# Patient Record
Sex: FEMALE | Race: WHITE | Employment: UNEMPLOYED | URBAN - METROPOLITAN AREA
[De-identification: names, ages, dates, MRNs, and addresses within clinical notes are randomized per-mention and may not be internally consistent; named-entity substitution may affect disease eponyms.]

---

## 2017-10-04 LAB
AVERAGE GLUCOSE: NORMAL
HBA1C MFR BLD: 10.1 %

## 2017-11-07 ENCOUNTER — OFFICE VISIT (OUTPATIENT)
Dept: FAMILY MEDICINE CLINIC | Age: 67
End: 2017-11-07

## 2017-11-07 VITALS
DIASTOLIC BLOOD PRESSURE: 68 MMHG | HEART RATE: 66 BPM | WEIGHT: 152.4 LBS | SYSTOLIC BLOOD PRESSURE: 138 MMHG | BODY MASS INDEX: 26.02 KG/M2 | OXYGEN SATURATION: 96 % | TEMPERATURE: 95.9 F | HEIGHT: 64 IN

## 2017-11-07 DIAGNOSIS — I10 ESSENTIAL HYPERTENSION: ICD-10-CM

## 2017-11-07 DIAGNOSIS — Z79.4 TYPE 2 DIABETES MELLITUS WITH DIABETIC NEUROPATHY, WITH LONG-TERM CURRENT USE OF INSULIN (HCC): Primary | ICD-10-CM

## 2017-11-07 DIAGNOSIS — F32.A DEPRESSION, UNSPECIFIED DEPRESSION TYPE: ICD-10-CM

## 2017-11-07 DIAGNOSIS — E11.40 TYPE 2 DIABETES MELLITUS WITH DIABETIC NEUROPATHY, WITH LONG-TERM CURRENT USE OF INSULIN (HCC): Primary | ICD-10-CM

## 2017-11-07 DIAGNOSIS — E03.9 ACQUIRED HYPOTHYROIDISM: ICD-10-CM

## 2017-11-07 DIAGNOSIS — Z76.89 ESTABLISHING CARE WITH NEW DOCTOR, ENCOUNTER FOR: ICD-10-CM

## 2017-11-07 DIAGNOSIS — Z85.3 PERSONAL HISTORY OF BREAST CANCER: ICD-10-CM

## 2017-11-07 PROBLEM — E78.5 HYPERLIPIDEMIA: Status: ACTIVE | Noted: 2017-11-07

## 2017-11-07 PROBLEM — E11.9 DM (DIABETES MELLITUS) (HCC): Status: ACTIVE | Noted: 2017-11-07

## 2017-11-07 PROCEDURE — 99214 OFFICE O/P EST MOD 30 MIN: CPT | Performed by: FAMILY MEDICINE

## 2017-11-07 RX ORDER — FERROUS SULFATE 325(65) MG
325 TABLET ORAL
COMMUNITY
End: 2022-07-15

## 2017-11-07 RX ORDER — LORAZEPAM 0.5 MG/1
TABLET ORAL
Qty: 90 TABLET | Refills: 1 | Status: SHIPPED | OUTPATIENT
Start: 2017-11-07 | End: 2018-05-11 | Stop reason: SDUPTHER

## 2017-11-07 RX ORDER — METFORMIN HYDROCHLORIDE 500 MG/1
2 TABLET, EXTENDED RELEASE ORAL
COMMUNITY
End: 2019-02-05 | Stop reason: SDUPTHER

## 2017-11-07 RX ORDER — RANITIDINE 150 MG/1
150 TABLET ORAL 2 TIMES DAILY
COMMUNITY
End: 2022-07-15

## 2017-11-07 RX ORDER — INSULIN ASPART 100 [IU]/ML
INJECTION, SOLUTION INTRAVENOUS; SUBCUTANEOUS
Status: ON HOLD | COMMUNITY
Start: 2017-08-28 | End: 2019-03-23 | Stop reason: SDUPTHER

## 2017-11-07 RX ORDER — LATANOPROST 50 UG/ML
SOLUTION/ DROPS OPHTHALMIC
COMMUNITY
Start: 2017-10-30 | End: 2019-09-09

## 2017-11-07 RX ORDER — INSULIN GLARGINE 100 [IU]/ML
INJECTION, SOLUTION SUBCUTANEOUS
COMMUNITY
Start: 2017-10-30 | End: 2018-11-30 | Stop reason: ALTCHOICE

## 2017-11-07 RX ORDER — LORAZEPAM 0.5 MG/1
TABLET ORAL
COMMUNITY
Start: 2017-09-18 | End: 2017-11-07 | Stop reason: SDUPTHER

## 2017-11-07 RX ORDER — LOVASTATIN 40 MG/1
40 TABLET ORAL
COMMUNITY
Start: 2017-08-09 | End: 2017-11-07 | Stop reason: SDUPTHER

## 2017-11-07 RX ORDER — GABAPENTIN 100 MG/1
CAPSULE ORAL
COMMUNITY
Start: 2017-08-14 | End: 2017-11-07 | Stop reason: SDUPTHER

## 2017-11-07 RX ORDER — TRAMADOL HYDROCHLORIDE 50 MG/1
50 TABLET ORAL EVERY 6 HOURS PRN
COMMUNITY
End: 2018-05-14 | Stop reason: SDUPTHER

## 2017-11-07 RX ORDER — LEVOTHYROXINE SODIUM 0.07 MG/1
TABLET ORAL
COMMUNITY
Start: 2017-10-01 | End: 2018-11-30 | Stop reason: SDUPTHER

## 2017-11-07 RX ORDER — LISINOPRIL 10 MG/1
TABLET ORAL
COMMUNITY
Start: 2017-08-22 | End: 2017-11-07 | Stop reason: SDUPTHER

## 2017-11-07 RX ORDER — ASPIRIN 81 MG
TABLET, DELAYED RELEASE (ENTERIC COATED) ORAL
Status: ON HOLD | COMMUNITY
End: 2022-07-15 | Stop reason: HOSPADM

## 2017-11-07 RX ORDER — GABAPENTIN 100 MG/1
100 CAPSULE ORAL 3 TIMES DAILY
Qty: 270 CAPSULE | Refills: 1 | Status: SHIPPED | OUTPATIENT
Start: 2017-11-07 | End: 2018-05-11 | Stop reason: SDUPTHER

## 2017-11-07 RX ORDER — LOVASTATIN 40 MG/1
40 TABLET ORAL NIGHTLY
Qty: 90 TABLET | Refills: 1 | Status: SHIPPED | OUTPATIENT
Start: 2017-11-07 | End: 2018-05-11 | Stop reason: SDUPTHER

## 2017-11-07 RX ORDER — LISINOPRIL 10 MG/1
10 TABLET ORAL DAILY
Qty: 90 TABLET | Refills: 1 | Status: SHIPPED | OUTPATIENT
Start: 2017-11-07 | End: 2018-05-11 | Stop reason: SDUPTHER

## 2017-11-07 ASSESSMENT — PATIENT HEALTH QUESTIONNAIRE - PHQ9
1. LITTLE INTEREST OR PLEASURE IN DOING THINGS: 0
SUM OF ALL RESPONSES TO PHQ9 QUESTIONS 1 & 2: 1
2. FEELING DOWN, DEPRESSED OR HOPELESS: 1
SUM OF ALL RESPONSES TO PHQ QUESTIONS 1-9: 1

## 2017-11-07 ASSESSMENT — ENCOUNTER SYMPTOMS: SHORTNESS OF BREATH: 0

## 2017-11-07 NOTE — PROGRESS NOTES
Subjective:      Patient ID: Natacha Stafford is a 79 y.o. female. Prince Quiroz is here to re-establish care. She continues to see Dr. Richard Saenz for DM, lipids, and thyroid. DM/Lipids/Thyroid  DM is doing better, she is controlling it better. Thyroid is being worked on also, she expects an increase in her dose of med in 2 weeks with labs. Overall she is very confident in Dr. Richard Saenz and is accepting the DM and working with it now. Depression  Chronic. No meds. Dr. Richard Saenz tried [place], but she didn't tolerate it. She reports this is better, but still has symptoms. Review of Systems   Respiratory: Negative for shortness of breath. Cardiovascular: Negative for chest pain. Objective:   Physical Exam   Constitutional: She is oriented to person, place, and time. She appears well-developed and well-nourished. No distress. Cardiovascular: Normal rate and regular rhythm. Pulmonary/Chest: Effort normal and breath sounds normal. She has no wheezes. She has no rales. Neurological: She is alert and oriented to person, place, and time. Psychiatric: She has a normal mood and affect. Nursing note and vitals reviewed. Assessment:      1. Type 2 diabetes mellitus with diabetic neuropathy, with long-term current use of insulin ([address]     2. Acquired hypothyroidism     3. Depression, unspecified depression type     4. Essential hypertension     5. Personal history of breast cancer     6. Establishing care with new doctor, encounter for            Plan:      1 & 2. Continue excellent care with Dr. Richard Saenz. She follows up with him in about 1 month. Continue meds. Continue to work on diet and exercise. 3. This is doing better, despite not being on medications. Prince Quiroz runs a fine line of being controlled and out of control with her depressive symptoms. We will work on reducing her stress and increasing balance in her life to see if this gives her more stability in her symptom control.  She has found some relief in the Relaxing Deep Breath. Will add breath observation to that. Will also have her work on regular enjoyable exercise, mental de-stressing, and the biggest one for Jodie Montes is taking time for herself. Her symptoms do get worse in the winter so will have her look into a light/photo box or sun lamp. Breath Observation - at least 5 minutes daily. Sit in a comfortable position with your back straight and your eyes lightly closed. Keep clothing loose. Follow the contour of your breathing cycle from inhalation through exhalation, paying attention to when it changes from one to another. 4. Controlled. Continue meds. Work on diet. Work on exercise. Will work on inflammation via supplements. Start on the following vitamin regimen, each weeks builds on the previous. Week 1: Vit C 2959-6098 mg at breakfast, dinner and bedtime. Week 2: Mixed Carotene once daily - at least 25,000 IU of beta carotene. Must contain Licopene. Week 3:  Vit E 400-800 IU. Selenium 200-300 mcg. Take with the largest meal of the day. 5. 10 years disease free. She is happy with that, still has the concern in her head. Follows with Onc as directed. 6. Visit to re-establish care.      Follow up 3 months: diet, lifestyle mods, depression

## 2017-11-15 ENCOUNTER — TELEPHONE (OUTPATIENT)
Dept: FAMILY MEDICINE CLINIC | Age: 67
End: 2017-11-15

## 2017-11-15 NOTE — TELEPHONE ENCOUNTER
Patient's  called and stated the patient has been vomiting all afternoon. Patient is diabetic  And her blood sugar is 280. I spoke with Dr. Shirleen Blizzard and he wanted the patient to only do clear liquids in small amounts. Patient was instructed to do 2 tablespoons of clear liquids every 30 minutes and is she becomes not able to do that then she was informed she needed to go to the ED.  Patient's  confirmed understanding

## 2017-12-01 ENCOUNTER — OFFICE VISIT (OUTPATIENT)
Dept: FAMILY MEDICINE CLINIC | Age: 67
End: 2017-12-01

## 2017-12-01 VITALS
OXYGEN SATURATION: 98 % | DIASTOLIC BLOOD PRESSURE: 82 MMHG | WEIGHT: 149.8 LBS | BODY MASS INDEX: 25.71 KG/M2 | HEART RATE: 106 BPM | SYSTOLIC BLOOD PRESSURE: 148 MMHG | TEMPERATURE: 96.5 F

## 2017-12-01 DIAGNOSIS — J01.40 ACUTE NON-RECURRENT PANSINUSITIS: Primary | ICD-10-CM

## 2017-12-01 PROCEDURE — G8419 CALC BMI OUT NRM PARAM NOF/U: HCPCS | Performed by: FAMILY MEDICINE

## 2017-12-01 PROCEDURE — G8427 DOCREV CUR MEDS BY ELIG CLIN: HCPCS | Performed by: FAMILY MEDICINE

## 2017-12-01 PROCEDURE — 3014F SCREEN MAMMO DOC REV: CPT | Performed by: FAMILY MEDICINE

## 2017-12-01 PROCEDURE — G8484 FLU IMMUNIZE NO ADMIN: HCPCS | Performed by: FAMILY MEDICINE

## 2017-12-01 PROCEDURE — 1123F ACP DISCUSS/DSCN MKR DOCD: CPT | Performed by: FAMILY MEDICINE

## 2017-12-01 PROCEDURE — 1090F PRES/ABSN URINE INCON ASSESS: CPT | Performed by: FAMILY MEDICINE

## 2017-12-01 PROCEDURE — 3017F COLORECTAL CA SCREEN DOC REV: CPT | Performed by: FAMILY MEDICINE

## 2017-12-01 PROCEDURE — G8400 PT W/DXA NO RESULTS DOC: HCPCS | Performed by: FAMILY MEDICINE

## 2017-12-01 PROCEDURE — 4040F PNEUMOC VAC/ADMIN/RCVD: CPT | Performed by: FAMILY MEDICINE

## 2017-12-01 PROCEDURE — 99213 OFFICE O/P EST LOW 20 MIN: CPT | Performed by: FAMILY MEDICINE

## 2017-12-01 PROCEDURE — 1036F TOBACCO NON-USER: CPT | Performed by: FAMILY MEDICINE

## 2017-12-01 RX ORDER — AMOXICILLIN 500 MG/1
500 CAPSULE ORAL 2 TIMES DAILY
Qty: 20 CAPSULE | Refills: 0 | Status: SHIPPED | OUTPATIENT
Start: 2017-12-01 | End: 2017-12-11 | Stop reason: ALTCHOICE

## 2017-12-01 RX ORDER — ASCORBIC ACID 500 MG
1000 TABLET ORAL 3 TIMES DAILY
COMMUNITY
End: 2019-09-09

## 2017-12-01 RX ORDER — VITAMIN E 268 MG
800 CAPSULE ORAL DAILY
COMMUNITY
End: 2019-09-09

## 2017-12-01 RX ORDER — CALCIUM CARBONATE 500(1250)
500 TABLET ORAL DAILY
COMMUNITY
End: 2022-07-15

## 2017-12-01 RX ORDER — SELENIUM 100 MCG
300 TABLET ORAL DAILY
COMMUNITY
End: 2019-09-09

## 2017-12-01 RX ORDER — BETA-CAROTENE 7500 MCG
25000 CAPSULE ORAL DAILY
COMMUNITY
End: 2019-09-09

## 2017-12-01 ASSESSMENT — ENCOUNTER SYMPTOMS
DIARRHEA: 0
HOARSE VOICE: 1
SINUS PRESSURE: 1
COUGH: 1
VOMITING: 0

## 2017-12-08 ENCOUNTER — TELEPHONE (OUTPATIENT)
Dept: FAMILY MEDICINE CLINIC | Age: 67
End: 2017-12-08

## 2017-12-11 ENCOUNTER — TELEPHONE (OUTPATIENT)
Dept: FAMILY MEDICINE CLINIC | Age: 67
End: 2017-12-11

## 2017-12-11 RX ORDER — AZITHROMYCIN 250 MG/1
TABLET, FILM COATED ORAL
Qty: 1 PACKET | Refills: 0 | Status: SHIPPED | OUTPATIENT
Start: 2017-12-11 | End: 2017-12-21

## 2017-12-11 NOTE — TELEPHONE ENCOUNTER
Will try zithromax in its place. If she is not better after 10 more days, then she needs to contact us and be seen. Script(s) sent.

## 2017-12-11 NOTE — TELEPHONE ENCOUNTER
Pt called left a message stating she still is not feeling any better she still has the cough and drainage. Pt wanted to know if you could call in a different antibiotic or does she need to be seen again.   Please advise

## 2017-12-11 NOTE — TELEPHONE ENCOUNTER
Pt notified and wanted to know if the rx is only for 5 days or will she be on it for the whole 10 days

## 2017-12-11 NOTE — TELEPHONE ENCOUNTER
Zithromax is taken for 5 days, but stays in her body for 10 days, so she will be on the abx for 10 days.

## 2018-02-06 ENCOUNTER — OFFICE VISIT (OUTPATIENT)
Dept: FAMILY MEDICINE CLINIC | Age: 68
End: 2018-02-06

## 2018-02-06 VITALS
TEMPERATURE: 96.1 F | WEIGHT: 153.6 LBS | SYSTOLIC BLOOD PRESSURE: 136 MMHG | BODY MASS INDEX: 26.37 KG/M2 | OXYGEN SATURATION: 96 % | HEART RATE: 57 BPM | DIASTOLIC BLOOD PRESSURE: 82 MMHG

## 2018-02-06 DIAGNOSIS — E11.40 TYPE 2 DIABETES MELLITUS WITH DIABETIC NEUROPATHY, WITH LONG-TERM CURRENT USE OF INSULIN (HCC): Primary | ICD-10-CM

## 2018-02-06 DIAGNOSIS — Z79.4 TYPE 2 DIABETES MELLITUS WITH DIABETIC NEUROPATHY, WITH LONG-TERM CURRENT USE OF INSULIN (HCC): Primary | ICD-10-CM

## 2018-02-06 DIAGNOSIS — F32.A DEPRESSION, UNSPECIFIED DEPRESSION TYPE: ICD-10-CM

## 2018-02-06 PROCEDURE — G8484 FLU IMMUNIZE NO ADMIN: HCPCS | Performed by: FAMILY MEDICINE

## 2018-02-06 PROCEDURE — G8427 DOCREV CUR MEDS BY ELIG CLIN: HCPCS | Performed by: FAMILY MEDICINE

## 2018-02-06 PROCEDURE — 3014F SCREEN MAMMO DOC REV: CPT | Performed by: FAMILY MEDICINE

## 2018-02-06 PROCEDURE — 99214 OFFICE O/P EST MOD 30 MIN: CPT | Performed by: FAMILY MEDICINE

## 2018-02-06 PROCEDURE — G8400 PT W/DXA NO RESULTS DOC: HCPCS | Performed by: FAMILY MEDICINE

## 2018-02-06 PROCEDURE — 3017F COLORECTAL CA SCREEN DOC REV: CPT | Performed by: FAMILY MEDICINE

## 2018-02-06 PROCEDURE — G8419 CALC BMI OUT NRM PARAM NOF/U: HCPCS | Performed by: FAMILY MEDICINE

## 2018-02-06 PROCEDURE — 1090F PRES/ABSN URINE INCON ASSESS: CPT | Performed by: FAMILY MEDICINE

## 2018-02-06 PROCEDURE — 1123F ACP DISCUSS/DSCN MKR DOCD: CPT | Performed by: FAMILY MEDICINE

## 2018-02-06 PROCEDURE — 1036F TOBACCO NON-USER: CPT | Performed by: FAMILY MEDICINE

## 2018-02-06 PROCEDURE — 3046F HEMOGLOBIN A1C LEVEL >9.0%: CPT | Performed by: FAMILY MEDICINE

## 2018-02-06 PROCEDURE — 4040F PNEUMOC VAC/ADMIN/RCVD: CPT | Performed by: FAMILY MEDICINE

## 2018-02-06 ASSESSMENT — ENCOUNTER SYMPTOMS: SHORTNESS OF BREATH: 0

## 2018-02-28 LAB
ALBUMIN SERPL-MCNC: 4.5 G/DL
ALP BLD-CCNC: 59 U/L
ALT SERPL-CCNC: 32 U/L
ANION GAP SERPL CALCULATED.3IONS-SCNC: 1.3 MMOL/L
AST SERPL-CCNC: 34 U/L
BASOPHILS ABSOLUTE: 0 /ΜL
BASOPHILS RELATIVE PERCENT: 0.4 %
BILIRUB SERPL-MCNC: 0.3 MG/DL (ref 0.1–1.4)
BUN BLDV-MCNC: 19 MG/DL
CALCIUM SERPL-MCNC: 10 MG/DL
CHLORIDE BLD-SCNC: 98 MMOL/L
CHOLESTEROL, TOTAL: 156 MG/DL
CHOLESTEROL/HDL RATIO: NORMAL
CO2: 28 MMOL/L
CREAT SERPL-MCNC: 1.3 MG/DL
CREATININE, URINE: NORMAL
EOSINOPHILS ABSOLUTE: 0.1 /ΜL
EOSINOPHILS RELATIVE PERCENT: 1.6 %
GFR CALCULATED: 43
GLUCOSE BLD-MCNC: 28 MG/DL
HCT VFR BLD CALC: 35.4 % (ref 36–46)
HDLC SERPL-MCNC: 48 MG/DL (ref 35–70)
HEMOGLOBIN: 11.8 G/DL (ref 12–16)
LDL CHOLESTEROL CALCULATED: 72 MG/DL (ref 0–160)
LYMPHOCYTES ABSOLUTE: 1.7 /ΜL
LYMPHOCYTES RELATIVE PERCENT: 22.8 %
MCH RBC QN AUTO: 31.5 PG
MCHC RBC AUTO-ENTMCNC: 33.4 G/DL
MCV RBC AUTO: 94.3 FL
MICROALBUMIN/CREAT 24H UR: NORMAL MG/G{CREAT}
MICROALBUMIN/CREAT UR-RTO: NORMAL
MONOCYTES ABSOLUTE: 0.4 /ΜL
MONOCYTES RELATIVE PERCENT: 5.6 %
NEUTROPHILS ABSOLUTE: 5.3 /ΜL
NEUTROPHILS RELATIVE PERCENT: 69.6 %
PLATELET # BLD: 222 K/ΜL
PMV BLD AUTO: ABNORMAL FL
POTASSIUM SERPL-SCNC: 4.4 MMOL/L
RBC # BLD: 3.76 10^6/ΜL
SODIUM BLD-SCNC: 141 MMOL/L
T4 FREE: 1.58
TOTAL PROTEIN: 7.9
TRIGL SERPL-MCNC: 178 MG/DL
VLDLC SERPL CALC-MCNC: 36 MG/DL
WBC # BLD: 7.6 10^3/ML

## 2018-05-11 ENCOUNTER — OFFICE VISIT (OUTPATIENT)
Dept: FAMILY MEDICINE CLINIC | Age: 68
End: 2018-05-11

## 2018-05-11 VITALS
BODY MASS INDEX: 25.1 KG/M2 | DIASTOLIC BLOOD PRESSURE: 76 MMHG | WEIGHT: 146.2 LBS | TEMPERATURE: 97.6 F | OXYGEN SATURATION: 99 % | SYSTOLIC BLOOD PRESSURE: 142 MMHG | HEART RATE: 98 BPM

## 2018-05-11 DIAGNOSIS — I10 ESSENTIAL HYPERTENSION: Primary | ICD-10-CM

## 2018-05-11 DIAGNOSIS — F32.A DEPRESSION, UNSPECIFIED DEPRESSION TYPE: ICD-10-CM

## 2018-05-11 DIAGNOSIS — E78.2 MIXED HYPERLIPIDEMIA: ICD-10-CM

## 2018-05-11 DIAGNOSIS — E03.9 ACQUIRED HYPOTHYROIDISM: ICD-10-CM

## 2018-05-11 DIAGNOSIS — E11.40 TYPE 2 DIABETES MELLITUS WITH DIABETIC NEUROPATHY, WITH LONG-TERM CURRENT USE OF INSULIN (HCC): ICD-10-CM

## 2018-05-11 DIAGNOSIS — Z79.4 TYPE 2 DIABETES MELLITUS WITH DIABETIC NEUROPATHY, WITH LONG-TERM CURRENT USE OF INSULIN (HCC): ICD-10-CM

## 2018-05-11 PROCEDURE — 2022F DILAT RTA XM EVC RTNOPTHY: CPT | Performed by: FAMILY MEDICINE

## 2018-05-11 PROCEDURE — G8400 PT W/DXA NO RESULTS DOC: HCPCS | Performed by: FAMILY MEDICINE

## 2018-05-11 PROCEDURE — G8428 CUR MEDS NOT DOCUMENT: HCPCS | Performed by: FAMILY MEDICINE

## 2018-05-11 PROCEDURE — 1090F PRES/ABSN URINE INCON ASSESS: CPT | Performed by: FAMILY MEDICINE

## 2018-05-11 PROCEDURE — 3017F COLORECTAL CA SCREEN DOC REV: CPT | Performed by: FAMILY MEDICINE

## 2018-05-11 PROCEDURE — 3046F HEMOGLOBIN A1C LEVEL >9.0%: CPT | Performed by: FAMILY MEDICINE

## 2018-05-11 PROCEDURE — 4040F PNEUMOC VAC/ADMIN/RCVD: CPT | Performed by: FAMILY MEDICINE

## 2018-05-11 PROCEDURE — 99214 OFFICE O/P EST MOD 30 MIN: CPT | Performed by: FAMILY MEDICINE

## 2018-05-11 PROCEDURE — 1123F ACP DISCUSS/DSCN MKR DOCD: CPT | Performed by: FAMILY MEDICINE

## 2018-05-11 PROCEDURE — G8419 CALC BMI OUT NRM PARAM NOF/U: HCPCS | Performed by: FAMILY MEDICINE

## 2018-05-11 PROCEDURE — 1036F TOBACCO NON-USER: CPT | Performed by: FAMILY MEDICINE

## 2018-05-14 DIAGNOSIS — E11.42 DIABETIC POLYNEUROPATHY ASSOCIATED WITH TYPE 2 DIABETES MELLITUS (HCC): Primary | ICD-10-CM

## 2018-05-14 RX ORDER — TRAMADOL HYDROCHLORIDE 50 MG/1
50 TABLET ORAL EVERY 6 HOURS PRN
Qty: 50 TABLET | Refills: 0 | Status: SHIPPED | OUTPATIENT
Start: 2018-05-14 | End: 2018-07-13

## 2018-05-14 RX ORDER — LOVASTATIN 40 MG/1
40 TABLET ORAL NIGHTLY
Qty: 90 TABLET | Refills: 1 | Status: SHIPPED | OUTPATIENT
Start: 2018-05-14 | End: 2018-11-05 | Stop reason: SDUPTHER

## 2018-05-14 RX ORDER — GABAPENTIN 100 MG/1
100 CAPSULE ORAL 3 TIMES DAILY
Qty: 270 CAPSULE | Refills: 1 | Status: SHIPPED | OUTPATIENT
Start: 2018-05-14 | End: 2018-11-05 | Stop reason: SDUPTHER

## 2018-05-14 RX ORDER — LISINOPRIL 10 MG/1
10 TABLET ORAL DAILY
Qty: 90 TABLET | Refills: 1 | Status: SHIPPED | OUTPATIENT
Start: 2018-05-14 | End: 2018-11-30 | Stop reason: SDUPTHER

## 2018-05-14 RX ORDER — LORAZEPAM 0.5 MG/1
TABLET ORAL
Qty: 90 TABLET | Refills: 1 | Status: SHIPPED | OUTPATIENT
Start: 2018-05-14 | End: 2018-11-10

## 2018-05-14 ASSESSMENT — ENCOUNTER SYMPTOMS
COUGH: 0
SHORTNESS OF BREATH: 0

## 2018-07-03 ENCOUNTER — OFFICE VISIT (OUTPATIENT)
Dept: FAMILY MEDICINE CLINIC | Age: 68
End: 2018-07-03

## 2018-07-03 VITALS
SYSTOLIC BLOOD PRESSURE: 162 MMHG | TEMPERATURE: 96.5 F | WEIGHT: 147.2 LBS | HEART RATE: 100 BPM | BODY MASS INDEX: 25.27 KG/M2 | OXYGEN SATURATION: 98 % | DIASTOLIC BLOOD PRESSURE: 90 MMHG

## 2018-07-03 DIAGNOSIS — K62.89 PERIANAL PAIN: ICD-10-CM

## 2018-07-03 DIAGNOSIS — R22.9 PERINEAL LUMP: Primary | ICD-10-CM

## 2018-07-03 PROCEDURE — 1123F ACP DISCUSS/DSCN MKR DOCD: CPT | Performed by: FAMILY MEDICINE

## 2018-07-03 PROCEDURE — 99213 OFFICE O/P EST LOW 20 MIN: CPT | Performed by: FAMILY MEDICINE

## 2018-07-03 PROCEDURE — 3017F COLORECTAL CA SCREEN DOC REV: CPT | Performed by: FAMILY MEDICINE

## 2018-07-03 PROCEDURE — G8419 CALC BMI OUT NRM PARAM NOF/U: HCPCS | Performed by: FAMILY MEDICINE

## 2018-07-03 PROCEDURE — 1090F PRES/ABSN URINE INCON ASSESS: CPT | Performed by: FAMILY MEDICINE

## 2018-07-03 PROCEDURE — 1036F TOBACCO NON-USER: CPT | Performed by: FAMILY MEDICINE

## 2018-07-03 PROCEDURE — G8428 CUR MEDS NOT DOCUMENT: HCPCS | Performed by: FAMILY MEDICINE

## 2018-07-03 PROCEDURE — G8400 PT W/DXA NO RESULTS DOC: HCPCS | Performed by: FAMILY MEDICINE

## 2018-07-03 PROCEDURE — 4040F PNEUMOC VAC/ADMIN/RCVD: CPT | Performed by: FAMILY MEDICINE

## 2018-07-03 NOTE — PROGRESS NOTES
Subjective:      Patient ID: Sebastian Bajwa is a 76 y.o. female. Taisha Rebollar is here with concerns about a bug bite. Labial pain  Right labia pain for the last week. No injury. She thinks she may have been bitten by a bug. She has been placing neosporin on it with minimal improvement. She has only had 2 drops of bloody drainage. It is not getting bigger, hurting more or changing much. No fevers. Review of Systems   Constitutional: Negative for fever. Past Medical History:   Diagnosis Date    [facility]     Diabetes mellitus ([facility]     Glaucoma     Gout     Hyperlipidemia     Hypertension     [facility]     Thyroid disease      Past Surgical History:   Procedure Laterality Date     SECTION      MASTECTOMY, BILATERAL  10/26/2007     Social History     Social History    Marital status:      Spouse name: N/A    Number of children: N/A    Years of education: N/A     Occupational History    Not on file. Social History Main Topics    Smoking status: Never Smoker    Smokeless tobacco: Never Used    Alcohol use No    Drug use: No    Sexual activity: Not on file     Other Topics Concern    Not on file     Social History Narrative    No narrative on file       Current Outpatient Prescriptions:     lisinopril (PRINIVIL;ZESTRIL) 10 MG tablet, Take 1 tablet by mouth daily, Disp: 90 tablet, Rfl: 1    gabapentin (NEURONTIN) 100 MG capsule, Take 1 capsule by mouth 3 times daily for 180 days. ., Disp: 270 capsule, Rfl: 1    LORazepam (ATIVAN) 0.5 MG tablet, Take every night and one other time a day if needed. Indications: anxiety. , Disp: 90 tablet, Rfl: 1    lovastatin (MEVACOR) 40 MG tablet, Take 1 tablet by mouth nightly, Disp: 90 tablet, Rfl: 1    traMADol (ULTRAM) 50 MG tablet, Take 1 tablet by mouth every 6 hours as needed for Pain for up to 60 days. ., Disp: 50 tablet, Rfl: 0    vitamin C (ASCORBIC ACID) 500 MG tablet, Take 1,000 mg by mouth 3 times daily, Disp: ,

## 2018-11-05 DIAGNOSIS — E11.40 TYPE 2 DIABETES MELLITUS WITH DIABETIC NEUROPATHY, WITH LONG-TERM CURRENT USE OF INSULIN (HCC): ICD-10-CM

## 2018-11-05 DIAGNOSIS — Z79.4 TYPE 2 DIABETES MELLITUS WITH DIABETIC NEUROPATHY, WITH LONG-TERM CURRENT USE OF INSULIN (HCC): ICD-10-CM

## 2018-11-05 DIAGNOSIS — E78.2 MIXED HYPERLIPIDEMIA: ICD-10-CM

## 2018-11-05 RX ORDER — LOVASTATIN 40 MG/1
40 TABLET ORAL NIGHTLY
Qty: 90 TABLET | Refills: 1 | Status: SHIPPED | OUTPATIENT
Start: 2018-11-05 | End: 2019-05-07 | Stop reason: SDUPTHER

## 2018-11-05 RX ORDER — GABAPENTIN 100 MG/1
100 CAPSULE ORAL 3 TIMES DAILY
Qty: 270 CAPSULE | Refills: 1 | Status: SHIPPED | OUTPATIENT
Start: 2018-11-05 | End: 2019-05-07 | Stop reason: SDUPTHER

## 2018-11-05 NOTE — TELEPHONE ENCOUNTER
Pt called state she  Will be out of her gabapentin and lovastatin  Before her appointment and wanted to know if you could send in a refill.

## 2018-11-30 ENCOUNTER — OFFICE VISIT (OUTPATIENT)
Dept: FAMILY MEDICINE CLINIC | Age: 68
End: 2018-11-30
Payer: MEDICARE

## 2018-11-30 VITALS
TEMPERATURE: 98.1 F | BODY MASS INDEX: 26.02 KG/M2 | HEART RATE: 119 BPM | HEIGHT: 64 IN | OXYGEN SATURATION: 98 % | SYSTOLIC BLOOD PRESSURE: 160 MMHG | WEIGHT: 152.4 LBS | DIASTOLIC BLOOD PRESSURE: 86 MMHG

## 2018-11-30 DIAGNOSIS — E11.42 DIABETIC POLYNEUROPATHY ASSOCIATED WITH TYPE 2 DIABETES MELLITUS (HCC): ICD-10-CM

## 2018-11-30 DIAGNOSIS — E11.40 TYPE 2 DIABETES MELLITUS WITH DIABETIC NEUROPATHY, WITH LONG-TERM CURRENT USE OF INSULIN (HCC): Primary | ICD-10-CM

## 2018-11-30 DIAGNOSIS — F32.A DEPRESSION, UNSPECIFIED DEPRESSION TYPE: ICD-10-CM

## 2018-11-30 DIAGNOSIS — Z79.4 TYPE 2 DIABETES MELLITUS WITH DIABETIC NEUROPATHY, WITH LONG-TERM CURRENT USE OF INSULIN (HCC): Primary | ICD-10-CM

## 2018-11-30 DIAGNOSIS — I10 ESSENTIAL HYPERTENSION: ICD-10-CM

## 2018-11-30 DIAGNOSIS — E78.2 MIXED HYPERLIPIDEMIA: ICD-10-CM

## 2018-11-30 DIAGNOSIS — E03.9 ACQUIRED HYPOTHYROIDISM: ICD-10-CM

## 2018-11-30 LAB — HBA1C MFR BLD: 10.3 %

## 2018-11-30 PROCEDURE — 1090F PRES/ABSN URINE INCON ASSESS: CPT | Performed by: FAMILY MEDICINE

## 2018-11-30 PROCEDURE — G8400 PT W/DXA NO RESULTS DOC: HCPCS | Performed by: FAMILY MEDICINE

## 2018-11-30 PROCEDURE — 3046F HEMOGLOBIN A1C LEVEL >9.0%: CPT | Performed by: FAMILY MEDICINE

## 2018-11-30 PROCEDURE — 3017F COLORECTAL CA SCREEN DOC REV: CPT | Performed by: FAMILY MEDICINE

## 2018-11-30 PROCEDURE — 99214 OFFICE O/P EST MOD 30 MIN: CPT | Performed by: FAMILY MEDICINE

## 2018-11-30 PROCEDURE — 2022F DILAT RTA XM EVC RTNOPTHY: CPT | Performed by: FAMILY MEDICINE

## 2018-11-30 PROCEDURE — 83036 HEMOGLOBIN GLYCOSYLATED A1C: CPT | Performed by: FAMILY MEDICINE

## 2018-11-30 PROCEDURE — 1036F TOBACCO NON-USER: CPT | Performed by: FAMILY MEDICINE

## 2018-11-30 PROCEDURE — G8427 DOCREV CUR MEDS BY ELIG CLIN: HCPCS | Performed by: FAMILY MEDICINE

## 2018-11-30 PROCEDURE — 1123F ACP DISCUSS/DSCN MKR DOCD: CPT | Performed by: FAMILY MEDICINE

## 2018-11-30 PROCEDURE — G8419 CALC BMI OUT NRM PARAM NOF/U: HCPCS | Performed by: FAMILY MEDICINE

## 2018-11-30 PROCEDURE — 4040F PNEUMOC VAC/ADMIN/RCVD: CPT | Performed by: FAMILY MEDICINE

## 2018-11-30 PROCEDURE — G8484 FLU IMMUNIZE NO ADMIN: HCPCS | Performed by: FAMILY MEDICINE

## 2018-11-30 PROCEDURE — 1101F PT FALLS ASSESS-DOCD LE1/YR: CPT | Performed by: FAMILY MEDICINE

## 2018-11-30 RX ORDER — TRAMADOL HYDROCHLORIDE 50 MG/1
50 TABLET ORAL EVERY 6 HOURS PRN
Qty: 50 TABLET | Refills: 0 | Status: SHIPPED | OUTPATIENT
Start: 2018-11-30 | End: 2019-01-29

## 2018-11-30 RX ORDER — LISINOPRIL 10 MG/1
10 TABLET ORAL DAILY
Qty: 90 TABLET | Refills: 1 | Status: SHIPPED | OUTPATIENT
Start: 2018-11-30 | End: 2019-05-07 | Stop reason: SDUPTHER

## 2018-11-30 RX ORDER — ATOMOXETINE 40 MG/1
40 CAPSULE ORAL DAILY
Qty: 30 CAPSULE | Refills: 1 | Status: SHIPPED | OUTPATIENT
Start: 2018-11-30 | End: 2019-04-03 | Stop reason: ALTCHOICE

## 2018-11-30 RX ORDER — LEVOTHYROXINE SODIUM 88 UG/1
88 TABLET ORAL DAILY
Qty: 90 TABLET | Refills: 1 | Status: SHIPPED | OUTPATIENT
Start: 2018-11-30 | End: 2019-05-07 | Stop reason: SDUPTHER

## 2018-11-30 RX ORDER — LORAZEPAM 0.5 MG/1
TABLET ORAL
Qty: 90 TABLET | Refills: 1 | Status: SHIPPED | OUTPATIENT
Start: 2018-11-30 | End: 2019-05-07 | Stop reason: SDUPTHER

## 2018-11-30 ASSESSMENT — PATIENT HEALTH QUESTIONNAIRE - PHQ9
SUM OF ALL RESPONSES TO PHQ9 QUESTIONS 1 & 2: 0
SUM OF ALL RESPONSES TO PHQ QUESTIONS 1-9: 0
1. LITTLE INTEREST OR PLEASURE IN DOING THINGS: 0
SUM OF ALL RESPONSES TO PHQ QUESTIONS 1-9: 0
2. FEELING DOWN, DEPRESSED OR HOPELESS: 0

## 2018-11-30 ASSESSMENT — ENCOUNTER SYMPTOMS: SHORTNESS OF BREATH: 0

## 2018-12-03 ENCOUNTER — TELEPHONE (OUTPATIENT)
Dept: FAMILY MEDICINE CLINIC | Age: 68
End: 2018-12-03

## 2018-12-03 LAB
ALBUMIN SERPL-MCNC: 5.1 G/DL
ALP BLD-CCNC: 58 U/L
ALT SERPL-CCNC: 31 U/L
ANION GAP SERPL CALCULATED.3IONS-SCNC: 1.9 MMOL/L
AST SERPL-CCNC: 27 U/L
BILIRUB SERPL-MCNC: 0.3 MG/DL (ref 0.1–1.4)
BILIRUBIN, URINE: NEGATIVE
BLOOD, URINE: NEGATIVE
BUN BLDV-MCNC: 21 MG/DL
CALCIUM SERPL-MCNC: 10.3 MG/DL
CHLORIDE BLD-SCNC: 96 MMOL/L
CHOLESTEROL, TOTAL: 182 MG/DL
CHOLESTEROL/HDL RATIO: NORMAL
CLARITY: CLEAR
CO2: 27 MMOL/L
COLOR: YELLOW
CREAT SERPL-MCNC: 1.2 MG/DL
CREATININE, URINE: 159.9
GFR CALCULATED: 47
GLUCOSE BLD-MCNC: 223 MG/DL
GLUCOSE URINE: NORMAL
HDLC SERPL-MCNC: 50 MG/DL (ref 35–70)
KETONES, URINE: NEGATIVE
LDL CHOLESTEROL CALCULATED: 89 MG/DL (ref 0–160)
LEUKOCYTE ESTERASE, URINE: POSITIVE
MICROALBUMIN/CREAT 24H UR: 4050 MG/G{CREAT}
MICROALBUMIN/CREAT UR-RTO: 25
NITRITE, URINE: NEGATIVE
PH UA: 6 (ref 4.5–8)
POTASSIUM SERPL-SCNC: 4.8 MMOL/L
PROTEIN UA: NORMAL
SODIUM BLD-SCNC: 136 MMOL/L
SPECIFIC GRAVITY, URINE: 1.02
T4 FREE: 1.69
TOTAL PROTEIN: 7.8
TRIGL SERPL-MCNC: 217 MG/DL
TSH SERPL DL<=0.05 MIU/L-ACNC: 1.23 UIU/ML
UROBILINOGEN, URINE: NORMAL
VLDLC SERPL CALC-MCNC: 43 MG/DL

## 2019-02-01 ENCOUNTER — OFFICE VISIT (OUTPATIENT)
Dept: FAMILY MEDICINE CLINIC | Age: 69
End: 2019-02-01
Payer: MEDICARE

## 2019-02-01 VITALS
BODY MASS INDEX: 25.64 KG/M2 | TEMPERATURE: 98.4 F | OXYGEN SATURATION: 97 % | SYSTOLIC BLOOD PRESSURE: 122 MMHG | WEIGHT: 149.4 LBS | DIASTOLIC BLOOD PRESSURE: 72 MMHG | HEART RATE: 112 BPM

## 2019-02-01 DIAGNOSIS — R09.81 SINUS CONGESTION: Primary | ICD-10-CM

## 2019-02-01 DIAGNOSIS — R00.0 TACHYCARDIA: ICD-10-CM

## 2019-02-01 PROCEDURE — 4040F PNEUMOC VAC/ADMIN/RCVD: CPT | Performed by: FAMILY MEDICINE

## 2019-02-01 PROCEDURE — G8419 CALC BMI OUT NRM PARAM NOF/U: HCPCS | Performed by: FAMILY MEDICINE

## 2019-02-01 PROCEDURE — 1101F PT FALLS ASSESS-DOCD LE1/YR: CPT | Performed by: FAMILY MEDICINE

## 2019-02-01 PROCEDURE — 1123F ACP DISCUSS/DSCN MKR DOCD: CPT | Performed by: FAMILY MEDICINE

## 2019-02-01 PROCEDURE — 99213 OFFICE O/P EST LOW 20 MIN: CPT | Performed by: FAMILY MEDICINE

## 2019-02-01 PROCEDURE — 3017F COLORECTAL CA SCREEN DOC REV: CPT | Performed by: FAMILY MEDICINE

## 2019-02-01 PROCEDURE — G8427 DOCREV CUR MEDS BY ELIG CLIN: HCPCS | Performed by: FAMILY MEDICINE

## 2019-02-01 PROCEDURE — G8400 PT W/DXA NO RESULTS DOC: HCPCS | Performed by: FAMILY MEDICINE

## 2019-02-01 PROCEDURE — G8484 FLU IMMUNIZE NO ADMIN: HCPCS | Performed by: FAMILY MEDICINE

## 2019-02-01 PROCEDURE — 1036F TOBACCO NON-USER: CPT | Performed by: FAMILY MEDICINE

## 2019-02-01 PROCEDURE — 1090F PRES/ABSN URINE INCON ASSESS: CPT | Performed by: FAMILY MEDICINE

## 2019-02-01 RX ORDER — DEXTROMETHORPHAN HYDROBROMIDE AND PROMETHAZINE HYDROCHLORIDE 15; 6.25 MG/5ML; MG/5ML
5 SYRUP ORAL 4 TIMES DAILY PRN
Qty: 180 ML | Refills: 0 | Status: SHIPPED | OUTPATIENT
Start: 2019-02-01 | End: 2019-02-08

## 2019-02-01 ASSESSMENT — ENCOUNTER SYMPTOMS
SORE THROAT: 0
SINUS PRESSURE: 1
DIARRHEA: 1
VOMITING: 1
NAUSEA: 1
SINUS COMPLAINT: 1
CHEST TIGHTNESS: 0
RHINORRHEA: 1
SHORTNESS OF BREATH: 0
COUGH: 1
HOARSE VOICE: 1

## 2019-02-05 ENCOUNTER — OFFICE VISIT (OUTPATIENT)
Dept: FAMILY MEDICINE CLINIC | Age: 69
End: 2019-02-05
Payer: MEDICARE

## 2019-02-05 VITALS
BODY MASS INDEX: 25.92 KG/M2 | TEMPERATURE: 98.4 F | WEIGHT: 151 LBS | OXYGEN SATURATION: 98 % | DIASTOLIC BLOOD PRESSURE: 72 MMHG | SYSTOLIC BLOOD PRESSURE: 132 MMHG | HEART RATE: 107 BPM

## 2019-02-05 DIAGNOSIS — Z79.4 TYPE 2 DIABETES MELLITUS WITH DIABETIC NEUROPATHY, WITH LONG-TERM CURRENT USE OF INSULIN (HCC): Primary | ICD-10-CM

## 2019-02-05 DIAGNOSIS — E11.40 TYPE 2 DIABETES MELLITUS WITH DIABETIC NEUROPATHY, WITH LONG-TERM CURRENT USE OF INSULIN (HCC): Primary | ICD-10-CM

## 2019-02-05 DIAGNOSIS — Z71.3 DIETARY COUNSELING: ICD-10-CM

## 2019-02-05 PROCEDURE — G8400 PT W/DXA NO RESULTS DOC: HCPCS | Performed by: FAMILY MEDICINE

## 2019-02-05 PROCEDURE — G8427 DOCREV CUR MEDS BY ELIG CLIN: HCPCS | Performed by: FAMILY MEDICINE

## 2019-02-05 PROCEDURE — G8484 FLU IMMUNIZE NO ADMIN: HCPCS | Performed by: FAMILY MEDICINE

## 2019-02-05 PROCEDURE — G8419 CALC BMI OUT NRM PARAM NOF/U: HCPCS | Performed by: FAMILY MEDICINE

## 2019-02-05 PROCEDURE — 1036F TOBACCO NON-USER: CPT | Performed by: FAMILY MEDICINE

## 2019-02-05 PROCEDURE — 1101F PT FALLS ASSESS-DOCD LE1/YR: CPT | Performed by: FAMILY MEDICINE

## 2019-02-05 PROCEDURE — 4040F PNEUMOC VAC/ADMIN/RCVD: CPT | Performed by: FAMILY MEDICINE

## 2019-02-05 PROCEDURE — 1090F PRES/ABSN URINE INCON ASSESS: CPT | Performed by: FAMILY MEDICINE

## 2019-02-05 PROCEDURE — 99214 OFFICE O/P EST MOD 30 MIN: CPT | Performed by: FAMILY MEDICINE

## 2019-02-05 PROCEDURE — 2022F DILAT RTA XM EVC RTNOPTHY: CPT | Performed by: FAMILY MEDICINE

## 2019-02-05 PROCEDURE — 3017F COLORECTAL CA SCREEN DOC REV: CPT | Performed by: FAMILY MEDICINE

## 2019-02-05 PROCEDURE — 3046F HEMOGLOBIN A1C LEVEL >9.0%: CPT | Performed by: FAMILY MEDICINE

## 2019-02-05 PROCEDURE — 83036 HEMOGLOBIN GLYCOSYLATED A1C: CPT | Performed by: FAMILY MEDICINE

## 2019-02-05 PROCEDURE — 1123F ACP DISCUSS/DSCN MKR DOCD: CPT | Performed by: FAMILY MEDICINE

## 2019-02-05 RX ORDER — BRIMONIDINE TARTRATE 2 MG/ML
1 SOLUTION/ DROPS OPHTHALMIC EVERY 8 HOURS
Status: ON HOLD | COMMUNITY
End: 2019-03-21

## 2019-02-06 LAB — HBA1C MFR BLD: 9 %

## 2019-02-06 RX ORDER — METFORMIN HYDROCHLORIDE 500 MG/1
1000 TABLET, EXTENDED RELEASE ORAL 2 TIMES DAILY
Qty: 360 TABLET | Refills: 0 | Status: ON HOLD | OUTPATIENT
Start: 2019-02-06 | End: 2019-03-23 | Stop reason: SDUPTHER

## 2019-02-06 ASSESSMENT — ENCOUNTER SYMPTOMS: SHORTNESS OF BREATH: 0

## 2019-03-20 ENCOUNTER — TELEPHONE (OUTPATIENT)
Dept: FAMILY MEDICINE CLINIC | Age: 69
End: 2019-03-20

## 2019-03-20 ENCOUNTER — OFFICE VISIT (OUTPATIENT)
Dept: FAMILY MEDICINE CLINIC | Age: 69
End: 2019-03-20
Payer: MEDICARE

## 2019-03-20 VITALS
DIASTOLIC BLOOD PRESSURE: 90 MMHG | HEART RATE: 103 BPM | BODY MASS INDEX: 24.96 KG/M2 | SYSTOLIC BLOOD PRESSURE: 144 MMHG | OXYGEN SATURATION: 97 % | WEIGHT: 145.4 LBS | TEMPERATURE: 96.6 F

## 2019-03-20 DIAGNOSIS — R11.2 NON-INTRACTABLE VOMITING WITH NAUSEA, UNSPECIFIED VOMITING TYPE: ICD-10-CM

## 2019-03-20 DIAGNOSIS — R05.9 COUGH: ICD-10-CM

## 2019-03-20 DIAGNOSIS — R10.84 GENERALIZED ABDOMINAL PAIN: ICD-10-CM

## 2019-03-20 DIAGNOSIS — J34.89 NASAL DRAINAGE: ICD-10-CM

## 2019-03-20 DIAGNOSIS — J34.89 NASAL DRAINAGE: Primary | ICD-10-CM

## 2019-03-20 LAB
HAV IGM SER IA-ACNC: NORMAL
HEPATITIS B CORE IGM ANTIBODY: NORMAL
HEPATITIS B SURFACE ANTIGEN INTERPRETATION: NORMAL
HEPATITIS C ANTIBODY INTERPRETATION: NORMAL

## 2019-03-20 PROCEDURE — G8420 CALC BMI NORM PARAMETERS: HCPCS | Performed by: FAMILY MEDICINE

## 2019-03-20 PROCEDURE — G8484 FLU IMMUNIZE NO ADMIN: HCPCS | Performed by: FAMILY MEDICINE

## 2019-03-20 PROCEDURE — 4040F PNEUMOC VAC/ADMIN/RCVD: CPT | Performed by: FAMILY MEDICINE

## 2019-03-20 PROCEDURE — 1101F PT FALLS ASSESS-DOCD LE1/YR: CPT | Performed by: FAMILY MEDICINE

## 2019-03-20 PROCEDURE — 36415 COLL VENOUS BLD VENIPUNCTURE: CPT | Performed by: FAMILY MEDICINE

## 2019-03-20 PROCEDURE — 1036F TOBACCO NON-USER: CPT | Performed by: FAMILY MEDICINE

## 2019-03-20 PROCEDURE — 1090F PRES/ABSN URINE INCON ASSESS: CPT | Performed by: FAMILY MEDICINE

## 2019-03-20 PROCEDURE — 1123F ACP DISCUSS/DSCN MKR DOCD: CPT | Performed by: FAMILY MEDICINE

## 2019-03-20 PROCEDURE — 99213 OFFICE O/P EST LOW 20 MIN: CPT | Performed by: FAMILY MEDICINE

## 2019-03-20 PROCEDURE — 3017F COLORECTAL CA SCREEN DOC REV: CPT | Performed by: FAMILY MEDICINE

## 2019-03-20 PROCEDURE — G8400 PT W/DXA NO RESULTS DOC: HCPCS | Performed by: FAMILY MEDICINE

## 2019-03-20 PROCEDURE — G8427 DOCREV CUR MEDS BY ELIG CLIN: HCPCS | Performed by: FAMILY MEDICINE

## 2019-03-20 RX ORDER — LANCETS 33 GAUGE
EACH MISCELLANEOUS
Qty: 100 EACH | Refills: 5 | Status: ON HOLD | OUTPATIENT
Start: 2019-03-20 | End: 2019-03-22 | Stop reason: SDUPTHER

## 2019-03-20 RX ORDER — BROMPHENIRAMINE MALEATE, PSEUDOEPHEDRINE HYDROCHLORIDE, AND DEXTROMETHORPHAN HYDROBROMIDE 2; 30; 10 MG/5ML; MG/5ML; MG/5ML
5 SYRUP ORAL 4 TIMES DAILY PRN
Qty: 1 BOTTLE | Refills: 0 | Status: SHIPPED | OUTPATIENT
Start: 2019-03-20 | End: 2019-12-17

## 2019-03-20 RX ORDER — LANCETS 33 GAUGE
EACH MISCELLANEOUS
COMMUNITY
End: 2019-03-20 | Stop reason: SDUPTHER

## 2019-03-20 RX ORDER — ONDANSETRON 4 MG/1
4 TABLET, FILM COATED ORAL 3 TIMES DAILY PRN
Qty: 30 TABLET | Refills: 0 | Status: SHIPPED | OUTPATIENT
Start: 2019-03-20 | End: 2019-07-24

## 2019-03-20 RX ORDER — BROMPHENIRAMINE MALEATE, PSEUDOEPHEDRINE HYDROCHLORIDE, AND DEXTROMETHORPHAN HYDROBROMIDE 2; 30; 10 MG/5ML; MG/5ML; MG/5ML
5 SYRUP ORAL 4 TIMES DAILY PRN
Qty: 1 BOTTLE | Refills: 0 | COMMUNITY
Start: 2019-03-20 | End: 2019-03-20 | Stop reason: SDUPTHER

## 2019-03-20 ASSESSMENT — ENCOUNTER SYMPTOMS
SINUS PRESSURE: 1
SORE THROAT: 0
RHINORRHEA: 1
ABDOMINAL PAIN: 1
NAUSEA: 1
VOMITING: 1
BLOOD IN STOOL: 0
CONSTIPATION: 0
SHORTNESS OF BREATH: 0
DIARRHEA: 1
COUGH: 1
SINUS PAIN: 0

## 2019-03-21 ENCOUNTER — HOSPITAL ENCOUNTER (INPATIENT)
Age: 69
LOS: 2 days | Discharge: HOME | DRG: 392 | End: 2019-03-23
Attending: EMERGENCY MEDICINE | Admitting: INTERNAL MEDICINE
Payer: MEDICARE

## 2019-03-21 ENCOUNTER — APPOINTMENT (OUTPATIENT)
Dept: GENERAL RADIOLOGY | Age: 69
DRG: 392 | End: 2019-03-21
Payer: MEDICARE

## 2019-03-21 DIAGNOSIS — Z79.4 TYPE 2 DIABETES MELLITUS WITH DIABETIC NEUROPATHY, WITH LONG-TERM CURRENT USE OF INSULIN (HCC): ICD-10-CM

## 2019-03-21 DIAGNOSIS — R11.2 INTRACTABLE VOMITING WITH NAUSEA, UNSPECIFIED VOMITING TYPE: Primary | ICD-10-CM

## 2019-03-21 DIAGNOSIS — R73.9 HYPERGLYCEMIA: ICD-10-CM

## 2019-03-21 DIAGNOSIS — N17.9 AKI (ACUTE KIDNEY INJURY) (HCC): ICD-10-CM

## 2019-03-21 DIAGNOSIS — E11.40 TYPE 2 DIABETES MELLITUS WITH DIABETIC NEUROPATHY, WITH LONG-TERM CURRENT USE OF INSULIN (HCC): ICD-10-CM

## 2019-03-21 LAB
ADENOVIRUS DETECTION BY PCR: NOT DETECTED
ALBUMIN SERPL-MCNC: 4.5 GM/DL (ref 3.4–5)
ALP BLD-CCNC: 55 IU/L (ref 40–129)
ALT SERPL-CCNC: 45 U/L (ref 10–40)
ANION GAP SERPL CALCULATED.3IONS-SCNC: 18 MMOL/L (ref 4–16)
AST SERPL-CCNC: 35 IU/L (ref 15–37)
BACTERIA: NEGATIVE /HPF
BASE EXCESS MIXED: ABNORMAL (ref 0–2.3)
BASOPHILS ABSOLUTE: 0 K/CU MM
BASOPHILS RELATIVE PERCENT: 0.2 % (ref 0–1)
BETA-HYDROXYBUTYRATE: 17.3 MG/DL (ref 0–3)
BILIRUB SERPL-MCNC: 0.3 MG/DL (ref 0–1)
BILIRUBIN URINE: NEGATIVE MG/DL
BLOOD, URINE: NEGATIVE
BORDETELLA PERTUSSIS PCR: NOT DETECTED
BUN BLDV-MCNC: 37 MG/DL (ref 6–23)
CALCIUM SERPL-MCNC: 8.9 MG/DL (ref 8.3–10.6)
CARBON MONOXIDE, BLOOD: 1.9 % (ref 0–5)
CHLAMYDOPHILA PNEUMONIA PCR: NOT DETECTED
CHLORIDE BLD-SCNC: 93 MMOL/L (ref 99–110)
CLARITY: CLEAR
CO2 CONTENT: 30.2 MMOL/L (ref 19–24)
CO2: 27 MMOL/L (ref 21–32)
COLOR: YELLOW
COMMENT: ABNORMAL
CORONAVIRUS 229E PCR: NOT DETECTED
CORONAVIRUS HKU1 PCR: NOT DETECTED
CORONAVIRUS NL63 PCR: NOT DETECTED
CORONAVIRUS OC43 PCR: NOT DETECTED
CREAT SERPL-MCNC: 2 MG/DL (ref 0.6–1.1)
CREATININE URINE: 144.3 MG/DL (ref 28–217)
DIFFERENTIAL TYPE: ABNORMAL
EOSINOPHILS ABSOLUTE: 0 K/CU MM
EOSINOPHILS RELATIVE PERCENT: 0 % (ref 0–3)
ESTIMATED AVERAGE GLUCOSE: 240 MG/DL
GFR AFRICAN AMERICAN: 30 ML/MIN/1.73M2
GFR NON-AFRICAN AMERICAN: 25 ML/MIN/1.73M2
GLUCOSE BLD-MCNC: 124 MG/DL (ref 70–99)
GLUCOSE BLD-MCNC: 235 MG/DL (ref 70–99)
GLUCOSE BLD-MCNC: 258 MG/DL (ref 70–99)
GLUCOSE BLD-MCNC: 387 MG/DL (ref 70–99)
GLUCOSE BLD-MCNC: 428 MG/DL (ref 70–99)
GLUCOSE BLD-MCNC: 489 MG/DL (ref 70–99)
GLUCOSE, URINE: >500 MG/DL
HBA1C MFR BLD: 10 % (ref 4.2–6.3)
HCO3 ARTERIAL: 29 MMOL/L (ref 18–23)
HCT VFR BLD CALC: 39.8 % (ref 37–47)
HEMOGLOBIN: 12.5 GM/DL (ref 12.5–16)
HUMAN METAPNEUMOVIRUS PCR: NOT DETECTED
IMMATURE NEUTROPHIL %: 0.4 % (ref 0–0.43)
INFLUENZA A BY PCR: NOT DETECTED
INFLUENZA A H1 (2009) PCR: NOT DETECTED
INFLUENZA A H1 PANDEMIC PCR: NOT DETECTED
INFLUENZA A H3 PCR: NOT DETECTED
INFLUENZA B BY PCR: NOT DETECTED
KETONES, URINE: ABNORMAL MG/DL
LEUKOCYTE ESTERASE, URINE: ABNORMAL
LIPASE: 50 IU/L (ref 13–60)
LYMPHOCYTES ABSOLUTE: 1 K/CU MM
LYMPHOCYTES RELATIVE PERCENT: 9.7 % (ref 24–44)
MCH RBC QN AUTO: 31.1 PG (ref 27–31)
MCHC RBC AUTO-ENTMCNC: 31.4 % (ref 32–36)
MCV RBC AUTO: 99 FL (ref 78–100)
METHEMOGLOBIN ARTERIAL: 0.9 %
MONOCYTES ABSOLUTE: 0.6 K/CU MM
MONOCYTES RELATIVE PERCENT: 6.2 % (ref 0–4)
MUCUS: ABNORMAL HPF
MYCOPLASMA PNEUMONIAE PCR: NOT DETECTED
NITRITE URINE, QUANTITATIVE: NEGATIVE
NUCLEATED RBC %: 0 %
O2 SATURATION: 92.3 % (ref 96–97)
PARAINFLUENZA 1 PCR: NOT DETECTED
PARAINFLUENZA 2 PCR: NOT DETECTED
PARAINFLUENZA 3 PCR: NOT DETECTED
PARAINFLUENZA 4 PCR: NOT DETECTED
PCO2 ARTERIAL: 39 MMHG (ref 32–45)
PDW BLD-RTO: 13.8 % (ref 11.7–14.9)
PH BLOOD: 7.48 (ref 7.34–7.45)
PH VENOUS: 7.39 (ref 7.32–7.42)
PH, URINE: 5 (ref 5–8)
PLATELET # BLD: 269 K/CU MM (ref 140–440)
PMV BLD AUTO: 11.4 FL (ref 7.5–11.1)
PO2 ARTERIAL: 63 MMHG (ref 75–100)
POTASSIUM SERPL-SCNC: 4.8 MMOL/L (ref 3.5–5.1)
PROTEIN UA: 100 MG/DL
RBC # BLD: 4.02 M/CU MM (ref 4.2–5.4)
RBC URINE: 7 /HPF (ref 0–6)
RHINOVIRUS ENTEROVIRUS PCR: NOT DETECTED
RSV PCR: NOT DETECTED
SEGMENTED NEUTROPHILS ABSOLUTE COUNT: 8.6 K/CU MM
SEGMENTED NEUTROPHILS RELATIVE PERCENT: 83.5 % (ref 36–66)
SODIUM BLD-SCNC: 138 MMOL/L (ref 135–145)
SODIUM URINE: 99 MMOL/L (ref 35–167)
SPECIFIC GRAVITY UA: 1.03 (ref 1–1.03)
SQUAMOUS EPITHELIAL: 2 /HPF
T4 FREE: 1.47 NG/DL (ref 0.9–1.8)
TOTAL IMMATURE NEUTOROPHIL: 0.04 K/CU MM
TOTAL NUCLEATED RBC: 0 K/CU MM
TOTAL PROTEIN: 8.4 GM/DL (ref 6.4–8.2)
TRICHOMONAS: ABNORMAL /HPF
TSH HIGH SENSITIVITY: 1.22 UIU/ML (ref 0.27–4.2)
UROBILINOGEN, URINE: NORMAL MG/DL (ref 0.2–1)
WBC # BLD: 10.3 K/CU MM (ref 4–10.5)
WBC UA: 10 /HPF (ref 0–5)

## 2019-03-21 PROCEDURE — 36415 COLL VENOUS BLD VENIPUNCTURE: CPT

## 2019-03-21 PROCEDURE — 6360000002 HC RX W HCPCS: Performed by: PHYSICIAN ASSISTANT

## 2019-03-21 PROCEDURE — 80053 COMPREHEN METABOLIC PANEL: CPT

## 2019-03-21 PROCEDURE — 85025 COMPLETE CBC W/AUTO DIFF WBC: CPT

## 2019-03-21 PROCEDURE — 71046 X-RAY EXAM CHEST 2 VIEWS: CPT

## 2019-03-21 PROCEDURE — 1200000000 HC SEMI PRIVATE

## 2019-03-21 PROCEDURE — 82803 BLOOD GASES ANY COMBINATION: CPT

## 2019-03-21 PROCEDURE — 82962 GLUCOSE BLOOD TEST: CPT

## 2019-03-21 PROCEDURE — 6370000000 HC RX 637 (ALT 250 FOR IP): Performed by: INTERNAL MEDICINE

## 2019-03-21 PROCEDURE — 6360000002 HC RX W HCPCS: Performed by: INTERNAL MEDICINE

## 2019-03-21 PROCEDURE — 2580000003 HC RX 258: Performed by: PHYSICIAN ASSISTANT

## 2019-03-21 PROCEDURE — 82010 KETONE BODYS QUAN: CPT

## 2019-03-21 PROCEDURE — 2580000003 HC RX 258: Performed by: INTERNAL MEDICINE

## 2019-03-21 PROCEDURE — 96372 THER/PROPH/DIAG INJ SC/IM: CPT

## 2019-03-21 PROCEDURE — 87486 CHLMYD PNEUM DNA AMP PROBE: CPT

## 2019-03-21 PROCEDURE — 96361 HYDRATE IV INFUSION ADD-ON: CPT

## 2019-03-21 PROCEDURE — 96374 THER/PROPH/DIAG INJ IV PUSH: CPT

## 2019-03-21 PROCEDURE — 82800 BLOOD PH: CPT

## 2019-03-21 PROCEDURE — 84300 ASSAY OF URINE SODIUM: CPT

## 2019-03-21 PROCEDURE — 87798 DETECT AGENT NOS DNA AMP: CPT

## 2019-03-21 PROCEDURE — 84439 ASSAY OF FREE THYROXINE: CPT

## 2019-03-21 PROCEDURE — 87086 URINE CULTURE/COLONY COUNT: CPT

## 2019-03-21 PROCEDURE — 99285 EMERGENCY DEPT VISIT HI MDM: CPT

## 2019-03-21 PROCEDURE — 6360000002 HC RX W HCPCS

## 2019-03-21 PROCEDURE — 83690 ASSAY OF LIPASE: CPT

## 2019-03-21 PROCEDURE — 83036 HEMOGLOBIN GLYCOSYLATED A1C: CPT

## 2019-03-21 PROCEDURE — 81001 URINALYSIS AUTO W/SCOPE: CPT

## 2019-03-21 PROCEDURE — 82570 ASSAY OF URINE CREATININE: CPT

## 2019-03-21 PROCEDURE — 87581 M.PNEUMON DNA AMP PROBE: CPT

## 2019-03-21 PROCEDURE — 84443 ASSAY THYROID STIM HORMONE: CPT

## 2019-03-21 PROCEDURE — 36600 WITHDRAWAL OF ARTERIAL BLOOD: CPT

## 2019-03-21 RX ORDER — DEXTROSE MONOHYDRATE 50 MG/ML
100 INJECTION, SOLUTION INTRAVENOUS PRN
Status: DISCONTINUED | OUTPATIENT
Start: 2019-03-21 | End: 2019-03-23 | Stop reason: HOSPADM

## 2019-03-21 RX ORDER — NICOTINE POLACRILEX 4 MG
15 LOZENGE BUCCAL PRN
Status: DISCONTINUED | OUTPATIENT
Start: 2019-03-21 | End: 2019-03-23 | Stop reason: HOSPADM

## 2019-03-21 RX ORDER — GABAPENTIN 100 MG/1
100 CAPSULE ORAL 3 TIMES DAILY
Status: DISCONTINUED | OUTPATIENT
Start: 2019-03-21 | End: 2019-03-23 | Stop reason: HOSPADM

## 2019-03-21 RX ORDER — METOCLOPRAMIDE HYDROCHLORIDE 5 MG/ML
10 INJECTION INTRAMUSCULAR; INTRAVENOUS EVERY 6 HOURS
Status: COMPLETED | OUTPATIENT
Start: 2019-03-21 | End: 2019-03-22

## 2019-03-21 RX ORDER — INSULIN GLARGINE 100 [IU]/ML
50 INJECTION, SOLUTION SUBCUTANEOUS NIGHTLY
Status: DISCONTINUED | OUTPATIENT
Start: 2019-03-21 | End: 2019-03-21

## 2019-03-21 RX ORDER — CALCIUM CARBONATE 500(1250)
500 TABLET ORAL DAILY
Status: DISCONTINUED | OUTPATIENT
Start: 2019-03-21 | End: 2019-03-23 | Stop reason: HOSPADM

## 2019-03-21 RX ORDER — BETA-CAROTENE 7500 MCG
25000 CAPSULE ORAL DAILY
Status: DISCONTINUED | OUTPATIENT
Start: 2019-03-21 | End: 2019-03-23 | Stop reason: HOSPADM

## 2019-03-21 RX ORDER — BRIMONIDINE TARTRATE 2 MG/ML
1 SOLUTION/ DROPS OPHTHALMIC EVERY 8 HOURS SCHEDULED
Status: DISCONTINUED | OUTPATIENT
Start: 2019-03-21 | End: 2019-03-21

## 2019-03-21 RX ORDER — DEXTROMETHORPHAN HYDROBROMIDE AND PROMETHAZINE HYDROCHLORIDE 15; 6.25 MG/5ML; MG/5ML
5 SYRUP ORAL EVERY 6 HOURS PRN
Status: DISCONTINUED | OUTPATIENT
Start: 2019-03-21 | End: 2019-03-23 | Stop reason: HOSPADM

## 2019-03-21 RX ORDER — ATOMOXETINE 40 MG/1
40 CAPSULE ORAL DAILY
Status: DISCONTINUED | OUTPATIENT
Start: 2019-03-21 | End: 2019-03-23 | Stop reason: HOSPADM

## 2019-03-21 RX ORDER — DEXTROSE MONOHYDRATE 25 G/50ML
12.5 INJECTION, SOLUTION INTRAVENOUS PRN
Status: DISCONTINUED | OUTPATIENT
Start: 2019-03-21 | End: 2019-03-23 | Stop reason: HOSPADM

## 2019-03-21 RX ORDER — SODIUM CHLORIDE 0.9 % (FLUSH) 0.9 %
10 SYRINGE (ML) INJECTION PRN
Status: DISCONTINUED | OUTPATIENT
Start: 2019-03-21 | End: 2019-03-23 | Stop reason: HOSPADM

## 2019-03-21 RX ORDER — ASPIRIN 81 MG/1
81 TABLET, CHEWABLE ORAL DAILY
Status: DISCONTINUED | OUTPATIENT
Start: 2019-03-21 | End: 2019-03-23 | Stop reason: HOSPADM

## 2019-03-21 RX ORDER — INSULIN GLARGINE 100 [IU]/ML
50 INJECTION, SOLUTION SUBCUTANEOUS NIGHTLY
Status: DISCONTINUED | OUTPATIENT
Start: 2019-03-21 | End: 2019-03-23 | Stop reason: HOSPADM

## 2019-03-21 RX ORDER — SIMVASTATIN 20 MG
20 TABLET ORAL NIGHTLY
Status: DISCONTINUED | OUTPATIENT
Start: 2019-03-21 | End: 2019-03-23 | Stop reason: HOSPADM

## 2019-03-21 RX ORDER — SODIUM CHLORIDE 0.9 % (FLUSH) 0.9 %
10 SYRINGE (ML) INJECTION EVERY 12 HOURS SCHEDULED
Status: DISCONTINUED | OUTPATIENT
Start: 2019-03-21 | End: 2019-03-23 | Stop reason: HOSPADM

## 2019-03-21 RX ORDER — PSEUDOEPHEDRINE HYDROCHLORIDE 30 MG/1
30 TABLET ORAL EVERY 6 HOURS PRN
Status: DISCONTINUED | OUTPATIENT
Start: 2019-03-21 | End: 2019-03-21

## 2019-03-21 RX ORDER — BROMPHENIRAMINE MALEATE, PSEUDOEPHEDRINE HYDROCHLORIDE, AND DEXTROMETHORPHAN HYDROBROMIDE 2; 30; 10 MG/5ML; MG/5ML; MG/5ML
5 SYRUP ORAL 4 TIMES DAILY PRN
Status: DISCONTINUED | OUTPATIENT
Start: 2019-03-21 | End: 2019-03-21

## 2019-03-21 RX ORDER — ONDANSETRON 2 MG/ML
4 INJECTION INTRAMUSCULAR; INTRAVENOUS EVERY 4 HOURS PRN
Status: DISCONTINUED | OUTPATIENT
Start: 2019-03-21 | End: 2019-03-23 | Stop reason: HOSPADM

## 2019-03-21 RX ORDER — CETIRIZINE HYDROCHLORIDE 10 MG/1
5 TABLET ORAL DAILY
Status: DISCONTINUED | OUTPATIENT
Start: 2019-03-21 | End: 2019-03-23 | Stop reason: HOSPADM

## 2019-03-21 RX ORDER — SODIUM CHLORIDE 9 MG/ML
INJECTION, SOLUTION INTRAVENOUS
Status: DISPENSED
Start: 2019-03-21 | End: 2019-03-21

## 2019-03-21 RX ORDER — PANTOPRAZOLE SODIUM 40 MG/10ML
40 INJECTION, POWDER, LYOPHILIZED, FOR SOLUTION INTRAVENOUS DAILY
Status: DISCONTINUED | OUTPATIENT
Start: 2019-03-21 | End: 2019-03-21

## 2019-03-21 RX ORDER — SODIUM CHLORIDE 9 MG/ML
INJECTION, SOLUTION INTRAVENOUS CONTINUOUS
Status: DISCONTINUED | OUTPATIENT
Start: 2019-03-21 | End: 2019-03-23

## 2019-03-21 RX ORDER — DOCUSATE SODIUM 100 MG/1
100 CAPSULE, LIQUID FILLED ORAL 2 TIMES DAILY
Status: DISCONTINUED | OUTPATIENT
Start: 2019-03-21 | End: 2019-03-23 | Stop reason: HOSPADM

## 2019-03-21 RX ORDER — AMLODIPINE BESYLATE 10 MG/1
10 TABLET ORAL DAILY
Status: DISCONTINUED | OUTPATIENT
Start: 2019-03-21 | End: 2019-03-23 | Stop reason: HOSPADM

## 2019-03-21 RX ORDER — PROMETHAZINE HYDROCHLORIDE 25 MG/ML
25 INJECTION, SOLUTION INTRAMUSCULAR; INTRAVENOUS ONCE
Status: COMPLETED | OUTPATIENT
Start: 2019-03-21 | End: 2019-03-21

## 2019-03-21 RX ORDER — ONDANSETRON 2 MG/ML
INJECTION INTRAMUSCULAR; INTRAVENOUS
Status: COMPLETED
Start: 2019-03-21 | End: 2019-03-21

## 2019-03-21 RX ORDER — PROMETHAZINE HYDROCHLORIDE 25 MG/ML
6.25 INJECTION, SOLUTION INTRAMUSCULAR; INTRAVENOUS EVERY 6 HOURS PRN
Status: DISCONTINUED | OUTPATIENT
Start: 2019-03-21 | End: 2019-03-21

## 2019-03-21 RX ORDER — ONDANSETRON 2 MG/ML
4 INJECTION INTRAMUSCULAR; INTRAVENOUS EVERY 6 HOURS PRN
Status: DISCONTINUED | OUTPATIENT
Start: 2019-03-21 | End: 2019-03-21

## 2019-03-21 RX ORDER — LATANOPROST 50 UG/ML
1 SOLUTION/ DROPS OPHTHALMIC NIGHTLY
Status: DISCONTINUED | OUTPATIENT
Start: 2019-03-21 | End: 2019-03-23 | Stop reason: HOSPADM

## 2019-03-21 RX ORDER — 0.9 % SODIUM CHLORIDE 0.9 %
1000 INTRAVENOUS SOLUTION INTRAVENOUS ONCE
Status: COMPLETED | OUTPATIENT
Start: 2019-03-21 | End: 2019-03-21

## 2019-03-21 RX ORDER — ONDANSETRON 2 MG/ML
4 INJECTION INTRAMUSCULAR; INTRAVENOUS ONCE
Status: COMPLETED | OUTPATIENT
Start: 2019-03-21 | End: 2019-03-21

## 2019-03-21 RX ORDER — LEVOTHYROXINE SODIUM 88 UG/1
88 TABLET ORAL DAILY
Status: DISCONTINUED | OUTPATIENT
Start: 2019-03-21 | End: 2019-03-23 | Stop reason: HOSPADM

## 2019-03-21 RX ADMIN — PROMETHAZINE HYDROCHLORIDE 6.25 MG: 25 INJECTION INTRAMUSCULAR; INTRAVENOUS at 06:40

## 2019-03-21 RX ADMIN — SODIUM CHLORIDE, PRESERVATIVE FREE 10 ML: 5 INJECTION INTRAVENOUS at 22:39

## 2019-03-21 RX ADMIN — INSULIN GLARGINE 50 UNITS: 100 INJECTION, SOLUTION SUBCUTANEOUS at 22:40

## 2019-03-21 RX ADMIN — GABAPENTIN 100 MG: 100 CAPSULE ORAL at 22:39

## 2019-03-21 RX ADMIN — CETIRIZINE HYDROCHLORIDE 5 MG: 10 TABLET, FILM COATED ORAL at 12:17

## 2019-03-21 RX ADMIN — LATANOPROST 1 DROP: 50 SOLUTION OPHTHALMIC at 22:30

## 2019-03-21 RX ADMIN — SODIUM CHLORIDE 1000 ML: 9 INJECTION, SOLUTION INTRAVENOUS at 02:05

## 2019-03-21 RX ADMIN — ONDANSETRON 4 MG: 2 INJECTION INTRAMUSCULAR; INTRAVENOUS at 02:05

## 2019-03-21 RX ADMIN — INSULIN LISPRO 10 UNITS: 100 INJECTION, SOLUTION INTRAVENOUS; SUBCUTANEOUS at 08:27

## 2019-03-21 RX ADMIN — ONDANSETRON 4 MG: 2 INJECTION INTRAMUSCULAR; INTRAVENOUS at 12:28

## 2019-03-21 RX ADMIN — SODIUM CHLORIDE: 9 INJECTION, SOLUTION INTRAVENOUS at 06:14

## 2019-03-21 RX ADMIN — ONDANSETRON 4 MG: 2 INJECTION INTRAMUSCULAR; INTRAVENOUS at 06:04

## 2019-03-21 RX ADMIN — GABAPENTIN 100 MG: 100 CAPSULE ORAL at 10:40

## 2019-03-21 RX ADMIN — ASPIRIN 81 MG 81 MG: 81 TABLET ORAL at 10:40

## 2019-03-21 RX ADMIN — METOCLOPRAMIDE 10 MG: 5 INJECTION, SOLUTION INTRAMUSCULAR; INTRAVENOUS at 18:56

## 2019-03-21 RX ADMIN — PROMETHAZINE HYDROCHLORIDE 25 MG: 25 INJECTION INTRAMUSCULAR; INTRAVENOUS at 03:19

## 2019-03-21 RX ADMIN — AMLODIPINE BESYLATE 10 MG: 10 TABLET ORAL at 12:17

## 2019-03-21 RX ADMIN — SODIUM CHLORIDE: 9 INJECTION, SOLUTION INTRAVENOUS at 10:45

## 2019-03-21 RX ADMIN — SIMVASTATIN 20 MG: 20 TABLET, FILM COATED ORAL at 22:39

## 2019-03-21 RX ADMIN — INSULIN HUMAN 20 UNITS: 100 INJECTION, SUSPENSION SUBCUTANEOUS at 10:53

## 2019-03-21 RX ADMIN — LEVOTHYROXINE SODIUM 88 MCG: 88 TABLET ORAL at 10:39

## 2019-03-21 RX ADMIN — DOCUSATE SODIUM 100 MG: 100 CAPSULE, LIQUID FILLED ORAL at 22:39

## 2019-03-21 RX ADMIN — INSULIN LISPRO 6 UNITS: 100 INJECTION, SOLUTION INTRAVENOUS; SUBCUTANEOUS at 12:26

## 2019-03-21 RX ADMIN — PROMETHAZINE HYDROCHLORIDE 6.25 MG: 25 INJECTION INTRAMUSCULAR; INTRAVENOUS at 16:50

## 2019-03-21 RX ADMIN — ONDANSETRON 4 MG: 2 INJECTION INTRAMUSCULAR; INTRAVENOUS at 22:39

## 2019-03-21 RX ADMIN — GABAPENTIN 100 MG: 100 CAPSULE ORAL at 13:22

## 2019-03-21 RX ADMIN — INSULIN LISPRO 4 UNITS: 100 INJECTION, SOLUTION INTRAVENOUS; SUBCUTANEOUS at 22:40

## 2019-03-21 ASSESSMENT — PAIN SCALES - GENERAL
PAINLEVEL_OUTOF10: 3
PAINLEVEL_OUTOF10: 0

## 2019-03-21 ASSESSMENT — PAIN DESCRIPTION - PAIN TYPE: TYPE: ACUTE PAIN

## 2019-03-21 ASSESSMENT — PAIN DESCRIPTION - LOCATION: LOCATION: ABDOMEN

## 2019-03-21 NOTE — ED PROVIDER NOTES
Emergency [address] EMERGENCY DEPARTMENT    Patient: Rob Zhang  MRN: [id number]  : 1950  Date of Evaluation: 3/20/2019  ED Supervising Physician: Hank Concepcion MD    I independently examined and evaluated Rob Zhang. In brief, Rob Zhang is a 76 y.o. female that presents to the emergency department with 3 days of intractable nausea, vomiting diarrhea, abdominal cramping. Focused exam: Well-appearing patient in no acute distress. Cardiac exam reveals tachycardia without any murmurs. Lungs sounds are clear bilaterally with no increased work of breathing. Abdomen is soft, nontender, nondistended. Brief ED course/MDM: Patient with benign abdominal exam.  Tachycardia is improving with IV fluids. She required Zofran and Phenergan. Suspect likely gastroenteritis. Do not suspect acute intraabdominal emergency at this time based on presentation and exam.  Lab work significant for evidence of dehydration, hyperglycemia without DKA. Plan to admit for further management. All diagnostic, treatment, and disposition decisions were made by myself in conjunction with the [facility]. For all further details of the patient's emergency department visit, please see their documentation.     (Please note that portions of this note may have been completed with a voice recognition program. Efforts were made to edit the dictations but occasionally words are mis-transcribed.)    MD Alexandru Downey7, MD  19 1623

## 2019-03-21 NOTE — CONSULTS
Endocrinology   Consult Note  Dear Doctor Angeles Olsen for the Consult     Pt. Was Admitted for : Nausea and vomiting and dehydration    Reason for Consult:  Better control of blood glucose    History Obtained From:  Patient/ EMR       HISTORY OF PRESENT ILLNESS:                The patient is a 76 y.o. female with significant past medical history of diabetes mellitus, hyperlipidemia, hypertension, neuropathy, hypothyroidism and breast cancer with bilateral mastectomy admitted with history of nausea vomiting and dehydration. I was  consulted for better control of blood glucose. ROS:   Pt's ROS done in detail. Abnormal ROS are noted in Medical and Surgical History Section below: Other Medical History:        Diagnosis Date    [facility]     [facility]     Glaucoma     Gout     Hyperlipidemia     Hypertension     Neuropathy     Thyroid disease      Surgical History:        Procedure Laterality Date     SECTION      MASTECTOMY, BILATERAL  10/26/2007       Allergies:  Bupropion; Nsaids; Nateglinide; Ofloxacin; Paroxetine hcl; and Pioglitazone    Family History:   History reviewed. No pertinent family history. REVIEW OF SYSTEMS:  Review of System Done as noted above     PHYSICAL EXAM:      Vitals:    BP (!) 190/87   Pulse 97   Temp 98.8 °F (37.1 °C) (Oral)   Resp 18   Ht 5' 4\" (1.626 m)   Wt 141 lb 8 oz (64.2 kg)   SpO2 93%   Breastfeeding? No   BMI 24.29 kg/m²     CONSTITUTIONAL:  awake, alert, cooperative, appears stated age   EYES:  vision intact Fundoscopic Exam not performed   ENT:Normal  NECK:  Supple, No JVD.    Thyroid Exam:Normal   LUNGS:  Has Vesicular Breath Sounds,   CARDIOVASCULAR:  Normal apical impulse, regular rate and rhythm, normal S1 and S2, no S3 or S4, and has no  murmur   ABDOMEN:  No scars, normal bowel sounds, soft, non-distended, non-tender, no masses palpated, no hepatolienomegaly  Musculoskeletal: Normal  Extremities: Normal, peripheral pulses normal, , has no edema   NEUROLOGIC:  Awake, alert, oriented to name, place and time. Cranial nerves II-XII are grossly intact. Motor is  intact. Sensory is intact. ,  and gait is normal.    DATA:    CBC:   Recent Labs     03/21/19 0151   WBC 10.3   HGB 12.5       CMP:  Recent Labs     03/21/19 0151      K 4.8   CL 93*   CO2 27   BUN 37*   CREATININE 2.0*   CALCIUM 8.9   PROT 8.4*   LABALBU 4.5   BILITOT 0.3   ALKPHOS 55   AST 35   ALT 45*     Lipids:   Lab Results   Component Value Date    CHOL 182 11/30/2018    HDL 50 11/30/2018    TRIG 217 11/30/2018     Glucose: No results for input(s): POCGLU in the last 72 hours. Hemoglobin A1C:   Lab Results   Component Value Date    LABA1C 9.0 02/06/2019     Free T4:   Lab Results   Component Value Date    T4FREE 1.69 11/30/2018     Free T3: No results found for: FT3  TSH High Sensitivity: No results found for: [facility]    Xr Chest Standard (2 Vw)    Result Date: 3/21/2019  EXAMINATION: TWO VIEWS OF THE CHEST 3/21/2019 3:15 am COMPARISON: None. HISTORY: ORDERING SYSTEM PROVIDED HISTORY: cough TECHNOLOGIST PROVIDED HISTORY: Reason for exam:->cough Ordering Physician Provided Reason for Exam: cough Acuity: Acute Type of Exam: Initial FINDINGS: The cardiomediastinal and hilar silhouettes appear unremarkable. The lungs appear clear. No pleural effusion evident. No pneumothorax is seen. No acute osseous abnormality is identified. Confluent ossification of the anterior longitudinal ligament of thoracic spine reflects diffuse idiopathic skeletal hyperostosis (DISH). No radiographic evidence of acute cardiopulmonary disease.        Scheduled Medicines   Medications:    aspirin  81 mg Oral Daily    atomoxetine  40 mg Oral Daily    beta carotene  25,000 Units Oral Daily    brimonidine  1 drop Left Eye 3 times per day    calcium elemental  500 mg Oral Daily    gabapentin  100 mg Oral TID    latanoprost  1 drop Both Eyes Nightly    levothyroxine  88 mcg Oral Daily    simvastatin  20 mg Oral Nightly    sodium chloride flush  10 mL Intravenous 2 times per day    docusate sodium  100 mg Oral BID    enoxaparin  30 mg Subcutaneous Daily    pantoprazole  40 mg Intravenous Daily    insulin lispro  0-12 Units Subcutaneous TID WC    insulin lispro  0-12 Units Subcutaneous 2 times per day    insulin lispro  15 Units Subcutaneous TID WC    insulin glargine  50 Units Subcutaneous Nightly    insulin NPH  20 Units Subcutaneous Once      Infusions:    sodium chloride 125 mL/hr at 03/21/19 2786    sodium chloride      dextrose           IMPRESSION    Patient Active Problem List   Diagnosis    Acquired hypothyroidism    Depression    [facility]    DM (diabetes mellitus) ([facility]    HTN (hypertension)    Hyperlipidemia    Osteopenia    Personal history of breast cancer    Allergic rhinitis    [facility]) [facility]         RECOMMENDATIONS:      1. Reviewed POC blood glucose . Labs and X ray results   2. Reviewed Home and Current Medicines   3. Will Start On meal/ Correction bolus Humalog/ Lantus Insulin regime  4. Monitor Blood glucose frequently   5. Modify  the dose of Insulin  as needed        Will follow with you  Again thank you for sharing pt's care with me.      Truly yours,       Reece Martini MD

## 2019-03-21 NOTE — PROGRESS NOTES
MG tablet, Take one pill at bedtime and may take one other once a day as needed for anxiety. lovastatin (MEVACOR) 40 MG tablet, Take 1 tablet by mouth nightly  gabapentin (NEURONTIN) 100 MG capsule, Take 1 capsule by mouth 3 times daily for 180 days. .  vitamin C (ASCORBIC ACID) 500 MG tablet, Take 1,000 mg by mouth 3 times daily  beta carotene 95548 units capsule, Take 25,000 Units by mouth daily  vitamin E 400 UNIT capsule, Take 800 Units by mouth daily  Selenium 100 MCG TABS, Take 300 mcg by mouth daily  calcium carbonate (OSCAL) 500 MG TABS tablet, Take 500 mg by mouth daily  aspirin 81 MG tablet, Take 1 tablet by mouth  NOVOLOG FLEXPEN 100 UNIT/ML injection pen, 14 units with her largest meal  latanoprost (XALATAN) 0.005 % ophthalmic solution,   Cetirizine HCl 10 MG CAPS, Take by mouth  Cranberry 27828 MG CAPS, Take by mouth  Docusate Sodium (STOOL SOFTENER) 100 MG TABS, Take by mouth  GARLIC PO, Take by mouth  Multiple Vitamins-Minerals (ICAPS AREDS 2 PO), Take by mouth  ferrous sulfate 325 (65 Fe) MG tablet, Take 325 mg by mouth daily (with breakfast)  ranitidine (ACID REDUCER) 150 MG tablet, Take 150 mg by mouth 2 times daily  Pomegranate, [place] gran[place], (POMEGRANATE PO), Take 500 mg by mouth daily    Current Medications  Current Facility-Administered Medications   Medication Dose Route Frequency Provider Last Rate Last Dose    aspirin chewable tablet 81 mg  81 mg Oral Daily Ziyad Álvarez MD   81 mg at 03/21/19 1040    atomoxetine (STRATTERA) capsule 40 mg  40 mg Oral Daily Ziyad Álvarez MD        beta carotene capsule 25,000 Units  25,000 Units Oral Daily Ziyad Álvarez MD        calcium elemental (OSCAL) tablet 500 mg  500 mg Oral Daily Ziyad Álvarez MD        gabapentin (NEURONTIN) capsule 100 mg  100 mg Oral TID Ziyad Álvarez MD   100 mg at 03/21/19 1040    latanoprost (XALATAN) 0.005 % ophthalmic solution 1 drop  1 drop Both Eyes Nightly Ziyad Álvarez MD        levothyroxine (SYNTHROID) tablet 88 mcg  88 mcg Oral Daily Edna Bond MD   88 mcg at 03/21/19 1039    simvastatin (ZOCOR) tablet 20 mg  20 mg Oral Nightly Edna Bond MD        sodium chloride flush 0.9 % injection 10 mL  10 mL Intravenous 2 times per day Edna Bond MD        sodium chloride flush 0.9 % injection 10 mL  10 mL Intravenous PRN Edna Bond MD        docusate sodium (COLACE) capsule 100 mg  100 mg Oral BID Edna Bond MD        ondansetron [facility]) injection 4 mg  4 mg Intravenous Q6H PRN Edna Bond MD   4 mg at 03/21/19 1228    enoxaparin (LOVENOX) injection 30 mg  30 mg Subcutaneous Daily Edna Bond MD        0.9 % sodium chloride infusion   Intravenous Continuous Bebeto Heath MD 75 mL/hr at 03/21/19 1045      promethazine (PHENERGAN) injection 6.25 mg  6.25 mg Intravenous Q6H PRN Edna Bond MD   6.25 mg at 03/21/19 0640    sodium chloride 0.9 % infusion             insulin lispro (HUMALOG) injection vial 0-12 Units  0-12 Units Subcutaneous TID  Dinora Mckee MD   6 Units at 03/21/19 1226    glucose (GLUTOSE) 40 % oral gel 15 g  15 g Oral PRN Dinora Mckee MD        dextrose 50 % solution 12.5 g  12.5 g Intravenous PRN Dinora Mckee MD        glucagon (rDNA) injection 1 mg  1 mg Intramuscular PRN Dinora Mckee MD        dextrose 5 % solution  100 mL/hr Intravenous PRN Dinora Mckee MD        promethazine-dextromethorphan (PROMETHAZINE-DM) 6.25-15 MG/5ML syrup 5 mL  5 mL Oral Q6H PRN Edna Bond MD        pseudoephedrine (SUDAFED) tablet 30 mg  30 mg Oral Q6H PRN Edna Bond MD        insulin lispro (HUMALOG) injection vial 0-12 Units  0-12 Units Subcutaneous 2 times per day Dinora Mckee MD        insulin lispro (HUMALOG) injection vial 15 Units  15 Units Subcutaneous TID  Dinora Mckee MD        insulin glargine (LANTUS) injection vial 50 Units  50 Units Subcutaneous Nightly M Claudia Muro MD        cetirizine (ZYRTEC) tablet 5 mg  5 mg Oral Daily Jens Jacob MD   5 mg at 03/21/19 1217    amLODIPine (NORVASC) tablet 10 mg  10 mg Oral Daily Jens Jacob MD   10 mg at 03/21/19 1217         Allergies  Allergies   Allergen Reactions    Bupropion      Other reaction(s): Other - comment required  Suicidal ideation    Nsaids      Other reaction(s): Other - comment required  Elevated Serum Creatinine    Nateglinide Nausea Only    Ofloxacin Hives    Paroxetine Hcl Hives     Other reaction(s): Other - comment required  Shakey/nerveous    Pioglitazone      Other reaction(s): Other - comment required  Dizzy / nauseated       REVIEW OF SYSTEMS     Within above limitations. 14 point review of systems reviewed. Pertinent positive or negative as per HPI or otherwise negative per 14 point systems review. Reviewed 3/21/2019 at 1:11 PM    PHYSICAL EXAM       Blood pressure (!) 162/77, pulse 95, temperature 98.8 °F (37.1 °C), temperature source Oral, resp. rate 18, height 5' 4\" (1.626 m), weight 141 lb 8 oz (64.2 kg), SpO2 93 %, not currently breastfeeding. General - AAO x 3  Psych - Appropriate affect/speech. No agitation  Eyes - Eye lids intact. No scleral icterus  ENT - Lips wnl. External ear clear/dry/intact. No thyromegaly on inspection  Neuro - No gross peripheral or central neuro deficits on inspection  Heart - Sinus. RRR. S1 and S2 present. No elevated JVD appreciated   Lung - Adequate air entry b/l, No crackles/wheezes appreciated  GI -  Soft. No guarding/rigidity.  BS+   - No CVA/suprapubic tenderness or palpable bladder distension      LABS AND IMAGING   CBC  [unfilled]    Last 3 Hemoglobin  Lab Results   Component Value Date    HGB 12.5 03/21/2019    HGB 11.8 02/28/2018     Last 3 WBC/ANC  Lab Results   Component Value Date    WBC 10.3 03/21/2019    WBC 7.6 02/28/2018     No components found for: GRNLOCTYABS  Last 3 Platelets  No results found for: PLATELET  Chemistry  [unfilled]  [unfilled]  No results found for: LDH  Coagulation Studies  No results found for: PTT, INR  Liver Function Studies  Lab Results   Component Value Date    ALT 45 03/21/2019    AST 35 03/21/2019    ALKPHOS 55 03/21/2019       Recent Imaging    Mathieu Pizano #[id number] ([id number]) (77 y.o. F) (Adm: 03/21/19)    [facility] [id number]         Imaging Results (last 7 days)          Procedure Component Value Ref Range Date/Time     XR CHEST STANDARD (2 VW) [[id number]] Collected: 03/21/19 0319     Order Status: Completed Specimen: Chest Updated: 03/21/19 0323     Narrative:       EXAMINATION:  TWO VIEWS OF THE CHEST    3/21/2019 3:15 am    COMPARISON:  None. HISTORY:  ORDERING SYSTEM PROVIDED HISTORY: cough  TECHNOLOGIST PROVIDED HISTORY:  Reason for exam:->cough  Ordering Physician Provided Reason for Exam: cough  Acuity: Acute  Type of Exam: Initial    FINDINGS:  The cardiomediastinal and hilar silhouettes appear unremarkable.  The lungs  appear clear. No pleural effusion evident. No pneumothorax is seen. No acute  osseous abnormality is identified.  Confluent ossification of the anterior  longitudinal ligament of thoracic spine reflects diffuse idiopathic skeletal  hyperostosis (DISH).      Impression:       No radiographic evidence of acute cardiopulmonary disease.             Relevant labs and imaging reviewed    ASSESSMENT AND PLAN     ANTONINA on CKD  Suspect pre-renal from viral prodrome - viral gastroenteritis  - IVF  - IV anti-emetic  - supportive care for now    DMII, IDDM  - uncontrolled, A1c 10  - at baseline, uses basaglar 30 am/ 20 pm and 14 U TIDAC, metformin  - endocrine assisting with adjustment and optimization     HTN  - add norvasc    Hypothyroid  HLD    Lovenox ppx    [facility] Internal Medicine  3/21/2019 at 1:11 PM

## 2019-03-21 NOTE — ED PROVIDER NOTES
by mouth daily (with breakfast)      ranitidine (ACID REDUCER) 150 MG tablet Take 150 mg by mouth 2 times daily      Pomegranate, Punica granatum, (POMEGRANATE PO) Take 500 mg by mouth daily         ALLERGIES    Allergies   Allergen Reactions    Bupropion      Other reaction(s): Other - comment required  Suicidal ideation    Nsaids      Other reaction(s): Other - comment required  Elevated Serum Creatinine    Nateglinide Nausea Only    Ofloxacin Hives    Paroxetine Hcl Hives     Other reaction(s): Other - comment required  Shakey/nerveous    Pioglitazone      Other reaction(s): Other - comment required  Dizzy / nauseated       FAMILY HISTORY    History reviewed. No pertinent family history.     SOCIAL HISTORY    Social History     Socioeconomic History    Marital status:      Spouse name: None    Number of children: None    Years of education: None    Highest education level: None   Occupational History    None   Social Needs    Financial resource strain: None    Food insecurity:     Worry: None     Inability: None    Transportation needs:     Medical: None     Non-medical: None   Tobacco Use    Smoking status: Never Smoker    Smokeless tobacco: Never Used   Substance and Sexual Activity    Alcohol use: No    Drug use: No    Sexual activity: None   Lifestyle    Physical activity:     Days per week: None     Minutes per session: None    Stress: None   Relationships    Social connections:     Talks on phone: None     Gets together: None     Attends Mosque service: None     Active member of club or organization: None     Attends meetings of clubs or organizations: None     Relationship status: None    Intimate partner violence:     Fear of current or ex partner: None     Emotionally abused: None     Physically abused: None     Forced sexual activity: None   Other Topics Concern    None   Social History Narrative    None       PHYSICAL EXAM    VITAL SIGNS: BP (!) 161/103   Pulse 117 Temp 97.9 °F (36.6 °C) (Oral)   Resp 18   Ht 5' 4\" (1.626 m)   Wt 145 lb (65.8 kg)   SpO2 97%   Breastfeeding? No   BMI 24.89 kg/m²   Constitutional:  Well developed, appears unwell  Eyes:  Sclera anicteric. HENT:  NC/AT. Ears, nose normal.  Oropharynx moist.  Neck:  Supple. Respiratory:  Lungs CTAB. Cardiovascular:  Tachycardic, regular. GI:  Abdomen soft, mild diffuse tenderness, BS active. Patient actively vomiting. :  No CVA tenderness. Musculoskeletal:  No acute deformities. Integument:  Warm and dry. Neurologic:  Alert & oriented. No focal deficits. LABS/IMAGING    Labs Reviewed   CBC WITH AUTO DIFFERENTIAL - Abnormal; Notable for the following components:       Result Value    RBC 4.02 (*)     MCH 31.1 (*)     MCHC 31.4 (*)     MPV 11.4 (*)     Segs Relative 83.5 (*)     Lymphocytes % 9.7 (*)     Monocytes % 6.2 (*)     All other components within normal limits   COMPREHENSIVE METABOLIC PANEL - Abnormal; Notable for the following components:    Chloride 93 (*)     BUN 37 (*)     CREATININE 2.0 (*)     Glucose 489 (*)     Total Protein 8.4 (*)     ALT 45 (*)     GFR Non- 25 (*)     GFR  30 (*)     Anion Gap 18 (*)     All other components within normal limits   URINE RT REFLEX TO CULTURE - Abnormal; Notable for the following components:    Glucose, Urine >500 (*)     Ketones, Urine MODERATE (*)     Protein,  (*)     Leukocyte Esterase, Urine TRACE (*)     RBC, UA 7 (*)     WBC, UA 10 (*)     Mucus, UA RARE (*)     All other components within normal limits   BETA-HYDROXYBUTYRATE - Abnormal; Notable for the following components:    Beta-Hydroxybutyrate 17.3 (*)     All other components within normal limits   URINE CULTURE   LIPASE   PH, VENOUS     ED COURSE & MEDICAL DECISION MAKING    Pertinent Labs & Imaging studies reviewed. (See chart for details)   -  Patient seen and evaluated in the emergency department.   -  Triage and nursing notes

## 2019-03-21 NOTE — H&P
brimonidine (ALPHAGAN) 0.2 % ophthalmic solution 1 drop  1 drop Left Eye 3 times per day Drew Marroquin MD        calcium elemental (OSCAL) tablet 500 mg  500 mg Oral Daily Drew Marroquin MD        gabapentin (NEURONTIN) capsule 100 mg  100 mg Oral TID Drew Marroquin MD        latanoprost (XALATAN) 0.005 % ophthalmic solution 1 drop  1 drop Both Eyes Nightly Drew Marroquin MD        levothyroxine (SYNTHROID) tablet 88 mcg  88 mcg Oral Daily Drew Marroquin MD        simvastatin (ZOCOR) tablet 20 mg  20 mg Oral Nightly Drew Marroquin MD        sodium chloride flush 0.9 % injection 10 mL  10 mL Intravenous 2 times per day Drew Marroquin MD        sodium chloride flush 0.9 % injection 10 mL  10 mL Intravenous PRN Drew Marroquin MD        docusate sodium (COLACE) capsule 100 mg  100 mg Oral BID Drew Marroquin MD        ondansetron [facility]) injection 4 mg  4 mg Intravenous Q6H PRN Drwe Marroquin MD   4 mg at 03/21/19 0604    enoxaparin (LOVENOX) injection 30 mg  30 mg Subcutaneous Daily Drew Marroquin MD        0.9 % sodium chloride infusion   Intravenous Continuous Drew Marroquin  mL/hr at 03/21/19 6427      pantoprazole (PROTONIX) injection 40 mg  40 mg Intravenous Daily Drew Marroquin MD        [facility]) injection 6.25 mg  6.25 mg Intravenous Q6H PRN Drew Marroquin MD   6.25 mg at 03/21/19 0640    sodium chloride 0.9 % infusion             insulin lispro (HUMALOG) injection vial 0-12 Units  0-12 Units Subcutaneous TID  Haley Khan MD        glucose (GLUTOSE) 40 % oral gel 15 g  15 g Oral PRN Haley Khan MD        dextrose 50 % solution 12.5 g  12.5 g Intravenous PRN Haley Khan MD        glucagon (rDNA) injection 1 mg  1 mg Intramuscular PRN Haley Khan MD        dextrose 5 % solution  100 mL/hr Intravenous PRN Haley Khan MD        promethazine-dextromethorphan (PROMETHAZINE-DM) 6.25-15 MG/5ML syrup 5 mL  5 mL Oral Q6H PRN Joeline Carrel, MD        pseudoephedrine (SUDAFED) tablet 30 mg  30 mg Oral Q6H PRN Joeline Carrel, MD        insulin lispro (HUMALOG) injection vial 0-12 Units  0-12 Units Subcutaneous 2 times per day Cyn Jean MD        insulin lispro (HUMALOG) injection vial 15 Units  15 Units Subcutaneous TID  Cyn Jean MD        insulin glargine (LANTUS) injection vial 50 Units  50 Units Subcutaneous Nightly M Alejandro Stafford MD        insulin NPH (HUMULIN N;NOVOLIN N) injection vial 20 Units  20 Units Subcutaneous Once Cyn Failing, MD          ?   ? REVIEW OF SYSTEMS:   All systems were reviewed and all were negative except for those mentioned in HPI. PHYSICAL EXAM:   Blood pressure (!) 190/87, pulse 97, temperature 98.8 °F (37.1 °C), temperature source Oral, resp. rate 18, height 5' 4\" (1.626 m), weight 141 lb 8 oz (64.2 kg), SpO2 93 %, not currently breastfeeding. . Body mass index is 24.29 kg/m². CONSTITUTIONAL: Not in acute distress  HENT: NC/AT Ear: normal, patent without effusion Nose: no deformities, nares patent  EYES:Conjunctiva normal. No discharge. NECK: Neck supple,No JVD /Thyromegaly/LAD   RESP:No chest wall deformities or tenderness. No wheezing or rales. B/L air entry positive+  CVS: Regular rate and rhythm. S1 and S2 normal, no murmurs, clicks, gallops or rubs  GI: Soft, ND/NT,No guarding/rebound/mass/organomegaly. Bowel sounds are normal.    MUSCULAR/EXT:  no pedal edema, no clubbing or cyanosis,Pulses 2+ B/L  CNS: Awake, alert. Cranial nerves intact, no focal neurological deficits.    MOOD/PSYCH: Normal mood and affect  SKIN: Warm and dry,No rashes    Lab results:   Results for orders placed or performed during the hospital encounter of 03/21/19   CBC Auto Differential   Result Value Ref Range    WBC 10.3 4.0 - 10.5 K/CU MM    RBC 4.02 (L) 4.2 - 5.4 M/CU MM    Hemoglobin 12.5 12.5 - 16.0 GM/DL    Hematocrit 39.8 37 - 47 %    MCV 99.0 78 - 100 FL    MCH 31.1 (H) 27 - 31 PG MCHC 31.4 (L) 32.0 - 36.0 %    RDW 13.8 11.7 - 14.9 %    Platelets 845 753 - 748 K/CU MM    MPV 11.4 (H) 7.5 - 11.1 FL    Differential Type AUTOMATED DIFFERENTIAL     Segs Relative 83.5 (H) 36 - 66 %    Lymphocytes % 9.7 (L) 24 - 44 %    Monocytes % 6.2 (H) 0 - 4 %    Eosinophils % 0.0 0 - 3 %    Basophils % 0.2 0 - 1 %    Segs Absolute 8.6 K/CU MM    Lymphocytes # 1.0 K/CU MM    Monocytes # 0.6 K/CU MM    Eosinophils # 0.0 K/CU MM    Basophils # 0.0 K/CU MM    Nucleated RBC % 0.0 %    Total Nucleated RBC 0.0 K/CU MM    Total Immature Neutrophil 0.04 K/CU MM    Immature Neutrophil % 0.4 0 - 0.43 %   CMP   Result Value Ref Range    Sodium 138 135 - 145 MMOL/L    Potassium 4.8 3.5 - 5.1 MMOL/L    Chloride 93 (L) 99 - 110 mMol/L    CO2 27 21 - 32 MMOL/L    BUN 37 (H) 6 - 23 MG/DL    CREATININE 2.0 (H) 0.6 - 1.1 MG/DL    Glucose 489 (HH) 70 - 99 MG/DL    Calcium 8.9 8.3 - 10.6 MG/DL    Alb 4.5 3.4 - 5.0 GM/DL    Total Protein 8.4 (H) 6.4 - 8.2 GM/DL    Total Bilirubin 0.3 0.0 - 1.0 MG/DL    ALT 45 (H) 10 - 40 U/L    AST 35 15 - 37 IU/L    Alkaline Phosphatase 55 40 - 129 IU/L    GFR Non- 25 (L) >60 mL/min/1.73m2    GFR  30 (L) >60 mL/min/1.73m2    Anion Gap 18 (H) 4 - 16   Lipase   Result Value Ref Range    Lipase 50 13 - 60 IU/L   Urinalysis Reflex to Culture   Result Value Ref Range    Color, UA YELLOW YELLOW    Clarity, UA CLEAR CLEAR    Glucose, Urine >500 (A) NEGATIVE MG/DL    Bilirubin Urine NEGATIVE NEGATIVE MG/DL    Ketones, Urine MODERATE (A) NEGATIVE MG/DL    Specific Gravity, UA 1.026 1.001 - 1.035    Blood, Urine NEGATIVE NEGATIVE    pH, Urine 5.0 5.0 - 8.0    Protein,  (A) NEGATIVE MG/DL    Urobilinogen, Urine NORMAL 0.2 - 1.0 MG/DL    Nitrite Urine, Quantitative NEGATIVE NEGATIVE    Leukocyte Esterase, Urine TRACE (A) NEGATIVE    RBC, UA 7 (H) 0 - 6 /HPF    WBC, UA 10 (H) 0 - 5 /HPF    Bacteria, UA NEGATIVE NEGATIVE /HPF    Squam Epithel, UA 2 /HPF    Mucus, UA RARE (A) NEGATIVE HPF    Trichomonas, UA NONE SEEN NONE SEEN /HPF   pH, venous   Result Value Ref Range    pH, Gilbert 7.39 7.32 - 7.42   Beta-Hydroxybutyrate   Result Value Ref Range    Beta-Hydroxybutyrate 17.3 (H) 0.0 - 3.0 MG/DL   Blood gas, arterial   Result Value Ref Range    pH, Bld 7.48 (H) 7.34 - 7.45    pCO2, Arterial 39.0 32 - 45 MMHG    pO2, Arterial 63 (L) 75 - 100 MMHG    Base Exc, Mixed 5.2  PLUS   (H) 0 - 2.3    HCO3, Arterial 29.0 (H) 18 - 23 MMOL/L    CO2 Content 30.2 (H) 19 - 24 MMOL/L    O2 Sat 92.3 (L) 96 - 97 %    Carbon Monoxide, Blood 1.9 0 - 5 %    Methemoglobin, Arterial 0.9 <1.5 %    Comment ROOM AIR      XR CHEST STANDARD (2 VW)   Final Result   No radiographic evidence of acute cardiopulmonary disease. ASSESSMENT/IMPRESSION:      ANTONINA (acute kidney injury) ([address] likely prerenal from dehydration. Continue IV fluids    Avoid nephrotoxic agents. Follow clinically, vitals, BMP and electrolytes.  Nausea vomiting and improved diarrhea: PPI IV and advance diet as tolerated.  Uncontrolled diabetes mellitus: Started Lantus, prandial insulin and sliding scale. Check A1c and consulted Endo for blood sugar management. Follow Accu-Cheks? DVT prophylaxis: Lovenox . Old records reviewed. Medications reviewed with patient. Patient is a Full Code-discussed     All questions and concerns addressed at this time, and patient is in agreement with current treatment plan.      Shira Hawkins MD   Hospitalist at [facility]

## 2019-03-22 LAB
ALBUMIN SERPL-MCNC: 3.4 GM/DL (ref 3.4–5)
ALP BLD-CCNC: 41 IU/L (ref 40–129)
ALT SERPL-CCNC: 25 U/L (ref 10–40)
ANION GAP SERPL CALCULATED.3IONS-SCNC: 10 MMOL/L (ref 4–16)
AST SERPL-CCNC: 20 IU/L (ref 15–37)
BILIRUB SERPL-MCNC: 0.3 MG/DL (ref 0–1)
BILIRUBIN DIRECT: 0.2 MG/DL (ref 0–0.3)
BILIRUBIN, INDIRECT: 0.1 MG/DL (ref 0–0.7)
BUN BLDV-MCNC: 31 MG/DL (ref 6–23)
CALCIUM SERPL-MCNC: 8 MG/DL (ref 8.3–10.6)
CHLORIDE BLD-SCNC: 103 MMOL/L (ref 99–110)
CO2: 27 MMOL/L (ref 21–32)
CREAT SERPL-MCNC: 2 MG/DL (ref 0.6–1.1)
CULTURE: ABNORMAL
GFR AFRICAN AMERICAN: 30 ML/MIN/1.73M2
GFR NON-AFRICAN AMERICAN: 25 ML/MIN/1.73M2
GLUCOSE BLD-MCNC: 130 MG/DL (ref 70–99)
GLUCOSE BLD-MCNC: 140 MG/DL (ref 70–99)
GLUCOSE BLD-MCNC: 160 MG/DL (ref 70–99)
GLUCOSE BLD-MCNC: 166 MG/DL (ref 70–99)
GLUCOSE BLD-MCNC: 77 MG/DL (ref 70–99)
GLUCOSE BLD-MCNC: 83 MG/DL (ref 70–99)
Lab: ABNORMAL
MAGNESIUM: 1.9 MG/DL (ref 1.8–2.4)
POTASSIUM SERPL-SCNC: 3.7 MMOL/L (ref 3.5–5.1)
SODIUM BLD-SCNC: 140 MMOL/L (ref 135–145)
SPECIMEN: ABNORMAL
TOTAL COLONY COUNT: ABNORMAL
TOTAL PROTEIN: 6.7 GM/DL (ref 6.4–8.2)

## 2019-03-22 PROCEDURE — 6370000000 HC RX 637 (ALT 250 FOR IP): Performed by: INTERNAL MEDICINE

## 2019-03-22 PROCEDURE — 1200000000 HC SEMI PRIVATE

## 2019-03-22 PROCEDURE — 36415 COLL VENOUS BLD VENIPUNCTURE: CPT

## 2019-03-22 PROCEDURE — 2580000003 HC RX 258: Performed by: INTERNAL MEDICINE

## 2019-03-22 PROCEDURE — 6360000002 HC RX W HCPCS: Performed by: INTERNAL MEDICINE

## 2019-03-22 PROCEDURE — 83735 ASSAY OF MAGNESIUM: CPT

## 2019-03-22 PROCEDURE — 82962 GLUCOSE BLOOD TEST: CPT

## 2019-03-22 PROCEDURE — 82248 BILIRUBIN DIRECT: CPT

## 2019-03-22 PROCEDURE — 80053 COMPREHEN METABOLIC PANEL: CPT

## 2019-03-22 RX ORDER — ACETAMINOPHEN 80 MG
TABLET,CHEWABLE ORAL
Status: COMPLETED
Start: 2019-03-22 | End: 2019-03-22

## 2019-03-22 RX ORDER — LANCETS 33 GAUGE
EACH MISCELLANEOUS
Qty: 100 EACH | Refills: 5 | Status: SHIPPED | OUTPATIENT
Start: 2019-03-22 | End: 2020-04-17 | Stop reason: SDUPTHER

## 2019-03-22 RX ADMIN — INSULIN LISPRO 2 UNITS: 100 INJECTION, SOLUTION INTRAVENOUS; SUBCUTANEOUS at 01:07

## 2019-03-22 RX ADMIN — METOCLOPRAMIDE 10 MG: 5 INJECTION, SOLUTION INTRAMUSCULAR; INTRAVENOUS at 01:02

## 2019-03-22 RX ADMIN — GABAPENTIN 100 MG: 100 CAPSULE ORAL at 20:54

## 2019-03-22 RX ADMIN — SODIUM CHLORIDE, PRESERVATIVE FREE 10 ML: 5 INJECTION INTRAVENOUS at 01:03

## 2019-03-22 RX ADMIN — LEVOTHYROXINE SODIUM 88 MCG: 88 TABLET ORAL at 06:07

## 2019-03-22 RX ADMIN — SODIUM CHLORIDE: 9 INJECTION, SOLUTION INTRAVENOUS at 06:52

## 2019-03-22 RX ADMIN — SODIUM CHLORIDE, PRESERVATIVE FREE 10 ML: 5 INJECTION INTRAVENOUS at 20:54

## 2019-03-22 RX ADMIN — ENOXAPARIN SODIUM 30 MG: 30 INJECTION SUBCUTANEOUS at 10:34

## 2019-03-22 RX ADMIN — SODIUM CHLORIDE: 9 INJECTION, SOLUTION INTRAVENOUS at 19:00

## 2019-03-22 RX ADMIN — SIMVASTATIN 20 MG: 20 TABLET, FILM COATED ORAL at 20:54

## 2019-03-22 RX ADMIN — ASPIRIN 81 MG 81 MG: 81 TABLET ORAL at 10:33

## 2019-03-22 RX ADMIN — Medication: at 15:51

## 2019-03-22 RX ADMIN — GABAPENTIN 100 MG: 100 CAPSULE ORAL at 10:32

## 2019-03-22 RX ADMIN — CETIRIZINE HYDROCHLORIDE 5 MG: 10 TABLET, FILM COATED ORAL at 10:32

## 2019-03-22 RX ADMIN — DOCUSATE SODIUM 100 MG: 100 CAPSULE, LIQUID FILLED ORAL at 10:33

## 2019-03-22 RX ADMIN — GABAPENTIN 100 MG: 100 CAPSULE ORAL at 15:49

## 2019-03-22 RX ADMIN — AMLODIPINE BESYLATE 10 MG: 10 TABLET ORAL at 10:32

## 2019-03-22 RX ADMIN — INSULIN LISPRO 2 UNITS: 100 INJECTION, SOLUTION INTRAVENOUS; SUBCUTANEOUS at 20:54

## 2019-03-22 RX ADMIN — INSULIN GLARGINE 50 UNITS: 100 INJECTION, SOLUTION SUBCUTANEOUS at 20:55

## 2019-03-22 RX ADMIN — DOCUSATE SODIUM 100 MG: 100 CAPSULE, LIQUID FILLED ORAL at 20:54

## 2019-03-22 RX ADMIN — METOCLOPRAMIDE 10 MG: 5 INJECTION, SOLUTION INTRAMUSCULAR; INTRAVENOUS at 06:07

## 2019-03-22 RX ADMIN — LATANOPROST 1 DROP: 50 SOLUTION OPHTHALMIC at 20:54

## 2019-03-22 ASSESSMENT — PAIN SCALES - GENERAL: PAINLEVEL_OUTOF10: 0

## 2019-03-22 NOTE — PROGRESS NOTES
tablet, Take 1 tablet by mouth nightly  gabapentin (NEURONTIN) 100 MG capsule, Take 1 capsule by mouth 3 times daily for 180 days. .  vitamin C (ASCORBIC ACID) 500 MG tablet, Take 1,000 mg by mouth 3 times daily  beta carotene 49887 units capsule, Take 25,000 Units by mouth daily  vitamin E 400 UNIT capsule, Take 800 Units by mouth daily  Selenium 100 MCG TABS, Take 300 mcg by mouth daily  calcium carbonate (OSCAL) 500 MG TABS tablet, Take 500 mg by mouth daily  aspirin 81 MG tablet, Take 1 tablet by mouth  NOVOLOG FLEXPEN 100 UNIT/ML injection pen, 14 units with her largest meal  latanoprost (XALATAN) 0.005 % ophthalmic solution,   Cetirizine HCl 10 MG CAPS, Take by mouth  Cranberry 86242 MG CAPS, Take by mouth  Docusate Sodium (STOOL SOFTENER) 100 MG TABS, Take by mouth  GARLIC PO, Take by mouth  Multiple Vitamins-Minerals (ICAPS AREDS 2 PO), Take by mouth  ferrous sulfate 325 (65 Fe) MG tablet, Take 325 mg by mouth daily (with breakfast)  ranitidine (ACID REDUCER) 150 MG tablet, Take 150 mg by mouth 2 times daily  Pomegranate, Punica granatum, (POMEGRANATE PO), Take 500 mg by mouth daily    Current Medications  Current Facility-Administered Medications   Medication Dose Route Frequency Provider Last Rate Last Dose    pill splitter             aspirin chewable tablet 81 mg  81 mg Oral Daily Donna Hernandez MD   81 mg at 03/22/19 1033    atomoxetine (STRATTERA) capsule 40 mg  40 mg Oral Daily Donna Hernandez MD        beta carotene capsule 25,000 Units  25,000 Units Oral Daily Donna Hernandez MD        calcium elemental (OSCAL) tablet 500 mg  500 mg Oral Daily Donna Hernandez MD        gabapentin (NEURONTIN) capsule 100 mg  100 mg Oral TID Donna Hernandez MD   100 mg at 03/22/19 1032    latanoprost (XALATAN) 0.005 % ophthalmic solution 1 drop  1 drop Both Eyes Nightly Donna Hernandez MD   1 drop at 03/21/19 3460    levothyroxine (SYNTHROID) tablet 88 mcg  88 mcg Oral Daily Donna Hernandez MD   88 mcg Intravenous Q4H PRN Verna Benitez MD        labetalol (NORMODYNE;TRANDATE) 10 mg in sodium chloride 0.9 % 50 mL IVPB  10 mg Intravenous Q4H PRN Verna Benitez MD        ondansetron [facility]) injection 4 mg  4 mg Intravenous Q4H PRN Verna Benitez MD   4 mg at 03/21/19 2239    prochlorperazine (COMPAZINE) injection 10 mg  10 mg Intravenous Q6H PRN Verna Benitez MD             Allergies  Allergies   Allergen Reactions    Bupropion      Other reaction(s): Other - comment required  Suicidal ideation    Nsaids      Other reaction(s): Other - comment required  Elevated Serum Creatinine    Nateglinide Nausea Only    Ofloxacin Hives    Paroxetine Hcl Hives     Other reaction(s): Other - comment required  Shakey/nerveous    Pioglitazone      Other reaction(s): Other - comment required  Dizzy / nauseated       REVIEW OF SYSTEMS     Within above limitations. 14 point review of systems reviewed. Pertinent positive or negative as per HPI or otherwise negative per 14 point systems review. Reviewed 3/22/2019 at 2:00 PM    PHYSICAL EXAM       Blood pressure (!) 156/74, pulse 95, temperature 98.1 °F (36.7 °C), temperature source Oral, resp. rate 17, height 5' 4\" (1.626 m), weight 141 lb (64 kg), SpO2 98 %, not currently breastfeeding. General - AAO x 3  Psych - Appropriate affect/speech. No agitation  Eyes - Eye lids intact. No scleral icterus  ENT - Lips wnl. External ear clear/dry/intact. No thyromegaly on inspection  Neuro - No gross peripheral or central neuro deficits on inspection  Heart - Sinus. RRR. S1 and S2 present. No elevated JVD appreciated   Lung - Adequate air entry b/l, No crackles/wheezes appreciated  GI -  Soft. No guarding/rigidity.  BS+   - No CVA/suprapubic tenderness or palpable bladder distension      LABS AND IMAGING   CBC  [unfilled]    Last 3 Hemoglobin  Lab Results   Component Value Date    HGB 12.5 03/21/2019    HGB 11.8 02/28/2018     Last 3 WBC/ANC  Lab Results   Component Value Date improvement    Hypothyroid  HLD    Lovenox ppx    [address] [facility], Internal Medicine  3/22/2019 at 2:00 PM

## 2019-03-22 NOTE — PLAN OF CARE
Nutrition Problem: Predicted suboptimal energy intake  Intervention: Food and/or Nutrient Delivery: Continue current diet, Start ONS  Nutritional Goals: Patient will tolerate diet to consume at least 70% of meals durign stay

## 2019-03-22 NOTE — PLAN OF CARE
Problem: Nausea/Vomiting:  Goal: Absence of nausea/vomiting  Description  Absence of nausea/vomiting  Outcome: Ongoing     Problem: Nausea/Vomiting:  Goal: Able to drink  Description  Able to drink  Outcome: Met This Shift  Goal: Able to eat  Description  Able to eat  Outcome: Met This Shift

## 2019-03-22 NOTE — PROGRESS NOTES
Nutrition Assessment    Type and Reason for Visit: Initial, Positive Nutrition Screen(weight loss, nausea, vomiting)    Nutrition Recommendations:    Continue carb controlled diet    Offer glucerna oral nutrition supplement as needed    Nutrition Assessment: Patient currently moderate nutrition risk with predicted suboptimal intake related to recent illness as evidenced by 2 day hx nausea and vomiting, hx weight loss. Able to eat some of meals today. Malnutrition Assessment:  · Malnutrition Status: At risk for malnutrition  · Context: Acute illness or injury    Nutrition Risk Level:  Moderate    Nutrient Needs:  · Estimated Daily Total Kcal: 1016-3053 (25-30 elizabeth/kg actual weight with BMI 24.3)  · Estimated Daily Protein (g): 64-77 (1-12. g/kg current wt)  · Estimated Daily Total Fluid (ml/day): 1238-6969 (1 ml/elizabeth)    Nutrition Diagnosis:   · Problem: Predicted suboptimal energy intake  · Etiology: related to Other (Comment)(illness -viral )     Signs and symptoms:  as evidenced by Diet history of poor intake, Vomiting, Nausea    Objective Information:  · Nutrition-Focused Physical Findings: covered up asleep in bed, meal tray present with some of meal eaten  · Wound Type: None  · Current Nutrition Therapies:  · Oral Diet Orders: Carb Control 4 Carbs/Meal   · Oral Diet intake: %, 26-50%  · Oral Nutrition Supplement (ONS) Orders: None  · ONS intake:    · Anthropometric Measures:  · Ht: 5' 4\" (162.6 cm)   · Current Body Wt: 141 lb 1.5 oz (64 kg)  · % Weight Change:  ,  per records loss in past 1-2 months 5%  · Ideal Body Wt: 120 lb (54.4 kg), % Ideal Body 117  · BMI Classification: BMI 18.5 - 24.9 Normal Weight(BMI-24.3 )    Nutrition Interventions:   Continue current diet, Start ONS  Continued Inpatient Monitoring, Education not appropriate at this time, Coordination of Care    Nutrition Evaluation:   · Evaluation: Goals set   · Goals: Patient will tolerate diet to consume at least 70% of meals clar[place] stay     · Monitoring: Meal Intake, Diet Tolerance, Weight, Pertinent Labs, Nausea or Vomiting      Electronically signed by Deborah Benton RD, LD on 3/22/19 at 12:59 PM    Contact Number: [phone]

## 2019-03-22 NOTE — PROGRESS NOTES
Progress Note( Dr. Dorota Hughes)  3/22/2019  Subjective:   Admit Date: 3/21/2019  PCP: Petra West MD    Admitted For : Nausea and vomiting and dehydration        Consulted For: better control of Blood glucose     Interval History: Feels better     Denies any chest pains,   Mild SOB . Denies nausea or vomiting. No new bowel or bladder symptoms. Intake/Output Summary (Last 24 hours) at 3/22/2019 0725  Last data filed at 3/22/2019 0616  Gross per 24 hour   Intake 1170 ml   Output 502 ml   Net 668 ml       DATA    CBC:   Recent Labs     03/21/19  0151   WBC 10.3   HGB 12.5       CMP:  Recent Labs     03/21/19  0151      K 4.8   CL 93*   CO2 27   BUN 37*   CREATININE 2.0*   CALCIUM 8.9   PROT 8.4*   LABALBU 4.5   BILITOT 0.3   ALKPHOS 55   AST 35   ALT 45*     Lipids:   Lab Results   Component Value Date    CHOL 182 11/30/2018    HDL 50 11/30/2018    TRIG 217 11/30/2018     Glucose:  Recent Labs     03/21/19  1641 03/21/19  2236 03/22/19  0106   POCGLU 124* 235* 160*     [id number]:  Lab Results   Component Value Date    LABA1C 10.0 03/21/2019     High Sensitivity TSH:   Lab Results   Component Value Date    TSHHS 1.220 03/21/2019     Free T3: No results found for: FT3  Free T4:  Lab Results   Component Value Date    T4FREE 1.47 03/21/2019       Xr Chest Standard (2 Vw)    Result Date: 3/21/2019  EXAMINATION: TWO VIEWS OF THE CHEST 3/21/2019 3:15 am COMPARISON: None. HISTORY: ORDERING SYSTEM PROVIDED HISTORY: cough TECHNOLOGIST PROVIDED HISTORY: Reason for exam:->cough   No radiographic evidence of acute cardiopulmonary disease.        Scheduled Medicines   Medications:    aspirin  81 mg Oral Daily    atomoxetine  40 mg Oral Daily    beta carotene  25,000 Units Oral Daily    calcium elemental  500 mg Oral Daily    gabapentin  100 mg Oral TID    latanoprost  1 drop Both Eyes Nightly    levothyroxine  88 mcg Oral Daily    simvastatin  20 mg Oral Nightly    sodium chloride flush  10 mL

## 2019-03-23 VITALS
TEMPERATURE: 98.8 F | SYSTOLIC BLOOD PRESSURE: 145 MMHG | HEIGHT: 64 IN | BODY MASS INDEX: 25.01 KG/M2 | OXYGEN SATURATION: 93 % | DIASTOLIC BLOOD PRESSURE: 70 MMHG | HEART RATE: 98 BPM | RESPIRATION RATE: 20 BRPM | WEIGHT: 146.5 LBS

## 2019-03-23 LAB
ANION GAP SERPL CALCULATED.3IONS-SCNC: 10 MMOL/L (ref 4–16)
BUN BLDV-MCNC: 21 MG/DL (ref 6–23)
CALCIUM SERPL-MCNC: 7.5 MG/DL (ref 8.3–10.6)
CHLORIDE BLD-SCNC: 104 MMOL/L (ref 99–110)
CO2: 25 MMOL/L (ref 21–32)
CREAT SERPL-MCNC: 1.7 MG/DL (ref 0.6–1.1)
GFR AFRICAN AMERICAN: 36 ML/MIN/1.73M2
GFR NON-AFRICAN AMERICAN: 30 ML/MIN/1.73M2
GLUCOSE BLD-MCNC: 174 MG/DL (ref 70–99)
GLUCOSE BLD-MCNC: 180 MG/DL (ref 70–99)
GLUCOSE BLD-MCNC: 194 MG/DL (ref 70–99)
GLUCOSE BLD-MCNC: 233 MG/DL (ref 70–99)
POTASSIUM SERPL-SCNC: 3.9 MMOL/L (ref 3.5–5.1)
SODIUM BLD-SCNC: 139 MMOL/L (ref 135–145)

## 2019-03-23 PROCEDURE — 82962 GLUCOSE BLOOD TEST: CPT

## 2019-03-23 PROCEDURE — 6360000002 HC RX W HCPCS: Performed by: INTERNAL MEDICINE

## 2019-03-23 PROCEDURE — 36415 COLL VENOUS BLD VENIPUNCTURE: CPT

## 2019-03-23 PROCEDURE — 6370000000 HC RX 637 (ALT 250 FOR IP): Performed by: INTERNAL MEDICINE

## 2019-03-23 PROCEDURE — 94761 N-INVAS EAR/PLS OXIMETRY MLT: CPT

## 2019-03-23 PROCEDURE — 80048 BASIC METABOLIC PNL TOTAL CA: CPT

## 2019-03-23 RX ORDER — INSULIN ASPART 100 [IU]/ML
15 INJECTION, SOLUTION INTRAVENOUS; SUBCUTANEOUS
Qty: 5 PEN | Refills: 0 | Status: SHIPPED
Start: 2019-03-23 | End: 2019-04-03

## 2019-03-23 RX ORDER — METFORMIN HYDROCHLORIDE 500 MG/1
500 TABLET, EXTENDED RELEASE ORAL 2 TIMES DAILY
Qty: 60 TABLET | Refills: 0 | Status: SHIPPED
Start: 2019-03-23 | End: 2019-05-07 | Stop reason: SDUPTHER

## 2019-03-23 RX ORDER — AMLODIPINE BESYLATE 10 MG/1
10 TABLET ORAL DAILY
Qty: 90 TABLET | Refills: 0 | Status: SHIPPED | OUTPATIENT
Start: 2019-03-23 | End: 2019-04-03 | Stop reason: ALTCHOICE

## 2019-03-23 RX ADMIN — CETIRIZINE HYDROCHLORIDE 5 MG: 10 TABLET, FILM COATED ORAL at 09:24

## 2019-03-23 RX ADMIN — LEVOTHYROXINE SODIUM 88 MCG: 88 TABLET ORAL at 06:49

## 2019-03-23 RX ADMIN — AMLODIPINE BESYLATE 10 MG: 10 TABLET ORAL at 09:25

## 2019-03-23 RX ADMIN — INSULIN LISPRO 2 UNITS: 100 INJECTION, SOLUTION INTRAVENOUS; SUBCUTANEOUS at 09:28

## 2019-03-23 RX ADMIN — ASPIRIN 81 MG 81 MG: 81 TABLET ORAL at 09:25

## 2019-03-23 RX ADMIN — GABAPENTIN 100 MG: 100 CAPSULE ORAL at 09:25

## 2019-03-23 RX ADMIN — ENOXAPARIN SODIUM 30 MG: 30 INJECTION SUBCUTANEOUS at 09:26

## 2019-03-23 RX ADMIN — DOCUSATE SODIUM 100 MG: 100 CAPSULE, LIQUID FILLED ORAL at 09:25

## 2019-03-23 RX ADMIN — INSULIN LISPRO 2 UNITS: 100 INJECTION, SOLUTION INTRAVENOUS; SUBCUTANEOUS at 02:13

## 2019-03-23 NOTE — PROGRESS NOTES
Progress Note( Dr. Mackey Backjustine)  3/23/2019  Subjective:   Admit Date: 3/21/2019  PCP: Darcie Edwards MD    Admitted For : Nausea and vomiting and dehydration        Consulted For: better control of Blood glucose     Interval History: Feels better     Denies any chest pains,   Mild SOB . Denies nausea or vomiting. No new bowel or bladder symptoms. Intake/Output Summary (Last 24 hours) at 3/23/2019 0711  Last data filed at 3/23/2019 0418  Gross per 24 hour   Intake --   Output 1200 ml   Net -1200 ml       DATA    CBC:   Recent Labs     03/21/19  0151   WBC 10.3   HGB 12.5       CMP:  Recent Labs     03/21/19  0151 03/22/19  0831 03/23/19  0604    140 139   K 4.8 3.7 3.9   CL 93* 103 104   CO2 27 27 25   BUN 37* 31* 21   CREATININE 2.0* 2.0* 1.7*   CALCIUM 8.9 8.0* 7.5*   PROT 8.4* 6.7  --    LABALBU 4.5 3.4  --    BILITOT 0.3 0.3  --    ALKPHOS 55 41  --    AST 35 20  --    ALT 45* 25  --      Lipids:   Lab Results   Component Value Date    CHOL 182 11/30/2018    HDL 50 11/30/2018    TRIG 217 11/30/2018     Glucose:  Recent Labs     03/22/19  1709 03/22/19  2052 03/23/19  0212   POCGLU 130* 166* 174*     [id number]:  Lab Results   Component Value Date    LABA1C 10.0 03/21/2019     High Sensitivity TSH:   Lab Results   Component Value Date    TSHHS 1.220 03/21/2019     Free T3: No results found for: FT3  Free T4:  Lab Results   Component Value Date    T4FREE 1.47 03/21/2019       Xr Chest Standard (2 Vw)    Result Date: 3/21/2019  EXAMINATION: TWO VIEWS OF THE CHEST 3/21/2019 3:15 am COMPARISON: None. HISTORY: ORDERING SYSTEM PROVIDED HISTORY: cough TECHNOLOGIST PROVIDED HISTORY: Reason for exam:->cough   No radiographic evidence of acute cardiopulmonary disease.        Scheduled Medicines   Medications:    aspirin  81 mg Oral Daily    atomoxetine  40 mg Oral Daily    beta carotene  25,000 Units Oral Daily    calcium elemental  500 mg Oral Daily    gabapentin  100 mg Oral TID   Cely Phelps

## 2019-03-23 NOTE — DISCHARGE SUMMARY
Gavino Mccarthy 1950 [id number]  PCP:  Noreen Baez MD    Admit date: 3/21/2019  Admitting Physician: Marija Kovacs MD    Discharge date: 3/23/2019 Discharge Physician: Russ Johnson MD      Reason for admission:   Chief Complaint   Patient presents with    Emesis     Present on Admission:   ANTONINA (acute kidney injury) [facility])       Discharge Diagnoses & Hospital Course[de-identified]       ANTONINA on CKD  Suspect pre-renal from viral prodrome - viral gastroenteritis  - continue IVF  - attempt to wean off IV anti-emetic  - clinical condition improved and tolerating PO intake. Ok to discharge with close PCP f/u         DMII, IDDM  - uncontrolled, A1c 10  - at baseline, uses basaglar 30 am/ 20 pm and 14 U TIDAC, metformin  - endocrine assisting with adjustment and optimization - regimen adjusted     HTN  - add norvasc with improvement  - resume lisinopril late next week in setting of ANTONINA on CKD    Hypothyroid  HLD        Discharge instructions    Take note of insulin adjustment for better sugar control  Decrease metformin to 500mg BID (instead of 1000mg BID)  Hold lisinopril until Thursday 3/28 before resuming  Add norvasc for BP control - hold if systolic BP < 701    Exam:   Wt Readings from Last 3 Encounters:   03/23/19 146 lb 8 oz (66.5 kg)   03/20/19 145 lb 6.4 oz (66 kg)   02/05/19 151 lb (68.5 kg)       Blood pressure 136/75, pulse 98, temperature 99.7 °F (37.6 °C), temperature source Oral, resp. rate 18, height 5' 4\" (1.626 m), weight 146 lb 8 oz (66.5 kg), SpO2 93 %, not currently breastfeeding. General - AAO x 3  Psych - Appropriate affect/speech. No agitation  Eyes - Eye lids intact. No scleral icterus  ENT - Lips wnl. External ear clear/dry/intact. No thyromegaly on inspection  Heart - Sinus. RRR. S1 and S2 present. No elevated JVD appreciated  Lung - Adequate air entry b/l, No crackles/wheezes appreciated  GI -  Soft. No guarding/rigidity.          Significant Diagnostic Studies:   CBC:   Recent Labs TOUCH ULTRA TEST VI) strip  1 each by In Vitro route daily As needed. brompheniramine-pseudoephedrine-DM 2-30-10 MG/5ML syrup  Take 5 mLs by mouth 4 times daily as needed for Congestion or Cough             calcium carbonate (OSCAL) 500 MG TABS tablet  Take 500 mg by mouth daily             Cetirizine HCl 10 MG CAPS  Take by mouth             Cranberry 63531 MG CAPS  Take by mouth             Docusate Sodium (STOOL SOFTENER) 100 MG TABS  Take by mouth             ferrous sulfate 325 (65 Fe) MG tablet  Take 325 mg by mouth daily (with breakfast)             gabapentin (NEURONTIN) 100 MG capsule  Take 1 capsule by mouth 3 times daily for 180 days. Luciacee Taylor GARLIC PO  Take by mouth             insulin aspart (NOVOLOG FLEXPEN) 100 UNIT/ML injection pen  0-12 Units,ISS TIDAC, Glucose: Dose: If <139 No Insulin, 140-199 2 Units, 200-249 4 Units, 250-299 6 Units, 300-349 8 Units, 350-400 10 Units, Above 400 12 Units             insulin glargine (BASAGLAR KWIKPEN) 100 UNIT/ML injection pen  Inject 50 Units into the skin nightly Hold if qHS glucose < 150 or NPO overnight             latanoprost (XALATAN) 0.005 % ophthalmic solution               levothyroxine (SYNTHROID) 88 MCG tablet  Take 1 tablet by mouth Daily             lisinopril (PRINIVIL;ZESTRIL) 10 MG tablet  Take 1 tablet by mouth daily             LORazepam (ATIVAN) 0.5 MG tablet  Take one pill at bedtime and may take one other once a day as needed for anxiety.              lovastatin (MEVACOR) 40 MG tablet  Take 1 tablet by mouth nightly             metFORMIN (GLUCOPHAGE-XR) 500 MG extended release tablet  Take 1 tablet by mouth 2 times daily             Multiple Vitamins-Minerals (ICAPS AREDS 2 PO)  Take by mouth             NOVOLOG FLEXPEN 100 UNIT/ML injection pen  Inject 15 Units into the skin 3 times daily (before meals) Only use if you are eating your meals             ondansetron (ZOFRAN) 4 MG tablet  Take 1 tablet by mouth 3 times daily as needed for Nausea or Vomiting             ONETOUCH DELICA LANCETS 77Z MISC  Test blood sugar 1-2 times a day as directed. Dx: E11.65             Pomegranate, [place] granatum, (POMEGRANATE PO)  Take 500 mg by mouth daily             ranitidine (ACID REDUCER) 150 MG tablet  Take 150 mg by mouth 2 times daily             Selenium 100 MCG TABS  Take 300 mcg by mouth daily             vitamin C (ASCORBIC ACID) 500 MG tablet  Take 1,000 mg by mouth 3 times daily             vitamin E 400 UNIT capsule  Take 800 Units by mouth daily                  Code Status: Full Code     Consults:   IP CONSULT TO HOSPITALIST  IP CONSULT TO ENDOCRINOLOGY    Diet: cardiac diet and diabetic diet    Activity: activity as tolerated   Work:    Discharged Condition: fair    Prognosis: Fair    Disposition: home      Follow-up with     Follow-up With  Details  Why  Contact Info   Juan Ramon Stringer MD  Schedule an appointment as soon as possible for a visit in 1 week  for post hospitalization check  [address] [place] [address]   Reece Martini MD  Call  for diabetic care  [address]  [phone]            Discharge Physician Signed:   [address] [place] [address], Internal medicine  3/23/2019 at 8:55 AM    The patient was seen and examined on day of discharge and this discharge summary is in conjunction with any daily progress note from day of discharge.   Time spent on discharge in the examination, evaluation, counseling and review of medications and discharge plan: <30 minutes    Please forward this discharge summary to patient's PCP

## 2019-04-03 ENCOUNTER — OFFICE VISIT (OUTPATIENT)
Dept: FAMILY MEDICINE CLINIC | Age: 69
End: 2019-04-03
Payer: MEDICARE

## 2019-04-03 VITALS
OXYGEN SATURATION: 98 % | BODY MASS INDEX: 24.89 KG/M2 | HEART RATE: 104 BPM | TEMPERATURE: 96.5 F | DIASTOLIC BLOOD PRESSURE: 80 MMHG | WEIGHT: 145 LBS | SYSTOLIC BLOOD PRESSURE: 124 MMHG

## 2019-04-03 DIAGNOSIS — E11.40 TYPE 2 DIABETES MELLITUS WITH DIABETIC NEUROPATHY, WITH LONG-TERM CURRENT USE OF INSULIN (HCC): ICD-10-CM

## 2019-04-03 DIAGNOSIS — N17.9 AKI (ACUTE KIDNEY INJURY) (HCC): Primary | ICD-10-CM

## 2019-04-03 DIAGNOSIS — K52.9 AGE (ACUTE GASTROENTERITIS): ICD-10-CM

## 2019-04-03 DIAGNOSIS — Z79.4 TYPE 2 DIABETES MELLITUS WITH DIABETIC NEUROPATHY, WITH LONG-TERM CURRENT USE OF INSULIN (HCC): ICD-10-CM

## 2019-04-03 PROCEDURE — 99214 OFFICE O/P EST MOD 30 MIN: CPT | Performed by: FAMILY MEDICINE

## 2019-04-03 PROCEDURE — 36415 COLL VENOUS BLD VENIPUNCTURE: CPT | Performed by: FAMILY MEDICINE

## 2019-04-03 PROCEDURE — 1111F DSCHRG MED/CURRENT MED MERGE: CPT | Performed by: FAMILY MEDICINE

## 2019-04-03 RX ORDER — INSULIN ASPART 100 [IU]/ML
INJECTION, SOLUTION INTRAVENOUS; SUBCUTANEOUS
Qty: 5 PEN | Refills: 0
Start: 2019-04-03 | End: 2020-07-07 | Stop reason: DRUGHIGH

## 2019-04-03 ASSESSMENT — ENCOUNTER SYMPTOMS: SHORTNESS OF BREATH: 0

## 2019-04-03 NOTE — PROGRESS NOTES
Post-Discharge Transitional Care Management Services or Hospital Follow Up      Gavino Mccarthy   YOB: 1950    Date of Office Visit:  4/3/2019  Date of Hospital Admission: 3/21/19  Date of Hospital Discharge: 3/23/19  Readmission Risk Score(high >=14%. Medium >=10%):Readmission Risk Score: 16      Care management risk score Rising risk (score 2-5) and Complex Care (Scores >=6): 2     Non face to face  following discharge, date last encounter closed (first attempt may have been earlier): *No documented post hospital discharge outreach found in the last 14 days *No documented post hospital discharge outreach found in the last 14 days    Call initiated 2 business days of discharge: *No response recorded in the last 14 days     Patient Active Problem List   Diagnosis    Acquired hypothyroidism    Depression    Diabetic neuropathy [facility]    DM (diabetes mellitus) ([facility]    HTN (hypertension)    Hyperlipidemia    Osteopenia    Personal history of breast cancer    Allergic rhinitis    ANTONINA (acute kidney injury) ([facility]       Allergies   Allergen Reactions    Bupropion      Other reaction(s): Other - comment required  Suicidal ideation    Nsaids      Other reaction(s): Other - comment required  Elevated Serum Creatinine    Nateglinide Nausea Only    Ofloxacin Hives    Paroxetine Hcl Hives     Other reaction(s): Other - comment required  Shakey/nerveous    Pioglitazone      Other reaction(s): Other - comment required  Dizzy / nauseated       Medications listed as ordered at the time of discharge from hospital   [facility], [address] [place] Medication Instructions KELLY:    Printed on:04/03/19 1311   Medication Information                      aspirin 81 MG tablet  Take 1 tablet by mouth             beta carotene 99160 units capsule  Take 25,000 Units by mouth daily             blood glucose test strips (ASCENSIA AUTODISC VI;ONE TOUCH ULTRA TEST VI) strip  1 each by In Vitro route daily As needed. 500 MG tablet  Take 1,000 mg by mouth 3 times daily             vitamin E 400 UNIT capsule  Take 800 Units by mouth daily                   Medications marked \"taking\" at this time  Outpatient Medications Marked as Taking for the 4/3/19 encounter (Office Visit) with Rae Clemons MD   Medication Sig Dispense Refill    insulin glargine (BASAGLAR KWIKPEN) 100 UNIT/ML injection pen 30 units in the morning and 20 units at bed 15 pen 0    NOVOLOG FLEXPEN 100 UNIT/ML injection pen 14 units with largest meal of the day 5 pen 0    blood glucose test strips (ASCENSIA AUTODISC VI;ONE TOUCH ULTRA TEST VI) strip 1 each by In Vitro route daily As needed. 100 each 5    metFORMIN (GLUCOPHAGE-XR) 500 MG extended release tablet Take 1 tablet by mouth 2 times daily 60 tablet 0    ondansetron (ZOFRAN) 4 MG tablet Take 1 tablet by mouth 3 times daily as needed for Nausea or Vomiting 30 tablet 0    brompheniramine-pseudoephedrine-DM 2-30-10 MG/5ML syrup Take 5 mLs by mouth 4 times daily as needed for Congestion or Cough 1 Bottle 0    levothyroxine (SYNTHROID) 88 MCG tablet Take 1 tablet by mouth Daily 90 tablet 1    LORazepam (ATIVAN) 0.5 MG tablet Take one pill at bedtime and may take one other once a day as needed for anxiety. 90 tablet 1    lovastatin (MEVACOR) 40 MG tablet Take 1 tablet by mouth nightly 90 tablet 1    gabapentin (NEURONTIN) 100 MG capsule Take 1 capsule by mouth 3 times daily for 180 days. . 270 capsule 1    vitamin C (ASCORBIC ACID) 500 MG tablet Take 1,000 mg by mouth 3 times daily      beta carotene 93033 units capsule Take 25,000 Units by mouth daily      vitamin E 400 UNIT capsule Take 800 Units by mouth daily      Selenium 100 MCG TABS Take 300 mcg by mouth daily      calcium carbonate (OSCAL) 500 MG TABS tablet Take 500 mg by mouth daily      aspirin 81 MG tablet Take 1 tablet by mouth      latanoprost (XALATAN) 0.005 % ophthalmic solution       Cetirizine HCl 10 MG CAPS Take by mouth  Cranberry 57585 MG CAPS Take by mouth      Docusate Sodium (STOOL SOFTENER) 100 MG TABS Take by mouth      GARLIC PO Take by mouth      Multiple Vitamins-Minerals (ICAPS AREDS 2 PO) Take by mouth      ferrous sulfate 325 (65 Fe) MG tablet Take 325 mg by mouth daily (with breakfast)      ranitidine (ACID REDUCER) 150 MG tablet Take 150 mg by mouth 2 times daily      Pomegranate, [place] granatum, (POMEGRANATE PO) Take 500 mg by mouth daily          Medications patient taking as of now reconciled against medications ordered at time of hospital discharge: Yes    Chief Complaint   Patient presents with    Follow-Up from Hospital       HPI    Inpatient course: Discharge summary reviewed- see chart. Interval history/Current status: Since discharge she's been doing quite well. She still does not have her stamina back but has no more vomiting. Her energy is returning. Blood sugars are very good. She was not tolerating amlodipine well so she stopped taking it. She is not taking lisinopril as of yet. Her BP has been adequately controlled on no meds for 2 days. She is taking her insulin as prescribed previously to admission which is 15 units of NovoLog with her largest meal day 30 units of Basiglar in the morning and 20 units of Basiglar in the evening. She remains on 500 mg of metformin twice a day. Blood sugars are in the 120s to 150s. Review of Systems   Constitutional: Negative for fever and unexpected weight change. Respiratory: Negative for shortness of breath. Cardiovascular: Negative for chest pain. Vitals:    04/03/19 0733   BP: 124/80   Site: Left Upper Arm   Position: Sitting   Cuff Size: Medium Adult   Pulse: 104   Temp: 96.5 °F (35.8 °C)   TempSrc: Temporal   SpO2: 98%   Weight: 145 lb (65.8 kg)     Body mass index is 24.89 kg/m².    Wt Readings from Last 3 Encounters:   04/03/19 145 lb (65.8 kg)   03/23/19 146 lb 8 oz (66.5 kg)   03/20/19 145 lb 6.4 oz (66 kg)     BP Readings from Last 3 Encounters:   04/03/19 124/80   03/23/19 (!) 145/70   03/20/19 (!) 144/90       Physical Exam   Constitutional: She is oriented to person, place, and time. She appears well-developed and well-nourished. No distress. Cardiovascular: Normal rate and regular rhythm. Pulmonary/Chest: Effort normal and breath sounds normal.   Neurological: She is alert and oriented to person, place, and time. Psychiatric: She has a normal mood and affect. Nursing note and vitals reviewed. Assessment:   Diagnosis Orders   1. ANTONINA (acute kidney injury) ([facility]  AL DISCHARGE MEDS RECONCILED W/ CURRENT OUTPATIENT MED LIST    BASIC METABOLIC PANEL   2. AGE (acute gastroenteritis)  AL DISCHARGE MEDS RECONCILED W/ CURRENT OUTPATIENT MED LIST   3. Type 2 diabetes mellitus with diabetic neuropathy, with long-term current use of insulin ([facility])  AL DISCHARGE MEDS RECONCILED W/ CURRENT OUTPATIENT MED LIST    insulin glargine (BASAGLAR KWIKPEN) 100 UNIT/ML injection pen    NOVOLOG FLEXPEN 100 UNIT/ML injection pen     Plan:  1. We'll check BMP today to ensure renal function has returned to normal.  We'll resume lisinopril once that is happening. Continue metformin 500 mg twice a day as long as renal function is good. 2.  This has resolved. 3.  A1c was 10.0 in the hospital.  I think this is most likely because of her being sick. Her blood sugars since home have been good. Continue metformin twice a day and insulin as previously addressed. Call if blood sugars are consistently over 150. Keep follow-up in one month for diabetes.     Follow up 1 month DM, Lipids, HTN, et al.       Medical Decision Making: moderate complexity

## 2019-04-04 LAB
ANION GAP SERPL CALCULATED.3IONS-SCNC: 20 MMOL/L (ref 3–16)
BUN BLDV-MCNC: 25 MG/DL (ref 7–20)
CALCIUM SERPL-MCNC: 10.3 MG/DL (ref 8.3–10.6)
CHLORIDE BLD-SCNC: 103 MMOL/L (ref 99–110)
CO2: 18 MMOL/L (ref 21–32)
CREAT SERPL-MCNC: 1.5 MG/DL (ref 0.6–1.2)
GFR AFRICAN AMERICAN: 42
GFR NON-AFRICAN AMERICAN: 34
GLUCOSE BLD-MCNC: 135 MG/DL (ref 70–99)
POTASSIUM SERPL-SCNC: 5.8 MMOL/L (ref 3.5–5.1)
SODIUM BLD-SCNC: 141 MMOL/L (ref 136–145)

## 2019-05-07 ENCOUNTER — OFFICE VISIT (OUTPATIENT)
Dept: FAMILY MEDICINE CLINIC | Age: 69
End: 2019-05-07
Payer: MEDICARE

## 2019-05-07 ENCOUNTER — TELEPHONE (OUTPATIENT)
Dept: FAMILY MEDICINE CLINIC | Age: 69
End: 2019-05-07

## 2019-05-07 VITALS
SYSTOLIC BLOOD PRESSURE: 148 MMHG | DIASTOLIC BLOOD PRESSURE: 82 MMHG | HEART RATE: 100 BPM | WEIGHT: 148.8 LBS | OXYGEN SATURATION: 98 % | BODY MASS INDEX: 25.54 KG/M2 | TEMPERATURE: 96.8 F

## 2019-05-07 DIAGNOSIS — E11.40 TYPE 2 DIABETES MELLITUS WITH DIABETIC NEUROPATHY, WITH LONG-TERM CURRENT USE OF INSULIN (HCC): Primary | ICD-10-CM

## 2019-05-07 DIAGNOSIS — F32.A DEPRESSION, UNSPECIFIED DEPRESSION TYPE: ICD-10-CM

## 2019-05-07 DIAGNOSIS — E03.9 ACQUIRED HYPOTHYROIDISM: ICD-10-CM

## 2019-05-07 DIAGNOSIS — E78.2 MIXED HYPERLIPIDEMIA: ICD-10-CM

## 2019-05-07 DIAGNOSIS — Z79.4 TYPE 2 DIABETES MELLITUS WITH DIABETIC NEUROPATHY, WITH LONG-TERM CURRENT USE OF INSULIN (HCC): Primary | ICD-10-CM

## 2019-05-07 DIAGNOSIS — Z71.3 DIETARY COUNSELING: ICD-10-CM

## 2019-05-07 DIAGNOSIS — I10 ESSENTIAL HYPERTENSION: ICD-10-CM

## 2019-05-07 LAB — HBA1C MFR BLD: 8.9 %

## 2019-05-07 PROCEDURE — 3017F COLORECTAL CA SCREEN DOC REV: CPT | Performed by: FAMILY MEDICINE

## 2019-05-07 PROCEDURE — 99214 OFFICE O/P EST MOD 30 MIN: CPT | Performed by: FAMILY MEDICINE

## 2019-05-07 PROCEDURE — G8419 CALC BMI OUT NRM PARAM NOF/U: HCPCS | Performed by: FAMILY MEDICINE

## 2019-05-07 PROCEDURE — 1123F ACP DISCUSS/DSCN MKR DOCD: CPT | Performed by: FAMILY MEDICINE

## 2019-05-07 PROCEDURE — 4040F PNEUMOC VAC/ADMIN/RCVD: CPT | Performed by: FAMILY MEDICINE

## 2019-05-07 PROCEDURE — 1090F PRES/ABSN URINE INCON ASSESS: CPT | Performed by: FAMILY MEDICINE

## 2019-05-07 PROCEDURE — G8427 DOCREV CUR MEDS BY ELIG CLIN: HCPCS | Performed by: FAMILY MEDICINE

## 2019-05-07 PROCEDURE — 3045F PR MOST RECENT HEMOGLOBIN A1C LEVEL 7.0-9.0%: CPT | Performed by: FAMILY MEDICINE

## 2019-05-07 PROCEDURE — 83036 HEMOGLOBIN GLYCOSYLATED A1C: CPT | Performed by: FAMILY MEDICINE

## 2019-05-07 PROCEDURE — 2022F DILAT RTA XM EVC RTNOPTHY: CPT | Performed by: FAMILY MEDICINE

## 2019-05-07 PROCEDURE — 1036F TOBACCO NON-USER: CPT | Performed by: FAMILY MEDICINE

## 2019-05-07 PROCEDURE — G8400 PT W/DXA NO RESULTS DOC: HCPCS | Performed by: FAMILY MEDICINE

## 2019-05-07 PROCEDURE — 36415 COLL VENOUS BLD VENIPUNCTURE: CPT | Performed by: FAMILY MEDICINE

## 2019-05-07 RX ORDER — SCOLOPAMINE TRANSDERMAL SYSTEM 1 MG/1
1 PATCH, EXTENDED RELEASE TRANSDERMAL
Qty: 7 PATCH | Refills: 0 | Status: SHIPPED | OUTPATIENT
Start: 2019-05-07 | End: 2019-06-06

## 2019-05-07 RX ORDER — GABAPENTIN 100 MG/1
100 CAPSULE ORAL 3 TIMES DAILY
Qty: 270 CAPSULE | Refills: 1 | Status: SHIPPED | OUTPATIENT
Start: 2019-05-07 | End: 2019-11-19 | Stop reason: SDUPTHER

## 2019-05-07 RX ORDER — LOVASTATIN 40 MG/1
40 TABLET ORAL NIGHTLY
Qty: 90 TABLET | Refills: 1 | Status: SHIPPED | OUTPATIENT
Start: 2019-05-07 | End: 2019-11-19 | Stop reason: SDUPTHER

## 2019-05-07 RX ORDER — METFORMIN HYDROCHLORIDE 500 MG/1
500 TABLET, EXTENDED RELEASE ORAL 2 TIMES DAILY
Qty: 180 TABLET | Refills: 0 | Status: SHIPPED | OUTPATIENT
Start: 2019-05-07 | End: 2019-09-09 | Stop reason: SDUPTHER

## 2019-05-07 RX ORDER — LORAZEPAM 0.5 MG/1
TABLET ORAL
Qty: 90 TABLET | Refills: 1 | Status: SHIPPED | OUTPATIENT
Start: 2019-05-07 | End: 2019-11-04

## 2019-05-07 RX ORDER — LISINOPRIL 10 MG/1
10 TABLET ORAL DAILY
Qty: 90 TABLET | Refills: 1 | Status: SHIPPED | OUTPATIENT
Start: 2019-05-07 | End: 2019-12-02 | Stop reason: SDUPTHER

## 2019-05-07 RX ORDER — LEVOTHYROXINE SODIUM 88 UG/1
88 TABLET ORAL DAILY
Qty: 90 TABLET | Refills: 1 | Status: SHIPPED | OUTPATIENT
Start: 2019-05-07 | End: 2019-12-17 | Stop reason: SDUPTHER

## 2019-05-07 ASSESSMENT — ENCOUNTER SYMPTOMS: SHORTNESS OF BREATH: 0

## 2019-05-07 NOTE — PROGRESS NOTES
 Smoking status: Never Smoker    Smokeless tobacco: Never Used   Substance and Sexual Activity    Alcohol use: No    Drug use: No    Sexual activity: Not on file   Lifestyle    Physical activity:     Days per week: Not on file     Minutes per session: Not on file    Stress: Not on file   Relationships    Social connections:     Talks on phone: Not on file     Gets together: Not on file     Attends [employer] service: Not on file     Active member of club or organization: Not on file     Attends meetings of clubs or organizations: Not on file     Relationship status: Not on file    Intimate partner violence:     Fear of current or ex partner: Not on file     Emotionally abused: Not on file     Physically abused: Not on file     Forced sexual activity: Not on file   Other Topics Concern    Not on file   Social History Narrative    Not on file     No family history on file. Objective:   Physical Exam   Constitutional: She is oriented to person, place, and time. She appears well-developed and well-nourished. No distress. Cardiovascular: Normal rate and regular rhythm. Pulmonary/Chest: Effort normal and breath sounds normal. She has no wheezes. She has no rales. Musculoskeletal: She exhibits no edema. Neurological: She is alert and oriented to person, place, and time. Psychiatric: She has a normal mood and affect. Nursing note and vitals reviewed. Assessment:       Diagnosis Orders   1. Type 2 diabetes mellitus with diabetic neuropathy, with long-term current use of insulin (HCC)  insulin glargine (BASAGLAR KWIKPEN) 100 UNIT/ML injection pen    gabapentin (NEURONTIN) 100 MG capsule    metFORMIN (GLUCOPHAGE-XR) 500 MG extended release tablet    POCT glycosylated hemoglobin (Hb A1C)    Comprehensive Metabolic Panel, Fasting   2. Dietary counseling     3. Essential hypertension  lisinopril (PRINIVIL;ZESTRIL) 10 MG tablet    Comprehensive Metabolic Panel, Fasting   4.  Mixed hyperlipidemia lovastatin (MEVACOR) 40 MG tablet    Lipid, Fasting    Comprehensive Metabolic Panel, Fasting   5. Depression, unspecified depression type  LORazepam (ATIVAN) 0.5 MG tablet   6. Acquired hypothyroidism  levothyroxine (SYNTHROID) 88 MCG tablet           Plan:      1 & 2. Her A1c is 8.9%. That's an improvement over March which was 10%. About the same as in February before hospitalization. I encouraged her to continue to work on lifestyle modifications. We spent 25 minutes reviewing dietary suggestions. More fruits more vegetables more whole gr more plant food less processed food less dairy and less animal proteins. We discussed the goal of complete avoidance of animal proteins up at all possible. We discussed the differences of foods high in carbohydrates with or without fiber and the impact that holds. She will continue to work on this. 3.  Blood pressure is adequately controlled on current therapy. CMP obtained to evaluate labs. 4.  Continue lovastatin therapy. Check lipids for stabilization. 5.  Continue lorazepam as needed. Stable. Controlled Substances Monitoring:     RX Monitoring 5/7/2019   Attestation The Prescription Monitoring Report for this patient was reviewed today. Chronic Pain Routine Monitoring -      6. Refill thyroid medication labs are due in 6 months. Follow-up 3 months: Diabetes, diet. Current Outpatient Medications:     insulin glargine (BASAGLAR KWIKPEN) 100 UNIT/ML injection pen, 30 units in the morning and 20 units at bed, Disp: 15 pen, Rfl: 0    gabapentin (NEURONTIN) 100 MG capsule, Take 1 capsule by mouth 3 times daily for 180 days. , Disp: 270 capsule, Rfl: 1    lisinopril (PRINIVIL;ZESTRIL) 10 MG tablet, Take 1 tablet by mouth daily, Disp: 90 tablet, Rfl: 1    lovastatin (MEVACOR) 40 MG tablet, Take 1 tablet by mouth nightly, Disp: 90 tablet, Rfl: 1    LORazepam (ATIVAN) 0.5 MG tablet, Take one pill at bedtime and may take one other once a day as needed for anxiety, Disp: 90 tablet, Rfl: 1    metFORMIN (GLUCOPHAGE-XR) 500 MG extended release tablet, Take 1 tablet by mouth 2 times daily, Disp: 180 tablet, Rfl: 0    levothyroxine (SYNTHROID) 88 MCG tablet, Take 1 tablet by mouth Daily, Disp: 90 tablet, Rfl: 1    blood glucose test strips (ASCENSIA AUTODISC VI;ONE TOUCH ULTRA TEST VI) strip, 1 each by In Vitro route daily As needed. Dx: E11.40, Disp: 100 each, Rfl: 5    NOVOLOG FLEXPEN 100 UNIT/ML injection pen, 14 units with largest meal of the day, Disp: 5 pen, Rfl: 0    ONETOUCH DELICA LANCETS 12V MISC, Test blood sugar 1-2 times a day as directed.  Dx: E11.65, Disp: 100 each, Rfl: 5    ondansetron (ZOFRAN) 4 MG tablet, Take 1 tablet by mouth 3 times daily as needed for Nausea or Vomiting, Disp: 30 tablet, Rfl: 0    brompheniramine-pseudoephedrine-DM 2-30-10 MG/5ML syrup, Take 5 mLs by mouth 4 times daily as needed for Congestion or Cough, Disp: 1 Bottle, Rfl: 0    vitamin C (ASCORBIC ACID) 500 MG tablet, Take 1,000 mg by mouth 3 times daily, Disp: , Rfl:     beta carotene 05854 units capsule, Take 25,000 Units by mouth daily, Disp: , Rfl:     vitamin E 400 UNIT capsule, Take 800 Units by mouth daily, Disp: , Rfl:     Selenium 100 MCG TABS, Take 300 mcg by mouth daily, Disp: , Rfl:     calcium carbonate (OSCAL) 500 MG TABS tablet, Take 500 mg by mouth daily, Disp: , Rfl:     aspirin 81 MG tablet, Take 1 tablet by mouth, Disp: , Rfl:     latanoprost (XALATAN) 0.005 % ophthalmic solution, , Disp: , Rfl:     Cetirizine HCl 10 MG CAPS, Take by mouth, Disp: , Rfl:     Cranberry 72091 MG CAPS, Take by mouth, Disp: , Rfl:     Docusate Sodium (STOOL SOFTENER) 100 MG TABS, Take by mouth, Disp: , Rfl:     GARLIC PO, Take by mouth, Disp: , Rfl:     Multiple Vitamins-Minerals (ICAPS AREDS 2 PO), Take by mouth, Disp: , Rfl:     ferrous sulfate 325 (65 Fe) MG tablet, Take 325 mg by mouth daily (with breakfast), Disp: , Rfl:     ranitidine (ACID REDUCER) 150 MG tablet, Take 150 mg by mouth 2 times daily, Disp: , Rfl:     Pomegranate, [place] granatum, (POMEGRANATE PO), Take 500 mg by mouth daily, Disp: , Rfl:           Arin Esposito MD

## 2019-05-07 NOTE — TELEPHONE ENCOUNTER
Pt called state she asked for a patch for when she gets on the plane.    Pt wanted to know if you could send it to [facility]

## 2019-05-08 LAB
A/G RATIO: 1.3 (ref 1.1–2.2)
ALBUMIN SERPL-MCNC: 4.7 G/DL (ref 3.4–5)
ALP BLD-CCNC: 65 U/L (ref 40–129)
ALT SERPL-CCNC: 18 U/L (ref 10–40)
ANION GAP SERPL CALCULATED.3IONS-SCNC: 20 MMOL/L (ref 3–16)
AST SERPL-CCNC: 24 U/L (ref 15–37)
BILIRUB SERPL-MCNC: 0.3 MG/DL (ref 0–1)
BUN BLDV-MCNC: 23 MG/DL (ref 7–20)
CALCIUM SERPL-MCNC: 10.1 MG/DL (ref 8.3–10.6)
CHLORIDE BLD-SCNC: 97 MMOL/L (ref 99–110)
CHOLESTEROL, FASTING: 157 MG/DL (ref 0–199)
CO2: 21 MMOL/L (ref 21–32)
CREAT SERPL-MCNC: 1.1 MG/DL (ref 0.6–1.2)
GFR AFRICAN AMERICAN: 60
GFR NON-AFRICAN AMERICAN: 49
GLOBULIN: 3.5 G/DL
GLUCOSE FASTING: 175 MG/DL (ref 70–99)
HDLC SERPL-MCNC: 50 MG/DL (ref 40–60)
LDL CHOLESTEROL CALCULATED: 71 MG/DL
POTASSIUM SERPL-SCNC: 5.1 MMOL/L (ref 3.5–5.1)
SODIUM BLD-SCNC: 138 MMOL/L (ref 136–145)
TOTAL PROTEIN: 8.2 G/DL (ref 6.4–8.2)
TRIGLYCERIDE, FASTING: 182 MG/DL (ref 0–150)
VLDLC SERPL CALC-MCNC: 36 MG/DL

## 2019-06-19 ENCOUNTER — TELEPHONE (OUTPATIENT)
Dept: FAMILY MEDICINE CLINIC | Age: 69
End: 2019-06-19

## 2019-06-19 DIAGNOSIS — Z79.4 TYPE 2 DIABETES MELLITUS WITH DIABETIC NEUROPATHY, WITH LONG-TERM CURRENT USE OF INSULIN (HCC): ICD-10-CM

## 2019-06-19 DIAGNOSIS — E11.40 TYPE 2 DIABETES MELLITUS WITH DIABETIC NEUROPATHY, WITH LONG-TERM CURRENT USE OF INSULIN (HCC): ICD-10-CM

## 2019-07-15 ENCOUNTER — TELEPHONE (OUTPATIENT)
Dept: FAMILY MEDICINE CLINIC | Age: 69
End: 2019-07-15

## 2019-07-15 DIAGNOSIS — Z79.4 TYPE 2 DIABETES MELLITUS WITH DIABETIC NEUROPATHY, WITH LONG-TERM CURRENT USE OF INSULIN (HCC): ICD-10-CM

## 2019-07-15 DIAGNOSIS — E11.40 TYPE 2 DIABETES MELLITUS WITH DIABETIC NEUROPATHY, WITH LONG-TERM CURRENT USE OF INSULIN (HCC): ICD-10-CM

## 2019-07-17 NOTE — TELEPHONE ENCOUNTER
Please check with the pharmacy as to if that most recent script was filled and if it was, then why it wasn't filled as ordered.

## 2019-07-24 ENCOUNTER — OFFICE VISIT (OUTPATIENT)
Dept: FAMILY MEDICINE CLINIC | Age: 69
End: 2019-07-24
Payer: MEDICARE

## 2019-07-24 VITALS
SYSTOLIC BLOOD PRESSURE: 158 MMHG | TEMPERATURE: 96.1 F | OXYGEN SATURATION: 92 % | WEIGHT: 150 LBS | BODY MASS INDEX: 25.75 KG/M2 | DIASTOLIC BLOOD PRESSURE: 90 MMHG | HEART RATE: 101 BPM

## 2019-07-24 DIAGNOSIS — J01.90 ACUTE NON-RECURRENT SINUSITIS, UNSPECIFIED LOCATION: Primary | ICD-10-CM

## 2019-07-24 PROCEDURE — G8419 CALC BMI OUT NRM PARAM NOF/U: HCPCS | Performed by: FAMILY MEDICINE

## 2019-07-24 PROCEDURE — 99213 OFFICE O/P EST LOW 20 MIN: CPT | Performed by: FAMILY MEDICINE

## 2019-07-24 PROCEDURE — 1036F TOBACCO NON-USER: CPT | Performed by: FAMILY MEDICINE

## 2019-07-24 PROCEDURE — 4040F PNEUMOC VAC/ADMIN/RCVD: CPT | Performed by: FAMILY MEDICINE

## 2019-07-24 PROCEDURE — G8427 DOCREV CUR MEDS BY ELIG CLIN: HCPCS | Performed by: FAMILY MEDICINE

## 2019-07-24 PROCEDURE — 1123F ACP DISCUSS/DSCN MKR DOCD: CPT | Performed by: FAMILY MEDICINE

## 2019-07-24 PROCEDURE — 1090F PRES/ABSN URINE INCON ASSESS: CPT | Performed by: FAMILY MEDICINE

## 2019-07-24 PROCEDURE — G8400 PT W/DXA NO RESULTS DOC: HCPCS | Performed by: FAMILY MEDICINE

## 2019-07-24 PROCEDURE — 3017F COLORECTAL CA SCREEN DOC REV: CPT | Performed by: FAMILY MEDICINE

## 2019-07-24 RX ORDER — AMOXICILLIN 875 MG/1
875 TABLET, COATED ORAL 2 TIMES DAILY
Qty: 20 TABLET | Refills: 0 | Status: SHIPPED | OUTPATIENT
Start: 2019-07-24 | End: 2019-08-03

## 2019-07-24 ASSESSMENT — ENCOUNTER SYMPTOMS
SINUS COMPLAINT: 1
COUGH: 1
HOARSE VOICE: 1
SINUS PRESSURE: 1
SORE THROAT: 1
SHORTNESS OF BREATH: 0

## 2019-08-05 ENCOUNTER — TELEPHONE (OUTPATIENT)
Dept: FAMILY MEDICINE CLINIC | Age: 69
End: 2019-08-05

## 2019-08-05 RX ORDER — DOXYCYCLINE HYCLATE 100 MG
100 TABLET ORAL 2 TIMES DAILY
Qty: 14 TABLET | Refills: 0 | Status: SHIPPED | OUTPATIENT
Start: 2019-08-05 | End: 2019-08-12

## 2019-09-09 ENCOUNTER — OFFICE VISIT (OUTPATIENT)
Dept: FAMILY MEDICINE CLINIC | Age: 69
End: 2019-09-09
Payer: MEDICARE

## 2019-09-09 VITALS
BODY MASS INDEX: 26.26 KG/M2 | WEIGHT: 153 LBS | TEMPERATURE: 96.7 F | SYSTOLIC BLOOD PRESSURE: 122 MMHG | DIASTOLIC BLOOD PRESSURE: 86 MMHG | OXYGEN SATURATION: 97 % | HEART RATE: 113 BPM

## 2019-09-09 DIAGNOSIS — Z79.4 TYPE 2 DIABETES MELLITUS WITH DIABETIC NEUROPATHY, WITH LONG-TERM CURRENT USE OF INSULIN (HCC): Primary | ICD-10-CM

## 2019-09-09 DIAGNOSIS — F32.4 MAJOR DEPRESSIVE DISORDER IN PARTIAL REMISSION, UNSPECIFIED WHETHER RECURRENT (HCC): ICD-10-CM

## 2019-09-09 DIAGNOSIS — Z78.0 POST-MENOPAUSAL: ICD-10-CM

## 2019-09-09 DIAGNOSIS — E11.40 TYPE 2 DIABETES MELLITUS WITH DIABETIC NEUROPATHY, WITH LONG-TERM CURRENT USE OF INSULIN (HCC): Primary | ICD-10-CM

## 2019-09-09 PROCEDURE — 4040F PNEUMOC VAC/ADMIN/RCVD: CPT | Performed by: FAMILY MEDICINE

## 2019-09-09 PROCEDURE — 1123F ACP DISCUSS/DSCN MKR DOCD: CPT | Performed by: FAMILY MEDICINE

## 2019-09-09 PROCEDURE — 1090F PRES/ABSN URINE INCON ASSESS: CPT | Performed by: FAMILY MEDICINE

## 2019-09-09 PROCEDURE — G8400 PT W/DXA NO RESULTS DOC: HCPCS | Performed by: FAMILY MEDICINE

## 2019-09-09 PROCEDURE — 3017F COLORECTAL CA SCREEN DOC REV: CPT | Performed by: FAMILY MEDICINE

## 2019-09-09 PROCEDURE — G8419 CALC BMI OUT NRM PARAM NOF/U: HCPCS | Performed by: FAMILY MEDICINE

## 2019-09-09 PROCEDURE — 2022F DILAT RTA XM EVC RTNOPTHY: CPT | Performed by: FAMILY MEDICINE

## 2019-09-09 PROCEDURE — 99213 OFFICE O/P EST LOW 20 MIN: CPT | Performed by: FAMILY MEDICINE

## 2019-09-09 PROCEDURE — 3045F PR MOST RECENT HEMOGLOBIN A1C LEVEL 7.0-9.0%: CPT | Performed by: FAMILY MEDICINE

## 2019-09-09 PROCEDURE — 83036 HEMOGLOBIN GLYCOSYLATED A1C: CPT | Performed by: FAMILY MEDICINE

## 2019-09-09 PROCEDURE — 1036F TOBACCO NON-USER: CPT | Performed by: FAMILY MEDICINE

## 2019-09-09 PROCEDURE — G8427 DOCREV CUR MEDS BY ELIG CLIN: HCPCS | Performed by: FAMILY MEDICINE

## 2019-09-09 RX ORDER — BIMATOPROST 0.3 MG/ML
1 SOLUTION/ DROPS OPHTHALMIC NIGHTLY
COMMUNITY

## 2019-09-09 RX ORDER — TRAMADOL HYDROCHLORIDE 50 MG/1
50 TABLET ORAL EVERY 8 HOURS PRN
Qty: 50 TABLET | Refills: 0 | Status: SHIPPED | OUTPATIENT
Start: 2019-09-09 | End: 2019-10-09

## 2019-09-09 RX ORDER — METFORMIN HYDROCHLORIDE 500 MG/1
500 TABLET, EXTENDED RELEASE ORAL 3 TIMES DAILY
Qty: 90 TABLET | Refills: 2 | Status: SHIPPED | OUTPATIENT
Start: 2019-09-09 | End: 2019-12-17 | Stop reason: SDUPTHER

## 2019-09-09 ASSESSMENT — ENCOUNTER SYMPTOMS: SHORTNESS OF BREATH: 0

## 2019-09-10 ENCOUNTER — TELEPHONE (OUTPATIENT)
Dept: FAMILY MEDICINE CLINIC | Age: 69
End: 2019-09-10

## 2019-09-23 ENCOUNTER — TELEPHONE (OUTPATIENT)
Dept: FAMILY MEDICINE CLINIC | Age: 69
End: 2019-09-23

## 2019-11-19 DIAGNOSIS — Z79.4 TYPE 2 DIABETES MELLITUS WITH DIABETIC NEUROPATHY, WITH LONG-TERM CURRENT USE OF INSULIN (HCC): ICD-10-CM

## 2019-11-19 DIAGNOSIS — E78.2 MIXED HYPERLIPIDEMIA: ICD-10-CM

## 2019-11-19 DIAGNOSIS — E11.40 TYPE 2 DIABETES MELLITUS WITH DIABETIC NEUROPATHY, WITH LONG-TERM CURRENT USE OF INSULIN (HCC): ICD-10-CM

## 2019-11-19 RX ORDER — LOVASTATIN 40 MG/1
40 TABLET ORAL NIGHTLY
Qty: 90 TABLET | Refills: 0 | Status: SHIPPED | OUTPATIENT
Start: 2019-11-19 | End: 2020-02-10 | Stop reason: SDUPTHER

## 2019-11-19 RX ORDER — GABAPENTIN 100 MG/1
100 CAPSULE ORAL 3 TIMES DAILY
Qty: 270 CAPSULE | Refills: 0 | Status: SHIPPED | OUTPATIENT
Start: 2019-11-19 | End: 2020-02-10 | Stop reason: SDUPTHER

## 2019-12-02 DIAGNOSIS — I10 ESSENTIAL HYPERTENSION: ICD-10-CM

## 2019-12-02 DIAGNOSIS — F32.4 MAJOR DEPRESSIVE DISORDER IN PARTIAL REMISSION, UNSPECIFIED WHETHER RECURRENT (HCC): Primary | ICD-10-CM

## 2019-12-02 RX ORDER — LORAZEPAM 0.5 MG/1
TABLET ORAL
Qty: 90 TABLET | Refills: 0 | Status: SHIPPED | OUTPATIENT
Start: 2019-12-02 | End: 2020-03-02

## 2019-12-02 RX ORDER — LISINOPRIL 10 MG/1
10 TABLET ORAL DAILY
Qty: 30 TABLET | Refills: 0 | Status: SHIPPED | OUTPATIENT
Start: 2019-12-02 | End: 2019-12-17 | Stop reason: SDUPTHER

## 2019-12-17 ENCOUNTER — OFFICE VISIT (OUTPATIENT)
Dept: FAMILY MEDICINE CLINIC | Age: 69
End: 2019-12-17
Payer: MEDICARE

## 2019-12-17 VITALS
TEMPERATURE: 95.5 F | DIASTOLIC BLOOD PRESSURE: 78 MMHG | OXYGEN SATURATION: 94 % | BODY MASS INDEX: 25.85 KG/M2 | HEART RATE: 118 BPM | SYSTOLIC BLOOD PRESSURE: 132 MMHG | WEIGHT: 150.6 LBS

## 2019-12-17 DIAGNOSIS — F32.4 MAJOR DEPRESSIVE DISORDER IN PARTIAL REMISSION, UNSPECIFIED WHETHER RECURRENT (HCC): ICD-10-CM

## 2019-12-17 DIAGNOSIS — E03.9 ACQUIRED HYPOTHYROIDISM: ICD-10-CM

## 2019-12-17 DIAGNOSIS — E78.2 MIXED HYPERLIPIDEMIA: ICD-10-CM

## 2019-12-17 DIAGNOSIS — Z79.4 TYPE 2 DIABETES MELLITUS WITH DIABETIC NEUROPATHY, WITH LONG-TERM CURRENT USE OF INSULIN (HCC): Primary | ICD-10-CM

## 2019-12-17 DIAGNOSIS — E11.40 TYPE 2 DIABETES MELLITUS WITH DIABETIC NEUROPATHY, WITH LONG-TERM CURRENT USE OF INSULIN (HCC): Primary | ICD-10-CM

## 2019-12-17 DIAGNOSIS — I10 ESSENTIAL HYPERTENSION: ICD-10-CM

## 2019-12-17 DIAGNOSIS — Z85.3 PERSONAL HISTORY OF BREAST CANCER: ICD-10-CM

## 2019-12-17 LAB
A/G RATIO: 1.5 (ref 1.1–2.2)
ALBUMIN SERPL-MCNC: 4.8 G/DL (ref 3.4–5)
ALP BLD-CCNC: 56 U/L (ref 40–129)
ALT SERPL-CCNC: 33 U/L (ref 10–40)
ANION GAP SERPL CALCULATED.3IONS-SCNC: 22 MMOL/L (ref 3–16)
AST SERPL-CCNC: 29 U/L (ref 15–37)
BACTERIA: ABNORMAL /HPF
BILIRUB SERPL-MCNC: 0.4 MG/DL (ref 0–1)
BILIRUBIN URINE: NEGATIVE
BLOOD, URINE: NEGATIVE
BUN BLDV-MCNC: 29 MG/DL (ref 7–20)
CALCIUM SERPL-MCNC: 10.2 MG/DL (ref 8.3–10.6)
CHLORIDE BLD-SCNC: 95 MMOL/L (ref 99–110)
CHOLESTEROL, FASTING: 153 MG/DL (ref 0–199)
CLARITY: CLEAR
CO2: 19 MMOL/L (ref 21–32)
COLOR: YELLOW
CREAT SERPL-MCNC: 1.4 MG/DL (ref 0.6–1.2)
CREATININE URINE: 149.7 MG/DL (ref 28–259)
EPITHELIAL CELLS, UA: 1 /HPF (ref 0–5)
GFR AFRICAN AMERICAN: 45
GFR NON-AFRICAN AMERICAN: 37
GLOBULIN: 3.2 G/DL
GLUCOSE FASTING: 244 MG/DL (ref 70–99)
GLUCOSE URINE: NEGATIVE MG/DL
HBA1C MFR BLD: 10.1 %
HDLC SERPL-MCNC: 51 MG/DL (ref 40–60)
HYALINE CASTS: 1 /LPF (ref 0–8)
KETONES, URINE: NEGATIVE MG/DL
LDL CHOLESTEROL CALCULATED: 60 MG/DL
LEUKOCYTE ESTERASE, URINE: ABNORMAL
MICROALBUMIN UR-MCNC: 2.2 MG/DL
MICROALBUMIN/CREAT UR-RTO: 14.7 MG/G (ref 0–30)
MICROSCOPIC EXAMINATION: YES
NITRITE, URINE: NEGATIVE
PH UA: 5.5 (ref 5–8)
POTASSIUM SERPL-SCNC: 5 MMOL/L (ref 3.5–5.1)
PROTEIN UA: NEGATIVE MG/DL
RBC UA: 0 /HPF (ref 0–4)
SODIUM BLD-SCNC: 136 MMOL/L (ref 136–145)
SPECIFIC GRAVITY UA: 1.02 (ref 1–1.03)
T4 FREE: 1.5 NG/DL (ref 0.9–1.8)
TOTAL PROTEIN: 8 G/DL (ref 6.4–8.2)
TRIGLYCERIDE, FASTING: 208 MG/DL (ref 0–150)
TSH SERPL DL<=0.05 MIU/L-ACNC: 1.35 UIU/ML (ref 0.27–4.2)
URINE TYPE: ABNORMAL
UROBILINOGEN, URINE: 0.2 E.U./DL
VLDLC SERPL CALC-MCNC: 42 MG/DL
WBC UA: 5 /HPF (ref 0–5)

## 2019-12-17 PROCEDURE — 83036 HEMOGLOBIN GLYCOSYLATED A1C: CPT | Performed by: FAMILY MEDICINE

## 2019-12-17 PROCEDURE — 2022F DILAT RTA XM EVC RTNOPTHY: CPT | Performed by: FAMILY MEDICINE

## 2019-12-17 PROCEDURE — G8484 FLU IMMUNIZE NO ADMIN: HCPCS | Performed by: FAMILY MEDICINE

## 2019-12-17 PROCEDURE — 3045F PR MOST RECENT HEMOGLOBIN A1C LEVEL 7.0-9.0%: CPT | Performed by: FAMILY MEDICINE

## 2019-12-17 PROCEDURE — G8400 PT W/DXA NO RESULTS DOC: HCPCS | Performed by: FAMILY MEDICINE

## 2019-12-17 PROCEDURE — 1036F TOBACCO NON-USER: CPT | Performed by: FAMILY MEDICINE

## 2019-12-17 PROCEDURE — 1123F ACP DISCUSS/DSCN MKR DOCD: CPT | Performed by: FAMILY MEDICINE

## 2019-12-17 PROCEDURE — 4040F PNEUMOC VAC/ADMIN/RCVD: CPT | Performed by: FAMILY MEDICINE

## 2019-12-17 PROCEDURE — 3017F COLORECTAL CA SCREEN DOC REV: CPT | Performed by: FAMILY MEDICINE

## 2019-12-17 PROCEDURE — 81003 URINALYSIS AUTO W/O SCOPE: CPT | Performed by: FAMILY MEDICINE

## 2019-12-17 PROCEDURE — G8417 CALC BMI ABV UP PARAM F/U: HCPCS | Performed by: FAMILY MEDICINE

## 2019-12-17 PROCEDURE — G8427 DOCREV CUR MEDS BY ELIG CLIN: HCPCS | Performed by: FAMILY MEDICINE

## 2019-12-17 PROCEDURE — 36415 COLL VENOUS BLD VENIPUNCTURE: CPT | Performed by: FAMILY MEDICINE

## 2019-12-17 PROCEDURE — 1090F PRES/ABSN URINE INCON ASSESS: CPT | Performed by: FAMILY MEDICINE

## 2019-12-17 PROCEDURE — 99214 OFFICE O/P EST MOD 30 MIN: CPT | Performed by: FAMILY MEDICINE

## 2019-12-17 RX ORDER — LISINOPRIL 10 MG/1
10 TABLET ORAL DAILY
Qty: 90 TABLET | Refills: 1 | Status: SHIPPED | OUTPATIENT
Start: 2019-12-17 | End: 2020-06-18 | Stop reason: SDUPTHER

## 2019-12-17 RX ORDER — LEVOTHYROXINE SODIUM 88 UG/1
88 TABLET ORAL DAILY
Qty: 90 TABLET | Refills: 3 | Status: SHIPPED | OUTPATIENT
Start: 2019-12-17 | End: 2021-01-08 | Stop reason: SDUPTHER

## 2019-12-17 RX ORDER — RISPERIDONE 1 MG/1
1 TABLET, FILM COATED ORAL DAILY
Qty: 30 TABLET | Refills: 0 | Status: SHIPPED | OUTPATIENT
Start: 2019-12-17 | End: 2020-01-13 | Stop reason: SDUPTHER

## 2019-12-17 RX ORDER — METFORMIN HYDROCHLORIDE 500 MG/1
500 TABLET, EXTENDED RELEASE ORAL 3 TIMES DAILY
Qty: 270 TABLET | Refills: 0 | Status: SHIPPED | OUTPATIENT
Start: 2019-12-17 | End: 2020-03-09 | Stop reason: SDUPTHER

## 2019-12-17 ASSESSMENT — ENCOUNTER SYMPTOMS: SHORTNESS OF BREATH: 0

## 2020-01-13 ENCOUNTER — OFFICE VISIT (OUTPATIENT)
Dept: FAMILY MEDICINE CLINIC | Age: 70
End: 2020-01-13
Payer: MEDICARE

## 2020-01-13 ENCOUNTER — HOSPITAL ENCOUNTER (OUTPATIENT)
Age: 70
Discharge: HOME | End: 2020-01-13
Payer: MEDICARE

## 2020-01-13 ENCOUNTER — TELEPHONE (OUTPATIENT)
Dept: FAMILY MEDICINE CLINIC | Age: 70
End: 2020-01-13

## 2020-01-13 VITALS
SYSTOLIC BLOOD PRESSURE: 132 MMHG | WEIGHT: 156.6 LBS | TEMPERATURE: 97.2 F | DIASTOLIC BLOOD PRESSURE: 84 MMHG | HEART RATE: 105 BPM | OXYGEN SATURATION: 93 % | BODY MASS INDEX: 26.88 KG/M2

## 2020-01-13 LAB
FOLATE: >20 NG/ML (ref 3.1–17.5)
VITAMIN B-12: 334.5 PG/ML (ref 211–911)
VITAMIN D 25-HYDROXY: 26.06 NG/ML

## 2020-01-13 PROCEDURE — G8484 FLU IMMUNIZE NO ADMIN: HCPCS | Performed by: FAMILY MEDICINE

## 2020-01-13 PROCEDURE — 82607 VITAMIN B-12: CPT

## 2020-01-13 PROCEDURE — 1036F TOBACCO NON-USER: CPT | Performed by: FAMILY MEDICINE

## 2020-01-13 PROCEDURE — 4040F PNEUMOC VAC/ADMIN/RCVD: CPT | Performed by: FAMILY MEDICINE

## 2020-01-13 PROCEDURE — 3017F COLORECTAL CA SCREEN DOC REV: CPT | Performed by: FAMILY MEDICINE

## 2020-01-13 PROCEDURE — 1090F PRES/ABSN URINE INCON ASSESS: CPT | Performed by: FAMILY MEDICINE

## 2020-01-13 PROCEDURE — 3046F HEMOGLOBIN A1C LEVEL >9.0%: CPT | Performed by: FAMILY MEDICINE

## 2020-01-13 PROCEDURE — G8428 CUR MEDS NOT DOCUMENT: HCPCS | Performed by: FAMILY MEDICINE

## 2020-01-13 PROCEDURE — 82746 ASSAY OF FOLIC ACID SERUM: CPT

## 2020-01-13 PROCEDURE — 2022F DILAT RTA XM EVC RTNOPTHY: CPT | Performed by: FAMILY MEDICINE

## 2020-01-13 PROCEDURE — 82306 VITAMIN D 25 HYDROXY: CPT

## 2020-01-13 PROCEDURE — 1123F ACP DISCUSS/DSCN MKR DOCD: CPT | Performed by: FAMILY MEDICINE

## 2020-01-13 PROCEDURE — 84252 ASSAY OF VITAMIN B-2: CPT

## 2020-01-13 PROCEDURE — G8417 CALC BMI ABV UP PARAM F/U: HCPCS | Performed by: FAMILY MEDICINE

## 2020-01-13 PROCEDURE — G8400 PT W/DXA NO RESULTS DOC: HCPCS | Performed by: FAMILY MEDICINE

## 2020-01-13 PROCEDURE — 99214 OFFICE O/P EST MOD 30 MIN: CPT | Performed by: FAMILY MEDICINE

## 2020-01-13 PROCEDURE — 36415 COLL VENOUS BLD VENIPUNCTURE: CPT

## 2020-01-13 RX ORDER — TRAMADOL HYDROCHLORIDE 50 MG/1
50 TABLET ORAL EVERY 8 HOURS PRN
Qty: 50 TABLET | Refills: 0 | Status: SHIPPED | OUTPATIENT
Start: 2020-01-13 | End: 2020-02-12

## 2020-01-13 RX ORDER — RISPERIDONE 0.5 MG/1
0.5 TABLET, FILM COATED ORAL NIGHTLY
Qty: 30 TABLET | Refills: 0 | Status: SHIPPED | OUTPATIENT
Start: 2020-01-13 | End: 2020-02-10 | Stop reason: SDUPTHER

## 2020-01-13 ASSESSMENT — ENCOUNTER SYMPTOMS: SHORTNESS OF BREATH: 0

## 2020-01-13 NOTE — PROGRESS NOTES
Relationship status: Not on file    Intimate partner violence:     Fear of current or ex partner: Not on file     Emotionally abused: Not on file     Physically abused: Not on file     Forced sexual activity: Not on file   Other Topics Concern    Not on file   Social History Narrative    Not on file     No family history on file. Objective:   Physical Exam  Vitals signs and nursing note reviewed. Constitutional:       Appearance: Normal appearance. She is not ill-appearing. Neck:      Musculoskeletal: Neck supple. No muscular tenderness. Cardiovascular:      Rate and Rhythm: Normal rate and regular rhythm. Heart sounds: No murmur. Pulmonary:      Effort: Pulmonary effort is normal.      Breath sounds: Normal breath sounds. No wheezing, rhonchi or rales. Abdominal:      General: Bowel sounds are normal.      Palpations: Abdomen is soft. There is no mass. Tenderness: There is no tenderness. Musculoskeletal:         General: No tenderness. Right lower leg: No edema. Left lower leg: No edema. Lymphadenopathy:      Cervical: No cervical adenopathy. Neurological:      General: No focal deficit present. Mental Status: She is alert and oriented to person, place, and time. Psychiatric:         Mood and Affect: Mood normal.         Behavior: Behavior normal.         Assessment:       Diagnosis Orders   1. Major depressive disorder in partial remission, unspecified whether recurrent ([facility])  VITAMIN D 25 HYDROXY    VITAMIN B12 & FOLATE    VITAMIN B2    risperiDONE (RISPERDAL) 0.5 MG tablet   2. Type 2 diabetes mellitus with diabetic neuropathy, with long-term current use of insulin ([facility])     3. Personal history of breast cancer  VITAMIN D 25 HYDROXY    VITAMIN B12 & FOLATE    VITAMIN B2    UNABLE TO FIND    UNABLE TO FIND   4. Midline low back pain without sciatica, unspecified chronicity  traMADol (ULTRAM) 50 MG tablet   5.  CKD (chronic kidney disease) stage 3, GFR 30-59

## 2020-01-14 LAB
CONTROL: NORMAL
HEMOCCULT STL QL: NEGATIVE

## 2020-01-14 PROCEDURE — 82274 ASSAY TEST FOR BLOOD FECAL: CPT | Performed by: FAMILY MEDICINE

## 2020-01-16 LAB
VITAMIN B2: 9 NMOL/L (ref 5–50)
VITAMIN B2: NORMAL NMOL/L (ref 5–50)

## 2020-01-24 ENCOUNTER — TELEPHONE (OUTPATIENT)
Dept: FAMILY MEDICINE CLINIC | Age: 70
End: 2020-01-24

## 2020-01-24 NOTE — TELEPHONE ENCOUNTER
Patient is at the pharmacy and she is only finding Vitamin D3 and wants to make sure this is the correct medication.  She has also checked with two pharmacies already and is unable to find any vitamin B-2

## 2020-01-24 NOTE — TELEPHONE ENCOUNTER
Patient left a message she stated she was able to order the B2 from [facility] and it will be coming in the mail.

## 2020-02-10 RX ORDER — LORAZEPAM 0.5 MG/1
TABLET ORAL
Qty: 90 TABLET | Refills: 0 | OUTPATIENT
Start: 2020-02-10 | End: 2020-05-11

## 2020-02-10 RX ORDER — GABAPENTIN 100 MG/1
100 CAPSULE ORAL 3 TIMES DAILY
Qty: 90 CAPSULE | Refills: 0 | Status: SHIPPED | OUTPATIENT
Start: 2020-02-10 | End: 2020-04-06 | Stop reason: SDUPTHER

## 2020-02-10 RX ORDER — LOVASTATIN 40 MG/1
40 TABLET ORAL NIGHTLY
Qty: 30 TABLET | Refills: 0 | Status: SHIPPED | OUTPATIENT
Start: 2020-02-10 | End: 2020-03-11 | Stop reason: SDUPTHER

## 2020-02-10 RX ORDER — RISPERIDONE 0.25 MG/1
0.25 TABLET, FILM COATED ORAL NIGHTLY
Qty: 30 TABLET | Refills: 0 | Status: SHIPPED | OUTPATIENT
Start: 2020-02-10 | End: 2020-03-11 | Stop reason: SDUPTHER

## 2020-02-10 NOTE — TELEPHONE ENCOUNTER
Prescription sent on all meds except for the Ativan. I will have to wait until all she sees Dr. Avel Killian.  She should not be out at this time.

## 2020-02-18 NOTE — TELEPHONE ENCOUNTER
Patient stated she is taking her Ativan one at bedtime. She sttaed she will also need a refill on her [address].

## 2020-03-02 ENCOUNTER — TELEPHONE (OUTPATIENT)
Dept: FAMILY MEDICINE CLINIC | Age: 70
End: 2020-03-02

## 2020-03-04 ENCOUNTER — TELEPHONE (OUTPATIENT)
Dept: FAMILY MEDICINE CLINIC | Age: 70
End: 2020-03-04

## 2020-03-04 RX ORDER — LORAZEPAM 0.5 MG/1
0.5 TABLET ORAL NIGHTLY PRN
Qty: 90 TABLET | Refills: 0 | Status: SHIPPED | OUTPATIENT
Start: 2020-03-04 | End: 2020-06-02

## 2020-03-04 NOTE — TELEPHONE ENCOUNTER
Script(s) sent. Controlled Substance Monitoring:    Acute and Chronic Pain Monitoring:   RX Monitoring 3/4/2020   Attestation -   Periodic Controlled Substance Monitoring No signs of potential drug abuse or diversion identified.

## 2020-03-09 RX ORDER — METFORMIN HYDROCHLORIDE 500 MG/1
500 TABLET, EXTENDED RELEASE ORAL 3 TIMES DAILY
Qty: 270 TABLET | Refills: 0 | Status: SHIPPED | OUTPATIENT
Start: 2020-03-09 | End: 2020-06-08 | Stop reason: SDUPTHER

## 2020-03-11 RX ORDER — LOVASTATIN 40 MG/1
40 TABLET ORAL NIGHTLY
Qty: 30 TABLET | Refills: 1 | Status: SHIPPED | OUTPATIENT
Start: 2020-03-11 | End: 2020-04-06 | Stop reason: SDUPTHER

## 2020-03-11 RX ORDER — RISPERIDONE 0.25 MG/1
0.25 TABLET, FILM COATED ORAL NIGHTLY
Qty: 30 TABLET | Refills: 1 | Status: SHIPPED | OUTPATIENT
Start: 2020-03-11 | End: 2020-05-06 | Stop reason: SDUPTHER

## 2020-04-06 RX ORDER — LOVASTATIN 40 MG/1
40 TABLET ORAL NIGHTLY
Qty: 90 TABLET | Refills: 0 | Status: SHIPPED | OUTPATIENT
Start: 2020-04-06 | End: 2020-06-18 | Stop reason: SDUPTHER

## 2020-04-06 RX ORDER — GABAPENTIN 100 MG/1
100 CAPSULE ORAL 3 TIMES DAILY
Qty: 90 CAPSULE | Refills: 2 | Status: SHIPPED | OUTPATIENT
Start: 2020-04-06 | End: 2020-07-07 | Stop reason: SDUPTHER

## 2020-04-15 RX ORDER — INSULIN GLARGINE 100 [IU]/ML
INJECTION, SOLUTION SUBCUTANEOUS
Qty: 15 PEN | Refills: 0 | Status: SHIPPED | OUTPATIENT
Start: 2020-04-15 | End: 2020-08-21 | Stop reason: SDUPTHER

## 2020-04-17 RX ORDER — LANCETS 33 GAUGE
EACH MISCELLANEOUS
Qty: 100 EACH | Refills: 5 | Status: SHIPPED | OUTPATIENT
Start: 2020-04-17

## 2020-04-29 ENCOUNTER — TELEPHONE (OUTPATIENT)
Dept: FAMILY MEDICINE CLINIC | Age: 70
End: 2020-04-29

## 2020-05-06 RX ORDER — RISPERIDONE 0.25 MG/1
0.25 TABLET, FILM COATED ORAL NIGHTLY
Qty: 30 TABLET | Refills: 0 | Status: SHIPPED | OUTPATIENT
Start: 2020-05-06 | End: 2020-06-08 | Stop reason: SDUPTHER

## 2020-06-08 RX ORDER — LORAZEPAM 1 MG/1
1 TABLET ORAL EVERY 8 HOURS PRN
Qty: 90 TABLET | Refills: 0 | Status: SHIPPED | OUTPATIENT
Start: 2020-06-08 | End: 2020-08-21 | Stop reason: SDUPTHER

## 2020-06-08 RX ORDER — METFORMIN HYDROCHLORIDE 500 MG/1
500 TABLET, EXTENDED RELEASE ORAL 3 TIMES DAILY
Qty: 90 TABLET | Refills: 0 | Status: SHIPPED | OUTPATIENT
Start: 2020-06-08 | End: 2020-07-07 | Stop reason: SDUPTHER

## 2020-06-08 RX ORDER — RISPERIDONE 0.25 MG/1
0.25 TABLET, FILM COATED ORAL NIGHTLY
Qty: 30 TABLET | Refills: 0 | Status: SHIPPED | OUTPATIENT
Start: 2020-06-08 | End: 2020-06-18 | Stop reason: SDUPTHER

## 2020-06-18 ENCOUNTER — VIRTUAL VISIT (OUTPATIENT)
Dept: FAMILY MEDICINE CLINIC | Age: 70
End: 2020-06-18
Payer: MEDICARE

## 2020-06-18 PROCEDURE — 99443 PR PHYS/QHP TELEPHONE EVALUATION 21-30 MIN: CPT | Performed by: FAMILY MEDICINE

## 2020-06-18 RX ORDER — RISPERIDONE 0.25 MG/1
0.25 TABLET, FILM COATED ORAL NIGHTLY
Qty: 90 TABLET | Refills: 1 | Status: SHIPPED | OUTPATIENT
Start: 2020-06-18 | End: 2020-12-01 | Stop reason: SDUPTHER

## 2020-06-18 RX ORDER — GLUCOSAMINE HCL/CHONDROITIN SU 500-400 MG
CAPSULE ORAL
Qty: 100 STRIP | Refills: 5 | Status: SHIPPED | OUTPATIENT
Start: 2020-06-18 | End: 2020-06-22

## 2020-06-18 RX ORDER — LISINOPRIL 10 MG/1
10 TABLET ORAL DAILY
Qty: 90 TABLET | Refills: 1 | Status: SHIPPED | OUTPATIENT
Start: 2020-06-18 | End: 2020-12-21 | Stop reason: SDUPTHER

## 2020-06-18 RX ORDER — LOVASTATIN 40 MG/1
40 TABLET ORAL NIGHTLY
Qty: 90 TABLET | Refills: 1 | Status: SHIPPED | OUTPATIENT
Start: 2020-06-18

## 2020-06-18 NOTE — PROGRESS NOTES
Component Value Date    CHOL 182 2018    TRIG 217 2018    HDL 51 2019    LDLCALC 60 2019        D&A  Chronic. On risperdal at night and ativan as needed. Works [employer]. Her anxiety has been higher soince COVID started. She is a social butterfly and her social life revolves around [employer] and family which has been limited. She has not been exercising as much as she should. Vit D def  Chronic. Taking VIt D as prescribed. No side effects. Due for labs. Vit B2 Def  Chronic. B vitamins make her belch but it is tolerable.      ROS: No CP or shortness of breath    Past Medical History:   Diagnosis Date    [facility] [facility]     Diabetes mellitus ([facility] [facility]     Glaucoma     Gout     Hyperlipidemia     Hypertension     Neuropathy     Thyroid disease      Past Surgical History:   Procedure Laterality Date     SECTION      MASTECTOMY, BILATERAL  10/26/2007     Social History     Socioeconomic History    Marital status:      Spouse name: Not on file    Number of children: Not on file    Years of education: Not on file    Highest education level: Not on file   Occupational History    Not on file   Social Needs    Financial resource strain: Not on file    Food insecurity     Worry: Not on file     Inability: Not on file    Transportation needs     Medical: Not on file     Non-medical: Not on file   Tobacco Use    Smoking status: Never Smoker    Smokeless tobacco: Never Used   Substance and Sexual Activity    Alcohol use: No    Drug use: No    Sexual activity: Not on file   Lifestyle    Physical activity     Days per week: Not on file     Minutes per session: Not on file    Stress: Not on file   Relationships    Social connections     Talks on phone: Not on file     Gets together: Not on file     Attends [employer] service: Not on file     Active member of club or organization: Not on file     Attends meetings of clubs or organizations: Not on file     Relationship status: Not on file    Intimate partner violence     Fear of current or ex partner: Not on file     Emotionally abused: Not on file     Physically abused: Not on file     Forced sexual activity: Not on file   Other Topics Concern    Not on file   Social History Narrative    Not on file     No family history on file. O:  Alert and oriented x 3  Normal respiratory effort    A/P:  1. DM  Uncontrolled based on home numbers. Get A1c to see where she is. Improve diet and exercise. Continue meds. 2. HTN  Controlled. Continue current meds. 3. Lipids  Check labs for stability. Continue statin therapy. 4. D&A  Uncontrolled. Continue meds. Get back to regular exercise, as this helps her a lot and she doesn't like taking med. 5. Vit D def  Check levels. Continue supplementation. 6. Vit B2 Def  Check levels. Continue supplementation. 7. CKD  Check CMP for levels. Drink adequate fluids. Follow up based on labs, anticipate 3 months. Current Outpatient Medications:     lisinopril (PRINIVIL;ZESTRIL) 10 MG tablet, Take 1 tablet by mouth daily, Disp: 90 tablet, Rfl: 1    blood glucose monitor strips, Test 2 times a day & as needed for symptoms of irregular blood glucose. Dispense sufficient amount for indicated testing frequency plus additional to accommodate PRN testing needs. , Disp: 100 strip, Rfl: 5    lovastatin (MEVACOR) 40 MG tablet, Take 1 tablet by mouth nightly, Disp: 90 tablet, Rfl: 1    risperiDONE (RISPERDAL) 0.25 MG tablet, Take 1 tablet by mouth nightly, Disp: 90 tablet, Rfl: 1    metFORMIN (GLUCOPHAGE-XR) 500 MG extended release tablet, Take 1 tablet by mouth 3 times daily, Disp: 90 tablet, Rfl: 0    LORazepam (ATIVAN) 1 MG tablet, Take 1 tablet by mouth every 8 hours as needed for Anxiety for up to 90 days. May take an additional one during the day as needed. , Disp: 90 tablet, Rfl: 0    OneTouch Delica Lancets 06A MISC, Test blood sugar 1-2 times a day as directed.  Dx: E11.65, Disp: 100 each, Rfl: 5    insulin glargine (BASAGLAR KWIKPEN) 100 UNIT/ML injection pen, 30 units in the morning and 20 units at bed, Disp: 15 pen, Rfl: 0    gabapentin (NEURONTIN) 100 MG capsule, Take 1 capsule by mouth 3 times daily for 90 days. , Disp: 90 capsule, Rfl: 2    UNABLE TO FIND, Bilateral breast prosthesis, Disp: 1 each, Rfl: 0    UNABLE TO FIND, Bra for bilateral prosthesis, Disp: 12 each, Rfl: 0    zoster recombinant adjuvanted vaccine (SHINGRIX) 50 MCG/0.5ML SUSR injection, Inject 0.5 mLs into the muscle See Admin Instructions 1 dose now and repeat in 2-6 months, Disp: 0.5 mL, Rfl: 0    levothyroxine (SYNTHROID) 88 MCG tablet, Take 1 tablet by mouth Daily, Disp: 90 tablet, Rfl: 3    bimatoprost (LUMIGAN) 0.03 % ophthalmic drops, Place 1 drop into both eyes nightly, Disp: , Rfl:     blood glucose test strips (ASCENSIA AUTODISC VI;ONE TOUCH ULTRA TEST VI) strip, 1 each by In Vitro route daily As needed.  Dx: E11.40, Disp: 100 each, Rfl: 5    NOVOLOG FLEXPEN 100 UNIT/ML injection pen, 14 units with largest meal of the day, Disp: 5 pen, Rfl: 0    calcium carbonate (OSCAL) 500 MG TABS tablet, Take 500 mg by mouth daily, Disp: , Rfl:     aspirin 81 MG tablet, Take 1 tablet by mouth, Disp: , Rfl:     Cetirizine HCl 10 MG CAPS, Take by mouth, Disp: , Rfl:     Cranberry 27488 MG CAPS, Take by mouth, Disp: , Rfl:     Docusate Sodium (STOOL SOFTENER) 100 MG TABS, Take by mouth, Disp: , Rfl:     GARLIC PO, Take by mouth, Disp: , Rfl:     Multiple Vitamins-Minerals (ICAPS AREDS 2 PO), Take by mouth, Disp: , Rfl:     ferrous sulfate 325 (65 Fe) MG tablet, Take 325 mg by mouth daily (with breakfast), Disp: , Rfl:     ranitidine (ACID REDUCER) 150 MG tablet, Take 150 mg by mouth 2 times daily, Disp: , Rfl:     Pomegranate, [place] granatum, (POMEGRANATE PO), Take 500 mg by mouth daily, Disp: , Rfl:        I affirm this is a Patient Initiated Episode with a Patient who has not had a related appointment within my department in the past 7 days or scheduled within the next 24 hours.     Patient identification was verified at the start of the visit: Yes    Total Time: minutes: 21-30 minutes    Note: not billable if this call serves to triage the patient into an appointment for the relevant concern      Marily Manuel

## 2020-06-22 RX ORDER — GLUCOSAMINE HCL/CHONDROITIN SU 500-400 MG
CAPSULE ORAL
Qty: 100 STRIP | Refills: 5 | Status: SHIPPED | OUTPATIENT
Start: 2020-06-22 | End: 2022-08-31

## 2020-07-06 ENCOUNTER — TELEPHONE (OUTPATIENT)
Dept: FAMILY MEDICINE CLINIC | Age: 70
End: 2020-07-06

## 2020-07-07 RX ORDER — INSULIN ASPART 100 [IU]/ML
INJECTION, SOLUTION INTRAVENOUS; SUBCUTANEOUS
Qty: 5 PEN | Refills: 0 | Status: SHIPPED
Start: 2020-07-07 | End: 2020-08-14 | Stop reason: SDUPTHER

## 2020-07-07 RX ORDER — METFORMIN HYDROCHLORIDE 500 MG/1
500 TABLET, EXTENDED RELEASE ORAL 3 TIMES DAILY
Qty: 90 TABLET | Refills: 2 | Status: SHIPPED | OUTPATIENT
Start: 2020-07-07 | End: 2020-09-15 | Stop reason: SDUPTHER

## 2020-07-07 RX ORDER — GABAPENTIN 100 MG/1
100 CAPSULE ORAL 3 TIMES DAILY
Qty: 90 CAPSULE | Refills: 2 | Status: SHIPPED | OUTPATIENT
Start: 2020-07-07 | End: 2020-09-15 | Stop reason: SDUPTHER

## 2020-07-07 RX ORDER — RISPERIDONE 0.25 MG/1
0.25 TABLET, FILM COATED ORAL NIGHTLY
Qty: 90 TABLET | Refills: 1 | Status: CANCELLED | OUTPATIENT
Start: 2020-07-07

## 2020-07-07 NOTE — TELEPHONE ENCOUNTER
I want her to take 7 units with the 1 or 2 smaller meals a day and continue the 14 units with the largest meal. Continue the basaglar at the current dose. Appt in 2 1/2 months.

## 2020-07-14 DIAGNOSIS — E53.0 VITAMIN B2 DEFICIENCY: ICD-10-CM

## 2020-08-12 RX ORDER — INSULIN GLARGINE 100 [IU]/ML
INJECTION, SOLUTION SUBCUTANEOUS
Qty: 15 PEN | Refills: 0 | OUTPATIENT
Start: 2020-08-12

## 2020-08-12 RX ORDER — INSULIN ASPART 100 [IU]/ML
INJECTION, SOLUTION INTRAVENOUS; SUBCUTANEOUS
Qty: 5 PEN | Refills: 0 | OUTPATIENT
Start: 2020-08-12

## 2020-08-14 ENCOUNTER — TELEPHONE (OUTPATIENT)
Dept: FAMILY MEDICINE CLINIC | Age: 70
End: 2020-08-14

## 2020-08-14 RX ORDER — INSULIN ASPART 100 [IU]/ML
INJECTION, SOLUTION INTRAVENOUS; SUBCUTANEOUS
Qty: 7 PEN | Refills: 0 | Status: SHIPPED | OUTPATIENT
Start: 2020-08-14

## 2020-08-21 RX ORDER — LORAZEPAM 1 MG/1
1 TABLET ORAL EVERY 8 HOURS PRN
Qty: 90 TABLET | Refills: 0 | Status: SHIPPED | OUTPATIENT
Start: 2020-09-06 | End: 2020-12-01 | Stop reason: SDUPTHER

## 2020-08-21 RX ORDER — GABAPENTIN 100 MG/1
100 CAPSULE ORAL 3 TIMES DAILY
Qty: 90 CAPSULE | Refills: 2 | OUTPATIENT
Start: 2020-08-21 | End: 2020-11-19

## 2020-08-21 RX ORDER — INSULIN GLARGINE 100 [IU]/ML
INJECTION, SOLUTION SUBCUTANEOUS
Qty: 15 PEN | Refills: 0 | Status: SHIPPED | OUTPATIENT
Start: 2020-08-21 | End: 2020-09-15 | Stop reason: SDUPTHER

## 2020-08-21 NOTE — TELEPHONE ENCOUNTER
Addtl request was meant to be for Basaglar, I accidentally pended the incorrect medication. Please review.

## 2020-08-26 RX ORDER — TRAMADOL HYDROCHLORIDE 50 MG/1
50 TABLET ORAL EVERY 4 HOURS PRN
COMMUNITY
Start: 2017-08-11 | End: 2020-08-26 | Stop reason: SDUPTHER

## 2020-08-26 RX ORDER — TRAMADOL HYDROCHLORIDE 50 MG/1
50 TABLET ORAL EVERY 8 HOURS PRN
Qty: 50 TABLET | Refills: 0 | Status: SHIPPED | OUTPATIENT
Start: 2020-08-26 | End: 2020-09-25

## 2020-08-26 NOTE — TELEPHONE ENCOUNTER
Script(s) sent. Controlled Substance Monitoring:    Acute and Chronic Pain Monitoring:   RX Monitoring 8/26/2020   Attestation -   Periodic Controlled Substance Monitoring No signs of potential drug abuse or diversion identified.

## 2020-09-15 ENCOUNTER — VIRTUAL VISIT (OUTPATIENT)
Dept: FAMILY MEDICINE CLINIC | Age: 70
End: 2020-09-15
Payer: MEDICARE

## 2020-09-15 PROCEDURE — 4040F PNEUMOC VAC/ADMIN/RCVD: CPT | Performed by: NURSE PRACTITIONER

## 2020-09-15 PROCEDURE — 99214 OFFICE O/P EST MOD 30 MIN: CPT | Performed by: NURSE PRACTITIONER

## 2020-09-15 PROCEDURE — 1090F PRES/ABSN URINE INCON ASSESS: CPT | Performed by: NURSE PRACTITIONER

## 2020-09-15 PROCEDURE — 1123F ACP DISCUSS/DSCN MKR DOCD: CPT | Performed by: NURSE PRACTITIONER

## 2020-09-15 PROCEDURE — G8400 PT W/DXA NO RESULTS DOC: HCPCS | Performed by: NURSE PRACTITIONER

## 2020-09-15 PROCEDURE — 2022F DILAT RTA XM EVC RTNOPTHY: CPT | Performed by: NURSE PRACTITIONER

## 2020-09-15 PROCEDURE — 3046F HEMOGLOBIN A1C LEVEL >9.0%: CPT | Performed by: NURSE PRACTITIONER

## 2020-09-15 PROCEDURE — 3017F COLORECTAL CA SCREEN DOC REV: CPT | Performed by: NURSE PRACTITIONER

## 2020-09-15 PROCEDURE — G8427 DOCREV CUR MEDS BY ELIG CLIN: HCPCS | Performed by: NURSE PRACTITIONER

## 2020-09-15 RX ORDER — GABAPENTIN 100 MG/1
100 CAPSULE ORAL 3 TIMES DAILY
Qty: 90 CAPSULE | Refills: 2 | Status: SHIPPED | OUTPATIENT
Start: 2020-09-15 | End: 2022-11-02

## 2020-09-15 RX ORDER — INSULIN GLARGINE 100 [IU]/ML
35 INJECTION, SOLUTION SUBCUTANEOUS 2 TIMES DAILY
Qty: 15 PEN | Refills: 3 | Status: SHIPPED | OUTPATIENT
Start: 2020-09-15

## 2020-09-15 RX ORDER — AMOXICILLIN 500 MG/1
500 CAPSULE ORAL 2 TIMES DAILY
Qty: 14 CAPSULE | Refills: 0 | Status: SHIPPED | OUTPATIENT
Start: 2020-09-15 | End: 2020-09-22

## 2020-09-15 RX ORDER — METFORMIN HYDROCHLORIDE 500 MG/1
500 TABLET, EXTENDED RELEASE ORAL 3 TIMES DAILY
Qty: 90 TABLET | Refills: 2 | Status: SHIPPED | OUTPATIENT
Start: 2020-09-15 | End: 2022-11-01

## 2020-09-15 ASSESSMENT — ENCOUNTER SYMPTOMS
APNEA: 0
ABDOMINAL PAIN: 0
VOMITING: 0
SINUS PRESSURE: 0
DIARRHEA: 0
SINUS PAIN: 0
NAUSEA: 0
SHORTNESS OF BREATH: 0
CHEST TIGHTNESS: 0
COUGH: 0
COLOR CHANGE: 0

## 2020-09-15 NOTE — PROGRESS NOTES
9/15/2020    TELEHEALTH EVALUATION -- Audio/Visual (During [facility]-32 public health emergency)    HPI:    Chandra Kulkarni (:  1950) has requested an audio/video evaluation for the following concern(s):    Patient compliant with all current medications for diabetes. Blood sugars have been averaging around 200-250, and the patient reports checking their blood sugar 2-3 time daily. Patient has had no episodes of significant hypoglycemia, fainting, dizziness, or loss of consciousness. Currently on regiment as follows:  -Novolog 7 units breakfast, 7 units with lunch, 14 units with dinner  -Metformin 1500 mg daily (not on 2000 mg d/t CKD)  -Basaglar 30 units in morning, 25 units in evening    The patient is currently taking all hypertensive medications compliantly without c/o of any side effects. Denies chest pain, dyspnea, edema, palpiations. Blood pressure has been averaging  120/80 over the last few months. Overall, her neuropathic pain remains well controlled on current Gabapentin therapy; Denies any concerns at this time. Patient also with c/o increasing sinus pain/pressure, nasal congestion, frontal headaches, and PND over the last 1-2 weeks. Denies any cough, fevers, or shortness of breath. Review of Systems   Constitutional: Negative for activity change, appetite change, fatigue and fever. HENT: Negative for congestion, nosebleeds, sinus pressure and sinus pain. Respiratory: Negative for apnea, cough, chest tightness and shortness of breath. Cardiovascular: Negative for chest pain and palpitations. Gastrointestinal: Negative for abdominal pain, diarrhea, nausea and vomiting. Genitourinary: Negative for difficulty urinating, flank pain and hematuria. Musculoskeletal: Negative for arthralgias, joint swelling and myalgias. Skin: Negative for color change and rash. Neurological: Negative for dizziness, light-headedness and headaches. Psychiatric/Behavioral: Negative.   Negative for behavioral problems. Prior to Visit Medications    Medication Sig Taking? Authorizing Provider   insulin glargine (BASAGLAR KWIKPEN) 100 UNIT/ML injection pen Inject 35 Units into the skin 2 times daily 35 units in the morning and 25 units at bedtime Yes ATILIO Chirinos CNP   gabapentin (NEURONTIN) 100 MG capsule Take 1 capsule by mouth 3 times daily for 90 days. Yes ATILIO Chirinos CNP   metFORMIN (GLUCOPHAGE-XR) 500 MG extended release tablet Take 1 tablet by mouth 3 times daily Yes ATILIO Chirinos CNP   amoxicillin (AMOXIL) 500 MG capsule Take 1 capsule by mouth 2 times daily for 7 days Yes ATILIO Chirinos CNP   traMADol (ULTRAM) 50 MG tablet Take 1 tablet by mouth every 8 hours as needed for Pain for up to 30 days. Yes Rodolfo Cobian MD   LORazepam (ATIVAN) 1 MG tablet Take 1 tablet by mouth every 8 hours as needed for Anxiety for up to 90 days. May take an additional one during the day as needed. Yes Rodolfo Cobian MD   NOVOLOG FLEXPEN 100 UNIT/ML injection pen 14 units with largest meal of the day, 7 units with each smaller meal Yes Rodolfo Cobian MD   blood glucose monitor strips Test 2 times a day & as needed for symptoms of irregular blood glucose. Dispense sufficient amount for indicated testing frequency plus additional to accommodate PRN testing needs.  Dx: E11.40, z79,4, n18.3 Yes Rodolfo Cobian MD   lisinopril (PRINIVIL;ZESTRIL) 10 MG tablet Take 1 tablet by mouth daily Yes Rodolfo Cobian MD   lovastatin (MEVACOR) 40 MG tablet Take 1 tablet by mouth nightly Yes Rodolfo Cobian MD   risperiDONE (RISPERDAL) 0.25 MG tablet Take 1 tablet by mouth nightly Yes Rodolfo Cobian MD   levothyroxine (SYNTHROID) 88 MCG tablet Take 1 tablet by mouth Daily Yes Rodolfo Cobian MD   calcium carbonate (OSCAL) 500 MG TABS tablet Take 500 mg by mouth daily Yes Historical Provider, MD   aspirin 81 MG tablet Take 1 tablet by mouth Yes Historical Provider, MD   Cetirizine HCl 10 MG CAPS Take by mouth Yes Historical Provider, MD   Cranberry 64102 MG CAPS Take by mouth Yes Historical Provider, MD   Docusate Sodium (STOOL SOFTENER) 100 MG TABS Take by mouth Yes Historical Provider, MD   GARLIC PO Take by mouth Yes Historical Provider, MD   Multiple Vitamins-Minerals (ICAPS AREDS 2 PO) Take by mouth Yes Historical Provider, MD   ferrous sulfate 325 (65 Fe) MG tablet Take 325 mg by mouth daily (with breakfast) Yes Historical Provider, MD   ranitidine (ACID REDUCER) 150 MG tablet Take 150 mg by mouth 2 times daily Yes Historical Provider, MD Rasmussen Delica Lancets 56F MISC Test blood sugar 1-2 times a day as directed. Dx: X25.82  Marquis Nolasco MD   UNABLE TO FIND Bilateral breast prosthesis  Marquis Nolasco MD   UNABLE TO FIND Bra for bilateral prosthesis  Marquis Nolasco MD   bimatoprost (LUMIGAN) 0.03 % ophthalmic drops Place 1 drop into both eyes nightly  Historical Provider, MD   blood glucose test strips (ASCENSIA AUTODISC VI;ONE TOUCH ULTRA TEST VI) strip 1 each by In Vitro route daily As needed. Dx: F81.73  Marquis Nolasco MD   Pomegranate, Punica granatum, (POMEGRANATE PO) Take 500 mg by mouth daily  Historical Provider, MD       Social History     Tobacco Use    Smoking status: Never Smoker    Smokeless tobacco: Never Used   Substance Use Topics    Alcohol use: No    Drug use: No        Allergies   Allergen Reactions    Bupropion      Other reaction(s): Other - comment required  Suicidal ideation    Nsaids      Other reaction(s): Other - comment required  Elevated Serum Creatinine    Amlodipine     Nateglinide Nausea Only    Ofloxacin Hives    Paroxetine Hcl Hives     Other reaction(s): Other - comment required  Shakey/nerveous    Pioglitazone      Other reaction(s):  Other - comment required  Dizzy / nauseated   ,   Past Medical History:   Diagnosis Date    Cancer ([facility] [address]     Diabetes mellitus ([facility] [address]     Glaucoma     Gout     Hyperlipidemia     current use of insulin ([facility] [address]- will increase basaglar to 35 untis in the morning and 25 in the evening; continue to check 3 TID. Call if any issues; Return in 1 month for lab check and follow up to determine any future med changes. - insulin glargine (BASAGLAR KWIKPEN) 100 UNIT/ML injection pen; Inject 35 Units into the skin 2 times daily 35 units in the morning and 25 units at bedtime  Dispense: 15 pen; Refill: 3  - gabapentin (NEURONTIN) 100 MG capsule; Take 1 capsule by mouth 3 times daily for 90 days. Dispense: 90 capsule; Refill: 2  - metFORMIN (GLUCOPHAGE-XR) 500 MG extended release tablet; Take 1 tablet by mouth 3 times daily  Dispense: 90 tablet; Refill: 2    2. Essential hypertension - well controlled; no changes in management at this time. 3. Diabetic polyneuropathy associated with type 2 diabetes mellitus ([facility] [address]- doing well; refill of Gabapentin provided. 4. Acute non-recurrent maxillary sinusitis- exam most c/w dx. Recommend Amox as below. - amoxicillin (AMOXIL) 500 MG capsule; Take 1 capsule by mouth 2 times daily for 7 days  Dispense: 14 capsule; Refill: 0    Course of treatment, including any medications, possible imaging, referrals, and follow ups discussed with patient. All risks and benefits and possible side effects discussed with patient who agrees to plan of care and verbalizes understanding. All labs and imaging reviewed. Return in about 1 month (around 10/15/2020). Solo Hackett is a 79 y.o. female being evaluated by a Virtual Visit (video visit) encounter to address concerns as mentioned above. A caregiver was present when appropriate. Due to this being a TeleHealth encounter (During [facility] public health emergency), evaluation of the following organ systems was limited: Vitals/Constitutional/EENT/Resp/CV/GI//MS/Neuro/Skin/Heme-Lymph-Imm.   Pursuant to the emergency declaration under the [facility] waiver authority and the Coronavirus Preparedness and Response Supplemental Appropriations Act, this Virtual Visit was conducted with patient's (and/or legal guardian's) consent, to reduce the patient's risk of exposure to COVID-19 and provide necessary medical care. The patient (and/or legal guardian) has also been advised to contact this office for worsening conditions or problems, and seek emergency medical treatment and/or call 911 if deemed necessary. Patient identification was verified at the start of the visit: Yes    Total time spent on this encounter: 25 minutes    Services were provided through a video synchronous discussion virtually to substitute for in-person clinic visit. Patient and provider were located at their individual homes. --ATILIO Roberts Asa, CNP on 9/15/2020 at 2:36 PM    An electronic signature was used to authenticate this note.

## 2020-09-18 ENCOUNTER — NURSE ONLY (OUTPATIENT)
Dept: FAMILY MEDICINE CLINIC | Age: 70
End: 2020-09-18
Payer: MEDICARE

## 2020-09-18 VITALS — TEMPERATURE: 98 F

## 2020-09-18 PROCEDURE — G0008 ADMIN INFLUENZA VIRUS VAC: HCPCS | Performed by: FAMILY MEDICINE

## 2020-09-18 PROCEDURE — 90653 IIV ADJUVANT VACCINE IM: CPT | Performed by: FAMILY MEDICINE

## 2020-09-18 NOTE — PROGRESS NOTES
Vaccine Information Sheet, \"Influenza - Inactivated\"  given to [place], or parent/legal guardian of  Rachel and verbalized understanding. Patient responses:    Have you ever had a reaction to a flu vaccine? No  Do you have any current illness? No  Have you ever had [place] Syndrome? No  Do you have a serious allergy to any of the follow: Neomycin, Polymyxin, Thimerosal, eggs or egg products? No    Flu vaccine given per order. Please see immunization tab. Risks and benefits explained. Current VIS given.

## 2020-10-20 ENCOUNTER — OFFICE VISIT (OUTPATIENT)
Dept: FAMILY MEDICINE CLINIC | Age: 70
End: 2020-10-20
Payer: MEDICARE

## 2020-10-20 VITALS
TEMPERATURE: 96.4 F | DIASTOLIC BLOOD PRESSURE: 84 MMHG | BODY MASS INDEX: 25.95 KG/M2 | WEIGHT: 152 LBS | OXYGEN SATURATION: 98 % | SYSTOLIC BLOOD PRESSURE: 138 MMHG | HEART RATE: 110 BPM | HEIGHT: 64 IN

## 2020-10-20 LAB
A/G RATIO: 1.6 (ref 1.1–2.2)
ALBUMIN SERPL-MCNC: 4.7 G/DL (ref 3.4–5)
ALP BLD-CCNC: 94 U/L (ref 40–129)
ALT SERPL-CCNC: 94 U/L (ref 10–40)
ANION GAP SERPL CALCULATED.3IONS-SCNC: 17 MMOL/L (ref 3–16)
AST SERPL-CCNC: 56 U/L (ref 15–37)
BILIRUB SERPL-MCNC: 0.5 MG/DL (ref 0–1)
BUN BLDV-MCNC: 19 MG/DL (ref 7–20)
CALCIUM SERPL-MCNC: 10.1 MG/DL (ref 8.3–10.6)
CHLORIDE BLD-SCNC: 98 MMOL/L (ref 99–110)
CO2: 23 MMOL/L (ref 21–32)
CREAT SERPL-MCNC: 1.2 MG/DL (ref 0.6–1.2)
GFR AFRICAN AMERICAN: 54
GFR NON-AFRICAN AMERICAN: 44
GLOBULIN: 2.9 G/DL
GLUCOSE BLD-MCNC: 331 MG/DL (ref 70–99)
POTASSIUM SERPL-SCNC: 4.5 MMOL/L (ref 3.5–5.1)
SODIUM BLD-SCNC: 138 MMOL/L (ref 136–145)
T4 FREE: 1.8 NG/DL (ref 0.9–1.8)
TOTAL PROTEIN: 7.6 G/DL (ref 6.4–8.2)
TSH REFLEX: 2.79 UIU/ML (ref 0.27–4.2)

## 2020-10-20 PROCEDURE — G8400 PT W/DXA NO RESULTS DOC: HCPCS | Performed by: NURSE PRACTITIONER

## 2020-10-20 PROCEDURE — 3046F HEMOGLOBIN A1C LEVEL >9.0%: CPT | Performed by: NURSE PRACTITIONER

## 2020-10-20 PROCEDURE — 1036F TOBACCO NON-USER: CPT | Performed by: NURSE PRACTITIONER

## 2020-10-20 PROCEDURE — 36415 COLL VENOUS BLD VENIPUNCTURE: CPT | Performed by: NURSE PRACTITIONER

## 2020-10-20 PROCEDURE — G8482 FLU IMMUNIZE ORDER/ADMIN: HCPCS | Performed by: NURSE PRACTITIONER

## 2020-10-20 PROCEDURE — 1090F PRES/ABSN URINE INCON ASSESS: CPT | Performed by: NURSE PRACTITIONER

## 2020-10-20 PROCEDURE — G8427 DOCREV CUR MEDS BY ELIG CLIN: HCPCS | Performed by: NURSE PRACTITIONER

## 2020-10-20 PROCEDURE — G8417 CALC BMI ABV UP PARAM F/U: HCPCS | Performed by: NURSE PRACTITIONER

## 2020-10-20 PROCEDURE — 1123F ACP DISCUSS/DSCN MKR DOCD: CPT | Performed by: NURSE PRACTITIONER

## 2020-10-20 PROCEDURE — 4040F PNEUMOC VAC/ADMIN/RCVD: CPT | Performed by: NURSE PRACTITIONER

## 2020-10-20 PROCEDURE — 2022F DILAT RTA XM EVC RTNOPTHY: CPT | Performed by: NURSE PRACTITIONER

## 2020-10-20 PROCEDURE — 3017F COLORECTAL CA SCREEN DOC REV: CPT | Performed by: NURSE PRACTITIONER

## 2020-10-20 PROCEDURE — 99214 OFFICE O/P EST MOD 30 MIN: CPT | Performed by: NURSE PRACTITIONER

## 2020-10-20 RX ORDER — ZOSTER VACCINE RECOMBINANT, ADJUVANTED 50 MCG/0.5
0.5 KIT INTRAMUSCULAR SEE ADMIN INSTRUCTIONS
Qty: 0.5 ML | Refills: 0 | Status: SHIPPED | OUTPATIENT
Start: 2020-10-20 | End: 2021-04-18

## 2020-10-20 ASSESSMENT — ENCOUNTER SYMPTOMS
SHORTNESS OF BREATH: 0
CHEST TIGHTNESS: 0
COUGH: 0
ABDOMINAL PAIN: 0
SINUS PAIN: 0
NAUSEA: 0
COLOR CHANGE: 0
VOMITING: 0
SINUS PRESSURE: 0
DIARRHEA: 0
APNEA: 0

## 2020-10-20 NOTE — PROGRESS NOTES
Elsa Olivo  1950  10/20/20    Chief Complaint   Patient presents with    1 Month Follow-Up       SUBJECTIVE:      Mirna Mendiola presents to the office this morning for 1 month follow up:    Patient compliant with all current medications for diabetes. Blood sugars have been averaging around 160-200, and the patient reports checking their blood sugar 1 time daily. Patient has had no episodes of significant hypoglycemia, fainting, dizziness, or loss of consciousness. Current regiment  Novolog 7 units at breakfast, 7 with lunch , 14 at dinner  Metformin 1500 daily   basaglar 30 units morning, 25 evening NOW to 35 and 25 as of last visit    The patient is currently taking all hypertensive medications compliantly without c/o of any side effects. Denies chest pain, dyspnea, edema, palpiations. Blood pressure has been averaging  120/80 over the last several months. Patient continues to be compliant with synthroid regiment for her hypothyroidism. Denies c/o fatigue, weight gain or loss, heat or cold intolerance, diarrhea, or other GI symptoms. Lab Results   Component Value Date    TSH 1.35 12/17/2019     -had first Shingrix, script, but did not get second series. Wants new script today  -needs DM foot exam today    Review of Systems   Constitutional: Negative for activity change, appetite change, fatigue and fever. HENT: Negative for congestion, nosebleeds, sinus pressure and sinus pain. Respiratory: Negative for apnea, cough, chest tightness and shortness of breath. Cardiovascular: Negative for chest pain and palpitations. Gastrointestinal: Negative for abdominal pain, diarrhea, nausea and vomiting. Genitourinary: Negative for difficulty urinating, flank pain and hematuria. Musculoskeletal: Negative for arthralgias, joint swelling and myalgias. Skin: Negative for color change and rash. Neurological: Negative for dizziness, light-headedness and headaches. Psychiatric/Behavioral: Negative. Negative for behavioral problems. OBJECTIVE:    /84 (Site: Left Upper Arm, Position: Sitting, Cuff Size: Medium Adult)   Pulse 110   Temp 96.4 °F (35.8 °C) (Temporal)   Ht 5' 4\" (1.626 m)   Wt 152 lb (68.9 kg)   SpO2 98%   BMI 26.09 kg/m²     Physical Exam  Constitutional:       General: She is not in acute distress. Appearance: She is well-developed. She is not diaphoretic. HENT:      Head: Normocephalic and atraumatic. Nose: Nose normal.      Mouth/Throat:      Pharynx: No oropharyngeal exudate. Eyes:      Conjunctiva/sclera: Conjunctivae normal.      Pupils: Pupils are equal, round, and reactive to light. Neck:      Musculoskeletal: Normal range of motion and neck supple. No edema, erythema or muscular tenderness. Cardiovascular:      Rate and Rhythm: Normal rate and regular rhythm. Heart sounds: Normal heart sounds. No murmur. No friction rub. Pulmonary:      Effort: Pulmonary effort is normal. No respiratory distress. Breath sounds: Normal breath sounds. Abdominal:      General: Bowel sounds are normal.      Palpations: Abdomen is soft. Tenderness: There is no abdominal tenderness. Musculoskeletal: Normal range of motion. Right foot: Normal range of motion. No deformity. Left foot: Normal range of motion. No deformity. Feet:      Right foot:      Protective Sensation: 6 sites tested. 6 sites sensed. Left foot:      Protective Sensation: 6 sites tested. 6 sites sensed. Skin:     General: Skin is warm and dry. Capillary Refill: Capillary refill takes less than 2 seconds. Findings: No erythema or rash. Neurological:      Mental Status: She is alert and oriented to person, place, and time. Cranial Nerves: No cranial nerve deficit. Coordination: Coordination normal.      Deep Tendon Reflexes: Reflexes normal.   Psychiatric:         Behavior: Behavior normal.         Thought Content:  Thought content normal. Judgment: Judgment normal.         ASSESSMENT:    1. Type 2 diabetes mellitus with diabetic neuropathy, with long-term current use of insulin ([address]    2. Essential hypertension    3. Acquired hypothyroidism    4. Need for shingles vaccine        PLAN:    Dara Villegas was seen today for 1 month follow-up. Diagnoses and all orders for this visit:    Type 2 diabetes mellitus with diabetic neuropathy, with long-term current use of insulin ([address]- morning sugars still above goal, averaging 160-200. Will recheck A1C today and adjust regiment accordingly. Consider endocrinology consult. -     Comprehensive Metabolic Panel; Future  -     Hemoglobin A1C; Future  -      DIABETES FOOT EXAM  -     Hemoglobin A1C  -     Comprehensive Metabolic Panel    Essential hypertension - well controlled; no changes in management at this time. -     Comprehensive Metabolic Panel; Future  -     Hemoglobin A1C; Future    Acquired hypothyroidism- recheck TSH/T4; will adjust synthroid as appropriate.  -     TSH with Reflex; Future  -     T4, Free; Future    Need for shingles vaccine- script provided today  -     zoster recombinant adjuvanted vaccine [facility]) 50 MCG/0.5ML SUSR injection; Inject 0.5 mLs into the muscle See Admin Instructions 1 dose now and repeat in 2-6 months    Course of treatment, including any medications, possible imaging, referrals, and follow ups discussed with patient. All risks and benefits and possible side effects discussed with patient who agrees to plan of care and verbalizes understanding. All labs and imaging reviewed. RX Monitoring 8/26/2020   Attestation -   Periodic Controlled Substance Monitoring No signs of potential drug abuse or diversion identified. Return in about 3 months (around 1/20/2021) for A1C Recheck.

## 2020-10-21 LAB
ESTIMATED AVERAGE GLUCOSE: 326.4 MG/DL
HBA1C MFR BLD: 13 %

## 2020-11-03 ENCOUNTER — VIRTUAL VISIT (OUTPATIENT)
Dept: FAMILY MEDICINE CLINIC | Age: 70
End: 2020-11-03
Payer: MEDICARE

## 2020-11-03 PROCEDURE — 3288F FALL RISK ASSESSMENT DOCD: CPT | Performed by: FAMILY MEDICINE

## 2020-11-03 PROCEDURE — 99214 OFFICE O/P EST MOD 30 MIN: CPT | Performed by: FAMILY MEDICINE

## 2020-11-03 PROCEDURE — 4040F PNEUMOC VAC/ADMIN/RCVD: CPT | Performed by: FAMILY MEDICINE

## 2020-11-03 PROCEDURE — G8510 SCR DEP NEG, NO PLAN REQD: HCPCS | Performed by: FAMILY MEDICINE

## 2020-11-03 PROCEDURE — G8428 CUR MEDS NOT DOCUMENT: HCPCS | Performed by: FAMILY MEDICINE

## 2020-11-03 PROCEDURE — 3017F COLORECTAL CA SCREEN DOC REV: CPT | Performed by: FAMILY MEDICINE

## 2020-11-03 PROCEDURE — 1123F ACP DISCUSS/DSCN MKR DOCD: CPT | Performed by: FAMILY MEDICINE

## 2020-11-03 PROCEDURE — G8400 PT W/DXA NO RESULTS DOC: HCPCS | Performed by: FAMILY MEDICINE

## 2020-11-03 PROCEDURE — 1090F PRES/ABSN URINE INCON ASSESS: CPT | Performed by: FAMILY MEDICINE

## 2020-11-03 RX ORDER — AMOXICILLIN 500 MG/1
500 CAPSULE ORAL 3 TIMES DAILY
Qty: 21 CAPSULE | Refills: 0 | Status: SHIPPED | OUTPATIENT
Start: 2020-11-03 | End: 2020-11-10

## 2020-11-03 RX ORDER — PROMETHAZINE HYDROCHLORIDE 25 MG/1
25 TABLET ORAL EVERY 6 HOURS PRN
Qty: 20 TABLET | Refills: 0 | Status: SHIPPED | OUTPATIENT
Start: 2020-11-03 | End: 2022-06-23

## 2020-11-03 ASSESSMENT — ENCOUNTER SYMPTOMS
EYES NEGATIVE: 1
ALLERGIC/IMMUNOLOGIC NEGATIVE: 1
GASTROINTESTINAL NEGATIVE: 1
COUGH: 1
SINUS PRESSURE: 1

## 2020-11-03 ASSESSMENT — PATIENT HEALTH QUESTIONNAIRE - PHQ9
1. LITTLE INTEREST OR PLEASURE IN DOING THINGS: 0
2. FEELING DOWN, DEPRESSED OR HOPELESS: 0
SUM OF ALL RESPONSES TO PHQ QUESTIONS 1-9: 0
SUM OF ALL RESPONSES TO PHQ9 QUESTIONS 1 & 2: 0

## 2020-11-03 NOTE — PATIENT INSTRUCTIONS
nasal washes. Where can you learn more? Go to [email or web address] and sign in to your [employer] account. Enter [phone] in the [facility] box to learn more about \"Saline Nasal Washes: Care Instructions. \"     If you do not have an account, please click on the \"Sign Up Now\" link. Current as of: April 15, 2020               Content Version: 12.6  © 2006-2020 "[employer]. Care instructions adapted under license by [facility] ([facility]). If you have questions about a medical condition or this instruction, always ask your healthcare professional. [address] any warranty or liability for your use of this information.

## 2020-11-03 NOTE — PROGRESS NOTES
11/3/2020    TELEHEALTH EVALUATION -- Audio/Visual (During [place]-82 public health emergency)    HPI:    Fani Late (:  1950) has requested an audio/video evaluation for the following concern(s):    Upper Respiratory Infection: Patient complains of symptoms of a URI. Symptoms include congestion. Onset of symptoms was 2 weeks ago, gradually worsening since that time. She also c/o headache described as frontal and occipital, post nasal drip and sinus pressure for the past 2 weeks . She is drinking plenty of fluids. Evaluation to date: Seen by new PCP and was told to use Nasocort. She says she has significant nausea from the drainage. She has been unable to hold anything down for the last 24 hours. Not interested in eating or drinking. The patient is taking hypertensive medications compliantly without side effects. Denies chest pain, dyspnea, edema, or TIA's. Review of Systems   Constitutional: Negative. HENT: Positive for congestion, postnasal drip and sinus pressure. Eyes: Negative. Respiratory: Positive for cough. Cardiovascular: Negative. Gastrointestinal: Negative. Endocrine: Negative. Genitourinary: Negative. Musculoskeletal: Negative. Skin: Negative. Allergic/Immunologic: Negative. Neurological: Negative. Hematological: Negative. Psychiatric/Behavioral: Negative. Prior to Visit Medications    Medication Sig Taking?  Authorizing Provider   Riboflavin (B2 PO) Take by mouth  Historical Provider, MD   Cholecalciferol (D3-1000 PO) Take by mouth  Historical Provider, MD   zoster recombinant adjuvanted vaccine (SHINGRIX) 50 MCG/0.5ML SUSR injection Inject 0.5 mLs into the muscle See Admin Instructions 1 dose now and repeat in 2-6 months  ATILIO Bro CNP   insulin glargine (BASAGLAR KWIKPEN) 100 UNIT/ML injection pen Inject 35 Units into the skin 2 times daily 35 units in the morning and 25 units at bedtime  ATILIO Bro CNP gabapentin (NEURONTIN) 100 MG capsule Take 1 capsule by mouth 3 times daily for 90 days. ATILIO Carlos CNP   metFORMIN (GLUCOPHAGE-XR) 500 MG extended release tablet Take 1 tablet by mouth 3 times daily  ATILIO Carlos CNP   LORazepam (ATIVAN) 1 MG tablet Take 1 tablet by mouth every 8 hours as needed for Anxiety for up to 90 days. May take an additional one during the day as needed. Pattie Heart MD   NOVOLOG FLEXPEN 100 UNIT/ML injection pen 14 units with largest meal of the day, 7 units with each smaller meal  Pattie Heart MD   blood glucose monitor strips Test 2 times a day & as needed for symptoms of irregular blood glucose. Dispense sufficient amount for indicated testing frequency plus additional to accommodate PRN testing needs. Dx: E11.40, z79,4, n18.3  Pattie Heart MD   lisinopril (PRINIVIL;ZESTRIL) 10 MG tablet Take 1 tablet by mouth daily  Pattie Heart MD   lovastatin (MEVACOR) 40 MG tablet Take 1 tablet by mouth nightly  Pattie Heart MD   risperiDONE (RISPERDAL) 0.25 MG tablet Take 1 tablet by mouth nightly  Pattie Heart MD   OneTouch Delica Lancets 47L MISC Test blood sugar 1-2 times a day as directed. Dx: S46.76  Pattie Heart MD   UNABLE TO FIND Bilateral breast prosthesis  Pattie Heart MD   UNABLE TO FIND Bra for bilateral prosthesis  Pattie Heart MD   levothyroxine (SYNTHROID) 88 MCG tablet Take 1 tablet by mouth Daily  Pattie Heart MD   bimatoprost (LUMIGAN) 0.03 % ophthalmic drops Place 1 drop into both eyes nightly  Historical Provider, MD   blood glucose test strips (ASCENSIA AUTODISC VI;ONE TOUCH ULTRA TEST VI) strip 1 each by In Vitro route daily As needed.  Dx: W48.59  Pattie Heart MD   calcium carbonate (OSCAL) 500 MG TABS tablet Take 500 mg by mouth daily  Historical Provider, MD   aspirin 81 MG tablet Take 1 tablet by mouth  Historical Provider, MD   Cetirizine HCl 10 MG CAPS Take by mouth  Historical Provider, MD Cranberry 69901 MG CAPS Take by mouth  Historical Provider, MD   Docusate Sodium (STOOL SOFTENER) 100 MG TABS Take by mouth  Historical Provider, MD   GARLIC PO Take by mouth  Historical Provider, MD   Multiple Vitamins-Minerals (ICAPS AREDS 2 PO) Take by mouth  Historical Provider, MD   ferrous sulfate 325 (65 Fe) MG tablet Take 325 mg by mouth daily (with breakfast)  Historical Provider, MD   ranitidine (ACID REDUCER) 150 MG tablet Take 150 mg by mouth 2 times daily  Historical Provider, MD   Pomegranate, [place] granatum, (POMEGRANATE PO) Take 500 mg by mouth daily  Historical Provider, MD       Social History     Tobacco Use    Smoking status: Never Smoker    Smokeless tobacco: Never Used   Substance Use Topics    Alcohol use: No    Drug use: No        Allergies   Allergen Reactions    Bupropion      Other reaction(s): Other - comment required  Suicidal ideation    Nsaids      Other reaction(s): Other - comment required  Elevated Serum Creatinine    Amlodipine     Nateglinide Nausea Only    Ofloxacin Hives    Paroxetine Hcl Hives     Other reaction(s): Other - comment required  Shakey/nerveous    Pioglitazone      Other reaction(s):  Other - comment required  Dizzy / nauseated   ,   Past Medical History:   Diagnosis Date    [facility]     Diabetes mellitus ([facility]     Glaucoma     Gout     Hyperlipidemia     Hypertension     Neuropathy     Thyroid disease    ,   Past Surgical History:   Procedure Laterality Date     SECTION      MASTECTOMY, BILATERAL  10/26/2007   ,   Social History     Tobacco Use    Smoking status: Never Smoker    Smokeless tobacco: Never Used   Substance Use Topics    Alcohol use: No    Drug use: No       PHYSICAL EXAMINATION:  [ INSTRUCTIONS:  \"[x]\" Indicates a positive item  \"[]\" Indicates a negative item  -- DELETE ALL ITEMS NOT EXAMINED]  Vital Signs: (As obtained by patient/caregiver or practitioner observation)    Not available  Constitutional: [x] Appears well-developed and well-nourished [x] No apparent distress      [] Abnormal-   Mental status  [x] Alert and awake  [x] Oriented to person/place/time [x]Able to follow commands      Eyes:  EOM    [x]  Normal  [] Abnormal-  Sclera  [x]  Normal  [] Abnormal -         Discharge [x]  None visible  [] Abnormal -    HENT:   [x] Normocephalic, atraumatic. [] Abnormal   [x] Mouth/Throat: Mucous membranes are moist.     External Ears [x] Normal  [] Abnormal-     Neck: [x] No visualized mass     Pulmonary/Chest: [x] Respiratory effort normal.  [x] No visualized signs of difficulty breathing or respiratory distress        [] Abnormal-      Musculoskeletal:           [x] Normal range of motion of neck        [] Abnormal-       Neurological:        [x] No Facial Asymmetry (Cranial nerve 7 motor function) (limited exam to video visit)          [x] No gaze palsy        [] Abnormal-         Skin:        [x] No significant exanthematous lesions or discoloration noted on facial skin         [] Abnormal-            Psychiatric:       [x] Normal Affect       [] Abnormal-     Other pertinent observable physical exam findings-     ASSESSMENT/PLAN:  1. Acute non-recurrent frontal sinusitis    - amoxicillin (AMOXIL) 500 MG capsule; Take 1 capsule by mouth 3 times daily for 7 days  Dispense: 21 capsule; Refill: 0    2. Nausea    - promethazine (PHENERGAN) 25 MG tablet; Take 1 tablet by mouth every 6 hours as needed for Nausea  Dispense: 20 tablet; Refill: 0    3. Essential hypertension  She needs avoid decongestants due to hypertension. She may take Coricidin. Return if symptoms worsen or fail to improve. Carole De Santiago is a 79 y.o. female being evaluated by a Virtual Visit (video visit) encounter to address concerns as mentioned above. A caregiver was present when appropriate.  Due to this being a TeleHealth encounter (During [place] [address] public health emergency), evaluation of the following organ systems was limited: Vitals/Constitutional/EENT/Resp/CV/GI//MS/Neuro/Skin/Heme-Lymph-Imm. Pursuant to the emergency declaration under the [address] [place] [address] and the [employer] Act, this Virtual Visit was conducted with patient's (and/or legal guardian's) consent, to reduce the patient's risk of exposure to COVID-19 and provide necessary medical care. The patient (and/or legal guardian) has also been advised to contact this office for worsening conditions or problems, and seek emergency medical treatment and/or call 911 if deemed necessary. Patient identification was verified at the start of the visit: Yes    Total time spent on this encounter: Not billed by time    Services were provided through a video synchronous discussion virtually to substitute for in-person clinic visit. Patient and provider were located at their individual homes. --Yury Victoria MD on 11/3/2020 at 9:18 AM    An electronic signature was used to authenticate this note.

## 2020-12-01 RX ORDER — RISPERIDONE 0.25 MG/1
0.25 TABLET, FILM COATED ORAL NIGHTLY
Qty: 90 TABLET | Refills: 1 | Status: SHIPPED | OUTPATIENT
Start: 2020-12-01

## 2020-12-01 RX ORDER — LORAZEPAM 1 MG/1
1 TABLET ORAL EVERY 8 HOURS PRN
Qty: 90 TABLET | Refills: 0 | Status: SHIPPED | OUTPATIENT
Start: 2020-12-01 | End: 2021-03-01

## 2020-12-01 NOTE — TELEPHONE ENCOUNTER
Requested Prescriptions     Pending Prescriptions Disp Refills    risperiDONE (RISPERDAL) 0.25 MG tablet 90 tablet 1     Sig: Take 1 tablet by mouth nightly    LORazepam (ATIVAN) 1 MG tablet 90 tablet 0     Sig: Take 1 tablet by mouth every 8 hours as needed for Anxiety for up to 90 days. May take an additional one during the day as needed.

## 2020-12-21 RX ORDER — LISINOPRIL 10 MG/1
10 TABLET ORAL DAILY
Qty: 90 TABLET | Refills: 0 | Status: SHIPPED | OUTPATIENT
Start: 2020-12-21 | End: 2022-07-15

## 2021-01-08 DIAGNOSIS — E03.9 ACQUIRED HYPOTHYROIDISM: ICD-10-CM

## 2021-01-08 RX ORDER — LEVOTHYROXINE SODIUM 88 UG/1
88 TABLET ORAL DAILY
Qty: 30 TABLET | Refills: 0 | Status: SHIPPED | OUTPATIENT
Start: 2021-01-08

## 2021-02-25 ENCOUNTER — HOSPITAL ENCOUNTER (OUTPATIENT)
Dept: ULTRASOUND IMAGING | Age: 71
Discharge: HOME | End: 2021-02-25
Payer: MEDICARE

## 2021-02-25 DIAGNOSIS — E03.9 HYPOTHYROIDISM, UNSPECIFIED TYPE: ICD-10-CM

## 2021-02-25 PROCEDURE — 76536 US EXAM OF HEAD AND NECK: CPT

## 2021-03-10 ENCOUNTER — HOSPITAL ENCOUNTER (OUTPATIENT)
Age: 71
Setting detail: SPECIMEN
Discharge: HOME | End: 2021-03-10
Payer: MEDICARE

## 2021-03-10 PROCEDURE — 88173 CYTOPATH EVAL FNA REPORT: CPT

## 2021-03-10 PROCEDURE — 88305 TISSUE EXAM BY PATHOLOGIST: CPT

## 2021-12-15 NOTE — TELEPHONE ENCOUNTER
Has appt scheduled to re-establish with Dr. Camille Sahu 02/04/2021. Was under the impression that Yenny Hernandez would accept them as patients in [facility], but he declined. Letter sent

## 2022-01-27 LAB
A/G RATIO: 0.9 (ref 1.1–2.2)
ALBUMIN SERPL-MCNC: 3.8 G/DL (ref 3.4–5)
ALP BLD-CCNC: 123 U/L (ref 40–129)
ALT SERPL-CCNC: 39 U/L (ref 10–40)
ANION GAP SERPL CALCULATED.3IONS-SCNC: 16 MMOL/L (ref 3–16)
AST SERPL-CCNC: 61 U/L (ref 15–37)
BILIRUB SERPL-MCNC: 1 MG/DL (ref 0–1)
BUN BLDV-MCNC: 13 MG/DL (ref 7–20)
CALCIUM SERPL-MCNC: 9.6 MG/DL (ref 8.3–10.6)
CHLORIDE BLD-SCNC: 98 MMOL/L (ref 99–110)
CHOLESTEROL, TOTAL: 148 MG/DL (ref 0–199)
CO2: 23 MMOL/L (ref 21–32)
CREAT SERPL-MCNC: 1.3 MG/DL (ref 0.6–1.2)
GFR AFRICAN AMERICAN: 49
GFR NON-AFRICAN AMERICAN: 40
GLUCOSE BLD-MCNC: 219 MG/DL (ref 70–99)
HDLC SERPL-MCNC: 54 MG/DL (ref 40–60)
LDL CHOLESTEROL CALCULATED: 75 MG/DL
POTASSIUM SERPL-SCNC: 4.1 MMOL/L (ref 3.5–5.1)
SODIUM BLD-SCNC: 137 MMOL/L (ref 136–145)
T4 FREE: 1.6 NG/DL (ref 0.9–1.8)
TOTAL PROTEIN: 8.2 G/DL (ref 6.4–8.2)
TRIGL SERPL-MCNC: 96 MG/DL (ref 0–150)
TSH SERPL DL<=0.05 MIU/L-ACNC: 6.23 UIU/ML (ref 0.27–4.2)
VLDLC SERPL CALC-MCNC: 19 MG/DL

## 2022-01-28 LAB
ESTIMATED AVERAGE GLUCOSE: 283.4 MG/DL
HBA1C MFR BLD: 11.5 %

## 2022-02-07 ENCOUNTER — HOSPITAL ENCOUNTER (OUTPATIENT)
Dept: GENERAL RADIOLOGY | Age: 72
Discharge: HOME | End: 2022-02-07
Payer: MEDICARE

## 2022-02-07 ENCOUNTER — HOSPITAL ENCOUNTER (OUTPATIENT)
Age: 72
Discharge: HOME | End: 2022-02-07
Payer: MEDICARE

## 2022-02-07 DIAGNOSIS — R09.89 ABNORMAL CHEST SOUNDS: ICD-10-CM

## 2022-02-07 PROCEDURE — 71046 X-RAY EXAM CHEST 2 VIEWS: CPT

## 2022-02-23 ENCOUNTER — HOSPITAL ENCOUNTER (OUTPATIENT)
Age: 72
Discharge: HOME | End: 2022-02-23
Payer: MEDICARE

## 2022-02-23 LAB
ALBUMIN SERPL-MCNC: 3.3 GM/DL (ref 3.4–5)
ALP BLD-CCNC: 106 IU/L (ref 40–128)
ALT SERPL-CCNC: 26 U/L (ref 10–40)
ANION GAP SERPL CALCULATED.3IONS-SCNC: 15 MMOL/L (ref 4–16)
AST SERPL-CCNC: 52 IU/L (ref 15–37)
BILIRUB SERPL-MCNC: 1.2 MG/DL (ref 0–1)
BUN BLDV-MCNC: 13 MG/DL (ref 6–23)
CALCIUM SERPL-MCNC: 9.4 MG/DL (ref 8.3–10.6)
CHLORIDE BLD-SCNC: 103 MMOL/L (ref 99–110)
CO2: 23 MMOL/L (ref 21–32)
CREAT SERPL-MCNC: 1.2 MG/DL (ref 0.6–1.1)
FOLATE: 14.1 NG/ML (ref 3.1–17.5)
GFR AFRICAN AMERICAN: 54 ML/MIN/1.73M2
GFR NON-AFRICAN AMERICAN: 44 ML/MIN/1.73M2
GLUCOSE BLD-MCNC: 98 MG/DL (ref 70–99)
LACTATE DEHYDROGENASE: 276 IU/L (ref 120–246)
POTASSIUM SERPL-SCNC: 5.2 MMOL/L (ref 3.5–5.1)
RETICULOCYTE COUNT PCT: 2.5 % (ref 0.2–2.2)
SODIUM BLD-SCNC: 141 MMOL/L (ref 135–145)
T4 FREE: 1.55 NG/DL (ref 0.9–1.8)
TOTAL PROTEIN: 8 GM/DL (ref 6.4–8.2)
TSH HIGH SENSITIVITY: 6.52 UIU/ML (ref 0.27–4.2)
VITAMIN B-12: 532.5 PG/ML (ref 211–911)

## 2022-02-23 PROCEDURE — 36415 COLL VENOUS BLD VENIPUNCTURE: CPT

## 2022-02-23 PROCEDURE — 86301 IMMUNOASSAY TUMOR CA 19-9: CPT

## 2022-02-23 PROCEDURE — 84439 ASSAY OF FREE THYROXINE: CPT

## 2022-02-23 PROCEDURE — 82607 VITAMIN B-12: CPT

## 2022-02-23 PROCEDURE — 85045 AUTOMATED RETICULOCYTE COUNT: CPT

## 2022-02-23 PROCEDURE — 80053 COMPREHEN METABOLIC PANEL: CPT

## 2022-02-23 PROCEDURE — 83615 LACTATE (LD) (LDH) ENZYME: CPT

## 2022-02-23 PROCEDURE — 84443 ASSAY THYROID STIM HORMONE: CPT

## 2022-02-23 PROCEDURE — 82746 ASSAY OF FOLIC ACID SERUM: CPT

## 2022-03-02 LAB — CA 19-9: 472 U/ML

## 2022-03-03 ENCOUNTER — HOSPITAL ENCOUNTER (OUTPATIENT)
Dept: CT IMAGING | Age: 72
Discharge: HOME | End: 2022-03-03
Payer: MEDICARE

## 2022-03-03 DIAGNOSIS — R41.0 CONFUSION: ICD-10-CM

## 2022-03-03 DIAGNOSIS — Z85.3 HISTORY OF BREAST CANCER: ICD-10-CM

## 2022-03-03 DIAGNOSIS — R27.8 COORDINATION IMPAIRMENT: ICD-10-CM

## 2022-03-03 PROCEDURE — 6360000004 HC RX CONTRAST MEDICATION: Performed by: FAMILY MEDICINE

## 2022-03-03 PROCEDURE — 70470 CT HEAD/BRAIN W/O & W/DYE: CPT

## 2022-03-03 RX ADMIN — IOPAMIDOL 100 ML: 755 INJECTION, SOLUTION INTRAVENOUS at 17:17

## 2022-03-15 ENCOUNTER — OFFICE VISIT (OUTPATIENT)
Dept: NEUROLOGY | Age: 72
End: 2022-03-15
Payer: MEDICARE

## 2022-03-15 VITALS
DIASTOLIC BLOOD PRESSURE: 70 MMHG | HEIGHT: 64 IN | BODY MASS INDEX: 27.45 KG/M2 | SYSTOLIC BLOOD PRESSURE: 132 MMHG | HEART RATE: 102 BPM | OXYGEN SATURATION: 98 % | WEIGHT: 160.8 LBS

## 2022-03-15 DIAGNOSIS — R29.818 DIFFICULTY BALANCING: ICD-10-CM

## 2022-03-15 DIAGNOSIS — G47.9 DIFFICULTY SLEEPING: ICD-10-CM

## 2022-03-15 DIAGNOSIS — F41.9 ANXIETY: ICD-10-CM

## 2022-03-15 DIAGNOSIS — F03.90 DEMENTIA WITHOUT BEHAVIORAL DISTURBANCE, UNSPECIFIED DEMENTIA TYPE: Primary | ICD-10-CM

## 2022-03-15 PROCEDURE — 1036F TOBACCO NON-USER: CPT | Performed by: PSYCHIATRY & NEUROLOGY

## 2022-03-15 PROCEDURE — 1123F ACP DISCUSS/DSCN MKR DOCD: CPT | Performed by: PSYCHIATRY & NEUROLOGY

## 2022-03-15 PROCEDURE — G8400 PT W/DXA NO RESULTS DOC: HCPCS | Performed by: PSYCHIATRY & NEUROLOGY

## 2022-03-15 PROCEDURE — 3017F COLORECTAL CA SCREEN DOC REV: CPT | Performed by: PSYCHIATRY & NEUROLOGY

## 2022-03-15 PROCEDURE — 4040F PNEUMOC VAC/ADMIN/RCVD: CPT | Performed by: PSYCHIATRY & NEUROLOGY

## 2022-03-15 PROCEDURE — 1090F PRES/ABSN URINE INCON ASSESS: CPT | Performed by: PSYCHIATRY & NEUROLOGY

## 2022-03-15 PROCEDURE — 99205 OFFICE O/P NEW HI 60 MIN: CPT | Performed by: PSYCHIATRY & NEUROLOGY

## 2022-03-15 PROCEDURE — G8484 FLU IMMUNIZE NO ADMIN: HCPCS | Performed by: PSYCHIATRY & NEUROLOGY

## 2022-03-15 PROCEDURE — G8427 DOCREV CUR MEDS BY ELIG CLIN: HCPCS | Performed by: PSYCHIATRY & NEUROLOGY

## 2022-03-15 PROCEDURE — G8417 CALC BMI ABV UP PARAM F/U: HCPCS | Performed by: PSYCHIATRY & NEUROLOGY

## 2022-03-15 RX ORDER — DONEPEZIL HYDROCHLORIDE 10 MG/1
10 TABLET, FILM COATED ORAL DAILY
Qty: 30 TABLET | Refills: 5 | Status: SHIPPED | OUTPATIENT
Start: 2022-03-15 | End: 2022-07-15

## 2022-03-15 RX ORDER — DONEPEZIL HYDROCHLORIDE 5 MG/1
10 TABLET, FILM COATED ORAL NIGHTLY
Qty: 30 TABLET | Refills: 0 | Status: ON HOLD | OUTPATIENT
Start: 2022-03-15 | End: 2022-03-28 | Stop reason: HOSPADM

## 2022-03-15 NOTE — PROGRESS NOTES
3/15/22    Dayana Pacheco  1950    Chief Complaint   Patient presents with    Altered Mental Status     pt presents very confused and in a fog, pt states she has balance issues, pt's  states she almost cannot process what is happening around her or at least as quickly, pt's  states she mixes up words/numbers and almost forgets what she is doing while she is doing it   [facility] Fall     pt states she had a bad fall 2 weeks ago, pt states she hit her head, pt's  states the confusion started happening before the fall, but the pt states the gait issues happened after       History of Present Illness    Fidencio Dillard presents in neurologic consultation at our [facility] office accompanied by her  Tad for confusion. He states that he has noticed in recent months that she has had a difficulty with confusion. He describes several things today. 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 He will check over her work and there will be errors. He has started managing the finances. She will be confused to the day and/or time. He has had to start helping her organize her medications. He will have to remind her to take them from time to time. She has not driven for about a year. She was not having issues but they decided she should not drive due to precaution. She does have issue with sleep. She feels anxious at night and several times per night some nights she may get up and walk around the house with her . Sometimes she will hyperventilate. She does take lorazepam 1 mg at bedtime to help with sleep. Unfortunately, she does tend to take to several hour naps per day. She did have a fall in the middle of the night the other night. She got up in the middle the night and fell and hit her face. She had a big hematoma on the right side of her scalp.   She had a CT of her scan performed which did not reveal any intracranial bleeding. She does admit to some difficulty with her balance. There is no family history of memory issues.     Subjective    Review of Symptoms:  Neurologic   Symptoms: confusion, memory loss, difficulty with gait or walking, no bowel symptoms, no vertigo, no speech disorder, no visual loss, no double vision, no dizziness, no loss of hearing, no sensory disturbances, no weakness, no headaches, no bladder symptoms, no seizures, no excessive fatigue and no syncope    Current Outpatient Medications   Medication Sig Dispense Refill    donepezil (ARICEPT) 5 MG tablet Take 2 tablets by mouth nightly 30 tablet 0    donepezil (ARICEPT) 10 MG tablet Take 1 tablet by mouth daily NOTE TO PHARMACY: DO NOT FILL UNTIL 5 MG RX IS COMPLETE 30 tablet 5    levothyroxine (SYNTHROID) 88 MCG tablet Take 1 tablet by mouth Daily 30 tablet 0    lisinopril (PRINIVIL;ZESTRIL) 10 MG tablet Take 1 tablet by mouth daily 90 tablet 0    risperiDONE (RISPERDAL) 0.25 MG tablet Take 1 tablet by mouth nightly 90 tablet 1    promethazine (PHENERGAN) 25 MG tablet Take 1 tablet by mouth every 6 hours as needed for Nausea 20 tablet 0    Riboflavin (B2 PO) Take by mouth      Cholecalciferol (D3-1000 PO) Take by mouth      insulin glargine (BASAGLAR KWIKPEN) 100 UNIT/ML injection pen Inject 35 Units into the skin 2 times daily 35 units in the morning and 25 units at bedtime (Patient taking differently: Inject 30 Units into the skin 2 times daily 35 units in the morning and 25 units at bedtime) 15 pen 3    metFORMIN (GLUCOPHAGE-XR) 500 MG extended release tablet Take 1 tablet by mouth 3 times daily 90 tablet 2    NOVOLOG FLEXPEN 100 UNIT/ML injection pen 14 units with largest meal of the day, 7 units with each smaller meal (Patient taking differently: Inject into the skin 25 units with largest meal of the day, 7 units with each smaller meal) 7 pen 0    blood glucose monitor strips Test 2 times a day & as needed for symptoms of irregular blood glucose. Dispense sufficient amount for indicated testing frequency plus additional to accommodate PRN testing needs. Dx: E11.40, z79,4, n18.3 100 strip 5    lovastatin (MEVACOR) 40 MG tablet Take 1 tablet by mouth nightly 90 tablet 1    OneTouch Delica Lancets 76F MISC Test blood sugar 1-2 times a day as directed. Dx: E11.65 100 each 5    UNABLE TO FIND Bilateral breast prosthesis 1 each 0    UNABLE TO FIND Bra for bilateral prosthesis 12 each 0    bimatoprost (LUMIGAN) 0.03 % ophthalmic drops Place 1 drop into both eyes nightly      blood glucose test strips (ASCENSIA AUTODISC VI;ONE TOUCH ULTRA TEST VI) strip 1 each by In Vitro route daily As needed. Dx: E11.40 100 each 5    calcium carbonate (OSCAL) 500 MG TABS tablet Take 500 mg by mouth daily      aspirin 81 MG tablet Take 1 tablet by mouth      Cetirizine HCl 10 MG CAPS Take by mouth      Cranberry 87777 MG CAPS Take by mouth      Docusate Sodium (STOOL SOFTENER) 100 MG TABS Take by mouth      GARLIC PO Take by mouth      Multiple Vitamins-Minerals (ICAPS AREDS 2 PO) Take by mouth      ferrous sulfate 325 (65 Fe) MG tablet Take 325 mg by mouth daily (with breakfast)      ranitidine (ACID REDUCER) 150 MG tablet Take 150 mg by mouth 2 times daily      Pomegranate, Punica granatum, (POMEGRANATE PO) Take 500 mg by mouth daily      gabapentin (NEURONTIN) 100 MG capsule Take 1 capsule by mouth 3 times daily for 90 days. 90 capsule 2     No current facility-administered medications for this visit.        Past Medical History:   Diagnosis Date    [facility] [address] [facility]     Diabetes mellitus ([facility] [address] [facility]     Glaucoma     Gout     Hyperlipidemia     Hypertension     Neuropathy     Thyroid disease        Past Surgical History:   Procedure Laterality Date     SECTION      MASTECTOMY, BILATERAL  10/26/2007        Social History     Socioeconomic History    Marital status:      Spouse name: None    Number of children: None    Years of education: None    Highest education level: None   Occupational History    None   Tobacco Use    Smoking status: Never Smoker    Smokeless tobacco: Never Used   Vaping Use    Vaping Use: Never used   Substance and Sexual Activity    Alcohol use: No    Drug use: No    Sexual activity: None   Other Topics Concern    None   Social History Narrative    None     Social Determinants of Health     Financial Resource Strain:     Difficulty of Paying Living Expenses: Not on file   Food Insecurity:     Worried About Running Out of Food in the Last Year: Not on file    Lawanda of Food in the Last Year: Not on file   Transportation Needs:     Lack of Transportation (Medical): Not on file    Lack of Transportation (Non-Medical): Not on file   Physical Activity:     Days of Exercise per Week: Not on file    Minutes of Exercise per Session: Not on file   Stress:     Feeling of Stress : Not on file   Social Connections:     Frequency of Communication with Friends and Family: Not on file    Frequency of Social Gatherings with Friends and Family: Not on file    Attends Islam Services: Not on file    Active Member of [address] [place] [address] or Organizations: Not on file    Attends Club or Organization Meetings: Not on file    Marital Status: Not on file   Intimate Partner Violence:     Fear of Current or Ex-Partner: Not on file    Emotionally Abused: Not on file    Physically Abused: Not on file    Sexually Abused: Not on file   Housing Stability:     Unable to Pay for Housing in the Last Year: Not on file    Number of [place] in the Last Year: Not on file    Unstable Housing in the Last Year: Not on file       No family history on file. Objective    Physical Exam:  Also present during visit: spouse.     Constitutional   Weight: well nourished  Heart/Vascular   Rate and Rhythm: RRR   Murmurs: none   Arterial Pulses:  no carotid bruits  Neck   Appearance/Palpation/Auscultation: supple  Mental normal bulk and normal tone   Right Lower Extremity: normal motor strength, normal bulk and normal tone   Left Lower Extremity: normal motor strength, normal bulk and normal tone   Coordination: no drift, normal finger-to-nose and rapid alternating movements normal  Reflexes   Reflexes Right: biceps 2/4, triceps 2/4, brachioradialis 2/4 and patellar 1/4   Reflexes Left: biceps 2/4, triceps 2/4, brachioradialis 2/4 and patellar 1/4   Plantar Reflex Right: response downgoing   Plantar Reflex Left: response downgoing   Hoffmans Reflex Right: absent   Hoffmans Reflex Left: absent  Sensory   Sensation: normal light touch, normal vibration, normal position, normal DSS and no neglect  Spine   Cervical Spine: no tenderness, no dystonia  and full ROM   Thoracic Spine: no spasms, no bony abnormalities, normal curvature, no tenderness and full ROM   Low Back: full ROM, no pain, no spasms and no bony abnormalities  Lungs   Auscultation: normal breath sounds  Skin   Inspection: no jaundice, no lesions, no rashes and no cyanosis      /70 (Site: Right Upper Arm, Position: Sitting, Cuff Size: Large Adult)   Pulse 102   Ht 5' 4\" (1.626 m)   Wt 160 lb 12.8 oz (72.9 kg)   SpO2 98%   BMI 27.60 kg/m²     Assessment and Plan     Diagnosis Orders   1. Dementia without behavioral disturbance, unspecified dementia type (HCC)  donepezil (ARICEPT) 5 MG tablet    donepezil (ARICEPT) 10 MG tablet    MRI BRAIN WO CONTRAST   2. Anxiety     3. Difficulty sleeping     4. Difficulty balancing  Ambulatory referral to Physical Therapy    MRI BRAIN WO CONTRAST       Nahomi Chowdhury has a pattern of memory impairment consistent with signs of an early cortical dementia. I will obtain an MRI of the brain given vascular risk factors such as diabetes to assess for the extent of vascular involvement and due to recent head trauma. We will start donepezil to be titrated up to 10 mg daily also on the progress memory decline.   We discussed nonpharmacologic interventions including staying active cognitively, socially, and physically to help slow down the progression of memory loss. Her balance could be a result of vascular change in the brain or diabetic peripheral neuropathy. I will set her up for physical therapy for gait and balance training. We can investigate peripheral neuropathy in future appointment and possibly obtain EMG if needed. Return in about 3 months (around 6/15/2022) for Follow-up PA/NP.     Nette Donald, DO

## 2022-03-24 ENCOUNTER — TELEPHONE (OUTPATIENT)
Dept: NEUROLOGY | Age: 72
End: 2022-03-24

## 2022-03-24 DIAGNOSIS — F03.90 DEMENTIA WITHOUT BEHAVIORAL DISTURBANCE, UNSPECIFIED DEMENTIA TYPE: Primary | ICD-10-CM

## 2022-03-24 NOTE — TELEPHONE ENCOUNTER
Patient's  called and states that the patient's cognitive issues have become worse and she is becoming more and more confused and having delusions since starting the Aricept, along with GI issues such as diarrhea. Put patient on hold and went through the office notes and possible side effects of the medication. Picked up the line to ask more questions and discuss that the patient was already suffering a lot of the same issues cognitively that was stated in the office visit note. However, the line was silent and cut out. Patient has been on medication for almost 10 days and has had diarrhea for most of them, should we cut the dose back? Please advise.

## 2022-03-26 ENCOUNTER — HOSPITAL ENCOUNTER (OUTPATIENT)
Age: 72
Setting detail: OBSERVATION
Discharge: HOME | End: 2022-03-28
Attending: HOSPITALIST | Admitting: HOSPITALIST
Payer: MEDICARE

## 2022-03-26 ENCOUNTER — APPOINTMENT (OUTPATIENT)
Dept: CT IMAGING | Age: 72
End: 2022-03-26
Payer: MEDICARE

## 2022-03-26 DIAGNOSIS — K52.9 GASTROENTERITIS: ICD-10-CM

## 2022-03-26 DIAGNOSIS — R10.84 GENERALIZED ABDOMINAL PAIN: Primary | ICD-10-CM

## 2022-03-26 DIAGNOSIS — R19.7 NAUSEA VOMITING AND DIARRHEA: ICD-10-CM

## 2022-03-26 DIAGNOSIS — N39.0 URINARY TRACT INFECTION WITHOUT HEMATURIA, SITE UNSPECIFIED: ICD-10-CM

## 2022-03-26 DIAGNOSIS — R11.2 NAUSEA VOMITING AND DIARRHEA: ICD-10-CM

## 2022-03-26 DIAGNOSIS — R79.89 ELEVATED LACTIC ACID LEVEL: ICD-10-CM

## 2022-03-26 LAB
ALBUMIN SERPL-MCNC: 3.2 GM/DL (ref 3.4–5)
ALP BLD-CCNC: 97 IU/L (ref 40–129)
ALT SERPL-CCNC: 27 U/L (ref 10–40)
ANION GAP SERPL CALCULATED.3IONS-SCNC: 13 MMOL/L (ref 4–16)
AST SERPL-CCNC: 45 IU/L (ref 15–37)
BACTERIA: ABNORMAL /HPF
BASOPHILS ABSOLUTE: 0 K/CU MM
BASOPHILS RELATIVE PERCENT: 0.3 % (ref 0–1)
BILIRUB SERPL-MCNC: 1 MG/DL (ref 0–1)
BILIRUBIN URINE: NEGATIVE MG/DL
BLOOD, URINE: NEGATIVE
BUN BLDV-MCNC: 11 MG/DL (ref 6–23)
CALCIUM SERPL-MCNC: 9.6 MG/DL (ref 8.3–10.6)
CHLORIDE BLD-SCNC: 101 MMOL/L (ref 99–110)
CLARITY: CLEAR
CO2: 23 MMOL/L (ref 21–32)
COLOR: YELLOW
CREAT SERPL-MCNC: 1 MG/DL (ref 0.6–1.1)
DIFFERENTIAL TYPE: ABNORMAL
EOSINOPHILS ABSOLUTE: 0.1 K/CU MM
EOSINOPHILS RELATIVE PERCENT: 1.4 % (ref 0–3)
GFR AFRICAN AMERICAN: >60 ML/MIN/1.73M2
GFR NON-AFRICAN AMERICAN: 55 ML/MIN/1.73M2
GLUCOSE BLD-MCNC: 102 MG/DL
GLUCOSE BLD-MCNC: 102 MG/DL (ref 70–99)
GLUCOSE BLD-MCNC: 117 MG/DL (ref 70–99)
GLUCOSE BLD-MCNC: 124 MG/DL (ref 70–99)
GLUCOSE BLD-MCNC: 139 MG/DL (ref 70–99)
GLUCOSE BLD-MCNC: 79 MG/DL (ref 70–99)
GLUCOSE BLD-MCNC: 82 MG/DL
GLUCOSE BLD-MCNC: 82 MG/DL (ref 70–99)
GLUCOSE, URINE: NEGATIVE MG/DL
HCT VFR BLD CALC: 31 % (ref 37–47)
HEMOGLOBIN: 9.8 GM/DL (ref 12.5–16)
IMMATURE NEUTROPHIL %: 0.2 % (ref 0–0.43)
KETONES, URINE: NEGATIVE MG/DL
LACTATE: 2.7 MMOL/L (ref 0.4–2)
LACTATE: 3.2 MMOL/L (ref 0.4–2)
LEUKOCYTE ESTERASE, URINE: ABNORMAL
LIPASE: 59 IU/L (ref 13–60)
LYMPHOCYTES ABSOLUTE: 1.3 K/CU MM
LYMPHOCYTES RELATIVE PERCENT: 20.5 % (ref 24–44)
MAGNESIUM: 1.8 MG/DL (ref 1.8–2.4)
MCH RBC QN AUTO: 35.4 PG (ref 27–31)
MCHC RBC AUTO-ENTMCNC: 31.6 % (ref 32–36)
MCV RBC AUTO: 111.9 FL (ref 78–100)
MONOCYTES ABSOLUTE: 0.4 K/CU MM
MONOCYTES RELATIVE PERCENT: 6.5 % (ref 0–4)
MUCUS: ABNORMAL HPF
NITRITE URINE, QUANTITATIVE: NEGATIVE
NUCLEATED RBC %: 0 %
PDW BLD-RTO: 14.4 % (ref 11.7–14.9)
PH, URINE: 7 (ref 5–8)
PLATELET # BLD: 138 K/CU MM (ref 140–440)
PMV BLD AUTO: 11.6 FL (ref 7.5–11.1)
POTASSIUM SERPL-SCNC: 4.7 MMOL/L (ref 3.5–5.1)
PROTEIN UA: NEGATIVE MG/DL
RBC # BLD: 2.77 M/CU MM (ref 4.2–5.4)
RBC URINE: ABNORMAL /HPF (ref 0–6)
SEGMENTED NEUTROPHILS ABSOLUTE COUNT: 4.6 K/CU MM
SEGMENTED NEUTROPHILS RELATIVE PERCENT: 71.1 % (ref 36–66)
SODIUM BLD-SCNC: 137 MMOL/L (ref 135–145)
SPECIFIC GRAVITY UA: 1.01 (ref 1–1.03)
SQUAMOUS EPITHELIAL: 5 /HPF
TOTAL IMMATURE NEUTOROPHIL: 0.01 K/CU MM
TOTAL NUCLEATED RBC: 0 K/CU MM
TOTAL PROTEIN: 7.7 GM/DL (ref 6.4–8.2)
TRANSITIONAL EPITHELIAL: <1 /HPF
TROPONIN T: <0.01 NG/ML
UROBILINOGEN, URINE: 0.2 MG/DL (ref 0.2–1)
WBC # BLD: 6.5 K/CU MM (ref 4–10.5)
WBC UA: 10 /HPF (ref 0–5)

## 2022-03-26 PROCEDURE — 36415 COLL VENOUS BLD VENIPUNCTURE: CPT

## 2022-03-26 PROCEDURE — 96361 HYDRATE IV INFUSION ADD-ON: CPT

## 2022-03-26 PROCEDURE — 96376 TX/PRO/DX INJ SAME DRUG ADON: CPT

## 2022-03-26 PROCEDURE — 6360000002 HC RX W HCPCS: Performed by: PHYSICIAN ASSISTANT

## 2022-03-26 PROCEDURE — 83690 ASSAY OF LIPASE: CPT

## 2022-03-26 PROCEDURE — 85025 COMPLETE CBC W/AUTO DIFF WBC: CPT

## 2022-03-26 PROCEDURE — A4216 STERILE WATER/SALINE, 10 ML: HCPCS | Performed by: NURSE PRACTITIONER

## 2022-03-26 PROCEDURE — 93005 ELECTROCARDIOGRAM TRACING: CPT | Performed by: PHYSICIAN ASSISTANT

## 2022-03-26 PROCEDURE — 2580000003 HC RX 258: Performed by: PHYSICIAN ASSISTANT

## 2022-03-26 PROCEDURE — 96375 TX/PRO/DX INJ NEW DRUG ADDON: CPT

## 2022-03-26 PROCEDURE — 82962 GLUCOSE BLOOD TEST: CPT

## 2022-03-26 PROCEDURE — 74177 CT ABD & PELVIS W/CONTRAST: CPT

## 2022-03-26 PROCEDURE — 83605 ASSAY OF LACTIC ACID: CPT

## 2022-03-26 PROCEDURE — 96366 THER/PROPH/DIAG IV INF ADDON: CPT

## 2022-03-26 PROCEDURE — 81001 URINALYSIS AUTO W/SCOPE: CPT

## 2022-03-26 PROCEDURE — G0378 HOSPITAL OBSERVATION PER HR: HCPCS

## 2022-03-26 PROCEDURE — 6360000004 HC RX CONTRAST MEDICATION

## 2022-03-26 PROCEDURE — 99284 EMERGENCY DEPT VISIT MOD MDM: CPT

## 2022-03-26 PROCEDURE — 6370000000 HC RX 637 (ALT 250 FOR IP): Performed by: NURSE PRACTITIONER

## 2022-03-26 PROCEDURE — 96365 THER/PROPH/DIAG IV INF INIT: CPT

## 2022-03-26 PROCEDURE — 87086 URINE CULTURE/COLONY COUNT: CPT

## 2022-03-26 PROCEDURE — 84484 ASSAY OF TROPONIN QUANT: CPT

## 2022-03-26 PROCEDURE — 80053 COMPREHEN METABOLIC PANEL: CPT

## 2022-03-26 PROCEDURE — 2500000003 HC RX 250 WO HCPCS: Performed by: PHYSICIAN ASSISTANT

## 2022-03-26 PROCEDURE — 2580000003 HC RX 258: Performed by: NURSE PRACTITIONER

## 2022-03-26 PROCEDURE — A4216 STERILE WATER/SALINE, 10 ML: HCPCS | Performed by: PHYSICIAN ASSISTANT

## 2022-03-26 PROCEDURE — 83735 ASSAY OF MAGNESIUM: CPT

## 2022-03-26 PROCEDURE — 2500000003 HC RX 250 WO HCPCS: Performed by: NURSE PRACTITIONER

## 2022-03-26 RX ORDER — METOCLOPRAMIDE HYDROCHLORIDE 5 MG/ML
10 INJECTION INTRAMUSCULAR; INTRAVENOUS ONCE
Status: COMPLETED | OUTPATIENT
Start: 2022-03-26 | End: 2022-03-26

## 2022-03-26 RX ORDER — POLYETHYLENE GLYCOL 3350 17 G/17G
17 POWDER, FOR SOLUTION ORAL DAILY PRN
Status: DISCONTINUED | OUTPATIENT
Start: 2022-03-26 | End: 2022-03-28 | Stop reason: HOSPADM

## 2022-03-26 RX ORDER — 0.9 % SODIUM CHLORIDE 0.9 %
1000 INTRAVENOUS SOLUTION INTRAVENOUS ONCE
Status: COMPLETED | OUTPATIENT
Start: 2022-03-26 | End: 2022-03-26

## 2022-03-26 RX ORDER — ACETAMINOPHEN 325 MG/1
650 TABLET ORAL EVERY 6 HOURS PRN
Status: DISCONTINUED | OUTPATIENT
Start: 2022-03-26 | End: 2022-03-28 | Stop reason: HOSPADM

## 2022-03-26 RX ORDER — POTASSIUM CHLORIDE 7.45 MG/ML
10 INJECTION INTRAVENOUS PRN
Status: DISCONTINUED | OUTPATIENT
Start: 2022-03-26 | End: 2022-03-28 | Stop reason: HOSPADM

## 2022-03-26 RX ORDER — MAGNESIUM SULFATE IN WATER 40 MG/ML
2000 INJECTION, SOLUTION INTRAVENOUS PRN
Status: DISCONTINUED | OUTPATIENT
Start: 2022-03-26 | End: 2022-03-28 | Stop reason: HOSPADM

## 2022-03-26 RX ORDER — GABAPENTIN 100 MG/1
100 CAPSULE ORAL 3 TIMES DAILY
Status: DISCONTINUED | OUTPATIENT
Start: 2022-03-26 | End: 2022-03-28 | Stop reason: HOSPADM

## 2022-03-26 RX ORDER — LORAZEPAM 0.5 MG/1
0.5 TABLET ORAL NIGHTLY PRN
Status: DISCONTINUED | OUTPATIENT
Start: 2022-03-26 | End: 2022-03-28 | Stop reason: HOSPADM

## 2022-03-26 RX ORDER — SODIUM CHLORIDE 9 MG/ML
INJECTION, SOLUTION INTRAVENOUS CONTINUOUS
Status: DISCONTINUED | OUTPATIENT
Start: 2022-03-26 | End: 2022-03-27

## 2022-03-26 RX ORDER — RISPERIDONE 0.5 MG/1
0.25 TABLET, FILM COATED ORAL NIGHTLY
Status: DISCONTINUED | OUTPATIENT
Start: 2022-03-26 | End: 2022-03-28 | Stop reason: HOSPADM

## 2022-03-26 RX ORDER — DEXTROSE MONOHYDRATE 50 MG/ML
100 INJECTION, SOLUTION INTRAVENOUS PRN
Status: DISCONTINUED | OUTPATIENT
Start: 2022-03-26 | End: 2022-03-28 | Stop reason: HOSPADM

## 2022-03-26 RX ORDER — DONEPEZIL HYDROCHLORIDE 10 MG/1
10 TABLET, FILM COATED ORAL DAILY
Status: DISCONTINUED | OUTPATIENT
Start: 2022-03-26 | End: 2022-03-27

## 2022-03-26 RX ORDER — MAGNESIUM HYDROXIDE/ALUMINUM HYDROXICE/SIMETHICONE 120; 1200; 1200 MG/30ML; MG/30ML; MG/30ML
30 SUSPENSION ORAL EVERY 6 HOURS PRN
Status: DISCONTINUED | OUTPATIENT
Start: 2022-03-26 | End: 2022-03-28 | Stop reason: HOSPADM

## 2022-03-26 RX ORDER — SODIUM CHLORIDE 9 MG/ML
INJECTION, SOLUTION INTRAVENOUS CONTINUOUS
Status: DISCONTINUED | OUTPATIENT
Start: 2022-03-26 | End: 2022-03-26 | Stop reason: SDUPTHER

## 2022-03-26 RX ORDER — NICOTINE POLACRILEX 4 MG
15 LOZENGE BUCCAL PRN
Status: DISCONTINUED | OUTPATIENT
Start: 2022-03-26 | End: 2022-03-28 | Stop reason: HOSPADM

## 2022-03-26 RX ORDER — LATANOPROST 50 UG/ML
1 SOLUTION/ DROPS OPHTHALMIC NIGHTLY
Status: DISCONTINUED | OUTPATIENT
Start: 2022-03-26 | End: 2022-03-28 | Stop reason: HOSPADM

## 2022-03-26 RX ORDER — LEVOTHYROXINE SODIUM 88 UG/1
88 TABLET ORAL DAILY
Status: DISCONTINUED | OUTPATIENT
Start: 2022-03-27 | End: 2022-03-28 | Stop reason: HOSPADM

## 2022-03-26 RX ORDER — ONDANSETRON 4 MG/1
4 TABLET, ORALLY DISINTEGRATING ORAL EVERY 8 HOURS PRN
Status: DISCONTINUED | OUTPATIENT
Start: 2022-03-26 | End: 2022-03-28 | Stop reason: HOSPADM

## 2022-03-26 RX ORDER — SODIUM CHLORIDE 0.9 % (FLUSH) 0.9 %
5-40 SYRINGE (ML) INJECTION EVERY 12 HOURS SCHEDULED
Status: DISCONTINUED | OUTPATIENT
Start: 2022-03-26 | End: 2022-03-28 | Stop reason: HOSPADM

## 2022-03-26 RX ORDER — MORPHINE SULFATE/0.9% NACL/PF 1 MG/ML
4 SYRINGE (ML) INJECTION ONCE
Status: COMPLETED | OUTPATIENT
Start: 2022-03-26 | End: 2022-03-26

## 2022-03-26 RX ORDER — PROCHLORPERAZINE EDISYLATE 5 MG/ML
10 INJECTION INTRAMUSCULAR; INTRAVENOUS EVERY 6 HOURS PRN
Status: DISCONTINUED | OUTPATIENT
Start: 2022-03-26 | End: 2022-03-28 | Stop reason: HOSPADM

## 2022-03-26 RX ORDER — LISINOPRIL 10 MG/1
10 TABLET ORAL DAILY
Status: DISCONTINUED | OUTPATIENT
Start: 2022-03-26 | End: 2022-03-28 | Stop reason: HOSPADM

## 2022-03-26 RX ORDER — LORAZEPAM 0.5 MG/1
TABLET ORAL
COMMUNITY

## 2022-03-26 RX ORDER — INSULIN GLARGINE 100 [IU]/ML
30 INJECTION, SOLUTION SUBCUTANEOUS EVERY MORNING
Status: DISCONTINUED | OUTPATIENT
Start: 2022-03-27 | End: 2022-03-28 | Stop reason: HOSPADM

## 2022-03-26 RX ORDER — SODIUM CHLORIDE 0.9 % (FLUSH) 0.9 %
10 SYRINGE (ML) INJECTION PRN
Status: DISCONTINUED | OUTPATIENT
Start: 2022-03-26 | End: 2022-03-28 | Stop reason: HOSPADM

## 2022-03-26 RX ORDER — PROMETHAZINE HYDROCHLORIDE 25 MG/ML
25 INJECTION, SOLUTION INTRAMUSCULAR; INTRAVENOUS ONCE
Status: DISCONTINUED | OUTPATIENT
Start: 2022-03-26 | End: 2022-03-28 | Stop reason: HOSPADM

## 2022-03-26 RX ORDER — ONDANSETRON 2 MG/ML
4 INJECTION INTRAMUSCULAR; INTRAVENOUS EVERY 6 HOURS PRN
Status: DISCONTINUED | OUTPATIENT
Start: 2022-03-26 | End: 2022-03-28 | Stop reason: HOSPADM

## 2022-03-26 RX ORDER — DEXTROSE MONOHYDRATE 25 G/50ML
12.5 INJECTION, SOLUTION INTRAVENOUS PRN
Status: DISCONTINUED | OUTPATIENT
Start: 2022-03-26 | End: 2022-03-26 | Stop reason: RX

## 2022-03-26 RX ORDER — SODIUM CHLORIDE 9 MG/ML
25 INJECTION, SOLUTION INTRAVENOUS PRN
Status: DISCONTINUED | OUTPATIENT
Start: 2022-03-26 | End: 2022-03-28 | Stop reason: HOSPADM

## 2022-03-26 RX ORDER — ASPIRIN 81 MG/1
81 TABLET, CHEWABLE ORAL DAILY
Status: DISCONTINUED | OUTPATIENT
Start: 2022-03-26 | End: 2022-03-28 | Stop reason: HOSPADM

## 2022-03-26 RX ORDER — ONDANSETRON 2 MG/ML
4 INJECTION INTRAMUSCULAR; INTRAVENOUS EVERY 30 MIN PRN
Status: DISCONTINUED | OUTPATIENT
Start: 2022-03-26 | End: 2022-03-28 | Stop reason: HOSPADM

## 2022-03-26 RX ORDER — POTASSIUM CHLORIDE 20 MEQ/1
40 TABLET, EXTENDED RELEASE ORAL PRN
Status: DISCONTINUED | OUTPATIENT
Start: 2022-03-26 | End: 2022-03-28 | Stop reason: HOSPADM

## 2022-03-26 RX ADMIN — ASPIRIN 81 MG 81 MG: 81 TABLET ORAL at 22:34

## 2022-03-26 RX ADMIN — DONEPEZIL HYDROCHLORIDE 10 MG: 10 TABLET, FILM COATED ORAL at 22:34

## 2022-03-26 RX ADMIN — CEFTRIAXONE SODIUM 1000 MG: 1 INJECTION, POWDER, FOR SOLUTION INTRAMUSCULAR; INTRAVENOUS at 12:30

## 2022-03-26 RX ADMIN — SODIUM CHLORIDE 1000 ML: 9 INJECTION, SOLUTION INTRAVENOUS at 12:31

## 2022-03-26 RX ADMIN — FAMOTIDINE 20 MG: 10 INJECTION, SOLUTION INTRAVENOUS at 22:36

## 2022-03-26 RX ADMIN — IOPAMIDOL 75 ML: 755 INJECTION, SOLUTION INTRAVENOUS at 11:32

## 2022-03-26 RX ADMIN — LORAZEPAM 0.5 MG: 0.5 TABLET ORAL at 22:33

## 2022-03-26 RX ADMIN — SODIUM CHLORIDE 1000 ML: 9 INJECTION, SOLUTION INTRAVENOUS at 15:50

## 2022-03-26 RX ADMIN — ONDANSETRON 4 MG: 2 INJECTION INTRAMUSCULAR; INTRAVENOUS at 10:17

## 2022-03-26 RX ADMIN — METOCLOPRAMIDE 10 MG: 5 INJECTION, SOLUTION INTRAMUSCULAR; INTRAVENOUS at 15:50

## 2022-03-26 RX ADMIN — ONDANSETRON 4 MG: 2 INJECTION INTRAMUSCULAR; INTRAVENOUS at 18:26

## 2022-03-26 RX ADMIN — LISINOPRIL 10 MG: 10 TABLET ORAL at 22:34

## 2022-03-26 RX ADMIN — Medication 4 MG: at 10:23

## 2022-03-26 RX ADMIN — GABAPENTIN 100 MG: 100 CAPSULE ORAL at 22:34

## 2022-03-26 RX ADMIN — FAMOTIDINE 20 MG: 10 INJECTION, SOLUTION INTRAVENOUS at 15:50

## 2022-03-26 RX ADMIN — LATANOPROST 1 DROP: 50 SOLUTION/ DROPS OPHTHALMIC at 22:56

## 2022-03-26 RX ADMIN — FAMOTIDINE 20 MG: 10 INJECTION, SOLUTION INTRAVENOUS at 10:16

## 2022-03-26 RX ADMIN — SODIUM CHLORIDE, PRESERVATIVE FREE 10 ML: 5 INJECTION INTRAVENOUS at 22:46

## 2022-03-26 RX ADMIN — SODIUM CHLORIDE 1000 ML: 9 INJECTION, SOLUTION INTRAVENOUS at 10:16

## 2022-03-26 RX ADMIN — RISPERIDONE 0.25 MG: 0.5 TABLET ORAL at 22:34

## 2022-03-26 ASSESSMENT — PAIN - FUNCTIONAL ASSESSMENT: PAIN_FUNCTIONAL_ASSESSMENT: 0-10

## 2022-03-26 ASSESSMENT — PAIN SCALES - GENERAL
PAINLEVEL_OUTOF10: 0
PAINLEVEL_OUTOF10: 5
PAINLEVEL_OUTOF10: 5

## 2022-03-26 NOTE — ED PROVIDER NOTES
EKG sinus tachycardia, ventricular rate of 102, FL interval 150, QRS duration 72, QT/QTc 360/469. No ST elevation noted.       Ovidio Hobbs,   03/26/22 1540

## 2022-03-26 NOTE — ED TRIAGE NOTES
Pt states that she has had nausea, vomiting, and intermittent diarrhea x 1 week. States that her blood sugar has been low at home.

## 2022-03-26 NOTE — ED PROVIDER NOTES
eMERGENCY dEPARTMENT eNCOUnter         [facility]     PCP: Geraldo Bhatia MD    CHIEF COMPLAINT    Chief Complaint   Patient presents with    Abdominal Pain     x1 wk    Diarrhea       HPI    Bryon Villa is a 70 y.o. female who presents with for generalized abdominal pain, nausea vomiting diarrhea. Onset was prior to arrival, x1 week ago. Context is patient denies any new foods medications antibiotic use or travel. States that she has had generalized abdominal pain and cramping, 5/10 throughout the abdomen, does not radiate into the back, no tearing or ripping sensation. Has had multiple episodes of yellow bilious vomiting as well as dark nonbloody. No underlying history for GI disorders but states that several years ago, had similar GI symptoms and was \"dehydrated. \"  Denying any dysuria hematuria but has had decreased urine output. She is a type II diabetic does not taken any insulin or other oral medications last 24 hours given her abdominal pain.  does state that her glucose levels have been running low in the 60s to 120 range and she has not had any insulin in 24 hours. No fever chills chest pain shortness of breath, calf pain or swelling. No abdominal surgical history. REVIEW OF SYSTEMS    Constitutional:  Denies fever, chills, weight loss or weakness   HENT:  Denies sore throat or ear pain   Cardiovascular:  Denies chest pain, palpitations or swelling   Respiratory:  Denies cough or shortness of breath   GI:  See HPI above  : No hematuria or dysuria. No vaginal symptoms. Musculoskeletal:  Denies back pain or groin pain or masses. No pain or swelling of extremities.   Skin:  Denies rash  Neurologic:  Denies headache, focal weakness or sensory changes   Endocrine:  Denies polyuria or polydypsia   Lymphatic:  Denies swollen glands     All other review of systems are negative  See HPI and nursing notes for additional information     PAST MEDICAL & SURGICAL HISTORY    Past Medical History:   Diagnosis Date    [facility] ([facility] [address]     Diabetes mellitus ([facility] [address]     Glaucoma     Gout     Hyperlipidemia     Hypertension     Neuropathy     Thyroid disease      Past Surgical History:   Procedure Laterality Date     SECTION      MASTECTOMY, BILATERAL  10/26/2007       CURRENT MEDICATIONS    Current Outpatient Rx   Medication Sig Dispense Refill    donepezil (ARICEPT) 5 MG tablet Take 2 tablets by mouth nightly 30 tablet 0    donepezil (ARICEPT) 10 MG tablet Take 1 tablet by mouth daily NOTE TO PHARMACY: DO NOT FILL UNTIL 5 MG RX IS COMPLETE 30 tablet 5    levothyroxine (SYNTHROID) 88 MCG tablet Take 1 tablet by mouth Daily 30 tablet 0    lisinopril (PRINIVIL;ZESTRIL) 10 MG tablet Take 1 tablet by mouth daily 90 tablet 0    risperiDONE (RISPERDAL) 0.25 MG tablet Take 1 tablet by mouth nightly 90 tablet 1    promethazine (PHENERGAN) 25 MG tablet Take 1 tablet by mouth every 6 hours as needed for Nausea 20 tablet 0    Riboflavin (B2 PO) Take by mouth      Cholecalciferol (D3-1000 PO) Take by mouth      insulin glargine (BASAGLAR KWIKPEN) 100 UNIT/ML injection pen Inject 35 Units into the skin 2 times daily 35 units in the morning and 25 units at bedtime (Patient taking differently: Inject 30 Units into the skin 2 times daily 35 units in the morning and 25 units at bedtime) 15 pen 3    gabapentin (NEURONTIN) 100 MG capsule Take 1 capsule by mouth 3 times daily for 90 days.  90 capsule 2    metFORMIN (GLUCOPHAGE-XR) 500 MG extended release tablet Take 1 tablet by mouth 3 times daily 90 tablet 2    NOVOLOG FLEXPEN 100 UNIT/ML injection pen 14 units with largest meal of the day, 7 units with each smaller meal (Patient taking differently: Inject into the skin 25 units with largest meal of the day, 7 units with each smaller meal) 7 pen 0    blood glucose monitor strips Test 2 times a day & as needed for symptoms of irregular blood glucose. Dispense sufficient amount for indicated testing frequency plus additional to accommodate PRN testing needs. Dx: E11.40, z79,4, n18.3 100 strip 5    lovastatin (MEVACOR) 40 MG tablet Take 1 tablet by mouth nightly 90 tablet 1    OneTouch Delica Lancets 72I MISC Test blood sugar 1-2 times a day as directed. Dx: E11.65 100 each 5    UNABLE TO FIND Bilateral breast prosthesis 1 each 0    UNABLE TO FIND Bra for bilateral prosthesis 12 each 0    bimatoprost (LUMIGAN) 0.03 % ophthalmic drops Place 1 drop into both eyes nightly      blood glucose test strips (ASCENSIA AUTODISC VI;ONE TOUCH ULTRA TEST VI) strip 1 each by In Vitro route daily As needed. Dx: E11.40 100 each 5    calcium carbonate (OSCAL) 500 MG TABS tablet Take 500 mg by mouth daily      aspirin 81 MG tablet Take 1 tablet by mouth      Cetirizine HCl 10 MG CAPS Take by mouth      Cranberry 61836 MG CAPS Take by mouth      Docusate Sodium (STOOL SOFTENER) 100 MG TABS Take by mouth      GARLIC PO Take by mouth      Multiple Vitamins-Minerals (ICAPS AREDS 2 PO) Take by mouth      ferrous sulfate 325 (65 Fe) MG tablet Take 325 mg by mouth daily (with breakfast)      ranitidine (ACID REDUCER) 150 MG tablet Take 150 mg by mouth 2 times daily      Pomegranate, [place] granatum, (POMEGRANATE PO) Take 500 mg by mouth daily         ALLERGIES    Allergies   Allergen Reactions    Bupropion      Other reaction(s): Other - comment required  Suicidal ideation    Nsaids      Other reaction(s): Other - comment required  Elevated Serum Creatinine    Amlodipine     Nateglinide Nausea Only    Ofloxacin Hives    Paroxetine Hcl Hives     Other reaction(s): Other - comment required  Shakey/nerveous    Pioglitazone      Other reaction(s):  Other - comment required  Dizzy / nauseated       SOCIAL AND FAMILY HISTORY    Social History     Socioeconomic History    Marital status:      Spouse name: None    Number of children: None    Years of education: None    Highest education level: None   Occupational History    None   Tobacco Use    Smoking status: Never Smoker    Smokeless tobacco: Never Used   Vaping Use    Vaping Use: Never used   Substance and Sexual Activity    Alcohol use: No    Drug use: No    Sexual activity: None   Other Topics Concern    None   Social History Narrative    None     Social Determinants of Health     Financial Resource Strain:     Difficulty of Paying Living Expenses: Not on file   Food Insecurity:     Worried About Running Out of Food in the Last Year: Not on file    [place] of Food in the Last Year: Not on file   Transportation Needs:     Lack of Transportation (Medical): Not on file    Lack of Transportation (Non-Medical): Not on file   Physical Activity:     Days of Exercise per Week: Not on file    Minutes of Exercise per Session: Not on file   Stress:     Feeling of Stress : Not on file   Social Connections:     Frequency of Communication with Friends and Family: Not on file    Frequency of Social Gatherings with Friends and Family: Not on file    Attends Rastafari Services: Not on file    Active Member of [address] [place] [address] or Organizations: Not on file    Attends Club or Organization Meetings: Not on file    Marital Status: Not on file   Intimate Partner Violence:     Fear of Current or Ex-Partner: Not on file    Emotionally Abused: Not on file    Physically Abused: Not on file    Sexually Abused: Not on file   Housing Stability:     Unable to Pay for Housing in the Last Year: Not on file    Number of [place] in the Last Year: Not on file    Unstable Housing in the Last Year: Not on file     History reviewed. No pertinent family history.     PHYSICAL EXAM    VITAL SIGNS: BP (!) 164/73   Pulse 102   Temp 98.6 °F (37 °C)   Resp 18   Ht 5' 6\" (1.676 m)   Wt 150 lb (68 kg)   SpO2 97%   BMI 24.21 kg/m²   General:  Well developed, well nourished, ill-appearing but nontoxic   Eyes:  Sclera nonicteric, Conjunctiva moist, No discharge  Head:  Normocephalic, Atramautic  Neck/Lymphatics: Supple, no JVD, no swollen nodes  Respiratory:  Clear to ausculation bilaterally, No retractions, Non labored breathing  Cardiovascular: Tachycardic rate 110 bpm, regular rhythm. Normal S1/S2. GI: No gross discoloration. Bowel sounds present in all quadrants but distant secondary to overlying distention peer, No audible bruits. Soft, abdomen is mildly distended with fluid wave, not tight drawn. Generalized periumbilical abdominal tenderness to deep palpation without rebound tenderness or guarding, No palpable pulsatile masses or obvious hernias. No hepatosplenomegaly  Back:  No CVA tenderness to percussion.   Musculoskeletal:  No edema, No deformity  Peripheral Vascular: Distal pulses 2+ equal bilaterally  Integument: No rash, Normal turgor  Neurologic:  Alert & oriented, Normal speech  Psychiatric: Cooperative, pleasant affect       I have reviewed and interpreted all of the currently available lab results from this visit (if applicable):  Results for orders placed or performed during the hospital encounter of 03/26/22   CBC with Auto Differential   Result Value Ref Range    WBC 6.5 4.0 - 10.5 K/CU MM    RBC 2.77 (L) 4.2 - 5.4 M/CU MM    Hemoglobin 9.8 (L) 12.5 - 16.0 GM/DL    Hematocrit 31.0 (L) 37 - 47 %    .9 (H) 78 - 100 FL    MCH 35.4 (H) 27 - 31 PG    MCHC 31.6 (L) 32.0 - 36.0 %    RDW 14.4 11.7 - 14.9 %    Platelets 541 (L) 021 - 440 K/CU MM    MPV 11.6 (H) 7.5 - 11.1 FL    Differential Type AUTOMATED DIFFERENTIAL     Segs Relative 71.1 (H) 36 - 66 %    Lymphocytes % 20.5 (L) 24 - 44 %    Monocytes % 6.5 (H) 0 - 4 %    Eosinophils % 1.4 0 - 3 %    Basophils % 0.3 0 - 1 %    Segs Absolute 4.6 K/CU MM    Lymphocytes Absolute 1.3 K/CU MM    Monocytes Absolute 0.4 K/CU MM    Eosinophils Absolute 0.1 K/CU MM    Basophils Absolute 0.0 K/CU MM    Nucleated RBC % 0.0 %    Total Nucleated RBC 0.0 K/CU MM Total Immature Neutrophil 0.01 K/CU MM    Immature Neutrophil % 0.2 0 - 0.43 %   Comprehensive Metabolic Panel   Result Value Ref Range    Sodium 137 135 - 145 MMOL/L    Potassium 4.7 3.5 - 5.1 MMOL/L    Chloride 101 99 - 110 mMol/L    CO2 23 21 - 32 MMOL/L    BUN 11 6 - 23 MG/DL    CREATININE 1.0 0.6 - 1.1 MG/DL    Glucose 79 70 - 99 MG/DL    Calcium 9.6 8.3 - 10.6 MG/DL    Albumin 3.2 (L) 3.4 - 5.0 GM/DL    Total Protein 7.7 6.4 - 8.2 GM/DL    Total Bilirubin 1.0 0.0 - 1.0 MG/DL    ALT 27 10 - 40 U/L    AST 45 (H) 15 - 37 IU/L    Alkaline Phosphatase 97 40 - 129 IU/L    GFR Non- 55 (L) >60 mL/min/1.73m2    GFR African American >60 >60 mL/min/1.73m2    Anion Gap 13 4 - 16   Lipase   Result Value Ref Range    Lipase 59 13 - 60 IU/L   Lactic Acid   Result Value Ref Range    Lactate 3.2 (HH) 0.4 - 2.0 mMOL/L   Urinalysis   Result Value Ref Range    Color, UA YELLOW YELLOW    Clarity, UA CLEAR (A) CLEAR    Glucose, Urine NEGATIVE NEGATIVE MG/DL    Bilirubin Urine NEGATIVE NEGATIVE MG/DL    Ketones, Urine NEGATIVE NEGATIVE MG/DL    Specific Gravity, UA 1.010 1.001 - 1.035    Blood, Urine NEGATIVE NEGATIVE    pH, Urine 7.0 5.0 - 8.0    Protein, UA NEGATIVE NEGATIVE MG/DL    Urobilinogen, Urine 0.2 0.2 - 1.0 MG/DL    Nitrite Urine, Quantitative NEGATIVE NEGATIVE    Leukocyte Esterase, Urine SMALL NUMBER OR AMOUNT OBSERVED (A) NEGATIVE    RBC, UA NONE SEEN 0 - 6 /HPF    WBC, UA 10 (H) 0 - 5 /HPF    Bacteria, UA FEW (A) NEGATIVE /HPF    Squam Epithel, UA 5 /HPF    Trans Epithel, UA <1 /HPF    Mucus, UA RARE (A) NEGATIVE HPF   Magnesium   Result Value Ref Range    Magnesium 1.8 1.8 - 2.4 mg/dl   POC Blood Glucose   Result Value Ref Range    Glucose 82 mg/dL   POCT Glucose   Result Value Ref Range    POC Glucose 82 70 - 99 MG/DL        RADIOLOGY/PROCEDURES            CT ABDOMEN PELVIS W IV CONTRAST Additional Contrast? None (Final result)  Result time 03/26/22 15:44:37  Final result by Emma Abreu MD (03/26/22 15:44:37)                Impression:    Nonspecific gastric and jejunal wall thickening could represent   gastroenteritis. Small amount of ascites. Inferior mesenteric veno-caval shunt. Narrative:    EXAMINATION:   CT OF THE ABDOMEN AND PELVIS WITH CONTRAST 3/26/2022 11:09 am     TECHNIQUE:   CT of the abdomen and pelvis was performed with the administration of   intravenous contrast. Multiplanar reformatted images are provided for review. Dose modulation, iterative reconstruction, and/or weight based adjustment of   the mA/kV was utilized to reduce the radiation dose to as low as reasonably   achievable. COMPARISON:   None. HISTORY:   ORDERING SYSTEM PROVIDED HISTORY: generalized abdominal pain, diarrhea   TECHNOLOGIST PROVIDED HISTORY:   Reason for exam:->generalized abdominal pain, diarrhea   Additional Contrast?->None   Decision Support Exception - unselect if not a suspected or confirmed   emergency medical condition->Emergency Medical Condition (MA)   Reason for Exam: generalized abdominal pain, diarrhea   Relevant Medical/Surgical History: 75 ML ISOVUE 370  LOT [id number]     FINDINGS:   Lower Chest: There is mild smooth interlobular septal thickening in the lung   bases. Organs: There is no acute abnormality of the liver, pancreas, spleen,   adrenals, or kidneys. Cholelithiasis without evidence of acute cholecystitis. GI/Bowel: Thickening of the distal gastric and proximal jejunal wall is   nonspecific due to the presence of ascites but could represent   gastroenteritis.  Enhancement of the bowel is normal.  There is no bowel   obstruction. Pelvis: No acute abnormality of the pelvic viscera. Peritoneum/Retroperitoneum: There is a small amount of ascites. [place] is no   free air.  No enlarged lymph node. There is a 14 mm diameter shunt from the inferior mesenteric vein to the   inferior vena cava.  The main portal vein is patent.      Bones/Soft Tissues: No acute osseous abnormality. EKG Interpretation  Please see ED physician's note - Dr. Mandy Gilbert - for EKG interpretation      ED COURSE & MEDICAL DECISION MAKING      Vital signs and nursing notes reviewed during ED course. I have independently evaluated this patient . Supervising MD - Dr Mandy Gilbert - present in the Emergency Department, available for consultation, throughout entirety of  patient care. All pertinent Lab data and radiographic results reviewed with patient at bedside. The patient and / or the family were informed of the results of any tests, a time was given to answer questions, a plan was proposed and they agreed with plan. Differential diagnosis: Abdominal Aortic Aneurysm, Ischemic Bowel, Bowel Obstruction, Acute Cholecystitis, Acute Appendicitis, other    Clinical  IMPRESSION    1. Generalized abdominal pain    2. Nausea vomiting and diarrhea    3. Urinary tract infection without hematuria, site unspecified        Patient presents with 1 week history of generalized abdominal pain nausea vomiting diarrhea. On exam, ill-appearing uncomfortable 77-year-old female, afebrile in no acute distress. Noted tachycardic but 97% on room air without increased work of breathing. Noted mild abdominal distention with palpable fluid wave with distant bowel sounds. Abdomen is generally tender, nonfocal without rebound or guarding. No CVA tenderness to percussion. Patient start IV fluids, morphine, Zofran and Pepcid. Accu-Chek is 82. CBC with normal white count, hemoglobin is 9.8 which is down trended compared to previous however last comparative labs was from 2019 and was normal at 12.5. CMP without significant derangement electrolyte disturbance. Troponin is normal. Normal renal function, lipase and LFTs. Lactic is elevated at 3.2 with normal magnesium.   UA shows small leukocytes with 10 white blood cells, few bacteria, negative for nitrites or ketones. Send for urine culture start patient IV Rocephin. CT abdomen pelvis with IV contrast shows nonspecific gastric and jejunal wall thickening which could represent gastroenteritis with small amount of ascites as well as an inferior meso enteric vena caval shunt. Patient was given a total of 2 L IV fluids in the ED and repeat lactic acid slightly downtrending 2.7. On serial reassessment, patient does state abdominal pain has resolved but continues to feel nauseated, did order IM phenergan. After additional antiemetics, patient continues to have significant nausea and dry heaving. Given her age, CT findings, we discussed admission for continue antiemetics, IV fluids and serial abdominal exams. Patient is comfortable and agreeable this plan. I did consult with Ted Chery CNP - hospitalist - and discussed patient's history, ED presentation/course including any pertinent laboratory findings and imaging study findings. He/she agrees to hospital admission. Patient is admitted to the hospital in stable condition. In consideration of current COVID19 pandemic, with effort to minimize unnecessary provider exposure, this patient was seen at bedside by me independently. However, in compliance with current hospital RAFIQ/ED protocol, prior to admission I did discuss this patient case with emergency department physician, Dr. Patricia Brar, who did agree with ED workup/evaluation and plan for admission however but ED attending physician did not independently evaluate the patient. Of note, this Pt was NOT admitted to the ICU. Comment: Please note this report has been produced using speech recognition software and may contain errors related to that system including errors in grammar, punctuation, and spelling, as well as words and phrases that may be inappropriate. If there are any questions or concerns please feel free to contact the dictating provider for clarification.           Read Alex BUTCH  03/26/22 1708

## 2022-03-26 NOTE — H&P
History and Physical      Name:  Della Andrews /Age/Sex: 1950  (70 y.o. female)   MRN & CSN:  [id number] & [id number] Admission Date/Time: 3/26/2022  9:42 AM   Location:  [facility] PCP: Ena Palacio MD       Hospital Day: 1        Admitting Physician: Dr. Shon Jaffe and Plan:   Della Andrews is a 70 y.o. female who presents with Abdominal Pain (x1 wk) and Diarrhea    Gastroenteritis  Ascites  Intractable N/V    CT abd w/o contrast (3/26) Nonspecific gastric and jejunal wall thickening could represent gastroenteritis. Small amount of ascites   Lactic Acidosis. 3.2 initially now 2.4 with 2 L NS bolus. Trend until normal    Admit to observation    PRN symptom control    GI PCR   Serial abdominal exams    IV hydration    Telemetry x 24 hrs   UA questionable for infection    Pending culture    Empiric rocephin     Uncontrolled 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 Last A1C 11.7 (2022)   Continue glargine (50% reduction d/t NPO) when tolerating PO . Monitor FSBS and cover with medium dose SSI   Hold PO medications while inpatient   Gabapentin     Essential hypertension- continue home antihypertensive regimen- Monitor BP trends. Dementia, vascular- new diagnsis for patient 3/15/22 current work up by neurology Dr Sunil Morris and pending MRI -OP   Continue Aricept   Recently started on this medication by neurology      Depression/anxiety Risperdal    PRN ativan rx by PCP. OARRS verified      HLD- Continue statin     Diet Advance as tolerated    DVT Prophylaxis [x] Lovenox, []  Heparin, [] SCDs, [] Ambulation  [] Long term AC   GI Prophylaxis [] PPI,  [x] H2 Blocker,  [] Carafate,  [] Diet/Tube Feeds   Code Status Full     Disposition Admit to OBS. Patient plans to return home upon discharge     -Patient assessment and plan discussed and reviewed with admitting physician:  Dr Marisa Ratliff.      History of Present Illness:     Chief Complaint: Abdominal Pain (x1 wk) and Diarrhea    Romeo Reyes is a 70 y.o. female who presents with abdominal pain, N/V/D. Onset approximately 1 week with progressive symptoms which prompted ED evaluation. Describes generalized pain but unable to more detailed description. She is very vague with any direct questioning and raises concern for historical ability and memory issues. Denies subjective fever symptoms, Chest pain, SOB, Dizziness, HA, denies urinary symptoms. Does not know if she is more edematous or bloated. History obtained from patient at bedside/outside chart review        Ten point ROS: reviewed negative, unless as noted in above HPI. Objective:   No intake or output data in the 24 hours ending 22 1600     Vitals:   Vitals:    22 1020 22 1134 22 1226 22 1554   BP: (!) 167/79 (!) 164/73 133/62 (!) 164/82   Pulse: 104 102 97 100   Resp: 15 18 15 16   Temp:       SpO2: 95% 97% 96% 95%   Weight:       Height:           Physical Exam: 22     Gen:  awake, alert, cooperative, no apparent distress normal body habitus  Head/Eyes:  Normocephalic atraumatic, EOMI   NECK:   symmetrical, trachea midline  LUNGS: Normal Effort/ symmetry movement   CARDIOVASCULAR:  Normal rate Tele ST  ABDOMEN: distended but soft. Hypoactive BS. Non tender,  no HSM noted. MUSCULOSKELETAL: no gross deformities  NEUROLOGIC: Alert and Oriented x 3,  Cranial nerves II-XII are grossly intact. SKIN:  no bruising or bleeding, normal skin color,  no redness  Past Medical History:      Past Medical History:   Diagnosis Date    Cancer ([facility]     Diabetes mellitus ([facility]     Glaucoma     Gout     Hyperlipidemia     Hypertension     Neuropathy     Thyroid disease        Past Surgical  History:    has a past surgical history that includes  section and Mastectomy, bilateral (10/26/2007).     Social History:    FAM HX: Reviewed and noncontributory     Soc HX:   Social History     Socioeconomic History    Marital status:      Spouse name: None    Number of children: None    Years of education: None    Highest education level: None   Occupational History    None   Tobacco Use    Smoking status: Never Smoker    Smokeless tobacco: Never Used   Vaping Use    Vaping Use: Never used   Substance and Sexual Activity    Alcohol use: No    Drug use: No    Sexual activity: None   Other Topics Concern    None   Social History Narrative    None     Social Determinants of Health     Financial Resource Strain:     Difficulty of Paying Living Expenses: Not on file   Food Insecurity:     Worried About Running Out of Food in the Last Year: Not on file    Lawanda of Food in the Last Year: Not on file   Transportation Needs:     Lack of Transportation (Medical): Not on file    Lack of Transportation (Non-Medical): Not on file   Physical Activity:     Days of Exercise per Week: Not on file    Minutes of Exercise per Session: Not on file   Stress:     Feeling of Stress : Not on file   Social Connections:     Frequency of Communication with Friends and Family: Not on file    Frequency of Social Gatherings with Friends and Family: Not on file    Attends Congregation Services: Not on file    Active Member of [address] [place] [address] or Organizations: Not on file    Attends Club or Organization Meetings: Not on file    Marital Status: Not on file   Intimate Partner Violence:     Fear of Current or Ex-Partner: Not on file    Emotionally Abused: Not on file    Physically Abused: Not on file    Sexually Abused: Not on file   Housing Stability:     Unable to Pay for Housing in the Last Year: Not on file    Number of [place] in the Last Year: Not on file    Unstable Housing in the Last Year: Not on file     TOBACCO:   reports that she has never smoked. She has never used smokeless tobacco.  ETOH:   reports no history of alcohol use. Drugs:  reports no history of drug use. Allergies:    Allergies   Allergen Reactions    Bupropion Other reaction(s): Other - comment required  Suicidal ideation    Nsaids      Other reaction(s): Other - comment required  Elevated Serum Creatinine    Amlodipine     Nateglinide Nausea Only    Ofloxacin Hives    Paroxetine Hcl Hives     Other reaction(s): Other - comment required  Shakey/nerveous    Pioglitazone      Other reaction(s): Other - comment required  Dizzy / nauseated       Home Medications:     Prior to Admission medications    Medication Sig Start Date End Date Taking? Authorizing Provider   donepezil (ARICEPT) 5 MG tablet Take 2 tablets by mouth nightly 3/15/22   Kodak Díaz DO   donepezil (ARICEPT) 10 MG tablet Take 1 tablet by mouth daily NOTE TO PHARMACY: DO NOT FILL UNTIL 5 MG RX IS COMPLETE 3/15/22   Freya Bermeo DO   levothyroxine (SYNTHROID) 88 MCG tablet Take 1 tablet by mouth Daily 1/8/21   Swetha Aguiar MD   lisinopril (PRINIVIL;ZESTRIL) 10 MG tablet Take 1 tablet by mouth daily 12/21/20   ATILIO Mayer CNP   risperiDONE (RISPERDAL) 0.25 MG tablet Take 1 tablet by mouth nightly 12/1/20   ATILIO Mayer CNP   promethazine (PHENERGAN) 25 MG tablet Take 1 tablet by mouth every 6 hours as needed for Nausea 11/3/20   Swetha Aguiar MD   Riboflavin (B2 PO) Take by mouth    Historical Provider, MD   Cholecalciferol (D3-1000 PO) Take by mouth    Historical Provider, MD   insulin glargine (BASAGLAR KWIKPEN) 100 UNIT/ML injection pen Inject 35 Units into the skin 2 times daily 35 units in the morning and 25 units at bedtime  Patient taking differently: Inject 30 Units into the skin 2 times daily 35 units in the morning and 25 units at bedtime 9/15/20   ATILIO Mayer CNP   gabapentin (NEURONTIN) 100 MG capsule Take 1 capsule by mouth 3 times daily for 90 days.  9/15/20 12/14/20  ATILIO Mayer CNP   metFORMIN (GLUCOPHAGE-XR) 500 MG extended release tablet Take 1 tablet by mouth 3 times daily 9/15/20   ATILIO Mayer CNP   NOVOLOG FLEXPEN 100 UNIT/ML injection pen 14 units with largest meal of the day, 7 units with each smaller meal  Patient taking differently: Inject into the skin 25 units with largest meal of the day, 7 units with each smaller meal 8/14/20   Tanya Lopez MD   blood glucose monitor strips Test 2 times a day & as needed for symptoms of irregular blood glucose. Dispense sufficient amount for indicated testing frequency plus additional to accommodate PRN testing needs. Dx: E11.40, z79,4, n18.3 6/22/20   Tanya Lopez MD   lovastatin (MEVACOR) 40 MG tablet Take 1 tablet by mouth nightly 6/18/20   Tanya Lopez MD   OneTouch Delica Lancets 92F MISC Test blood sugar 1-2 times a day as directed. Dx: E11.65 4/17/20   Tanya Lopez MD   UNABLE TO FIND Bilateral breast prosthesis 1/13/20   Tanya Lopez MD   UNABLE TO FIND Bra for bilateral prosthesis 1/13/20   Tanya Lopez MD   bimatoprost (LUMIGAN) 0.03 % ophthalmic drops Place 1 drop into both eyes nightly    Historical Provider, MD   blood glucose test strips (ASCENSIA AUTODISC VI;ONE TOUCH ULTRA TEST VI) strip 1 each by In Vitro route daily As needed.  Dx: E11.40 4/5/19   Tanya Lopez MD   calcium carbonate (OSCAL) 500 MG TABS tablet Take 500 mg by mouth daily    Historical Provider, MD   aspirin 81 MG tablet Take 1 tablet by mouth    Historical Provider, MD   Cetirizine HCl 10 MG CAPS Take by mouth    Historical Provider, MD   Cranberry 70988 MG CAPS Take by mouth    Historical Provider, MD   Docusate Sodium (STOOL SOFTENER) 100 MG TABS Take by mouth    Historical Provider, MD   GARLIC PO Take by mouth    Historical Provider, MD   Multiple Vitamins-Minerals (ICAPS AREDS 2 PO) Take by mouth    Historical Provider, MD   ferrous sulfate 325 (65 Fe) MG tablet Take 325 mg by mouth daily (with breakfast)    Historical Provider, MD   ranitidine (ACID REDUCER) 150 MG tablet Take 150 mg by mouth 2 times daily    Historical Provider, MD   Pomegranate, [place] gran[place], (POMEGRANATE PO) Take 500 mg by mouth daily    Historical Provider, MD         Medications:   Medications:    promethazine  25 mg IntraMUSCular Once    sodium chloride  1,000 mL IntraVENous Once      Infusions:    sodium chloride       PRN Meds: ondansetron, 4 mg, Q30 Min PRN        Data:     Laboratory this visit:  Reviewed  Recent Labs     03/26/22  1020   WBC 6.5   HGB 9.8*   HCT 31.0*   *      Recent Labs     03/26/22  1020      K 4.7      CO2 23   BUN 11   CREATININE 1.0     Recent Labs     03/26/22  1020   AST 45*   ALT 27   BILITOT 1.0   ALKPHOS 97     No results for input(s): INR in the last 72 hours. Radiology this visit:  Reviewed. CT HEAD W WO CONTRAST    Result Date: 3/4/2022  EXAMINATION: CT OF THE HEAD WITH AND WITHOUT CONTRAST  3/3/2022 5:16 pm TECHNIQUE: CT of the head/brain was performed without and with the administration of intravenous contrast. Multiplanar reformatted images are provided for review. Dose modulation, iterative reconstruction, and/or weight based adjustment of the mA/kV was utilized to reduce the radiation dose to as low as reasonably achievable. COMPARISON: None. HISTORY: ORDERING SYSTEM PROVIDED HISTORY: Confusion TECHNOLOGIST PROVIDED HISTORY: STAT Creatinine as needed:->Yes Reason for Exam: Confusion; Coordination impairment, hx of breast ca Additional signs and symptoms: 100ml isovue 370 @ lt anti @ 1710hrs, gfr44, creat 1.2 2-23-22, Confusion; Coordination impairment, hx of breast ca. ams, memory loss,weakness FINDINGS: BRAIN/VENTRICLES: There is no acute intracranial hemorrhage, mass effect or midline shift. No abnormal extra-axial fluid collection. The gray-white differentiation is maintained without evidence of an acute infarct. There is no evidence of hydrocephalus. No abnormal enhancement. Mild diffuse cerebral atrophy and chronic white matter ischemic change. ORBITS: The visualized portion of the orbits demonstrate no acute abnormality. SINUSES: The visualized paranasal sinuses and mastoid air cells demonstrate no acute abnormality. SOFT TISSUES/SKULL:  The skull base and overlying calvarium are intact. There is a moderate-sized right lateral scalp hematoma. No evidence of metastatic disease. Moderate-sized right scalp hematoma. Mild diffuse cerebral atrophy and chronic white matter ischemic change. CT ABDOMEN PELVIS W IV CONTRAST Additional Contrast? None    Result Date: 3/26/2022  EXAMINATION: CT OF THE ABDOMEN AND PELVIS WITH CONTRAST 3/26/2022 11:09 am TECHNIQUE: CT of the abdomen and pelvis was performed with the administration of intravenous contrast. Multiplanar reformatted images are provided for review. Dose modulation, iterative reconstruction, and/or weight based adjustment of the mA/kV was utilized to reduce the radiation dose to as low as reasonably achievable. COMPARISON: None. HISTORY: ORDERING SYSTEM PROVIDED HISTORY: generalized abdominal pain, diarrhea TECHNOLOGIST PROVIDED HISTORY: Reason for exam:->generalized abdominal pain, diarrhea Additional Contrast?->None Decision Support Exception - unselect if not a suspected or confirmed emergency medical condition->Emergency Medical Condition (MA) Reason for Exam: generalized abdominal pain, diarrhea Relevant Medical/Surgical History: 75 ML ISOVUE 370  LOT [id number] FINDINGS: Lower Chest: There is mild smooth interlobular septal thickening in the lung bases. Organs: There is no acute abnormality of the liver, pancreas, spleen, adrenals, or kidneys. Cholelithiasis without evidence of acute cholecystitis. GI/Bowel: Thickening of the distal gastric and proximal jejunal wall is nonspecific due to the presence of ascites but could represent gastroenteritis. Enhancement of the bowel is normal.  There is no bowel obstruction. Pelvis: No acute abnormality of the pelvic viscera. Peritoneum/Retroperitoneum: There is a small amount of ascites. There is no free air.   No enlarged lymph node. There is a 14 mm diameter shunt from the inferior mesenteric vein to the inferior vena cava. The main portal vein is patent. Bones/Soft Tissues: No acute osseous abnormality. Nonspecific gastric and jejunal wall thickening could represent gastroenteritis. Small amount of ascites. Inferior mesenteric veno-caval shunt.        EKG this visit:  Reviewed       Electronically signed by ATILIO Hong CNP on 3/26/2022 at 4:00 PM

## 2022-03-27 LAB
ALBUMIN SERPL-MCNC: 2.9 GM/DL (ref 3.4–5)
ALP BLD-CCNC: 83 IU/L (ref 40–129)
ALT SERPL-CCNC: 23 U/L (ref 10–40)
ANION GAP SERPL CALCULATED.3IONS-SCNC: 10 MMOL/L (ref 4–16)
AST SERPL-CCNC: 39 IU/L (ref 15–37)
BASOPHILS ABSOLUTE: 0 K/CU MM
BASOPHILS RELATIVE PERCENT: 0.6 % (ref 0–1)
BILIRUB SERPL-MCNC: 0.7 MG/DL (ref 0–1)
BUN BLDV-MCNC: 12 MG/DL (ref 6–23)
CALCIUM SERPL-MCNC: 8.6 MG/DL (ref 8.3–10.6)
CHLORIDE BLD-SCNC: 107 MMOL/L (ref 99–110)
CO2: 24 MMOL/L (ref 21–32)
CREAT SERPL-MCNC: 1 MG/DL (ref 0.6–1.1)
CULTURE: ABNORMAL
CULTURE: ABNORMAL
DIFFERENTIAL TYPE: ABNORMAL
EKG ATRIAL RATE: 102 BPM
EKG DIAGNOSIS: NORMAL
EKG P AXIS: 43 DEGREES
EKG P-R INTERVAL: 150 MS
EKG Q-T INTERVAL: 360 MS
EKG QRS DURATION: 72 MS
EKG QTC CALCULATION (BAZETT): 469 MS
EKG R AXIS: 35 DEGREES
EKG T AXIS: 31 DEGREES
EKG VENTRICULAR RATE: 102 BPM
EOSINOPHILS ABSOLUTE: 0.1 K/CU MM
EOSINOPHILS RELATIVE PERCENT: 1.9 % (ref 0–3)
GFR AFRICAN AMERICAN: >60 ML/MIN/1.73M2
GFR NON-AFRICAN AMERICAN: 55 ML/MIN/1.73M2
GLUCOSE BLD-MCNC: 160 MG/DL (ref 70–99)
GLUCOSE BLD-MCNC: 225 MG/DL (ref 70–99)
GLUCOSE BLD-MCNC: 272 MG/DL (ref 70–99)
GLUCOSE BLD-MCNC: 76 MG/DL (ref 70–99)
GLUCOSE BLD-MCNC: 81 MG/DL (ref 70–99)
HCT VFR BLD CALC: 26.4 % (ref 37–47)
HEMOGLOBIN: 8.4 GM/DL (ref 12.5–16)
IMMATURE NEUTROPHIL %: 0.2 % (ref 0–0.43)
LACTATE: 1.2 MMOL/L (ref 0.4–2)
LACTATE: 1.5 MMOL/L (ref 0.4–2)
LYMPHOCYTES ABSOLUTE: 1.6 K/CU MM
LYMPHOCYTES RELATIVE PERCENT: 34.4 % (ref 24–44)
Lab: ABNORMAL
MAGNESIUM: 1.7 MG/DL (ref 1.8–2.4)
MCH RBC QN AUTO: 35.6 PG (ref 27–31)
MCHC RBC AUTO-ENTMCNC: 31.8 % (ref 32–36)
MCV RBC AUTO: 111.9 FL (ref 78–100)
MONOCYTES ABSOLUTE: 0.3 K/CU MM
MONOCYTES RELATIVE PERCENT: 7.3 % (ref 0–4)
NUCLEATED RBC %: 0 %
PDW BLD-RTO: 14.6 % (ref 11.7–14.9)
PLATELET # BLD: 120 K/CU MM (ref 140–440)
PMV BLD AUTO: 11.8 FL (ref 7.5–11.1)
POTASSIUM SERPL-SCNC: 4.3 MMOL/L (ref 3.5–5.1)
RBC # BLD: 2.36 M/CU MM (ref 4.2–5.4)
SEGMENTED NEUTROPHILS ABSOLUTE COUNT: 2.6 K/CU MM
SEGMENTED NEUTROPHILS RELATIVE PERCENT: 55.6 % (ref 36–66)
SODIUM BLD-SCNC: 141 MMOL/L (ref 135–145)
SPECIMEN: ABNORMAL
TOTAL IMMATURE NEUTOROPHIL: 0.01 K/CU MM
TOTAL NUCLEATED RBC: 0 K/CU MM
TOTAL PROTEIN: 7 GM/DL (ref 6.4–8.2)
WBC # BLD: 4.7 K/CU MM (ref 4–10.5)

## 2022-03-27 PROCEDURE — 80053 COMPREHEN METABOLIC PANEL: CPT

## 2022-03-27 PROCEDURE — 6370000000 HC RX 637 (ALT 250 FOR IP): Performed by: NURSE PRACTITIONER

## 2022-03-27 PROCEDURE — 82140 ASSAY OF AMMONIA: CPT

## 2022-03-27 PROCEDURE — 96372 THER/PROPH/DIAG INJ SC/IM: CPT

## 2022-03-27 PROCEDURE — 6360000002 HC RX W HCPCS: Performed by: PHYSICIAN ASSISTANT

## 2022-03-27 PROCEDURE — 96367 TX/PROPH/DG ADDL SEQ IV INF: CPT

## 2022-03-27 PROCEDURE — 82272 OCCULT BLD FECES 1-3 TESTS: CPT

## 2022-03-27 PROCEDURE — 85025 COMPLETE CBC W/AUTO DIFF WBC: CPT

## 2022-03-27 PROCEDURE — G0378 HOSPITAL OBSERVATION PER HR: HCPCS

## 2022-03-27 PROCEDURE — 6370000000 HC RX 637 (ALT 250 FOR IP): Performed by: INTERNAL MEDICINE

## 2022-03-27 PROCEDURE — 2580000003 HC RX 258: Performed by: PHYSICIAN ASSISTANT

## 2022-03-27 PROCEDURE — 83540 ASSAY OF IRON: CPT

## 2022-03-27 PROCEDURE — 2580000003 HC RX 258: Performed by: NURSE PRACTITIONER

## 2022-03-27 PROCEDURE — 2500000003 HC RX 250 WO HCPCS: Performed by: NURSE PRACTITIONER

## 2022-03-27 PROCEDURE — 83735 ASSAY OF MAGNESIUM: CPT

## 2022-03-27 PROCEDURE — 82746 ASSAY OF FOLIC ACID SERUM: CPT

## 2022-03-27 PROCEDURE — 36415 COLL VENOUS BLD VENIPUNCTURE: CPT

## 2022-03-27 PROCEDURE — 96376 TX/PRO/DX INJ SAME DRUG ADON: CPT

## 2022-03-27 PROCEDURE — 6360000002 HC RX W HCPCS: Performed by: NURSE PRACTITIONER

## 2022-03-27 PROCEDURE — 93010 ELECTROCARDIOGRAM REPORT: CPT | Performed by: INTERNAL MEDICINE

## 2022-03-27 PROCEDURE — 82962 GLUCOSE BLOOD TEST: CPT

## 2022-03-27 PROCEDURE — 82607 VITAMIN B-12: CPT

## 2022-03-27 PROCEDURE — 94761 N-INVAS EAR/PLS OXIMETRY MLT: CPT

## 2022-03-27 PROCEDURE — 83550 IRON BINDING TEST: CPT

## 2022-03-27 PROCEDURE — 82728 ASSAY OF FERRITIN: CPT

## 2022-03-27 PROCEDURE — 83605 ASSAY OF LACTIC ACID: CPT

## 2022-03-27 PROCEDURE — A4216 STERILE WATER/SALINE, 10 ML: HCPCS | Performed by: NURSE PRACTITIONER

## 2022-03-27 RX ORDER — INSULIN GLARGINE 100 [IU]/ML
20 INJECTION, SOLUTION SUBCUTANEOUS NIGHTLY
Status: DISCONTINUED | OUTPATIENT
Start: 2022-03-27 | End: 2022-03-28

## 2022-03-27 RX ORDER — DONEPEZIL HYDROCHLORIDE 10 MG/1
10 TABLET, FILM COATED ORAL DAILY
Status: DISCONTINUED | OUTPATIENT
Start: 2022-03-28 | End: 2022-03-28 | Stop reason: HOSPADM

## 2022-03-27 RX ORDER — MAGNESIUM SULFATE 1 G/100ML
1000 INJECTION INTRAVENOUS ONCE
Status: COMPLETED | OUTPATIENT
Start: 2022-03-27 | End: 2022-03-28

## 2022-03-27 RX ADMIN — ASPIRIN 81 MG 81 MG: 81 TABLET ORAL at 08:58

## 2022-03-27 RX ADMIN — ENOXAPARIN SODIUM 30 MG: 100 INJECTION SUBCUTANEOUS at 08:57

## 2022-03-27 RX ADMIN — SODIUM CHLORIDE: 9 INJECTION, SOLUTION INTRAVENOUS at 06:14

## 2022-03-27 RX ADMIN — GABAPENTIN 100 MG: 100 CAPSULE ORAL at 21:01

## 2022-03-27 RX ADMIN — ONDANSETRON 4 MG: 2 INJECTION INTRAMUSCULAR; INTRAVENOUS at 07:00

## 2022-03-27 RX ADMIN — LATANOPROST 1 DROP: 50 SOLUTION/ DROPS OPHTHALMIC at 20:59

## 2022-03-27 RX ADMIN — LISINOPRIL 10 MG: 10 TABLET ORAL at 08:58

## 2022-03-27 RX ADMIN — INSULIN GLARGINE 20 UNITS: 100 INJECTION, SOLUTION SUBCUTANEOUS at 21:02

## 2022-03-27 RX ADMIN — RISPERIDONE 0.25 MG: 0.5 TABLET ORAL at 21:01

## 2022-03-27 RX ADMIN — GABAPENTIN 100 MG: 100 CAPSULE ORAL at 15:38

## 2022-03-27 RX ADMIN — INSULIN LISPRO 1 UNITS: 100 INJECTION, SOLUTION INTRAVENOUS; SUBCUTANEOUS at 12:28

## 2022-03-27 RX ADMIN — SODIUM CHLORIDE, PRESERVATIVE FREE 10 ML: 5 INJECTION INTRAVENOUS at 20:59

## 2022-03-27 RX ADMIN — LEVOTHYROXINE SODIUM 88 MCG: 0.09 TABLET ORAL at 05:53

## 2022-03-27 RX ADMIN — MAGNESIUM SULFATE IN DEXTROSE 1000 MG: 10 INJECTION, SOLUTION INTRAVENOUS at 09:01

## 2022-03-27 RX ADMIN — INSULIN LISPRO 2 UNITS: 100 INJECTION, SOLUTION INTRAVENOUS; SUBCUTANEOUS at 21:02

## 2022-03-27 RX ADMIN — GABAPENTIN 100 MG: 100 CAPSULE ORAL at 08:58

## 2022-03-27 RX ADMIN — INSULIN LISPRO 2 UNITS: 100 INJECTION, SOLUTION INTRAVENOUS; SUBCUTANEOUS at 16:35

## 2022-03-27 ASSESSMENT — PAIN SCALES - GENERAL: PAINLEVEL_OUTOF10: 0

## 2022-03-27 NOTE — PROGRESS NOTES
V2.0  [facility] [place]      Name:  Kevin Mccoy /Age/Sex: 1950  (70 y.o. female)   MRN & CSN:  [id number] & [id number] Encounter Date/Time: 3/27/2022 7:34 AM EDT    Location:  [address] [place] [address] PCP: Bam Ashley MD       Hospital Day: 2    Assessment and Plan:   Kevin Mccoy is a 70 y.o. female with past medical history of cancer, T2DM, hyperlipidemia, hypertension who presents with Gastroenteritis    Acute gastroenteritis  - reports 2 weeks of symptoms, worse over last several days   -CT A/P without contrast showed nonspecific gastric jejunal wall thickening which could represent gastritis, small amount of ascites  -afebrile without leukocytosis, lactic acidosis resolved   -GI PCR pending  - still complaining of nausea, but able to tolerate most foods   -continue antiemetics, ADAT    Anemia   Thrombocytopenia   - Hgb 9.8--->8.4; plts 120  - .9  - has prior history of macrocytic anemia, baseline Hgb ~10-11, follows with hematology  - denies signs or symptoms of bleeding   - no history of colonoscopy   - anemia panel pending  - occult stool pending   - likely will need GI evaluation as outpatient     Asymptomatic bacteriuria  -UA with trace leuks, 10 WBCs  - denies urinary symptoms   - UCx with <10K gram negative rods   - received IV Rocephin x1, will DC for now    T2DM  -Hold p.o. medications while inpatient  -Continue Lantus at reduced dose due to n.p.o.  -Continue SSI medium dose  -Monitor POCT glucose  -Hypoglycemia management protocol  - family requested Dr. Jordan Jaramillo to consult, appreciate recs     Hypertension  -Continue home BP meds    Vascular dementia   -New diagnosis 3/15/2022  -Currently following with neurology as outpatient, has pending MRI on Tuesday   -continue Aricept, consider holding if GI symptoms return     Inferior mesenteric veno-caval shunt   - incidental finding on CT, per literature review can potentially affect hepatic function   - no signs of cirrhosis on imaging  - ammonia pending given recent confusin    Depression/anxiety  -Continue Risperdal, as needed Ativan per PCP    Hyperlipidemia  -Continue statin    History of breast cancer  - s/p bilateral mastectomy 2007      This patient was discussed with Dr. Kassandra Huitron. He was agreeable with the plan and management as dictated above. Current Living situation: home with    Expected Disposition: same  Estimated D/C: hopefully 3/28/22 if tolerating diet and H&H stable     Diet ADULT DIET; Regular   DVT Prophylaxis [] Lovenox, []  Heparin, [x] SCDs, [] Ambulation,  [] Eliquis, [] Xarelto []Coumadin - reevaluate once H&H stable    Code Status Full Code   Disposition Patient requires continued admission due to advancing diet, anemia work-up    Surrogate Decision Maker/ POA      Subjective:     Chief Complaint: Abdominal Pain (x1 wk) and Diarrhea     Seen and examined at bedside. No acute events overnight. Complaining of some nausea this morning, denies abdominal pain. Had a normal bowel movement this morning. Denies any tarry stools, hematemesis, bloody stool. Has never had a colonoscopy. We discussed plan of care for today, patient is agreeable. Review of Systems:    Ten point ROS reviewed negative, unless as noted above    Objective:   No intake or output data in the 24 hours ending 03/27/22 0734     Vitals:   Vitals:    03/26/22 2015   BP: (!) 153/74   Pulse: 106   Resp: 16   Temp: 98.4 °F (36.9 °C)   SpO2: 97%       Physical Exam:   PHYSICAL EXAM   GEN Awake female, sitting upright in bed in no apparent distress. Appears given age. EYES Pupils are equally round. Gaze conjugate. HENT Mucous membranes are moist.   NECK Supple, no apparent thyromegaly or masses. RESP Respirations even and unlabored on RA, clear to auscultation bilaterally   CARDIO/VASC Regular rate without appreciable murmurs.   GI Abdomen is soft, moderately distended, bowel sounds active, no significant tenderness, rebound or guarding    Collazo catheter is not present. MSK No gross joint deformities. SKIN Normal coloration, warm, dry. NEURO Cranial nerves appear grossly intact, normal speech, no lateralizing weakness. PSYCH Awake, alert, oriented x 4. Affect appropriate.     Medications:   Medications:    promethazine  25 mg IntraMUSCular Once    sodium chloride flush  5-40 mL IntraVENous 2 times per day    enoxaparin  30 mg SubCUTAneous Daily    famotidine (PEPCID) injection  20 mg IntraVENous Daily    aspirin  81 mg Oral Daily    latanoprost  1 drop Both Eyes Nightly    [Held by provider] insulin glargine  30 Units SubCUTAneous QAM    levothyroxine  88 mcg Oral Daily    lisinopril  10 mg Oral Daily    risperiDONE  0.25 mg Oral Nightly    donepezil  10 mg Oral Daily    gabapentin  100 mg Oral TID    insulin lispro  0-6 Units SubCUTAneous TID WC    insulin lispro  0-3 Units SubCUTAneous Nightly      Infusions:    sodium chloride 100 mL/hr at 03/27/22 0614    sodium chloride      dextrose       PRN Meds: ondansetron, 4 mg, Q30 Min PRN  sodium chloride flush, 10 mL, PRN  sodium chloride, 25 mL, PRN  potassium chloride, 40 mEq, PRN   Or  potassium alternative oral replacement, 40 mEq, PRN   Or  potassium chloride, 10 mEq, PRN  magnesium sulfate, 2,000 mg, PRN  ondansetron, 4 mg, Q8H PRN   Or  ondansetron, 4 mg, Q6H PRN  polyethylene glycol, 17 g, Daily PRN  acetaminophen, 650 mg, Q6H PRN   Or  acetaminophen, 650 mg, Q6H PRN  aluminum & magnesium hydroxide-simethicone, 30 mL, Q6H PRN  prochlorperazine, 10 mg, Q6H PRN  LORazepam, 0.5 mg, Nightly PRN  glucose, 15 g, PRN  glucagon (rDNA), 1 mg, PRN  dextrose, 100 mL/hr, PRN  dextrose bolus (hypoglycemia), 125 mL, PRN   Or  dextrose bolus (hypoglycemia), 250 mL, PRN        Labs      Recent Results (from the past 24 hour(s))   POCT Glucose    Collection Time: 03/26/22 10:09 AM   Result Value Ref Range    POC Glucose 82 70 - 99 MG/DL   POC Blood Glucose Collection Time: 03/26/22 10:15 AM   Result Value Ref Range    Glucose 82 mg/dL   CBC with Auto Differential    Collection Time: 03/26/22 10:20 AM   Result Value Ref Range    WBC 6.5 4.0 - 10.5 K/CU MM    RBC 2.77 (L) 4.2 - 5.4 M/CU MM    Hemoglobin 9.8 (L) 12.5 - 16.0 GM/DL    Hematocrit 31.0 (L) 37 - 47 %    .9 (H) 78 - 100 FL    MCH 35.4 (H) 27 - 31 PG    MCHC 31.6 (L) 32.0 - 36.0 %    RDW 14.4 11.7 - 14.9 %    Platelets 555 (L) 437 - 440 K/CU MM    MPV 11.6 (H) 7.5 - 11.1 FL    Differential Type AUTOMATED DIFFERENTIAL     Segs Relative 71.1 (H) 36 - 66 %    Lymphocytes % 20.5 (L) 24 - 44 %    Monocytes % 6.5 (H) 0 - 4 %    Eosinophils % 1.4 0 - 3 %    Basophils % 0.3 0 - 1 %    Segs Absolute 4.6 K/CU MM    Lymphocytes Absolute 1.3 K/CU MM    Monocytes Absolute 0.4 K/CU MM    Eosinophils Absolute 0.1 K/CU MM    Basophils Absolute 0.0 K/CU MM    Nucleated RBC % 0.0 %    Total Nucleated RBC 0.0 K/CU MM    Total Immature Neutrophil 0.01 K/CU MM    Immature Neutrophil % 0.2 0 - 0.43 %   Comprehensive Metabolic Panel    Collection Time: 03/26/22 10:20 AM   Result Value Ref Range    Sodium 137 135 - 145 MMOL/L    Potassium 4.7 3.5 - 5.1 MMOL/L    Chloride 101 99 - 110 mMol/L    CO2 23 21 - 32 MMOL/L    BUN 11 6 - 23 MG/DL    CREATININE 1.0 0.6 - 1.1 MG/DL    Glucose 79 70 - 99 MG/DL    Calcium 9.6 8.3 - 10.6 MG/DL    Albumin 3.2 (L) 3.4 - 5.0 GM/DL    Total Protein 7.7 6.4 - 8.2 GM/DL    Total Bilirubin 1.0 0.0 - 1.0 MG/DL    ALT 27 10 - 40 U/L    AST 45 (H) 15 - 37 IU/L    Alkaline Phosphatase 97 40 - 129 IU/L    GFR Non- 55 (L) >60 mL/min/1.73m2    GFR African American >60 >60 mL/min/1.73m2    Anion Gap 13 4 - 16   Lipase    Collection Time: 03/26/22 10:20 AM   Result Value Ref Range    Lipase 59 13 - 60 IU/L   Lactic Acid    Collection Time: 03/26/22 10:20 AM   Result Value Ref Range    Lactate 3.2 (HH) 0.4 - 2.0 mMOL/L   Magnesium    Collection Time: 03/26/22 10:20 AM   Result Value Ref Range    Magnesium 1.8 1.8 - 2.4 mg/dl   Troponin    Collection Time: 03/26/22 10:20 AM   Result Value Ref Range    Troponin T <0.010 <0.01 NG/ML   Urinalysis    Collection Time: 03/26/22 11:06 AM   Result Value Ref Range    Color, UA YELLOW YELLOW    Clarity, UA CLEAR (A) CLEAR    Glucose, Urine NEGATIVE NEGATIVE MG/DL    Bilirubin Urine NEGATIVE NEGATIVE MG/DL    Ketones, Urine NEGATIVE NEGATIVE MG/DL    Specific Gravity, UA 1.010 1.001 - 1.035    Blood, Urine NEGATIVE NEGATIVE    pH, Urine 7.0 5.0 - 8.0    Protein, UA NEGATIVE NEGATIVE MG/DL    Urobilinogen, Urine 0.2 0.2 - 1.0 MG/DL    Nitrite Urine, Quantitative NEGATIVE NEGATIVE    Leukocyte Esterase, Urine SMALL NUMBER OR AMOUNT OBSERVED (A) NEGATIVE    RBC, UA NONE SEEN 0 - 6 /HPF    WBC, UA 10 (H) 0 - 5 /HPF    Bacteria, UA FEW (A) NEGATIVE /HPF    Squam Epithel, UA 5 /HPF    Trans Epithel, UA <1 /HPF    Mucus, UA RARE (A) NEGATIVE HPF   POC Blood Glucose    Collection Time: 03/26/22  2:02 PM   Result Value Ref Range    Glucose 102 mg/dL   POCT Glucose    Collection Time: 03/26/22  2:03 PM   Result Value Ref Range    POC Glucose 102 (H) 70 - 99 MG/DL   Lactic Acid    Collection Time: 03/26/22  2:10 PM   Result Value Ref Range    Lactate 2.7 (HH) 0.4 - 2.0 mMOL/L   EKG 12 Lead    Collection Time: 03/26/22  3:33 PM   Result Value Ref Range    Ventricular Rate 102 BPM    Atrial Rate 102 BPM    P-R Interval 150 ms    QRS Duration 72 ms    Q-T Interval 360 ms    QTc Calculation (Bazett) 469 ms    P Axis 43 degrees    R Axis 35 degrees    T Axis 31 degrees    Diagnosis       Sinus tachycardia  Otherwise normal ECG  No previous ECGs available     POCT Glucose    Collection Time: 03/26/22  5:25 PM   Result Value Ref Range    POC Glucose 117 (H) 70 - 99 MG/DL   POCT Glucose    Collection Time: 03/26/22  6:27 PM   Result Value Ref Range    POC Glucose 124 (H) 70 - 99 MG/DL   POCT Glucose    Collection Time: 03/26/22  9:31 PM   Result Value Ref Range    POC Glucose 139 (H) 70 - 99 MG/DL   Magnesium    Collection Time: 03/27/22 12:34 AM   Result Value Ref Range    Magnesium 1.7 (L) 1.8 - 2.4 mg/dl   Lactic Acid    Collection Time: 03/27/22 12:34 AM   Result Value Ref Range    Lactate 1.2 0.4 - 2.0 mMOL/L   Comprehensive Metabolic Panel w/ Reflex to MG    Collection Time: 03/27/22  4:29 AM   Result Value Ref Range    Sodium 141 135 - 145 MMOL/L    Potassium 4.3 3.5 - 5.1 MMOL/L    Chloride 107 99 - 110 mMol/L    CO2 24 21 - 32 MMOL/L    BUN 12 6 - 23 MG/DL    CREATININE 1.0 0.6 - 1.1 MG/DL    Glucose 76 70 - 99 MG/DL    Calcium 8.6 8.3 - 10.6 MG/DL    Albumin 2.9 (L) 3.4 - 5.0 GM/DL    Total Protein 7.0 6.4 - 8.2 GM/DL    Total Bilirubin 0.7 0.0 - 1.0 MG/DL    ALT 23 10 - 40 U/L    AST 39 (H) 15 - 37 IU/L    Alkaline Phosphatase 83 40 - 129 IU/L    GFR Non- 55 (L) >60 mL/min/1.73m2    GFR African American >60 >60 mL/min/1.73m2    Anion Gap 10 4 - 16   CBC auto differential    Collection Time: 03/27/22  4:29 AM   Result Value Ref Range    WBC 4.7 4.0 - 10.5 K/CU MM    RBC 2.36 (L) 4.2 - 5.4 M/CU MM    Hemoglobin 8.4 (L) 12.5 - 16.0 GM/DL    Hematocrit 26.4 (L) 37 - 47 %    .9 (H) 78 - 100 FL    MCH 35.6 (H) 27 - 31 PG    MCHC 31.8 (L) 32.0 - 36.0 %    RDW 14.6 11.7 - 14.9 %    Platelets 965 (L) 449 - 440 K/CU MM    MPV 11.8 (H) 7.5 - 11.1 FL    Differential Type AUTOMATED DIFFERENTIAL     Segs Relative 55.6 36 - 66 %    Lymphocytes % 34.4 24 - 44 %    Monocytes % 7.3 (H) 0 - 4 %    Eosinophils % 1.9 0 - 3 %    Basophils % 0.6 0 - 1 %    Segs Absolute 2.6 K/CU MM    Lymphocytes Absolute 1.6 K/CU MM    Monocytes Absolute 0.3 K/CU MM    Eosinophils Absolute 0.1 K/CU MM    Basophils Absolute 0.0 K/CU MM    Nucleated RBC % 0.0 %    Total Nucleated RBC 0.0 K/CU MM    Total Immature Neutrophil 0.01 K/CU MM    Immature Neutrophil % 0.2 0 - 0.43 %   Lactic Acid    Collection Time: 03/27/22  4:29 AM   Result Value Ref Range    Lactate 1.5 0.4 - 2.0 mMOL/L Imaging/Diagnostics Last 24 Hours   CT ABDOMEN PELVIS W IV CONTRAST Additional Contrast? None    Result Date: 3/26/2022  EXAMINATION: CT OF THE ABDOMEN AND PELVIS WITH CONTRAST 3/26/2022 11:09 am TECHNIQUE: CT of the abdomen and pelvis was performed with the administration of intravenous contrast. Multiplanar reformatted images are provided for review. Dose modulation, iterative reconstruction, and/or weight based adjustment of the mA/kV was utilized to reduce the radiation dose to as low as reasonably achievable. COMPARISON: None. HISTORY: ORDERING SYSTEM PROVIDED HISTORY: generalized abdominal pain, diarrhea TECHNOLOGIST PROVIDED HISTORY: Reason for exam:->generalized abdominal pain, diarrhea Additional Contrast?->None Decision Support Exception - unselect if not a suspected or confirmed emergency medical condition->Emergency Medical Condition (MA) Reason for Exam: generalized abdominal pain, diarrhea Relevant Medical/Surgical History: 75 ML ISOVUE 370  LOT [id number] FINDINGS: Lower Chest: There is mild smooth interlobular septal thickening in the lung bases. Organs: There is no acute abnormality of the liver, pancreas, spleen, adrenals, or kidneys. Cholelithiasis without evidence of acute cholecystitis. GI/Bowel: Thickening of the distal gastric and proximal jejunal wall is nonspecific due to the presence of ascites but could represent gastroenteritis. Enhancement of the bowel is normal.  There is no bowel obstruction. Pelvis: No acute abnormality of the pelvic viscera. Peritoneum/Retroperitoneum: There is a small amount of ascites. There is no free air. No enlarged lymph node. There is a 14 mm diameter shunt from the inferior mesenteric vein to the inferior vena cava. The main portal vein is patent. Bones/Soft Tissues: No acute osseous abnormality. Nonspecific gastric and jejunal wall thickening could represent gastroenteritis. Small amount of ascites. Inferior mesenteric veno-caval shunt. Electronically signed by Marianela Sanchez PA-C on 3/27/2022 at 7:34 AM

## 2022-03-27 NOTE — CONSULTS
Endocrinology   Consult Note      [facility]     Thank You for the Consult     Pt. Was Admitted for : Diarrhea abdominal pain with some nausea    Reason for Consult: Better control of blood glucose      History Obtained From:  Patient/ EMR       HISTORY OF PRESENT ILLNESS:                The patient is a 70 y.o. female with significant past medical history of breast cancer with bilateral mastectomy diabetes mellitus, glaucoma, dementia was recently, depression and anxiety gout, hypertension, hyperlipidemia, hypothyroidism and peripheral neuropathy comes in complaining of abdominal pain with diarrhea and some some vomiting. Her blood glucose has been running somewhat lower. She has lactic acidosis because of dehydration I was  consulted for better control of blood glucose. ROS:   Pt's ROS done in detail. Abnormal ROS are noted in Medical and Surgical History Section below: Other Medical History:        Diagnosis Date    Cancer ([facility]     Diabetes mellitus ([facility]     Glaucoma     Gout     Hyperlipidemia     Hypertension     Neuropathy     Thyroid disease      Surgical History:        Procedure Laterality Date     SECTION      MASTECTOMY, BILATERAL  10/26/2007       Allergies:  Bupropion, Nsaids, Amlodipine, Nateglinide, Ofloxacin, Paroxetine hcl, and Pioglitazone    Family History:   History reviewed. No pertinent family history. REVIEW OF SYSTEMS:  Review of System Done as noted above     PHYSICAL EXAM:      Vitals:    BP (!) 151/70   Pulse 97   Temp 98.4 °F (36.9 °C) (Oral)   Resp 16   Ht 5' 6\" (1.676 m)   Wt 150 lb (68 kg)   SpO2 95%   BMI 24.21 kg/m²     CONSTITUTIONAL:  awake, alert, cooperative, appears stated age   EYES:  vision intact Fundoscopic Exam not performed   ENT:Normal  NECK:  Supple, No JVD.    Thyroid Exam:Normal   LUNGS:  Has Vesicular Breath Sounds,   CARDIOVASCULAR:  Normal apical impulse, regular rate and rhythm, normal S1 and S2, no S3 or S4, Last appt: 6/17/2020  Next appt: Visit date not found  Due to return: and has no  murmur   ABDOMEN:  No scars, normal bowel sounds, soft, non-distended, non-tender, no masses palpated, no hepatolienomegaly  Musculoskeletal: Normal  Extremities: Normal, peripheral pulses normal, , has no edema   NEUROLOGIC:  Awake, alert, oriented to name, place and time. Cranial nerves II-XII are grossly intact. Motor is  intact. Sensory neuropathy t. ,  and gait is abnormal.  Unstable    DATA:    CBC:   Recent Labs     03/26/22  1020 03/27/22  0429   WBC 6.5 4.7   HGB 9.8* 8.4*   * 120*    CMP:  Recent Labs     03/26/22  1020 03/27/22  0429    141   K 4.7 4.3    107   CO2 23 24   BUN 11 12   CREATININE 1.0 1.0   CALCIUM 9.6 8.6   PROT 7.7 7.0   LABALBU 3.2* 2.9*   BILITOT 1.0 0.7   ALKPHOS 97 83   AST 45* 39*   ALT 27 23     Lipids:   Lab Results   Component Value Date    CHOL 148 01/27/2022    HDL 54 01/27/2022    TRIG 96 01/27/2022     Glucose:   Recent Labs     03/26/22  1827 03/26/22  2131 03/27/22  0853   POCGLU 124* 139* 81     Hemoglobin A1C:   Lab Results   Component Value Date    LABA1C 11.5 01/27/2022     Free T4:   Lab Results   Component Value Date    T4FREE 1.55 02/23/2022     Free T3: No results found for: FT3  TSH High Sensitivity:   Lab Results   Component Value Date    TSHHS 6.520 02/23/2022       CT ABDOMEN PELVIS W IV CONTRAST Additional Contrast? None   Final Result   Nonspecific gastric and jejunal wall thickening could represent   gastroenteritis. Small amount of ascites. Inferior mesenteric veno-caval shunt.                 Scheduled Medicines   Medications:    magnesium sulfate  1,000 mg IntraVENous Once    promethazine  25 mg IntraMUSCular Once    sodium chloride flush  5-40 mL IntraVENous 2 times per day    enoxaparin  30 mg SubCUTAneous Daily    famotidine (PEPCID) injection  20 mg IntraVENous Daily    aspirin  81 mg Oral Daily    latanoprost  1 drop Both Eyes Nightly    [Held by provider] insulin glargine  30 Units SubCUTAneous QAM  levothyroxine  88 mcg Oral Daily    lisinopril  10 mg Oral Daily    risperiDONE  0.25 mg Oral Nightly    donepezil  10 mg Oral Daily    gabapentin  100 mg Oral TID    insulin lispro  0-6 Units SubCUTAneous TID     insulin lispro  0-3 Units SubCUTAneous Nightly      Infusions:    sodium chloride 100 mL/hr at 03/27/22 5393    sodium chloride      dextrose           IMPRESSION    Patient Active Problem List   Diagnosis    Acquired hypothyroidism    Depression    Di[facility] neuropathy ([facility]    DM (diabetes mellitus) ([facility]    HTN (hypertension)    Hyperlipidemia    Osteopenia    Personal history of breast cancer    Allergic rhinitis    ANTONINA (acute kidney injury) ([facility]    Gastroenteritis         RECOMMENDATIONS:      1. Reviewed POC blood glucose . Labs and X ray results   2. Reviewed Home and Current Medicines   3. Will Start On Correction bolus Humalog/ Lantus Insulin regime   4. Monitor Blood glucose frequently   5. Modify  the dose of Insulin  as needed        Will follow with you  Again thank you for sharing pt's care with me.      Truly yours,       Lawyer Dame CHING

## 2022-03-27 NOTE — PROGRESS NOTES
Comprehensive Nutrition Assessment    Type and Reason for Visit:  Initial,Positive Nutrition Screen (N/V/D, weight loss, appetite loss)    Nutrition Recommendations/Plan:   · Start antiemetic regimen mary-prandial  · Start Carb Control 5 diet with Low Fat modifier  · Recommend probiotic regimen   · Offer oral nutrition supplement as needed   · Please measure weights during admit     Nutrition Assessment:  Admitted with gastroenteritis, anxiety. Pt on regular diet, reports consuming greater than 75% of lunch meal. Pt had diarrhea over 1 week, taking ABX, discussed probiotic intake, will trial yogurts at meals. Pt feels N/V related to anxiety. Educated pt on low fat intake 2/2 gastroenteritis. Noted 7% wt loss in 1 months. Encouraged adequate fluids while having diarrhea and consuming adequate protein wiht fruits/veg at meals. Monitor as moderate nutrition risk. Malnutrition Assessment:  Malnutrition Status: At risk for malnutrition (Comment)    Context:  Acute Illness     Findings of the 6 clinical characteristics of malnutrition:  Energy Intake:  Mild decrease in energy intake (Comment)  Weight Loss:  7 - Greater than 5% over 1 month (7%)     Body Fat Loss:  No significant body fat loss     Muscle Mass Loss:  No significant muscle mass loss    Fluid Accumulation:  Unable to assess     Strength:  Not Performed    Estimated Daily Nutrient Needs:  Energy (kcal):  1920-6050 (25-30 kcals/kg); Weight Used for Energy Requirements:  Current     Protein (g):  59-71 (1-1.2 g/kg); Weight Used for Protein Requirements:  Ideal        Fluid (ml/day):  8991-4091; Method Used for Fluid Requirements:  1 ml/kcal      Nutrition Related Findings:  Hx of diabetes. Pt loves fruit, educated on carb with protein intake and better glucose mgt. Wounds:  None       Current Nutrition Therapies:    ADULT DIET;  Regular    Anthropometric Measures:  · Height: 5' 6\" (167.6 cm)  · Current Body Weight: 149 lb 14.6 oz (68 kg) · Admission Body Weight:  (stated)    · Usual Body Weight: 160 lb 11.5 oz (72.9 kg) (3/15/22)     · Ideal Body Weight: 130 lbs; % Ideal Body Weight 115.3 %   · BMI: 24.2  · BMI Categories: Normal Weight (BMI 22.0 to 24.9) age over 72       Nutrition Diagnosis:   · Unintended weight loss related to altered GI function as evidenced by diarrhea,weight loss greater than or equal to 5% in 1 month (~ 1 month)    Nutrition Interventions:   Food and/or Nutrient Delivery:  Modify Current Diet,Snacks (Comment)  Nutrition Education/Counseling:  Education initiated   Coordination of Nutrition Care:  Continue to monitor while inpatient    Goals:  Pt will consume at least 75% of meals without GI intolerances       Nutrition Monitoring and Evaluation:   Behavioral-Environmental Outcomes:  Knowledge or Skill   Food/Nutrient Intake Outcomes:  Diet Advancement/Tolerance,Food and Nutrient Intake  Physical Signs/Symptoms Outcomes:  Diarrhea,GI Status,Nausea or Vomiting,Hemodynamic Status,Meal Time Behavior,Nutrition Focused Physical Findings,Weight,Fluid Status or Edema     Discharge Planning:     Too soon to determine     Electronically signed by Scarlett Pate RD, LD on 3/27/22 at 5:54 PM EDT    Contact: [phone]

## 2022-03-27 NOTE — PLAN OF CARE
Nutrition Problem #1: Unintended weight loss  Intervention: Food and/or Nutrient Delivery: Modify Current Diet,Snacks (Comment)  Nutritional Goals: Pt will consume at least 75% of meals without GI intolerances

## 2022-03-27 NOTE — PROGRESS NOTES
Patient's IV infiltrated. This RN attempted to start another IV x 2 attempts with no success. IV insertion team consulted. This RN attempted to call the access team with no answer. Patient is now refusing to have lab work completed and a new IV inserted. Charge nurse made aware.

## 2022-03-28 VITALS
DIASTOLIC BLOOD PRESSURE: 70 MMHG | BODY MASS INDEX: 24.89 KG/M2 | WEIGHT: 154.9 LBS | HEART RATE: 97 BPM | TEMPERATURE: 98.4 F | RESPIRATION RATE: 16 BRPM | HEIGHT: 66 IN | SYSTOLIC BLOOD PRESSURE: 140 MMHG | OXYGEN SATURATION: 95 %

## 2022-03-28 LAB — GLUCOSE BLD-MCNC: 142 MG/DL (ref 70–99)

## 2022-03-28 PROCEDURE — 6370000000 HC RX 637 (ALT 250 FOR IP): Performed by: NURSE PRACTITIONER

## 2022-03-28 PROCEDURE — 82962 GLUCOSE BLOOD TEST: CPT

## 2022-03-28 PROCEDURE — 80048 BASIC METABOLIC PNL TOTAL CA: CPT

## 2022-03-28 PROCEDURE — G0378 HOSPITAL OBSERVATION PER HR: HCPCS

## 2022-03-28 RX ORDER — INSULIN GLARGINE 100 [IU]/ML
30 INJECTION, SOLUTION SUBCUTANEOUS NIGHTLY
Status: DISCONTINUED | OUTPATIENT
Start: 2022-03-28 | End: 2022-03-28 | Stop reason: HOSPADM

## 2022-03-28 RX ORDER — FAMOTIDINE 20 MG/1
20 TABLET, FILM COATED ORAL 2 TIMES DAILY
Status: DISCONTINUED | OUTPATIENT
Start: 2022-03-28 | End: 2022-03-28 | Stop reason: HOSPADM

## 2022-03-28 RX ADMIN — GABAPENTIN 100 MG: 100 CAPSULE ORAL at 08:42

## 2022-03-28 RX ADMIN — LEVOTHYROXINE SODIUM 88 MCG: 0.09 TABLET ORAL at 06:30

## 2022-03-28 RX ADMIN — FAMOTIDINE 20 MG: 20 TABLET ORAL at 08:43

## 2022-03-28 RX ADMIN — LISINOPRIL 10 MG: 10 TABLET ORAL at 08:43

## 2022-03-28 RX ADMIN — FAMOTIDINE 20 MG: 20 TABLET ORAL at 02:13

## 2022-03-28 RX ADMIN — ASPIRIN 81 MG 81 MG: 81 TABLET ORAL at 08:43

## 2022-03-28 RX ADMIN — ONDANSETRON 4 MG: 4 TABLET, ORALLY DISINTEGRATING ORAL at 02:13

## 2022-03-28 ASSESSMENT — PAIN SCALES - GENERAL
PAINLEVEL_OUTOF10: 0
PAINLEVEL_OUTOF10: 0

## 2022-03-28 NOTE — TELEPHONE ENCOUNTER
We can discontinue the donepezil trial a different medication to help sort on the progress of memory decline suggest memantine which will be titrated up to 10 mg twice daily. See if she is agreeable.

## 2022-03-28 NOTE — PROGRESS NOTES
Went over AVS with Dayana and her . Questions answered. Patient verbalized she needed to finish getting dressed. Juno Castillo RN pt primary nurse came in room and verbalized she would call volunteer when pt was ready.  voiced agreement.

## 2022-03-28 NOTE — DISCHARGE SUMMARY
Discharge Summary    Name:  Jacquie Church /Age/Sex: 1950 (70 y.o. female)   Admit Date: 3/26/2022  Discharge Date: 3/28/22    MRN & CSN:  [id number] & [id number] Encounter Date and Time 3/28/22 8:37 AM EDT    Attending:  Elisabeth Fuentes MD Discharging Provider: Marina [facility] Course:     Brief HPI: Jacquie Church is a 70 y.o. female with past medical history of cancer, T2DM, hyperlipidemia, hypertension who presents with Gastroenteritis. She was given IV fluids. She was also noted to have uncontrolled diabetes. Patient improved with clear liquid diet. She stated that her diarrhea was improving and she had no abdominal pain. Patient's family requested to see Dr. Rod for hyperglycemia, and her inpatient diabetic regimen was adjusted. Unfortunately, lab was unable to draw blood on the patient for follow-up and she refused further attempts but stated she would follow-up outpatient. Patient had no hematochezia or melena in her diarrhea. We were unable to collect a stool sample or GI disease panel and she is aware of this. Today she felt much better with resolution of her diarrhea and no abdominal pain and wanted to go home despite not having follow up CBC, CMP, magnesium.  was present and in agreement of discharge to home. She was given follow-up for GI and follow-up with her PCP and hematologist for repeat CBC, anemia panel, and GI follow-up, and keep appointments with Dr. Rod. She also was instructed not to take her home Colace for at least 1 week after her diarrhea has resolved.     Patient and medications were adjusted     Acute gastroenteritis - improved  - reports 2 weeks of symptoms  -CT A/P without contrast showed nonspecific gastric jejunal wall thickening which could represent gastritis, small amount of ascites  -afebrile without leukocytosis, lactic acidosis resolved   -GI PCR pending - was unable to collect at discharge, but will follow up outpatient  - was able to tolerate meals at discharge     Anemia   Thrombocytopenia   - has prior history of macrocytic anemia, baseline Hgb ~10-11, follows with hematology and her PCP   - denies signs or symptoms of bleeding   - no history of colonoscopy   - anemia panel and occult stool pending at discharge  - discussed GI follow up in several weeks with patient     Hypomagnesemia  -aware of abnormal lab, but patient refused further lab draws, will follow up outpatient for follow up lab   -received 1 gram of magnesium IV    Asymptomatic bacteriuria  -UA with trace leuks, 10 WBCs  - denies urinary symptoms   - UCx with <10K gram negative rods   - received IV Rocephin x1, discontinued     T2DM  -Resume home regimen, discussed with patient  - Dr. Kj Burton was consulted per family request to assist in diabetic management. Patient will follow up outpatient     Hypertension  -Continue home BP meds     Vascular dementia   -New diagnosis 3/15/2022  -Currently following with neurology as outpatient, has pending MRI on Tuesday outpatient  -continue Aricept, consider holding if GI symptoms return      Inferior mesenteric veno-caval shunt   - incidental finding on CT, per literature review can potentially affect hepatic function   - no signs of cirrhosis on imaging  - ammonia was ordered, but unable to be obtained due to refusal of lab draws. Patient at baseline per  upon discharge     Depression/anxiety  -Continue Risperdal, as needed Ativan per PCP     Hyperlipidemia  -Continue statin     History of breast cancer  - s/p bilateral mastectomy 2007        This patient was discussed with Dr. Mingo Merida. He was agreeable with the plan and management as dictated above.      Current Living situation: home with      The patient expressed appropriate understanding of, and agreement with the discharge recommendations, medications, and plan.      Consults this admission:  IP CONSULT TO HOSPITALIST  IP CONSULT TO ENDOCRINOLOGY  IP CONSULT TO IV TEAM    Discharge Diagnosis:   Gastroenteritis  Thrombocytopenia  Asymptomatic bacteriuria  DM II  HTN  Vascular dementia  Inferior mesenteric veno-caval shunt   Depression/anxiety  HLD  History of breast cancer      Discharge Instruction:   Follow up appointments: PCP, GI, Dr. Gregoria Rodriguez  Primary care physician: Dano Coronel MD within 2 weeks  Diet: clear liquids, advance as tolerated   Activity: activity as tolerated  Disposition: Discharged to:   [x]Home, []C, []SNF, []Acute Rehab, []Hospice   Condition on discharge: Stable  Labs and Tests to be Followed up as an outpatient by PCP or Specialist: anemia panel to be drawn outpatient  Discharge Medications:        Medication List      CHANGE how you take these medications    donepezil 10 MG tablet  Commonly known as: ARICEPT  Take 1 tablet by mouth daily NOTE TO PHARMACY: DO NOT FILL UNTIL 5 MG RX IS COMPLETE  What changed: Another medication with the same name was removed. Continue taking this medication, and follow the directions you see here. NovoLOG FlexPen 100 UNIT/ML injection pen  Generic drug: insulin aspart  14 units with largest meal of the day, 7 units with each smaller meal  What changed:   · how to take this  · additional instructions        CONTINUE taking these medications    Acid Reducer 150 MG tablet  Generic drug: raNITIdine     aspirin 81 MG tablet     B2 PO     bimatoprost 0.03 % ophthalmic drops  Commonly known as: LUMIGAN     * blood glucose test strips strip  Commonly known as: ASCENSIA AUTODISC VI;ONE TOUCH ULTRA TEST VI  1 each by In Vitro route daily As needed. Dx: E11.40     * blood glucose test strips  Test 2 times a day & as needed for symptoms of irregular blood glucose. Dispense sufficient amount for indicated testing frequency plus additional to accommodate PRN testing needs.  Dx: E11.40, z79,4, n18.3     calcium carbonate 500 MG Tabs tablet  Commonly known as: OSCAL     Cetirizine HCl 10 MG Caps     Cranberry 43056 MG Caps     D3-1000 PO     ferrous sulfate 325 (65 Fe) MG tablet  Commonly known as: IRON 325     gabapentin 100 MG capsule  Commonly known as: NEURONTIN  Take 1 capsule by mouth 3 times daily for 90 days. GARLIC PO     ICAPS AREDS 2 PO     levothyroxine 88 MCG tablet  Commonly known as: SYNTHROID  Take 1 tablet by mouth Daily     lisinopril 10 MG tablet  Commonly known as: PRINIVIL;ZESTRIL  Take 1 tablet by mouth daily     LORazepam 0.5 MG tablet  Commonly known as: ATIVAN     lovastatin 40 MG tablet  Commonly known as: MEVACOR  Take 1 tablet by mouth nightly     metFORMIN 500 MG extended release tablet  Commonly known as: GLUCOPHAGE-XR  Take 1 tablet by mouth 3 times daily     OneTouch Delica Lancets 51H Misc  Test blood sugar 1-2 times a day as directed. Dx: E11.65     POMEGRANATE PO     promethazine 25 MG tablet  Commonly known as: PHENERGAN  Take 1 tablet by mouth every 6 hours as needed for Nausea     risperiDONE 0.25 MG tablet  Commonly known as: RISPERDAL  Take 1 tablet by mouth nightly     UNABLE TO FIND  Bilateral breast prosthesis     UNABLE TO FIND  Bra for bilateral prosthesis         * This list has 2 medication(s) that are the same as other medications prescribed for you. Read the directions carefully, and ask your doctor or other care provider to review them with you. ASK your doctor about these medications    Basaglar KwikPen 100 UNIT/ML injection pen  Generic drug: insulin glargine  Inject 35 Units into the skin 2 times daily 35 units in the morning and 25 units at bedtime     Stool Softener 100 MG Tabs  Generic drug: Docusate Sodium           Objective Findings at Discharge:   BP (!) 144/62   Pulse 93   Temp 98.4 °F (36.9 °C) (Oral)   Resp 16   Ht 5' 6\" (1.676 m)   Wt 154 lb 14.4 oz (70.3 kg)   SpO2 95%   BMI 25.00 kg/m²   Physical Exam:   General: NAD  Eyes: EOMI  ENT: neck supple  Cardiovascular: Regular rate.   Respiratory: Clear to auscultation  Gastrointestinal: Soft, non tender  Genitourinary: no suprapubic tenderness  Musculoskeletal: No edema  Skin: warm, dry  Neuro: Alert. Psych: Mood appropriate. Labs and Imaging   CT ABDOMEN PELVIS W IV CONTRAST Additional Contrast? None    Result Date: 3/26/2022  EXAMINATION: CT OF THE ABDOMEN AND PELVIS WITH CONTRAST 3/26/2022 11:09 am TECHNIQUE: CT of the abdomen and pelvis was performed with the administration of intravenous contrast. Multiplanar reformatted images are provided for review. Dose modulation, iterative reconstruction, and/or weight based adjustment of the mA/kV was utilized to reduce the radiation dose to as low as reasonably achievable. COMPARISON: None. HISTORY: ORDERING SYSTEM PROVIDED HISTORY: generalized abdominal pain, diarrhea TECHNOLOGIST PROVIDED HISTORY: Reason for exam:->generalized abdominal pain, diarrhea Additional Contrast?->None Decision Support Exception - unselect if not a suspected or confirmed emergency medical condition->Emergency Medical Condition (MA) Reason for Exam: generalized abdominal pain, diarrhea Relevant Medical/Surgical History: 75 ML ISOVUE 370  LOT [id number] FINDINGS: Lower Chest: There is mild smooth interlobular septal thickening in the lung bases. Organs: There is no acute abnormality of the liver, pancreas, spleen, adrenals, or kidneys. Cholelithiasis without evidence of acute cholecystitis. GI/Bowel: Thickening of the distal gastric and proximal jejunal wall is nonspecific due to the presence of ascites but could represent gastroenteritis. Enhancement of the bowel is normal.  There is no bowel obstruction. Pelvis: No acute abnormality of the pelvic viscera. Peritoneum/Retroperitoneum: There is a small amount of ascites. There is no free air. No enlarged lymph node. There is a 14 mm diameter shunt from the inferior mesenteric vein to the inferior vena cava. The main portal vein is patent. Bones/Soft Tissues: No acute osseous abnormality. Nonspecific gastric and jejunal wall thickening could represent gastroenteritis. Small amount of ascites. Inferior mesenteric veno-caval shunt. CBC:   Recent Labs     03/26/22  1020 03/27/22  0429   WBC 6.5 4.7   HGB 9.8* 8.4*   * 120*     BMP:    Recent Labs     03/26/22  1020 03/26/22  1402 03/27/22  0429     --  141   K 4.7  --  4.3     --  107   CO2 23  --  24   BUN 11  --  12   CREATININE 1.0  --  1.0   GLUCOSE 79 102 76     Hepatic:   Recent Labs     03/26/22  1020 03/27/22  0429   AST 45* 39*   ALT 27 23   BILITOT 1.0 0.7   ALKPHOS 97 83     Lipids:   Lab Results   Component Value Date    CHOL 148 01/27/2022    HDL 54 01/27/2022    TRIG 96 01/27/2022     Hemoglobin A1C:   Lab Results   Component Value Date    LABA1C 11.5 01/27/2022     TSH:   Lab Results   Component Value Date    TSH 6.23 01/27/2022     Troponin:   Lab Results   Component Value Date    TROPONINT <0.010 03/26/2022     Lactic Acid: No results for input(s): LACTA in the last 72 hours. BNP: No results for input(s): PROBNP in the last 72 hours.   UA:  Lab Results   Component Value Date    NITRU NEGATIVE 03/26/2022    NITRU Negative 12/17/2019    COLORU YELLOW 03/26/2022    PHUR 5.5 12/17/2019    WBCUA 10 03/26/2022    RBCUA NONE SEEN 03/26/2022    MUCUS RARE 03/26/2022    TRICHOMONAS NONE SEEN 03/21/2019    BACTERIA FEW 03/26/2022    CLARITYU CLEAR 03/26/2022    SPECGRAV 1.010 03/26/2022    LEUKOCYTESUR SMALL NUMBER OR AMOUNT OBSERVED 03/26/2022    UROBILINOGEN 0.2 03/26/2022    BILIRUBINUR NEGATIVE 03/26/2022    BILIRUBINUR Negative 11/30/2018    BLOODU NEGATIVE 03/26/2022    GLUCOSEU Negative 12/17/2019    KETUA NEGATIVE 03/26/2022     Urine Cultures: No results found for: LABURIN  Blood Cultures: No results found for: BC  No results found for: BLOODCULT2  Organism: No results found for: ORG    Time Spent Discharging patient 35 minutes    [facility]  3/28/2022, 8:37 AM

## 2022-03-29 LAB
FERRITIN: 325 NG/ML (ref 15–150)
FOLATE: 13.4 NG/ML (ref 3.1–17.5)
IRON: 108 UG/DL (ref 37–145)
PCT TRANSFERRIN: 50 % (ref 10–44)
TOTAL IRON BINDING CAPACITY: 218 UG/DL (ref 250–450)
UNSATURATED IRON BINDING CAPACITY: 110 UG/DL (ref 110–370)
VITAMIN B-12: 506.4 PG/ML (ref 211–911)

## 2022-03-29 NOTE — PROGRESS NOTES
Progress Note( Dr. Keren Sterling)  3/28/2022  Subjective:   Admit Date: 3/26/2022  PCP: Bronson Cornelius MD    Admitted For : Diarrhea abdominal pain with some nausea    Consulted For: Better control of blood glucose    Interval History: Feels lot better tolerating the p.o. food better known no more nausea and vomiting    Denies any chest pains,   Denies SOB . Denies nausea or vomiting. No new bowel or bladder symptoms. No intake or output data in the 24 hours ending 03/28/22 2304    DATA    CBC: Recent Labs     03/26/22  1020 03/27/22  0429   WBC 6.5 4.7   HGB 9.8* 8.4*   * 120*    CMP:  Recent Labs     03/26/22  1020 03/27/22  0429    141   K 4.7 4.3    107   CO2 23 24   BUN 11 12   CREATININE 1.0 1.0   CALCIUM 9.6 8.6   PROT 7.7 7.0   LABALBU 3.2* 2.9*   BILITOT 1.0 0.7   ALKPHOS 97 83   AST 45* 39*   ALT 27 23     Lipids:   Lab Results   Component Value Date    CHOL 148 01/27/2022    HDL 54 01/27/2022    TRIG 96 01/27/2022     Glucose:  Recent Labs     03/27/22  1619 03/27/22 2029 03/28/22  0749   POCGLU 225* 272* 142*     [id number]:  Lab Results   Component Value Date    LABA1C 11.5 01/27/2022     High Sensitivity TSH:   Lab Results   Component Value Date    TSHHS 6.520 02/23/2022     Free T3: No results found for: FT3  Free T4:  Lab Results   Component Value Date    T4FREE 1.55 02/23/2022       CT ABDOMEN PELVIS W IV CONTRAST Additional Contrast? None   Final Result   Nonspecific gastric and jejunal wall thickening could represent   gastroenteritis. Small amount of ascites. Inferior mesenteric veno-caval shunt.               Scheduled Medicines   Medications:    Infusions:       Objective:   Vitals: BP (!) 140/70   Pulse 97   Temp 98.4 °F (36.9 °C) (Oral)   Resp 16   Ht 5' 6\" (1.676 m)   Wt 154 lb 14.4 oz (70.3 kg)   SpO2 95%   BMI 25.00 kg/m²   General appearance: alert and cooperative with exam  Neck: no JVD or bruit  Thyroid : Normal lobes   Lungs: Has Vesicular Breath sounds   Heart:  regular rate and rhythm  Abdomen: soft, non-tender; bowel sounds normal; no masses,  no organomegaly  Musculoskeletal: Normal  Extremities: extremities normal, , no edema  Neurologic:  Awake, alert, oriented to name, place and time. Cranial nerves II-XII are grossly intact. Motor is  intact. Sensory neuropathy. ,  and gait is normal.    Assessment:     Patient Active Problem List:     Acquired hypothyroidism     Depression     Diabetic neuropathy (HCC)     DM (diabetes mellitus) ([facility] [address]     HTN (hypertension)     Hyperlipidemia     Osteopenia     Personal history of breast cancer     Allergic rhinitis     ANTON[facility]) ([facility]     Gastroenteritis      Plan:     1. Reviewed POC blood glucose . Labs and X ray results   2. Reviewed Current Medicines   3. On meal/ Correction bolus Humalog/ Basal Lantus Insulin regime  4. Monitor Blood glucose frequently   5. Modified  the dose of Insulin/ other medicines as needed   6. Will follow     .      Keyla Arriaga MD, MD

## 2022-04-01 NOTE — TELEPHONE ENCOUNTER
Spoke with  and they are agreeable to med being sent to [facility], tried sending this before not sure if Dr. Lennox Gross got it.

## 2022-04-06 ENCOUNTER — OFFICE VISIT (OUTPATIENT)
Dept: GASTROENTEROLOGY | Age: 72
End: 2022-04-06
Payer: MEDICARE

## 2022-04-06 ENCOUNTER — HOSPITAL ENCOUNTER (OUTPATIENT)
Age: 72
Discharge: HOME | End: 2022-04-06
Payer: MEDICARE

## 2022-04-06 VITALS
BODY MASS INDEX: 23.3 KG/M2 | TEMPERATURE: 97.2 F | OXYGEN SATURATION: 96 % | HEIGHT: 66 IN | WEIGHT: 145 LBS | SYSTOLIC BLOOD PRESSURE: 136 MMHG | HEART RATE: 71 BPM | DIASTOLIC BLOOD PRESSURE: 60 MMHG

## 2022-04-06 DIAGNOSIS — D64.9 ANEMIA, UNSPECIFIED TYPE: ICD-10-CM

## 2022-04-06 DIAGNOSIS — D69.6 THROMBOCYTOPENIA (HCC): ICD-10-CM

## 2022-04-06 DIAGNOSIS — D64.9 ANEMIA, UNSPECIFIED TYPE: Primary | ICD-10-CM

## 2022-04-06 DIAGNOSIS — R93.89 ABNORMAL FINDING ON CT SCAN: ICD-10-CM

## 2022-04-06 LAB
BASOPHILS ABSOLUTE: 0.1 K/CU MM
BASOPHILS RELATIVE PERCENT: 0.5 % (ref 0–1)
DIFFERENTIAL TYPE: ABNORMAL
EOSINOPHILS ABSOLUTE: 0 K/CU MM
EOSINOPHILS RELATIVE PERCENT: 0.3 % (ref 0–3)
HCT VFR BLD CALC: 34.4 % (ref 37–47)
HEMOGLOBIN: 10.6 GM/DL (ref 12.5–16)
IMMATURE NEUTROPHIL %: 0.5 % (ref 0–0.43)
LYMPHOCYTES ABSOLUTE: 2.1 K/CU MM
LYMPHOCYTES RELATIVE PERCENT: 19.6 % (ref 24–44)
MAGNESIUM: 2 MG/DL (ref 1.8–2.4)
MCH RBC QN AUTO: 36.4 PG (ref 27–31)
MCHC RBC AUTO-ENTMCNC: 30.8 % (ref 32–36)
MCV RBC AUTO: 118.2 FL (ref 78–100)
MONOCYTES ABSOLUTE: 0.7 K/CU MM
MONOCYTES RELATIVE PERCENT: 6.2 % (ref 0–4)
NUCLEATED RBC %: 0 %
PDW BLD-RTO: 15.5 % (ref 11.7–14.9)
PLATELET # BLD: 136 K/CU MM (ref 140–440)
PMV BLD AUTO: 12.6 FL (ref 7.5–11.1)
RBC # BLD: 2.91 M/CU MM (ref 4.2–5.4)
SEGMENTED NEUTROPHILS ABSOLUTE COUNT: 7.7 K/CU MM
SEGMENTED NEUTROPHILS RELATIVE PERCENT: 72.9 % (ref 36–66)
TOTAL IMMATURE NEUTOROPHIL: 0.05 K/CU MM
TOTAL NUCLEATED RBC: 0 K/CU MM
WBC # BLD: 10.6 K/CU MM (ref 4–10.5)

## 2022-04-06 PROCEDURE — 36415 COLL VENOUS BLD VENIPUNCTURE: CPT

## 2022-04-06 PROCEDURE — 99214 OFFICE O/P EST MOD 30 MIN: CPT | Performed by: NURSE PRACTITIONER

## 2022-04-06 PROCEDURE — 3017F COLORECTAL CA SCREEN DOC REV: CPT | Performed by: NURSE PRACTITIONER

## 2022-04-06 PROCEDURE — 85025 COMPLETE CBC W/AUTO DIFF WBC: CPT

## 2022-04-06 PROCEDURE — 83735 ASSAY OF MAGNESIUM: CPT

## 2022-04-06 PROCEDURE — 1036F TOBACCO NON-USER: CPT | Performed by: NURSE PRACTITIONER

## 2022-04-06 PROCEDURE — 1090F PRES/ABSN URINE INCON ASSESS: CPT | Performed by: NURSE PRACTITIONER

## 2022-04-06 PROCEDURE — 1123F ACP DISCUSS/DSCN MKR DOCD: CPT | Performed by: NURSE PRACTITIONER

## 2022-04-06 PROCEDURE — G8427 DOCREV CUR MEDS BY ELIG CLIN: HCPCS | Performed by: NURSE PRACTITIONER

## 2022-04-06 PROCEDURE — G8420 CALC BMI NORM PARAMETERS: HCPCS | Performed by: NURSE PRACTITIONER

## 2022-04-06 PROCEDURE — 4040F PNEUMOC VAC/ADMIN/RCVD: CPT | Performed by: NURSE PRACTITIONER

## 2022-04-06 PROCEDURE — G8400 PT W/DXA NO RESULTS DOC: HCPCS | Performed by: NURSE PRACTITIONER

## 2022-04-06 ASSESSMENT — ENCOUNTER SYMPTOMS
VOMITING: 0
BLOOD IN STOOL: 0
ABDOMINAL PAIN: 1
EYE DISCHARGE: 0
CONSTIPATION: 0
EYE PAIN: 0
BACK PAIN: 1
PHOTOPHOBIA: 0
COLOR CHANGE: 0
SHORTNESS OF BREATH: 0
COUGH: 0
DIARRHEA: 0
WHEEZING: 0
NAUSEA: 0

## 2022-04-06 NOTE — PATIENT INSTRUCTIONS
Patient Education        Anemia: Care Instructions  Your Care Instructions     Anemia is a low level of red blood cells, which carry oxygen throughout your body. Many things can cause anemia. Lack of iron is one of the most common causes. Your body needs iron to make hemoglobin, a substance in red blood cells that carries oxygen from the lungs to your body's cells. Without enough iron, the body produces fewer and smaller red blood cells. As a result, your body's cells do not get enough oxygen, and you feel tired and weak. And you may havetrouble concentrating. Bleeding is the most common cause of a lack of iron. You may have heavy menstrual bleeding or bleeding caused by conditions such as ulcers, hemorrhoids, or cancer. Regular use of aspirin or other anti-inflammatory medicines (such as ibuprofen) also can cause bleeding in some people. A lack of iron in your diet also can cause anemia, especially at times when the bodyneeds more iron, such as during pregnancy, infancy, and the teen years. Your doctor may have prescribed iron pills. It may take several months of treatment for your iron levels to return to normal. Your doctor also maysuggest that you eat foods that are rich in iron, such as meat and beans. There are many other causes of anemia. It is not always due to a lack of iron. Finding the specific cause of your anemia will help your doctor find the righttreatment for you. Follow-up care is a key part of your treatment and safety. Be sure to make and go to all appointments, and call your doctor if you are having problems. It's also a good idea to know your test results and keep alist of the medicines you take. How can you care for yourself at home?  Take your medicines exactly as prescribed. Call your doctor if you think you are having a problem with your medicine.    If your doctor recommends iron pills, take them as directed:  ? Try to take the pills on an empty stomach about 1 hour before or 2 hours after meals. But you may need to take iron with food to avoid an upset stomach. ? Do not take antacids or drink milk or caffeine drinks (such as coffee, tea, or cola) at the same time or within 2 hours of the time that you take your iron. They can make it hard for your body to absorb the iron. ? Vitamin C (from food or supplements) helps your body absorb iron. Try taking iron pills with a glass of orange juice or some other food that is high in vitamin C, such as citrus fruits. ? Iron pills may cause stomach problems, such as heartburn, nausea, diarrhea, constipation, and cramps. Be sure to drink plenty of fluids, and include fruits, vegetables, and fiber in your diet each day. Iron pills often make your bowel movements dark or green. ? If you forget to take an iron pill, do not take a double dose of iron the next time you take a pill. ? Keep iron pills out of the reach of small children. An overdose of iron can be very dangerous.  Follow your doctor's advice about eating iron-rich foods. These include red meat, shellfish, poultry, eggs, beans, raisins, whole-grain bread, and leafy green vegetables.  Steam vegetables to help them keep their iron content. When should you call for help? Call 911 anytime you think you may need emergency care. For example, call if:     You have symptoms of a heart attack. These may include:  ? Chest pain or pressure, or a strange feeling in the chest.  ? Sweating. ? Shortness of breath. ? Nausea or vomiting. ? Pain, pressure, or a strange feeling in the back, neck, jaw, or upper belly or in one or both shoulders or arms. ? Lightheadedness or sudden weakness. ? A fast or irregular heartbeat. After you call 911, the  may tell you to chew 1 adult-strength or 2 to 4 low-dose aspirin. Wait for an ambulance. Do not try to drive yourself.      You passed out (lost consciousness).    Call your doctor now or seek immediate medical care if:     You have new or increased shortness of breath.      You are dizzy or lightheaded, or you feel like you may faint.      Your fatigue and weakness continue or get worse.      You have any abnormal bleeding, such as:  ? Nosebleeds. ? Vaginal bleeding that is different (heavier, more frequent, at a different time of the month) than what you are used to.  ? Bloody or black stools, or rectal bleeding. ? Bloody or pink urine. Watch closely for changes in your health, and be sure to contact your doctor if:     You do not get better as expected. Where can you learn more? Go to https://[employer].Demandforce. org and sign in to your [employer] account. Enter R301 in the [employer] box to learn more about \"Anemia: Care Instructions. \"     If you do not have an account, please click on the \"Sign Up Now\" link. Current as of: November 29, 2021               Content Version: 13.2  © 9964-8915 [employer]. Care instructions adapted under license by [facility] ([facility]). If you have questions about a medical condition or this instruction, always ask your healthcare professional. [place] [address] any warranty or liability for your use of this information.

## 2022-04-06 NOTE — PROGRESS NOTES
Naun Pacheco 70 y.o. female was seen by CLARIBEL Renee on 4/6/2022     Wt Readings from Last 3 Encounters:   04/06/22 145 lb (65.8 kg)   03/28/22 154 lb 14.4 oz (70.3 kg)   03/15/22 160 lb 12.8 oz (72.9 kg)       MARII Ogden is a pleasant 70 y.o.  female who presents today with her  for anemia. She denies NSAID use. She has a past medical history of cancer, diabetes mellitus, glaucoma, gout, hyperlipidemia, hypertension, neuropathy, and thyroid disease. She has never had an EGD or colonoscopy. She was hospitalized at [facility] on 3- for gastroenteritis, ascites and intractable nausea/vomiting. Her CT of the abdomen/pelvis done on 3- showed nonspecific gastric and jejunal wall thickening, small amount of ascites and inferior mesenteric veno-caval shunt. Per chart review her anemia has been ongoing since at least 2- with Hgb 11.8, Hct 35.4 and Platelets 846. Her CBC on 3- showed RBC 2.36, Hgb 8.4, Hct 26.4, .9 and Platelets 588. Her appetite is improving since discharge described as fair without early satiety. Her weight is down ten pounds since the beginning of the year. No current abdominal pain, bloating or distention. No heartburn or acid reflux. No nocturnal awakenings with acid reflux. No dysphagia or pain with swallowing. She mentioned one week ago having emesis of bile but denies hematemesis or coffee ground emesis. No current nausea or vomiting. No excess belching or flatulence. She denies changes in her bowel pattern. Her typical bowel pattern is daily with soft brown formed stools. No constipation. She had an episode of diarrhea after hospital discharge that resolved two days later. No current diarrhea. No blood in her stools or melena. No family history of stomach or colon cancer. ROS  Review of Systems   Constitutional: Positive for appetite change.  Negative for chills, diaphoresis, fatigue, fever and unexpected weight change. HENT: Negative for ear pain, hearing loss and tinnitus. Eyes: Negative for photophobia, pain, discharge and visual disturbance. Respiratory: Negative for cough, shortness of breath and wheezing. Cardiovascular: Negative for chest pain, palpitations and leg swelling. Gastrointestinal: Positive for abdominal pain. Negative for blood in stool, constipation, diarrhea, nausea and vomiting. Endocrine: Negative for cold intolerance, heat intolerance and polydipsia. Genitourinary: Negative for dysuria, frequency, hematuria and urgency. Musculoskeletal: Positive for back pain. Negative for myalgias and neck pain. Skin: Negative for color change, pallor and rash. Allergic/Immunologic: Negative for environmental allergies and food allergies. Neurological: Negative for dizziness, seizures, weakness and headaches. Hematological: Does not bruise/bleed easily. Psychiatric/Behavioral: Negative for dysphoric mood, sleep disturbance and suicidal ideas. The patient is not nervous/anxious. Allergies  Allergies   Allergen Reactions    Bupropion      Other reaction(s): Other - comment required  Suicidal ideation    Nsaids      Other reaction(s): Other - comment required  Elevated Serum Creatinine    Amlodipine     Nateglinide Nausea Only    Ofloxacin Hives    Paroxetine Hcl Hives     Other reaction(s): Other - comment required  Shakey/nerveous    Pioglitazone      Other reaction(s):  Other - comment required  Dizzy / nauseated       Medications  Current Outpatient Medications   Medication Sig Dispense Refill    LORazepam (ATIVAN) 0.5 MG tablet 1 tablet at bedtime as needed      donepezil (ARICEPT) 10 MG tablet Take 1 tablet by mouth daily NOTE TO PHARMACY: DO NOT FILL UNTIL 5 MG RX IS COMPLETE 30 tablet 5    levothyroxine (SYNTHROID) 88 MCG tablet Take 1 tablet by mouth Daily 30 tablet 0    lisinopril (PRINIVIL;ZESTRIL) 10 MG tablet Take 1 tablet by mouth daily 90 tablet 0    risperiDONE (RISPERDAL) 0.25 MG tablet Take 1 tablet by mouth nightly 90 tablet 1    promethazine (PHENERGAN) 25 MG tablet Take 1 tablet by mouth every 6 hours as needed for Nausea 20 tablet 0    Riboflavin (B2 PO) Take by mouth      Cholecalciferol (D3-1000 PO) Take by mouth      insulin glargine (BASAGLAR KWIKPEN) 100 UNIT/ML injection pen Inject 35 Units into the skin 2 times daily 35 units in the morning and 25 units at bedtime (Patient taking differently: Inject 30 Units into the skin 2 times daily 35 units in the morning and 25 units at bedtime) 15 pen 3    metFORMIN (GLUCOPHAGE-XR) 500 MG extended release tablet Take 1 tablet by mouth 3 times daily 90 tablet 2    NOVOLOG FLEXPEN 100 UNIT/ML injection pen 14 units with largest meal of the day, 7 units with each smaller meal (Patient taking differently: Inject into the skin 25 units with largest meal of the day, 7 units with each smaller meal) 7 pen 0    blood glucose monitor strips Test 2 times a day & as needed for symptoms of irregular blood glucose. Dispense sufficient amount for indicated testing frequency plus additional to accommodate PRN testing needs. Dx: E11.40, z79,4, n18.3 100 strip 5    lovastatin (MEVACOR) 40 MG tablet Take 1 tablet by mouth nightly 90 tablet 1    OneTouch Delica Lancets 03N MISC Test blood sugar 1-2 times a day as directed. Dx: E11.65 100 each 5    UNABLE TO FIND Bilateral breast prosthesis 1 each 0    UNABLE TO FIND Bra for bilateral prosthesis 12 each 0    bimatoprost (LUMIGAN) 0.03 % ophthalmic drops Place 1 drop into both eyes nightly      blood glucose test strips (ASCENSIA AUTODISC VI;ONE TOUCH ULTRA TEST VI) strip 1 each by In Vitro route daily As needed.  Dx: E11.40 100 each 5    calcium carbonate (OSCAL) 500 MG TABS tablet Take 500 mg by mouth daily      aspirin 81 MG tablet Take 1 tablet by mouth      Cetirizine HCl 10 MG CAPS Take by mouth      Cranberry 10669 MG CAPS Take by mouth      Docusate Sodium (STOOL SOFTENER) 100 MG TABS Take by mouth      GARLIC PO Take by mouth      Multiple Vitamins-Minerals (ICAPS AREDS 2 PO) Take by mouth      ferrous sulfate 325 (65 Fe) MG tablet Take 325 mg by mouth daily (with breakfast)      ranitidine (ACID REDUCER) 150 MG tablet Take 150 mg by mouth 2 times daily      Pomegranate, [place] granatum, (POMEGRANATE PO) Take 500 mg by mouth daily      gabapentin (NEURONTIN) 100 MG capsule Take 1 capsule by mouth 3 times daily for 90 days. 90 capsule 2     No current facility-administered medications for this visit. Past medical history:   She has a past medical history of Cancer ([facility] [address] Diabetes mellitus ([facility] [address] Glaucoma, Gout, Hyperlipidemia, Hypertension, Neuropathy, and Thyroid disease. Past surgical history:  She has a past surgical history that includes  section and Mastectomy, bilateral (10/26/2007). Social History:  She reports that she has never smoked. She has never used smokeless tobacco. She reports that she does not drink alcohol and does not use drugs. Family history:  Her family history is not on file. Objective    Vitals:    22 1520   BP: 136/60   Pulse: 71   Temp: 97.2 °F (36.2 °C)   SpO2: 96%        Physical exam    Physical Exam  Vitals reviewed. Constitutional:       General: She is not in acute distress. Appearance: Normal appearance. She is well-developed. She is not ill-appearing, toxic-appearing or diaphoretic. HENT:      Head: Normocephalic and atraumatic. Nose: Nose normal.      Mouth/Throat:      Mouth: Mucous membranes are moist.   Eyes:      General:         Right eye: No discharge. Left eye: No discharge. Pupils: Pupils are equal, round, and reactive to light. Neck:      Trachea: No tracheal deviation. Cardiovascular:      Rate and Rhythm: Normal rate and regular rhythm. Pulses: Normal pulses. Heart sounds: Normal heart sounds. No murmur heard.   No gallop. Pulmonary:      Effort: Pulmonary effort is normal. No respiratory distress. Breath sounds: Normal breath sounds. No stridor. No wheezing, rhonchi or rales. Abdominal:      General: Bowel sounds are normal. There is no distension. Palpations: Abdomen is soft. There is no mass. Tenderness: There is no abdominal tenderness. There is no guarding or rebound. Hernia: No hernia is present. Musculoskeletal:         General: Normal range of motion. Cervical back: Normal range of motion and neck supple. Skin:     General: Skin is warm and dry. Neurological:      Mental Status: She is alert and oriented to person, place, and time.    Psychiatric:         Mood and Affect: Mood normal.         Admission on 03/26/2022, Discharged on 03/28/2022   Component Date Value Ref Range Status    WBC 03/26/2022 6.5  4.0 - 10.5 K/CU MM Final    RBC 03/26/2022 2.77* 4.2 - 5.4 M/CU MM Final    Hemoglobin 03/26/2022 9.8* 12.5 - 16.0 GM/DL Final    Hematocrit 03/26/2022 31.0* 37 - 47 % Final    MCV 03/26/2022 111.9* 78 - 100 FL Final    MCH 03/26/2022 35.4* 27 - 31 PG Final    MCHC 03/26/2022 31.6* 32.0 - 36.0 % Final    RDW 03/26/2022 14.4  11.7 - 14.9 % Final    Platelets 14/38/1820 138* 140 - 440 K/CU MM Final    MPV 03/26/2022 11.6* 7.5 - 11.1 FL Final    Differential Type 03/26/2022 AUTOMATED DIFFERENTIAL   Final    Segs Relative 03/26/2022 71.1* 36 - 66 % Final    Lymphocytes % 03/26/2022 20.5* 24 - 44 % Final    Monocytes % 03/26/2022 6.5* 0 - 4 % Final    Eosinophils % 03/26/2022 1.4  0 - 3 % Final    Basophils % 03/26/2022 0.3  0 - 1 % Final    Segs Absolute 03/26/2022 4.6  K/CU MM Final    Lymphocytes Absolute 03/26/2022 1.3  K/CU MM Final    Monocytes Absolute 03/26/2022 0.4  K/CU MM Final    Eosinophils Absolute 03/26/2022 0.1  K/CU MM Final    Basophils Absolute 03/26/2022 0.0  K/CU MM Final    Nucleated RBC % 03/26/2022 0.0  % Final    Total Nucleated RBC 03/26/2022 0.0  K/CU MM Final    Total Immature Neutrophil 03/26/2022 0.01  K/CU MM Final    Immature Neutrophil % 03/26/2022 0.2  0 - 0.43 % Final    Sodium 03/26/2022 137  135 - 145 MMOL/L Final    Potassium 03/26/2022 4.7  3.5 - 5.1 MMOL/L Final    Chloride 03/26/2022 101  99 - 110 mMol/L Final    CO2 03/26/2022 23  21 - 32 MMOL/L Final    BUN 03/26/2022 11  6 - 23 MG/DL Final    CREATININE 03/26/2022 1.0  0.6 - 1.1 MG/DL Final    Glucose 03/26/2022 79  70 - 99 MG/DL Final    Calcium 03/26/2022 9.6  8.3 - 10.6 MG/DL Final    Albumin 03/26/2022 3.2* 3.4 - 5.0 GM/DL Final    Total Protein 03/26/2022 7.7  6.4 - 8.2 GM/DL Final    Total Bilirubin 03/26/2022 1.0  0.0 - 1.0 MG/DL Final    ALT 03/26/2022 27  10 - 40 U/L Final    AST 03/26/2022 45* 15 - 37 IU/L Final    Alkaline Phosphatase 03/26/2022 97  40 - 129 IU/L Final    GFR Non- 03/26/2022 55* >60 mL/min/1.73m2 Final    GFR  03/26/2022 >60  >60 mL/min/1.73m2 Final    Anion Gap 03/26/2022 13  4 - 16 Final    Lipase 03/26/2022 59  13 - 60 IU/L Final    Lactate 03/26/2022 3.2* 0.4 - 2.0 mMOL/L Final    Comment: LACT CALLED TO Sury PEÑA ON [id number] AT 1057 BY [facility]  RESULTS READ BACK      Color, UA 03/26/2022 YELLOW  YELLOW Final    Clarity, UA 03/26/2022 CLEAR* CLEAR Final    Glucose, Urine 03/26/2022 NEGATIVE  NEGATIVE MG/DL Final    Bilirubin Urine 03/26/2022 NEGATIVE  NEGATIVE MG/DL Final    Ketones, Urine 03/26/2022 NEGATIVE  NEGATIVE MG/DL Final    Specific Gravity, UA 03/26/2022 1.010  1.001 - 1.035 Final    Blood, Urine 03/26/2022 NEGATIVE  NEGATIVE Final    pH, Urine 03/26/2022 7.0  5.0 - 8.0 Final    Protein, UA 03/26/2022 NEGATIVE  NEGATIVE MG/DL Final    Urobilinogen, Urine 03/26/2022 0.2  0.2 - 1.0 MG/DL Final    Nitrite Urine, Quantitative 03/26/2022 NEGATIVE  NEGATIVE Final    Leukocyte Esterase, Urine 03/26/2022 SMALL NUMBER OR AMOUNT OBSERVED* NEGATIVE Final    RBC, UA 03/26/2022 NONE SEEN  0 - 6 /HPF Final    WBC, UA 03/26/2022 10* 0 - 5 /HPF Final    Bacteria, UA 03/26/2022 FEW* NEGATIVE /HPF Final    Squam Epithel, UA 03/26/2022 5  /HPF Final    Trans Epithel, UA 03/26/2022 <1  /HPF Final    Mucus, UA 03/26/2022 RARE* NEGATIVE HPF Final    Glucose 03/26/2022 82  mg/dL Final    POC Glucose 03/26/2022 82  70 - 99 MG/DL Final    Magnesium 03/26/2022 1.8  1.8 - 2.4 mg/dl Final    Lactate 03/26/2022 2.7* 0.4 - 2.0 mMOL/L Final    Specimen 03/26/2022 URINE CLEAN CATCH   Final    Special Requests 03/26/2022 NONE   Final    Culture 03/26/2022 Final Report   Final    Culture 03/26/2022 GRAM NEGATIVE ANGELO <10,000 CFU/ml No further workup*  Final    Glucose 03/26/2022 102  mg/dL Final    POC Glucose 03/26/2022 102* 70 - 99 MG/DL Final    Troponin T 03/26/2022 <0.010  <0.01 NG/ML Final    Comment:         Patients with high levels of Biotin oral intake  (ie >5 mg/day) may have falsely decreased Troponin  levels. Samples collected within 8 hours of Biotin  intake may require addtional information for diagnosis.       Ventricular Rate 03/26/2022 102  BPM Final    Atrial Rate 03/26/2022 102  BPM Final    P-R Interval 03/26/2022 150  ms Final    QRS Duration 03/26/2022 72  ms Final    Q-T Interval 03/26/2022 360  ms Final    QTc Calculation (Bazett) 03/26/2022 469  ms Final    P Axis 03/26/2022 43  degrees Final    R Axis 03/26/2022 35  degrees Final    T Axis 03/26/2022 31  degrees Final    Diagnosis 03/26/2022    Final                    Value:Sinus tachycardia  Otherwise normal ECG  No previous ECGs available  Confirmed by [facility] Jose CHING ([id number]) on 3/27/2022 12:22:14 PM      POC Glucose 03/26/2022 117* 70 - 99 MG/DL Final    POC Glucose 03/26/2022 124* 70 - 99 MG/DL Final    Sodium 03/27/2022 141  135 - 145 MMOL/L Final    Potassium 03/27/2022 4.3  3.5 - 5.1 MMOL/L Final    Chloride 03/27/2022 107  99 - 110 mMol/L Final    CO2 03/27/2022 24  21 - 32 MMOL/L Final    BUN 03/27/2022 12  6 - 23 MG/DL Final    CREATININE 03/27/2022 1.0  0.6 - 1.1 MG/DL Final    Glucose 03/27/2022 76  70 - 99 MG/DL Final    Calcium 03/27/2022 8.6  8.3 - 10.6 MG/DL Final    Albumin 03/27/2022 2.9* 3.4 - 5.0 GM/DL Final    Total Protein 03/27/2022 7.0  6.4 - 8.2 GM/DL Final    Total Bilirubin 03/27/2022 0.7  0.0 - 1.0 MG/DL Final    ALT 03/27/2022 23  10 - 40 U/L Final    AST 03/27/2022 39* 15 - 37 IU/L Final    Alkaline Phosphatase 03/27/2022 83  40 - 129 IU/L Final    GFR Non- 03/27/2022 55* >60 mL/min/1.73m2 Final    GFR  03/27/2022 >60  >60 mL/min/1.73m2 Final    Anion Gap 03/27/2022 10  4 - 16 Final    WBC 03/27/2022 4.7  4.0 - 10.5 K/CU MM Final    RBC 03/27/2022 2.36* 4.2 - 5.4 M/CU MM Final    Hemoglobin 03/27/2022 8.4* 12.5 - 16.0 GM/DL Final    Hematocrit 03/27/2022 26.4* 37 - 47 % Final    MCV 03/27/2022 111.9* 78 - 100 FL Final    MCH 03/27/2022 35.6* 27 - 31 PG Final    MCHC 03/27/2022 31.8* 32.0 - 36.0 % Final    RDW 03/27/2022 14.6  11.7 - 14.9 % Final    Platelets 45/52/8298 120* 140 - 440 K/CU MM Final    MPV 03/27/2022 11.8* 7.5 - 11.1 FL Final    Differential Type 03/27/2022 AUTOMATED DIFFERENTIAL   Final    Segs Relative 03/27/2022 55.6  36 - 66 % Final    Lymphocytes % 03/27/2022 34.4  24 - 44 % Final    Monocytes % 03/27/2022 7.3* 0 - 4 % Final    Eosinophils % 03/27/2022 1.9  0 - 3 % Final    Basophils % 03/27/2022 0.6  0 - 1 % Final    Segs Absolute 03/27/2022 2.6  K/CU MM Final    Lymphocytes Absolute 03/27/2022 1.6  K/CU MM Final    Monocytes Absolute 03/27/2022 0.3  K/CU MM Final    Eosinophils Absolute 03/27/2022 0.1  K/CU MM Final    Basophils Absolute 03/27/2022 0.0  K/CU MM Final    Nucleated RBC % 03/27/2022 0.0  % Final    Total Nucleated RBC 03/27/2022 0.0  K/CU MM Final    Total Immature Neutrophil 03/27/2022 0.01  K/CU MM Final    Immature Neutrophil % 03/27/2022 0.2  0 - 0.43 % Final    Magnesium 03/27/2022 1.7* 1.8 - 2.4 mg/dl Final    Lactate 03/27/2022 1.2  0.4 - 2.0 mMOL/L Final    Lactate 03/27/2022 1.5  0.4 - 2.0 mMOL/L Final    POC Glucose 03/26/2022 139* 70 - 99 MG/DL Final    POC Glucose 03/27/2022 81  70 - 99 MG/DL Final    POC Glucose 03/27/2022 160* 70 - 99 MG/DL Final    POC Glucose 03/27/2022 225* 70 - 99 MG/DL Final    POC Glucose 03/27/2022 272* 70 - 99 MG/DL Final    POC Glucose 03/28/2022 142* 70 - 99 MG/DL Final    Vitamin B-12 03/27/2022 506.4  211 - 911 pg/ml Final    Folate 03/27/2022 13.4  3.1 - 17.5 NG/ML Final    Ferritin 03/27/2022 325* 15 - 150 NG/ML Final    Iron 03/27/2022 108  37 - 145 ug/dL Final    UIBC 03/27/2022 110  110 - 370 ug/dL Final    TIBC 03/27/2022 218* 250 - 450 ug/dL Final    Transferrin % 03/27/2022 50* 10 - 44 % Final   Hospital Outpatient Visit on 02/23/2022   Component Date Value Ref Range Status    Vitamin B-12 02/23/2022 532.5  211 - 911 pg/ml Final    Folate 02/23/2022 14.1  3.1 - 17.5 NG/ML Final    CA 19-9 02/23/2022 472* <=35 U/mL Final    Comment: (NOTE)  INTERPRETIVE INFORMATION: Cancer Antigen-GI (CA 19-9)  This test uses Roche CA 19-9 electrochemiluminescent immunoassay. Results obtained with different test methods or kits cannot be   used interchangeably. CA 19-9 value is useful in monitoring   pancreatic, hepatobiliary, gastric, hepatocellular, and colorectal   cancer. CA 19-9 value, regardless of level, should not be   interpreted as absolute evidence of the presence or absence of   malignant disease. Performed By: [address]  [place], [address] [facility]  : Robel Ozuna.  Humaira Lancaster MD      Sodium 02/23/2022 141  135 - 145 MMOL/L Final    Potassium 02/23/2022 5.2* 3.5 - 5.1 MMOL/L Final    Chloride 02/23/2022 103  99 - 110 mMol/L Final    CO2 02/23/2022 23  21 - 32 MMOL/L Final    BUN 02/23/2022 13  6 - 23 MG/DL Final    CREATININE 02/23/2022 1.2* 0.6 - 1.1 MG/DL Final    Glucose 02/23/2022 98  70 - 99 MG/DL Final    Calcium 02/23/2022 9.4  8.3 - 10.6 MG/DL Final    Albumin 02/23/2022 3.3* 3.4 - 5.0 GM/DL Final    Total Protein 02/23/2022 8.0  6.4 - 8.2 GM/DL Final    Total Bilirubin 02/23/2022 1.2* 0.0 - 1.0 MG/DL Final    ALT 02/23/2022 26  10 - 40 U/L Final    AST 02/23/2022 52* 15 - 37 IU/L Final    Alkaline Phosphatase 02/23/2022 106  40 - 128 IU/L Final    GFR Non- 02/23/2022 44* >60 mL/min/1.73m2 Final    GFR  02/23/2022 54* >60 mL/min/1.73m2 Final    Anion Gap 02/23/2022 15  4 - 16 Final    LD 02/23/2022 276* 120 - 246 IU/L Final    Retic Ct Pct 02/23/2022 2.5* 0.2 - 2.20 % Final    T4 Free 02/23/2022 1.55  0.9 - 1.8 NG/DL Final    Comment: (NOTE)  PATIENTS WITH HIGH LEVELS OF BIOTIN ORAL INTAKE (ie >5mg/day) MAY   HAVE FALSELY INCREASED FT4 LEVELS. SAMPLES COLLECTED WITHIN 8 HOURS   OF BIOTIN INTAKE MAY REQUIRE ADDITIONAL INFORMATION FOR DIAGNOSIS.      TSH, High Sensitivity 02/23/2022 6.520* 0.270 - 4.20 uIu/ml Final    Comment:         Patients with high levels of Biotin intake (ie >5mg/day) may have falsely decreased TSHS   levels. Samples collected within 8 hours of Biotin intake may require additional information for   diagnosis. Assessment and Plan:  1. Discussed plan for a EGD/colonoscopy with MAC anesthesia. The patient was informed of the risks and benefits of the procedures and patient declined not wanting to proceed with any further testing at this time. 2.  Anemia most likely of chronic disease; currently the patient did not have signs of GI bleeding. The patient hemoglobin/hematocrit have been around her baseline. Recommend periodic monitoring of H&H. She denies blood in her stools or melena. 3.  Abnormal finding on CT suggesting nonspecific gastritis and jejunal wall thickening suggestive of gastroenteritis. She recalls improvement in abdominal pain and nausea at this time.     4. Mild ascites noted on CT; thrombocytopenia- with no obvious jaundice, lower extremity swelling, mild foggy brain, her FIB 4 index is 4.81 concerning for possible advanced liver fibrosis. Will order abdominal ultrasound and fibroscan. 5.  The patient was encouraged to follow-up as needed or sooner if symptoms worsen. Total time:  30 minutes.

## 2022-04-08 ENCOUNTER — TELEPHONE (OUTPATIENT)
Dept: GASTROENTEROLOGY | Age: 72
End: 2022-04-08

## 2022-04-08 RX ORDER — MEMANTINE HYDROCHLORIDE 10 MG/1
10 TABLET ORAL 2 TIMES DAILY
Qty: 60 TABLET | Refills: 5 | Status: SHIPPED | OUTPATIENT
Start: 2022-04-08 | End: 2022-06-08 | Stop reason: DRUGHIGH

## 2022-04-08 RX ORDER — MEMANTINE HYDROCHLORIDE 5 MG/1
TABLET ORAL
Qty: 42 TABLET | Refills: 0 | Status: SHIPPED | OUTPATIENT
Start: 2022-04-08 | End: 2022-06-08 | Stop reason: DRUGHIGH

## 2022-04-08 NOTE — TELEPHONE ENCOUNTER
Zhang called in stating that the patient does not want to do US and liver scan at this time.  He or she will call when she is ready to schedule

## 2022-04-11 ENCOUNTER — HOSPITAL ENCOUNTER (OUTPATIENT)
Dept: PHYSICAL THERAPY | Age: 72
Setting detail: THERAPIES SERIES
Discharge: HOME | End: 2022-04-11
Payer: MEDICARE

## 2022-04-11 PROCEDURE — 97110 THERAPEUTIC EXERCISES: CPT

## 2022-04-11 PROCEDURE — 97162 PT EVAL MOD COMPLEX 30 MIN: CPT

## 2022-04-11 NOTE — PROGRESS NOTES
Physical Therapy  Initial Assessment  Date: 2022  Patient Name: Keely Jama  MRN: [id number]  : 1950     Treatment Diagnosis: impaired gait, impiared balance, impiared BLE strength    Restrictions   none    Subjective   General  Chart Reviewed: Yes  Patient assessed for rehabilitation services?: Yes  Additional Pertinent Hx: DM, cancer  Family / Caregiver Present: Yes  Referring Practitioner: Sanjeev Echevarria DO  Referral Date : 03/15/22  Diagnosis: difficulty balancing  Follows Commands: Within Functional Limits  PT Visit Information  PT Insurance Information: medicare-[employer]  Subjective  Subjective: Pt reports that she had a fall about 6 weeks ago when she was getting up in the middle of the night to go to the bathroom. She notes that was in the hospital recently for UTI, dehydration, abnormal blood sugars and intestinal issues. She reports that she has an MRI tomorrow of her brain. Pt reports that she does not use an AD now or prior to fall. She reports that her balance is doing much better and stumbles occasionally, but has not tried to do any housework since fall.   Pain Screening  Patient Currently in Pain: No  Vital Signs  Patient Currently in Pain: No    Vision/Hearing  Vision  Vision: Within Functional Limits  Hearing  Hearing: Within functional limits    Orientation  Orientation  Overall Orientation Status: Within Normal Limits    Social/Functional History  Social/Functional History  Lives With: Spouse  Type of Home: House  Home Layout: One level  ADL Assistance: Needs assistance  Homemaking Assistance: Needs assistance  Ambulation Assistance: Independent  Transfer Assistance: Independent  Occupation: Retired    Objective     Observation/Palpation  Posture: Fair  Observation: gait: no AD, lateral trunk lean to R, neglect of obstacles, slow lj    AROM RLE (degrees)  RLE AROM: WFL  AROM LLE (degrees)  LLE AROM : WFL    Strength RLE  Strength RLE: Exception  R Hip Flexion: 4-/5  R Hip ABduction: 4-/5  R Hip ADduction: 4-/5  R Knee Flexion: 3+/5  R Knee Extension: 4-/5  R Ankle Dorsiflexion: 4-/5  Strength LLE  Strength LLE: Exception  L Hip Flexion: 4-/5  L Hip ABduction: 4-/5  L Hip ADduction: 4-/5  L Knee Flexion: 3+/5  L Knee Extension: 4-/5  L Ankle Dorsiflexion: 4-/5     Additional Measures  Special Tests: TU.52\" from chair with arms. 5x STS: 16.18\" from chair with arms. DGI: . Sensation  Overall Sensation Status: [facility]     Assessment   Conditions Requiring Skilled Therapeutic Intervention  Body structures, Functions, Activity limitations: Decreased functional mobility ; Decreased ADL status; Decreased balance;Decreased posture;Decreased strength;Decreased high-level IADLs;Decreased endurance  Assessment: Pt is a 70 y.o. female that presents to therapy with complaints of balance issues. Pt amb without an AD, trunk lateral shift to the R, slow lj, and neglect of obstacles in her path. Pt demo decreased BLE strength, impaired balance, impaired gait, impaired transfers especially with turning and impaired perception of obstacles during gait. Pt would benefit from continued skilled physical therapy to address impairments and limitations, progress toward goal completion, promote independence with ADLs, and prevent further injury. Treatment Diagnosis: impaired gait, impiared balance, impiared BLE strength  Decision Making: Medium Complexity  History: PLOF: indep  Barriers to Learning: none  REQUIRES PT FOLLOW UP: Yes  Activity Tolerance  Activity Tolerance: Patient Tolerated treatment well    Patient agrees with established plan of care and assisted in the development of their short term and long term goals. Patient had no adverse reaction with initial treatment and there are no barriers to learning. Learning preferences include demonstration, practice, and handouts.   Patient expressed understanding of HEP and appears to be motivated to participate in an active PT program including compliance with HEP expectations. Plan   Plan  Times per week: 2  Plan weeks: 5  Specific instructions for Next Treatment: gait, balance, BLE strength, obsatcle course  Current Treatment Recommendations: Strengthening,IADL Training,Neuromuscular Re-education,Home Exercise Program,Safety Education & Training,Balance Training,Endurance Training,Patient/Caregiver Education & Training,Functional Mobility Training,Transfer Training,Gait Training,ADL/Self-care Training,Stair training    OutComes Score    DHI: 8%  DGI: 13/24  5x STS: 16.18\" from chair with arms  TU.52\" from chair with arms   Goals  Short term goals  Time Frame for Short term goals: 5 visits  Short term goal 1: Pt will be indep with HEP in order to maximize recovery outside of clinic  Long term goals  Time Frame for Long term goals : 10 visits  Long term goal 1: pt will improve DGI to 16/24 or more to show [address] improvement  Long term goal 2: pt will improve TUG to 12\" or less to reduce risk of falls  Long term goal 3: pt will improve BLE gross strength to 4/5 to faciliate safe transfers  Long term goal 4: pt will report 50% or more improvement in condition to show subjective improvement  Patient Goals   Patient goals : be confident in balance       Therapy Time   1030/1114     Yana Aj, PT, DPT Assisted by  KEENA Gan.

## 2022-04-11 NOTE — FLOWSHEET NOTE
Outpatient Physical Therapy  [place]           [x] Phone: [phone]   Fax: [phone]  [place]           [] Phone: [phone]   Fax: [phone]        Physical Therapy Daily Treatment Note  Date:  2022    Patient Name:  Keely Jama    :  1950  MRN: [id number]  Restrictions/Precautions:  none  Diagnosis:   Diagnosis: difficulty balancing  Date of Injury/Surgery:   Treatment Diagnosis: Treatment Diagnosis: impaired gait, impiared balance, impiared BLE strength    Insurance/Certification information: PT Insurance Information: medicare-[employer]   Referring Physician:  Referring Practitioner: Sanjeev Echevarria DO  Next Doctor Visit:    Plan of care signed (Y/N):  Sent   Outcome Measure:   DHI: 8%  DGI:   5x STS: 16.18\" from chair with arms  TU.52\" from chair with arms   Visit# / total visits:   1/10  Pain level: 0/10   Goals:     Patient goals : be confident in balance  Short term goals  Time Frame for Short term goals: 5 visits  Short term goal 1: Pt will be indep with HEP in order to maximize recovery outside of clinic  Long term goals  Time Frame for Long term goals : 10 visits  Long term goal 1: pt will improve DGI to 16/24 or more to show [address] improvement  Long term goal 2: pt will improve TUG to 12\" or less to reduce risk of falls  Long term goal 3: pt will improve BLE gross strength to 4/5 to faciliate safe transfers  Long term goal 4: pt will report 50% or more improvement in condition to show subjective improvement    Summary of Evaluation: Assessment: Pt is a 70 y.o. female that presents to therapy with complaints of balance issues. Pt amb without an AD, trunk lateral shift to the R, slow lj, and neglect of obstacles in her path. Pt demo decreased BLE strength, impaired balance, impaired gait, impaired transfers especially with turning and impaired perception of obstacles during gait.  Pt would benefit from continued skilled physical therapy to address impairments and limitations, progress toward goal completion, promote independence with ADLs, and prevent further injury. Subjective:  See eval         Any changes in Ambulatory Summary Sheet? None        Objective:  See eval   COVID screening questions were asked and patient attested that there had been no contact or symptoms        Exercises: (No more than 4 columns)   Exercise/Equipment 4/11/22 #1 Date Date           WARM UP                     TABLE      Seated clams  RTB x10                                 STANDING      Marches/buttkicks x20 alt      Heel raise/toe raise x20 ea                                        PROPRIOCEPTION                                    MODALITIES                      Other Therapeutic Activities/Education:  HEP and importance of completion, POC and goals, anatomy and physiology related to condition        Home Exercise Program:  Issued, practiced and pt demo ability to perform on 4/11/22 4/11: marches, buttkicks, HR/TR, clams         Manual Treatments:  none      Modalities:  none      Communication with other providers:  POC sent       Assessment:      Assessment: Pt is a 70 y.o. female that presents to therapy with complaints of balance issues. Pt amb without an AD, trunk lateral shift to the R, slow lj, and neglect of obstacles in her path. Pt demo decreased BLE strength, impaired balance, impaired gait, impaired transfers especially with turning and impaired perception of obstacles during gait. Pt would benefit from continued skilled physical therapy to address impairments and limitations, progress toward goal completion, promote independence with ADLs, and prevent further injury.  end pain: 0/10      Plan for Next Session: Specific instructions for Next Treatment: gait, balance, BLE strength, obsatcle course SIGN CANCELLATION NOTICE      Time In / Time Out:    1030/1114        Timed Code/Total Treatment Minutes:  44': 12' TE x1, 32' eval x1      Next Progress Note due:  10th visit      Plan of Care Interventions:  [x] Therapeutic Exercise  [] Modalities:  [x] Therapeutic Activity     [] Ultrasound  [] Estim  [x] Gait Training      [] Cervical Traction [] Lumbar Traction  [x] Neuromuscular Re-education    [] Cold/hotpack [] Iontophoresis   [x] Instruction in HEP      [] Vasopneumatic   [] Dry Needling    [x] Manual Therapy               [] Aquatic Therapy              Electronically signed by: Kevin Cadena PT, DPT assisted by  KEENA Villagomez, 4/11/2022, 2:02 PM

## 2022-04-11 NOTE — PLAN OF CARE
Outpatient Physical Therapy           [place]           [x] Phone: [phone]   Fax: [phone]  [place]           [] Phone: [phone]   Fax: [phone]     To:  Cristy Pleitez DO    From: Quinten Herbert, PT, DPT. Sharon Grey, SPT,    Patient: Melanie Barry       : 1950  Diagnosis: Diagnosis: difficulty balancing   Treatment Diagnosis: Treatment Diagnosis: impaired gait, impiared balance, impiared BLE strength   Date: 2022    Physical Therapy Certification/Re-Certification Form  Dear Dr. Moss Second,   The following patient has been evaluated for physical therapy services and for therapy to continue, insurance requires physician review of the treatment plan initially and every 90 days. Please review the attached evaluation and/or summary of the patient's plan of care, and verify that you agree therapy should continue by signing the attached document and sending it back to our office. Assessment:    Assessment: Pt is a 70 y.o. female that presents to therapy with complaints of balance issues. Pt amb without an AD, trunk lateral shift to the R, slow lj, and neglect of obstacles in her path. Pt demo decreased BLE strength, impaired balance, impaired gait, impaired transfers especially with turning and impaired perception of obstacles during gait. Pt would benefit from continued skilled physical therapy to address impairments and limitations, progress toward goal completion, promote independence with ADLs, and prevent further injury. Patient agrees with established plan of care and assisted in the development of their short term and long term goals. Patient had no adverse reaction with initial treatment and there are no barriers to learning. Learning preferences include demonstration, practice, and handouts. Patient expressed understanding of HEP and appears to be motivated to participate in an active PT program including compliance with HEP expectations.       Plan of Care/Treatment to date:  [x] Therapeutic Exercise  [] Modalities:  [x] Therapeutic Activity     [] Ultrasound  [] Electrical Stimulation  [x] Gait Training      [] Cervical Traction [] Lumbar Traction  [x] Neuromuscular Re-education    [] Cold/hotpack [] Iontophoresis   [x] Instruction in HEP      [] Vasopneumatic    [] Dry Needling  [x] Manual Therapy               [] Aquatic Therapy       Other:          Frequency/Duration:  # Days per week: [] 1 day # Weeks: [] 1 week [x] 5 weeks     [x] 2 days   [] 2 weeks [] 6 weeks     [] 3 days   [] 3 weeks [] 7 weeks     [] 4 days   [] 4 weeks [] 8 weeks         [] 9 weeks [] 10 weeks         [] 11 weeks [] 12 weeks    Rehab Potential/Progress: [] Excellent [x] Good [] Fair  [] Poor     Goals:    Patient goals : be confident in balance  Short term goals  Time Frame for Short term goals: 5 visits  Short term goal 1: Pt will be indep with HEP in order to maximize recovery outside of clinic  Long term goals  Time Frame for Long term goals : 10 visits  Long term goal 1: pt will improve DGI to 16/24 or more to show [address] improvement  Long term goal 2: pt will improve TUG to 12\" or less to reduce risk of falls  Long term goal 3: pt will improve BLE gross strength to 4/5 to faciliate safe transfers  Long term goal 4: pt will report 50% or more improvement in condition to show subjective improvement      Electronically signed by: Kevin Cadena PT, DPT assisted by KEENA Villagomez,  4/11/2022, 2:00 PM      If you have any questions or concerns, please don't hesitate to call.   Thank you for your referral.      Physician Signature:________________________________Date:_________ TIME: _____  By signing above, therapists plan is approved by physician

## 2022-04-12 ENCOUNTER — HOSPITAL ENCOUNTER (OUTPATIENT)
Dept: MRI IMAGING | Age: 72
Discharge: HOME | End: 2022-04-12
Payer: MEDICARE

## 2022-04-12 DIAGNOSIS — R29.818 DIFFICULTY BALANCING: ICD-10-CM

## 2022-04-12 DIAGNOSIS — F03.90 DEMENTIA WITHOUT BEHAVIORAL DISTURBANCE, UNSPECIFIED DEMENTIA TYPE: ICD-10-CM

## 2022-04-12 PROCEDURE — 70551 MRI BRAIN STEM W/O DYE: CPT

## 2022-04-14 ENCOUNTER — HOSPITAL ENCOUNTER (OUTPATIENT)
Dept: PHYSICAL THERAPY | Age: 72
Setting detail: THERAPIES SERIES
Discharge: HOME | End: 2022-04-14
Payer: MEDICARE

## 2022-04-14 PROCEDURE — 97112 NEUROMUSCULAR REEDUCATION: CPT

## 2022-04-14 NOTE — FLOWSHEET NOTE
Outpatient Physical Therapy  [place]           [x] Phone: [phone]   Fax: [phone]  [place]           [] Phone: [phone]   Fax: [phone]        Physical Therapy Daily Treatment Note  Date:  2022    Patient Name:  Radha Elizabeth    :  1950  MRN: [id number]  Restrictions/Precautions:  none  Diagnosis:   Diagnosis: difficulty balancing  Date of Injury/Surgery:   Treatment Diagnosis: Treatment Diagnosis: impaired gait, impiared balance, impiared BLE strength    Insurance/Certification information: PT Insurance Information: medicare-[employer]   Referring Physician:  Referring Practitioner: Pavan Palma DO  Next Doctor Visit:    Plan of care signed (Y/N):  yes  Outcome Measure:   DHI: 8%  DGI: 13/  5x STS: 16.18\" from chair with arms  TU.52\" from chair with arms   Visit# / total visits:   2/10  Pain level: 0/10   Goals:     Patient goals : be confident in balance  Short term goals  Time Frame for Short term goals: 5 visits  Short term goal 1: Pt will be indep with HEP in order to maximize recovery outside of clinic  Long term goals  Time Frame for Long term goals : 10 visits  Long term goal 1: pt will improve DGI to 16/24 or more to show [address] improvement  Long term goal 2: pt will improve TUG to 12\" or less to reduce risk of falls  Long term goal 3: pt will improve BLE gross strength to 4/5 to faciliate safe transfers  Long term goal 4: pt will report 50% or more improvement in condition to show subjective improvement    Summary of Evaluation: Assessment: Pt is a 70 y.o. female that presents to therapy with complaints of balance issues. Pt amb without an AD, trunk lateral shift to the R, slow lj, and neglect of obstacles in her path. Pt demo decreased BLE strength, impaired balance, impaired gait, impaired transfers especially with turning and impaired perception of obstacles during gait.  Pt would benefit from continued skilled physical therapy to address impairments and limitations, progress toward goal completion, promote independence with ADLs, and prevent further injury. Subjective:  Pt reports that she has not fallen since last visit and she had her MRI on Tuesday. Any changes in Ambulatory Summary Sheet? None        Objective:    COVID screening questions were asked and patient attested that there had been no contact or symptoms    Severe unsteadiness with wobbleboard    Exercises: (No more than 4 columns)   Exercise/Equipment 4/11/22 #1 4/14/22 #2 Date           WARM UP                     TABLE      Seated clams  RTB x10                                 STANDING      Marches/buttkicks x20 alt      Heel raise/toe raise x20 ea                                        PROPRIOCEPTION      Static balance foam  Romberg EO/EC, tandem EO/EC    Dynamic balance foam  Marches x20 alt EO    Cone taps  between one cone on floor x20 alt    wobbleboard  AP/ML 1' ea EO    Dynamic gait   Fwd EC, bckwd EO    MODALITIES                      Other Therapeutic Activities/Education:  HEP and importance of completion, POC and goals        Home Exercise Program:  Issued, practiced and pt demo ability to perform on 4/11/22 4/11: william, maxime, HR/TR, clams         Manual Treatments:  none      Modalities:  none      Communication with other providers:  POC sent       Assessment:  Pt tolerated treatment well today. Pt demo increased unsteadiness with wobbleboard today. Pt demo severe decreased step length with backward walking. Pt would benefit from continued skilled physical therapy to address remaining limitations of balance and proprioception in order to prevent further injury and decline.  End pain: same      Plan for Next Session: Specific instructions for Next Treatment: gait, balance, BLE strength, obsatcle course      Time In / Time Out:  1138/1201      Timed Code/Total Treatment Minutes:  23': 21' NMR x2      Next Progress Note due:  10th visit or 30 days       Plan of Care Interventions:  [x] Therapeutic Exercise  [] Modalities:  [x] Therapeutic Activity     [] Ultrasound  [] Estim  [x] Gait Training      [] Cervical Traction [] Lumbar Traction  [x] Neuromuscular Re-education    [] Cold/hotpack [] Iontophoresis   [x] Instruction in HEP      [] Vasopneumatic   [] Dry Needling    [x] Manual Therapy               [] Aquatic Therapy              Electronically signed by: Elly Jacob PT, DPT assisted by  KEENA Florian, 4/14/2022, 8:05 AM

## 2022-04-18 ENCOUNTER — HOSPITAL ENCOUNTER (OUTPATIENT)
Dept: PHYSICAL THERAPY | Age: 72
Setting detail: THERAPIES SERIES
Discharge: HOME | End: 2022-04-18
Payer: MEDICARE

## 2022-04-18 PROCEDURE — 97112 NEUROMUSCULAR REEDUCATION: CPT

## 2022-04-18 PROCEDURE — 97116 GAIT TRAINING THERAPY: CPT

## 2022-04-18 PROCEDURE — 97110 THERAPEUTIC EXERCISES: CPT

## 2022-04-18 NOTE — FLOWSHEET NOTE
Outpatient Physical Therapy  [place]           [x] Phone: [phone]   Fax: [phone]  [place]           [] Phone: [phone]   Fax: [phone]        Physical Therapy Daily Treatment Note  Date:  2022    Patient Name:  Florecita Saunders    :  1950  MRN: [id number]  Restrictions/Precautions:  none  Diagnosis:   Diagnosis: difficulty balancing  Date of Injury/Surgery:   Treatment Diagnosis: Treatment Diagnosis: impaired gait, impiared balance, impiared BLE strength    Insurance/Certification information: PT Insurance Information: medicare-[employer]   Referring Physician:  Referring Practitioner: Che Garibay DO  Next Doctor Visit:    Plan of care signed (Y/N):  yes  Outcome Measure:   DHI: 8%  DGI:   5x STS: 16.18\" from chair with arms  TU.52\" from chair with arms   Visit# / total visits:   3/10  Pain level: 0/10   Goals:     Patient goals : be confident in balance  Short term goals  Time Frame for Short term goals: 5 visits  Short term goal 1: Pt will be indep with HEP in order to maximize recovery outside of clinic  Long term goals  Time Frame for Long term goals : 10 visits  Long term goal 1: pt will improve DGI to 16/24 or more to show [address] improvement  Long term goal 2: pt will improve TUG to 12\" or less to reduce risk of falls  Long term goal 3: pt will improve BLE gross strength to 4/5 to faciliate safe transfers  Long term goal 4: pt will report 50% or more improvement in condition to show subjective improvement    Summary of Evaluation: Assessment: Pt is a 70 y.o. female that presents to therapy with complaints of balance issues. Pt amb without an AD, trunk lateral shift to the R, slow lj, and neglect of obstacles in her path. Pt demo decreased BLE strength, impaired balance, impaired gait, impaired transfers especially with turning and impaired perception of obstacles during gait.  Pt would benefit from continued skilled physical therapy to address impairments and limitations, progress toward goal completion, promote independence with ADLs, and prevent further injury. Subjective:   Pt reports no falls and feeling nervous today because last time she had to walk with her eyes closed. Pt is compliant with HEP and was asking if she could wear slippers at home. Any changes in Ambulatory Summary Sheet?   None        Objective:    COVID screening questions were asked and patient attested that there had been no contact or symptoms    At arrival and during treatment pt ambulated with decrease step length and height and NBOS with mild post lean   Cued to push into heel for bridges  Cued for TA engagement and technique during table exercises   Mod vc for gait pattern with SPC and 2WW  Poor dynamic balance during gait  Cued for step height and step length during gait with SPC [place] (CGAx2 with gait belt)  1 LOB to the right  Gait with 2WW improved step height and length, lj, balance, posture and confidence    Exercises: (No more than 4 columns)   Exercise/Equipment 4/11/22 #1 4/14/22 #2 4/18/22 #3           WARM UP                     TABLE      Seated clams  RTB x10   Supine RTB x 20   bridges    10x   Adduction squeeze    With red ball 10x5\"                  STANDING      Marches/buttkicks x20 alt   5x    Heel raise/toe raise x20 ea     STS      SLS   30s ea UE support                           PROPRIOCEPTION      Static balance foam  Romberg EO/EC, tandem EO/EC Romberg EO/EC, tandem EO/EC 1 min ea  UE support    Dynamic balance foam  Marches x20 alt EO Marches x20 alt EO One hand UE    Cone taps  between one cone on floor x20 alt x20 alt   wobbleboard  AP/ML 1' ea EO AP/ML 1' ea EO x2   Dynamic gait   Fwd EC, bckwd EO 35ftx2 with walker, 35ftx2 with cane    MODALITIES                      Other Therapeutic Activities/Education:  HEP and importance of completion, POC and goals  4/18/22: pt educated on proper footwear in the house, tripping hazards at home and using a walker to prevent wall walking      Home Exercise Program:  Issued, practiced and pt demo ability to perform on 4/11/22 4/11: william, maxime, HR/TR, clams   4/18/22: HL clams, adduction, bridge and TA HO given         Manual Treatments:  none      Modalities:  none      Communication with other providers:  POC sent       Assessment:    Pt tolerated treatment well with increased tolerance to static balance exercises. Pt demonstrated increased apprehension with dynamic balance and gait activities, but confidence and gait mechanics improved while using 2WW. Pt needs improvement with LE strength and proprioception to decrease fall risk. Updated HEP. Pt would benefit from skilled therapy to improve balance and stabilizing muscles for safe home and community ambulation.    Pain 2/10 LB, but that is always there      Plan for Next Session: Specific instructions for Next Treatment: gait, balance, BLE strength, obsatcle course      Time In / Time Out:  3758/1038      Timed Code/Total Treatment Minutes:  48/48: 1 TE, 1 NE 1 GT      Next Progress Note due:  10th visit or 30 days       Plan of Care Interventions:  [x] Therapeutic Exercise  [] Modalities:  [x] Therapeutic Activity     [] Ultrasound  [] Estim  [x] Gait Training      [] Cervical Traction [] Lumbar Traction  [x] Neuromuscular Re-education    [] Cold/hotpack [] Iontophoresis   [x] Instruction in HEP      [] Vasopneumatic   [] Dry Needling    [x] Manual Therapy               [] Aquatic Therapy              Electronically signed by: Jenn Johnson PTA, CLT 4/18/22

## 2022-04-21 ENCOUNTER — HOSPITAL ENCOUNTER (OUTPATIENT)
Dept: PHYSICAL THERAPY | Age: 72
Setting detail: THERAPIES SERIES
Discharge: HOME | End: 2022-04-21
Payer: MEDICARE

## 2022-04-21 PROCEDURE — 97112 NEUROMUSCULAR REEDUCATION: CPT

## 2022-04-21 PROCEDURE — 97110 THERAPEUTIC EXERCISES: CPT

## 2022-04-21 NOTE — FLOWSHEET NOTE
Outpatient Physical Therapy  [place]           [x] Phone: [phone]   Fax: [phone]  [place] [facility]           [] Phone: [phone]   Fax: [phone]        Physical Therapy Daily Treatment Note  Date:  2022    Patient Name:  Freddie Louis    :  1950  MRN: [id number]  Restrictions/Precautions:  none  Diagnosis:   Diagnosis: difficulty balancing  Date of Injury/Surgery:   Treatment Diagnosis: Treatment Diagnosis: impaired gait, impaired balance, impiared BLE strength    Insurance/Certification information: PT Insurance Information: medicare  Referring Physician:  Referring Practitioner: Taylor Modi DO  Next Doctor Visit:    Plan of care signed (Y/N):  yes  Outcome Measure:   DHI: 8%  DGI:   5x STS: 16.18\" from chair with arms  TU.52\" from chair with arms   Visit# / total visits:   4/10  Pain level: 0/10     Goals:     Patient goals : be confident in balance  Short term goals  Time Frame for Short term goals: 5 visits  Short term goal 1: Pt will be indep with HEP in order to maximize recovery outside of clinic  Long term goals  Time Frame for Long term goals : 10 visits  Long term goal 1: pt will improve DGI to 16/24 or more to show [address] improvement  Long term goal 2: pt will improve TUG to 12\" or less to reduce risk of falls  Long term goal 3: pt will improve BLE gross strength to 4/5 to faciliate safe transfers  Long term goal 4: pt will report 50% or more improvement in condition to show subjective improvement    Summary of Evaluation: Assessment: Pt is a 70 y.o. female that presents to therapy with complaints of balance issues. Pt amb without an AD, trunk lateral shift to the R, slow lj, and neglect of obstacles in her path. Pt demo decreased BLE strength, impaired balance, impaired gait, impaired transfers especially with turning and impaired perception of obstacles during gait.  Pt would benefit from continued skilled physical therapy to address impairments and limitations, progress toward goal completion, promote independence with ADLs, and prevent further injury. Subjective: pt reports that she has no complaints today and is doing fine. Any changes in Ambulatory Summary Sheet?   None      Objective:    COVID screening questions were asked and patient attested that there had been no contact or symptoms    Cognition and memory issues at baseline, did not remember therapist   Trouble balancing with cone taps today     Exercises: (No more than 4 columns)   Exercise/Equipment 4/14/22 #2 4/18/22 #3 4/21/22 #4           WARM UP      nustep    L3 5'          TABLE      Seated clams   Supine RTB x 20 Seated RTB x20    bridges   10x 2x10    Adduction squeeze   With red ball 10x5\" 20x5\"    SLR    2x10 ea    LAQ   20x3\" ea    STANDING      Marches/buttkicks  5x  x20 alt ea with BUE use    Heel raise/toe raise      STS      SLS  30s ea UE support                            PROPRIOCEPTION      Static balance foam Romberg EO/EC, tandem EO/EC Romberg EO/EC, tandem EO/EC 1 min ea  UE support  Romberg EO/EC, tandem EO/EC   Dynamic balance foam Marches x20 alt EO Marches x20 alt EO One hand UE  Marches x20 alt EO   Cone taps between one cone on floor x20 alt x20 alt between one cone on floor x20 alt   wobbleboard AP/ML 1' ea EO AP/ML 1' ea EO x2 AP/ML 1' ea EO x2   Dynamic gait  Fwd EC, bckwd EO 35ftx2 with walker, 35ftx2 with cane     MODALITIES                      Other Therapeutic Activities/Education:  HEP and importance of completion, POC and goals  4/18/22: pt educated on proper footwear in the house, tripping hazards at home and using a walker to prevent wall walking    Home Exercise Program:  Issued, practiced and pt demo ability to perform on 4/11/22 4/11: marches, buttkicks, HR/TR, clams   4/18/22: HL clams, adduction, bridge and TA HO given     Manual Treatments:  none    Modalities:  none    Communication with other providers:  POC sent     Assessment:  Pt tolerated treatment well today. Pt is not able to notice when she is travelling forward and stepping off foam pad while performing marches, cues needed to stop reps and back up to prevent fall from occurring. Pt would benefit from continued skilled physical therapy to address remaining limitations of BLE strength, balance, proprioception, gait and functional mobility in order to prevent further injury and decline.  End pain: same     Plan for Next Session: Specific instructions for Next Treatment: gait, balance, BLE strength, obsatcle course    Time In / Time Out:  1300/1342    Timed Code/Total Treatment Minutes:  43': 21' NMR x2, 19' TE x1    Next Progress Note due:  10th visit or 30 days     Plan of Care Interventions:  [x] Therapeutic Exercise  [] Modalities:  [x] Therapeutic Activity     [] Ultrasound  [] Estim  [x] Gait Training      [] Cervical Traction [] Lumbar Traction  [x] Neuromuscular Re-education    [] Cold/hotpack [] Iontophoresis   [x] Instruction in HEP      [] Vasopneumatic   [] Dry Needling    [x] Manual Therapy               [] Aquatic Therapy              Electronically signed by: Isidoro Mejia, PT, DPT assisted by Kaley Guevara, SPT

## 2022-04-25 ENCOUNTER — HOSPITAL ENCOUNTER (OUTPATIENT)
Dept: PHYSICAL THERAPY | Age: 72
Setting detail: THERAPIES SERIES
Discharge: HOME | End: 2022-04-25
Payer: MEDICARE

## 2022-04-25 PROCEDURE — 97112 NEUROMUSCULAR REEDUCATION: CPT

## 2022-04-25 PROCEDURE — 97110 THERAPEUTIC EXERCISES: CPT

## 2022-04-25 NOTE — FLOWSHEET NOTE
Outpatient Physical Therapy  [place]           [x] Phone: [phone]   Fax: [phone]  Enio Hollingsworth           [] Phone: [phone]   Fax: [phone]        Physical Therapy Daily Treatment Note  Date:  2022    Patient Name:  Radha Elizabeth    :  1950  MRN: [id number]  Restrictions/Precautions:  none  Diagnosis:   Diagnosis: difficulty balancing  Date of Injury/Surgery:   Treatment Diagnosis: Treatment Diagnosis: impaired gait, impaired balance, impiared BLE strength    Insurance/Certification information: PT Insurance Information: medicare  Referring Physician:  Referring Practitioner: Pavan Palma DO  Next Doctor Visit:    Plan of care signed (Y/N):  yes  Outcome Measure:   DHI: 8%  DGI: 13/24  5x STS: 16.18\" from chair with arms  TU.52\" from chair with arms   Visit# / total visits:   5/10  Pain level: 0/10     Goals:     Patient goals : be confident in balance  Short term goals  Time Frame for Short term goals: 5 visits  Short term goal 1: Pt will be indep with HEP in order to maximize recovery outside of clinic met- reports compliance  Long term goals  Time Frame for Long term goals : 10 visits  Long term goal 1: pt will improve DGI to 16/24 or more to show [address] improvement  Long term goal 2: pt will improve TUG to 12\" or less to reduce risk of falls  Long term goal 3: pt will improve BLE gross strength to 4/5 to faciliate safe transfers  Long term goal 4: pt will report 50% or more improvement in condition to show subjective improvement    Summary of Evaluation: Assessment: Pt is a 70 y.o. female that presents to therapy with complaints of balance issues. Pt amb without an AD, trunk lateral shift to the R, slow lj, and neglect of obstacles in her path. Pt demo decreased BLE strength, impaired balance, impaired gait, impaired transfers especially with turning and impaired perception of obstacles during gait.  Pt would benefit from continued skilled physical therapy to address impairments and limitations, progress toward goal completion, promote independence with ADLs, and prevent further injury. Subjective: pt reports that she walked on the treadmill this morning but has not done her HEP yet today. Pt did note that she bought a ball to do adduction squeezes at home. Any changes in Ambulatory Summary Sheet? None      Objective:    COVID screening questions were asked and patient attested that there had been no contact or symptoms    Pt did not respond to her name being called in the waiting room. Pt looked at therapist and said \"is that me? \" pt continues to show baseline confusion at sessions.  (is seeing neuro for follow up for dementia)    Good eccentric control with SLR today    Pt relies on using table on back of knees for sit to stand despite cues to scoot forward and stand up all the way straight    Exercises: (No more than 4 columns)   Exercise/Equipment 4/18/22 #3 4/21/22 #4 4/25/22 #5           WARM UP      nustep   L3 5'  L3 5'         TABLE      Seated clams  Supine RTB x 20 Seated RTB x20  Seated RTB x30    bridges  10x 2x10  2x10    Adduction squeeze  With red ball 10x5\" 20x5\"  30x5\"   SLR   2x10 ea  2x10 ea    LAQ  20x3\" ea  20x3\" ea   STANDING      Marches/buttkicks 5x  x20 alt ea with BUE use  x30 alt ea with BUE use    Heel raise/toe raise   x20 ea with BUE use    STS   x10   SLS 30s ea UE support                             PROPRIOCEPTION      Static balance foam Romberg EO/EC, tandem EO/EC 1 min ea  UE support  Romberg EO/EC, tandem EO/EC    Dynamic balance foam Marches x20 alt EO One hand UE  Marches x20 alt EO    Cone taps x20 alt between one cone on floor x20 alt    wobbleboard AP/ML 1' ea EO x2 AP/ML 1' ea EO x2 AP/ML 1' ea EO/EC one finger hold    Dynamic gait  35ftx2 with walker, 35ftx2 with cane      MODALITIES                      Other Therapeutic Activities/Education:  HEP and importance of completion, POC and goals  4/18/22: pt educated on proper footwear in the house, tripping hazards at home and using a walker to prevent wall walking    Home Exercise Program:  Issued, practiced and pt demo ability to perform on 4/11/22 4/11: william, pauls, HR/TR, clams   4/18/22: HL clams, adduction, bridge and TA HO given     Manual Treatments:  none    Modalities:  none    Communication with other providers:  POC sent     Assessment:  Pt tolerated treatment well today. Therapist focused on more strength today since pt did not perform HEP yet today. Pt would benefit from continued skilled physical therapy to address remaining limitations of balance, BLE strength, gait and functional mobility in order to prevent further injury and decline.  End pain: 0/10      Plan for Next Session: Specific instructions for Next Treatment: gait, balance, BLE strength, obsatcle course    Time In / Time Out:  1443/1527    Timed Code/Total Treatment Minutes:  40': 29' TE x2, 16' NMR x1     Next Progress Note due:  10th visit or 30 days     Plan of Care Interventions:  [x] Therapeutic Exercise  [] Modalities:  [x] Therapeutic Activity     [] Ultrasound  [] Estim  [x] Gait Training      [] Cervical Traction [] Lumbar Traction  [x] Neuromuscular Re-education    [] Cold/hotpack [] Iontophoresis   [x] Instruction in HEP      [] Vasopneumatic   [] Dry Needling    [x] Manual Therapy               [] Aquatic Therapy              Electronically signed by: Dominic Castillo, PT, DPT assisted by Wanda Romero, SPT

## 2022-04-28 ENCOUNTER — HOSPITAL ENCOUNTER (OUTPATIENT)
Age: 72
Discharge: HOME | End: 2022-04-28
Payer: MEDICARE

## 2022-04-28 LAB
ALBUMIN SERPL-MCNC: 4 GM/DL (ref 3.4–5)
ALP BLD-CCNC: 76 IU/L (ref 40–128)
ALT SERPL-CCNC: 18 U/L (ref 10–40)
ANION GAP SERPL CALCULATED.3IONS-SCNC: 14 MMOL/L (ref 4–16)
AST SERPL-CCNC: 25 IU/L (ref 15–37)
BILIRUB SERPL-MCNC: 0.6 MG/DL (ref 0–1)
BUN BLDV-MCNC: 14 MG/DL (ref 6–23)
CALCIUM SERPL-MCNC: 9.5 MG/DL (ref 8.3–10.6)
CHLORIDE BLD-SCNC: 101 MMOL/L (ref 99–110)
CHOLESTEROL: 137 MG/DL
CO2: 23 MMOL/L (ref 21–32)
CREAT SERPL-MCNC: 1 MG/DL (ref 0.6–1.1)
CREATININE URINE: 58.7 MG/DL (ref 28–217)
ESTIMATED AVERAGE GLUCOSE: 154 MG/DL
GFR AFRICAN AMERICAN: >60 ML/MIN/1.73M2
GFR NON-AFRICAN AMERICAN: 55 ML/MIN/1.73M2
GLUCOSE BLD-MCNC: 244 MG/DL (ref 70–99)
HBA1C MFR BLD: 7 % (ref 4.2–6.3)
HDLC SERPL-MCNC: 61 MG/DL
LDL CHOLESTEROL CALCULATED: 59 MG/DL
LDL CHOLESTEROL DIRECT: 62 MG/DL
MICROALBUMIN/CREAT 24H UR: <1.2 MG/DL
MICROALBUMIN/CREAT UR-RTO: NORMAL MG/G CREAT (ref 0–30)
POTASSIUM SERPL-SCNC: 4.5 MMOL/L (ref 3.5–5.1)
SODIUM BLD-SCNC: 138 MMOL/L (ref 135–145)
T4 FREE: 1.53 NG/DL (ref 0.9–1.8)
TOTAL PROTEIN: 7.7 GM/DL (ref 6.4–8.2)
TRIGL SERPL-MCNC: 86 MG/DL
TSH HIGH SENSITIVITY: 4.78 UIU/ML (ref 0.27–4.2)

## 2022-04-28 PROCEDURE — 82043 UR ALBUMIN QUANTITATIVE: CPT

## 2022-04-28 PROCEDURE — 83721 ASSAY OF BLOOD LIPOPROTEIN: CPT

## 2022-04-28 PROCEDURE — 36415 COLL VENOUS BLD VENIPUNCTURE: CPT

## 2022-04-28 PROCEDURE — 80061 LIPID PANEL: CPT

## 2022-04-28 PROCEDURE — 83036 HEMOGLOBIN GLYCOSYLATED A1C: CPT

## 2022-04-28 PROCEDURE — 82570 ASSAY OF URINE CREATININE: CPT

## 2022-04-28 PROCEDURE — 84443 ASSAY THYROID STIM HORMONE: CPT

## 2022-04-28 PROCEDURE — 80053 COMPREHEN METABOLIC PANEL: CPT

## 2022-04-28 PROCEDURE — 84439 ASSAY OF FREE THYROXINE: CPT

## 2022-04-29 ENCOUNTER — HOSPITAL ENCOUNTER (OUTPATIENT)
Dept: PHYSICAL THERAPY | Age: 72
Setting detail: THERAPIES SERIES
Discharge: HOME | End: 2022-04-29
Payer: MEDICARE

## 2022-04-29 PROCEDURE — 97112 NEUROMUSCULAR REEDUCATION: CPT

## 2022-04-29 PROCEDURE — 97110 THERAPEUTIC EXERCISES: CPT

## 2022-04-29 NOTE — FLOWSHEET NOTE
Outpatient Physical Therapy  [place]           [x] Phone: [phone]   Fax: [phone]  [facility]everly [facility]alesroyer           [] Phone: [phone]   Fax: [phone]        Physical Therapy Daily Treatment Note  Date:  2022    Patient Name:  Sergio Sommers    :  1950  MRN: [id number]  Restrictions/Precautions:  none  Diagnosis:   Diagnosis: difficulty balancing  Date of Injury/Surgery:   Treatment Diagnosis: Treatment Diagnosis: impaired gait, impaired balance, impiared BLE strength    Insurance/Certification information: PT Insurance Information: medicare  Referring Physician:  Referring Practitioner: Rebeca Curry DO  Next Doctor Visit:    Plan of care signed (Y/N):  yes  Outcome Measure:   DHI: 8%  DGI: 13/  5x STS: 16.18\" from chair with arms  TU.52\" from chair with arms   Visit# / total visits:   6/10  Pain level: 0/10     Goals:     Patient goals : be confident in balance  Short term goals  Time Frame for Short term goals: 5 visits  Short term goal 1: Pt will be indep with HEP in order to maximize recovery outside of clinic met- reports compliance  Long term goals  Time Frame for Long term goals : 10 visits  Long term goal 1: pt will improve DGI to 16/24 or more to show [address] improvement  Long term goal 2: pt will improve TUG to 12\" or less to reduce risk of falls  Long term goal 3: pt will improve BLE gross strength to 4/5 to faciliate safe transfers  Long term goal 4: pt will report 50% or more improvement in condition to show subjective improvement    Summary of Evaluation: Assessment: Pt is a 70 y.o. female that presents to therapy with complaints of balance issues. Pt amb without an AD, trunk lateral shift to the R, slow lj, and neglect of obstacles in her path. Pt demo decreased BLE strength, impaired balance, impaired gait, impaired transfers especially with turning and impaired perception of obstacles during gait.  Pt would benefit from continued skilled physical therapy to address impairments and limitations, progress toward goal completion, promote independence with ADLs, and prevent further injury. Subjective: pt reports that she is doing good today. Any changes in Ambulatory Summary Sheet?   None      Objective:    COVID screening questions were asked and patient attested that there had been no contact or symptoms    Pt seemed to have improved cognition/memory today    Pt required max assist for upright position with sidestepping on foam, pt leaning posteriorly with all weight on heels, cues for putting weight through toes with no carryover    Exercises: (No more than 4 columns)   Exercise/Equipment 4/21/22 #4 4/25/22 #5 4/29/22 #6           WARM UP      nustep  L3 5'  L3 5' L4 5'          TABLE      Seated clams  Seated RTB x20  Seated RTB x30  Supine GTB x30      bridges  2x10  2x10  3x10    Adduction squeeze  20x5\"  30x5\" 30x5\"    SLR  2x10 ea  2x10 ea  3x10 ea    LAQ 20x3\" ea  20x3\" ea 20x3\" ea    STANDING      Marches/buttkicks x20 alt ea with BUE use  x30 alt ea with BUE use  x30 alt ea with BUE use    Heel raise/toe raise  x20 ea with BUE use  x30 ea with BUE use    STS  x10 x10    SLS                             PROPRIOCEPTION      Static balance foam Romberg EO/EC, tandem EO/EC  Romberg EO/EC, tandem EO/EC   Dynamic balance foam Marches x20 alt EO     Cone taps between one cone on floor x20 alt     wobbleboard AP/ML 1' ea EO x2 AP/ML 1' ea EO/EC one finger hold      foam strip   Sidestepping x1 max assist to stay upright   Dynamic gait       MODALITIES                      Other Therapeutic Activities/Education:  HEP and importance of completion, POC and goals  4/18/22: pt educated on proper footwear in the house, tripping hazards at home and using a walker to prevent wall walking    Home Exercise Program:  Issued, practiced and pt demo ability to perform on 4/11/22 4/11: william, korincks, HR/TR, clams   4/18/22: HL clams, adduction, bridge and TA HO given   4/29: GTB given to pt    Manual Treatments:  none    Modalities:  none    Communication with other providers:  POC sent     Assessment: Pt tolerated treatment well today. Pt was leaning posteriorly with sidestepping on foam strip. Despite cues for leaning forward and putting weight through toes, pt continued to lean posteriorly. Pt required max assist from therapist for anterior stabilization. Pt would benefit from continued skilled physical therapy to address remaining limitations of balance and gait in order to prevent further injury and decline.  End pain: same      Plan for Next Session: Specific instructions for Next Treatment: gait, balance, BLE strength, obsatcle course    Time In / Time Out:  2195/1783    Timed Code/Total Treatment Minutes:   41': 24' TE x2, 17' NMR x1    Next Progress Note due:  10th visit or 30 days     Plan of Care Interventions:  [x] Therapeutic Exercise  [] Modalities:  [x] Therapeutic Activity     [] Ultrasound  [] Estim  [x] Gait Training      [] Cervical Traction [] Lumbar Traction  [x] Neuromuscular Re-education    [] Cold/hotpack [] Iontophoresis   [x] Instruction in HEP      [] Vasopneumatic   [] Dry Needling    [x] Manual Therapy               [] Aquatic Therapy              Electronically signed by: Kristine Knight, PT, DPT assisted by Fabi Parsons, SPT

## 2022-05-02 ENCOUNTER — HOSPITAL ENCOUNTER (OUTPATIENT)
Dept: PHYSICAL THERAPY | Age: 72
Setting detail: THERAPIES SERIES
Discharge: HOME | End: 2022-05-02
Payer: MEDICARE

## 2022-05-02 PROCEDURE — 97112 NEUROMUSCULAR REEDUCATION: CPT

## 2022-05-02 PROCEDURE — 97110 THERAPEUTIC EXERCISES: CPT

## 2022-05-02 NOTE — FLOWSHEET NOTE
Outpatient Physical Therapy  [place]           [x] Phone: [phone]   Fax: [phone]  [place]           [] Phone: [phone]   Fax: [phone]        Physical Therapy Daily Treatment Note  Date:  2022    Patient Name:  Lauren Burnett    :  1950  MRN: [id number]  Restrictions/Precautions:  none  Diagnosis:   Diagnosis: difficulty balancing  Date of Injury/Surgery:   Treatment Diagnosis: Treatment Diagnosis: impaired gait, impaired balance, impiared BLE strength    Insurance/Certification information: PT Insurance Information: medicare  Referring Physician:  Referring Practitioner: Glo Chery DO  Next Doctor Visit:    Plan of care signed (Y/N):  yes  Outcome Measure:   DHI: 8%  DGI:   5x STS: 16.18\" from chair with arms  TU.52\" from chair with arms   Visit# / total visits:  7 /10  Pain level: 3/10 low back pain    Goals:     Patient goals : be confident in balance  Short term goals  Time Frame for Short term goals: 5 visits  Short term goal 1: Pt will be indep with HEP in order to maximize recovery outside of clinic met- reports compliance  Long term goals  Time Frame for Long term goals : 10 visits  Long term goal 1: pt will improve DGI to 16/24 or more to show [address] improvement  Long term goal 2: pt will improve TUG to 12\" or less to reduce risk of falls  Long term goal 3: pt will improve BLE gross strength to 4/5 to faciliate safe transfers  Long term goal 4: pt will report 50% or more improvement in condition to show subjective improvement    Summary of Evaluation: Assessment: Pt is a 70 y.o. female that presents to therapy with complaints of balance issues. Pt amb without an AD, trunk lateral shift to the R, slow lj, and neglect of obstacles in her path. Pt demo decreased BLE strength, impaired balance, impaired gait, impaired transfers especially with turning and impaired perception of obstacles during gait.  Pt would benefit from continued skilled physical therapy to address impairments and limitations, progress toward goal completion, promote independence with ADLs, and prevent further injury. Subjective: Pt reports LB pain and is seeing a chiropractor about it. Any changes in Ambulatory Summary Sheet? None      Objective:    COVID screening questions were asked and patient attested that there had been no contact or symptoms    At arrival and throughout tx, improved lj, step length and height with ambulation no AD with increased confidence. Noted R ER weakness with supine clamshells and bilat.  ER weakness during add squeeze  Decreased stance time on R LE during butt kicks   [place] step length to the L during foam bean sidestepping      Exercises: (No more than 4 columns)   Exercise/Equipment 4/21/22 #4 4/25/22 #5 4/29/22 #6 5/2/22 #7            WARM UP       nustep  L3 5'  L3 5' L4 5'  L4 5'          TABLE       Seated clams  Seated RTB x20  Seated RTB x30  Supine GTB x30    Supine GTB x30 same time   bridges  2x10  2x10  3x10  3x10    Adduction squeeze  20x5\"  30x5\" 30x5\"  20x5\" seated    SLR  2x10 ea  2x10 ea  3x10 ea  3x10 ea   LAQ 20x3\" ea  20x3\" ea 20x3\" ea  20x3\" ea    STANDING       Marches/buttkicks x20 alt ea with BUE use  x30 alt ea with BUE use  x30 alt ea with BUE use  x30 at ea with BUE use    Heel raise/toe raise  x20 ea with BUE use  x30 ea with BUE use  x30 ea with BUE use    STS  x10 x10     SLS    B x1 30s ea                             PROPRIOCEPTION       Static balance foam Romberg EO/EC, tandem EO/EC  Romberg EO/EC, tandem EO/EC Romberg EO/EC, tandem EO/EC 30s ea   Dynamic balance foam Marches x20 alt EO      Cone taps between one cone on floor x20 alt      wobbleboard AP/ML 1' ea EO x2 AP/ML 1' ea EO/EC one finger hold       foam strip   Sidestepping x1 max assist to stay upright Side stepping x 3 in // fingertip support    Dynamic gait        MODALITIES                         Other Therapeutic Activities/Education:  HEP and importance of completion, POC and goals  4/18/22: pt educated on proper footwear in the house, tripping hazards at home and using a walker to prevent wall walking    Home Exercise Program:  Issued, practiced and pt demo ability to perform on 4/11/22 4/11: william, maxime, HR/TR, clams   4/18/22: HL clams, adduction, bridge and TA HO given   4/29: GTB given to pt    Manual Treatments:  none    Modalities:  none    Communication with other providers:  POC sent     Assessment:   Pt tolerated treatment well with no increase in pain. Pt demonstrated improved static balance exerices. Improved gait quality today. Pt needs to improve dynamic balance and LE strength for confidence in balance and safe ambulation. Pt would benefit from skilled therapy to promote healing and prevent further injury or decline.    Pain: 0/10    Plan for Next Session: Specific instructions for Next Treatment: gait, balance, BLE strength, obstacle course DYNAMIC balance     Time In / Time Out:  1120/1205    Timed Code/Total Treatment Minutes:   45/45: 2 NR, 1 TE    Next Progress Note due:  10th visit or 30 days     Plan of Care Interventions:  [x] Therapeutic Exercise  [] Modalities:  [x] Therapeutic Activity     [] Ultrasound  [] Estim  [x] Gait Training      [] Cervical Traction [] Lumbar Traction  [x] Neuromuscular Re-education    [] Cold/hotpack [] Iontophoresis   [x] Instruction in HEP      [] Vasopneumatic   [] Dry Needling    [x] Manual Therapy               [] Aquatic Therapy              Electronically signed by: Sammie Marcum PTA, CLT 5/2/22

## 2022-05-05 ENCOUNTER — HOSPITAL ENCOUNTER (OUTPATIENT)
Dept: PHYSICAL THERAPY | Age: 72
Setting detail: THERAPIES SERIES
Discharge: HOME | End: 2022-05-05
Payer: MEDICARE

## 2022-05-05 PROCEDURE — 97110 THERAPEUTIC EXERCISES: CPT

## 2022-05-05 PROCEDURE — 97112 NEUROMUSCULAR REEDUCATION: CPT

## 2022-05-05 NOTE — FLOWSHEET NOTE
Outpatient Physical Therapy  [place]           [x] Phone: [phone]   Fax: [phone]  [facility]           [] Phone: [phone]   Fax: [phone]        Physical Therapy Daily Treatment Note  Date:  2022    Patient Name:  Lauren Burnett    :  1950  MRN: [id number]  Restrictions/Precautions:  none  Diagnosis:   Diagnosis: difficulty balancing  Date of Injury/Surgery:   Treatment Diagnosis: Treatment Diagnosis: impaired gait, impaired balance, impiared BLE strength    Insurance/Certification information: PT Insurance Information: medicare  Referring Physician:  Referring Practitioner: Glo Chery DO  Next Doctor Visit:    Plan of care signed (Y/N):  yes  Outcome Measure:   DHI: 8%  DGI:   5x STS: 16.18\" from chair with arms  TU.52\" from chair with arms   Visit# / total visits:  8 /10  Pain level: 3/10 LB/SI ache pain    Goals:     Patient goals : be confident in balance  Short term goals  Time Frame for Short term goals: 5 visits  Short term goal 1: Pt will be indep with HEP in order to maximize recovery outside of clinic met- reports compliance  Long term goals  Time Frame for Long term goals : 10 visits  Long term goal 1: pt will improve DGI to 16/24 or more to show [address] improvement  Long term goal 2: pt will improve TUG to 12\" or less to reduce risk of falls  Long term goal 3: pt will improve BLE gross strength to 4/5 to faciliate safe transfers  Long term goal 4: pt will report 50% or more improvement in condition to show subjective improvement    Summary of Evaluation: Assessment: Pt is a 70 y.o. female that presents to therapy with complaints of balance issues. Pt amb without an AD, trunk lateral shift to the R, slow lj, and neglect of obstacles in her path. Pt demo decreased BLE strength, impaired balance, impaired gait, impaired transfers especially with turning and impaired perception of obstacles during gait.  Pt would benefit from continued skilled physical therapy to address impairments and limitations, progress toward goal completion, promote independence with ADLs, and prevent further injury. Subjective: Pt stated she went to her chiropractor and he stated he is very unhappy with her physical therapy and all the twisting PT is making her do. He stated he will not see her anymore if she continues PT. Patient reports that she feels outside and gym environment makes her feel less stable. She is confident at home. Any changes in Ambulatory Summary Sheet? None      Objective:    COVID screening questions were asked and patient attested that there had been no contact or symptoms    At arrival and throughout tx, good lj, step length and height with ambulation no AD    No trunk rotation and no arm swing with ambulation noted  Low clearance with HR  Some minimal deviation with dynamic walking with alternating gaze directions. Catches toe intermittently with step up and step overs. Reports visual stimulation of the gym in overwhelming. Exercises: (No more than 4 columns)   Exercise/Equipment 4/21/22 #4 4/25/22 #5 4/29/22 #6 5/2/22 #7 5/5/22 #8             WARM UP        nustep  L3 5'  L3 5' L4 5'  L4 5' L4 7'           TABLE        Seated clams  Seated RTB x20  Seated RTB x30  Supine GTB x30    Supine GTB x30 same time    bridges  2x10  2x10  3x10  3x10     Adduction squeeze  20x5\"  30x5\" 30x5\"  20x5\" seated     SLR  2x10 ea  2x10 ea  3x10 ea  3x10 ea    LAQ 20x3\" ea  20x3\" ea 20x3\" ea  20x3\" ea  X 20    STANDING        Marches/buttkicks x20 alt ea with BUE use  x30 alt ea with BUE use  x30 alt ea with BUE use  x30 at ea with BUE use     Heel raise/toe raise  x20 ea with BUE use  x30 ea with BUE use  x30 ea with BUE use  X 30 finger support low clearance   STS  x10 x10      SLS    B x1 30s ea X 30 s ea. Dynamic walking with alternatnig gaze directions     2 x 35 ft with gait belt.                         PROPRIOCEPTION        Static balance foam Romberg EO/EC, tandem EO/EC  Romberg EO/EC, tandem EO/EC Romberg EO/EC, tandem EO/EC 30s ea Romberg EO/EC, tandem EO/EC 30s ea   Dynamic balance foam Marches x20 alt EO       Cone taps between one cone on floor x20 alt       wobbleboard AP/ML 1' ea EO x2 AP/ML 1' ea EO/EC one finger hold    F/lat 30 ea. x 2 Hold  Fwd/back x 10 taps    foam strip   Sidestepping x1 max assist to stay upright Side stepping x 3 in // fingertip support  X 10 R/L on aire   step over red bolster      Fwd x 10 ea. Lat x 10 ea. Step up     X 10 ea 6\"   MODALITIES                            Other Therapeutic Activities/Education:  HEP and importance of completion, POC and goals  4/18/22: pt educated on proper footwear in the house, tripping hazards at home and using a walker to prevent wall walking  5/5/22 Pt educated on purpose of physical therapy and the difference between chiropractic treatments and PT treatments and their benefits. Educated on importance not denying patient proper care and limiting treatment options. Home Exercise Program:  Issued, practiced and pt demo ability to perform on 4/11/22 4/11: marches, buttkicks, HR/TR, clams   4/18/22: HL clams, adduction, bridge and TA HO given   4/29: GTB given to pt  5/5/22 walking at the park for increased visual stimulation exercises    Manual Treatments:  none    Modalities:  none    Communication with other providers:  POC sent     Assessment:   Pt demonstrated GOOD tolerance to added balance exercises today with LB pain resolve. Patient shows dynamic balance deficits but has improved quite a bit since the first day. Patient seems to be uncertain to want to continue physical therapy because of the chiropractor stating he will no longer work with her if she continues here. Extensive education was given to patient to understand benefits of PT and safe treatment. Patient reports no pain during and post tx.  Patient could benefit from increased balance exercises, strength and visual stimulation and from skilled therapy interventions to address remaining impairments, improve mobility and strength and progress toward goal completion while reducing risk for re-injury or further decline.     Pain: 0/10 looser    Plan for Next Session: Specific instructions for Next Treatment: gait, balance, BLE strength, obstacle course DYNAMIC balance     Time In / Time Out:  1300/1345    Timed Code/Total Treatment Minutes:   45/45: 2 NR, 1 TE    Next Progress Note due:  10th visit or 30 days     Plan of Care Interventions:  [x] Therapeutic Exercise  [] Modalities:  [x] Therapeutic Activity     [] Ultrasound  [] Estim  [x] Gait Training      [] Cervical Traction [] Lumbar Traction  [x] Neuromuscular Re-education    [] Cold/hotpack [] Iontophoresis   [x] Instruction in HEP      [] Vasopneumatic   [] Dry Needling    [x] Manual Therapy               [] Aquatic Therapy              Electronically signed by: Mavis Moreau PTA, CLT 5/5/22

## 2022-05-06 ENCOUNTER — TELEPHONE (OUTPATIENT)
Dept: NEUROLOGY | Age: 72
End: 2022-05-06

## 2022-05-06 DIAGNOSIS — F03.90 DEMENTIA WITHOUT BEHAVIORAL DISTURBANCE, UNSPECIFIED DEMENTIA TYPE: ICD-10-CM

## 2022-05-06 RX ORDER — MEMANTINE HYDROCHLORIDE 5 MG/1
TABLET ORAL
Qty: 42 TABLET | Refills: 0 | Status: CANCELLED | OUTPATIENT
Start: 2022-05-06

## 2022-05-06 NOTE — TELEPHONE ENCOUNTER
Patient called stating she wanted us to know it was 5mg. When asked what she was referring to and if she needed a refill. Patient stated yes she was told by the girls up front the only way to get a refill is to call it in and was forced to cancel her appointment for yesterday 5/5. Asked patient if she was referring to the [facility], and if so it states here that Dr. Tran Pena wants to increase this med to 10mg twice daily. Patient then states that the girls up front told her she did not have to take the 10 mg when she feels fine and that she does not believe she needs it. Patient states that she is doing great and does not fully understand why she has to take it. Explained I would forward this information to Dr. Tran Pena and that we cannot force a care plan on a patient, but that Dr. Tran Pena truly believed this dose was appropriate and that we did not want the patient regretting not taking it. Patient stated understanding and that she just wants things to stay the same. Upon discussing with Alisia Reyes who was up front during this encounter, she stated the patient was confused thinking she had an appointment today, but she had actually canceled her appointment due to moving it up to March to be seen sooner. Patient and patient's  were visibly confused, but then the patient stated she only wants 5mg of her med. When Alisia Reyes asked what medication the patient stated she did not know, but she did know she does not want to increase it. Please advise and send a refill of the medication, please note this encounter for the follow up appointment.

## 2022-05-09 ENCOUNTER — HOSPITAL ENCOUNTER (OUTPATIENT)
Dept: PHYSICAL THERAPY | Age: 72
Setting detail: THERAPIES SERIES
Discharge: HOME | End: 2022-05-09
Payer: MEDICARE

## 2022-05-09 PROCEDURE — 97112 NEUROMUSCULAR REEDUCATION: CPT

## 2022-05-09 PROCEDURE — 97110 THERAPEUTIC EXERCISES: CPT

## 2022-05-09 NOTE — FLOWSHEET NOTE
Outpatient Physical Therapy  [place]           [x] Phone: [phone]   Fax: [phone]  [place] H[facility]           [] Phone: [phone]   Fax: [phone]        Physical Therapy Daily Treatment Note  Date:  2022    Patient Name:  Freddie Louis    :  1950  MRN: [id number]  Restrictions/Precautions:  none  Diagnosis:   Diagnosis: difficulty balancing  Date of Injury/Surgery:   Treatment Diagnosis: Treatment Diagnosis: impaired gait, impaired balance, impiared BLE strength    Insurance/Certification information: PT Insurance Information: medicare  Referring Physician:  Referring Practitioner: Taylor Modi DO  Next Doctor Visit:    Plan of care signed (Y/N):  yes  Outcome Measure:   DHI: 8%  DGI: 13  5x STS: 16.18\" from chair with arms  TU.52\" from chair with arms   Visit# / total visits:  9 /10  Pain level: 4/10 LB/SI ache pain    Goals:     Patient goals : be confident in balance  Short term goals  Time Frame for Short term goals: 5 visits  Short term goal 1: Pt will be indep with HEP in order to maximize recovery outside of clinic met- reports compliance  Long term goals  Time Frame for Long term goals : 10 visits  Long term goal 1: pt will improve DGI to 16/24 or more to show [address] improvement  Long term goal 2: pt will improve TUG to 12\" or less to reduce risk of falls  Long term goal 3: pt will improve BLE gross strength to 4/5 to faciliate safe transfers  Long term goal 4: pt will report 50% or more improvement in condition to show subjective improvement    Summary of Evaluation: Assessment: Pt is a 70 y.o. female that presents to therapy with complaints of balance issues. Pt amb without an AD, trunk lateral shift to the R, slow lj, and neglect of obstacles in her path. Pt demo decreased BLE strength, impaired balance, impaired gait, impaired transfers especially with turning and impaired perception of obstacles during gait.  Pt would benefit from continued skilled physical therapy to address impairments and limitations, progress toward goal completion, promote independence with ADLs, and prevent further injury. Subjective: Pt stated she went to her chiropractor and he stated he is very unhappy with her physical therapy and all the twisting PT is making her do. He stated he will not see her anymore if she continues PT. Patient reports that she feels outside and gym environment makes her feel less stable. She is confident at home. Any changes in Ambulatory Summary Sheet? None      Objective:    COVID screening questions were asked and patient attested that there had been no contact or symptoms    At arrival and throughout tx, good lj, step length and height with ambulation no AD    No trunk rotation and no arm swing with ambulation noted  Low clearance with HR  Some minimal deviation with dynamic walking with alternating gaze directions. Catches toe intermittently with step up and step overs. Reports visual stimulation of the gym in overwhelming. L low clearance with step overs with posterior lean. Exercises: (No more than 4 columns)   Exercise/Equipment 4/21/22 #4 4/25/22 #5 4/29/22 #6 5/2/22 #7 5/5/22 #8 5/9/22 #              WARM UP         nustep  L3 5'  L3 5' L4 5'  L4 5' L4 7' L 4 5'            TABLE         Seated clams  Seated RTB x20  Seated RTB x30  Supine GTB x30    Supine GTB x30 same time     bridges  2x10  2x10  3x10  3x10      Adduction squeeze  20x5\"  30x5\" 30x5\"  20x5\" seated      SLR  2x10 ea  2x10 ea  3x10 ea  3x10 ea     LAQ 20x3\" ea  20x3\" ea 20x3\" ea  20x3\" ea  X 20  X 20    STANDING         Marches/buttkicks x20 alt ea with BUE use  x30 alt ea with BUE use  x30 alt ea with BUE use  x30 at ea with BUE use      Heel raise/toe raise  x20 ea with BUE use  x30 ea with BUE use  x30 ea with BUE use  X 30 finger support low clearance X 30 finger support   STS  x10 x10    X 15   SLS    B x1 30s ea X 30 s ea. X 30 s ea.    Dynamic walking with alternatnig gaze directions     2 x 35 ft with gait belt. PROPRIOCEPTION         Static balance foam Romberg EO/EC, tandem EO/EC  Romberg EO/EC, tandem EO/EC Romberg EO/EC, tandem EO/EC 30s ea Romberg EO/EC, tandem EO/EC 30s ea Romberg EO/EC, tandem EO/EC 30s ea   Dynamic balance foam Marches x20 alt EO        Cone taps between one cone on floor x20 alt        wobbleboard AP/ML 1' ea EO x2 AP/ML 1' ea EO/EC one finger hold    F/lat 30 ea. x 2 Hold  Fwd/back x 10 taps F/lat 30 ea. x 2 Hold  Fwd/back x 10 taps    foam strip   Sidestepping x1 max assist to stay upright Side stepping x 3 in // fingertip support  X 10 R/L on aire    step over red bolster      Fwd x 10 ea. Lat x 10 ea. Step up     X 10 ea 6\" X 10 ea 6\"   MODALITIES                               Other Therapeutic Activities/Education:  HEP and importance of completion, POC and goals  4/18/22: pt educated on proper footwear in the house, tripping hazards at home and using a walker to prevent wall walking  5/5/22 Pt educated on purpose of physical therapy and the difference between chiropractic treatments and PT treatments and their benefits. Educated on importance not denying patient proper care and limiting treatment options. Home Exercise Program:  Issued, practiced and pt demo ability to perform on 4/11/22 4/11: marches, buttkicks, HR/TR, clams   4/18/22: HL clams, adduction, bridge and TA HO given   4/29: GTB given to pt  5/5/22 walking at the park for increased visual stimulation exercises    Manual Treatments:  none    Modalities:  none    Communication with other providers:  POC sent     Assessment:   Pt demonstrated GOOD tolerance to balance exercises today with LB pain decreased. Patient wants to continue PT. Patient demonstrated narrow GENI and posterior lean with presence of fear of falling. B LE core weakness noted but improving overall.   Patient could benefit from increased balance exercises, strength and visual stimulation and from skilled therapy interventions to address remaining impairments, improve mobility and strength and progress toward goal completion while reducing risk for re-injury or further decline.     Pain: 1-2/10 looser    Plan for Next Session: Specific instructions for Next Treatment: gait, balance, BLE strength, obstacle course DYNAMIC balance     Time In / Time Out:  1122/1202    Timed Code/Total Treatment Minutes:   40/40: 2 NR, 1 TE    Next Progress Note due:  10th visit or 30 days     Plan of Care Interventions:  [x] Therapeutic Exercise  [] Modalities:  [x] Therapeutic Activity     [] Ultrasound  [] Estim  [x] Gait Training      [] Cervical Traction [] Lumbar Traction  [x] Neuromuscular Re-education    [] Cold/hotpack [] Iontophoresis   [x] Instruction in HEP      [] Vasopneumatic   [] Dry Needling    [x] Manual Therapy               [] Aquatic Therapy              Electronically signed by: Elvira Bal PTA, CLT 5/9/22

## 2022-05-11 NOTE — TELEPHONE ENCOUNTER
Received call from pt stating she hadn't heard back from our office regarding [address]. She stated again that she's doing well with her current dose and is not interested in increasing. Advised to continue current dose until we receive recommendation from Dr. Dominique Marino. Please advise.

## 2022-05-12 ENCOUNTER — HOSPITAL ENCOUNTER (OUTPATIENT)
Dept: PHYSICAL THERAPY | Age: 72
Setting detail: THERAPIES SERIES
Discharge: HOME | End: 2022-05-12
Payer: MEDICARE

## 2022-05-12 PROCEDURE — 97110 THERAPEUTIC EXERCISES: CPT

## 2022-05-12 PROCEDURE — 97112 NEUROMUSCULAR REEDUCATION: CPT

## 2022-05-12 NOTE — FLOWSHEET NOTE
Outpatient Physical Therapy  [place]           [x] Phone: [phone]   Fax: [phone]  [place]           [] Phone: [phone]   Fax: [phone]        Physical Therapy Daily Treatment Note  Date:  2022    Patient Name:  Abby Sheppard    :  1950  MRN: [id number]  Restrictions/Precautions:  none  Diagnosis:   Diagnosis: difficulty balancing  Date of Injury/Surgery:   Treatment Diagnosis: Treatment Diagnosis: impaired gait, impaired balance, impiared BLE strength    Insurance/Certification information: PT Insurance Information: medicare  Referring Physician:  Referring Practitioner: Oralee Shown, DO  Next Doctor Visit:    Plan of care signed (Y/N):  yes  Outcome Measure:   DGI:   TU.74 seconds   Visit# / total visits:  10 /16  Pain level: 0/10     Goals:     Patient goals : be confident in balance  Short term goals  Time Frame for Short term goals: 5 visits  Short term goal 1: Pt will be indep with HEP in order to maximize recovery outside of clinic met   Long term goals  Time Frame for Long term goals : 10 visits  Long term goal 1: pt will improve DGI to 16/24 or more to show [address] improvement MET   Long term goal 2: pt will improve TUG to 12\" or less to reduce risk of falls Progress   Long term goal 3: pt will improve BLE gross strength to 4/5 to faciliate safe transfers MET   Long term goal 4: pt will report 50% or more improvement in condition to show subjective improvement MET     Summary of Evaluation: Assessment: Pt is a 70 y.o. female that presents to therapy with complaints of balance issues. Pt amb without an AD, trunk lateral shift to the R, slow lj, and neglect of obstacles in her path. Pt demo decreased BLE strength, impaired balance, impaired gait, impaired transfers especially with turning and impaired perception of obstacles during gait.  Pt would benefit from continued skilled physical therapy to address impairments and limitations, progress toward goal completion, promote independence with ADLs, and prevent further injury. Subjective: Pt and her  really feel that therapy is helping and that the balance and walking have gotten better. Pt has had no falls since therapy start. She has had some LOB but no falls. Pt reports overall she has improved 50-75% since eval. She also went to a restaurant over the weekend and had no problem with people walking fast near her. She also feels her legs are getting stronger. Any changes in Ambulatory Summary Sheet? None      Objective:    COVID screening questions were asked and patient attested that there had been no contact or symptoms    Strength RLE:   R Hip Flexion: 4/5  R Hip ABduction: 4/5  R Hip ADduction: 4/5  R Knee Flexion: 4/5  R Knee Extension: 4+/5  R Ankle Dorsiflexion: 4+/5    Strength LLE:   L Hip Flexion: 4/5  L Hip ABduction: 4/5  L Hip ADduction: 4/5  L Knee Flexion: 4/5  L Knee Extension: 4+/5  L Ankle Dorsiflexion: 4+/5    DGI:   TU.74 seconds     Exercises: (No more than 4 columns)   Exercise/Equipment 22 #8 22 #9 22 #10           WARM UP      nustep  L4 7' L 4 5' L 4 5'         TABLE      Seated clams       bridges       Adduction squeeze       SLR       LAQ X 20  X 20     STANDING      Marches/buttkicks      Heel raise/toe raise X 30 finger support low clearance X 30 finger support    STS  X 15 x15   SLS X 30 s ea. X 30 s ea. Dynamic walking with alternatnig gaze directions 2 x 35 ft with gait belt. PROPRIOCEPTION      gait   1 big lap, focus on form   marches   Foam alternating   Static balance foam Romberg EO/EC, tandem EO/EC 30s ea Romberg EO/EC, tandem EO/EC 30s ea WBOS EO/EC  Romberg EO/EC,   Dynamic balance    Around cones  Over 1/2 rolls  Various speeds  Head turns  turning   obstacles   Over 2 1/2 foam rolls and in/out of cones x4 laps   wobbleboard F/lat 30 ea. x 2 Hold  Fwd/back x 10 taps F/lat 30 ea. x 2 Hold  Fwd/back x 10 taps    Cone tap   Floor alternating BLE    foam strip X 10 R/L on aire     step over red bolster  Fwd x 10 ea. Lat x 10 ea. Step up X 10 ea 6\" X 10 ea 6\"    MODALITIES                      Other Therapeutic Activities/Education:  Cont of 4 more visits before dc, HEP    Home Exercise Program:  Issued, practiced and pt demo ability to perform on 4/11/22 4/11: marches, buttkicks, HR/TR, clams   4/18/22: HL clams, adduction, bridge and TA HO given   4/29: GTB given to pt  5/5/22 walking at the park for increased visual stimulation exercises    Manual Treatments:  none    Modalities:  none    Communication with other providers:  POC sent     Assessment:   Pt tolerated today's treatment without any adverse reactions or complications this date. Pt has shown good progress since therapy start with improved balance, improved gait, improved BLE strength and improved confidence with tasks outside of clinic. She has met all but one goal at this time, and will most likely meet that goal after her final 4 visits. She will dc after her 4 more visits that are scheduled. Pt would continue to benefit from skilled therapy interventions to address remaining impairments, improve mobility and strength and progress toward goal completion while reducing risk for re-injury or further decline. End pain: same    Plan for Next Session: Specific instructions for Next Treatment: gait, balance, BLE strength, obstacle course DYNAMIC balance .      Gait (tug goal)    Time In / Time Out:  1115 - 1153    Timed Code/Total Treatment Minutes:   38': 24' NMR x2, 14' TE x1    Next Progress Note due:  10th visit or 30 days     Plan of Care Interventions:  [x] Therapeutic Exercise  [] Modalities:  [x] Therapeutic Activity     [] Ultrasound  [] Estim  [x] Gait Training      [] Cervical Traction [] Lumbar Traction  [x] Neuromuscular Re-education    [] Cold/hotpack [] Iontophoresis   [x] Instruction in HEP      [] Vasopneumatic   [] Dry Needling    [x] Manual Therapy               [] Aquatic Therapy              Electronically signed by: Lyn Calderón PT, DPT. 5/12/22. 11:15am

## 2022-05-12 NOTE — PROGRESS NOTES
Outpatient Physical Therapy           [place]           [x] Phone: [phone]   Fax: [phone]  [place]           [] Phone: [phone]   Fax: [phone]      To: Donell Edmond DO     From: Mirtha Vicente, PT, DPT     Patient: Maxine Kaufman                    : 1950  Diagnosis: difficulty balancing     Treatment Diagnosis:    impaired gait, impaired balance, impiared BLE strength     Date: 2022  [x]  Progress Note                []  Discharge Note    Evaluation Date: 22  Total Visits to date:   10 Cancels/No-shows to date:  0    Subjective:  Pt and her  really feel that therapy is helping and that the balance and walking have gotten better. Pt has had no falls since therapy start. She has had some LOB but no falls. Pt reports overall she has improved 50-75% since eval. She also went to a restaurant over the weekend and had no problem with people walking fast near her. She also feels her legs are getting stronger.       Plan of Care/Treatment to date:  [x] Therapeutic Exercise    [] Modalities:  [x] Therapeutic Activity     [] Ultrasound  [] Electrical Stimulation  [x] Gait Training      [] Cervical Traction   [] Lumbar Traction  [x] Neuromuscular Re-education  [] Cold/hotpack [] Iontophoresis  [x] Instruction in HEP      Other:  [x] Manual Therapy       []  Vasopneumatic  [] Aquatic Therapy       []   Dry Needle Therapy                      Objective/Significant Findings At Last Visit/Comments:    Strength RLE:   R Hip Flexion: 4/5  R Hip ABduction: 4/5  R Hip ADduction: 4/5  R Knee Flexion: 4/5  R Knee Extension: 4+/5  R Ankle Dorsiflexion: 4+/5     Strength LLE:   L Hip Flexion: 4/5  L Hip ABduction: 4/5  L Hip ADduction: 4/5  L Knee Flexion: 4/5  L Knee Extension: 4+/5  L Ankle Dorsiflexion: 4+/5     DGI:   TU.74 seconds     Assessment:   Pt has shown good progress since therapy start with improved balance, improved gait, improved BLE strength and improved confidence with tasks outside of clinic. She has met all but one goal at this time, and will most likely meet that goal after her final 4 visits. She will dc after her 4 more visits that are scheduled. Pt would continue to benefit from skilled therapy interventions to address remaining impairments, improve mobility and strength and progress toward goal completion while reducing risk for re-injury or further decline. Goal Status:  [] Achieved [x] Partially Achieved  [] Not Achieved   Patient goals : be confident in balance  Short term goals  Time Frame for Short term goals: 5 visits  Short term goal 1: Pt will be indep with HEP in order to maximize recovery outside of clinic met 5/12  Long term goals  Time Frame for Long term goals : 10 visits  Long term goal 1: pt will improve DGI to 16/24 or more to show [facility] improvement MET 5/12  Long term goal 2: pt will improve TUG to 12\" or less to reduce risk of falls Progress 5/12  Long term goal 3: pt will improve BLE gross strength to 4/5 to faciliate safe transfers MET 5/12  Long term goal 4: pt will report 50% or more improvement in condition to show subjective improvement MET 5/12        Frequency/Duration:  # Days per week: [] 1 day # Weeks: [] 1 week [] 4 weeks [] 8 weeks     [x] 2 days   [] 2 weeks [] 5 weeks [] 10 weeks     [] 3 days   [x] 3 weeks [] 6 weeks [] 12 weeks       Rehab Potential: [] Excellent [x] Good [] Fair  [] Poor         Patient Status: [] Continue per initial plan of Care     [] Patient now discharged     [x] Additional visits requested, Please re-certify for additional visits:      Requested frequency/duration:  2 /week for 3 weeks    If we are requesting more visits, we fully anticipate the patient's condition is expected to improve within the treatment timeframe we are requesting. Electronically signed by:  Eduardo Saini PT, DPT, 5/12/2022, 8:21 AM    If you have any questions or concerns, please don't hesitate to call.   Thank you for your referral.    Physician Signature:______________________ Date:______ Time: ________  By signing above, therapists plan is approved by physician

## 2022-05-13 NOTE — TELEPHONE ENCOUNTER
The instructions on the original prescription of memantine was to titrate up to 10 mg twice daily. The therapeutic dose of memantine is 10 mg twice daily. It is to continue to make her feel good as it is to slow down the progress of memory decline. With a subtherapeutic dose I fear that it would not provide her with the long-term benefit that it would at the 10 mg twice a day dosing.

## 2022-05-13 NOTE — TELEPHONE ENCOUNTER
Called the patient and went into detail explaining how important it is to at least try the therapeutic dosage of memantine and if she does not find improvement and or has side effects to call us immediately. Explained to patient that worst case scenario is that she tries it and does not like it, and we go back to the very low dose. Patient stated understanding and agreed to try the memantine 10 mg dosage twice daily and will try it until her follow up 6/23 and decide from there if she likes it.

## 2022-05-16 ENCOUNTER — HOSPITAL ENCOUNTER (OUTPATIENT)
Dept: PHYSICAL THERAPY | Age: 72
Setting detail: THERAPIES SERIES
Discharge: HOME | End: 2022-05-16
Payer: MEDICARE

## 2022-05-16 PROCEDURE — 97112 NEUROMUSCULAR REEDUCATION: CPT

## 2022-05-16 NOTE — FLOWSHEET NOTE
Outpatient Physical Therapy  [place]           [x] Phone: [phone]   Fax: [phone]  [facility]           [] Phone: [phone]   Fax: [phone]        Physical Therapy Daily Treatment Note  Date:  2022    Patient Name:  Isidoro Brock    :  1950  MRN: [id number]  Restrictions/Precautions:  none  Diagnosis:   Diagnosis: difficulty balancing  Date of Injury/Surgery:   Treatment Diagnosis: Treatment Diagnosis: impaired gait, impaired balance, impiared BLE strength    Insurance/Certification information: PT Insurance Information: medicare  Referring Physician:  Referring Practitioner: Keturah Cintron DO  Next Doctor Visit:    Plan of care signed (Y/N):  yes  Outcome Measure:   DGI:   TU.74 seconds   Visit# / total visits:    Pain level: 0/10     Goals:     Patient goals : be confident in balance  Short term goals  Time Frame for Short term goals: 5 visits  Short term goal 1: Pt will be indep with HEP in order to maximize recovery outside of clinic met   Long term goals  Time Frame for Long term goals : 10 visits  Long term goal 1: pt will improve DGI to 16/24 or more to show [address] improvement MET   Long term goal 2: pt will improve TUG to 12\" or less to reduce risk of falls Progress   Long term goal 3: pt will improve BLE gross strength to 4/5 to faciliate safe transfers MET   Long term goal 4: pt will report 50% or more improvement in condition to show subjective improvement MET     Summary of Evaluation: Assessment: Pt is a 70 y.o. female that presents to therapy with complaints of balance issues. Pt amb without an AD, trunk lateral shift to the R, slow lj, and neglect of obstacles in her path. Pt demo decreased BLE strength, impaired balance, impaired gait, impaired transfers especially with turning and impaired perception of obstacles during gait.  Pt would benefit from continued skilled physical therapy to address impairments and limitations, progress toward goal completion, promote independence with ADLs, and prevent further injury. Subjective: Pt states that Md started her on new dose of Rx (Nomanda) and has noticed that she has increased dizziness with transitions. Pt states that she was taking 5mg daily and now is taking 10mg x 2 daily. Pt states that she has noticed higher BP than prior and that she takes the BP everyday, but doesn't write it down. Any changes in Ambulatory Summary Sheet? None      Objective:    COVID screening questions were asked and patient attested that there had been no contact or symptoms    Strength RLE:   R Hip Flexion: 4/5  R Hip ABduction: 4/5  R Hip ADduction: 4/5  R Knee Flexion: 4/5  R Knee Extension: 4+/5  R Ankle Dorsiflexion: 4+/5    Strength LLE:   L Hip Flexion: 4/5  L Hip ABduction: 4/5  L Hip ADduction: 4/5  L Knee Flexion: 4/5  L Knee Extension: 4+/5  L Ankle Dorsiflexion: 4+/5    DGI:   TU.74 seconds     Exercises: (No more than 4 columns)   Exercise/Equipment 22 #8 22 #9 22 #10 22 #11            WARM UP       nustep  L4 7' L 4 5' L 4 5' L5 5'          TABLE       Seated clams        bridges        Adduction squeeze        SLR        LAQ X 20  X 20      STANDING       Marches/buttkicks       Heel raise/toe raise X 30 finger support low clearance X 30 finger support     STS  X 15 x15    SLS X 30 s ea. X 30 s ea. Dynamic walking with alternatnig gaze directions 2 x 35 ft with gait belt.                          PROPRIOCEPTION       gait   1 big lap, focus on form 1 big lap, focus on form   marches   Foam alternating Foam alternating   Static balance foam Romberg EO/EC, tandem EO/EC 30s ea Romberg EO/EC, tandem EO/EC 30s ea WBOS EO/EC  Romberg EO/EC, WBOS EO/EC  Romberg EO/EC,  Tandem EO/EC     Dynamic balance    Around cones  Over 1/2 rolls  Various speeds  Head turns  turning    obstacles   Over 2 1/2 foam rolls and in/out of cones x4 laps Sideways step, forward step, long stride, lift objects   wobbleboard F/lat 30 ea. x 2 Hold  Fwd/back x 10 taps F/lat 30 ea. x 2 Hold  Fwd/back x 10 taps     Cone tap   Floor alternating BLE Cones in front BLE x 5    foam strip X 10 R/L on aire      step over red bolster  Fwd x 10 ea. Lat x 10 ea. Sit to stand    10 x 1   Step up X 10 ea 6\" X 10 ea 6\"     MODALITIES                         Other Therapeutic Activities/Education:  Cont of 4 more visits before dc, HEP. Advised pt to call MD and discuss meds and symptoms with them and advised pt of pt rights. Home Exercise Program:  Issued, practiced and pt demo ability to perform on 4/11/22 4/11: william, pauls, HR/TR, clams   4/18/22: HL clams, adduction, bridge and TA HO given   4/29: GTB given to pt  5/5/22 walking at the park for increased visual stimulation exercises    Manual Treatments:  none    Modalities:  none    Communication with other providers:  POC sent     Assessment:   Pt luis felipe treatment well. She stated she was more wobbly than typical when coming in for session, but tolerated changes well in activity level. Noted 1 LOB with amb when turning to R during long distance with focus on form and noted decreased bal when in tandem stand with RLE posterior on foam. Pt required 2 rest breaks due to fatigue. End pain: none    Plan for Next Session: Specific instructions for Next Treatment: gait, balance, BLE strength, obstacle course DYNAMIC balance .      Gait (tug goal)    Time In / Time Out:  1350 - 1433    Timed Code/Total Treatment Minutes:   37': 37' NMR x3    Next Progress Note due:  10th visit or 30 days     Plan of Care Interventions:  [x] Therapeutic Exercise  [] Modalities:  [x] Therapeutic Activity     [] Ultrasound  [] Estim  [x] Gait Training      [] Cervical Traction [] Lumbar Traction  [x] Neuromuscular Re-education    [] Cold/hotpack [] Iontophoresis   [x] Instruction in HEP      [] Vasopneumatic   [] Dry Needling    [x] Manual Therapy               [] Aquatic Therapy              Electronically signed by: Irena Oates [address]

## 2022-05-19 ENCOUNTER — TELEPHONE (OUTPATIENT)
Dept: NEUROLOGY | Age: 72
End: 2022-05-19

## 2022-05-19 DIAGNOSIS — G31.84 MCI (MILD COGNITIVE IMPAIRMENT): Primary | ICD-10-CM

## 2022-05-19 NOTE — TELEPHONE ENCOUNTER
Patient called and states that after taking the 10 mg twice daily of Namenda for 6 days she has been \"really struggling\". Patient states she is so dizzy, lightheaded, and feels sick all the time. Patient states even her  noticed a change in her and stop taking the medication. Asked patient if she had tried starting with 10 mg once daily and or taking that dosage now? And patient states no and she does not want to she will only take Namenda 5 mg if she is going to take anything because she felt fine on that. Let the patient know I would send this information to Dr. Melony Monzon and that she will here back from us once we hear what Dr. Melony Monzon is sending to the pharmacy and or his suggestion on the situation.

## 2022-05-20 ENCOUNTER — HOSPITAL ENCOUNTER (OUTPATIENT)
Dept: PHYSICAL THERAPY | Age: 72
Setting detail: THERAPIES SERIES
Discharge: HOME | End: 2022-05-20
Payer: MEDICARE

## 2022-05-20 PROCEDURE — 97112 NEUROMUSCULAR REEDUCATION: CPT

## 2022-05-20 PROCEDURE — 97530 THERAPEUTIC ACTIVITIES: CPT

## 2022-05-20 NOTE — FLOWSHEET NOTE
Outpatient Physical Therapy  [place]           [x] Phone: [phone]   Fax: [phone]  [place]           [] Phone: [phone]   Fax: [phone]        Physical Therapy Daily Treatment Note  Date:  2022    Patient Name:  Estrellita Morris    :  1950  MRN: [id number]  Restrictions/Precautions:  none  Diagnosis:   Diagnosis: difficulty balancing  Date of Injury/Surgery:   Treatment Diagnosis: Treatment Diagnosis: impaired gait, impaired balance, impiared BLE strength    Insurance/Certification information: PT Insurance Information: medicare  Referring Physician:  Referring Practitioner: Keila Johnson DO  Next Doctor Visit:    Plan of care signed (Y/N):  yes  Outcome Measure:   DGI:   TU.74 seconds   Visit# / total visits:    Pain level: 0/10     Goals:     Patient goals : be confident in balance  Short term goals  Time Frame for Short term goals: 5 visits  Short term goal 1: Pt will be indep with HEP in order to maximize recovery outside of clinic met   Long term goals  Time Frame for Long term goals : 10 visits  Long term goal 1: pt will improve DGI to 16/24 or more to show [address] improvement MET   Long term goal 2: pt will improve TUG to 12\" or less to reduce risk of falls Progress   Long term goal 3: pt will improve BLE gross strength to 4/5 to faciliate safe transfers MET   Long term goal 4: pt will report 50% or more improvement in condition to show subjective improvement MET     Summary of Evaluation: Assessment: Pt is a 70 y.o. female that presents to therapy with complaints of balance issues. Pt amb without an AD, trunk lateral shift to the R, slow lj, and neglect of obstacles in her path. Pt demo decreased BLE strength, impaired balance, impaired gait, impaired transfers especially with turning and impaired perception of obstacles during gait.  Pt would benefit from continued skilled physical therapy to address impairments and limitations, progress toward goal completion, promote independence with ADLs, and prevent further injury. Subjective: Pt states she stopped the 10 mg of Nomanda. It was making her more dizzy. She reports able to reach and stretch to reach cup from [facility] cabinet. Any changes in Ambulatory Summary Sheet? None      Objective:    COVID screening questions were asked and patient attested that there had been no contact or symptoms    Ambulation with posterior lean, no trunk rotation and small step length. Improving  Cued for increased stride, arm swing, trunk rotation, abdominal engagement    Inconsistent Stability with STS Cued for technique  Coordination difficulties with functional mobility  Cued for step fwd/ bwd with ball lifts to clear object    Exercises: (No more than 4 columns)   Exercise/Equipment 5/9/22 #9 5/12/22 #10 5/16/22 #11 5/20/22 #12            WARM UP       nustep  L 4 5' L 4 5' L5 5' L5 5'          TABLE       Seated clams        bridges        Adduction squeeze        SLR        LAQ X 20       STANDING       Marches/buttkicks       Heel raise/toe raise X 30 finger support      STS X 15 x15     SLS X 30 s ea. Dynamic walking with alternatnig gaze directions                          PROPRIOCEPTION       gait  1 big lap, focus on form 1 big lap, focus on form 1 big lap, focus on form   marches  Foam alternating Foam alternating Foam alternating   Static balance foam Romberg EO/EC, tandem EO/EC 30s ea WBOS EO/EC  Romberg EO/EC, WBOS EO/EC  Romberg EO/EC,  Tandem EO/EC   WBOS EO/EC  Romberg EO/EC,  Tandem EO/EC  30 s x ea. Dynamic balance   Around cones  Over 1/2 rolls  Various speeds  Head turns  turning     obstacles  Over 2 1/2 foam rolls and in/out of cones x4 laps Sideways step, forward step, long stride, lift objects Sideways step, forward step, long stride, lift objects   wobbleboard F/lat 30 ea. x 2 Hold  Fwd/back x 10 taps      Cone tap  Floor alternating BLE Cones in front BLE x 5 Cones in front BLE x 10    foam strip       step over red bolster        Sit to stand   10 x 1 15x 1   Step up X 10 ea 6\"      MODALITIES                         Other Therapeutic Activities/Education:  Cont of 4 more visits before dc, HEP. Advised pt to call MD and discuss meds and symptoms with them and advised pt of pt rights. Home Exercise Program:  Issued, practiced and pt demo ability to perform on 4/11/22 4/11: marches, buttkicks, HR/TR, clams   4/18/22: HL clams, adduction, bridge and TA HO given   4/29: GTB given to pt  5/5/22 walking at the park for increased visual stimulation exercises    Manual Treatments:  none    Modalities:  none    Communication with other providers:  POC sent     Assessment:   Pt demonstrated good tolerance to treatment with increase in reps with balance activities. Coordination difficulties and with COG shifting over GENI challenging with LOB. She reports feeling better without the medication. Noted intermittent LOB with challenging activities. End pain: none    Plan for Next Session: Specific instructions for Next Treatment: gait, balance, BLE strength, obstacle course DYNAMIC balance .      Gait (tug goal)    Time In / Time Out:  8818 - 1556    Timed Code/Total Treatment Minutes:   38'/38' 2 NR 1 TA    Next Progress Note due:  10th visit or 30 days     Plan of Care Interventions:  [x] Therapeutic Exercise  [] Modalities:  [x] Therapeutic Activity     [] Ultrasound  [] Estim  [x] Gait Training      [] Cervical Traction [] Lumbar Traction  [x] Neuromuscular Re-education    [] Cold/hotpack [] Iontophoresis   [x] Instruction in HEP      [] Vasopneumatic   [] Dry Needling    [x] Manual Therapy               [] Aquatic Therapy              Electronically signed by: Elfreda Runner, PTA, CLT 5/20/22

## 2022-05-23 ENCOUNTER — HOSPITAL ENCOUNTER (OUTPATIENT)
Dept: PHYSICAL THERAPY | Age: 72
Setting detail: THERAPIES SERIES
Discharge: HOME | End: 2022-05-23
Payer: MEDICARE

## 2022-05-23 PROCEDURE — 97110 THERAPEUTIC EXERCISES: CPT

## 2022-05-23 PROCEDURE — 97112 NEUROMUSCULAR REEDUCATION: CPT

## 2022-05-23 NOTE — FLOWSHEET NOTE
Outpatient Physical Therapy  [place]           [x] Phone: [phone]   Fax: [phone]  [place]           [] Phone: [phone]   Fax: [phone]        Physical Therapy Daily Treatment Note  Date:  2022    Patient Name:  Hien Castellanos    :  1950  MRN: [id number]  Restrictions/Precautions:  none  Diagnosis:   Diagnosis: difficulty balancing  Date of Injury/Surgery:   Treatment Diagnosis: Treatment Diagnosis: impaired gait, impaired balance, impiared BLE strength    Insurance/Certification information: PT Insurance Information: medicare  Referring Physician:  Referring Practitioner: Tara Barnes DO  Next Doctor Visit:    Plan of care signed (Y/N):  yes  Outcome Measure:   DGI:   TU.74 seconds   Visit# / total visits:    Pain level: 0/10     Goals:     Patient goals : be confident in balance  Short term goals  Time Frame for Short term goals: 5 visits  Short term goal 1: Pt will be indep with HEP in order to maximize recovery outside of clinic met   Long term goals  Time Frame for Long term goals : 10 visits  Long term goal 1: pt will improve DGI to 16/24 or more to show [address] improvement MET   Long term goal 2: pt will improve TUG to 12\" or less to reduce risk of falls Progress   Long term goal 3: pt will improve BLE gross strength to 4/5 to faciliate safe transfers MET   Long term goal 4: pt will report 50% or more improvement in condition to show subjective improvement MET     Summary of Evaluation: Assessment: Pt is a 70 y.o. female that presents to therapy with complaints of balance issues. Pt amb without an AD, trunk lateral shift to the R, slow lj, and neglect of obstacles in her path. Pt demo decreased BLE strength, impaired balance, impaired gait, impaired transfers especially with turning and impaired perception of obstacles during gait.  Pt would benefit from continued skilled physical therapy to address impairments and limitations, progress toward goal completion, promote independence with ADLs, and prevent further injury. Subjective: Pt stated she is excited to be done with therapy. Any changes in Ambulatory Summary Sheet? None      Objective:    COVID screening questions were asked and patient attested that there had been no contact or symptoms    Ambulation with mild posterior lean, min trunk rotation and smaller step length  Cued for increased stride, arm swing, trunk rotation, abdominal engagement    Increased difficulty with STS Cued for technique  Coordination difficulties with functional mobility. Improved      Exercises: (No more than 4 columns)   Exercise/Equipment 5/12/22 #10 5/16/22 #11 5/20/22 #12 5/23/22 #13            WARM UP       nustep  L 4 5' L5 5' L5 5' L5 5'          TABLE       Seated clams        bridges        Adduction squeeze        SLR        LAQ       STANDING       Marches/buttkicks       Heel raise/toe raise       STS x15      SLS       Dynamic walking with alternatnig gaze directions                          PROPRIOCEPTION       gait 1 big lap, focus on form 1 big lap, focus on form 1 big lap, focus on form 2 big lap, focus on form   marches Foam alternating Foam alternating Foam alternating 2 X 10   Static balance foam WBOS EO/EC  Romberg EO/EC, WBOS EO/EC  Romberg EO/EC,  Tandem EO/EC   WBOS EO/EC  Romberg EO/EC,  Tandem EO/EC  30 s x ea. Romberg EO/EC,  Tandem EO/EC  30 s x ea.    Dynamic balance  Around cones  Over 1/2 rolls  Various speeds  Head turns  turning      obstacles Over 2 1/2 foam rolls and in/out of cones x4 laps Sideways step, forward step, long stride, lift objects Sideways step, forward step, long stride, lift objects Sideways step, forward step, long stride, lift objects   wobbleboard       Cone tap Floor alternating BLE Cones in front BLE x 5 Cones in front BLE x 10 Cones in front BLE 2 x 10 UE PRN    foam strip       Obstacle course: walk over foam, step over red bolster go around cone    X 4 laps CGA   Sit to stand  10 x 1 15x 1 x20   Step up       MODALITIES                         Other Therapeutic Activities/Education:  Cont of 4 more visits before dc, HEP. Advised pt to call MD and discuss meds and symptoms with them and advised pt of pt rights. Home Exercise Program:  Issued, practiced and pt demo ability to perform on 4/11/22 4/11: william, kor[place], HR/TR, clams   4/18/22: HL clams, adduction, bridge and TA HO given   4/29: GTB given to pt  5/5/22 walking at the park for increased visual stimulation exercises    Manual Treatments:  none    Modalities:  none    Communication with other providers:  POC sent     Assessment:   Pt demonstrated good tolerance to treatment with increase in reps with balance activities. Improved coordination with difficulties and with COG shifting over GENI challenging with LOB. STS most challenging. End pain: none    Plan for Next Session: Specific instructions for Next Treatment: gait, balance, BLE strength, obstacle course DYNAMIC balance .      Gait (tug goal)    Time In / Time Out:  1115 - 1155  Timed Code/Total Treatment Minutes:   40'/40' 2 NR 1 TA    Next Progress Note due:  10th visit or 30 days     Plan of Care Interventions:  [x] Therapeutic Exercise  [] Modalities:  [x] Therapeutic Activity     [] Ultrasound  [] Estim  [x] Gait Training      [] Cervical Traction [] Lumbar Traction  [x] Neuromuscular Re-education    [] Cold/hotpack [] Iontophoresis   [x] Instruction in HEP      [] Vasopneumatic   [] Dry Needling    [x] Manual Therapy               [] Aquatic Therapy              Electronically signed by: Juwan Guillen PTA, CLT 5/23/22

## 2022-05-27 ENCOUNTER — HOSPITAL ENCOUNTER (OUTPATIENT)
Dept: PHYSICAL THERAPY | Age: 72
Setting detail: THERAPIES SERIES
Discharge: HOME | End: 2022-05-27
Payer: MEDICARE

## 2022-05-27 PROCEDURE — 97110 THERAPEUTIC EXERCISES: CPT

## 2022-05-27 PROCEDURE — 97112 NEUROMUSCULAR REEDUCATION: CPT

## 2022-05-27 NOTE — FLOWSHEET NOTE
Outpatient Physical Therapy  [facility]           [x] Phone: [phone]   Fax: [phone]  [facility]           [] Phone: [phone]   Fax: [phone]        Physical Therapy Daily Treatment Note  Date:  2022    Patient Name:  Maxine Kaufman    :  1950  MRN: [id number]  Restrictions/Precautions:  none  Diagnosis:   Diagnosis: difficulty balancing  Date of Injury/Surgery:   Treatment Diagnosis: Treatment Diagnosis: impaired gait, impaired balance, impiared BLE strength    Insurance/Certification information: PT Insurance Information: medicare  Referring Physician:  Referring Practitioner: Nancy Toro DO  Next Doctor Visit:    Plan of care signed (Y/N):  yes  Outcome Measure:   DGI:   TUG: 10.87 seconds   Visit# / total visits:    Pain level: 0/10     Goals:    Patient goals : be confident in balance: MET   Short term goals  Time Frame for Short term goals: 5 visits  Short term goal 1: Pt will be indep with HEP in order to maximize recovery outside of clinic REmet   Long term goals  Time Frame for Long term goals : 10 visits  Long term goal 1: pt will improve DGI to 16/24 or more to show [address] improvement ReMET   Long term goal 2: pt will improve TUG to 12\" or less to reduce risk of falls MET   Long term goal 3: pt will improve BLE gross strength to 4/5 to faciliate safe transfers ReMET   Long term goal 4: pt will report 50% or more improvement in condition to show subjective improvement ReMET     Summary of Evaluation: Assessment: Pt is a 70 y.o. female that presents to therapy with complaints of balance issues. Pt amb without an AD, trunk lateral shift to the R, slow lj, and neglect of obstacles in her path. Pt demo decreased BLE strength, impaired balance, impaired gait, impaired transfers especially with turning and impaired perception of obstacles during gait.  Pt would benefit from continued skilled physical therapy to address impairments and limitations, progress toward goal completion, promote independence with ADLs, and prevent further injury. Subjective: Pt reports overall she feels she has improved at least 75% since eval. During her entire time she was in therapy since eval, she has not fallen which was one of her goals to meet. She has also learned to slow her movements to keep her balance better and not lose it as much. She feels her legs are stronger too from the STS. Any changes in Ambulatory Summary Sheet? None      Objective:    COVID screening questions were asked and patient attested that there had been no contact or symptoms    TUG: 10.87 seconds  DGI: 16/24    Strength RLE:   R Hip Flexion: 4/5  R Hip ABduction: 4/5  R Hip ADduction: 4/5  R Knee Flexion: 4/5  R Knee Extension: 4+/5  R Ankle Dorsiflexion: 4+/5     Strength LLE:   L Hip Flexion: 4/5  L Hip ABduction: 4/5  L Hip ADduction: 4/5  L Knee Flexion: 4/5  L Knee Extension: 4+/5  L Ankle Dorsiflexion: 4+/5    Exercises: (No more than 4 columns)   Exercise/Equipment 5/20/22 #12 5/23/22 #13 5/27/22 #14           WARM UP      nustep  L5 5' L5 5' L5 x5'         TABLE      Seated clams       bridges       Adduction squeeze       SLR       LAQ      STANDING      Marches/buttkicks   2x10 marches   Heel raise/toe raise      STS   2x10 cues for foot position   SLS      Dynamic walking with alternatnig gaze directions      Hip abd   2x10 ea              PROPRIOCEPTION      gait 1 big lap, focus on form 2 big lap, focus on form    marches Foam alternating 2 X 10    Static balance foam WBOS EO/EC  Romberg EO/EC,  Tandem EO/EC  30 s x ea. Romberg EO/EC,  Tandem EO/EC  30 s x ea.  Floor for HEP practice, romberg and 1/2 tandem BL   Dynamic balance       obstacles Sideways step, forward step, long stride, lift objects Sideways step, forward step, long stride, lift objects Around cones   wobbleboard      Cone tap Cones in front BLE x 10 Cones in front BLE 2 x 10 UE PRN     foam strip Obstacle course: walk over foam, step over red bolster go around cone  X 4 laps CGA    Step up      MODALITIES                      Other Therapeutic Activities/Education:  HEP after dc. Education on safety recommendations and night lights    Home Exercise Program:  Issued, practiced and pt demo ability to perform on 4/11/22 4/11: maxime thomas, HR/TR, clams   4/18/22: HL clams, adduction, bridge and TA HO given   4/29: GTB given to pt  5/5/22 walking at the park for increased visual stimulation exercises    Manual Treatments:  none    Modalities:  none    Communication with other providers:  POC sent     Assessment: Pt tolerated today's treatment without any adverse reactions or complications this date. Pt has shown good progress since therapy start with improved overall BLE strength, improved balance, no falls since eval and improved gait. She has met all her goals at this time and is no longer having any major limitations. PT educated pt on continuation of HEP after discharge for max benefits and reduced risk for future decline. Pt dc this date.   End pain: none    Plan for Next Session: dc    Time In / Time Out:  1345 - 1426    Timed Code/Total Treatment Minutes:  41': 23' TE x2, 18' NMR x1    Next Progress Note due:  10th visit or 30 days     Plan of Care Interventions:  [x] Therapeutic Exercise  [] Modalities:  [x] Therapeutic Activity     [] Ultrasound  [] Estim  [x] Gait Training      [] Cervical Traction [] Lumbar Traction  [x] Neuromuscular Re-education    [] Cold/hotpack [] Iontophoresis   [x] Instruction in HEP      [] Vasopneumatic   [] Dry Needling    [x] Manual Therapy               [] Aquatic Therapy              Electronically signed by: Kalee Weinstein, PT, DPT 5/27/22. 13:45pm

## 2022-05-27 NOTE — DISCHARGE SUMMARY
Outpatient Physical Therapy           [facility]           [x] Phone: [phone]   Fax: [phone]  [facility]           [] Phone: [phone]   Fax: [phone]      To: Debbie Riddle DO     From: Rebeka Black, PT, DPT     Patient: Abby Sheppard                    : 1950  Diagnosis: difficulty balancing     Treatment Diagnosis:    impaired gait, impaired balance, impiared BLE strength     Date: 2022  []  Progress Note                [x]  Discharge Note    Evaluation Date: 22  Total Visits to date:   15 Cancels/No-shows to date:  0    Subjective:  Pt reports overall she feels she has improved at least 75% since eval. During her entire time she was in therapy since eval, she has not fallen which was one of her goals to meet. She has also learned to slow her movements to keep her balance better and not lose it as much. She feels her legs are stronger too from the STS. Plan of Care/Treatment to date:  [x] Therapeutic Exercise    [] Modalities:  [x] Therapeutic Activity     [] Ultrasound  [] Electrical Stimulation  [x] Gait Training      [] Cervical Traction   [] Lumbar Traction  [x] Neuromuscular Re-education  [] Cold/hotpack [] Iontophoresis  [x] Instruction in HEP      Other:  [x] Manual Therapy       []  Vasopneumatic  [] Aquatic Therapy       []   Dry Needle Therapy                      Objective/Significant Findings At Last Visit/Comments:    TUG: 10.87 seconds  DGI:      Strength RLE:   R Hip Flexion: 4/5  R Hip ABduction: 4/5  R Hip ADduction: 4/5  R Knee Flexion: 4/5  R Knee Extension: 4+/5  R Ankle Dorsiflexion: 4+/5     Strength LLE:   L Hip Flexion: 4/5  L Hip ABduction: 4/5  L Hip ADduction: 4/5  L Knee Flexion: 4/5  L Knee Extension: 4+/5  L Ankle Dorsiflexion: 4+/5    Assessment: Pt has shown good progress since therapy start with improved overall BLE strength, improved balance, no falls since eval and improved gait.  She has met all her goals at this time and is no longer having any major limitations. PT educated pt on continuation of HEP after discharge for max benefits and reduced risk for future decline. Pt dc this date. Goal Status:  [x] Achieved [] Partially Achieved  [] Not Achieved     Patient goals : be confident in balance: MET   Short term goals  Time Frame for Short term goals: 5 visits  Short term goal 1: Pt will be indep with HEP in order to maximize recovery outside of clinic REmet 5/27  Long term goals  Time Frame for Long term goals : 10 visits  Long term goal 1: pt will improve DGI to 16/24 or more to show [address] improvement ReMET 5/27  Long term goal 2: pt will improve TUG to 12\" or less to reduce risk of falls MET 5/27  Long term goal 3: pt will improve BLE gross strength to 4/5 to faciliate safe transfers ReMET 5/27  Long term goal 4: pt will report 50% or more improvement in condition to show subjective improvement ReMET 5/27    Patient Status: [] Continue per initial plan of Care     [x] Patient now discharged     [] Additional visits requested, Please re-certify for additional visits:      Requested frequency/duration:  2 /week for 3 weeks    If we are requesting more visits, we fully anticipate the patient's condition is expected to improve within the treatment timeframe we are requesting. Electronically signed by:  Alon Galeano, PT, DPT, 5/27/2022, 7:38 AM    If you have any questions or concerns, please don't hesitate to call.   Thank you for your referral.    Physician Signature:______________________ Date:______ Time: ________  By signing above, therapists plan is approved by physician

## 2022-06-02 NOTE — TELEPHONE ENCOUNTER
Another dementia medication that I commonly uses the Exelon patch which is applied and changed once daily. I would be okay with her maintaining the Namenda 5 mg twice daily for now and we can discuss alternative medication such as the Exelon patch at her follow-up appointment.

## 2022-06-08 RX ORDER — MEMANTINE HYDROCHLORIDE 5 MG/1
5 TABLET ORAL 2 TIMES DAILY
Qty: 180 TABLET | Refills: 1 | Status: SHIPPED | OUTPATIENT
Start: 2022-06-08 | End: 2022-07-15

## 2022-06-14 ENCOUNTER — HOSPITAL ENCOUNTER (OUTPATIENT)
Dept: OPERATING ROOM | Age: 72
Setting detail: OUTPATIENT SURGERY
Discharge: HOME | End: 2022-06-14

## 2022-06-14 ENCOUNTER — HOSPITAL ENCOUNTER (OUTPATIENT)
Dept: SURGERY | Age: 72
Discharge: HOME | End: 2022-06-14
Payer: MEDICARE

## 2022-06-14 PROCEDURE — 91200 LIVER ELASTOGRAPHY: CPT

## 2022-06-21 ENCOUNTER — TELEPHONE (OUTPATIENT)
Dept: GASTROENTEROLOGY | Age: 72
End: 2022-06-21

## 2022-06-21 PROCEDURE — 91200 LIVER ELASTOGRAPHY: CPT | Performed by: SPECIALIST

## 2022-06-21 NOTE — PROCEDURES
FIBROSCAN  REPORT     Patient Name: Melanie Barry  : 1950    Date: 22      MRN: [id number]    Physician: No att. providers found    Ordering Physician: Alessandra Cornell  Date of exam:  22      Technician:  CATA                  Probe used:    [] M   [] XL      Indication: (select suspected liver diagnosis)   [] NAFLD  (K76.0)  [] Chronic Hepatitis C (B18.2) [] Chronic Hepatitis B (B18.1)   [] Alcohol-related Liver Disease (K70.9)  [] PBC (K74.3)  [] Sclerosing Cholangitis (K83.0)   [x] Other:____cirrhosis_________________    Recent labs: AST 39 ALT 23 Bilirubin 0.7  Alk. Phosphatase N Platelet Count  N  Date of labs:3/27/22        Procedure: After providing oral explanation of the Fibroscan VCTE test procedure to the patient, the patient was placed in the supine position with the right arm in maximum abduction to allow exposure of the right lateral abdomen. The patient was briefly assessed, identifying the terminus of the xiphoid process and locating an ideal testing site, mid-line and lateral to this point. The patient was instructed to breath normally and remain stationary during the testing process. Pre-measurement data confirmed that the probe was centered over the liver parenchyma. A series of at least ten 50 Hz mechanical pulses were applied with controlled application pressure to induce a mechanical shear wave in the liver tissue. For each measurement, the shear wave propagation speed was detected, displayed and converted to its equivalent liver stiffness value measured in kilopascals (kPa). Skin-to-liver capsule distance and shear wave characteristics were monitored during the entire exam to assure data quality. Median liver stiffness measurement and interquartile range were calculated and displayed in real time. Acquired measurement data was stored and submitted to the provider for review and interpretation.  The patient tolerated the procedure well and was discharged without incident. FINDINGS:    A. FIBROSIS ASSSESSMENT:   MEDIAN LIVER STIFFNESS SCORE_____34.8__________kPa   INTERQUARTILE RANGE (IQR) /MEDIAN _____24____%   INTERPRETATION: Taking into account the patient's history, this liver stiffness     score is consistent with:        []  NO OR MINIMAL FIBROSIS (F0-F1)                   []  MODERATE FIBROSIS (F2)                   []  ADVANCED / SIGNIFICANT FIBROSIS (F3)                   [x]  CIRRHOSIS (F4)  B. LIVER FAT ESTIMATION:       MEDIAN CAP (controlled attenuation parameter)__271_______ dB/m  IRQ of  _____ % INTERPRETATION: Taking into account the patient's history, this CAP score is     consistent with:        [] NO STEATOSIS (S0)        [] MINIMAL-MILD STEATOSIS (S0-S1)        [x] MILD- MODERATE STEATOSIS (S1-S2)        [] SIGNIFICANT STEATOSIS (S3)     COMMENTS: THE ABOVE FINDINGS SUGGEST MILD-TO-MODERATE STEATOSIS AND THE kPa of 34.8 IN COMBINATION WITH THE CALCULATED FIB-4 INDEX OF 4.81 PROVIDES CONCORDANT DATA FOR THE PRESENCE OF ADVANCED FIBROSIS AND CIRRHOSIS. SIGNATURE OF INTERPRETING PROVIDER: Electronically signed by Brody Rodriguez MD on 6/21/2022 at 10:45 AM    My interpretation of this Vibration Controlled Transient Elastogtaphy (VCTE) was developed after careful consideration of the patient's current medical evidence. Any and all Fibroscan studies must be carefully evaluated, taking fully into account all individual measurements/scans, patient history, and other factors. Any further medical or surgical intervention should be made only while fully considering the circumstances of this patient, in consultation with this patient, fully informed by the product characteristics of any drugs or treatment protocols.

## 2022-06-22 ENCOUNTER — TELEPHONE (OUTPATIENT)
Dept: GASTROENTEROLOGY | Age: 72
End: 2022-06-22

## 2022-06-23 ENCOUNTER — OFFICE VISIT (OUTPATIENT)
Dept: NEUROLOGY | Age: 72
End: 2022-06-23
Payer: MEDICARE

## 2022-06-23 ENCOUNTER — HOSPITAL ENCOUNTER (OUTPATIENT)
Dept: ULTRASOUND IMAGING | Age: 72
Discharge: HOME | End: 2022-06-23
Payer: MEDICARE

## 2022-06-23 VITALS
OXYGEN SATURATION: 98 % | SYSTOLIC BLOOD PRESSURE: 190 MMHG | BODY MASS INDEX: 23.3 KG/M2 | DIASTOLIC BLOOD PRESSURE: 96 MMHG | WEIGHT: 145 LBS | HEIGHT: 66 IN | HEART RATE: 105 BPM

## 2022-06-23 DIAGNOSIS — F32.A DEPRESSION, UNSPECIFIED DEPRESSION TYPE: ICD-10-CM

## 2022-06-23 DIAGNOSIS — F41.9 ANXIETY: ICD-10-CM

## 2022-06-23 DIAGNOSIS — R93.89 ABNORMAL FINDING ON CT SCAN: ICD-10-CM

## 2022-06-23 DIAGNOSIS — G47.9 DIFFICULTY SLEEPING: ICD-10-CM

## 2022-06-23 DIAGNOSIS — D69.6 THROMBOCYTOPENIA (HCC): ICD-10-CM

## 2022-06-23 DIAGNOSIS — G31.84 MCI (MILD COGNITIVE IMPAIRMENT): Primary | ICD-10-CM

## 2022-06-23 PROCEDURE — 1123F ACP DISCUSS/DSCN MKR DOCD: CPT | Performed by: NURSE PRACTITIONER

## 2022-06-23 PROCEDURE — G8400 PT W/DXA NO RESULTS DOC: HCPCS | Performed by: NURSE PRACTITIONER

## 2022-06-23 PROCEDURE — 3017F COLORECTAL CA SCREEN DOC REV: CPT | Performed by: NURSE PRACTITIONER

## 2022-06-23 PROCEDURE — G8427 DOCREV CUR MEDS BY ELIG CLIN: HCPCS | Performed by: NURSE PRACTITIONER

## 2022-06-23 PROCEDURE — 1036F TOBACCO NON-USER: CPT | Performed by: NURSE PRACTITIONER

## 2022-06-23 PROCEDURE — G8420 CALC BMI NORM PARAMETERS: HCPCS | Performed by: NURSE PRACTITIONER

## 2022-06-23 PROCEDURE — 1090F PRES/ABSN URINE INCON ASSESS: CPT | Performed by: NURSE PRACTITIONER

## 2022-06-23 PROCEDURE — 76705 ECHO EXAM OF ABDOMEN: CPT

## 2022-06-23 PROCEDURE — 99214 OFFICE O/P EST MOD 30 MIN: CPT | Performed by: NURSE PRACTITIONER

## 2022-06-23 RX ORDER — RIVASTIGMINE 4.6 MG/24H
1 PATCH, EXTENDED RELEASE TRANSDERMAL DAILY
Qty: 30 PATCH | Refills: 0 | Status: CANCELLED | OUTPATIENT
Start: 2022-06-23

## 2022-06-23 NOTE — PROGRESS NOTES
6/23/22    Dayana Pacheco  1950    Chief Complaint   Patient presents with    Follow-up     Dementia seems to be better. She was hospitalizied shortly after last appt and once all that settled down her memory seems better. History of Present Illness  Bird Galeano is a 67 y.o. 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 MRI brain was ordered on last visit due to vascular risk factors such as diabetes to excess for the extent of vascular involvement and also due to recent head trauma as patient had a fall. Patient was started on donepezil titrating to 10 mg daily to slow the progression of memory decline. Physical therapy was also ordered to help improve her gait and balance to help decrease risk for falls.  reported patient was having difficulty managing finances as she had always done, showing confusion to the day and/or time, started needing help organizing her medications and needing reminders at times to take her medications. Patient no longer driving, has had no issue but has been decided she should not drive as a precaution. MRI showed no acute abnormality. There was volume loss with mild chronic white matter microvascular ischemic changes. A remote lacunar infarct present within cerebellum, right side. On last visit patient did report having difficulty with sleeping, feeling anxious this night and getting up in the middle of the night walking around the house. Patient reported often hyperventilating and does take lorazepam 1 mg at bedtime to help with her sleep. Patient reportedly takes several hour long naps per day. Shortly after last visit patient notified the office that she was having constant diarrhea after initiation of Aricept. This medication was DC'd and patient was initiated on memantine.   Patient had multiple calls to the office reporting that she could not tolerate any higher dose of the memantine other than the 5 mg twice daily and preferred to stay at this dose. A new prescription was sent to her pharmacy. Exelon patch was discussed as well. This is not something she and her  are interested in at this time. Shea Garcias was hospitalized after last visit for dehydration,  reports today that her memory seems to have improved somewhat after hospitalization. Wilkie Bosworth tells me today that she is not currently taking memantine as she has to have have an abdominal scan related to some concerning  findings from hospitalization with her liver. Wilkie Bosworth feels she is doing well, she is back being able to manage finances.  believes she is back to her normal baseline, no concern with her memory at this time. Current Outpatient Medications   Medication Sig Dispense Refill    memantine (NAMENDA) 5 MG tablet Take 1 tablet by mouth 2 times daily 180 tablet 1    LORazepam (ATIVAN) 0.5 MG tablet 1 tablet at bedtime as needed      donepezil (ARICEPT) 10 MG tablet Take 1 tablet by mouth daily NOTE TO PHARMACY: DO NOT FILL UNTIL 5 MG RX IS COMPLETE 30 tablet 5    levothyroxine (SYNTHROID) 88 MCG tablet Take 1 tablet by mouth Daily 30 tablet 0    lisinopril (PRINIVIL;ZESTRIL) 10 MG tablet Take 1 tablet by mouth daily 90 tablet 0    risperiDONE (RISPERDAL) 0.25 MG tablet Take 1 tablet by mouth nightly 90 tablet 1    promethazine (PHENERGAN) 25 MG tablet Take 1 tablet by mouth every 6 hours as needed for Nausea 20 tablet 0    Riboflavin (B2 PO) Take by mouth      Cholecalciferol (D3-1000 PO) Take by mouth      insulin glargine (BASAGLAR KWIKPEN) 100 UNIT/ML injection pen Inject 35 Units into the skin 2 times daily 35 units in the morning and 25 units at bedtime (Patient taking differently: Inject 30 Units into the skin 2 times daily 35 units in the morning and 25 units at bedtime) 15 pen 3    gabapentin (NEURONTIN) 100 MG capsule Take 1 capsule by mouth 3 times daily for 90 days.  90 capsule 2    metFORMIN (GLUCOPHAGE-XR) 500 MG extended release tablet Take 1 tablet by mouth 3 times daily 90 tablet 2    NOVOLOG FLEXPEN 100 UNIT/ML injection pen 14 units with largest meal of the day, 7 units with each smaller meal (Patient taking differently: Inject into the skin 25 units with largest meal of the day, 7 units with each smaller meal) 7 pen 0    blood glucose monitor strips Test 2 times a day & as needed for symptoms of irregular blood glucose. Dispense sufficient amount for indicated testing frequency plus additional to accommodate PRN testing needs. Dx: E11.40, z79,4, n18.3 100 strip 5    lovastatin (MEVACOR) 40 MG tablet Take 1 tablet by mouth nightly 90 tablet 1    OneTouch Delica Lancets 41Q MISC Test blood sugar 1-2 times a day as directed. Dx: E11.65 100 each 5    UNABLE TO FIND Bilateral breast prosthesis 1 each 0    UNABLE TO FIND Bra for bilateral prosthesis 12 each 0    bimatoprost (LUMIGAN) 0.03 % ophthalmic drops Place 1 drop into both eyes nightly      blood glucose test strips (ASCENSIA AUTODISC VI;ONE TOUCH ULTRA TEST VI) strip 1 each by In Vitro route daily As needed. Dx: E11.40 100 each 5    calcium carbonate (OSCAL) 500 MG TABS tablet Take 500 mg by mouth daily      aspirin 81 MG tablet Take 1 tablet by mouth      Cetirizine HCl 10 MG CAPS Take by mouth      Cranberry 29244 MG CAPS Take by mouth      Docusate Sodium (STOOL SOFTENER) 100 MG TABS Take by mouth      GARLIC PO Take by mouth      Multiple Vitamins-Minerals (ICAPS AREDS 2 PO) Take by mouth      ferrous sulfate 325 (65 Fe) MG tablet Take 325 mg by mouth daily (with breakfast)      ranitidine (ACID REDUCER) 150 MG tablet Take 150 mg by mouth 2 times daily      Pomegranate, [place] granatum, (POMEGRANATE PO) Take 500 mg by mouth daily       No current facility-administered medications for this visit.        Physical Exam:    Constitutional              Weight: well nourished  Heart/Vascular              Rate and Rhythm: RRR              Murmurs: none              Arterial Pulses:  no carotid bruits  Neck              Appearance/Palpation/Auscultation: supple  Mental Status              Orientation: oriented to person, oriented to place, oriented to problem and oriented to time              Mood/Affect: appropriate mood and appropriate affect              Memory/Other: remote memory intact, attention span normal, concentration normal, recent memory impaired and reduced fund of knowledge      MMSE TOTAL = 19/30  Orientation = 5/10  Registration = 3/3  Naming = 2/2  Repetition = 1/1  Spelling \"WORLD\" backwards = 1/5  Following a three-step command = 3/3  Following a written command = 1/1  Copying two intersecting pentagons = 0/1  Writing a sentence = 1/1  Recall = 2/3    6/23/22: knew date, president, birthday, difficulty with serial subtraction, able to spell WORLD forward and backward, counted coins correctly, 3/3 word recall     Language              Language: (normal) language, no dysarthria, (normal) articulation and no dysphasia/aphasia  Cranial Nerves              CN II Right: visual fields appear intact              CN II Left: visual fields appear intact              CN III, IV, VI: EOM no nystagmus, normal pursuit and extraocular muscle strength normal              CN III: pupil normal size, pupil reactive to light and dark, pupil accomodates and no ptosis              CN IV: normal              CN VI: normal              CN V Right: normal sensation and muscles of mastication intact              CN V Left: normal sensation and muscles of mastication intact              CN VII Right: normal facial expression              CN VII Left: normal facial expression              CN VIII Right: reduced hearing moderate              CN VIII Left: reduced hearing moderate              CN IX,X: normal palatal movement              CN XI Right: normal sternocleidomastoid and normal trapezius              CN XI Left: normal sternocleidomastoid and normal trapezius              CN XII: no tremors of the tongue, no fasciculation of the tongue, tongue protrudes midline, normal power to left and normal power to right  Gait and Stance              Gait/Posture: station normal, ambulates independently, gait normal and Romberg's test normal  Motor/Coordination Exam              General: no bradykinesia, no tremors, no chorea, no athetosis, no myoclonus and no dyskinesia              Right Upper Extremity: normal motor strength, normal bulk and normal tone              Left Upper Extremity: normal motor strength, normal bulk and normal tone              Right Lower Extremity: normal motor strength, normal bulk and normal tone              Left Lower Extremity: normal motor strength, normal bulk and normal tone              Coordination: no drift, normal finger-to-nose and rapid alternating movements normal  Reflexes              Reflexes Right: biceps 2/4, triceps 2/4, brachioradialis 2/4 and patellar 1/4              Reflexes Left: biceps 2/4, triceps 2/4, brachioradialis 2/4 and patellar 1/4              Plantar Reflex Right: response downgoing              Plantar Reflex Left: response downgoing              Hoffmans Reflex Right: absent              Hoffmans Reflex Left: absent  Sensory              Sensation: normal light touch, normal vibration, normal position, normal DSS and no neglect      BP (!) 190/96 (Site: Left Upper Arm, Position: Sitting, Cuff Size: Medium Adult)   Pulse (!) 105   Ht 5' 6\" (1.676 m)   Wt 145 lb (65.8 kg)   SpO2 98%   BMI 23.40 kg/m²     Assessment and Plan     Diagnosis Orders   1. MCI (mild cognitive impairment)     2. Anxiety     3. Difficulty sleeping     4. Depression, unspecified depression type       Dayana did quite well on MMSE today especially in comparison to last visit.   She and  both feel that she is back to her baseline and are not concerned with her memory however Wingard Schaumann would like to restart the memantine at the 5 mg twice daily dose as she cannot tolerate 10 mg twice daily. She has an active prescription at pharmacy at this time, I encouraged BHAVANInigel Pat to ensure following dosing instructions of starting with one 5 mg dose daily for 1 week and then adding in a second 5 mg dose for total dose of 5 mg twice daily. We did discuss initiation of rivastigmine patch at some point in the future however this is not something that she and  are interested in at this time. Charles Patel will continue to follow with other medical services for management of her other chronic health conditions. MRI did show a remote lacunar infarct, right side cerebellum. Janeth Stewart and  were unaware of this finding. Charles Patel remains on aspirin and statin. Medications prescribed for the patient were discussed in detail. This included a discussion of the potential risks versus potential benefits of the medications. The patient was given time to ask questions and these were answered to the best of my ability. The patient appeared to understand the information provided. Return in about 3 months (around 9/23/2022).     ATILIO Yusuf NP

## 2022-07-05 ENCOUNTER — TELEPHONE (OUTPATIENT)
Dept: NEUROLOGY | Age: 72
End: 2022-07-05

## 2022-07-05 NOTE — TELEPHONE ENCOUNTER
Pt called and left a voicemail stating  \" i am not taking the namenda any more because I have bad side effects and i'm not taking it \" I called the pt's  to ask him what the pt's side effects are and the pt's  reports light headedness and dizziness. Is there anything else the pt can be given?  Please advise

## 2022-07-10 ENCOUNTER — HOSPITAL ENCOUNTER (INPATIENT)
Age: 72
LOS: 5 days | Discharge: HOME | DRG: 432 | End: 2022-07-15
Attending: EMERGENCY MEDICINE | Admitting: INTERNAL MEDICINE
Payer: MEDICARE

## 2022-07-10 ENCOUNTER — APPOINTMENT (OUTPATIENT)
Dept: GENERAL RADIOLOGY | Age: 72
DRG: 432 | End: 2022-07-10
Payer: MEDICARE

## 2022-07-10 ENCOUNTER — APPOINTMENT (OUTPATIENT)
Dept: CT IMAGING | Age: 72
DRG: 432 | End: 2022-07-10
Payer: MEDICARE

## 2022-07-10 DIAGNOSIS — D62 ACUTE BLOOD LOSS ANEMIA: ICD-10-CM

## 2022-07-10 DIAGNOSIS — E87.5 HYPERKALEMIA: ICD-10-CM

## 2022-07-10 DIAGNOSIS — E11.40 TYPE 2 DIABETES MELLITUS WITH DIABETIC NEUROPATHY, WITH LONG-TERM CURRENT USE OF INSULIN (HCC): ICD-10-CM

## 2022-07-10 DIAGNOSIS — K92.2 GASTROINTESTINAL HEMORRHAGE, UNSPECIFIED GASTROINTESTINAL HEMORRHAGE TYPE: Primary | ICD-10-CM

## 2022-07-10 DIAGNOSIS — K52.9 COLITIS: ICD-10-CM

## 2022-07-10 DIAGNOSIS — N17.9 ACUTE KIDNEY INJURY (HCC): ICD-10-CM

## 2022-07-10 DIAGNOSIS — R79.89 ELEVATED D-DIMER: ICD-10-CM

## 2022-07-10 DIAGNOSIS — Z79.4 TYPE 2 DIABETES MELLITUS WITH DIABETIC NEUROPATHY, WITH LONG-TERM CURRENT USE OF INSULIN (HCC): ICD-10-CM

## 2022-07-10 LAB
ALBUMIN SERPL-MCNC: 3.2 GM/DL (ref 3.4–5)
ALBUMIN SERPL-MCNC: 3.3 GM/DL (ref 3.4–5)
ALP BLD-CCNC: 38 IU/L (ref 40–129)
ALP BLD-CCNC: 38 IU/L (ref 40–129)
ALT SERPL-CCNC: 13 U/L (ref 10–40)
ALT SERPL-CCNC: 14 U/L (ref 10–40)
ANION GAP SERPL CALCULATED.3IONS-SCNC: 23 MMOL/L (ref 4–16)
ANION GAP SERPL CALCULATED.3IONS-SCNC: 31 MMOL/L (ref 4–16)
ANION GAP SERPL CALCULATED.3IONS-SCNC: 31 MMOL/L (ref 4–16)
APTT: 22.6 SECONDS (ref 25.1–37.1)
AST SERPL-CCNC: 17 IU/L (ref 15–37)
AST SERPL-CCNC: 17 IU/L (ref 15–37)
BASOPHILS ABSOLUTE: 0 K/CU MM
BASOPHILS RELATIVE PERCENT: 0.1 % (ref 0–1)
BETA-HYDROXYBUTYRATE: 16.7 MG/DL (ref 0–3)
BILIRUB SERPL-MCNC: 0.6 MG/DL (ref 0–1)
BILIRUB SERPL-MCNC: 0.6 MG/DL (ref 0–1)
BUN BLDV-MCNC: 60 MG/DL (ref 6–23)
BUN BLDV-MCNC: 61 MG/DL (ref 6–23)
BUN BLDV-MCNC: 61 MG/DL (ref 6–23)
CALCIUM SERPL-MCNC: 7.8 MG/DL (ref 8.3–10.6)
CALCIUM SERPL-MCNC: 8.5 MG/DL (ref 8.3–10.6)
CALCIUM SERPL-MCNC: 8.7 MG/DL (ref 8.3–10.6)
CHLORIDE BLD-SCNC: 90 MMOL/L (ref 99–110)
CHLORIDE BLD-SCNC: 90 MMOL/L (ref 99–110)
CHLORIDE BLD-SCNC: 96 MMOL/L (ref 99–110)
CO2: 12 MMOL/L (ref 21–32)
CO2: 9 MMOL/L (ref 21–32)
CO2: 9 MMOL/L (ref 21–32)
CREAT SERPL-MCNC: 1.4 MG/DL (ref 0.6–1.1)
CREAT SERPL-MCNC: 1.6 MG/DL (ref 0.6–1.1)
CREAT SERPL-MCNC: 1.6 MG/DL (ref 0.6–1.1)
D DIMER: 344 NG/ML(DDU)
DIFFERENTIAL TYPE: ABNORMAL
EKG ATRIAL RATE: 94 BPM
EKG DIAGNOSIS: NORMAL
EKG P AXIS: 62 DEGREES
EKG P-R INTERVAL: 158 MS
EKG Q-T INTERVAL: 372 MS
EKG QRS DURATION: 88 MS
EKG QTC CALCULATION (BAZETT): 465 MS
EKG R AXIS: 55 DEGREES
EKG T AXIS: 23 DEGREES
EKG VENTRICULAR RATE: 94 BPM
EOSINOPHILS ABSOLUTE: 0 K/CU MM
EOSINOPHILS RELATIVE PERCENT: 0 % (ref 0–3)
GFR AFRICAN AMERICAN: 38 ML/MIN/1.73M2
GFR AFRICAN AMERICAN: 38 ML/MIN/1.73M2
GFR AFRICAN AMERICAN: 45 ML/MIN/1.73M2
GFR NON-AFRICAN AMERICAN: 32 ML/MIN/1.73M2
GFR NON-AFRICAN AMERICAN: 32 ML/MIN/1.73M2
GFR NON-AFRICAN AMERICAN: 37 ML/MIN/1.73M2
GLUCOSE BLD-MCNC: 305 MG/DL (ref 70–99)
GLUCOSE BLD-MCNC: 330 MG/DL (ref 70–99)
GLUCOSE BLD-MCNC: 336 MG/DL (ref 70–99)
GLUCOSE BLD-MCNC: 343 MG/DL (ref 70–99)
GLUCOSE BLD-MCNC: 362 MG/DL (ref 70–99)
HCT VFR BLD CALC: 12.6 % (ref 37–47)
HCT VFR BLD CALC: 15.5 % (ref 37–47)
HEMOGLOBIN: 3.8 GM/DL (ref 12.5–16)
HEMOGLOBIN: 5 GM/DL (ref 12.5–16)
IMMATURE NEUTROPHIL %: 0.7 % (ref 0–0.43)
INR BLD: 1.36 INDEX
LACTATE: 18 MMOL/L (ref 0.4–2)
LACTATE: 9.6 MMOL/L (ref 0.4–2)
LYMPHOCYTES ABSOLUTE: 1.7 K/CU MM
LYMPHOCYTES RELATIVE PERCENT: 11.3 % (ref 24–44)
MCH RBC QN AUTO: 35.5 PG (ref 27–31)
MCHC RBC AUTO-ENTMCNC: 30.2 % (ref 32–36)
MCV RBC AUTO: 117.8 FL (ref 78–100)
MONOCYTES ABSOLUTE: 0.9 K/CU MM
MONOCYTES RELATIVE PERCENT: 6.3 % (ref 0–4)
NUCLEATED RBC %: 0.6 %
PDW BLD-RTO: 15.8 % (ref 11.7–14.9)
PLATELET # BLD: 212 K/CU MM (ref 140–440)
PMV BLD AUTO: 12.5 FL (ref 7.5–11.1)
POTASSIUM SERPL-SCNC: 5.1 MMOL/L (ref 3.5–5.1)
POTASSIUM SERPL-SCNC: 5.3 MMOL/L (ref 3.5–5.1)
POTASSIUM SERPL-SCNC: 5.5 MMOL/L (ref 3.5–5.1)
PRO-BNP: 762.7 PG/ML
PRO-BNP: 799.9 PG/ML
PROTHROMBIN TIME: 17.6 SECONDS (ref 11.7–14.5)
RBC # BLD: 1.07 M/CU MM (ref 4.2–5.4)
REASON FOR REJECTION: NORMAL
REJECTED TEST: NORMAL
SEGMENTED NEUTROPHILS ABSOLUTE COUNT: 12 K/CU MM
SEGMENTED NEUTROPHILS RELATIVE PERCENT: 81.6 % (ref 36–66)
SODIUM BLD-SCNC: 130 MMOL/L (ref 135–145)
SODIUM BLD-SCNC: 130 MMOL/L (ref 135–145)
SODIUM BLD-SCNC: 131 MMOL/L (ref 135–145)
TOTAL CK: 128 IU/L (ref 26–140)
TOTAL CK: 129 IU/L (ref 26–140)
TOTAL IMMATURE NEUTOROPHIL: 0.1 K/CU MM
TOTAL NUCLEATED RBC: 0.1 K/CU MM
TOTAL PROTEIN: 5.7 GM/DL (ref 6.4–8.2)
TOTAL PROTEIN: 5.9 GM/DL (ref 6.4–8.2)
TROPONIN T: 0.01 NG/ML
TROPONIN T: <0.01 NG/ML
WBC # BLD: 14.7 K/CU MM (ref 4–10.5)

## 2022-07-10 PROCEDURE — 87040 BLOOD CULTURE FOR BACTERIA: CPT

## 2022-07-10 PROCEDURE — C9113 INJ PANTOPRAZOLE SODIUM, VIA: HCPCS | Performed by: EMERGENCY MEDICINE

## 2022-07-10 PROCEDURE — 93005 ELECTROCARDIOGRAM TRACING: CPT

## 2022-07-10 PROCEDURE — 86922 COMPATIBILITY TEST ANTIGLOB: CPT

## 2022-07-10 PROCEDURE — 2500000003 HC RX 250 WO HCPCS: Performed by: PHYSICIAN ASSISTANT

## 2022-07-10 PROCEDURE — 82962 GLUCOSE BLOOD TEST: CPT

## 2022-07-10 PROCEDURE — C9113 INJ PANTOPRAZOLE SODIUM, VIA: HCPCS | Performed by: PHYSICIAN ASSISTANT

## 2022-07-10 PROCEDURE — 36430 TRANSFUSION BLD/BLD COMPNT: CPT

## 2022-07-10 PROCEDURE — 85018 HEMOGLOBIN: CPT

## 2022-07-10 PROCEDURE — 6360000002 HC RX W HCPCS: Performed by: PHYSICIAN ASSISTANT

## 2022-07-10 PROCEDURE — 6360000002 HC RX W HCPCS: Performed by: INTERNAL MEDICINE

## 2022-07-10 PROCEDURE — 94761 N-INVAS EAR/PLS OXIMETRY MLT: CPT

## 2022-07-10 PROCEDURE — 82550 ASSAY OF CK (CPK): CPT

## 2022-07-10 PROCEDURE — 86850 RBC ANTIBODY SCREEN: CPT

## 2022-07-10 PROCEDURE — 85014 HEMATOCRIT: CPT

## 2022-07-10 PROCEDURE — 80048 BASIC METABOLIC PNL TOTAL CA: CPT

## 2022-07-10 PROCEDURE — P9016 RBC LEUKOCYTES REDUCED: HCPCS

## 2022-07-10 PROCEDURE — 71045 X-RAY EXAM CHEST 1 VIEW: CPT

## 2022-07-10 PROCEDURE — 02HV33Z INSERTION OF INFUSION DEVICE INTO SUPERIOR VENA CAVA, PERCUTANEOUS APPROACH: ICD-10-PCS | Performed by: INTERNAL MEDICINE

## 2022-07-10 PROCEDURE — 71250 CT THORAX DX C-: CPT

## 2022-07-10 PROCEDURE — 6370000000 HC RX 637 (ALT 250 FOR IP): Performed by: PHYSICIAN ASSISTANT

## 2022-07-10 PROCEDURE — 2580000003 HC RX 258: Performed by: PHYSICIAN ASSISTANT

## 2022-07-10 PROCEDURE — 96376 TX/PRO/DX INJ SAME DRUG ADON: CPT

## 2022-07-10 PROCEDURE — 85025 COMPLETE CBC W/AUTO DIFF WBC: CPT

## 2022-07-10 PROCEDURE — 96365 THER/PROPH/DIAG IV INF INIT: CPT

## 2022-07-10 PROCEDURE — 85610 PROTHROMBIN TIME: CPT

## 2022-07-10 PROCEDURE — 99285 EMERGENCY DEPT VISIT HI MDM: CPT

## 2022-07-10 PROCEDURE — 84484 ASSAY OF TROPONIN QUANT: CPT

## 2022-07-10 PROCEDURE — 83880 ASSAY OF NATRIURETIC PEPTIDE: CPT

## 2022-07-10 PROCEDURE — 2500000003 HC RX 250 WO HCPCS: Performed by: EMERGENCY MEDICINE

## 2022-07-10 PROCEDURE — 2580000003 HC RX 258: Performed by: EMERGENCY MEDICINE

## 2022-07-10 PROCEDURE — 96368 THER/DIAG CONCURRENT INF: CPT

## 2022-07-10 PROCEDURE — 74176 CT ABD & PELVIS W/O CONTRAST: CPT

## 2022-07-10 PROCEDURE — 83605 ASSAY OF LACTIC ACID: CPT

## 2022-07-10 PROCEDURE — 2000000000 HC ICU R&B

## 2022-07-10 PROCEDURE — 6360000002 HC RX W HCPCS: Performed by: EMERGENCY MEDICINE

## 2022-07-10 PROCEDURE — 86901 BLOOD TYPING SEROLOGIC RH(D): CPT

## 2022-07-10 PROCEDURE — 80053 COMPREHEN METABOLIC PANEL: CPT

## 2022-07-10 PROCEDURE — 2580000003 HC RX 258: Performed by: INTERNAL MEDICINE

## 2022-07-10 PROCEDURE — 96375 TX/PRO/DX INJ NEW DRUG ADDON: CPT

## 2022-07-10 PROCEDURE — 93010 ELECTROCARDIOGRAM REPORT: CPT | Performed by: INTERNAL MEDICINE

## 2022-07-10 PROCEDURE — 86900 BLOOD TYPING SEROLOGIC ABO: CPT

## 2022-07-10 PROCEDURE — 36556 INSERT NON-TUNNEL CV CATH: CPT

## 2022-07-10 PROCEDURE — 6370000000 HC RX 637 (ALT 250 FOR IP): Performed by: INTERNAL MEDICINE

## 2022-07-10 PROCEDURE — 85379 FIBRIN DEGRADATION QUANT: CPT

## 2022-07-10 PROCEDURE — 85730 THROMBOPLASTIN TIME PARTIAL: CPT

## 2022-07-10 PROCEDURE — 96366 THER/PROPH/DIAG IV INF ADDON: CPT

## 2022-07-10 PROCEDURE — 82010 KETONE BODYS QUAN: CPT

## 2022-07-10 RX ORDER — SODIUM CHLORIDE 0.9 % (FLUSH) 0.9 %
5-40 SYRINGE (ML) INJECTION EVERY 12 HOURS SCHEDULED
Status: DISCONTINUED | OUTPATIENT
Start: 2022-07-10 | End: 2022-07-15 | Stop reason: HOSPADM

## 2022-07-10 RX ORDER — NOREPINEPHRINE BIT/0.9 % NACL 16MG/250ML
1-100 INFUSION BOTTLE (ML) INTRAVENOUS CONTINUOUS
Status: DISCONTINUED | OUTPATIENT
Start: 2022-07-10 | End: 2022-07-13

## 2022-07-10 RX ORDER — FUROSEMIDE 10 MG/ML
20 INJECTION INTRAMUSCULAR; INTRAVENOUS ONCE
Status: COMPLETED | OUTPATIENT
Start: 2022-07-10 | End: 2022-07-10

## 2022-07-10 RX ORDER — INSULIN LISPRO 100 [IU]/ML
10 INJECTION, SOLUTION INTRAVENOUS; SUBCUTANEOUS ONCE
Status: COMPLETED | OUTPATIENT
Start: 2022-07-10 | End: 2022-07-10

## 2022-07-10 RX ORDER — 0.9 % SODIUM CHLORIDE 0.9 %
1000 INTRAVENOUS SOLUTION INTRAVENOUS ONCE
Status: COMPLETED | OUTPATIENT
Start: 2022-07-10 | End: 2022-07-10

## 2022-07-10 RX ORDER — DEXTROSE MONOHYDRATE 50 MG/ML
100 INJECTION, SOLUTION INTRAVENOUS PRN
Status: DISCONTINUED | OUTPATIENT
Start: 2022-07-10 | End: 2022-07-15 | Stop reason: HOSPADM

## 2022-07-10 RX ORDER — LIDOCAINE HYDROCHLORIDE 10 MG/ML
5 INJECTION, SOLUTION EPIDURAL; INFILTRATION; INTRACAUDAL; PERINEURAL ONCE
Status: DISCONTINUED | OUTPATIENT
Start: 2022-07-10 | End: 2022-07-15 | Stop reason: HOSPADM

## 2022-07-10 RX ORDER — METOCLOPRAMIDE HYDROCHLORIDE 5 MG/ML
10 INJECTION INTRAMUSCULAR; INTRAVENOUS ONCE
Status: COMPLETED | OUTPATIENT
Start: 2022-07-10 | End: 2022-07-10

## 2022-07-10 RX ORDER — ONDANSETRON 2 MG/ML
4 INJECTION INTRAMUSCULAR; INTRAVENOUS ONCE
Status: COMPLETED | OUTPATIENT
Start: 2022-07-10 | End: 2022-07-10

## 2022-07-10 RX ORDER — SODIUM CHLORIDE 0.9 % (FLUSH) 0.9 %
5-40 SYRINGE (ML) INJECTION PRN
Status: DISCONTINUED | OUTPATIENT
Start: 2022-07-10 | End: 2022-07-15 | Stop reason: HOSPADM

## 2022-07-10 RX ORDER — ACETAMINOPHEN 325 MG/1
650 TABLET ORAL EVERY 6 HOURS PRN
Status: DISCONTINUED | OUTPATIENT
Start: 2022-07-10 | End: 2022-07-14

## 2022-07-10 RX ORDER — RISPERIDONE 0.5 MG/1
0.25 TABLET, FILM COATED ORAL NIGHTLY
Status: DISCONTINUED | OUTPATIENT
Start: 2022-07-10 | End: 2022-07-15 | Stop reason: HOSPADM

## 2022-07-10 RX ORDER — SODIUM CHLORIDE 9 MG/ML
INJECTION, SOLUTION INTRAVENOUS CONTINUOUS
Status: DISCONTINUED | OUTPATIENT
Start: 2022-07-10 | End: 2022-07-14

## 2022-07-10 RX ORDER — ONDANSETRON 2 MG/ML
4 INJECTION INTRAMUSCULAR; INTRAVENOUS EVERY 6 HOURS PRN
Status: DISCONTINUED | OUTPATIENT
Start: 2022-07-10 | End: 2022-07-15 | Stop reason: HOSPADM

## 2022-07-10 RX ORDER — INSULIN LISPRO 100 [IU]/ML
0-18 INJECTION, SOLUTION INTRAVENOUS; SUBCUTANEOUS
Status: DISCONTINUED | OUTPATIENT
Start: 2022-07-10 | End: 2022-07-14

## 2022-07-10 RX ORDER — ACETAMINOPHEN 650 MG/1
650 SUPPOSITORY RECTAL EVERY 6 HOURS PRN
Status: DISCONTINUED | OUTPATIENT
Start: 2022-07-10 | End: 2022-07-14

## 2022-07-10 RX ORDER — INSULIN GLARGINE 100 [IU]/ML
15 INJECTION, SOLUTION SUBCUTANEOUS NIGHTLY
Status: DISCONTINUED | OUTPATIENT
Start: 2022-07-10 | End: 2022-07-15 | Stop reason: HOSPADM

## 2022-07-10 RX ORDER — LEVOTHYROXINE SODIUM 88 UG/1
88 TABLET ORAL DAILY
Status: DISCONTINUED | OUTPATIENT
Start: 2022-07-11 | End: 2022-07-15 | Stop reason: HOSPADM

## 2022-07-10 RX ORDER — SODIUM CHLORIDE 9 MG/ML
INJECTION, SOLUTION INTRAVENOUS PRN
Status: DISCONTINUED | OUTPATIENT
Start: 2022-07-10 | End: 2022-07-15 | Stop reason: HOSPADM

## 2022-07-10 RX ORDER — METRONIDAZOLE 500 MG/100ML
500 INJECTION, SOLUTION INTRAVENOUS EVERY 8 HOURS
Status: DISCONTINUED | OUTPATIENT
Start: 2022-07-10 | End: 2022-07-14

## 2022-07-10 RX ORDER — PANTOPRAZOLE SODIUM 40 MG/10ML
40 INJECTION, POWDER, LYOPHILIZED, FOR SOLUTION INTRAVENOUS 2 TIMES DAILY
Status: DISCONTINUED | OUTPATIENT
Start: 2022-07-10 | End: 2022-07-11

## 2022-07-10 RX ORDER — DONEPEZIL HYDROCHLORIDE 10 MG/1
10 TABLET, FILM COATED ORAL DAILY
Status: DISCONTINUED | OUTPATIENT
Start: 2022-07-11 | End: 2022-07-11

## 2022-07-10 RX ORDER — ONDANSETRON 4 MG/1
4 TABLET, ORALLY DISINTEGRATING ORAL EVERY 8 HOURS PRN
Status: DISCONTINUED | OUTPATIENT
Start: 2022-07-10 | End: 2022-07-14

## 2022-07-10 RX ORDER — PANTOPRAZOLE SODIUM 40 MG/10ML
40 INJECTION, POWDER, LYOPHILIZED, FOR SOLUTION INTRAVENOUS ONCE
Status: COMPLETED | OUTPATIENT
Start: 2022-07-10 | End: 2022-07-10

## 2022-07-10 RX ORDER — INSULIN LISPRO 100 [IU]/ML
0-9 INJECTION, SOLUTION INTRAVENOUS; SUBCUTANEOUS NIGHTLY
Status: DISCONTINUED | OUTPATIENT
Start: 2022-07-10 | End: 2022-07-14

## 2022-07-10 RX ORDER — LATANOPROST 50 UG/ML
1 SOLUTION/ DROPS OPHTHALMIC NIGHTLY
Status: DISCONTINUED | OUTPATIENT
Start: 2022-07-10 | End: 2022-07-15 | Stop reason: HOSPADM

## 2022-07-10 RX ORDER — METRONIDAZOLE 500 MG/100ML
500 INJECTION, SOLUTION INTRAVENOUS ONCE
Status: COMPLETED | OUTPATIENT
Start: 2022-07-10 | End: 2022-07-10

## 2022-07-10 RX ADMIN — PANTOPRAZOLE SODIUM 40 MG: 40 INJECTION, POWDER, FOR SOLUTION INTRAVENOUS at 20:29

## 2022-07-10 RX ADMIN — SODIUM CHLORIDE, PRESERVATIVE FREE 10 ML: 5 INJECTION INTRAVENOUS at 20:28

## 2022-07-10 RX ADMIN — METOCLOPRAMIDE 10 MG: 5 INJECTION, SOLUTION INTRAMUSCULAR; INTRAVENOUS at 21:32

## 2022-07-10 RX ADMIN — SODIUM CHLORIDE 25 ML: 9 INJECTION, SOLUTION INTRAVENOUS at 20:35

## 2022-07-10 RX ADMIN — METRONIDAZOLE 500 MG: 500 INJECTION, SOLUTION INTRAVENOUS at 20:35

## 2022-07-10 RX ADMIN — SODIUM CHLORIDE 40 MG: 9 INJECTION, SOLUTION INTRAVENOUS at 12:34

## 2022-07-10 RX ADMIN — SODIUM CHLORIDE 1000 ML: 9 INJECTION, SOLUTION INTRAVENOUS at 14:27

## 2022-07-10 RX ADMIN — SODIUM CHLORIDE: 9 INJECTION, SOLUTION INTRAVENOUS at 16:39

## 2022-07-10 RX ADMIN — INSULIN LISPRO 10 UNITS: 100 INJECTION, SOLUTION INTRAVENOUS; SUBCUTANEOUS at 19:21

## 2022-07-10 RX ADMIN — SODIUM CHLORIDE 8 MG/HR: 9 INJECTION, SOLUTION INTRAVENOUS at 14:14

## 2022-07-10 RX ADMIN — FUROSEMIDE 20 MG: 10 INJECTION, SOLUTION INTRAVENOUS at 20:29

## 2022-07-10 RX ADMIN — Medication 2 MCG/MIN: at 17:37

## 2022-07-10 RX ADMIN — SODIUM CHLORIDE: 9 INJECTION, SOLUTION INTRAVENOUS at 19:49

## 2022-07-10 RX ADMIN — SODIUM CHLORIDE 1000 ML: 9 INJECTION, SOLUTION INTRAVENOUS at 10:52

## 2022-07-10 RX ADMIN — INSULIN GLARGINE 15 UNITS: 100 INJECTION, SOLUTION SUBCUTANEOUS at 21:32

## 2022-07-10 RX ADMIN — PANTOPRAZOLE SODIUM 40 MG: 40 INJECTION, POWDER, FOR SOLUTION INTRAVENOUS at 10:52

## 2022-07-10 RX ADMIN — ONDANSETRON 4 MG: 2 INJECTION INTRAMUSCULAR; INTRAVENOUS at 20:29

## 2022-07-10 RX ADMIN — LATANOPROST 1 DROP: 50 SOLUTION/ DROPS OPHTHALMIC at 20:38

## 2022-07-10 RX ADMIN — CEFTRIAXONE 1000 MG: 1 INJECTION, POWDER, FOR SOLUTION INTRAMUSCULAR; INTRAVENOUS at 12:15

## 2022-07-10 RX ADMIN — METRONIDAZOLE 500 MG: 500 INJECTION, SOLUTION INTRAVENOUS at 12:45

## 2022-07-10 RX ADMIN — OCTREOTIDE ACETATE 25 MCG/HR: 500 INJECTION, SOLUTION INTRAVENOUS; SUBCUTANEOUS at 21:56

## 2022-07-10 RX ADMIN — ONDANSETRON 4 MG: 2 INJECTION INTRAMUSCULAR; INTRAVENOUS at 10:52

## 2022-07-10 ASSESSMENT — PAIN - FUNCTIONAL ASSESSMENT
PAIN_FUNCTIONAL_ASSESSMENT: NONE - DENIES PAIN
PAIN_FUNCTIONAL_ASSESSMENT: NONE - DENIES PAIN

## 2022-07-10 NOTE — H&P
History and Physical      Name:  Bubba Carrier /Age/Sex: 1950  (67 y.o. female)   MRN & CSN:  [id number] & [id number] Admission Date/Time: 7/10/2022  9:22 AM   Location:   PCP: Ofelia Candelario MD       Hospital Day: 1     Attending physician: Dr. Fannie Talley and Plan:   Bubba Carrier is a 67 y.o.  female  who presents with GI bleed    Assessment and plan:     Upper GI hemorrhage  Etiology: Portal hypertensive gastropathy vs esophageal varices  presented with nausea, vomiting, diarrhea (1x hematochezia) x 2 day duration  No Hx of NSAID use, Alcoholism, not on any blood thinners  Labs-Hb-3.8  CT abdomen pelvis-changes indicative of diffuse colitis, cholelithiasis without cholecystitis, IMV to IVC shunt (seen in prior CTs)  2U pRBC, repeat H&H q 6 hrs, Protonix 40 mg iv bid, Rocephin for SBP ppx  GI consulted-possible endoscopy 2022      She had similar complaint in 2022-with acute abdominal pain, CT abdomen/pelvis evidence of jejunal wall thickening  CA 19-9 [id number]  Advanced fibrosis and cirrhosis  Per FibroScan on 2022  U/s abdomen 22-suggestive of cirrhosis    Acute on chronic blood loss anemia  She has been following up with hem/onc and GI as OP  Hb 3.8,  Hemoccult stool positive  Monitor H&H q 6 hrs  Transfuse as needed to maintain Hb > 7      High anion gap metabolic acidosis s/t lactic acidosis  Lactic acid level-18.8, CO2--> 9, AG-31  Adequate fluid hydration-given 2 L in the ER  Cont NS @ 150 cc /hr  Monitor lactic acid level q 4 hrs      Hemorrhagic  vs hypovolemic  vs septic shock  Hypotensive on arrival  Fluid boluses and maintenance fluid  pRBC 2U given in the ER  Maintain MAP > 65 mmHg  Blood cx, stool antigens and C diff cx  Cont rocephin and flagyl for possible intra abdominal infection  Levophed, if BP is refractory to above measures    ANTONINA s/t ATN-prerenal azotemia  Hyperkalemia-5.5, hyponatremia-130, nausea, hematemesis, bloody diarrhea x1  of 2 days duration. History obtained from patient's  at the bedside and EMR. Patient is a lethargic, oriented but unable to answer all the questions. the present condition started Friday night with nausea and coffee-ground emesis. Friday afternoon they had lunch at [employer]. Saturday morning she had a one-time bloody diarrhea. Off note, patient has been not feeling well for the last 10 days with low blood pressure, seen by PCP on Friday morning, ordered lab work, and suggested not to take lisinopril. This morning, pt was unable to get out of the bed, worsened hematemesis which prompted to come to the ER. Fever, chills, chest pain, palpitations, vision changes. Lab work-up significant for lactic acid 18.8, Hb 3.9, second troponin level 0.011,Na-130, K 5.5, CR.1.6, BUN 61, glucose 336, AG-31  CT abdomen and pelvis e/o showed colitis in the transverse and descending colon, cholelithiasis and left lower lobe tree-in-bud nodules, IMV to IVC shunt. Given one-time cefepime and Flagyl and consulted gastroenterologist Dr. Anthony Singh, started on protonix gtt. On Examination,the patient is able to state her name, place, month and year  . At the ED, vital signs;T 97.6,HR 89, RR 21, /41 mm/hg,SPO2 91 to 95% RA. Significant laboratories as above  The patient was brought to the ED and was subsequently admitted for  further evaluation. and management          Review of Systems   10 point ROS reviewed  positive for nausea, abdominal pain,  Negative for  chest pain, palpitations, SOB, cough, cold, dizziness, vision changes, headache    Objective:   No intake or output data in the 24 hours ending 07/10/22 1542   Vitals:   Vitals:    07/10/22 1536   BP: (!) 111/54   Pulse: 97   Resp: 17   Temp:    SpO2:      Physical Exam:   GEN- patient is a 77-year-old female, appears stated age, ill-appearing, lethargic  EYES- Pupils are equally round.   No scleral erythema, discharge, or conjunctivitis. HENT- Mucous membranes are dry. oral pharynx without exudates, no evidence of thrush. NECK- Supple, no apparent thyromegaly or masses. RESP-Clear to auscultation, no wheezes, rales or rhonchi. Symmetric chest movement while on room air. CARDIO/VASC-  S1/S2 auscultated. Regular rate and rhythm, No JVD. Peripheral pulses equal bilaterally and palpable. GI- Abdomen is soft, no tenderness, masses, or guarding. Bowel sounds present. MSK- No gross joint deformities. EXTREMITIES- kurtis 1+ pitting edema- left > right, pulses intact,  no calf tenderness  SKIN- Normal coloration, warm, dry. NEURO-Cranial nerves appear grossly intact, normal speech, no lateralizing weakness. PSYCH- lethargic, oriented x 4. Past Medical History:      Past Medical History:   Diagnosis Date    Cancer ([facility] [address]     Diabetes mellitus ([facility] [address] [facility]     Glaucoma     Gout     Hyperlipidemia     Hypertension     Neuropathy     Thyroid disease      PSHX:  has a past surgical history that includes  section and Mastectomy, bilateral (10/26/2007). Allergies: Allergies   Allergen Reactions    Bupropion      Other reaction(s): Other - comment required  Suicidal ideation    Nsaids      Other reaction(s): Other - comment required  Elevated Serum Creatinine    Amlodipine     Nateglinide Nausea Only    Ofloxacin Hives    Paroxetine Hcl Hives     Other reaction(s): Other - comment required  Shakey/nerveous    Pioglitazone      Other reaction(s): Other - comment required  Dizzy / nauseated       FAM HX: family history is not on file.   Soc HX:   Social History     Socioeconomic History    Marital status:      Spouse name: Not on file    Number of children: Not on file    Years of education: Not on file    Highest education level: Not on file   Occupational History    Not on file   Tobacco Use    Smoking status: Never Smoker    Smokeless tobacco: Never Used   Vaping Use    Vaping Use: Never used Substance and Sexual Activity    Alcohol use: No    Drug use: No    Sexual activity: Not on file   Other Topics Concern    Not on file   Social History Narrative    Not on file     Social Determinants of Health     Financial Resource Strain:     Difficulty of Paying Living Expenses: Not on file   Food Insecurity:     Worried About Running Out of Food in the Last Year: Not on file    Lawanda of Food in the Last Year: Not on file   Transportation Needs:     Lack of Transportation (Medical): Not on file    Lack of Transportation (Non-Medical): Not on file   Physical Activity:     Days of Exercise per Week: Not on file    Minutes of Exercise per Session: Not on file   Stress:     Feeling of Stress : Not on file   Social Connections:     Frequency of Communication with Friends and Family: Not on file    Frequency of Social Gatherings with Friends and Family: Not on file    Attends [employer] Services: Not on file    Active Member of [address] [place] [address] or Organizations: Not on file    Attends Club or Organization Meetings: Not on file    Marital Status: Not on file   Intimate Partner Violence:     Fear of Current or Ex-Partner: Not on file    Emotionally Abused: Not on file    Physically Abused: Not on file    Sexually Abused: Not on file   Housing Stability:     Unable to Pay for Housing in the Last Year: Not on file    Number of [place] in the Last Year: Not on file    Unstable Housing in the Last Year: Not on file       Social and family history reviewed with patient/family. Medications:     Home Medication   Prior to Admission medications    Medication Sig Start Date End Date Taking?  Authorizing Provider   memantine (NAMENDA) 5 MG tablet Take 1 tablet by mouth 2 times daily  Patient not taking: Reported on 6/23/2022 6/8/22   Mei Díaz DO   LORazepam (ATIVAN) 0.5 MG tablet 1 tablet at bedtime as needed    Historical Provider, MD   donepezil (ARICEPT) 10 MG tablet Take 1 tablet by mouth daily NOTE TO PHARMACY: DO NOT FILL UNTIL 5 MG RX IS COMPLETE 3/15/22   Altagracia Gonzalez DO   levothyroxine (SYNTHROID) 88 MCG tablet Take 1 tablet by mouth Daily 1/8/21   Maciej Garcia MD   lisinopril (PRINIVIL;ZESTRIL) 10 MG tablet Take 1 tablet by mouth daily 12/21/20   [facility] ATILIO Beavers - CNP   risperiDONE (RISPERDAL) 0.25 MG tablet Take 1 tablet by mouth nightly 12/1/20   Karie Pouch, ATILIO - CNP   Riboflavin (B2 PO) Take by mouth    Historical Provider, MD   Cholecalciferol (D3-1000 PO) Take by mouth    Historical Provider, MD   insulin glargine (BASAGLAR KWIKPEN) 100 UNIT/ML injection pen Inject 35 Units into the skin 2 times daily 35 units in the morning and 25 units at bedtime  Patient taking differently: Inject 30 Units into the skin 2 times daily 35 units in the morning and 25 units at bedtime 9/15/20   Karie PouchATILIO - CNP   gabapentin (NEURONTIN) 100 MG capsule Take 1 capsule by mouth 3 times daily for 90 days. 9/15/20 12/14/20  Karie Pouch, APRN - CNP   metFORMIN (GLUCOPHAGE-XR) 500 MG extended release tablet Take 1 tablet by mouth 3 times daily 9/15/20   Karie Pouch, APRN - CNP   NOVOLOG FLEXPEN 100 UNIT/ML injection pen 14 units with largest meal of the day, 7 units with each smaller meal  Patient taking differently: Inject into the skin 25 units with largest meal of the day, 7 units with each smaller meal 8/14/20   Michael Jo MD   blood glucose monitor strips Test 2 times a day & as needed for symptoms of irregular blood glucose. Dispense sufficient amount for indicated testing frequency plus additional to accommodate PRN testing needs. Dx: E11.40, z79,4, n18.3 6/22/20   Michael Jo MD   lovastatin (MEVACOR) 40 MG tablet Take 1 tablet by mouth nightly 6/18/20   Michael Jo MD   OneTouch Delica Lancets 74S MISC Test blood sugar 1-2 times a day as directed.  Dx: E11.65 4/17/20   Michael Jo MD   UNABLE TO FIND Bilateral breast prosthesis 1/13/20   Michael Jo MD UNABLE TO FIND Bra for bilateral prosthesis 1/13/20   Lizzette Chaidez MD   bimatoprost (LUMIGAN) 0.03 % ophthalmic drops Place 1 drop into both eyes nightly    Historical Provider, MD   blood glucose test strips (ASCENSIA AUTODISC VI;ONE TOUCH ULTRA TEST VI) strip 1 each by In Vitro route daily As needed.  Dx: E11.40 4/5/19   Lizzette Chaidez MD   calcium carbonate (OSCAL) 500 MG TABS tablet Take 500 mg by mouth daily    Historical Provider, MD   aspirin 81 MG tablet Take 1 tablet by mouth    Historical Provider, MD   Cetirizine HCl 10 MG CAPS Take by mouth    Historical Provider, MD   Cranberry 34148 MG CAPS Take by mouth    Historical Provider, MD   Docusate Sodium (STOOL SOFTENER) 100 MG TABS Take by mouth    Historical Provider, MD   GARLIC PO Take by mouth    Historical Provider, MD   Multiple Vitamins-Minerals (ICAPS AREDS 2 PO) Take by mouth    Historical Provider, MD   ferrous sulfate 325 (65 Fe) MG tablet Take 325 mg by mouth daily (with breakfast)    Historical Provider, MD   ranitidine (ACID REDUCER) 150 MG tablet Take 150 mg by mouth 2 times daily    Historical Provider, MD   Pomegranate, [place] gran[place], (POMEGRANATE PO) Take 500 mg by mouth daily    Historical Provider, MD     Medications:    sodium zirconium cyclosilicate  10 g Oral Once    insulin regular  10 Units IntraVENous Once    And    dextrose bolus  250 mL IntraVENous Once    sodium chloride flush  5-40 mL IntraVENous 2 times per day    insulin lispro  0-18 Units SubCUTAneous TID WC    insulin lispro  0-9 Units SubCUTAneous Nightly    lidocaine PF  5 mL IntraDERmal Once    sodium chloride flush  5-40 mL IntraVENous 2 times per day    [START ON 7/11/2022] cefTRIAXone (ROCEPHIN) IV  1,000 mg IntraVENous Q24H    metroNIDAZOLE  500 mg IntraVENous Q8H      Infusions:    pantoprazole 8 mg/hr (07/10/22 1414)    sodium chloride      dextrose      sodium chloride      sodium chloride      sodium chloride       PRN Meds: sodium chloride, , PRN  glucose, 4 tablet, PRN  dextrose bolus, 125 mL, PRN   Or  dextrose bolus, 250 mL, PRN  glucagon (rDNA), 1 mg, PRN  dextrose, 100 mL/hr, PRN  sodium chloride flush, 5-40 mL, PRN  sodium chloride, , PRN  ondansetron, 4 mg, Q8H PRN   Or  ondansetron, 4 mg, Q6H PRN  acetaminophen, 650 mg, Q6H PRN   Or  acetaminophen, 650 mg, Q6H PRN  sodium chloride flush, 5-40 mL, PRN  sodium chloride, , PRN        Recent Labs     07/10/22  0945   WBC 14.7*   HGB 3.8*   HCT 12.6*         Recent Labs     07/10/22  0945 07/10/22  1046   * 130*   K 5.5* 5.3*   CL 90* 90*   CO2 9* 9*   BUN 60* 61*   CREATININE 1.6* 1.6*     Recent Labs     07/10/22  0945 07/10/22  1046   AST 17 17   ALT 14 13   BILITOT 0.6 0.6   ALKPHOS 38* 38*     Recent Labs     07/10/22  0945   INR 1.36     Recent Labs     07/10/22  0945 07/10/22  1046   CKTOTAL 128 129   TROPONINT <0.010 0.011*        Imaging reviewed  CT ABDOMEN PELVIS WO CONTRAST Additional Contrast? None    Result Date: 7/10/2022  EXAMINATION: CT OF THE CHEST WITHOUT CONTRAST; CT OF THE ABDOMEN AND PELVIS WITHOUT CONTRAST 7/10/2022 11:05 am; 7/10/2022 11:06 am TECHNIQUE: CT of the chest was performed without the administration of intravenous contrast. Multiplanar reformatted images are provided for review. Automated exposure control, iterative reconstruction, and/or weight based adjustment of the mA/kV was utilized to reduce the radiation dose to as low as reasonably achievable.; CT of the abdomen and pelvis was performed without the administration of intravenous contrast. Multiplanar reformatted images are provided for review. Automated exposure control, iterative reconstruction, and/or weight based adjustment of the mA/kV was utilized to reduce the radiation dose to as low as reasonably achievable. COMPARISON: CT abdomen and pelvis dated March 26, 2022.   No comparison for the chest portion of the exam. HISTORY: ORDERING SYSTEM PROVIDED HISTORY: elevated d-dimer TECHNOLOGIST PROVIDED HISTORY: Reason for exam:->elevated d-dimer Reason for Exam: ELEV D-DIMER   GFR 32; ORDERING SYSTEM PROVIDED HISTORY: abdominal pain nausea, vomiting diarrhea TECHNOLOGIST PROVIDED HISTORY: Reason for exam:->abdominal pain nausea, vomiting diarrhea Additional Contrast?->None Reason for Exam: NAUSEA  VOMITING FINDINGS: Chest: Mediastinum: Heart size is normal.  No pericardial effusion. Relative decreased attenuation of the blood compared to myocardium is suggestive of anemia. Within the limitations of a noncontrast exam, there is no evidence of hilar adenopathy. No mediastinal adenopathy. 1.3 cm hypodensity in the left lobe of the thyroid gland. This is below the size threshold to warrant additional follow-up unless patient has risk factors for thyroid cancer. Lungs/pleura: Central tracheobronchial airways are unremarkable. Tree-in-bud nodules in the left lower lobe. No focal consolidation, pleural effusion, or pneumothorax. Mild centrilobular emphysema. Soft Tissues/Bones: Old fractures of the right 4th and 5th ribs anteriorly. No aggressive lytic or blastic bony lesion. Abdomen/Pelvis: Organs: Cholelithiasis. Mild thickening of the gallbladder wall. Small amount of free fluid in the right upper and left upper quadrant of the abdomen. Unenhanced liver, spleen, pancreas, adrenal glands are unremarkable. There is a inferior mesenteric vein to IVC shunt. GI/Bowel: Circumferential thickening of the transverse colon and descending colon. Scattered colonic diverticula. No evidence of bowel obstruction or free intraperitoneal air. Pelvis: Bladder is unremarkable. Uterus is grossly unremarkable. Small free fluid in the pelvis. Peritoneum/Retroperitoneum: Abdominal aortic caliber is normal.  No retroperitoneal adenopathy. Bones/Soft Tissues: Moderate to severe degenerative disc disease at L1-L2 and L5-S1. Mild to moderate L4-L5 degenerative disc disease.      1. Circumferential thickening of the transverse colon and descending colon indicative of colitis. No evidence of pneumatosis intestinalis or portal venous gas. 2. Cholelithiasis with mild thickening of the gallbladder wall. If patient has right upper quadrant abdominal pain, ultrasound would better assess the gallbladder. 3. Left lower lobe tree-in-bud nodules. Appearance favors an infectious or inflammatory etiology, including bronchiolitis. 4. Persistent small free fluid in the upper quadrant of the abdomen and pelvis, not significantly changed since prior exam. 5. IMV to IVC shunt. CT CHEST WO CONTRAST    Result Date: 7/10/2022  EXAMINATION: CT OF THE CHEST WITHOUT CONTRAST; CT OF THE ABDOMEN AND PELVIS WITHOUT CONTRAST 7/10/2022 11:05 am; 7/10/2022 11:06 am TECHNIQUE: CT of the chest was performed without the administration of intravenous contrast. Multiplanar reformatted images are provided for review. Automated exposure control, iterative reconstruction, and/or weight based adjustment of the mA/kV was utilized to reduce the radiation dose to as low as reasonably achievable.; CT of the abdomen and pelvis was performed without the administration of intravenous contrast. Multiplanar reformatted images are provided for review. Automated exposure control, iterative reconstruction, and/or weight based adjustment of the mA/kV was utilized to reduce the radiation dose to as low as reasonably achievable. COMPARISON: CT abdomen and pelvis dated March 26, 2022.   No comparison for the chest portion of the exam. HISTORY: ORDERING SYSTEM PROVIDED HISTORY: elevated d-dimer TECHNOLOGIST PROVIDED HISTORY: Reason for exam:->elevated d-dimer Reason for Exam: ELEV D-DIMER   GFR 32; ORDERING SYSTEM PROVIDED HISTORY: abdominal pain nausea, vomiting diarrhea TECHNOLOGIST PROVIDED HISTORY: Reason for exam:->abdominal pain nausea, vomiting diarrhea Additional Contrast?->None Reason for Exam: NAUSEA  VOMITING FINDINGS: Chest: Mediastinum: Heart size is normal.  No pericardial effusion. Relative decreased attenuation of the blood compared to myocardium is suggestive of anemia. Within the limitations of a noncontrast exam, there is no evidence of hilar adenopathy. No mediastinal adenopathy. 1.3 cm hypodensity in the left lobe of the thyroid gland. This is below the size threshold to warrant additional follow-up unless patient has risk factors for thyroid cancer. Lungs/pleura: Central tracheobronchial airways are unremarkable. Tree-in-bud nodules in the left lower lobe. No focal consolidation, pleural effusion, or pneumothorax. Mild centrilobular emphysema. Soft Tissues/Bones: Old fractures of the right 4th and 5th ribs anteriorly. No aggressive lytic or blastic bony lesion. Abdomen/Pelvis: Organs: Cholelithiasis. Mild thickening of the gallbladder wall. Small amount of free fluid in the right upper and left upper quadrant of the abdomen. Unenhanced liver, spleen, pancreas, adrenal glands are unremarkable. There is a inferior mesenteric vein to IVC shunt. GI/Bowel: Circumferential thickening of the transverse colon and descending colon. Scattered colonic diverticula. No evidence of bowel obstruction or free intraperitoneal air. Pelvis: Bladder is unremarkable. Uterus is grossly unremarkable. Small free fluid in the pelvis. Peritoneum/Retroperitoneum: Abdominal aortic caliber is normal.  No retroperitoneal adenopathy. Bones/Soft Tissues: Moderate to severe degenerative disc disease at L1-L2 and L5-S1. Mild to moderate L4-L5 degenerative disc disease. 1. Circumferential thickening of the transverse colon and descending colon indicative of colitis. No evidence of pneumatosis intestinalis or portal venous gas. 2. Cholelithiasis with mild thickening of the gallbladder wall. If patient has right upper quadrant abdominal pain, ultrasound would better assess the gallbladder. 3. Left lower lobe tree-in-bud nodules.   Appearance favors an infectious or inflammatory etiology, including bronchiolitis. 4. Persistent small free fluid in the upper quadrant of the abdomen and pelvis, not significantly changed since prior exam. 5. IMV to IVC shunt. US ABDOMEN LIMITED    Result Date: 6/26/2022  EXAMINATION: RIGHT UPPER QUADRANT ULTRASOUND 6/23/2022 12:46 pm COMPARISON: CT 03/26/2022 HISTORY: ORDERING SYSTEM PROVIDED HISTORY: Abnormal finding on CT scan TECHNOLOGIST PROVIDED HISTORY: Reason for exam:->Please evaluate for advanced liver fibrosis Reason for Exam: eval for liver fibrosis Initial encounter FINDINGS: LIVER:  The liver is coarse in echotexture with slightly lobular contour. No focal hepatic lesion. BILIARY SYSTEM:  There is diffuse gallbladder wall thickening, nonspecific. This measures up to 7 mm. There is cholelithiasis and gallbladder sludge. Common bile duct is within normal limits measuring 6 mm. RIGHT KIDNEY: The right kidney is grossly unremarkable without evidence of hydronephrosis. PANCREAS:  Visualized portions of the pancreas are unremarkable. OTHER: No evidence of right upper quadrant ascites. 1. Sonographic findings are suspicious for cirrhosis. No focal hepatic lesion. 2. Nonspecific gallbladder wall thickening with gallbladder sludge and cholelithiasis. Findings are nonspecific for acute cholecystitis.      Fibroscan Procedure Routine 1 Time    Result Date: 6/14/2022  No dictation          Electronically signed by Ronni Garsia PA-C on 7/10/2022 at 3:42 PM

## 2022-07-10 NOTE — CONSULTS
Consult completed. PICC contraindicated r/t Hx of bilat Mastectomies and resulting risks of Lymphedema and DVT. Consult for Central Access per IR, avoiding arm vasculature, entered per protocol. Pt currently has 2x PIV's and therapeutic needs met while awaiting advanced access.

## 2022-07-10 NOTE — ED PROVIDER NOTES
[facility] Emergency Department Encounter    Patient: Florinda Murcia  MRN: [id number]  : 1950  Date of Evaluation: 7/10/2022  ED Provider:  Noemi Tamayo DO    Triage Chief Complaint:   Hematemesis, Abdominal Pain, and Diarrhea    Me[place]opda:  Florinda Murcia is a 67 y.o. female that presents to the emergency department complaining of a nausea vomiting diarrhea. Patient  gives most of the history. He states patient ate at [employer] around noon on Friday. He states Friday evening she started having nausea and vomiting. He states diarrhea starting yesterday Saturday. He states she did eat some Jell-O crackers and drinking some water. With decreased p.o. intake for the past 2 to 3 days. He states they have been working on patient blood pressure being low for the last week and a half. She has stopped her blood pressure medication. He states they seen primary care physician 2 days ago had lab work done but no results at this time. He states this morning patient just was vomiting and having diarrhea looked dehydrated so he brought her in for evaluation. Patient has denied any chest pain shortness of breath abdominal pain no fever chills cough no sore throat runny nose earache. Patient here for evaluation.     ROS - see HPI, below listed is current ROS at time of my eval:  General:  No fevers, no chills, no weakness  Eyes:  No recent vison changes, no discharge  ENT:  No sore throat, no nasal congestion, no hearing changes  Cardiovascular:  No chest pain, no palpitations  Respiratory:  No shortness of breath, no cough, no wheezing  Gastrointestinal: Positive for abdominal pain nausea vomiting and diarrhea  Musculoskeletal:  No muscle pain, no joint pain  Skin:  No rash, no pruritis, no easy bruising  Neurologic:  No speech problems, no headache, no extremity numbness, no extremity tingling, no extremity weakness  Psychiatric:  No anxiety  Genitourinary:  No dysuria, no hematuria  Endocrine: No unexpected weight gain, no unexpected weight loss  Extremities:  no edema, no pain    Past Medical History:   Diagnosis Date    Cancer ([facility] [address]     [facility]     Glaucoma     Gout     Hyperlipidemia     Hypertension     Neuropathy     Thyroid disease      Past Surgical History:   Procedure Laterality Date     SECTION      MASTECTOMY, BILATERAL  10/26/2007     No family history on file. Social History     Socioeconomic History    Marital status:      Spouse name: Not on file    Number of children: Not on file    Years of education: Not on file    Highest education level: Not on file   Occupational History    Not on file   Tobacco Use    Smoking status: Never Smoker    Smokeless tobacco: Never Used   Vaping Use    Vaping Use: Never used   Substance and Sexual Activity    Alcohol use: No    Drug use: No    Sexual activity: Not on file   Other Topics Concern    Not on file   Social History Narrative    Not on file     Social Determinants of Health     Financial Resource Strain:     Difficulty of Paying Living Expenses: Not on file   Food Insecurity:     Worried About [address] [facility] in the Last Year: Not on file    Lawanda of Food in the Last Year: Not on file   Transportation Needs:     Lack of Transportation (Medical): Not on file    Lack of Transportation (Non-Medical):  Not on file   Physical Activity:     Days of Exercise per Week: Not on file    Minutes of Exercise per Session: Not on file   Stress:     Feeling of Stress : Not on file   Social Connections:     Frequency of Communication with Friends and Family: Not on file    Frequency of Social Gatherings with Friends and Family: Not on file    Attends Mosque Services: Not on file    Active Member of Clubs or Organizations: Not on file    Attends Club or Organization Meetings: Not on file    Marital Status: Not on file   Intimate Partner Violence:     Fear of Current or Ex-Partner: Not on file    Emotionally Abused: Not on file    Physically Abused: Not on file    Sexually Abused: Not on file   Housing Stability:     Unable to Pay for Housing in the Last Year: Not on file    Number of Giovanny in the Last Year: Not on file    Unstable Housing in the Last Year: Not on file     Current Facility-Administered Medications   Medication Dose Route Frequency Provider Last Rate Last Admin    0.9 % sodium chloride bolus  1,000 mL IntraVENous Once Gladis Shaikh, DO        ondansetron [facility]) injection 4 mg  4 mg IntraVENous Once Gladis Long DO        pantoprazole (PROTONIX) injection 40 mg  40 mg IntraVENous Once Gladis Shaikh, DO         Current Outpatient Medications   Medication Sig Dispense Refill    memantine (NAMENDA) 5 MG tablet Take 1 tablet by mouth 2 times daily (Patient not taking: Reported on 6/23/2022) 180 tablet 1    LORazepam (ATIVAN) 0.5 MG tablet 1 tablet at bedtime as needed      donepezil (ARICEPT) 10 MG tablet Take 1 tablet by mouth daily NOTE TO PHARMACY: DO NOT FILL UNTIL 5 MG RX IS COMPLETE 30 tablet 5    levothyroxine (SYNTHROID) 88 MCG tablet Take 1 tablet by mouth Daily 30 tablet 0    lisinopril (PRINIVIL;ZESTRIL) 10 MG tablet Take 1 tablet by mouth daily 90 tablet 0    risperiDONE (RISPERDAL) 0.25 MG tablet Take 1 tablet by mouth nightly 90 tablet 1    Riboflavin (B2 PO) Take by mouth      Cholecalciferol (D3-1000 PO) Take by mouth      insulin glargine (BASAGLAR KWIKPEN) 100 UNIT/ML injection pen Inject 35 Units into the skin 2 times daily 35 units in the morning and 25 units at bedtime (Patient taking differently: Inject 30 Units into the skin 2 times daily 35 units in the morning and 25 units at bedtime) 15 pen 3    gabapentin (NEURONTIN) 100 MG capsule Take 1 capsule by mouth 3 times daily for 90 days.  90 capsule 2    metFORMIN (GLUCOPHAGE-XR) 500 MG extended release tablet Take 1 tablet by mouth 3 times daily 90 tablet 2    NOVOLOG reaction(s): Other - comment required  Shakey/nerveous    Pioglitazone      Other reaction(s): Other - comment required  Dizzy / nauseated       Nursing Notes Reviewed    Physical Exam:  Triage VS:    ED Triage Vitals   Enc Vitals Group      BP 07/10/22 0932 (!) 107/31      Heart Rate 07/10/22 0930 93      Resp 07/10/22 0930 16      Temp 07/10/22 0930 97.6 °F (36.4 °C)      Temp Source 07/10/22 0930 Oral      SpO2 07/10/22 0930 95 %      Weight 07/10/22 0930 145 lb (65.8 kg)      Height 07/10/22 0930 5' (1.524 m)      Head Circumference --       Peak Flow --       Pain Score --       Pain Loc --       Pain Edu? --       Excl. in [address]? --        My pulse ox interpretation is - normal    General appearance:  No acute distress. Skin:  Warm. Dry. Eye:  Extraocular movements intact. Ears, nose, mouth and throat:  Oral mucosa dry, dry blot vomitus around the mouth, dark in color, Hemoccult positive  Neck:  Trachea midline. Extremity:  No swelling. Normal ROM     Heart:  Regular rate and rhythm, normal S1 & S2, no extra heart sounds. Perfusion:  intact  Respiratory:  Lungs clear to auscultation bilaterally. Respirations nonlabored. Abdominal:  Normal bowel sounds. Soft. Nontender. Non distended. Rectal exam: Dark black stool, Hemoccult positive, no masses fissures or hemorrhoids noted  Back:  No CVA tenderness to palpation     Neurological:  Alert and oriented times 3. No focal neuro deficits.              Psychiatric:  Appropriate    I have reviewed and interpreted all of the currently available lab results from this visit (if applicable):  Results for orders placed or performed during the hospital encounter of 07/10/22   Protime-INR   Result Value Ref Range    Protime 17.6 (H) 11.7 - 14.5 SECONDS    INR 1.36 INDEX   PTT   Result Value Ref Range    aPTT 22.6 (L) 25.1 - 37.1 SECONDS   Troponin   Result Value Ref Range    Troponin T <0.010 <0.01 NG/ML   Brain Natriuretic Peptide   Result Value Ref Range    Pro-.7 (H) <300 PG/ML   D-Dimer, Quantitative   Result Value Ref Range    D-Dimer, Quant 344 (H) <230 NG/mL(DDU)   CK   Result Value Ref Range    Total  26 - 140 IU/L   Comprehensive Metabolic Panel   Result Value Ref Range    Sodium 130 (L) 135 - 145 MMOL/L    Potassium 5.5 (HH) 3.5 - 5.1 MMOL/L    Chloride 90 (L) 99 - 110 mMol/L    CO2 9 (L) 21 - 32 MMOL/L    BUN 60 (H) 6 - 23 MG/DL    CREATININE 1.6 (H) 0.6 - 1.1 MG/DL    Glucose 343 (H) 70 - 99 MG/DL    Calcium 8.5 8.3 - 10.6 MG/DL    Albumin 3.3 (L) 3.4 - 5.0 GM/DL    Total Protein 5.9 (L) 6.4 - 8.2 GM/DL    Total Bilirubin 0.6 0.0 - 1.0 MG/DL    ALT 14 10 - 40 U/L    AST 17 15 - 37 IU/L    Alkaline Phosphatase 38 (L) 40 - 129 IU/L    GFR Non- 32 (L) >60 mL/min/1.73m2    GFR  38 (L) >60 mL/min/1.73m2    Anion Gap 31 (H) 4 - 16   Lactic Acid   Result Value Ref Range    Lactate 18.0 (HH) 0.4 - 2.0 mMOL/L   CK   Result Value Ref Range    Total  26 - 140 IU/L   Brain Natriuretic Peptide   Result Value Ref Range    Pro-.9 (H) <300 PG/ML   Troponin   Result Value Ref Range    Troponin T 0.011 (H) <0.01 NG/ML   Comprehensive Metabolic Panel   Result Value Ref Range    Sodium 130 (L) 135 - 145 MMOL/L    Potassium 5.3 (H) 3.5 - 5.1 MMOL/L    Chloride 90 (L) 99 - 110 mMol/L    CO2 9 (L) 21 - 32 MMOL/L    BUN 61 (H) 6 - 23 MG/DL    CREATININE 1.6 (H) 0.6 - 1.1 MG/DL    Glucose 336 (H) 70 - 99 MG/DL    Calcium 8.7 8.3 - 10.6 MG/DL    Albumin 3.2 (L) 3.4 - 5.0 GM/DL    Total Protein 5.7 (L) 6.4 - 8.2 GM/DL    Total Bilirubin 0.6 0.0 - 1.0 MG/DL    ALT 13 10 - 40 U/L    AST 17 15 - 37 IU/L    Alkaline Phosphatase 38 (L) 40 - 129 IU/L    GFR Non- 32 (L) >60 mL/min/1.73m2    GFR  38 (L) >60 mL/min/1.73m2    Anion Gap 31 (H) 4 - 16   SPECIMEN REJECTION   Result Value Ref Range    Rejected Test CD     Reason for Rejection UNABLE TO PERFORM TESTING:    CBC with Auto Differential Result Value Ref Range    WBC 14.7 (H) 4.0 - 10.5 K/CU MM    RBC 1.07 (L) 4.2 - 5.4 M/CU MM    Hemoglobin 3.8 (LL) 12.5 - 16.0 GM/DL    Hematocrit 12.6 (LL) 37 - 47 %    .8 (H) 78 - 100 FL    MCH 35.5 (H) 27 - 31 PG    MCHC 30.2 (L) 32.0 - 36.0 %    RDW 15.8 (H) 11.7 - 14.9 %    Platelets 799 524 - 019 K/CU MM    MPV 12.5 (H) 7.5 - 11.1 FL    Differential Type AUTOMATED DIFFERENTIAL     Segs Relative 81.6 (H) 36 - 66 %    Lymphocytes % 11.3 (L) 24 - 44 %    Monocytes % 6.3 (H) 0 - 4 %    Eosinophils % 0.0 0 - 3 %    Basophils % 0.1 0 - 1 %    Segs Absolute 12.0 K/CU MM    Lymphocytes Absolute 1.7 K/CU MM    Monocytes Absolute 0.9 K/CU MM    Eosinophils Absolute 0.0 K/CU MM    Basophils Absolute 0.0 K/CU MM    Nucleated RBC % 0.6 %    Total Nucleated RBC 0.1 K/CU MM    Total Immature Neutrophil 0.10 K/CU MM    Immature Neutrophil % 0.7 (H) 0 - 0.43 %   POCT Glucose   Result Value Ref Range    POC Glucose 362 (H) 70 - 99 MG/DL   EKG 12 Lead   Result Value Ref Range    Ventricular Rate 94 BPM    Atrial Rate 94 BPM    P-R Interval 158 ms    QRS Duration 88 ms    Q-T Interval 372 ms    QTc Calculation (Bazett) 465 ms    P Axis 62 degrees    R Axis 55 degrees    T Axis 23 degrees    Diagnosis       Normal sinus rhythm  Nonspecific ST abnormality  Abnormal ECG  When compared with ECG of 26-MAR-2022 15:33,  No significant change was found     TYPE AND SCREEN   Result Value Ref Range    ABO/Rh A POSITIVE     Antibody Screen NEGATIVE     Unit Number [id number]     Component LEUKO-POOR RED CELLS     Unit Divison 00     Status ISSUED     Transfusion Status OK TO TRANSFUSE     Crossmatch Result COMPATIBLE     Unit Number [id number]     Component LEUKO-POOR RED CELLS     Unit Divison 00     Status ALLOCATED     Transfusion Status OK TO TRANSFUSE     Crossmatch Result COMPATIBLE       Radiographs (if obtained):  Radiologist's Report Reviewed:  CT CHEST WO CONTRAST   Preliminary Result   1.  Circumferential thickening of the transverse colon and descending colon   indicative of colitis. No evidence of pneumatosis intestinalis or portal   venous gas. 2. Cholelithiasis with mild thickening of the gallbladder wall. If patient   has right upper quadrant abdominal pain, ultrasound would better assess the   gallbladder. 3. Left lower lobe tree-in-bud nodules. Appearance favors an infectious or   inflammatory etiology, including bronchiolitis. 4. Persistent small free fluid in the upper quadrant of the abdomen and   pelvis, not significantly changed since prior exam.   5. IMV to IVC shunt. CT ABDOMEN PELVIS WO CONTRAST Additional Contrast? None   Preliminary Result   1. Circumferential thickening of the transverse colon and descending colon   indicative of colitis. No evidence of pneumatosis intestinalis or portal   venous gas. 2. Cholelithiasis with mild thickening of the gallbladder wall. If patient   has right upper quadrant abdominal pain, ultrasound would better assess the   gallbladder. 3. Left lower lobe tree-in-bud nodules. Appearance favors an infectious or   inflammatory etiology, including bronchiolitis. 4. Persistent small free fluid in the upper quadrant of the abdomen and   pelvis, not significantly changed since prior exam.   5. IMV to IVC shunt. EKG (if obtained): (All EKG's are interpreted by myself in the absence of a cardiologist)      MDM:  Patient presents emergency department for nausea vomiting diarrhea abdominal pain and appears to be dark vomitus per  and nursing staff. Laboratory studies were ordered prior to my evaluation of this patient. I did order patient some IV fluids Protonix and Zofran. I did order CT scan of the abdomen and pelvis. Laboratory data because states they need to redraw patient CBC because her hemoglobin was low. They repeated the CMP instead. Patient hemoglobin of 3.9.   I did type and cross for 2 units of packed red blood cells consent signed by patient's . Patient did vomit and it is Hemoccult positive. Did do rectal exam and black tarry stools was Hemoccult positive again patient not on any blood thinners but does take a baby aspirin daily. Patient with normal CK level. Patient BNP is 799. Patient troponin negative. Repeat troponin elevated 0.011. Patient sodium 130, potassium 5.3, creatinine of 1.6 and BUN of 61. Patient has some renal insufficiency. Patient glucose 336 and she is a diabetic. Patient with normal liver enzymes and lipase. Patient INR 1.36. Patient did have a CT scan chest abdomen pelvis which shows colitis, cholelithiasis left lower lobe tree-in-bud nodules, IMV to IVC shunt, persistent small free fluid in upper quadrant not significantly changed since prior exam.  Patient was ordered cefepime and Flagyl. I Did consult gastroenterologist Dr. Aroldo De Leon. Did speak with Dr. Aroldo De Leon gastroenterologist at 1:43 PM patient was discussed in full detail. He states continue Protonix bolus and antibiotics blood transfusion. Patient is to remain n.p.o. patient became hypotensive I did order a second and third liter of IV fluids which patient is responding. I did consult ICU hospitalist for admission they are agreeable to admit this patient to their service. Clinical Impression:  1. Gastrointestinal hemorrhage, unspecified gastrointestinal hemorrhage type    2. Acute blood loss anemia    3. Colitis    4. Hyperkalemia    5. Acute kidney injury ([facility]    6. Elevated d-dimer      ED Provider Disposition Time  DISPOSITION   1:45 PM    Total critical care time 45 minutes which include multiple evaluation patient, ordering and reviewing laboratory studies, ordering and reviewing imaging studies, ordering blood for blood transfusion obtain consent from patient and , consulting gastroenterologist for GI bleed, consulting ICU for admission, discussing diagnosis treatment plan with patient and family.     Comment: Please note this report has been produced using speech recognition software and may contain errors related to that system including errors in grammar, punctuation, and spelling, as well as words and phrases that may be inappropriate. Efforts were made to edit the dictations.         Nano Bullock,   07/10/22 4733

## 2022-07-11 ENCOUNTER — ANESTHESIA EVENT (OUTPATIENT)
Dept: ENDOSCOPY | Age: 72
DRG: 432 | End: 2022-07-11
Payer: MEDICARE

## 2022-07-11 ENCOUNTER — APPOINTMENT (OUTPATIENT)
Dept: INTERVENTIONAL RADIOLOGY/VASCULAR | Age: 72
DRG: 432 | End: 2022-07-11
Payer: MEDICARE

## 2022-07-11 ENCOUNTER — ANESTHESIA (OUTPATIENT)
Dept: ENDOSCOPY | Age: 72
DRG: 432 | End: 2022-07-11
Payer: MEDICARE

## 2022-07-11 LAB
ANION GAP SERPL CALCULATED.3IONS-SCNC: 17 MMOL/L (ref 4–16)
APTT: 27.4 SECONDS (ref 25.1–37.1)
BUN BLDV-MCNC: 59 MG/DL (ref 6–23)
C-REACTIVE PROTEIN, HIGH SENSITIVITY: 13.6 MG/L
CALCIUM SERPL-MCNC: 7.7 MG/DL (ref 8.3–10.6)
CHLORIDE BLD-SCNC: 101 MMOL/L (ref 99–110)
CO2: 16 MMOL/L (ref 21–32)
CREAT SERPL-MCNC: 1.6 MG/DL (ref 0.6–1.1)
GFR AFRICAN AMERICAN: 38 ML/MIN/1.73M2
GFR NON-AFRICAN AMERICAN: 32 ML/MIN/1.73M2
GLUCOSE BLD-MCNC: 234 MG/DL (ref 70–99)
GLUCOSE BLD-MCNC: 302 MG/DL (ref 70–99)
GLUCOSE BLD-MCNC: 305 MG/DL (ref 70–99)
GLUCOSE BLD-MCNC: 346 MG/DL (ref 70–99)
GLUCOSE BLD-MCNC: 368 MG/DL (ref 70–99)
GLUCOSE BLD-MCNC: 379 MG/DL (ref 70–99)
HBV SURFACE AB TITR SER: <3.5 {TITER}
HCT VFR BLD CALC: 21.2 % (ref 37–47)
HCT VFR BLD CALC: 22.3 % (ref 37–47)
HCT VFR BLD CALC: 22.4 % (ref 37–47)
HCT VFR BLD CALC: 22.6 % (ref 37–47)
HCT VFR BLD CALC: 22.8 % (ref 37–47)
HEMOGLOBIN: 7.4 GM/DL (ref 12.5–16)
HEMOGLOBIN: 7.6 GM/DL (ref 12.5–16)
HEMOGLOBIN: 7.6 GM/DL (ref 12.5–16)
HEMOGLOBIN: 7.7 GM/DL (ref 12.5–16)
HEMOGLOBIN: 7.8 GM/DL (ref 12.5–16)
HEPATITIS B SURFACE ANTIGEN: NON REACTIVE
HEPATITIS C ANTIBODY: NON REACTIVE
INR BLD: 1.31 INDEX
IRON: 299 UG/DL (ref 37–145)
LACTATE: 1.5 MMOL/L (ref 0.4–2)
LACTATE: 1.6 MMOL/L (ref 0.4–2)
LACTATE: 2.2 MMOL/L (ref 0.4–2)
LACTATE: 2.9 MMOL/L (ref 0.4–2)
LACTATE: 3.2 MMOL/L (ref 0.4–2)
LACTATE: 3.5 MMOL/L (ref 0.4–2)
MCH RBC QN AUTO: 32.1 PG (ref 27–31)
MCHC RBC AUTO-ENTMCNC: 34.1 % (ref 32–36)
MCV RBC AUTO: 94.2 FL (ref 78–100)
PCT TRANSFERRIN: 99 % (ref 10–44)
PDW BLD-RTO: 19 % (ref 11.7–14.9)
PLATELET # BLD: 225 K/CU MM (ref 140–440)
PMV BLD AUTO: 11.6 FL (ref 7.5–11.1)
POTASSIUM SERPL-SCNC: 4.9 MMOL/L (ref 3.5–5.1)
PROCALCITONIN: 0.38
PROTHROMBIN TIME: 16.9 SECONDS (ref 11.7–14.5)
RBC # BLD: 2.4 M/CU MM (ref 4.2–5.4)
SODIUM BLD-SCNC: 134 MMOL/L (ref 135–145)
TOTAL IRON BINDING CAPACITY: 301 UG/DL (ref 250–450)
TROPONIN T: 0.05 NG/ML
TROPONIN T: 0.1 NG/ML
TSH HIGH SENSITIVITY: 3.27 UIU/ML (ref 0.27–4.2)
UNSATURATED IRON BINDING CAPACITY: 2 UG/DL (ref 110–370)
WBC # BLD: 27.1 K/CU MM (ref 4–10.5)

## 2022-07-11 PROCEDURE — 85730 THROMBOPLASTIN TIME PARTIAL: CPT

## 2022-07-11 PROCEDURE — C1894 INTRO/SHEATH, NON-LASER: HCPCS

## 2022-07-11 PROCEDURE — 86038 ANTINUCLEAR ANTIBODIES: CPT

## 2022-07-11 PROCEDURE — 86803 HEPATITIS C AB TEST: CPT

## 2022-07-11 PROCEDURE — 2580000003 HC RX 258: Performed by: PHYSICIAN ASSISTANT

## 2022-07-11 PROCEDURE — 3700000000 HC ANESTHESIA ATTENDED CARE: Performed by: SPECIALIST

## 2022-07-11 PROCEDURE — 84484 ASSAY OF TROPONIN QUANT: CPT

## 2022-07-11 PROCEDURE — 6370000000 HC RX 637 (ALT 250 FOR IP): Performed by: PHYSICIAN ASSISTANT

## 2022-07-11 PROCEDURE — 86706 HEP B SURFACE ANTIBODY: CPT

## 2022-07-11 PROCEDURE — 83550 IRON BINDING TEST: CPT

## 2022-07-11 PROCEDURE — 2000000000 HC ICU R&B

## 2022-07-11 PROCEDURE — 87040 BLOOD CULTURE FOR BACTERIA: CPT

## 2022-07-11 PROCEDURE — 3609012300 HC EGD BAND LIGATION ESOPHGEAL/GASTRIC VARICES: Performed by: SPECIALIST

## 2022-07-11 PROCEDURE — 6360000002 HC RX W HCPCS

## 2022-07-11 PROCEDURE — 80048 BASIC METABOLIC PNL TOTAL CA: CPT

## 2022-07-11 PROCEDURE — 2720000010 HC SURG SUPPLY STERILE: Performed by: SPECIALIST

## 2022-07-11 PROCEDURE — 2500000003 HC RX 250 WO HCPCS: Performed by: SPECIALIST

## 2022-07-11 PROCEDURE — 6360000002 HC RX W HCPCS: Performed by: SPECIALIST

## 2022-07-11 PROCEDURE — C9113 INJ PANTOPRAZOLE SODIUM, VIA: HCPCS | Performed by: PHYSICIAN ASSISTANT

## 2022-07-11 PROCEDURE — 83540 ASSAY OF IRON: CPT

## 2022-07-11 PROCEDURE — 94761 N-INVAS EAR/PLS OXIMETRY MLT: CPT

## 2022-07-11 PROCEDURE — 86256 FLUORESCENT ANTIBODY TITER: CPT

## 2022-07-11 PROCEDURE — 6360000002 HC RX W HCPCS: Performed by: PHYSICIAN ASSISTANT

## 2022-07-11 PROCEDURE — 82105 ALPHA-FETOPROTEIN SERUM: CPT

## 2022-07-11 PROCEDURE — 83516 IMMUNOASSAY NONANTIBODY: CPT

## 2022-07-11 PROCEDURE — 2580000003 HC RX 258: Performed by: INTERNAL MEDICINE

## 2022-07-11 PROCEDURE — 6370000000 HC RX 637 (ALT 250 FOR IP): Performed by: SPECIALIST

## 2022-07-11 PROCEDURE — 85610 PROTHROMBIN TIME: CPT

## 2022-07-11 PROCEDURE — 82962 GLUCOSE BLOOD TEST: CPT

## 2022-07-11 PROCEDURE — 99221 1ST HOSP IP/OBS SF/LOW 40: CPT | Performed by: NURSE PRACTITIONER

## 2022-07-11 PROCEDURE — 43244 EGD VARICES LIGATION: CPT | Performed by: SPECIALIST

## 2022-07-11 PROCEDURE — 83605 ASSAY OF LACTIC ACID: CPT

## 2022-07-11 PROCEDURE — 2500000003 HC RX 250 WO HCPCS: Performed by: PHYSICIAN ASSISTANT

## 2022-07-11 PROCEDURE — 6370000000 HC RX 637 (ALT 250 FOR IP): Performed by: INTERNAL MEDICINE

## 2022-07-11 PROCEDURE — 2580000003 HC RX 258: Performed by: SPECIALIST

## 2022-07-11 PROCEDURE — 06L38CZ OCCLUSION OF ESOPHAGEAL VEIN WITH EXTRALUMINAL DEVICE, VIA NATURAL OR ARTIFICIAL OPENING ENDOSCOPIC: ICD-10-PCS | Performed by: SPECIALIST

## 2022-07-11 PROCEDURE — 3700000001 HC ADD 15 MINUTES (ANESTHESIA): Performed by: SPECIALIST

## 2022-07-11 PROCEDURE — 84443 ASSAY THYROID STIM HORMONE: CPT

## 2022-07-11 PROCEDURE — 86140 C-REACTIVE PROTEIN: CPT

## 2022-07-11 PROCEDURE — 84145 PROCALCITONIN (PCT): CPT

## 2022-07-11 PROCEDURE — C1751 CATH, INF, PER/CENT/MIDLINE: HCPCS

## 2022-07-11 PROCEDURE — 84155 ASSAY OF PROTEIN SERUM: CPT

## 2022-07-11 PROCEDURE — 82103 ALPHA-1-ANTITRYPSIN TOTAL: CPT

## 2022-07-11 PROCEDURE — 85018 HEMOGLOBIN: CPT

## 2022-07-11 PROCEDURE — 2709999900 HC NON-CHARGEABLE SUPPLY: Performed by: SPECIALIST

## 2022-07-11 PROCEDURE — 84165 PROTEIN E-PHORESIS SERUM: CPT

## 2022-07-11 PROCEDURE — 86704 HEP B CORE ANTIBODY TOTAL: CPT

## 2022-07-11 PROCEDURE — 85014 HEMATOCRIT: CPT

## 2022-07-11 PROCEDURE — 86708 HEPATITIS A ANTIBODY: CPT

## 2022-07-11 PROCEDURE — 85027 COMPLETE CBC AUTOMATED: CPT

## 2022-07-11 PROCEDURE — 87340 HEPATITIS B SURFACE AG IA: CPT

## 2022-07-11 PROCEDURE — 36592 COLLECT BLOOD FROM PICC: CPT

## 2022-07-11 PROCEDURE — 2709999900 HC NON-CHARGEABLE SUPPLY

## 2022-07-11 RX ORDER — INSULIN GLARGINE 100 [IU]/ML
15 INJECTION, SOLUTION SUBCUTANEOUS ONCE
Status: COMPLETED | OUTPATIENT
Start: 2022-07-11 | End: 2022-07-11

## 2022-07-11 RX ORDER — INSULIN LISPRO 100 [IU]/ML
6 INJECTION, SOLUTION INTRAVENOUS; SUBCUTANEOUS
Status: DISCONTINUED | OUTPATIENT
Start: 2022-07-12 | End: 2022-07-14

## 2022-07-11 RX ORDER — PROPOFOL 10 MG/ML
INJECTION, EMULSION INTRAVENOUS PRN
Status: DISCONTINUED | OUTPATIENT
Start: 2022-07-11 | End: 2022-07-11 | Stop reason: SDUPTHER

## 2022-07-11 RX ORDER — OCTREOTIDE ACETATE 100 UG/ML
50 INJECTION, SOLUTION INTRAVENOUS; SUBCUTANEOUS ONCE
Status: COMPLETED | OUTPATIENT
Start: 2022-07-11 | End: 2022-07-11

## 2022-07-11 RX ORDER — INSULIN LISPRO 100 [IU]/ML
10 INJECTION, SOLUTION INTRAVENOUS; SUBCUTANEOUS ONCE
Status: COMPLETED | OUTPATIENT
Start: 2022-07-11 | End: 2022-07-11

## 2022-07-11 RX ORDER — PANTOPRAZOLE SODIUM 40 MG/1
40 TABLET, DELAYED RELEASE ORAL
Status: DISCONTINUED | OUTPATIENT
Start: 2022-07-12 | End: 2022-07-15 | Stop reason: HOSPADM

## 2022-07-11 RX ADMIN — PROPOFOL 100 MG: 10 INJECTION, EMULSION INTRAVENOUS at 07:22

## 2022-07-11 RX ADMIN — INSULIN LISPRO 15 UNITS: 100 INJECTION, SOLUTION INTRAVENOUS; SUBCUTANEOUS at 09:04

## 2022-07-11 RX ADMIN — SODIUM CHLORIDE: 9 INJECTION, SOLUTION INTRAVENOUS at 06:23

## 2022-07-11 RX ADMIN — SODIUM CHLORIDE, PRESERVATIVE FREE 10 ML: 5 INJECTION INTRAVENOUS at 09:03

## 2022-07-11 RX ADMIN — OCTREOTIDE ACETATE 50 MCG/HR: 500 INJECTION, SOLUTION INTRAVENOUS; SUBCUTANEOUS at 21:53

## 2022-07-11 RX ADMIN — ONDANSETRON 4 MG: 4 TABLET, ORALLY DISINTEGRATING ORAL at 19:48

## 2022-07-11 RX ADMIN — ONDANSETRON 4 MG: 2 INJECTION INTRAMUSCULAR; INTRAVENOUS at 06:22

## 2022-07-11 RX ADMIN — LATANOPROST 1 DROP: 50 SOLUTION/ DROPS OPHTHALMIC at 22:11

## 2022-07-11 RX ADMIN — SODIUM CHLORIDE, PRESERVATIVE FREE 10 ML: 5 INJECTION INTRAVENOUS at 21:44

## 2022-07-11 RX ADMIN — INSULIN LISPRO 6 UNITS: 100 INJECTION, SOLUTION INTRAVENOUS; SUBCUTANEOUS at 21:42

## 2022-07-11 RX ADMIN — METRONIDAZOLE 500 MG: 500 INJECTION, SOLUTION INTRAVENOUS at 21:52

## 2022-07-11 RX ADMIN — SODIUM CHLORIDE, PRESERVATIVE FREE 10 ML: 5 INJECTION INTRAVENOUS at 09:04

## 2022-07-11 RX ADMIN — ONDANSETRON 4 MG: 2 INJECTION INTRAMUSCULAR; INTRAVENOUS at 15:13

## 2022-07-11 RX ADMIN — CEFTRIAXONE 1000 MG: 1 INJECTION, POWDER, FOR SOLUTION INTRAMUSCULAR; INTRAVENOUS at 00:33

## 2022-07-11 RX ADMIN — INSULIN LISPRO 6 UNITS: 100 INJECTION, SOLUTION INTRAVENOUS; SUBCUTANEOUS at 17:23

## 2022-07-11 RX ADMIN — RISPERIDONE 0.25 MG: 0.5 TABLET ORAL at 21:44

## 2022-07-11 RX ADMIN — INSULIN LISPRO 10 UNITS: 100 INJECTION, SOLUTION INTRAVENOUS; SUBCUTANEOUS at 05:55

## 2022-07-11 RX ADMIN — METRONIDAZOLE 500 MG: 500 INJECTION, SOLUTION INTRAVENOUS at 11:57

## 2022-07-11 RX ADMIN — INSULIN GLARGINE 15 UNITS: 100 INJECTION, SOLUTION SUBCUTANEOUS at 22:10

## 2022-07-11 RX ADMIN — Medication 8 MCG/MIN: at 11:57

## 2022-07-11 RX ADMIN — INSULIN LISPRO 12 UNITS: 100 INJECTION, SOLUTION INTRAVENOUS; SUBCUTANEOUS at 12:01

## 2022-07-11 RX ADMIN — SODIUM CHLORIDE 25 ML: 9 INJECTION, SOLUTION INTRAVENOUS at 00:33

## 2022-07-11 RX ADMIN — OCTREOTIDE ACETATE 50 MCG/HR: 500 INJECTION, SOLUTION INTRAVENOUS; SUBCUTANEOUS at 11:42

## 2022-07-11 RX ADMIN — METRONIDAZOLE 500 MG: 500 INJECTION, SOLUTION INTRAVENOUS at 05:00

## 2022-07-11 RX ADMIN — SODIUM CHLORIDE: 9 INJECTION, SOLUTION INTRAVENOUS at 16:50

## 2022-07-11 RX ADMIN — PROPOFOL 150 MG: 10 INJECTION, EMULSION INTRAVENOUS at 07:14

## 2022-07-11 RX ADMIN — INSULIN GLARGINE 15 UNITS: 100 INJECTION, SOLUTION SUBCUTANEOUS at 05:55

## 2022-07-11 RX ADMIN — PANTOPRAZOLE SODIUM 40 MG: 40 INJECTION, POWDER, FOR SOLUTION INTRAVENOUS at 09:02

## 2022-07-11 RX ADMIN — OCTREOTIDE ACETATE 50 MCG: 100 INJECTION, SOLUTION INTRAVENOUS; SUBCUTANEOUS at 07:55

## 2022-07-11 ASSESSMENT — PAIN DESCRIPTION - ONSET: ONSET: ON-GOING

## 2022-07-11 ASSESSMENT — PAIN DESCRIPTION - DIRECTION: RADIATING_TOWARDS: NO WHERE

## 2022-07-11 ASSESSMENT — PAIN SCALES - GENERAL: PAINLEVEL_OUTOF10: 7

## 2022-07-11 ASSESSMENT — PAIN DESCRIPTION - DESCRIPTORS: DESCRIPTORS: CRAMPING;DISCOMFORT

## 2022-07-11 ASSESSMENT — PAIN DESCRIPTION - LOCATION: LOCATION: ABDOMEN

## 2022-07-11 ASSESSMENT — PAIN DESCRIPTION - ORIENTATION: ORIENTATION: MID

## 2022-07-11 ASSESSMENT — PAIN - FUNCTIONAL ASSESSMENT: PAIN_FUNCTIONAL_ASSESSMENT: ACTIVITIES ARE NOT PREVENTED

## 2022-07-11 ASSESSMENT — PAIN DESCRIPTION - PAIN TYPE: TYPE: ACUTE PAIN

## 2022-07-11 ASSESSMENT — PAIN DESCRIPTION - FREQUENCY: FREQUENCY: CONTINUOUS

## 2022-07-11 NOTE — ANESTHESIA PRE PROCEDURE
Department of Anesthesiology  Preprocedure Note       Name:  Smita Hyatt   Age:  67 y.o.  :  1950                                          MRN:  [id number]         Date:  2022      Surgeon: Chad Cuadra):  Bg Dover MD    Procedure: Procedure(s):  EGD DIAGNOSTIC ONLY    Medications prior to admission:   Prior to Admission medications    Medication Sig Start Date End Date Taking? Authorizing Provider   memantine (NAMENDA) 5 MG tablet Take 1 tablet by mouth 2 times daily  Patient not taking: Reported on 2022   Mei Díaz DO   LORazepam (ATIVAN) 0.5 MG tablet 1 tablet at bedtime as needed    Historical Provider, MD   donepezil (ARICEPT) 10 MG tablet Take 1 tablet by mouth daily NOTE TO PHARMACY: DO NOT FILL UNTIL 5 MG RX IS COMPLETE 3/15/22   Sarita Neumann DO   levothyroxine (SYNTHROID) 88 MCG tablet Take 1 tablet by mouth Daily 21   Franchesca Salazar MD   lisinopril (PRINIVIL;ZESTRIL) 10 MG tablet Take 1 tablet by mouth daily 20   ATILIO Morrell CNP   risperiDONE (RISPERDAL) 0.25 MG tablet Take 1 tablet by mouth nightly 20   ATILIO Morrell CNP   Riboflavin (B2 PO) Take by mouth    Historical Provider, MD   Cholecalciferol (D3-1000 PO) Take by mouth    Historical Provider, MD   insulin glargine (BASAGLAR KWIKPEN) 100 UNIT/ML injection pen Inject 35 Units into the skin 2 times daily 35 units in the morning and 25 units at bedtime  Patient taking differently: Inject 30 Units into the skin 2 times daily 35 units in the morning and 25 units at bedtime 9/15/20   ATILIO Morrell CNP   gabapentin (NEURONTIN) 100 MG capsule Take 1 capsule by mouth 3 times daily for 90 days.  9/15/20 12/14/20  ATILIO Morrell CNP   metFORMIN (GLUCOPHAGE-XR) 500 MG extended release tablet Take 1 tablet by mouth 3 times daily 9/15/20   ATILIO Morrell CNP   NOVOLOG FLEXPEN 100 UNIT/ML injection pen 14 units with largest meal of the day, 7 units with each smaller meal  Patient taking differently: Inject into the skin 25 units with largest meal of the day, 7 units with each smaller meal 8/14/20   Miguel Angelstroyer Sheppard, MD   blood glucose monitor strips Test 2 times a day & as needed for symptoms of irregular blood glucose. Dispense sufficient amount for indicated testing frequency plus additional to accommodate PRN testing needs. Dx: E11.40, z79,4, n18.3 6/22/20   Miguel Angelstroyer Faster, MD   lovastatin (MEVACOR) 40 MG tablet Take 1 tablet by mouth nightly 6/18/20   Frosty Faster, MD   OneTouch Delica Lancets 50J MISC Test blood sugar 1-2 times a day as directed. Dx: E11.65 4/17/20   Frosty Faster, MD   UNABLE TO FIND Bilateral breast prosthesis 1/13/20   Frosty Faster, MD   UNABLE TO FIND Bra for bilateral prosthesis 1/13/20   Frosty Faster, MD   bimatoprost (LUMIGAN) 0.03 % ophthalmic drops Place 1 drop into both eyes nightly    Historical Provider, MD   blood glucose test strips (ASCENSIA AUTODISC VI;ONE TOUCH ULTRA TEST VI) strip 1 each by In Vitro route daily As needed.  Dx: E11.40 4/5/19   Frosty Faster, MD   calcium carbonate (OSCAL) 500 MG TABS tablet Take 500 mg by mouth daily    Historical Provider, MD   aspirin 81 MG tablet Take 1 tablet by mouth    Historical Provider, MD   Cetirizine HCl 10 MG CAPS Take by mouth    Historical Provider, MD   Cranberry 22940 MG CAPS Take by mouth    Historical Provider, MD   Docusate Sodium (STOOL SOFTENER) 100 MG TABS Take by mouth    Historical Provider, MD   GARLIC PO Take by mouth    Historical Provider, MD   Multiple Vitamins-Minerals (ICAPS AREDS 2 PO) Take by mouth    Historical Provider, MD   ferrous sulfate 325 (65 Fe) MG tablet Take 325 mg by mouth daily (with breakfast)    Historical Provider, MD   ranitidine (ACID REDUCER) 150 MG tablet Take 150 mg by mouth 2 times daily    Historical Provider, MD   Pomegranate, Punica granatum, (POMEGRANATE PO) Take 500 mg by mouth daily    Historical Provider, MD       Current Units  0-9 Units SubCUTAneous Nightly Krystal Herring PA-C        lidocaine PF 1 % injection 5 mL  5 mL IntraDERmal Once Krystal Herring PA-C        sodium chloride flush 0.9 % injection 5-40 mL  5-40 mL IntraVENous 2 times per day Pito Chang PA-C   10 mL at 07/10/22 2028    sodium chloride flush 0.9 % injection 5-40 mL  5-40 mL IntraVENous PRN Krystal Herring PA-C        0.9 % sodium chloride infusion   IntraVENous PRN Pito Chang PA-C   Stopped at 07/11/22 0615    cefTRIAXone (ROCEPHIN) 1000 mg IVPB in 50 mL D5W minibag  1,000 mg IntraVENous Q24H Krystal Herring PA-C   Stopped at 07/11/22 0104    metronidazole (FLAGYL) 500 mg in 0.9% NaCl 100 mL IVPB premix  500 mg IntraVENous Q8H Krystal Goss PA-C   Stopped at 07/11/22 0601    norepinephrine (LEVOPHED) 16 mg in sodium chloride 0.9 % 250 mL infusion  1-100 mcg/min IntraVENous Continuous Krystal Herring PA-C 15 mL/hr at 07/11/22 0624 16 mcg/min at 07/11/22 0624    insulin glargine (LANTUS) injection vial 15 Units  15 Units SubCUTAneous Nightly Krystal Herring PA-C   15 Units at 07/10/22 2132    pantoprazole (PROTONIX) injection 40 mg  40 mg IntraVENous BID Peola Gerry Herring PA-C   40 mg at 07/10/22 2029    latanoprost (XALATAN) 0.005 % ophthalmic solution 1 drop  1 drop Both Eyes Nightly Krystal Herring PA-C   1 drop at 07/10/22 2038    risperiDONE (RISPERDAL) tablet 0.25 mg  0.25 mg Oral Nightly Krystal Herring PA-C        levothyroxine (SYNTHROID) tablet 88 mcg  88 mcg Oral Daily Krystal Herring PA-C        donepezil (ARICEPT) tablet 10 mg  10 mg Oral Daily Krystal Herring PA-C        0.9 % sodium chloride infusion   IntraVENous PRN Krystal Herring PA-C        octreotide (SANDOSTATIN) 500 mcg in sodium chloride 0.9 % 100 mL infusion  50 mcg/hr IntraVENous Continuous Cristy Coronel MD 10 mL/hr at 07/11/22 0638 50 mcg/hr at 07/11/22 3190       Allergies:     Allergies   Allergen Reactions    Bupropion      Other reaction(s): Other - comment required  Suicidal ideation    Nsaids      Other reaction(s): Other - comment required  Elevated Serum Creatinine    Amlodipine     Nateglinide Nausea Only    Ofloxacin Hives    Paroxetine Hcl Hives     Other reaction(s): Other - comment required  Shakey/nerveous    Pioglitazone      Other reaction(s): Other - comment required  Dizzy / nauseated       Problem List:    Patient Active Problem List   Diagnosis Code    Acquired hypothyroidism E03.9    Depression F32. A    Diabetic neuropathy (HCC) E11.40    DM (diabetes mellitus) ([facility] [address] E11.9    HTN (hypertension) I10    Hyperlipidemia E78.5    Osteopenia M85.80    Personal history of breast cancer Z85.3    Allergic rhinitis J30.9    [facility] N17.9    Gastroenteritis K52.9    GI bleed K92.2       Past Medical History:        Diagnosis Date    [facility] ([facility]     Diabetes mellitus ([facility]     Glaucoma     Gout     Hyperlipidemia     Hypertension     Neuropathy     Thyroid disease        Past Surgical History:        Procedure Laterality Date     SECTION      MASTECTOMY, BILATERAL  10/26/2007       Social History:    Social History     Tobacco Use    Smoking status: Never Smoker    Smokeless tobacco: Never Used   Substance Use Topics    Alcohol use:  No                                Counseling given: Not Answered      Vital Signs (Current):   Vitals:    22 0623 22 0630 22 0645 22 0700   BP: (!) 134/42 (!) 141/59 (!) 142/48 (!) 135/53   Pulse: (!) 112 (!) 116 (!) 116 (!) 119   Resp:    Temp:       TempSrc:       SpO2: 94% 98%     Weight:       Height:                                                  BP Readings from Last 3 Encounters:   22 (!) 135/53   22 (!) 190/96   22 136/60       NPO Status:                                                                                 BMI:   Wt Readings from Last 3 Encounters: 07/11/22 159 lb 6.3 oz (72.3 kg)   06/23/22 145 lb (65.8 kg)   04/06/22 145 lb (65.8 kg)     Body mass index is 31.13 kg/m².     CBC:   Lab Results   Component Value Date/Time    WBC 27.1 07/11/2022 04:50 AM    RBC 2.40 07/11/2022 04:50 AM    HGB 7.7 07/11/2022 04:50 AM    HCT 22.6 07/11/2022 04:50 AM    MCV 94.2 07/11/2022 04:50 AM    RDW 19.0 07/11/2022 04:50 AM     07/11/2022 04:50 AM       CMP:   Lab Results   Component Value Date/Time     07/11/2022 04:50 AM    K 4.9 07/11/2022 04:50 AM     07/11/2022 04:50 AM    CO2 16 07/11/2022 04:50 AM    BUN 59 07/11/2022 04:50 AM    CREATININE 1.6 07/11/2022 04:50 AM    GFRAA 38 07/11/2022 04:50 AM    AGRATIO 0.9 01/27/2022 08:55 AM    LABGLOM 32 07/11/2022 04:50 AM    GLUCOSE 379 07/11/2022 04:50 AM    PROT 5.7 07/10/2022 10:46 AM    CALCIUM 7.7 07/11/2022 04:50 AM    BILITOT 0.6 07/10/2022 10:46 AM    ALKPHOS 38 07/10/2022 10:46 AM    AST 17 07/10/2022 10:46 AM    ALT 13 07/10/2022 10:46 AM       POC Tests:   Recent Labs     07/11/22  0032   POCGLU 346*       Coags:   Lab Results   Component Value Date/Time    PROTIME 16.9 07/11/2022 04:50 AM    INR 1.31 07/11/2022 04:50 AM    APTT 27.4 07/11/2022 04:50 AM       HCG (If Applicable): No results found for: PREGTESTUR, PREGSERUM, HCG, HCGQUANT     ABGs:   Lab Results   Component Value Date/Time    PO2ART 63 03/21/2019 06:15 AM    DOW9YHR 39.0 03/21/2019 06:15 AM    HRJ5CLP 29.0 03/21/2019 06:15 AM        Type & Screen (If Applicable):  No results found for: LABABO, LABRH    Drug/Infectious Status (If Applicable):  No results found for: HIV, HEPCAB    COVID-19 Screening (If Applicable): No results found for: COVID19        Anesthesia Evaluation    Airway: Mallampati: II  TM distance: <3 FB   Neck ROM: full  Mouth opening: > = 3 FB   Dental:          Pulmonary: breath sounds clear to auscultation                             Cardiovascular:  Exercise tolerance: good (>4 METS),   (+) hypertension:, hyperlipidemia        Rhythm: regular  Rate: abnormal           Beta Blocker:  Not on Beta Blocker         Neuro/Psych:               GI/Hepatic/Renal:             Endo/Other:    (+) hypothyroidism::., .                 Abdominal:             Vascular: Other Findings:           Anesthesia Plan      MAC     ASA 3 - emergent       Induction: intravenous. Anesthetic plan and risks discussed with patient.                         ATILIO Smith - JURGEN   7/11/2022

## 2022-07-11 NOTE — PROGRESS NOTES
This RN received a call from [facility] center, saying Dr. Hiram Kovacs wanted a Retuculocyte count lab test. Dr. Tung Price notified

## 2022-07-11 NOTE — CONSULTS
swallowing. She did recall having diarrhea with dark stool on Friday. No current diarrhea or constipation. No jaundice, extremity swelling, foggy brain or pruritus. No family history of stomach or colon cancer. Past Medical History:        Diagnosis Date    Cancer ([facility]     Diabetes mellitus ([facility]     Glaucoma     Gout     Hyperlipidemia     Hypertension     Neuropathy     Thyroid disease        Past Surgical History:        Procedure Laterality Date     SECTION      MASTECTOMY, BILATERAL  10/26/2007       Medications Prior to Admission:    Prior to Admission medications    Medication Sig Start Date End Date Taking? Authorizing Provider   memantine (NAMENDA) 5 MG tablet Take 1 tablet by mouth 2 times daily  Patient not taking: Reported on 2022   Suellen Díaz DO   LORazepam (ATIVAN) 0.5 MG tablet 1 tablet at bedtime as needed    Historical Provider, MD   donepezil (ARICEPT) 10 MG tablet Take 1 tablet by mouth daily NOTE TO PHARMACY: DO NOT FILL UNTIL 5 MG RX IS COMPLETE 3/15/22   Lizette Storey DO   levothyroxine (SYNTHROID) 88 MCG tablet Take 1 tablet by mouth Daily 21   Zhanna Gruber MD   lisinopril (PRINIVIL;ZESTRIL) 10 MG tablet Take 1 tablet by mouth daily 20   ATILIO Bravo Cera, CNP   risperiDONE (RISPERDAL) 0.25 MG tablet Take 1 tablet by mouth nightly 20   ATILIO Bravo Cera, CNP   Riboflavin (B2 PO) Take by mouth    Historical Provider, MD   Cholecalciferol (D3-1000 PO) Take by mouth    Historical Provider, MD   insulin glargine (BASAGLAR KWIKPEN) 100 UNIT/ML injection pen Inject 35 Units into the skin 2 times daily 35 units in the morning and 25 units at bedtime  Patient taking differently: Inject 30 Units into the skin 2 times daily 35 units in the morning and 25 units at bedtime 9/15/20   ATILIO Bravo Cera, CNP   gabapentin (NEURONTIN) 100 MG capsule Take 1 capsule by mouth 3 times daily for 90 days.  9/15/20 12/14/20  ATILIO Bravo Cera, CNP   metFORMIN (GLUCOPHAGE-XR) 500 MG extended release tablet Take 1 tablet by mouth 3 times daily 9/15/20   Lucia Laughlin, APRN - CNP   NOVOLOG FLEXPEN 100 UNIT/ML injection pen 14 units with largest meal of the day, 7 units with each smaller meal  Patient taking differently: Inject into the skin 25 units with largest meal of the day, 7 units with each smaller meal 8/14/20   Whitley Michelle MD   blood glucose monitor strips Test 2 times a day & as needed for symptoms of irregular blood glucose. Dispense sufficient amount for indicated testing frequency plus additional to accommodate PRN testing needs. Dx: E11.40, z79,4, n18.3 6/22/20   Whitley Michelle MD   lovastatin (MEVACOR) 40 MG tablet Take 1 tablet by mouth nightly 6/18/20   Whitley Michelle MD   OneTouch Delica Lancets 61D MISC Test blood sugar 1-2 times a day as directed. Dx: E11.65 4/17/20   Whitley Michelle MD   UNABLE TO FIND Bilateral breast prosthesis 1/13/20   Whitley Michelle MD   UNABLE TO FIND Bra for bilateral prosthesis 1/13/20   Whitley Michelle MD   bimatoprost (LUMIGAN) 0.03 % ophthalmic drops Place 1 drop into both eyes nightly    Historical Provider, MD   blood glucose test strips (ASCENSIA AUTODISC VI;ONE TOUCH ULTRA TEST VI) strip 1 each by In Vitro route daily As needed.  Dx: E11.40 4/5/19   Whitley Michelle MD   calcium carbonate (OSCAL) 500 MG TABS tablet Take 500 mg by mouth daily    Historical Provider, MD   aspirin 81 MG tablet Take 1 tablet by mouth    Historical Provider, MD   Cetirizine HCl 10 MG CAPS Take by mouth    Historical Provider, MD   Cranberry 76894 MG CAPS Take by mouth    Historical Provider, MD   Docusate Sodium (STOOL SOFTENER) 100 MG TABS Take by mouth    Historical Provider, MD   GARLIC PO Take by mouth    Historical Provider, MD   Multiple Vitamins-Minerals (ICAPS AREDS 2 PO) Take by mouth    Historical Provider, MD   ferrous sulfate 325 (65 Fe) MG tablet Take 325 mg by mouth daily (with breakfast)    Historical Provider, MD   ranitidine (ACID REDUCER) 150 MG tablet Take 150 mg by mouth 2 times daily    Historical Provider, MD   Pomegranate, Punica granatum, (POMEGRANATE PO) Take 500 mg by mouth daily    Historical Provider, MD       Allergies: Allergies   Allergen Reactions    Bupropion      Other reaction(s): Other - comment required  Suicidal ideation    Nsaids      Other reaction(s): Other - comment required  Elevated Serum Creatinine    Amlodipine     Nateglinide Nausea Only    Ofloxacin Hives    Paroxetine Hcl Hives     Other reaction(s): Other - comment required  Shakey/nerveous    Pioglitazone      Other reaction(s): Other - comment required  Dizzy / nauseated   . Social History:    TOBACCO:  No  ETOH:  No    Family History:   No family history on file. REVIEW OF SYSTEMS: (POSITIVES WILL BE HIGHLIGHTED)  CONSTITUTIONAL:    Weight change,fatigue, fever, chills  EYES:  Diplopia, change in vision  EARS:  hearing loss, tinnitus, vertigo  NOSE:   epistaxis  MOUTH/THROAT:     hoarseness, sore throat. RESPIRATORY:  SOB,  cough, sputum, hemoptysis  CARDIOVASCULAR : chest pain,palpitations, dyspnea exertion, orthopnea, paroxysmal nocturnal dyspnea, pedal edema. GASTROINTESTINAL:  See HPI  GENITOURINARY:   dysuria, hematuria, . HEMATOLOGIC/LYMPHATIC:   Anemia, bleeding tendencies.   MUSCULOSKELETAL:    myalgias,  joint pain  NEUROLOGICAL:   Loss of Consciousness, paresthesias, anesthesias, focal weakness  SKIN :   History of dermatitis, rashes  PSYCHIATRIC:  depression, , anxiety past psychosis  ENDOCRINE:  History of diabetes, thyroid disease  ALL/IMM : allergies, rashes    PHYSICAL EXAM:    Vitals:  BP (!) 141/59   Pulse (!) 116   Temp 98.6 °F (37 °C) (Oral)   Resp 20   Ht 5' (1.524 m)   Wt 159 lb 6.3 oz (72.3 kg)   SpO2 98%   BMI 31.13 kg/m²   CONSTITUTIONAL: alert, cooperative, no apparent distress,   EYES:  pupils equal, round and reactive to light and sclera clear  ENT:  normocepalic, without obvious abnormality  NECK:  supple, symmetrical, trachea midline  HEMATOLOGIC/LYMPHATICS:  no cervical lymphadenopathy and no supraclavicular lymphadenopathy  LUNGS:  clear to auscultation  CARDIOVASCULAR:  Sinus tachycardia and no murmur noted  ABDOMEN:  Soft, non-tender with normal bowel sounds. No organomegaly or masses  NEUROLOGIC: no focal deficit detected  SKIN:  no lesions  EXTREMITIES: no clubbing, cyanosis, or edema    IMPRESSION:  1) Acute GI blood loss anemia secondary to possible esophageal varices  2) Hematemesis with dark stools   3) Cirrhosis secondary to fatty liver- decompensated with MELD score 14, MELD Na 21  4)  Non-compliant to recommendations for follow-up and EGD/colonoscopy    RECOMMENDATIONS:  1) EGD this morning to evaluate for esophageal varices  2) Continue with Rocephin 1-2 grams daily for five days  3)  H&H every six hours and transfuse as indicated  4)  BMP daily x 3  5)  Continue with Protonix 40 mg IV bid pending EGD  6)  Continue with supportive care and will follow    The patient was seen and examined by myself and I agree with Linnea Iraheta impression and recommendations. Keshia Lugo.  Roro Abdi M.D

## 2022-07-11 NOTE — BRIEF OP NOTE
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

## 2022-07-11 NOTE — CARE COORDINATION
CM reviewed chart and discussed in IDR. CM met with pt to initiate discharge planning. Role of CM explained. Pt has insurance and is able to afford medication. Pt has PCP. She lives at home with her . She stated that she drives as does her . She stated that she was independent at home. She plans to return home. CM asked if she was open to having [facility] when discharged. Pt stated, \"We will see\". CM contact information given to pt. CM team available as needs arise.

## 2022-07-11 NOTE — PROGRESS NOTES
Dr. Grant Sandra at bedside discussing EGD with pt and . Risks and benefits explained, consent signed at this time.

## 2022-07-11 NOTE — PROGRESS NOTES
V2.0  [facility] Hospitalist Progress Note      Name:  Smita Hyatt /Age/Sex: 1950  (67 y.o. female)   MRN & CSN:  [id number] & [id number] Encounter Date/Time: 2022 12:13 PM EDT    Location:  -A PCP: Dhruv Li MD       Hospital Day: 2    Assessment and Plan:   Smita Hyatt is a 67 y.o. female who presents with GI bleed      Plan:  1. Upper GI bleed-EGD : Distal esophagus large varices, 6 bands placed. GI following. On Protonix and octreotide. Patient will need Rocephin for 5 days. Octreotide infusion for 3-5 days. Will need EVL every 2-6 weeks until varices decompressed. 2. Acute on chronic blood loss anemia-hemoglobin 3.8. Transfused 3 unit PRBC. Improved hemoglobin 7.4.  3. Advanced fibrosis and cirrhosis- abdominal ultrasound 2022 suggestive of cirrhosis. 4. High anion gap metabolic acidosis due to lactic acidosis- lactate 18. Bicarb 9. Given IVF. Improved lactate 3.5. Improved bicarb from 9 to 16.  5. Shock due to blood loss- transfused blood. On IV fluids. On Rocephin and Flagyl for possible intra-abdominal infection. On Levophed 15 mcg/min. Procalcitonin 0.38, may be due to kidney injury. CRP 13.  CT abdomen pelvis: Circumferential thickening of transverse colon and descending colon. CT chest: Left lower lobe tree-in-bud nodules. 6. Acute kidney injury-likely prerenal from blood loss and low blood pressure. Received blood and fluid. Creatinine 1.6. Monitor. Check UA. 7. Elevated troponin- likely due to ANTONINA. EKG: NSR. Monitor troponins. 8. DM2 with hyperglycemia-on Lantus and sliding scale insulin. Start prandial insulin.     History of lobular carcinoma of right breast, ER positive s/p bilateral mastectomy-  History of distal cell carcinoma- left lip  Diabetes mellitus type 2-insulin-dependent  History of hypertension-on 10 mg of oral lisinopril-has been hypotensive for the last 10 days -not taking lisinopril  Hypothyroidism-continue levothyroxine  Dementia without behavioral disturbance  Anxiety  Gout    Diet Diet NPO Exceptions are: Ice Chips  ADULT DIET; Clear Liquid    DVT Prophylaxis [] Lovenox, []  Heparin, [x] SCDs, [] Ambulation,  [] Eliquis, [] Xarelto  [] Coumadin   Code Status Full Code   Disposition From: home  Expected Disposition: home  Estimated Date of Discharge: 7/16  Patient requires continued admission due to GI bleed   Home O2 none     Subjective/Interval history:   Chief Complaint: Hematemesis, Abdominal Pain, and Diarrhea     Patient states she came in because she was vomiting for the last three days at home. She saw blood in her emesis. Has a fatty liver. She had hematemesis in the past once but it resolved on its own. Review of Systems:    Negative unless mentioned above    Objective: Intake/Output Summary (Last 24 hours) at 7/11/2022 1213  Last data filed at 7/11/2022 0726  Gross per 24 hour   Intake 3698.81 ml   Output --   Net 3698.81 ml        Vitals:   BP (!) 113/42   Pulse (!) 105   Temp 98.4 °F (36.9 °C) (Oral)   Resp 22   Ht 5' (1.524 m)   Wt 159 lb 6.3 oz (72.3 kg)   SpO2 92%   BMI 31.13 kg/m²     Physical Exam:   General: NAD  Eyes: no discharge  HENT: NCAT  Cardiovascular: slightly tachycardic  Respiratory: occasional crackle  Gastrointestinal: Soft, non tender, nondistended  Genitourinary: no suprapubic tenderness  Musculoskeletal: No edema, nontender  Skin: warm, dry, no gross lesions  Neuro: Alert. No gross deficits  Psych: Mood appropriate.      Medications:   Medications:    [START ON 7/12/2022] pantoprazole  40 mg Oral QAM AC    sodium zirconium cyclosilicate  10 g Oral Once    dextrose bolus  250 mL IntraVENous Once    sodium chloride flush  5-40 mL IntraVENous 2 times per day    insulin lispro  0-18 Units SubCUTAneous TID WC    insulin lispro  0-9 Units SubCUTAneous Nightly    lidocaine PF  5 mL IntraDERmal Once    sodium chloride flush  5-40 mL IntraVENous 2 times per day    cefTRIAXone (ROCEPHIN) IV  1,000 mg IntraVENous Q24H    metroNIDAZOLE  500 mg IntraVENous Q8H    insulin glargine  15 Units SubCUTAneous Nightly    latanoprost  1 drop Both Eyes Nightly    risperiDONE  0.25 mg Oral Nightly    levothyroxine  88 mcg Oral Daily      Infusions:    sodium chloride      dextrose      sodium chloride      sodium chloride Stopped (07/11/22 0726)    sodium chloride Stopped (07/11/22 0615)    norepinephrine 10 mcg/min (07/11/22 1206)    sodium chloride      octreotide (SandoSTATIN) infusion 50 mcg/hr (07/11/22 1142)     PRN Meds: sodium chloride, , PRN  glucose, 4 tablet, PRN  dextrose bolus, 125 mL, PRN   Or  dextrose bolus, 250 mL, PRN  glucagon (rDNA), 1 mg, PRN  dextrose, 100 mL/hr, PRN  sodium chloride flush, 5-40 mL, PRN  sodium chloride, , PRN  ondansetron, 4 mg, Q8H PRN   Or  ondansetron, 4 mg, Q6H PRN  acetaminophen, 650 mg, Q6H PRN   Or  acetaminophen, 650 mg, Q6H PRN  sodium chloride flush, 5-40 mL, PRN  sodium chloride, , PRN  sodium chloride, , PRN        Labs      Recent Labs     07/10/22  0945 07/10/22  1715 07/11/22  0032 07/11/22  0450 07/11/22  0845   WBC 14.7*  --   --  27.1*  --    HGB 3.8*   < > 7.8* 7.7* 7.4*   HCT 12.6*   < > 22.8* 22.6* 21.2*     --   --  225  --     < > = values in this interval not displayed.       Recent Labs     07/10/22  1046 07/10/22  1715 07/11/22  0450   * 131* 134*   K 5.3* 5.1 4.9   CL 90* 96* 101   CO2 9* 12* 16*   BUN 61* 61* 59*   CREATININE 1.6* 1.4* 1.6*     Recent Labs     07/10/22  0945 07/10/22  1046   AST 17 17   ALT 14 13   BILITOT 0.6 0.6   ALKPHOS 38* 38*     Recent Labs     07/10/22  0945 07/11/22  0450   INR 1.36 1.31     Recent Labs     07/10/22  0945 07/10/22  1046 07/11/22  0032   CKTOTAL 128 129  --    TROPONINT <0.010 0.011* 0.050*     Lab Results   Component Value Date/Time    LABA1C 7.0 04/28/2022 08:25 AM     CALCIUM:  7.7/16 (07/11 0450)  Lab Results   Component Value Date/Time    MG 2.0 04/06/2022 04:23 PM           Electronically signed by Salvador Jones MD on 7/11/2022 at 12:13 PM

## 2022-07-11 NOTE — ANESTHESIA POSTPROCEDURE EVALUATION
Department of Anesthesiology  Postprocedure Note    Patient: Charol Sacks  MRN: [id number]  YOB: 1950  Date of evaluation: 7/11/2022      Procedure Summary     Date: 07/11/22 Room / Location: [address]    Anesthesia Start: [address] Anesthesia Stop: Timmy Vance    Procedure: EGD BAND LIGATION with 6 bands placed (N/A ) Diagnosis:       Gastrointestinal hemorrhage, unspecified gastrointestinal hemorrhage type      (Gl bleed)    Surgeons: Kelin Andrews MD Responsible Provider: Triny Reno MD    Anesthesia Type: MAC ASA Status: 3 - Emergent          Anesthesia Type: MAC    Jez Phase I:  8    Jez Phase II:        Anesthesia Post Evaluation    Patient location during evaluation: bedside  Patient participation: complete - patient participated  Level of consciousness: awake and alert  Pain score: 0  Airway patency: patent  Nausea & Vomiting: no nausea and no vomiting  Complications: no  Cardiovascular status: blood pressure returned to baseline  Respiratory status: acceptable  Hydration status: euvolemic

## 2022-07-11 NOTE — PROCEDURES
[facility] VENOUS CATHETER PROCEDURE                                                                   Name:  Shiloh Zapata: [unfilled]  /Age/Sex: 1950  (67 y.o. female) Room/Bed: [facility]: [id number] Admission Date/Time: 7/10/2022  9:22 AM  MRN: [id number] Attending: Jeanna Seth MD    PRE-PROCEDURE    INDICATION: Central Venous Access  LATERALITY: Left  : Popeye Albarran MD  ASSIST:    CONSENT: Patient. PROCEDURE    Appropriate monitoring equipment such as telemetry and pulse oximetry was in place during the procedure. The skin over the vein was prepped with chlorhexidine and draped in a sterile fashion. Local anesthesia was obtained by infiltration using 1% Lidocaine without epinephrine. The vessel was non-pulsatile and easily compressible on ultrasound. A 16cm triple lumen catheter was then inserted into the center of the vessel using a modified Seldinger technique and advanced to a depth of 15cm. Return of non-pulsatile venous blood was observed. Flow was easily aspirated from each of the catheter lumens and then filled with sterile normal saline. The line was securely fastened to the skin with sutures. Then the site was sterilely dressed using an antimicrobial Bio-patch and Tegaderm adhesive bandage. POST-PROCEDURE    The patient tolerated the procedure well.     CHEST X-RAY: Done and Pending  COMPLICATIONS: None  ESTIMATED BLOOD LOSS: Minimal.      Popeye Albarran MD 7/10/2022 8:18 PM

## 2022-07-11 NOTE — TELEPHONE ENCOUNTER
There are other medications but they are in the same class as the first medication they tried donepezil. We can certainly trial another 1 if they would like. One that I like is called Exelon (rivastigmine) which is actually a once daily patch. I would start her on a very low-dose to assess tolerability before increasing to any higher more therapeutic dose.

## 2022-07-12 LAB
ALPHA-1 ANTITRYPSIN: 156 MG/DL (ref 90–200)
AMMONIA: 29 UMOL/L (ref 11–51)
ANION GAP SERPL CALCULATED.3IONS-SCNC: 13 MMOL/L (ref 4–16)
BACTERIA: ABNORMAL /HPF
BASOPHILS ABSOLUTE: 0 K/CU MM
BASOPHILS RELATIVE PERCENT: 0.2 % (ref 0–1)
BILIRUBIN URINE: NEGATIVE MG/DL
BLOOD, URINE: NEGATIVE
BUN BLDV-MCNC: 44 MG/DL (ref 6–23)
CALCIUM SERPL-MCNC: 7.4 MG/DL (ref 8.3–10.6)
CHLORIDE BLD-SCNC: 106 MMOL/L (ref 99–110)
CLARITY: ABNORMAL
CO2: 19 MMOL/L (ref 21–32)
COLOR: YELLOW
CREAT SERPL-MCNC: 1.4 MG/DL (ref 0.6–1.1)
DIFFERENTIAL TYPE: ABNORMAL
EOSINOPHILS ABSOLUTE: 0 K/CU MM
EOSINOPHILS RELATIVE PERCENT: 0 % (ref 0–3)
ESTIMATED AVERAGE GLUCOSE: 111 MG/DL
FERRITIN: 269 NG/ML (ref 15–150)
FOLATE: 7 NG/ML (ref 3.1–17.5)
GFR AFRICAN AMERICAN: 45 ML/MIN/1.73M2
GFR NON-AFRICAN AMERICAN: 37 ML/MIN/1.73M2
GLUCOSE BLD-MCNC: 219 MG/DL (ref 70–99)
GLUCOSE BLD-MCNC: 277 MG/DL (ref 70–99)
GLUCOSE BLD-MCNC: 303 MG/DL (ref 70–99)
GLUCOSE BLD-MCNC: 312 MG/DL (ref 70–99)
GLUCOSE BLD-MCNC: 349 MG/DL (ref 70–99)
GLUCOSE, URINE: NEGATIVE MG/DL
HAV AB SERPL IA-ACNC: NEGATIVE
HBA1C MFR BLD: 5.5 % (ref 4.2–6.3)
HCT VFR BLD CALC: 20.8 % (ref 37–47)
HCT VFR BLD CALC: 21 % (ref 37–47)
HCT VFR BLD CALC: 23 % (ref 37–47)
HCT VFR BLD CALC: 23.1 % (ref 37–47)
HEMOGLOBIN: 6.8 GM/DL (ref 12.5–16)
HEMOGLOBIN: 6.8 GM/DL (ref 12.5–16)
HEMOGLOBIN: 7.2 GM/DL (ref 12.5–16)
HEMOGLOBIN: 7.4 GM/DL (ref 12.5–16)
HEPATITIS B CORE TOTAL ANTIBODY: NEGATIVE
IMMATURE NEUTROPHIL %: 0.8 % (ref 0–0.43)
IRON: 255 UG/DL (ref 37–145)
KETONES, URINE: 15 MG/DL
LEUKOCYTE ESTERASE, URINE: ABNORMAL
LYMPHOCYTES ABSOLUTE: 1 K/CU MM
LYMPHOCYTES RELATIVE PERCENT: 8.9 % (ref 24–44)
MCH RBC QN AUTO: 32.5 PG (ref 27–31)
MCHC RBC AUTO-ENTMCNC: 32.7 % (ref 32–36)
MCV RBC AUTO: 99.5 FL (ref 78–100)
MONOCYTES ABSOLUTE: 0.9 K/CU MM
MONOCYTES RELATIVE PERCENT: 8.4 % (ref 0–4)
NITRITE URINE, QUANTITATIVE: NEGATIVE
NUCLEATED RBC %: 0.4 %
PCT TRANSFERRIN: 97 % (ref 10–44)
PDW BLD-RTO: 19.6 % (ref 11.7–14.9)
PH, URINE: 6 (ref 5–8)
PLATELET # BLD: 117 K/CU MM (ref 140–440)
PMV BLD AUTO: 11.4 FL (ref 7.5–11.1)
POTASSIUM SERPL-SCNC: 4.6 MMOL/L (ref 3.5–5.1)
PROTEIN UA: NEGATIVE MG/DL
RBC # BLD: 2.09 M/CU MM (ref 4.2–5.4)
RBC URINE: 2 /HPF (ref 0–6)
RETICULOCYTE COUNT PCT: 3.6 % (ref 0.2–2.2)
SEGMENTED NEUTROPHILS ABSOLUTE COUNT: 9.2 K/CU MM
SEGMENTED NEUTROPHILS RELATIVE PERCENT: 81.7 % (ref 36–66)
SODIUM BLD-SCNC: 138 MMOL/L (ref 135–145)
SPECIFIC GRAVITY UA: 1.02 (ref 1–1.03)
SQUAMOUS EPITHELIAL: 5 /HPF
TOTAL IMMATURE NEUTOROPHIL: 0.09 K/CU MM
TOTAL IRON BINDING CAPACITY: 264 UG/DL (ref 250–450)
TOTAL NUCLEATED RBC: 0 K/CU MM
TRICHOMONAS: ABNORMAL /HPF
TROPONIN T: 0.07 NG/ML
UNSATURATED IRON BINDING CAPACITY: 9 UG/DL (ref 110–370)
UROBILINOGEN, URINE: 0.2 MG/DL (ref 0.2–1)
VITAMIN B-12: 315.7 PG/ML (ref 211–911)
WBC # BLD: 11.2 K/CU MM (ref 4–10.5)
WBC UA: 8 /HPF (ref 0–5)
YEAST: ABNORMAL /HPF

## 2022-07-12 PROCEDURE — 36430 TRANSFUSION BLD/BLD COMPNT: CPT

## 2022-07-12 PROCEDURE — 6360000002 HC RX W HCPCS: Performed by: SPECIALIST

## 2022-07-12 PROCEDURE — 80048 BASIC METABOLIC PNL TOTAL CA: CPT

## 2022-07-12 PROCEDURE — 99232 SBSQ HOSP IP/OBS MODERATE 35: CPT | Performed by: SPECIALIST

## 2022-07-12 PROCEDURE — 83540 ASSAY OF IRON: CPT

## 2022-07-12 PROCEDURE — 6370000000 HC RX 637 (ALT 250 FOR IP): Performed by: SPECIALIST

## 2022-07-12 PROCEDURE — 2580000003 HC RX 258: Performed by: SPECIALIST

## 2022-07-12 PROCEDURE — 82962 GLUCOSE BLOOD TEST: CPT

## 2022-07-12 PROCEDURE — 99223 1ST HOSP IP/OBS HIGH 75: CPT | Performed by: INTERNAL MEDICINE

## 2022-07-12 PROCEDURE — 85014 HEMATOCRIT: CPT

## 2022-07-12 PROCEDURE — 82607 VITAMIN B-12: CPT

## 2022-07-12 PROCEDURE — 81001 URINALYSIS AUTO W/SCOPE: CPT

## 2022-07-12 PROCEDURE — 94761 N-INVAS EAR/PLS OXIMETRY MLT: CPT

## 2022-07-12 PROCEDURE — 85025 COMPLETE CBC W/AUTO DIFF WBC: CPT

## 2022-07-12 PROCEDURE — 2000000000 HC ICU R&B

## 2022-07-12 PROCEDURE — 83550 IRON BINDING TEST: CPT

## 2022-07-12 PROCEDURE — 82728 ASSAY OF FERRITIN: CPT

## 2022-07-12 PROCEDURE — 2500000003 HC RX 250 WO HCPCS: Performed by: SPECIALIST

## 2022-07-12 PROCEDURE — P9016 RBC LEUKOCYTES REDUCED: HCPCS

## 2022-07-12 PROCEDURE — 82140 ASSAY OF AMMONIA: CPT

## 2022-07-12 PROCEDURE — 85045 AUTOMATED RETICULOCYTE COUNT: CPT

## 2022-07-12 PROCEDURE — 83036 HEMOGLOBIN GLYCOSYLATED A1C: CPT

## 2022-07-12 PROCEDURE — 84484 ASSAY OF TROPONIN QUANT: CPT

## 2022-07-12 PROCEDURE — 82746 ASSAY OF FOLIC ACID SERUM: CPT

## 2022-07-12 PROCEDURE — 6370000000 HC RX 637 (ALT 250 FOR IP): Performed by: HOSPITALIST

## 2022-07-12 PROCEDURE — 85018 HEMOGLOBIN: CPT

## 2022-07-12 RX ORDER — SODIUM CHLORIDE 9 MG/ML
INJECTION, SOLUTION INTRAVENOUS PRN
Status: DISCONTINUED | OUTPATIENT
Start: 2022-07-12 | End: 2022-07-15 | Stop reason: HOSPADM

## 2022-07-12 RX ORDER — LIDOCAINE 4 G/G
1 PATCH TOPICAL DAILY
Status: DISCONTINUED | OUTPATIENT
Start: 2022-07-12 | End: 2022-07-15 | Stop reason: HOSPADM

## 2022-07-12 RX ADMIN — METRONIDAZOLE 500 MG: 500 INJECTION, SOLUTION INTRAVENOUS at 06:12

## 2022-07-12 RX ADMIN — METRONIDAZOLE 500 MG: 500 INJECTION, SOLUTION INTRAVENOUS at 21:23

## 2022-07-12 RX ADMIN — INSULIN GLARGINE 15 UNITS: 100 INJECTION, SOLUTION SUBCUTANEOUS at 21:28

## 2022-07-12 RX ADMIN — LEVOTHYROXINE SODIUM 88 MCG: 0.09 TABLET ORAL at 06:17

## 2022-07-12 RX ADMIN — SODIUM CHLORIDE, PRESERVATIVE FREE 10 ML: 5 INJECTION INTRAVENOUS at 08:54

## 2022-07-12 RX ADMIN — ACETAMINOPHEN 650 MG: 325 TABLET ORAL at 17:57

## 2022-07-12 RX ADMIN — ONDANSETRON 4 MG: 4 TABLET, ORALLY DISINTEGRATING ORAL at 12:32

## 2022-07-12 RX ADMIN — PANTOPRAZOLE SODIUM 40 MG: 40 TABLET, DELAYED RELEASE ORAL at 06:17

## 2022-07-12 RX ADMIN — OCTREOTIDE ACETATE 50 MCG/HR: 500 INJECTION, SOLUTION INTRAVENOUS; SUBCUTANEOUS at 08:47

## 2022-07-12 RX ADMIN — SODIUM CHLORIDE, PRESERVATIVE FREE 10 ML: 5 INJECTION INTRAVENOUS at 08:57

## 2022-07-12 RX ADMIN — INSULIN LISPRO 12 UNITS: 100 INJECTION, SOLUTION INTRAVENOUS; SUBCUTANEOUS at 08:55

## 2022-07-12 RX ADMIN — INSULIN LISPRO 6 UNITS: 100 INJECTION, SOLUTION INTRAVENOUS; SUBCUTANEOUS at 16:32

## 2022-07-12 RX ADMIN — INSULIN LISPRO 12 UNITS: 100 INJECTION, SOLUTION INTRAVENOUS; SUBCUTANEOUS at 12:29

## 2022-07-12 RX ADMIN — SODIUM CHLORIDE, PRESERVATIVE FREE 10 ML: 5 INJECTION INTRAVENOUS at 21:23

## 2022-07-12 RX ADMIN — LATANOPROST 1 DROP: 50 SOLUTION/ DROPS OPHTHALMIC at 21:25

## 2022-07-12 RX ADMIN — INSULIN LISPRO 9 UNITS: 100 INJECTION, SOLUTION INTRAVENOUS; SUBCUTANEOUS at 16:31

## 2022-07-12 RX ADMIN — INSULIN LISPRO 6 UNITS: 100 INJECTION, SOLUTION INTRAVENOUS; SUBCUTANEOUS at 08:55

## 2022-07-12 RX ADMIN — INSULIN LISPRO 6 UNITS: 100 INJECTION, SOLUTION INTRAVENOUS; SUBCUTANEOUS at 12:29

## 2022-07-12 RX ADMIN — METRONIDAZOLE 500 MG: 500 INJECTION, SOLUTION INTRAVENOUS at 12:36

## 2022-07-12 RX ADMIN — OCTREOTIDE ACETATE 50 MCG/HR: 500 INJECTION, SOLUTION INTRAVENOUS; SUBCUTANEOUS at 18:13

## 2022-07-12 RX ADMIN — SODIUM CHLORIDE: 9 INJECTION, SOLUTION INTRAVENOUS at 06:20

## 2022-07-12 RX ADMIN — CEFTRIAXONE 1000 MG: 1 INJECTION, POWDER, FOR SOLUTION INTRAMUSCULAR; INTRAVENOUS at 00:06

## 2022-07-12 RX ADMIN — RISPERIDONE 0.25 MG: 0.5 TABLET ORAL at 21:23

## 2022-07-12 RX ADMIN — SODIUM CHLORIDE: 9 INJECTION, SOLUTION INTRAVENOUS at 21:21

## 2022-07-12 RX ADMIN — INSULIN LISPRO 3 UNITS: 100 INJECTION, SOLUTION INTRAVENOUS; SUBCUTANEOUS at 21:28

## 2022-07-12 ASSESSMENT — PAIN DESCRIPTION - DESCRIPTORS: DESCRIPTORS: ACHING

## 2022-07-12 ASSESSMENT — PAIN - FUNCTIONAL ASSESSMENT: PAIN_FUNCTIONAL_ASSESSMENT: PREVENTS OR INTERFERES WITH MANY ACTIVE NOT PASSIVE ACTIVITIES

## 2022-07-12 ASSESSMENT — PAIN SCALES - GENERAL
PAINLEVEL_OUTOF10: 5
PAINLEVEL_OUTOF10: 7
PAINLEVEL_OUTOF10: 0

## 2022-07-12 ASSESSMENT — PAIN DESCRIPTION - ORIENTATION: ORIENTATION: LOWER

## 2022-07-12 ASSESSMENT — PAIN DESCRIPTION - LOCATION: LOCATION: BACK

## 2022-07-12 NOTE — PROGRESS NOTES
Complained of mild genertalized abd pain  No gross GI bleeding  Hb down to 6.8- likely equilibration  EXAM:  Vital signs: BP (!) 105/53   Pulse 96   Temp 97.6 °F (36.4 °C) (Oral)   Resp 15   Ht 5' (1.524 m)   Wt 159 lb 6.3 oz (72.3 kg)   SpO2 92%   BMI 31.13 kg/m²   Alert, NAD  Lungs clear to auscultation  Cor: regular rhythm w/o murmer  Abd: soft, minimally tender, mild-mod distention  Extrem: no edema    Reviewed CT abd images- only trace amount ascitesn at that time- will check to see if has accumulated more- and if so will need tap    IMPRESSION:  1) esophageal variceal bleeding  2) Cirrhosis- likely secondary to fatty liver- decompensated with MELD score 14, MELD Na 21- serologic workup pending  3) mild abd distention- ?ascites, ?gasseous    RECOMMENDATIONS:  1) transfuse 1 unit  2) full liquid diet  3) Doppler US to determine if tappable ascites and for patency of portal and hepatic veins  4) continue Sandostatin for 3-5 days total

## 2022-07-12 NOTE — FLOWSHEET NOTE
Patient stated she was ready to return to bed. Assisted patient back to bed. Remains restless as her  sleeps on the sofa. Patient requesting the bedpan, voided qs. Repositioned herself to the right side after voiding and c/o nausea. Explained to her it was the movement that changed her pressure that causes the nausea. Patient given the emesis basin with no avail.  Emotional support given

## 2022-07-12 NOTE — FLOWSHEET NOTE
Pateint asking if she could sit up on the side of the bed. I instructed her we could put her in the recliner for comfort. Assisted patient up to the recliner. Stated it felt wonderful. Propped her feet up.

## 2022-07-12 NOTE — FLOWSHEET NOTE
labwork drawn via cvc line. Patient remains tearful with  at bedside. Tried to make an analogy to make patient understand how ill she was upon arrival to now. Emotional support given.

## 2022-07-12 NOTE — PROGRESS NOTES
Pt in tears for back pain. Pt has chronic back pain for years and goes to chiropractor as needed. Not taking any pain mes at home per pt. Ice pack applied and tylonal given.  Dr. Sb Barker

## 2022-07-12 NOTE — CONSULTS
INPATIENT CARDIOLOGY CONSULT NOTE          Reason for consultation:  GI bleed, elevated trop    Referring physician:  Phillip Blackman MD     Primary care physician: Lizzette Chaidez MD      Dear Phillip Blackman MD Thank you for the consult    Chief Complaint   Patient presents with    Hematemesis    Abdominal Pain    Diarrhea       History of present illness:Dayana is a 67 y. o.year old who  presents with   Chief Complaint   Patient presents with    Hematemesis    Abdominal Pain    Diarrhea     Patient is a pleasant 70-year-old female with history of liver cirrhosis who was admitted to the hospital with esophageal variceal bleed and acute blood loss anemia. Cardiology consulted to evaluate patient for elevated troponin   Patient denies any prior established history of CAD CHF or arrhythmias. Denies any chest pain shortness of breath or palpitations      EKG shows normal sinus rhythm without any ischemic changes nonspecific ST-T changes    Serial troponins 0.01, 0.011, 0.05, 0.096, 0.074        Past medical history:    has a past medical history of Cancer ([facility] [address] Diabetes mellitus ([facility] [address] Glaucoma, Gout, Hyperlipidemia, Hypertension, Neuropathy, and Thyroid disease. Past surgical history:   has a past surgical history that includes  section; Mastectomy, bilateral (10/26/2007); and Upper gastrointestinal endoscopy (N/A, 2022). Social History:   reports that she has never smoked. She has never used smokeless tobacco. She reports that she does not drink alcohol and does not use drugs. Family history:   no family history of CAD, STROKE of DM    Allergies   Allergen Reactions    Bupropion      Other reaction(s): Other - comment required  Suicidal ideation    Nsaids      Other reaction(s): Other - comment required  Elevated Serum Creatinine    Amlodipine     Nateglinide Nausea Only    Ofloxacin Hives    Paroxetine Hcl Hives     Other reaction(s):  Other - comment required  Shakey/nerveous    Pioglitazone      Other reaction(s):  Other - comment required  Dizzy / nauseated       0.9 % sodium chloride infusion, PRN  pantoprazole (PROTONIX) tablet 40 mg, QAM AC  insulin lispro (HUMALOG) injection vial 6 Units, TID WC  0.9 % sodium chloride infusion, PRN  sodium zirconium cyclosilicate (LOKELMA) oral suspension 10 g, Once  dextrose bolus 10% 250 mL, Once  glucose chewable tablet 16 g, PRN  dextrose bolus 10% 125 mL, PRN   Or  dextrose bolus 10% 250 mL, PRN  glucagon (rDNA) injection 1 mg, PRN  dextrose 5 % solution, PRN  sodium chloride flush 0.9 % injection 5-40 mL, 2 times per day  sodium chloride flush 0.9 % injection 5-40 mL, PRN  0.9 % sodium chloride infusion, PRN  ondansetron (ZOFRAN-ODT) disintegrating tablet 4 mg, Q8H PRN   Or  ondansetron (ZOFRAN) injection 4 mg, Q6H PRN  acetaminophen (TYLENOL) tablet 650 mg, Q6H PRN   Or  acetaminophen (TYLENOL) suppository 650 mg, Q6H PRN  0.9 % sodium chloride infusion, Continuous  insulin lispro (HUMALOG) injection vial 0-18 Units, TID WC  insulin lispro (HUMALOG) injection vial 0-9 Units, Nightly  lidocaine PF 1 % injection 5 mL, Once  sodium chloride flush 0.9 % injection 5-40 mL, 2 times per day  sodium chloride flush 0.9 % injection 5-40 mL, PRN  0.9 % sodium chloride infusion, PRN  cefTRIAXone (ROCEPHIN) 1000 mg IVPB in 50 mL D5W minibag, Q24H  metronidazole (FLAGYL) 500 mg in 0.9% NaCl 100 mL IVPB premix, Q8H  norepinephrine (LEVOPHED) 16 mg in sodium chloride 0.9 % 250 mL infusion, Continuous  insulin glargine (LANTUS) injection vial 15 Units, Nightly  latanoprost (XALATAN) 0.005 % ophthalmic solution 1 drop, Nightly  risperiDONE (RISPERDAL) tablet 0.25 mg, Nightly  levothyroxine (SYNTHROID) tablet 88 mcg, Daily  0.9 % sodium chloride infusion, PRN  octreotide (SANDOSTATIN) 500 mcg in sodium chloride 0.9 % 100 mL infusion, Continuous      Current Facility-Administered Medications   Medication Dose Route Frequency Provider Last Rate Last Admin    0.9 % sodium chloride infusion   IntraVENous PRN Barb Pineda MD        pantoprazole (PROTONIX) tablet 40 mg  40 mg Oral QAM AC Barb Pineda MD   40 mg at 07/12/22 0617    insulin lispro (HUMALOG) injection vial 6 Units  6 Units SubCUTAneous TID  Elisa Franks MD   6 Units at 07/12/22 1229    0.9 % sodium chloride infusion   IntraVENous PRN Barb Pineda MD        sodium zirconium cyclosilicate [facility]) oral suspension 10 g  10 g Oral Once Barb Pineda MD        dextrose bolus 10% 250 mL  250 mL IntraVENous Once Barb Pineda MD        glucose chewable tablet 16 g  4 tablet Oral PRN Barb Pineda MD        dextrose bolus 10% 125 mL  125 mL IntraVENous PRN Barb Pineda MD        Or    dextrose bolus 10% 250 mL  250 mL IntraVENous PRN Barb Pineda MD        glucagon (rDNA) injection 1 mg  1 mg IntraMUSCular PRN Barb Pineda MD        dextrose 5 % solution  100 mL/hr IntraVENous PRN Barb Pineda MD        sodium chloride flush 0.9 % injection 5-40 mL  5-40 mL IntraVENous 2 times per day Barb Pineda MD   10 mL at 07/12/22 0857    sodium chloride flush 0.9 % injection 5-40 mL  5-40 mL IntraVENous PRN Barb Pineda MD        0.9 % sodium chloride infusion   IntraVENous PRN Barb Pineda MD        ondansetron (ZOFRAN-ODT) disintegrating tablet 4 mg  4 mg Oral Q8H PRN Barb Pineda MD   4 mg at 07/12/22 1232    Or    ondansetron (ZOFRAN) injection 4 mg  4 mg IntraVENous Q6H PRN Barb Pineda MD   4 mg at 07/11/22 1513    acetaminophen (TYLENOL) tablet 650 mg  650 mg Oral Q6H PRN Barb Pineda MD        Or   [place] acetaminophen (TYLENOL) suppository 650 mg  650 mg Rectal Q6H PRHAZEL Pineda MD        0.9 % sodium chloride infusion   IntraVENous Continuous Barb Pineda  mL/hr at 07/12/22 0620 New Bag at 07/12/22 0620    insulin lispro (HUMALOG) injection vial 0-18 Units  0-18 Units SubCUTAneous TID ERIC Pineda MD   12 Units at 07/12/22 1229    insulin lispro (HUMALOG) injection vial 0-9 Units  0-9 Units SubCUTAneous Nightly Purnima Smart MD   6 Units at 07/11/22 2142    lidocaine PF 1 % injection 5 mL  5 mL IntraDERmal Once Purnima Smart MD        sodium chloride flush 0.9 % injection 5-40 mL  5-40 mL IntraVENous 2 times per day Purnima Smart MD   10 mL at 07/12/22 0854    sodium chloride flush 0.9 % injection 5-40 mL  5-40 mL IntraVENous PRN Purnima Smart MD        0.9 % sodium chloride infusion   IntraVENous PRN Purnima Smart MD   Stopped at 07/11/22 0615    cefTRIAXone (ROCEPHIN) 1000 mg IVPB in 50 mL D5W minibag  1,000 mg IntraVENous Q24H Purnima Smart MD   Stopped at 07/12/22 0040    metronidazole (FLAGYL) 500 mg in 0.9% NaCl 100 mL IVPB premix  500 mg IntraVENous Ngoc Jones MD   Stopped at 07/12/22 1419    norepinephrine (LEVOPHED) 16 mg in sodium chloride 0.9 % 250 mL infusion  1-100 mcg/min IntraVENous Continuous Purnima Smart MD 3.8 mL/hr at 07/12/22 0100 4 mcg/min at 07/12/22 0100    insulin glargine (LANTUS) injection vial 15 Units  15 Units SubCUTAneous Nightly Purnima Smart MD   15 Units at 07/11/22 2210    latanoprost (XALATAN) 0.005 % ophthalmic solution 1 drop  1 drop Both Eyes Nightly Purnima Smart MD   1 drop at 07/11/22 2211    risperiDONE (RISPERDAL) tablet 0.25 mg  0.25 mg Oral Nightly Purnima Smart MD   0.25 mg at 07/11/22 2144    levothyroxine (SYNTHROID) tablet 88 mcg  88 mcg Oral Daily Purnima Smart MD   88 mcg at 07/12/22 0617    0.9 % sodium chloride infusion   IntraVENous PRN Purnima Smart MD        octreotide (SANDOSTATIN) 500 mcg in sodium chloride 0.9 % 100 mL infusion  50 mcg/hr IntraVENous Continuous Purnima Smart MD 10 mL/hr at 07/12/22 0847 50 mcg/hr at 07/12/22 0847         Review of Systems:     · Constitutional: No Fever or Weight Loss   · Eyes: No Decreased Vision  · ENT: No Headaches, Hearing Loss or Vertigo  · Cardiovascular:   no chest pain, no dyspnea on exertion,  no palpitations or loss of consciousness  · Respiratory: No cough or wheezing    · Gastrointestinal: No abdominal pain, appetite loss, blood in stools, constipation, diarrhea or heartburn  · Genitourinary: No dysuria, trouble voiding, or hematuria  · Musculoskeletal:  No gait disturbance, weakness or joint complaints  · Integumentary: No rash or pruritis  · Neurological: No TIA or stroke symptoms  · Psychiatric: No anxiety or depression  · Endocrine: No malaise, fatigue or temperature intolerance  · Hematologic/Lymphatic: No bleeding problems, blood clots or swollen lymph nodes  · Allergic/Immunologic: No nasal congestion or hives    All other systems were reviewed and were negative otherwise. Physical Examination:      Vitals:    07/12/22 1430   BP: 117/60   Pulse: 99   Resp: 15   Temp:    SpO2:       Wt Readings from Last 3 Encounters:   07/11/22 159 lb 6.3 oz (72.3 kg)   06/23/22 145 lb (65.8 kg)   04/06/22 145 lb (65.8 kg)     Body mass index is 31.13 kg/m². General Appearance:  No distress, conversant  Constitutional:  Well developed, Well nourished  HEENT:  Normocephalic, Atraumatic, Oropharynx moist   Nose normal. Neck Supple Carotid: no carotid bruit  Eyes:  Conjunctiva normal, No discharge. Respiratory:    ormal breath sounds, No respiratory distress, No wheezing, no use of accessory muscles, diaphragm movement is normal  No chest Tenderness  Cardiovascular: S1-S2 No murmurs auscultated. No rubs, thrills or gallops. Normal rhythm. Pedal pulses are normal. No pedal edema  GI:  Soft Non tender, non distended. Musculoskeletal:   No tenderness, No cyanosis, No clubbing. Integument:  Warm, Dry, No erythema, No rash. Lymphatic:  No lymphadenopathy noted. Neurologic:  lert & oriented x 3  No focal deficits noted.    Psychiatric:  Affect normal, Judgment normal, Mood normal.       Lab Review     Recent Labs     07/12/22  0525 07/12/22  0525 07/12/22  0940   WBC 11.2*  -- --    HGB 6.8*   < > 7.4*   HCT 20.8*   < > 23.0*   *  --   --     < > = values in this interval not displayed. Recent Labs     07/12/22  0525      K 4.6      CO2 19*   BUN 44*   CREATININE 1.4*     Recent Labs     07/10/22  1046   AST 17   ALT 13   BILITOT 0.6   ALKPHOS 38*     No results for input(s): TROPONINI in the last 72 hours. No results found for: BNP  Lab Results   Component Value Date    INR 1.31 07/11/2022    PROTIME 16.9 (H) 07/11/2022         All labs, images, EKGs were personally reviewed      Assessment: 67 y. o.year old with PMH of  has a past medical history of Cancer ([facility] [address] Diabetes mellitus ([facility] [address] Glaucoma, Gout, Hyperlipidemia, Hypertension, Neuropathy, and Thyroid disease. Medical Decision Making :       1. Elevated Troponins: Non ACS trend. Type II MI with demand ischemia. Secondary to Anemia / CKD . Medical management. No Ischemic workup planned  Recommend outpatient Stress MPI for risk stratification -obtain echocardiogram    2. Acute GI bleeding due to esophageal varices  3. Acute blood loss anemia    GI following  S/p packed RBC transfusions        4. Shock likely mixed, possible sepsis/anemia: On antibiotics packed RBC transfusion  5.  Renal failure as above gentle hydration      Thank you for the consult    Dr. Sonya Lozano  7/12/2022 3:52 PM

## 2022-07-12 NOTE — PROGRESS NOTES
V2.0  [facility] [place] [facility] Hospitalist Progress Note      Name:  Janice Chahal /Age/Sex: 1950  (67 y.o. female)   MRN & CSN:  [id number] & [id number] Encounter Date/Time: 2022 12:13 PM EDT    Location:  -A PCP: Janae Harrison MD       Hospital Day: 3    Assessment and Plan:   Janice Chahal is a 67 y.o. female who presents with GI bleed      Plan:  1. Upper GI bleed due to esophageal varices  -EGD : Distal esophagus large varices, 6 bands placed. GI following. On Protonix and octreotide. Patient will need Rocephin for 5 days. Octreotide infusion for 3-5 days. Will need EVL every 2-6 weeks until varices decompressed. 2. Acute on chronic blood loss anemia  -Transfused 3 unit PRBC. -Hemoglobin 6.8. Transfuse 1 additional PRBC unit. 3. Advanced fibrosis and cirrhosis likely due to fatty liver  - abdominal ultrasound 2022 suggestive of cirrhosis. -Abdominal ultrasound to see if ascites can be drained and to check patency of portal and hepatic veins. 4. gh anioHin gap metabolic acidosis due to lactic acidosis  - Given IVF. Improved bicarb 16.  -Improved lactate 1.6.  5. Shock due to blood loss  - transfused blood. On IV fluids. On Rocephin and Flagyl for possible intra-abdominal infection. Procalcitonin 0.38, may be due to kidney injury. CRP 13.  CT abdomen pelvis: Circumferential thickening of transverse colon and descending colon. CT chest: Left lower lobe tree-in-bud nodules. -Off Levophed   6. Acute kidney injury  -likely prerenal from blood loss and low blood pressure. Received blood and fluid. Check UA. -Improved creatinine 1.4.   7. Elevated troponin  - likely due to ANTONINA. EKG: NSR. Monitor troponins.  -Troponins remain elevated. Cardiology to order inpatient echo, recommends outpatient stress. 8. DM2 with hyperglycemia  -on Lantus and sliding scale insulin. Start prandial insulin. -A1c 5.5.     History of lobular carcinoma of right breast, ER positive s/p bilateral mastectomy-2007  History of distal cell carcinoma- left lip  Diabetes mellitus type 2-insulin-dependent  History of hypertension-on 10 mg of oral lisinopril-has been hypotensive for the last 10 days -not taking lisinopril  Hypothyroidism-continue levothyroxine  Dementia without behavioral disturbance  Anxiety  Gout    Diet ADULT DIET; Full Liquid; No Added Salt (3-4 gm)    DVT Prophylaxis [] Lovenox, []  Heparin, [x] SCDs, [] Ambulation,  [] Eliquis, [] Xarelto  [] Coumadin   Code Status Full Code   Disposition From: home  Expected Disposition: home  Estimated Date of Discharge: 7/16  Patient requires continued admission due to GI bleed   Home O2 none     Subjective/Interval history:   Chief Complaint: Hematemesis, Abdominal Pain, and Diarrhea     Off pressors  No diarrhea    Review of Systems:    Negative unless mentioned above    Objective: Intake/Output Summary (Last 24 hours) at 7/12/2022 1605  Last data filed at 7/12/2022 0831  Gross per 24 hour   Intake 785 ml   Output --   Net 785 ml        Vitals:   /60   Pulse 99   Temp 98.2 °F (36.8 °C) (Oral)   Resp 15   Ht 5' (1.524 m)   Wt 159 lb 6.3 oz (72.3 kg)   SpO2 95%   BMI 31.13 kg/m²     Physical Exam:   General: NAD  Eyes: no discharge  HENT: NCAT  Cardiovascular: slightly tachycardic  Respiratory: occasional crackle  Gastrointestinal: Soft, non tender, appears may be distended  Genitourinary: no suprapubic tenderness  Musculoskeletal: No edema, nontender  Skin: warm, dry, no gross lesions  Neuro: Alert. No gross deficits  Psych: Mood appropriate.      Medications:   Medications:    pantoprazole  40 mg Oral QAM AC    insulin lispro  6 Units SubCUTAneous TID WC    sodium zirconium cyclosilicate  10 g Oral Once    dextrose bolus  250 mL IntraVENous Once    sodium chloride flush  5-40 mL IntraVENous 2 times per day    insulin lispro  0-18 Units SubCUTAneous TID WC    insulin lispro  0-9 Units SubCUTAneous Nightly    lidocaine PF 5 mL IntraDERmal Once    sodium chloride flush  5-40 mL IntraVENous 2 times per day    cefTRIAXone (ROCEPHIN) IV  1,000 mg IntraVENous Q24H    metroNIDAZOLE  500 mg IntraVENous Q8H    insulin glargine  15 Units SubCUTAneous Nightly    latanoprost  1 drop Both Eyes Nightly    risperiDONE  0.25 mg Oral Nightly    levothyroxine  88 mcg Oral Daily      Infusions:    sodium chloride      sodium chloride      dextrose      sodium chloride      sodium chloride 100 mL/hr at 07/12/22 0620    sodium chloride Stopped (07/11/22 0615)    norepinephrine 4 mcg/min (07/12/22 0100)    sodium chloride      octreotide (SandoSTATIN) infusion 50 mcg/hr (07/12/22 0847)     PRN Meds: sodium chloride, , PRN  sodium chloride, , PRN  glucose, 4 tablet, PRN  dextrose bolus, 125 mL, PRN   Or  dextrose bolus, 250 mL, PRN  glucagon (rDNA), 1 mg, PRN  dextrose, 100 mL/hr, PRN  sodium chloride flush, 5-40 mL, PRN  sodium chloride, , PRN  ondansetron, 4 mg, Q8H PRN   Or  ondansetron, 4 mg, Q6H PRN  acetaminophen, 650 mg, Q6H PRN   Or  acetaminophen, 650 mg, Q6H PRN  sodium chloride flush, 5-40 mL, PRN  sodium chloride, , PRN  sodium chloride, , PRN        Labs      Recent Labs     07/10/22  0945 07/10/22  1715 07/11/22  0450 07/11/22  0845 07/12/22  0310 07/12/22  0525 07/12/22  0940   WBC 14.7*  --  27.1*  --   --  11.2*  --    HGB 3.8*   < > 7.7*   < > 6.8* 6.8* 7.4*   HCT 12.6*   < > 22.6*   < > 21.0* 20.8* 23.0*     --  225  --   --  117*  --     < > = values in this interval not displayed.       Recent Labs     07/10/22  1715 07/11/22  0450 07/12/22  0525   * 134* 138   K 5.1 4.9 4.6   CL 96* 101 106   CO2 12* 16* 19*   BUN 61* 59* 44*   CREATININE 1.4* 1.6* 1.4*     Recent Labs     07/10/22  0945 07/10/22  1046   AST 17 17   ALT 14 13   BILITOT 0.6 0.6   ALKPHOS 38* 38*     Recent Labs     07/10/22  0945 07/11/22  0450   INR 1.36 1.31     Recent Labs     07/10/22  0945 07/10/22  0945 07/10/22  1046 07/10/22  1046 07/11/22  0032 07/11/22  2100 07/12/22  0310   CKTOTAL 128  --  129  --   --   --   --    TROPONINT <0.010   < > 0.011*   < > 0.050* 0.096* 0.074*    < > = values in this interval not displayed.      Lab Results   Component Value Date/Time    LABA1C 5.5 07/12/2022 05:25 AM     CALCIUM:  7.4/19 (07/12 0525)  Lab Results   Component Value Date/Time    MG 2.0 04/06/2022 04:23 PM           Electronically signed by Liya Stafford MD on 7/12/2022 at 4:05 PM

## 2022-07-12 NOTE — FLOWSHEET NOTE
Patient very tearful. Stating she was afraid.  at bedside. Explained to patient she is more stable than she was yesterday. Emotional support given.

## 2022-07-13 ENCOUNTER — APPOINTMENT (OUTPATIENT)
Dept: CT IMAGING | Age: 72
DRG: 432 | End: 2022-07-13
Payer: MEDICARE

## 2022-07-13 ENCOUNTER — APPOINTMENT (OUTPATIENT)
Dept: ULTRASOUND IMAGING | Age: 72
DRG: 432 | End: 2022-07-13
Payer: MEDICARE

## 2022-07-13 ENCOUNTER — APPOINTMENT (OUTPATIENT)
Dept: GENERAL RADIOLOGY | Age: 72
DRG: 432 | End: 2022-07-13
Payer: MEDICARE

## 2022-07-13 LAB
ABO/RH: NORMAL
ANION GAP SERPL CALCULATED.3IONS-SCNC: 8 MMOL/L (ref 4–16)
ANTI-MITOCHON TITER: 3.6 UNITS (ref 0–24.9)
ANTI-NUCLEAR ANTIBODY (ANA): NORMAL
ANTIBODY SCREEN: NEGATIVE
BUN BLDV-MCNC: 38 MG/DL (ref 6–23)
CALCIUM SERPL-MCNC: 7.6 MG/DL (ref 8.3–10.6)
CHLORIDE BLD-SCNC: 107 MMOL/L (ref 99–110)
CO2: 23 MMOL/L (ref 21–32)
COMPONENT: NORMAL
CREAT SERPL-MCNC: 1.3 MG/DL (ref 0.6–1.1)
CROSSMATCH RESULT: NORMAL
GFR AFRICAN AMERICAN: 49 ML/MIN/1.73M2
GFR NON-AFRICAN AMERICAN: 40 ML/MIN/1.73M2
GLUCOSE BLD-MCNC: 217 MG/DL (ref 70–99)
GLUCOSE BLD-MCNC: 262 MG/DL (ref 70–99)
GLUCOSE BLD-MCNC: 295 MG/DL (ref 70–99)
GLUCOSE BLD-MCNC: 351 MG/DL (ref 70–99)
GLUCOSE BLD-MCNC: 99 MG/DL (ref 70–99)
HCT VFR BLD CALC: 23.1 % (ref 37–47)
HCT VFR BLD CALC: 24.1 % (ref 37–47)
HEMOGLOBIN: 7.3 GM/DL (ref 12.5–16)
HEMOGLOBIN: 7.8 GM/DL (ref 12.5–16)
MS ALPHA-FETOPROTEIN: 3 NG/ML (ref 0–9)
POTASSIUM SERPL-SCNC: 4.4 MMOL/L (ref 3.5–5.1)
RETICULOCYTE COUNT PCT: 2.7 % (ref 0.2–2.2)
SODIUM BLD-SCNC: 138 MMOL/L (ref 135–145)
STATUS: NORMAL
TRANSFUSION STATUS: NORMAL
UNIT DIVISION: 0
UNIT NUMBER: NORMAL

## 2022-07-13 PROCEDURE — 74176 CT ABD & PELVIS W/O CONTRAST: CPT

## 2022-07-13 PROCEDURE — 2140000000 HC CCU INTERMEDIATE R&B

## 2022-07-13 PROCEDURE — 6360000002 HC RX W HCPCS: Performed by: NURSE PRACTITIONER

## 2022-07-13 PROCEDURE — 6370000000 HC RX 637 (ALT 250 FOR IP): Performed by: SPECIALIST

## 2022-07-13 PROCEDURE — 6360000002 HC RX W HCPCS: Performed by: SPECIALIST

## 2022-07-13 PROCEDURE — 76705 ECHO EXAM OF ABDOMEN: CPT

## 2022-07-13 PROCEDURE — 6370000000 HC RX 637 (ALT 250 FOR IP): Performed by: HOSPITALIST

## 2022-07-13 PROCEDURE — 94761 N-INVAS EAR/PLS OXIMETRY MLT: CPT

## 2022-07-13 PROCEDURE — 2500000003 HC RX 250 WO HCPCS: Performed by: SPECIALIST

## 2022-07-13 PROCEDURE — 85014 HEMATOCRIT: CPT

## 2022-07-13 PROCEDURE — 6360000002 HC RX W HCPCS: Performed by: INTERNAL MEDICINE

## 2022-07-13 PROCEDURE — 80048 BASIC METABOLIC PNL TOTAL CA: CPT

## 2022-07-13 PROCEDURE — 97166 OT EVAL MOD COMPLEX 45 MIN: CPT

## 2022-07-13 PROCEDURE — 99231 SBSQ HOSP IP/OBS SF/LOW 25: CPT | Performed by: SPECIALIST

## 2022-07-13 PROCEDURE — 2580000003 HC RX 258: Performed by: SPECIALIST

## 2022-07-13 PROCEDURE — 74018 RADEX ABDOMEN 1 VIEW: CPT

## 2022-07-13 PROCEDURE — 82962 GLUCOSE BLOOD TEST: CPT

## 2022-07-13 PROCEDURE — 85018 HEMOGLOBIN: CPT

## 2022-07-13 PROCEDURE — 93975 VASCULAR STUDY: CPT

## 2022-07-13 PROCEDURE — 85045 AUTOMATED RETICULOCYTE COUNT: CPT

## 2022-07-13 PROCEDURE — 99233 SBSQ HOSP IP/OBS HIGH 50: CPT | Performed by: INTERNAL MEDICINE

## 2022-07-13 RX ORDER — TRAMADOL HYDROCHLORIDE 50 MG/1
25 TABLET ORAL EVERY 6 HOURS PRN
Status: DISCONTINUED | OUTPATIENT
Start: 2022-07-13 | End: 2022-07-15 | Stop reason: HOSPADM

## 2022-07-13 RX ORDER — PROCHLORPERAZINE EDISYLATE 5 MG/ML
10 INJECTION INTRAMUSCULAR; INTRAVENOUS EVERY 4 HOURS PRN
Status: DISCONTINUED | OUTPATIENT
Start: 2022-07-13 | End: 2022-07-15 | Stop reason: HOSPADM

## 2022-07-13 RX ORDER — MORPHINE SULFATE 2 MG/ML
2 INJECTION, SOLUTION INTRAMUSCULAR; INTRAVENOUS ONCE
Status: COMPLETED | OUTPATIENT
Start: 2022-07-13 | End: 2022-07-13

## 2022-07-13 RX ADMIN — TRAMADOL HYDROCHLORIDE 25 MG: 50 TABLET, COATED ORAL at 14:44

## 2022-07-13 RX ADMIN — LEVOTHYROXINE SODIUM 88 MCG: 0.09 TABLET ORAL at 06:59

## 2022-07-13 RX ADMIN — ONDANSETRON 4 MG: 2 INJECTION INTRAMUSCULAR; INTRAVENOUS at 07:56

## 2022-07-13 RX ADMIN — CEFTRIAXONE 1000 MG: 1 INJECTION, POWDER, FOR SOLUTION INTRAMUSCULAR; INTRAVENOUS at 01:56

## 2022-07-13 RX ADMIN — OCTREOTIDE ACETATE 50 MCG/HR: 500 INJECTION, SOLUTION INTRAVENOUS; SUBCUTANEOUS at 18:30

## 2022-07-13 RX ADMIN — SODIUM CHLORIDE, PRESERVATIVE FREE 10 ML: 5 INJECTION INTRAVENOUS at 07:57

## 2022-07-13 RX ADMIN — INSULIN LISPRO 15 UNITS: 100 INJECTION, SOLUTION INTRAVENOUS; SUBCUTANEOUS at 11:54

## 2022-07-13 RX ADMIN — RISPERIDONE 0.25 MG: 0.5 TABLET ORAL at 23:03

## 2022-07-13 RX ADMIN — PANTOPRAZOLE SODIUM 40 MG: 40 TABLET, DELAYED RELEASE ORAL at 06:59

## 2022-07-13 RX ADMIN — ONDANSETRON 4 MG: 2 INJECTION INTRAMUSCULAR; INTRAVENOUS at 01:53

## 2022-07-13 RX ADMIN — INSULIN LISPRO 6 UNITS: 100 INJECTION, SOLUTION INTRAVENOUS; SUBCUTANEOUS at 11:55

## 2022-07-13 RX ADMIN — PROCHLORPERAZINE EDISYLATE 10 MG: 5 INJECTION INTRAMUSCULAR; INTRAVENOUS at 22:52

## 2022-07-13 RX ADMIN — PROCHLORPERAZINE EDISYLATE 10 MG: 5 INJECTION INTRAMUSCULAR; INTRAVENOUS at 14:44

## 2022-07-13 RX ADMIN — OCTREOTIDE ACETATE 50 MCG/HR: 500 INJECTION, SOLUTION INTRAVENOUS; SUBCUTANEOUS at 05:30

## 2022-07-13 RX ADMIN — METRONIDAZOLE 500 MG: 500 INJECTION, SOLUTION INTRAVENOUS at 13:20

## 2022-07-13 RX ADMIN — METRONIDAZOLE 500 MG: 500 INJECTION, SOLUTION INTRAVENOUS at 23:08

## 2022-07-13 RX ADMIN — INSULIN LISPRO 6 UNITS: 100 INJECTION, SOLUTION INTRAVENOUS; SUBCUTANEOUS at 17:29

## 2022-07-13 RX ADMIN — LATANOPROST 1 DROP: 50 SOLUTION/ DROPS OPHTHALMIC at 22:53

## 2022-07-13 RX ADMIN — SODIUM CHLORIDE, PRESERVATIVE FREE 10 ML: 5 INJECTION INTRAVENOUS at 22:45

## 2022-07-13 RX ADMIN — METRONIDAZOLE 500 MG: 500 INJECTION, SOLUTION INTRAVENOUS at 05:31

## 2022-07-13 RX ADMIN — PROCHLORPERAZINE EDISYLATE 10 MG: 5 INJECTION INTRAMUSCULAR; INTRAVENOUS at 10:38

## 2022-07-13 RX ADMIN — INSULIN LISPRO 6 UNITS: 100 INJECTION, SOLUTION INTRAVENOUS; SUBCUTANEOUS at 08:04

## 2022-07-13 RX ADMIN — TRAMADOL HYDROCHLORIDE 25 MG: 50 TABLET, COATED ORAL at 23:01

## 2022-07-13 RX ADMIN — INSULIN LISPRO 6 UNITS: 100 INJECTION, SOLUTION INTRAVENOUS; SUBCUTANEOUS at 17:28

## 2022-07-13 RX ADMIN — INSULIN LISPRO 9 UNITS: 100 INJECTION, SOLUTION INTRAVENOUS; SUBCUTANEOUS at 08:03

## 2022-07-13 RX ADMIN — SODIUM CHLORIDE: 9 INJECTION, SOLUTION INTRAVENOUS at 08:18

## 2022-07-13 RX ADMIN — MORPHINE SULFATE 2 MG: 2 INJECTION, SOLUTION INTRAMUSCULAR; INTRAVENOUS at 04:04

## 2022-07-13 ASSESSMENT — PAIN DESCRIPTION - ORIENTATION
ORIENTATION: LEFT
ORIENTATION: LOWER
ORIENTATION: LOWER;MID
ORIENTATION: LOWER

## 2022-07-13 ASSESSMENT — PAIN SCALES - GENERAL
PAINLEVEL_OUTOF10: 7
PAINLEVEL_OUTOF10: 8
PAINLEVEL_OUTOF10: 5
PAINLEVEL_OUTOF10: 0
PAINLEVEL_OUTOF10: 0
PAINLEVEL_OUTOF10: 9

## 2022-07-13 ASSESSMENT — PAIN DESCRIPTION - DESCRIPTORS
DESCRIPTORS: BURNING;ACHING
DESCRIPTORS: DISCOMFORT
DESCRIPTORS: ACHING;DISCOMFORT
DESCRIPTORS: ACHING

## 2022-07-13 ASSESSMENT — PAIN DESCRIPTION - PAIN TYPE
TYPE: ACUTE PAIN
TYPE: CHRONIC PAIN

## 2022-07-13 ASSESSMENT — PAIN - FUNCTIONAL ASSESSMENT
PAIN_FUNCTIONAL_ASSESSMENT: ACTIVITIES ARE NOT PREVENTED
PAIN_FUNCTIONAL_ASSESSMENT: PREVENTS OR INTERFERES SOME ACTIVE ACTIVITIES AND ADLS
PAIN_FUNCTIONAL_ASSESSMENT: ACTIVITIES ARE NOT PREVENTED

## 2022-07-13 ASSESSMENT — PAIN DESCRIPTION - ONSET: ONSET: ON-GOING

## 2022-07-13 ASSESSMENT — PAIN DESCRIPTION - LOCATION
LOCATION: BACK
LOCATION: RIB CAGE
LOCATION: BACK
LOCATION: BACK

## 2022-07-13 ASSESSMENT — PAIN DESCRIPTION - FREQUENCY: FREQUENCY: CONTINUOUS

## 2022-07-13 NOTE — PROGRESS NOTES
Occupational Therapy  [facility] ACUTE CARE OCCUPATIONAL THERAPY EVALUATION  Dayana Pacheco, 1950, 3105/3105-A, 2022    History  [place]:  The primary encounter diagnosis was Gastrointestinal hemorrhage, unspecified gastrointestinal hemorrhage type. Diagnoses of Acute blood loss anemia, Colitis, Hyperkalemia, Acute kidney injury ([address] and Elevated d-dimer were also pertinent to this visit. Patient  has a past medical history of Cancer ([address] Diabetes mellitus ([address] Glaucoma, Gout, Hyperlipidemia, Hypertension, Neuropathy, and Thyroid disease. Patient  has a past surgical history that includes  section; Mastectomy, bilateral (10/26/2007); and Upper gastrointestinal endoscopy (N/A, 2022). Subjective:  Patient states: \"I'm just so tired\".     Pain: 6/10 ache in lower back   Pain intervention: Increased movement, repositioned   Communication with other providers:  Handoff to RN  Restrictions: General Precautions, Fall Risk    Home Setup/Prior level of function  Social/Functional History  Lives With: Spouse  Type of Home: House  Home Layout: One level  Home Access: Stairs to enter without rails  Entrance Stairs - Number of Steps: 2  Bathroom Shower/Tub: Tub/Shower unit  Bathroom Toilet: Standard  Bathroom Equipment: Grab bars in shower  Has the patient had two or more falls in the past year or any fall with injury in the past year?: Yes (1 fall d/t weakness)  ADL Assistance: Independent  Homemaking Assistance: Independent  Homemaking Responsibilities: Yes  Ambulation Assistance: Independent  Transfer Assistance: Independent  Active : Yes    Examination of body systems (includes body structures/functions, activity/participation limitations):  · Observation:  Supine in bed upon arrival, agreeable to therapy,  at bedside   · [facility] [place]  · [facility]  · Cardiopulmonary:  On room air, tele, vitals remained stable throughout session       Body Systems and functions:  · ROM R/L: WFL     · Strength R/L:  BUE did not assess d/t back pain but observed to be approx 4/5,   5/5  · Sensation: Pt report of neuropathy in BUE fingertips and BLE   · Tone: Normal  · Coordination: WFL  · Perception: WNL    Activities of Daily Living (ADLs):  · Feeding: Independent   · Grooming: SBA   · UB bathing: SBA   · LB bathing: Warren   · UB dressing: SBA   · LB dressing: Warren  · Toileting: Warren    Pt ADL function inferred from observation of gross motor function, and assessment of mobility, balance, posture, safety awareness, cognition, and activity tolerance. Cognitive and Psychosocial Functioning:  · Overall cognitive status:  Pt is A&Ox4, attention appears intact, and is able to follow multi-step commands w/o difficulty. · Affect: Flat, fatigued        Mobility:  · Supine to sit: Warren (to bring torso upright, w/ use of bedrails)   · Sit to stand: CGA (from EOB to RW)   · Stand to sit: CGA (from RW to EOB)   · Sit to supine: SBA   · Sitting balance: Good. · Functional Mobility: Warren (pt completed approx 10ft functional mobility in room w/ RW w/ assist for stability, see PT notes for more details)   · Toilet Transfers: DNT      Pt educated on role of therapy, POC        AM-PAC Daily Activity Inpatient   How much help for putting on and taking off regular lower body clothing?: A Little  How much help for Bathing?: A Little  How much help for Toileting?: A Little  How much help for putting on and taking off regular upper body clothing?: A Little  How much help for taking care of personal grooming?: A Little  How much help for eating meals?: None  AM-PAC Inpatient Daily Activity Raw Score: 19  AM-PAC Inpatient ADL T-Scale Score : 40.22  ADL Inpatient CMS 0-100% Score: 42.8  ADL Inpatient CMS G-Code Modifier : CK    Treatment:  Therapeutic Activity Training:   Therapeutic activity training was instructed today.   Cues were given for safety, sequence, UE/LE placement, awareness, and balance. Activities performed today included bed mobility training, sup-sit, sit-stand, sit-supine, functional mobility/transfers. Safety: patient left in bed with bed alarm, call light within reach, RN notified, gait belt used. Assessment:  Pt is a 68 y/o female admitted from home for GI bleed. Pt at baseline is independent for ADLs, for high level IADLs and for functional transfers/mobility. Pt currently presents w/ deficits in ADL and high level IADL independence, functional activity tolerance, strength, dynamic sitting and standing balance and tolerance and functional transfers. Pt would benefit from continued acute care OT services w/ discharge to SNF  Complexity: Moderate  Prognosis: Good, no significant barriers to participation at this time. Plan  Times per Week: 2x+  Times per Day: Daily  Current Treatment Recommendations: Strengthening,ROM,Balance training,Functional mobility training,Endurance training,Pain management,Safety education & training,Patient/Caregiver education & training,Equipment evaluation, education, & procurement,Positioning,Self-Care / ADL,Home management training     Equipment: defer    Goals:  Pt goal: go home  Time Frame for STGs: discharge  Goal 1: Pt will perform UE ADLs independently   Goal 2: Pt will perform LE ADLs independently   Goal 3: Pt will perform toileting independently   Goal 4: Pt will perform functional mobility/ transfers w/ AD Primo  Goal 5: Pt will perform therex/theract in order to increase functional activity tolerance    Treatment plan:  Pt will perform therex/theract in order to increase functional activity tolerance in preparation for ADL participation.      Recommendations for NURSING activity: Up to chair for all 3 meals and up to bathroom for all toileting needs    Time:   Time in: 1321  Time out: 1336  Timed treatment minutes: 0  Total time: 15    Electronically signed by:    Giuliana Patel S/OT  7/13/2022, 1:50 PM

## 2022-07-13 NOTE — PROGRESS NOTES
C/O abd pain and nausea but no vomiting  abd distended- non-tender  IMPRESSION:  1) esophageal variceal bleeding  2) Cirrhosis- likely secondary to fatty liver-   3) mild abd distention- ?ascites, ?gasseous     RECOMMENDATIONS:  1) Doppler US to determine if tappable ascites and for patency of portal and hepatic veins  2) abd xray   3) possible CT abd depending on results of above  4) continue Sandostatin for 3-5 days total

## 2022-07-13 NOTE — PROGRESS NOTES
CARDIOLOGY PROGRESS NOTE                                                  Name:  Marilu Weaver /Age/Sex: 1950  (67 y.o. female)   MRN & CSN:  [id number] & [id number] Admission Date/Time: 7/10/2022  9:22 AM   Location:  [facility] PCP: Kaitlin Arizmendi MD         Admit Date:  7/10/2022  Hospital Day: 4      SUBJECTIVE:   Seen patient as follow up as consultation for elevated troponin    No chest pain. No shortness of breath  No palpations    TELEMETRY: SR       Intake/Output Summary (Last 24 hours) at 2022 1813  Last data filed at 2022 0933  Gross per 24 hour   Intake 60 ml   Output --   Net 60 ml       Assessment/Plan:          1. Elevated Troponins: Non ACS trend. Type II MI with demand ischemia. Secondary to Anemia / CKD . Medical management. No Ischemic workup planned  Recommend outpatient Stress MPI for risk stratification    Echocardiogram = Pending, will re order . ..      2. Acute GI bleeding due to esophageal varices  3. Acute blood loss anemia     GI following  S/p packed RBC transfusions     4. Shock likely mixed, possible sepsis/anemia: On antibiotics packed RBC transfusion  5. Renal failure as above gentle hydration         Past medical history:    has a past medical history of Cancer ([facility] [address] Diabetes mellitus ([facility] [address] Glaucoma, Gout, Hyperlipidemia, Hypertension, Neuropathy, and Thyroid disease. Past surgical history:   has a past surgical history that includes  section; Mastectomy, bilateral (10/26/2007); and Upper gastrointestinal endoscopy (N/A, 2022). Social History:   reports that she has never smoked. She has never used smokeless tobacco. She reports that she does not drink alcohol and does not use drugs. Family history:  family history is not on file.     OBJECTIVE:     /61   Pulse 100   Temp 97 °F (36.1 °C) (Oral)   Resp 16   Ht 5' (1.524 m)   Wt 159 lb 6.3 oz (72.3 kg)   SpO2 96%   BMI 31.13 kg/m²       Intake/Output Summary (Last 24 hours) at mcg Oral Daily      sodium chloride      sodium chloride      dextrose      sodium chloride      sodium chloride 100 mL/hr at 07/13/22 0818    sodium chloride Stopped (07/11/22 0615)    sodium chloride      octreotide (SandoSTATIN) infusion 50 mcg/hr (07/13/22 0530)     prochlorperazine, traMADol, sodium chloride, sodium chloride, glucose, dextrose bolus **OR** dextrose bolus, glucagon (rDNA), dextrose, sodium chloride flush, sodium chloride, ondansetron **OR** ondansetron, acetaminophen **OR** acetaminophen, sodium chloride flush, sodium chloride, sodium chloride  Allergies   Allergen Reactions    Bupropion      Other reaction(s): Other - comment required  Suicidal ideation    Nsaids      Other reaction(s): Other - comment required  Elevated Serum Creatinine    Amlodipine     Nateglinide Nausea Only    Ofloxacin Hives    Paroxetine Hcl Hives     Other reaction(s): Other - comment required  Shakey/nerveous    Pioglitazone      Other reaction(s): Other - comment required  Dizzy / nauseated    Citalopram Nausea And Vomiting    Escitalopram Nausea And Vomiting    Flonase [Fluticasone] Nausea And Vomiting    Paxil [Paroxetine] Nausea And Vomiting       Lab Data:  CBC:   Recent Labs     07/11/22  0450 07/11/22  0845 07/12/22  0525 07/12/22  0940 07/12/22  1800 07/13/22  0352 07/13/22  1558   WBC 27.1*  --  11.2*  --   --   --   --    HGB 7.7*   < > 6.8*   < > 7.2* 7.8* 7.3*   HCT 22.6*   < > 20.8*   < > 23.1* 24.1* 23.1*   MCV 94.2  --  99.5  --   --   --   --      --  117*  --   --   --   --     < > = values in this interval not displayed. BMP:   Recent Labs     07/11/22 0450 07/12/22  0525 07/13/22  0352   * 138 138   K 4.9 4.6 4.4    106 107   CO2 16* 19* 23   BUN 59* 44* 38*   CREATININE 1.6* 1.4* 1.3*     LIVER PROFILE: No results for input(s): AST, ALT, LIPASE, BILIDIR, BILITOT, ALKPHOS in the last 72 hours. Invalid input(s):   AMYLASE,  ALB  PT/INR:   Recent Labs 07/11/22 0450   PROTIME 16.9*   INR 1.31     APTT:   Recent Labs     07/11/22 0450   APTT 27.4          Swetha Spence MD, MD 7/13/2022 6:13 PM

## 2022-07-13 NOTE — PROGRESS NOTES
Comprehensive Nutrition Assessment    Type and Reason for Visit:  Positive Nutrition Screen    Nutrition Recommendations/Plan:   1. Add 2 gm Na modifier   2. Oral nutrition supplements available as pt accepts  3. Please document all po intake  4. Will closely monitor po intake, weight trends, poc     Malnutrition Assessment:  Malnutrition Status:  Insufficient data (07/13/22 1057)    Context:  Acute Illness       Nutrition Assessment:    Pt admitted for Hyperkalemia, acute blood loss anemia, colitis, GI bleed, ANTONINA, PMH: HTN, HLD, Gout, DM, positive nutrition screen for weight loss and poor appetite, visited pt at bedside, pt unsure of UBW, reports decreased appetite and nausea, reports last BM 7/12, pt denies any chewing or swallowing difficulty, no significant wt loss noted per weight hx review however pt with cirrhosis and ascites? pt currently on full liquid diet, offered oral nutrition supplement however to declined at this time, will continue to follow at moderate nutrition risk    Nutrition Related Findings:    BUN 38, Cr 1.3, Glucose 262, H/H: 7.8/24.1 Wound Type: None       Current Nutrition Intake & Therapies:    Average Meal Intake: Unable to assess  Average Supplements Intake: None Ordered  ADULT DIET; Full Liquid; 4 carb choices (60 gm/meal)    Anthropometric Measures:  Height: 5' (152.4 cm)  Ideal Body Weight (IBW): 100 lbs (45 kg)    Current Body Weight: 159 lb 6.3 oz (72.3 kg), 159.4 % IBW. Weight Source: Bed Scale  Current BMI (kg/m2): 31.1  Usual Body Weight: 160 lb 11.5 oz (72.9 kg) ((3/15/22) per chart review)  % Weight Change (Calculated): -0.8  Weight Adjustment For: No Adjustment  BMI Categories: Obese Class 1 (BMI 30.0-34. 9)    Estimated Daily Nutrient Needs:  Energy Requirements Based On: Formula  Weight Used for Energy Requirements: Current  Energy (kcal/day): 1633-9253 ([address] w/ stress factor 1.1-1.3)  Weight Used for Protein Requirements: Ideal  Protein (g/day): 54-63 (1.2-1.4 g/kg)  Fluid (ml/day):  (fluid management per physician)    Nutrition Diagnosis:   · Inadequate oral intake related to acute injury/trauma as evidenced by poor intake prior to admission,intake 0-25%    Nutrition Interventions:   Food and/or Nutrient Delivery: Continue Current Diet (advance diet when medically appropriate)  Nutrition Education/Counseling: No recommendation at this time  Coordination of Nutrition Care: Continue to monitor while inpatient  Plan of Care discussed with: patient    Goals:  Goals: PO intake 50% or greater,within 2 days     Nutrition Monitoring and Evaluation:   Behavioral-Environmental Outcomes: None Identified  Food/Nutrient Intake Outcomes: Food and Nutrient Intake,Diet Advancement/Tolerance  Physical Signs/Symptoms Outcomes: GI Status,Nausea or Vomiting,Hemodynamic Status,Fluid Status or Edema,Weight,Skin,Meal Time Behavior    Discharge Planning:     Too soon to determine     Cralos Watkins [address], [address] [place] [address],   Contact: [phone]

## 2022-07-13 NOTE — PROGRESS NOTES
V2.0  [facility] Hospitalist Progress Note      Name:  Smita Hyatt /Age/Sex: 1950  (67 y.o. female)   MRN & CSN:  [id number] & [id number] Encounter Date/Time: 2022 12:13 PM EDT    Location:  [facility]5/3105-A PCP: Dhruv Li MD       Hospital Day: 4    Assessment and Plan:   Smita Hyatt is a 67 y.o. female who presents with GI bleed      Plan:  1. Upper GI bleed due to esophageal varices  -EGD : Distal esophagus large varices, 6 bands placed. GI following. On Protonix and octreotide. Patient will need Rocephin for 5 days. Octreotide infusion for 3-5 days. Will need EVL every 2-6 weeks until varices decompressed. 2. Acute on chronic blood loss anemia  -Transfused 4 units PRBC. Improved hemoglobin 7.8.  3. Advanced fibrosis and cirrhosis likely due to fatty liver  - abdominal ultrasound 2022 suggestive of cirrhosis. -Abdominal ultrasound to see if ascites can be drained and to check patency of portal and hepatic veins. 4. High anion gap metabolic acidosis due to lactic acidosis  - Given IVF. Improved bicarb 16.  -Improved lactate 1.6.  5. Shock due to blood loss  - transfused blood. On IV fluids. On Rocephin and Flagyl for possible intra-abdominal infection. Procalcitonin 0.38, may be due to kidney injury. CRP 13.  CT abdomen pelvis: Circumferential thickening of transverse colon and descending colon. CT chest: Left lower lobe tree-in-bud nodules. -Off Levophed   6. Acute kidney injury  -likely prerenal from blood loss and low blood pressure. Received blood and fluid. Check UA. -Improved creatinine 1.3.  UA: Ketones. 7. Elevated troponin  - likely due to ANTONINA. EKG: NSR. Monitor troponins.  -Troponins remain elevated. Cardiology to order inpatient echo, recommends outpatient stress. 8. DM2 with hyperglycemia  -on Lantus and sliding scale insulin. Start prandial insulin. -A1c 5.5.  -Endocrinology consult.     History of lobular carcinoma of right breast, ER flush  5-40 mL IntraVENous 2 times per day    insulin lispro  0-18 Units SubCUTAneous TID WC    insulin lispro  0-9 Units SubCUTAneous Nightly    lidocaine PF  5 mL IntraDERmal Once    sodium chloride flush  5-40 mL IntraVENous 2 times per day    cefTRIAXone (ROCEPHIN) IV  1,000 mg IntraVENous Q24H    metroNIDAZOLE  500 mg IntraVENous Q8H    insulin glargine  15 Units SubCUTAneous Nightly    latanoprost  1 drop Both Eyes Nightly    risperiDONE  0.25 mg Oral Nightly    levothyroxine  88 mcg Oral Daily      Infusions:    sodium chloride      sodium chloride      dextrose      sodium chloride      sodium chloride 100 mL/hr at 07/13/22 0818    sodium chloride Stopped (07/11/22 0615)    sodium chloride      octreotide (SandoSTATIN) infusion 50 mcg/hr (07/13/22 0530)     PRN Meds: prochlorperazine, 10 mg, Q4H PRN  traMADol, 25 mg, Q6H PRN  sodium chloride, , PRN  sodium chloride, , PRN  glucose, 4 tablet, PRN  dextrose bolus, 125 mL, PRN   Or  dextrose bolus, 250 mL, PRN  glucagon (rDNA), 1 mg, PRN  dextrose, 100 mL/hr, PRN  sodium chloride flush, 5-40 mL, PRN  sodium chloride, , PRN  ondansetron, 4 mg, Q8H PRN   Or  ondansetron, 4 mg, Q6H PRN  acetaminophen, 650 mg, Q6H PRN   Or  acetaminophen, 650 mg, Q6H PRN  sodium chloride flush, 5-40 mL, PRN  sodium chloride, , PRN  sodium chloride, , PRN        Labs      Recent Labs     07/11/22  0450 07/11/22  0845 07/12/22  0525 07/12/22  0525 07/12/22  0940 07/12/22  1800 07/13/22  0352   WBC 27.1*  --  11.2*  --   --   --   --    HGB 7.7*   < > 6.8*   < > 7.4* 7.2* 7.8*   HCT 22.6*   < > 20.8*   < > 23.0* 23.1* 24.1*     --  117*  --   --   --   --     < > = values in this interval not displayed.       Recent Labs     07/11/22  0450 07/12/22  0525 07/13/22  0352   * 138 138   K 4.9 4.6 4.4    106 107   CO2 16* 19* 23   BUN 59* 44* 38*   CREATININE 1.6* 1.4* 1.3*     No results for input(s): AST, ALT, ALB, BILIDIR, BILITOT, ALKPHOS in the last 72 hours.   Recent Labs     07/11/22  0450   INR 1.31     Recent Labs     07/11/22  0032 07/11/22  2100 07/12/22  0310   TROPONINT 0.050* 0.096* 0.074*     Lab Results   Component Value Date/Time    LABA1C 5.5 07/12/2022 05:25 AM     CALCIUM:  7.6/23 (07/13 0352)  Lab Results   Component Value Date/Time    MG 2.0 04/06/2022 04:23 PM           Electronically signed by James Perales MD on 7/13/2022 at 3:48 PM

## 2022-07-14 ENCOUNTER — APPOINTMENT (OUTPATIENT)
Dept: INTERVENTIONAL RADIOLOGY/VASCULAR | Age: 72
DRG: 432 | End: 2022-07-14
Payer: MEDICARE

## 2022-07-14 LAB
ALBUMIN ELP: 2.8 GM/DL (ref 3.2–5.6)
ALBUMIN FLUID: 0.2 GM/DL
ALPHA-1-GLOBULIN: 0.2 GM/DL (ref 0.1–0.4)
ALPHA-2-GLOBULIN: 0.5 GM/DL (ref 0.4–1.2)
ANION GAP SERPL CALCULATED.3IONS-SCNC: 11 MMOL/L (ref 4–16)
BETA GLOBULIN: 0.8 GM/DL (ref 0.5–1.3)
BUN BLDV-MCNC: 31 MG/DL (ref 6–23)
CALCIUM SERPL-MCNC: 7.8 MG/DL (ref 8.3–10.6)
CHLORIDE BLD-SCNC: 109 MMOL/L (ref 99–110)
CO2: 19 MMOL/L (ref 21–32)
CREAT SERPL-MCNC: 1.2 MG/DL (ref 0.6–1.1)
F-ACTIN AB, IGG: 7 UNITS (ref 0–19)
FLUID TYPE: NORMAL INDEX
GAMMA GLOBULIN: 1.2 GM/DL (ref 0.5–1.6)
GFR AFRICAN AMERICAN: 53 ML/MIN/1.73M2
GFR NON-AFRICAN AMERICAN: 44 ML/MIN/1.73M2
GLUCOSE BLD-MCNC: 131 MG/DL (ref 70–99)
GLUCOSE BLD-MCNC: 139 MG/DL (ref 70–99)
GLUCOSE BLD-MCNC: 225 MG/DL (ref 70–99)
GLUCOSE BLD-MCNC: 246 MG/DL (ref 70–99)
GLUCOSE BLD-MCNC: 279 MG/DL (ref 70–99)
HCT VFR BLD CALC: 23.5 % (ref 37–47)
HEMOGLOBIN: 7.3 GM/DL (ref 12.5–16)
HIGH SENSITIVE C-REACTIVE PROTEIN: 17.3 MG/L
LV EF: 53 %
LVEF MODALITY: NORMAL
LYMPHOCYTES, BODY FLUID: 2 %
MESOTHELIAL FLUID: 1 /100 WBC
MONOCYTE, FLUID: 22 %
NEUTROPHIL, FLUID: 76 %
POTASSIUM SERPL-SCNC: 4.4 MMOL/L (ref 3.5–5.1)
PROCALCITONIN: 0.17
PROTEIN FLUID: 0.4 GM/DL
RBC FLUID: 2 /CU MM
SODIUM BLD-SCNC: 139 MMOL/L (ref 135–145)
SPEP INTERPRETATION: ABNORMAL
TOTAL PROTEIN: 5.4 GM/DL (ref 6.4–8.2)
WBC FLUID: 124 /CU MM

## 2022-07-14 PROCEDURE — 6370000000 HC RX 637 (ALT 250 FOR IP): Performed by: SPECIALIST

## 2022-07-14 PROCEDURE — 97530 THERAPEUTIC ACTIVITIES: CPT

## 2022-07-14 PROCEDURE — 90744 HEPB VACC 3 DOSE PED/ADOL IM: CPT | Performed by: SPECIALIST

## 2022-07-14 PROCEDURE — 87449 NOS EACH ORGANISM AG IA: CPT

## 2022-07-14 PROCEDURE — 6360000002 HC RX W HCPCS: Performed by: SPECIALIST

## 2022-07-14 PROCEDURE — 84157 ASSAY OF PROTEIN OTHER: CPT

## 2022-07-14 PROCEDURE — 90632 HEPA VACCINE ADULT IM: CPT | Performed by: SPECIALIST

## 2022-07-14 PROCEDURE — 2140000000 HC CCU INTERMEDIATE R&B

## 2022-07-14 PROCEDURE — 82042 OTHER SOURCE ALBUMIN QUAN EA: CPT

## 2022-07-14 PROCEDURE — 87324 CLOSTRIDIUM AG IA: CPT

## 2022-07-14 PROCEDURE — 89051 BODY FLUID CELL COUNT: CPT

## 2022-07-14 PROCEDURE — 6370000000 HC RX 637 (ALT 250 FOR IP): Performed by: INTERNAL MEDICINE

## 2022-07-14 PROCEDURE — 93306 TTE W/DOPPLER COMPLETE: CPT

## 2022-07-14 PROCEDURE — 99232 SBSQ HOSP IP/OBS MODERATE 35: CPT | Performed by: SPECIALIST

## 2022-07-14 PROCEDURE — 90471 IMMUNIZATION ADMIN: CPT | Performed by: SPECIALIST

## 2022-07-14 PROCEDURE — 85018 HEMOGLOBIN: CPT

## 2022-07-14 PROCEDURE — 82962 GLUCOSE BLOOD TEST: CPT

## 2022-07-14 PROCEDURE — 99233 SBSQ HOSP IP/OBS HIGH 50: CPT | Performed by: INTERNAL MEDICINE

## 2022-07-14 PROCEDURE — 36592 COLLECT BLOOD FROM PICC: CPT

## 2022-07-14 PROCEDURE — 2580000003 HC RX 258: Performed by: SPECIALIST

## 2022-07-14 PROCEDURE — 6360000002 HC RX W HCPCS: Performed by: NURSE PRACTITIONER

## 2022-07-14 PROCEDURE — 84145 PROCALCITONIN (PCT): CPT

## 2022-07-14 PROCEDURE — 85014 HEMATOCRIT: CPT

## 2022-07-14 PROCEDURE — 97162 PT EVAL MOD COMPLEX 30 MIN: CPT

## 2022-07-14 PROCEDURE — 94761 N-INVAS EAR/PLS OXIMETRY MLT: CPT

## 2022-07-14 PROCEDURE — 0W9G3ZZ DRAINAGE OF PERITONEAL CAVITY, PERCUTANEOUS APPROACH: ICD-10-PCS | Performed by: RADIOLOGY

## 2022-07-14 PROCEDURE — 80048 BASIC METABOLIC PNL TOTAL CA: CPT

## 2022-07-14 PROCEDURE — 88108 CYTOPATH CONCENTRATE TECH: CPT | Performed by: PATHOLOGY

## 2022-07-14 PROCEDURE — 88305 TISSUE EXAM BY PATHOLOGIST: CPT | Performed by: PATHOLOGY

## 2022-07-14 PROCEDURE — G0010 ADMIN HEPATITIS B VACCINE: HCPCS | Performed by: SPECIALIST

## 2022-07-14 PROCEDURE — 6370000000 HC RX 637 (ALT 250 FOR IP): Performed by: HOSPITALIST

## 2022-07-14 PROCEDURE — 87070 CULTURE OTHR SPECIMN AEROBIC: CPT

## 2022-07-14 PROCEDURE — 87205 SMEAR GRAM STAIN: CPT

## 2022-07-14 PROCEDURE — 49083 ABD PARACENTESIS W/IMAGING: CPT

## 2022-07-14 PROCEDURE — 86141 C-REACTIVE PROTEIN HS: CPT

## 2022-07-14 RX ORDER — ACETAMINOPHEN 650 MG/1
650 SUPPOSITORY RECTAL EVERY 8 HOURS PRN
Status: DISCONTINUED | OUTPATIENT
Start: 2022-07-14 | End: 2022-07-15 | Stop reason: HOSPADM

## 2022-07-14 RX ORDER — INSULIN LISPRO 100 [IU]/ML
0-9 INJECTION, SOLUTION INTRAVENOUS; SUBCUTANEOUS
Status: DISCONTINUED | OUTPATIENT
Start: 2022-07-14 | End: 2022-07-14

## 2022-07-14 RX ORDER — ACETAMINOPHEN 325 MG/1
650 TABLET ORAL EVERY 8 HOURS PRN
Status: DISCONTINUED | OUTPATIENT
Start: 2022-07-14 | End: 2022-07-15 | Stop reason: HOSPADM

## 2022-07-14 RX ORDER — INSULIN LISPRO 100 [IU]/ML
0-6 INJECTION, SOLUTION INTRAVENOUS; SUBCUTANEOUS
Status: DISCONTINUED | OUTPATIENT
Start: 2022-07-14 | End: 2022-07-15 | Stop reason: HOSPADM

## 2022-07-14 RX ORDER — PROMETHAZINE HYDROCHLORIDE 25 MG/ML
12.5 INJECTION, SOLUTION INTRAMUSCULAR; INTRAVENOUS EVERY 6 HOURS PRN
Status: DISCONTINUED | OUTPATIENT
Start: 2022-07-14 | End: 2022-07-15 | Stop reason: HOSPADM

## 2022-07-14 RX ORDER — INSULIN LISPRO 100 [IU]/ML
0-12 INJECTION, SOLUTION INTRAVENOUS; SUBCUTANEOUS
Status: DISCONTINUED | OUTPATIENT
Start: 2022-07-14 | End: 2022-07-15 | Stop reason: HOSPADM

## 2022-07-14 RX ORDER — INSULIN LISPRO 100 [IU]/ML
10 INJECTION, SOLUTION INTRAVENOUS; SUBCUTANEOUS
Status: DISCONTINUED | OUTPATIENT
Start: 2022-07-14 | End: 2022-07-15 | Stop reason: HOSPADM

## 2022-07-14 RX ORDER — FUROSEMIDE 10 MG/ML
20 INJECTION INTRAMUSCULAR; INTRAVENOUS ONCE
Status: COMPLETED | OUTPATIENT
Start: 2022-07-14 | End: 2022-07-14

## 2022-07-14 RX ADMIN — OCTREOTIDE ACETATE 50 MCG/HR: 500 INJECTION, SOLUTION INTRAVENOUS; SUBCUTANEOUS at 02:51

## 2022-07-14 RX ADMIN — PANTOPRAZOLE SODIUM 40 MG: 40 TABLET, DELAYED RELEASE ORAL at 06:28

## 2022-07-14 RX ADMIN — CEFTRIAXONE 1000 MG: 1 INJECTION, POWDER, FOR SOLUTION INTRAMUSCULAR; INTRAVENOUS at 00:22

## 2022-07-14 RX ADMIN — INSULIN LISPRO 3 UNITS: 100 INJECTION, SOLUTION INTRAVENOUS; SUBCUTANEOUS at 22:48

## 2022-07-14 RX ADMIN — HEPATITIS A VACCINE 1440 UNITS: 1440 INJECTION, SUSPENSION INTRAMUSCULAR at 14:22

## 2022-07-14 RX ADMIN — INSULIN GLARGINE 15 UNITS: 100 INJECTION, SOLUTION SUBCUTANEOUS at 22:49

## 2022-07-14 RX ADMIN — RISPERIDONE 0.25 MG: 0.5 TABLET ORAL at 22:46

## 2022-07-14 RX ADMIN — HEPATITIS B VACCINE (RECOMBINANT) 20 MCG: 10 INJECTION, SUSPENSION INTRAMUSCULAR at 10:29

## 2022-07-14 RX ADMIN — INSULIN LISPRO 4 UNITS: 100 INJECTION, SOLUTION INTRAVENOUS; SUBCUTANEOUS at 18:36

## 2022-07-14 RX ADMIN — LATANOPROST 1 DROP: 50 SOLUTION/ DROPS OPHTHALMIC at 22:46

## 2022-07-14 RX ADMIN — INSULIN LISPRO 10 UNITS: 100 INJECTION, SOLUTION INTRAVENOUS; SUBCUTANEOUS at 11:48

## 2022-07-14 RX ADMIN — FUROSEMIDE 20 MG: 10 INJECTION, SOLUTION INTRAMUSCULAR; INTRAVENOUS at 15:05

## 2022-07-14 RX ADMIN — INSULIN LISPRO 10 UNITS: 100 INJECTION, SOLUTION INTRAVENOUS; SUBCUTANEOUS at 18:36

## 2022-07-14 RX ADMIN — SODIUM CHLORIDE, PRESERVATIVE FREE 10 ML: 5 INJECTION INTRAVENOUS at 10:27

## 2022-07-14 RX ADMIN — LEVOTHYROXINE SODIUM 88 MCG: 0.09 TABLET ORAL at 06:28

## 2022-07-14 RX ADMIN — ONDANSETRON 4 MG: 2 INJECTION INTRAMUSCULAR; INTRAVENOUS at 06:28

## 2022-07-14 ASSESSMENT — ENCOUNTER SYMPTOMS
VOMITING: 1
NAUSEA: 1
SHORTNESS OF BREATH: 0
ABDOMINAL DISTENTION: 1

## 2022-07-14 ASSESSMENT — PAIN SCALES - GENERAL: PAINLEVEL_OUTOF10: 0

## 2022-07-14 NOTE — PROGRESS NOTES
Remains nauseous but denies abd pain or vomiting  Day 4 of Sandostatin  EXAM:  Vital signs: BP (!) 122/55   Pulse 99   Temp 98.2 °F (36.8 °C) (Oral)   Resp 10   Ht 5' (1.524 m)   Wt 159 lb 6.3 oz (72.3 kg)   SpO2 95%   BMI 31.13 kg/m²   Alert, NAD  Lungs clear to auscultation  Cor: regular rhythm w/o murmer   Abd: distended but soft, non-tender  Extrem: no edema    Serologic liver workup negative so far  Has no native hep A or B immunity  Ultrasound: patent portal and hepatic veins, gallstones  CT: increased ascites- no dilated bowel, resolution of previous \"colitis\"    IMPRESSION:  1) esophageal variceal bleeding- stable  2) Cirrhosis- likely secondary to fatty liver-   3) increased ascites  4) nausea- ?  Drug-induced (Flagyl, Sandostatin, pain meds) vs due to back pain     RECOMMENDATIONS:  1) D/C Flagyl  2) taper sandostatin  3) paracentesis today  4) Hep A and B vaccine #1\  5) keep on full liquids per her request  6) reduce saline infusion  7) dietary consult for 4 gram sodium diet

## 2022-07-14 NOTE — CARE COORDINATION
Pt's spouse notified CM that they would like to have [facility]. D/c plan is home with spouse and HHC. Referral made to [facility]. Facesheet and H&P faxed. Notify CM if any new d/c needs arise. TE    At dc, please call [facility] to notify them of dc at [phone] or [phone]. Please fax AVS and C order to [phone].

## 2022-07-14 NOTE — PLAN OF CARE
Problem: Discharge Planning  Goal: Discharge to home or other facility with appropriate resources  Outcome: Progressing  Flowsheets (Taken 7/13/2022 1351 by Phil Cowden, RN)  Discharge to home or other facility with appropriate resources:   Refer to discharge planning if patient needs post-hospital services based on physician order or complex needs related to functional status, cognitive ability or social support system   Arrange for needed discharge resources and transportation as appropriate     Problem: Chronic Conditions and Co-morbidities  Goal: Patient's chronic conditions and co-morbidity symptoms are monitored and maintained or improved  Outcome: Progressing     Problem: Pain  Goal: Verbalizes/displays adequate comfort level or baseline comfort level  Outcome: Progressing     Problem: Skin/Tissue Integrity  Goal: Absence of new skin breakdown  Description: 1. Monitor for areas of redness and/or skin breakdown  2. Assess vascular access sites hourly  3. Every 4-6 hours minimum:  Change oxygen saturation probe site  4. Every 4-6 hours:  If on nasal continuous positive airway pressure, respiratory therapy assess nares and determine need for appliance change or resting period.   Outcome: Progressing     Problem: Safety - Adult  Goal: Free from fall injury  Outcome: Progressing     Problem: Cardiovascular - Adult  Goal: Maintains optimal cardiac output and hemodynamic stability  Outcome: Progressing  Goal: Absence of cardiac dysrhythmias or at baseline  Outcome: Progressing     Problem: Skin/Tissue Integrity - Adult  Goal: Skin integrity remains intact  Outcome: Progressing     Problem: Gastrointestinal - Adult  Goal: Minimal or absence of nausea and vomiting  Outcome: Progressing  Goal: Maintains or returns to baseline bowel function  Outcome: Progressing  Goal: Maintains adequate nutritional intake  Outcome: Progressing     Problem: Infection - Adult  Goal: Absence of infection at discharge  Outcome:

## 2022-07-14 NOTE — PLAN OF CARE
Problem: Discharge Planning  Goal: Discharge to home or other facility with appropriate resources  7/14/2022 1115 by Marcia Garcia LPN  Outcome: Progressing  7/14/2022 0132 by Salvador Foss RN  Outcome: Progressing  Flowsheets (Taken 7/13/2022 1351 by Tisha Felty, RN)  Discharge to home or other facility with appropriate resources:   Refer to discharge planning if patient needs post-hospital services based on physician order or complex needs related to functional status, cognitive ability or social support system   Arrange for needed discharge resources and transportation as appropriate     Problem: Chronic Conditions and Co-morbidities  Goal: Patient's chronic conditions and co-morbidity symptoms are monitored and maintained or improved  7/14/2022 1115 by Marcia Garcia LPN  Outcome: Progressing  7/14/2022 0132 by Salvador Foss RN  Outcome: Progressing     Problem: Pain  Goal: Verbalizes/displays adequate comfort level or baseline comfort level  7/14/2022 1115 by Marcia Garcia LPN  Outcome: Progressing  7/14/2022 0132 by Salvador Foss RN  Outcome: Progressing     Problem: Skin/Tissue Integrity  Goal: Absence of new skin breakdown  Description: 1. Monitor for areas of redness and/or skin breakdown  2. Assess vascular access sites hourly  3. Every 4-6 hours minimum:  Change oxygen saturation probe site  4. Every 4-6 hours:  If on nasal continuous positive airway pressure, respiratory therapy assess nares and determine need for appliance change or resting period.   7/14/2022 1115 by Marcia Garcia LPN  Outcome: Progressing  7/14/2022 0132 by Salvador Foss RN  Outcome: Progressing     Problem: Safety - Adult  Goal: Free from fall injury  7/14/2022 1115 by Marcia Garcia LPN  Outcome: Progressing  7/14/2022 0132 by Salvador Foss RN  Outcome: Progressing     Problem: Cardiovascular - Adult  Goal: Maintains optimal cardiac output and hemodynamic stability  7/14/2022 1115 by Marcia Garcia LPN  Outcome: Progressing  7/14/2022 0132 by Cony Bustos RN  Outcome: Progressing  Goal: Absence of cardiac dysrhythmias or at baseline  7/14/2022 1115 by Nola Tesfaye LPN  Outcome: Progressing  7/14/2022 0132 by Cony Bustos RN  Outcome: Progressing     Problem: Skin/Tissue Integrity - Adult  Goal: Skin integrity remains intact  7/14/2022 1115 by Nola Tesfaye LPN  Outcome: Progressing  7/14/2022 0132 by Cony Bustos RN  Outcome: Progressing     Problem: Gastrointestinal - Adult  Goal: Minimal or absence of nausea and vomiting  7/14/2022 1115 by Nola Tesfaye LPN  Outcome: Progressing  7/14/2022 0132 by Cony Bustos RN  Outcome: Progressing  Goal: Maintains or returns to baseline bowel function  7/14/2022 1115 by Nola Tesfaye LPN  Outcome: Progressing  7/14/2022 0132 by Cony Bustos RN  Outcome: Progressing  Goal: Maintains adequate nutritional intake  7/14/2022 1115 by Nola Tesfaye LPN  Outcome: Progressing  7/14/2022 0132 by Cony Bustos RN  Outcome: Progressing     Problem: Infection - Adult  Goal: Absence of infection at discharge  7/14/2022 1115 by Nola Tesfaye LPN  Outcome: Progressing  7/14/2022 0132 by Cony Bustos RN  Outcome: Progressing  Goal: Absence of infection during hospitalization  7/14/2022 1115 by Nola Tesfaye LPN  Outcome: Progressing  7/14/2022 0132 by Cony Bustos RN  Outcome: Progressing     Problem: Metabolic/Fluid and Electrolytes - Adult  Goal: Hemodynamic stability and optimal renal function maintained  7/14/2022 1115 by Nola Tesfaye LPN  Outcome: Progressing  7/14/2022 0132 by Cony Bustos RN  Outcome: Progressing  Goal: Glucose maintained within prescribed range  7/14/2022 1115 by Nola Tesfaye LPN  Outcome: Progressing  7/14/2022 0132 by Cony Bustos RN  Outcome: Progressing     Problem: Hematologic - Adult  Goal: Maintains hematologic stability  7/14/2022 1115 by Nola Tesfaye LPN  Outcome: Progressing  7/14/2022 0132 by Cony Bustos RN  Outcome: Progressing     Problem: Musculoskeletal - Adult  Goal: Return mobility to safest level of function  7/14/2022 1115 by Marcia Garcia LPN  Outcome: Progressing  7/14/2022 0132 by Salvador Foss RN  Outcome: Progressing  Goal: Return ADL status to a safe level of function  7/14/2022 1115 by Marcia Garcia LPN  Outcome: Progressing  7/14/2022 0132 by Salvador Foss RN  Outcome: Progressing     Problem: Nutrition Deficit:  Goal: Optimize nutritional status  7/14/2022 1115 by Marcia Garcia LPN  Outcome: Progressing  7/14/2022 0132 by Salvador Foss RN  Outcome: Progressing

## 2022-07-14 NOTE — CONSULTS
Endocrinology   Consult Note  7/10/2022  9:22 AM     Primary Care provider: Filiberto Jo MD     Referring physician:  Peter Garcia MD     Dear Doctor  Malu Bowling     Thank You for the Consult     Pt. Was Admitted for : Upper GI bleeding, nausea vomiting and diarrhea blood in the stools also vomiting blood    Reason for Consult:  btter control but pt is also on sandostatin for GI bleding     History Obtained From:  Patient/ EMR       HISTORY OF PRESENT ILLNESS:                The patient is a 67 y.o. female with significant past medical history of cancer, diabetes mellitus, history of GERD, hypertension, hyperlipidemia, neuropathy, dementia hypothyroidism, history of global strolls recently diagnosed cirrhosis of liver brought in because of nausea vomiting and diarrhea and blood in the stool and also been vomiting some blood. He has been running very erratic blood glucose levels. I was  consulted for better control of blood glucose. ROS:   Pt's ROS done in detail. Abnormal ROS are noted in Medical and Surgical History Section below: Other Medical History:        Diagnosis Date    Cancer [facility] [facility]     Diabetes mellitus ([facility] [facility]     Glaucoma     Gout     Hyperlipidemia     Hypertension     Neuropathy     Thyroid disease      Surgical History:        Procedure Laterality Date     SECTION      MASTECTOMY, BILATERAL  10/26/2007    UPPER GASTROINTESTINAL ENDOSCOPY N/A 2022    EGD BAND LIGATION with 6 bands placed performed by Frantz Cuadra MD at [facility] ENDOSCOPY       Allergies:  Bupropion, Nsaids, Amlodipine, Nateglinide, Ofloxacin, Paroxetine hcl, Pioglitazone, Citalopram, Escitalopram, Flonase [fluticasone], and Paxil [paroxetine]    Family History:   No family history on file.   REVIEW OF SYSTEMS:  Review of System Done as noted above     PHYSICAL EXAM:      Vitals:    BP (!) 122/55   Pulse 99   Temp 98.2 °F (36.8 °C) (Oral)   Resp 10   Ht 5' (1.524 m)   Wt 159 lb 6.3 oz (72.3 kg)   SpO2 95%   BMI 31.13 kg/m²     CONSTITUTIONAL:  awake, alert, cooperative, appears stated age   EYES:  vision intact Fundoscopic Exam not performed   ENT:Normal  NECK:  Supple, No JVD. Thyroid Exam:Normal   LUNGS:  Has Vesicular Breath Sounds,   CARDIOVASCULAR:  Normal apical impulse, regular rate and rhythm, normal S1 and S2, no S3 or S4, and has no  murmur   ABDOMEN:  No scars, normal bowel sounds, soft, non-distended, non-tender, no masses palpated, no hepatolienomegaly had paracentesis done  Musculoskeletal: Normal  Extremities: Normal, peripheral pulses normal, , has no edema   NEUROLOGIC:  Awake, alert, oriented to name, place and time. Cranial nerves II-XII are grossly intact. Motor is  intact. Sensory neuropathy. ,  and gait is abnormal.  And unstable    DATA:    CBC:   Recent Labs     07/12/22  0525 07/12/22  0940 07/13/22  0352 07/13/22  1558 07/14/22  0635   WBC 11.2*  --   --   --   --    HGB 6.8*   < > 7.8* 7.3* 7.3*   *  --   --   --   --     < > = values in this interval not displayed.     CMP:  Recent Labs     07/11/22  0845 07/12/22  0525 07/13/22 0352   NA  --  138 138   K  --  4.6 4.4   CL  --  106 107   CO2  --  19* 23   BUN  --  44* 38*   CREATININE  --  1.4* 1.3*   CALCIUM  --  7.4* 7.6*   PROT 5.4*  --   --      Lipids:   Lab Results   Component Value Date/Time    CHOL 137 04/28/2022 08:25 AM    CHOL 148 01/27/2022 08:55 AM    HDL 61 04/28/2022 08:25 AM    TRIG 86 04/28/2022 08:25 AM     Glucose:   Recent Labs     07/13/22  1706 07/13/22  2104 07/14/22  0659   POCGLU 217* 99 139*     Hemoglobin A1C:   Lab Results   Component Value Date/Time    LABA1C 5.5 07/12/2022 05:25 AM     Free T4:   Lab Results   Component Value Date/Time    T4FREE 1.53 04/28/2022 08:25 AM     Free T3: No results found for: FT3  TSH High Sensitivity:   Lab Results   Component Value Date/Time    Located within [facility] 3.270 07/11/2022 04:50 AM       CT ABDOMEN PELVIS WO CONTRAST Additional Contrast? None   Final Result 1. Mild-moderate bilateral pleural effusions with worsening infiltrates in   the lung bases, likely related to atelectasis and/or superimposed pneumonia. 2. Moderate-severe ascites and anasarca are significantly worse than 3 days   ago. 3. Cirrhosis. 4. Gallstones. 5. Atherosclerotic disease. 6. Soft tissue thickening along the distal esophageal wall. This could be   related to esophagitis versus mass. Endoscopy could be performed for further   evaluation if clinically indicated. XR ABDOMEN (KUB) (SINGLE AP VIEW)   Final Result   Overall nonobstructive bowel gas pattern. Airspace opacities in the left lung base. US ABDOMEN LIMITED   Final Result   1. Cirrhotic morphology of the liver with no evident focal lesion. 2. Patent main portal vein and hepatic veins. 3. At least small ascites potentially due to portal hypertension. 4. Cholelithiasis and biliary sludge. Gallbladder wall thickening could be   due to cholecystitis but may also be related to adjacent liver disease, the   presence of ascites, or a systemic process resulting in volume overload such   as hypoproteinemia. Consider further evaluation with a nuclear medicine   hepatobiliary scan if there are clinical findings of cholecystitis. Additionally, there may be irregular wall thickening near the gallbladder   fundus potentially related to adenomyomatosis given the prior CT appearance,   although malignancy could appear similar. 5. Hyperechoic right kidney consistent with underlying parenchymal disease. US DUP ABD PEL RETRO SCROT COMPLETE   Final Result   1. Cirrhotic morphology of the liver with no evident focal lesion. 2. Patent main portal vein and hepatic veins. 3. At least small ascites potentially due to portal hypertension. 4. Cholelithiasis and biliary sludge.   Gallbladder wall thickening could be   due to cholecystitis but may also be related to adjacent liver disease, the   presence of ascites, or a systemic process resulting in volume overload such   as hypoproteinemia. Consider further evaluation with a nuclear medicine   hepatobiliary scan if there are clinical findings of cholecystitis. Additionally, there may be irregular wall thickening near the gallbladder   fundus potentially related to adenomyomatosis given the prior CT appearance,   although malignancy could appear similar. 5. Hyperechoic right kidney consistent with underlying parenchymal disease. XR CHEST PORTABLE   Final Result   1. Left-sided CVC tip overlies the proximal superior vena cava level. 2. No pneumothorax evident. 3. No acute pulmonary infiltrate is seen. 4. Calcific atherosclerosis aorta. 5. Cardiomegaly. CT CHEST WO CONTRAST   Final Result   1. Circumferential thickening of the transverse colon and descending colon   indicative of colitis. No evidence of pneumatosis intestinalis or portal   venous gas. 2. Cholelithiasis with mild thickening of the gallbladder wall. If patient   has right upper quadrant abdominal pain, ultrasound would better assess the   gallbladder. 3. Left lower lobe tree-in-bud nodules. Appearance favors an infectious or   inflammatory etiology, including bronchiolitis. 4. Persistent small free fluid in the upper quadrant of the abdomen and   pelvis, not significantly changed since prior exam.   5. IMV to IVC shunt. CT ABDOMEN PELVIS WO CONTRAST Additional Contrast? None   Final Result   1. Circumferential thickening of the transverse colon and descending colon   indicative of colitis. No evidence of pneumatosis intestinalis or portal   venous gas. 2. Cholelithiasis with mild thickening of the gallbladder wall. If patient   has right upper quadrant abdominal pain, ultrasound would better assess the   gallbladder. 3. Left lower lobe tree-in-bud nodules. Appearance favors an infectious or   inflammatory etiology, including bronchiolitis.    4. Persistent small free fluid in the upper quadrant of the abdomen and   pelvis, not significantly changed since prior exam.   5. IMV to IVC shunt. IR US GUIDED PARACENTESIS    (Results Pending)          Scheduled Medicines   Medications:    hepatitis A vaccine  1,440 Units IntraMUSCular Once    hepatitis b vaccine recombinant  1 mL IntraMUSCular Once    insulin lispro  0-9 Units SubCUTAneous 2 times per day    insulin lispro  0-12 Units SubCUTAneous TID WC    insulin lispro  0-6 Units SubCUTAneous 2 times per day    insulin lispro  10 Units SubCUTAneous TID WC    lidocaine  1 patch TransDERmal Daily    pantoprazole  40 mg Oral QAM AC    sodium zirconium cyclosilicate  10 g Oral Once    dextrose bolus  250 mL IntraVENous Once    sodium chloride flush  5-40 mL IntraVENous 2 times per day    lidocaine PF  5 mL IntraDERmal Once    sodium chloride flush  5-40 mL IntraVENous 2 times per day    cefTRIAXone (ROCEPHIN) IV  1,000 mg IntraVENous Q24H    insulin glargine  15 Units SubCUTAneous Nightly    latanoprost  1 drop Both Eyes Nightly    risperiDONE  0.25 mg Oral Nightly    levothyroxine  88 mcg Oral Daily      Infusions:    sodium chloride      sodium chloride      dextrose      sodium chloride      sodium chloride 30 mL/hr at 07/14/22 0644    sodium chloride Stopped (07/11/22 0615)    sodium chloride      octreotide (SandoSTATIN) infusion 50 mcg/hr (07/14/22 0251)         IMPRESSION    Patient Active Problem List   Diagnosis    Acquired hypothyroidism    Depression    Diabetic neuropathy ([address]    DM (diabetes mellitus) ([address]    HTN (hypertension)    Hyperlipidemia    Osteopenia    Personal history of breast cancer    Allergic rhinitis    ANTONINA (acute kidney injury) ([address]    Gastroenteritis    GI bleed    Hematemesis with nausea    Cirrhosis of liver with ascites ([address]    Secondary esophageal varices with bleeding ([address]         RECOMMENDATIONS:      1.  Reviewed POC blood glucose . Labs and X ray results   2. Reviewed Home and Current Medicines   3. Will Start On meal/ Correction bolus Humalog/ Lantus Insulin regime   4. Monitor Blood glucose frequently   5. Modify  the dose of Insulin/ OHGD  as needed        Will follow with you  Again thank you for sharing pt's care with me.      Truly yours,       Ayana Pacheco MD

## 2022-07-14 NOTE — CONSULTS
Nutrition Education    · Educated on Low Sodium 2 G Nutrition Therapy. · Patient is diabetic, has been eating poorly on fulls due to pain. Reports only consuming jello and some soups, but continues with nausea and abdominal pain. No diarrhea. Pt desires to advance diet, recommend to begin solids slowly and monitor fat/sodium intake. · Learners: Patient and Significant Other  · Readiness: Acceptance  · Method: Explanation, Handout and Teachback  · Response: Demonstrated Understanding  · Contact name and number provided. · Will continue to monitor as high nutrition risk.      Nisreen Singh RD, BELKYS  Contact Number: [phone]

## 2022-07-14 NOTE — PROCEDURES
PROCEDURE PERFORMED: Paracentesis by Dr. Machado [place]: Ascites    INFORMED CONSENT:  Obtained prior to procedure. Consent placed in chart. JACKSON SCORE PRE PROCEDURE:  10      PT TRANSPORTED FROM:   3105                                 TO THE IR ROOM:    Small                    ARRIVED TO ROOM: 6919    ASSESSMENT:WNL    STAFF PRESENT: Joy Flynn RN    BARRIER PRECAUTIONS & STERILE TECHNIQUE:               Pt positioned for comfort. Warm blankets given. Pt placed on Cardiac Monitor. Pt prepped  and draped in a sterile fashion with chlorhexadine.     TIME OUT:  745    PROCEDURE STARTED:     PAIN/LOCAL ANESTHESIA/SEDATION MANAGEMENT:           Local: Lidocaine 1% given by Dr. Rosie Hernandez          Sedation: None             Fentanyl:             Versed:     INTRAOPERATIVE:           ACCESS TIME: 0746          US/FLUORO: US # 3 Images          FINAL IMAGE TAKEN TO CONFIRM PLACEMENT OF:           CONTRAST/CC:     STERILE DRESSINGS: Placed by Dr. Shayy Weinberg: 150cc total Clear, clear fluid sent to lab    EBL:      <1cc    FOLLOW- UP X-RAY: N/A    PROCEDURE ENDED: 5144    COMPLICATIONS:None    JACKSON SCORE POST PROCEDURE:  10        LEFT THE ROOM: 0825    REPORT CALLED TO: Butch Zazueta RN

## 2022-07-14 NOTE — PROGRESS NOTES
V2.0  [facility]      Name:  Charol Sacks /Age/Sex: 1950  (67 y.o. female)   MRN & CSN:  [id number] & [id number] Encounter Date/Time: 2022 12:13 PM EDT    [facility]  [facility] PCP: Elliot Al MD       Hospital Day: 5    Assessment and Plan:   Charol Sacks is a 67 y.o. female who presents with GI bleed      Plan:  Upper GI bleed due to esophageal varices  -EGD : Distal esophagus large varices, 6 bands placed. GI following. On Protonix and octreotide. Patient will need Rocephin for 5 days. Octreotide infusion for 3-5 days. Will need EVL every 2-6 weeks until varices decompressed. Acute on chronic blood loss anemia  -Transfused 4 units PRBC. Improved hemoglobin. .  Patient's hemoglobin 7. 3G/DL. Acute decompensated liver cirrhosis  - abdominal ultrasound 2022 suggestive of cirrhosis. -S/p abdominal paracentesis today with drainage of 150 cc of fluids. We will follow fluid evaluation. Non-anion gap metabolic acidosis likely due to hyperchloremia         -DC normal saline infusion. Shock due to blood loss, resolved    Acute kidney injury  -l renal function improving. Elevated troponin  - likely due to demand ischemia. DM2 with hyperglycemia  -on Lantus and sliding scale insulin. Start prandial insulin. -A1c 5.5.  -Endocrinology on board. History of hypertension, currently well controlled without any medication. History of lobular carcinoma of right breast, ER positive s/p bilateral mastectomy-  History of distal cell carcinoma- left lip  Hypothyroidism-continue levothyroxine  Dementia without behavioral disturbance  Anxiety  Gout    Diet ADULT DIET; Full Liquid; 4 carb choices (60 gm/meal)    DVT Prophylaxis [] Lovenox, []  Heparin, [x] SCDs, [] Ambulation,  [] Eliquis, [] Xarelto  [] Coumadin   Code Status Full Code   Disposition From: home  Expected Disposition: PT/OT recommending SNF.   Estimated Date of Discharge: 7/16  Patient requires continued admission due to GI bleed   Home O2 none     Subjective/Interval history:   Chief Complaint: Hematemesis, Abdominal Pain, and Diarrhea     Not having any bleeding  Does complain of her chronic back pain. Transferred to [address] [place] [address]. Review of Systems:    Negative unless mentioned above    Objective: Intake/Output Summary (Last 24 hours) at 7/14/2022 1203  Last data filed at 7/13/2022 2300  Gross per 24 hour   Intake 120 ml   Output 75 ml   Net 45 ml        Vitals:   /73   Pulse (!) 102   Temp 98.1 °F (36.7 °C) (Oral)   Resp 17   Ht 5' (1.524 m)   Wt 159 lb 6.3 oz (72.3 kg)   SpO2 97%   BMI 31.13 kg/m²     Physical Exam:   General: NAD, laying in bed  Eyes: no discharge  HENT: NCAT  Cardiovascular: RRR  Respiratory: seems clear to auscultation  Gastrointestinal: Soft, non tender, appears  distended  Genitourinary: no suprapubic tenderness  Musculoskeletal: trace pitting edema, nontender  Skin: warm, dry, no gross lesions  Neuro: Alert. No gross deficits  Psych: Mood appropriate.      Medications:   Medications:    hepatitis A vaccine  1,440 Units IntraMUSCular Once    insulin lispro  0-9 Units SubCUTAneous 2 times per day    insulin lispro  0-12 Units SubCUTAneous TID WC    insulin lispro  0-6 Units SubCUTAneous 2 times per day    insulin lispro  10 Units SubCUTAneous TID WC    lidocaine  1 patch TransDERmal Daily    pantoprazole  40 mg Oral QAM AC    sodium zirconium cyclosilicate  10 g Oral Once    dextrose bolus  250 mL IntraVENous Once    sodium chloride flush  5-40 mL IntraVENous 2 times per day    lidocaine PF  5 mL IntraDERmal Once    sodium chloride flush  5-40 mL IntraVENous 2 times per day    cefTRIAXone (ROCEPHIN) IV  1,000 mg IntraVENous Q24H    insulin glargine  15 Units SubCUTAneous Nightly    latanoprost  1 drop Both Eyes Nightly    risperiDONE  0.25 mg Oral Nightly    levothyroxine  88 mcg Oral Daily      Infusions:    sodium chloride      sodium chloride      dextrose      sodium chloride      sodium chloride 30 mL/hr at 07/14/22 0644    sodium chloride Stopped (07/11/22 0615)    sodium chloride      octreotide (SandoSTATIN) infusion 50 mcg/hr (07/14/22 0251)     PRN Meds: acetaminophen, 650 mg, Q8H PRN   Or  acetaminophen, 650 mg, Q8H PRN  promethazine, 12.5 mg, Q6H PRN  prochlorperazine, 10 mg, Q4H PRN  traMADol, 25 mg, Q6H PRN  sodium chloride, , PRN  sodium chloride, , PRN  glucose, 4 tablet, PRN  dextrose bolus, 125 mL, PRN   Or  dextrose bolus, 250 mL, PRN  glucagon (rDNA), 1 mg, PRN  dextrose, 100 mL/hr, PRN  sodium chloride flush, 5-40 mL, PRN  sodium chloride, , PRN  ondansetron, 4 mg, Q6H PRN  sodium chloride flush, 5-40 mL, PRN  sodium chloride, , PRN  sodium chloride, , PRN        Labs      Recent Labs     07/12/22  0525 07/12/22  0940 07/13/22  0352 07/13/22  1558 07/14/22  0635   WBC 11.2*  --   --   --   --    HGB 6.8*   < > 7.8* 7.3* 7.3*   HCT 20.8*   < > 24.1* 23.1* 23.5*   *  --   --   --   --     < > = values in this interval not displayed. Recent Labs     07/12/22  0525 07/13/22  0352 07/14/22  0635    138 139   K 4.6 4.4 4.4    107 109   CO2 19* 23 19*   BUN 44* 38* 31*   CREATININE 1.4* 1.3* 1.2*     No results for input(s): AST, ALT, ALB, BILIDIR, BILITOT, ALKPHOS in the last 72 hours. No results for input(s): INR in the last 72 hours.   Recent Labs     07/11/22  2100 07/12/22  0310   TROPONINT 0.096* 0.074*     Lab Results   Component Value Date/Time    LABA1C 5.5 07/12/2022 05:25 AM     CALCIUM:  7.8/19 (07/14 5229)  Lab Results   Component Value Date/Time    MG 2.0 04/06/2022 04:23 PM           Electronically signed by Ling Ratliff MD on 7/14/2022 at 12:03 PM

## 2022-07-14 NOTE — PROGRESS NOTES
Cardiology Progress Note     Today's Plan:  X 1 dose of IV lasix     Admit Date:  7/10/2022    Consult reason/ Seen today for: Elevated troponin    Subjective and  Overnight Events:  Patient still having nausea and weakness. History of Presenting Illness:    Chief complain on admission : 67 y. o.year old who is admitted for  Chief Complaint   Patient presents with    Hematemesis    Abdominal Pain    Diarrhea        Past medical history:    has a past medical history of Cancer ([facility] [address] Diabetes mellitus ([facility] [address] Glaucoma, Gout, Hyperlipidemia, Hypertension, Neuropathy, and Thyroid disease. Past surgical history:   has a past surgical history that includes  section; Mastectomy, bilateral (10/26/2007); and Upper gastrointestinal endoscopy (N/A, 2022). Social History:   reports that she has never smoked. She has never used smokeless tobacco. She reports that she does not drink alcohol and does not use drugs. Family history:  family history is not on file. Allergies   Allergen Reactions    Bupropion      Other reaction(s): Other - comment required  Suicidal ideation    Nsaids      Other reaction(s): Other - comment required  Elevated Serum Creatinine    Amlodipine     Nateglinide Nausea Only    Ofloxacin Hives    Paroxetine Hcl Hives     Other reaction(s): Other - comment required  Shakey/nerveous    Pioglitazone      Other reaction(s): Other - comment required  Dizzy / nauseated    Citalopram Nausea And Vomiting    Escitalopram Nausea And Vomiting    Flonase [Fluticasone] Nausea And Vomiting    Paxil [Paroxetine] Nausea And Vomiting       Review of Systems:  Review of Systems   Respiratory: Negative for shortness of breath. Cardiovascular: Positive for leg swelling. Negative for chest pain and palpitations. Gastrointestinal: Positive for abdominal distention, nausea and vomiting.    Musculoskeletal: Negative. Skin: Negative. Neurological: Negative for dizziness and weakness. All other systems reviewed and are negative. /73   Pulse (!) 102   Temp 98.1 °F (36.7 °C) (Oral)   Resp 17   Ht 5' (1.524 m)   Wt 159 lb 6.3 oz (72.3 kg)   SpO2 97%   BMI 31.13 kg/m²       Intake/Output Summary (Last 24 hours) at 7/14/2022 1259  Last data filed at 7/13/2022 2300  Gross per 24 hour   Intake 120 ml   Output 75 ml   Net 45 ml       Physical Exam:  Physical Exam  Constitutional:       Appearance: She is well-developed. Cardiovascular:      Rate and Rhythm: Normal rate and regular rhythm. Pulses: Intact distal pulses. Dorsalis pedis pulses are 2+ on the right side and 2+ on the left side. Posterior tibial pulses are 2+ on the right side and 2+ on the left side. Heart sounds: Normal heart sounds, S1 normal and S2 normal.   Pulmonary:      Effort: Pulmonary effort is normal.      Breath sounds: Normal breath sounds. Abdominal:      General: There is distension. Musculoskeletal:         General: Normal range of motion. Right lower leg: Edema present. Left lower leg: Edema present. Skin:     General: Skin is warm and dry. Neurological:      Mental Status: She is alert and oriented to person, place, and time.           Medications:    hepatitis A vaccine  1,440 Units IntraMUSCular Once    insulin lispro  0-9 Units SubCUTAneous 2 times per day    insulin lispro  0-12 Units SubCUTAneous TID WC    insulin lispro  0-6 Units SubCUTAneous 2 times per day    insulin lispro  10 Units SubCUTAneous TID WC    lidocaine  1 patch TransDERmal Daily    pantoprazole  40 mg Oral QAM AC    sodium zirconium cyclosilicate  10 g Oral Once    dextrose bolus  250 mL IntraVENous Once    sodium chloride flush  5-40 mL IntraVENous 2 times per day    lidocaine PF  5 mL IntraDERmal Once    sodium chloride flush  5-40 mL IntraVENous 2 times per day    cefTRIAXone (ROCEPHIN) IV 1,000 mg IntraVENous Q24H    insulin glargine  15 Units SubCUTAneous Nightly    latanoprost  1 drop Both Eyes Nightly    risperiDONE  0.25 mg Oral Nightly    levothyroxine  88 mcg Oral Daily      sodium chloride      sodium chloride      dextrose      sodium chloride      sodium chloride Stopped (07/11/22 0615)    sodium chloride      octreotide (SandoSTATIN) infusion 50 mcg/hr (07/14/22 0251)     acetaminophen **OR** acetaminophen, promethazine, prochlorperazine, traMADol, sodium chloride, sodium chloride, glucose, dextrose bolus **OR** dextrose bolus, glucagon (rDNA), dextrose, sodium chloride flush, sodium chloride, [DISCONTINUED] ondansetron **OR** ondansetron, sodium chloride flush, sodium chloride, sodium chloride    Lab Data:  CBC:   Recent Labs     07/12/22  0525 07/12/22  0940 07/13/22  0352 07/13/22  1558 07/14/22  0635   WBC 11.2*  --   --   --   --    HGB 6.8*   < > 7.8* 7.3* 7.3*   HCT 20.8*   < > 24.1* 23.1* 23.5*   MCV 99.5  --   --   --   --    *  --   --   --   --     < > = values in this interval not displayed. BMP:   Recent Labs     07/12/22  0525 07/13/22  0352 07/14/22  0635    138 139   K 4.6 4.4 4.4    107 109   CO2 19* 23 19*   BUN 44* 38* 31*   CREATININE 1.4* 1.3* 1.2*     PT/INR: No results for input(s): PROTIME, INR in the last 72 hours. BNP:  No results for input(s): PROBNP in the last 72 hours. TROPONIN:   Recent Labs     07/11/22  2100 07/12/22  0310   TROPONINT 0.096* 0.074*        Impression:  Principal Problem:    GI bleed  Active Problems:    Hematemesis with nausea    Cirrhosis of liver with ascites (HCC)    Secondary esophageal varices with bleeding (HCC)  Resolved Problems:    * No resolved hospital problems. *       All labs, medications and tests reviewed by myself, continue all other medications of all above medical condition listed as is except for changes mentioned above. Thank you   Please call with questions.     Electronically signed by Gifty Mcqueen, ATILIO - CNP on 7/14/2022 at 12:59 PM           CARDIOLOGY ATTENDING ADDENDUM    I have seen, spoken to and examined this patient personally, independent of the NP/PAC. I have reviewed the hospital care given to date and reviewed all pertinent labs and imaging. I have spoken with patient, nursing staff and provided written and verbal instructions . The above note has been reviewed. I have spent substantive amount of time in formulating patient care. Physical Exam:    General:   Awake, alert  Head:normal  Eye:normal  Chest:   Clear to auscultation 0 Basilar crackles   Cardiovascular:  S1S2   Abdomen: soft   Extremities:  + edema  Pulses; palpable      MEDICAL DECISION MAKING :          1. Elevated Troponin : in setting of renal failure, sepsis and acute infection, doubt ACS. Most probably demand ischemia related leak. No further work up at this time. Can consider OP stress test for risk stratification due to multiple risk factors . Echo normal. Patient has mild BLE edema, x 1 dose of IV lasix 20 mg.   2. Acute GI bleed: Esophageal varices, on Protonix gtt. 3. Acute blood loss anemia: S/p PRBC, current hgb 7.3.   4. Shock: sepsis/anemia, patient stable at this time. 5. ANTONINA: improved with hydration. 6. Cirrhosis: GI following. 7. DVT prophylaxis if not contraindicated.             Dr. Edi Condon MD

## 2022-07-14 NOTE — PROGRESS NOTES
Physical Therapy  Facility/Department: [facility]  Physical Therapy Initial Assessment    Name: Kayleen Garrett  : 1950  MRN: [id number]  Date of Service: 2022    Discharge Recommendations:  Home with Home health PT,Home with assist PRN   PT Equipment Recommendations  Equipment Needed: No      Patient Diagnosis(es): The primary encounter diagnosis was Gastrointestinal hemorrhage, unspecified gastrointestinal hemorrhage type. Diagnoses of Acute blood loss anemia, Colitis, Hyperkalemia, Acute kidney injury ([address] and Elevated d-dimer were also pertinent to this visit. Past Medical History:  has a past medical history of Cancer ([facility] [address] Diabetes mellitus ([facility] [address] Glaucoma, Gout, Hyperlipidemia, Hypertension, Neuropathy, and Thyroid disease. Past Surgical History:  has a past surgical history that includes  section; Mastectomy, bilateral (10/26/2007); and Upper gastrointestinal endoscopy (N/A, 2022). Assessment   Body Structures, Functions, Activity Limitations Requiring Skilled Therapeutic Intervention: Decreased functional mobility ; Decreased strength; Increased pain;Decreased ADL status; Decreased body mechanics; Decreased endurance;Decreased balance  Assessment: Pt presents 4 days s/p admission for nausea, vomitting, diarrhea, dec POC intake; admitted for UGI bleed, symptoms of shock 2/2 blood loss, s/p 4 units of RBC. She is home w/ , 2 EARNEST, no AD prior, ind PLOF. Presents w/ the above listed deficits, however, she is primarily limited by dec endurance and dec positional tolerance. Dec strength/power affecting transfers/transitions, impaired balance, intermittent dizziness/nausea, dec endurance w/ noted SOB, in pain, operating below baseline. Recommending home w/ assist PRN +  PT w/ anticipated functional improvement. Therapy Prognosis: Good  Decision Making: Medium Complexity  Requires PT Follow-Up: Yes  Activity Tolerance  Activity Tolerance: Patient limited by endurance; Patient limited by fatigue     Treatment:  Therapeutic Activity Training:   Therapeutic activity training was instructed today. Cues were given for safety, sequence, UE/LE placement, awareness, and balance. Activities performed today included bed mobility training, sup-sit, sit-stand, SPT.   Functional mobility training, safety education, extensive DC planning, balance activities this session    Plan   Plan  Plan:  (3+)  Plan weeks: 1  Current Treatment Recommendations: Strengthening,ROM,Balance training,Endurance training,Functional mobility training,Gait training,Transfer training,ADL/Self-care training,Stair training,Patient/Caregiver education & training,Safety education & training,Pain management,Equipment evaluation, education, & procurement,Modalities,Positioning,Therapeutic activities  Safety Devices  Type of Devices: Call light within reach,Chair alarm in place,Gait belt,Patient at risk for falls,Left in chair,Nurse notified  Restraints  Restraints Initially in Place: No     Restrictions  Restrictions/Precautions  Restrictions/Precautions: Fall Risk,General Precautions     Subjective   General  Chart Reviewed: Yes  Patient assessed for rehabilitation services?: Yes  Family / Caregiver Present: Yes ()  Follows Commands: Within Functional Limits  General Comment  Comments: endorses nausea, no pain  Subjective  Subjective: I think I need to turn back, I'm getting dizzy     Social/Functional History  Social/Functional History  Lives With: Spouse  Type of Home: House  Home Layout: One level  Home Access: Stairs to enter without rails  Entrance Stairs - Number of Steps: 2  Bathroom Shower/Tub: Tub/Shower unit  Bathroom Toilet: Standard  Bathroom Equipment: Grab bars in shower  Has the patient had two or more falls in the past year or any fall with injury in the past year?: Yes (1 fall d/t weakness)  ADL Assistance: Independent  Homemaking Assistance: Independent  Homemaking Responsibilities: Yes  Ambulation Assistance: Independent  Transfer Assistance: Independent  Active : Yes  Vision/Hearing  Vision  Vision: Within Functional Limits  Hearing  Hearing: Within functional limits    Cognition   Orientation  Overall Orientation Status: Within Normal Limits  Orientation Level: Oriented X4  Cognition  Overall Cognitive Status: WFL     Objective   Heart Rate: (!) 102  Heart Rate Source: Monitor  BP: 116/73  BP Location: Right upper arm  BP Method: Automatic  Patient Position: Semi fowlers  MAP (Calculated): 87.33  Resp: 17  SpO2: 97 %  O2 Device: None (Room air)     Observation/Palpation  Posture: Good  Observation: Supine, awake, alert, agreeable. She is guarded w/ movement 2/2 nausea, has improved since arrival; global fluid retention noted 2/2 chronic ongoing liver dysfunction; advancing diet. Tele, BP cuff, peripheral IV, TPN in place.       AROM RLE (degrees)  RLE AROM: WFL  AROM LLE (degrees)  LLE AROM : WFL  Strength RLE  Strength RLE: WFL  Comment: grossly 4+/5  Strength LLE  Strength LLE: WFL  Comment: grossly 4/5     Bed mobility  Supine to Sit: Stand by assistance  Scooting: Stand by assistance  Transfers  Sit to Stand: Contact guard assistance  Stand to sit: Contact guard assistance  Bed to Chair: Contact guard assistance  Ambulation  Surface: level tile  Device: Rolling Walker  Assistance: Contact guard assistance  Gait Deviations: Slow Fatimah;Decreased step length;Decreased step height;Deviated path  Distance: 35 ft  Comments: impaired AD mgmt, dec endurance w/ noted SOB, guarded style  More Ambulation?: No  Stairs/Curb  Stairs?: No     Balance  Posture: Good  Sitting - Static: Good  Sitting - Dynamic: Fair;+  Standing - Static: Fair;+  Standing - Dynamic: Fair    AM-PAC Score  AM-PAC Inpatient Mobility Raw Score : 19 (07/14/22 1317)  AM-PAC Inpatient T-Scale Score : 45.44 (07/14/22 1317)  Mobility Inpatient CMS 0-100% Score: 41.77 (07/14/22 1317)  Mobility Inpatient CMS G-Code Modifier : CK (07/14/22 1317)    Goals  Short Term Goals  Time Frame for Short term goals: 1 week  Short term goal 1: pt will complete bed mobility at mod ind  Short term goal 2: pt will complete transfers at [facility]  Short term goal 3: pt will ambulate 100 ft using LRAD at [facility]  Additional Goals?: No  Patient Goals   Patient goals : return home      Therapy Time   Individual Concurrent Group Co-treatment   Time In 1114         Time Out 1147         Minutes 33         Timed Code Treatment Minutes: 23 Minutes (TA x 2)       Ruth Gomez, PT, DPT

## 2022-07-15 VITALS
WEIGHT: 173.28 LBS | OXYGEN SATURATION: 94 % | TEMPERATURE: 98.2 F | HEART RATE: 88 BPM | BODY MASS INDEX: 34.02 KG/M2 | SYSTOLIC BLOOD PRESSURE: 132 MMHG | DIASTOLIC BLOOD PRESSURE: 66 MMHG | HEIGHT: 60 IN | RESPIRATION RATE: 12 BRPM

## 2022-07-15 LAB
ALBUMIN SERPL-MCNC: 2.7 GM/DL (ref 3.4–5)
ALP BLD-CCNC: 42 IU/L (ref 40–129)
ALT SERPL-CCNC: 14 U/L (ref 10–40)
ANION GAP SERPL CALCULATED.3IONS-SCNC: 11 MMOL/L (ref 4–16)
AST SERPL-CCNC: 19 IU/L (ref 15–37)
BILIRUB SERPL-MCNC: 0.6 MG/DL (ref 0–1)
BILIRUBIN DIRECT: 0.2 MG/DL (ref 0–0.3)
BILIRUBIN, INDIRECT: 0.4 MG/DL (ref 0–0.7)
BUN BLDV-MCNC: 29 MG/DL (ref 6–23)
CALCIUM SERPL-MCNC: 8.1 MG/DL (ref 8.3–10.6)
CHLORIDE BLD-SCNC: 105 MMOL/L (ref 99–110)
CO2: 21 MMOL/L (ref 21–32)
CREAT SERPL-MCNC: 1.2 MG/DL (ref 0.6–1.1)
GFR AFRICAN AMERICAN: 53 ML/MIN/1.73M2
GFR NON-AFRICAN AMERICAN: 44 ML/MIN/1.73M2
GLUCOSE BLD-MCNC: 216 MG/DL (ref 70–99)
GLUCOSE BLD-MCNC: 77 MG/DL (ref 70–99)
GLUCOSE BLD-MCNC: 93 MG/DL (ref 70–99)
GLUCOSE BLD-MCNC: 97 MG/DL (ref 70–99)
HCT VFR BLD CALC: 23.7 % (ref 37–47)
HEMOGLOBIN: 7.6 GM/DL (ref 12.5–16)
HIGH SENSITIVE C-REACTIVE PROTEIN: 16.9 MG/L
POTASSIUM SERPL-SCNC: 4.1 MMOL/L (ref 3.5–5.1)
REASON FOR REJECTION: NORMAL
REJECTED TEST: NORMAL
SODIUM BLD-SCNC: 137 MMOL/L (ref 135–145)
TOTAL PROTEIN: 5.4 GM/DL (ref 6.4–8.2)

## 2022-07-15 PROCEDURE — 86141 C-REACTIVE PROTEIN HS: CPT

## 2022-07-15 PROCEDURE — 80048 BASIC METABOLIC PNL TOTAL CA: CPT

## 2022-07-15 PROCEDURE — 82962 GLUCOSE BLOOD TEST: CPT

## 2022-07-15 PROCEDURE — 36415 COLL VENOUS BLD VENIPUNCTURE: CPT

## 2022-07-15 PROCEDURE — 85018 HEMOGLOBIN: CPT

## 2022-07-15 PROCEDURE — 2580000003 HC RX 258: Performed by: SPECIALIST

## 2022-07-15 PROCEDURE — 97116 GAIT TRAINING THERAPY: CPT

## 2022-07-15 PROCEDURE — 6360000002 HC RX W HCPCS: Performed by: SPECIALIST

## 2022-07-15 PROCEDURE — 6370000000 HC RX 637 (ALT 250 FOR IP): Performed by: SPECIALIST

## 2022-07-15 PROCEDURE — 99231 SBSQ HOSP IP/OBS SF/LOW 25: CPT | Performed by: SPECIALIST

## 2022-07-15 PROCEDURE — 80076 HEPATIC FUNCTION PANEL: CPT

## 2022-07-15 PROCEDURE — 76937 US GUIDE VASCULAR ACCESS: CPT

## 2022-07-15 PROCEDURE — 85014 HEMATOCRIT: CPT

## 2022-07-15 RX ORDER — CARVEDILOL 6.25 MG/1
6.25 TABLET ORAL 2 TIMES DAILY
Qty: 60 TABLET | Refills: 3 | Status: SHIPPED | OUTPATIENT
Start: 2022-07-15

## 2022-07-15 RX ADMIN — SODIUM CHLORIDE, PRESERVATIVE FREE 10 ML: 5 INJECTION INTRAVENOUS at 08:32

## 2022-07-15 RX ADMIN — LEVOTHYROXINE SODIUM 88 MCG: 0.09 TABLET ORAL at 08:32

## 2022-07-15 RX ADMIN — CEFTRIAXONE 1000 MG: 1 INJECTION, POWDER, FOR SOLUTION INTRAMUSCULAR; INTRAVENOUS at 06:01

## 2022-07-15 RX ADMIN — PANTOPRAZOLE SODIUM 40 MG: 40 TABLET, DELAYED RELEASE ORAL at 08:32

## 2022-07-15 ASSESSMENT — ENCOUNTER SYMPTOMS
SHORTNESS OF BREATH: 0
NAUSEA: 1
VOMITING: 1
ABDOMINAL DISTENTION: 1

## 2022-07-15 NOTE — PROGRESS NOTES
anasarca are significantly worse than 3 days   ago. 3. Cirrhosis. 4. Gallstones. 5. Atherosclerotic disease. 6. Soft tissue thickening along the distal esophageal wall. This could be   related to esophagitis versus mass. Endoscopy could be performed for further   evaluation if clinically indicated. XR ABDOMEN (KUB) (SINGLE AP VIEW)   Final Result   Overall nonobstructive bowel gas pattern. Airspace opacities in the left lung base. US ABDOMEN LIMITED   Final Result   1. Cirrhotic morphology of the liver with no evident focal lesion. 2. Patent main portal vein and hepatic veins. 3. At least small ascites potentially due to portal hypertension. 4. Cholelithiasis and biliary sludge. Gallbladder wall thickening could be   due to cholecystitis but may also be related to adjacent liver disease, the   presence of ascites, or a systemic process resulting in volume overload such   as hypoproteinemia. Consider further evaluation with a nuclear medicine   hepatobiliary scan if there are clinical findings of cholecystitis. Additionally, there may be irregular wall thickening near the gallbladder   fundus potentially related to adenomyomatosis given the prior CT appearance,   although malignancy could appear similar. 5. Hyperechoic right kidney consistent with underlying parenchymal disease. US DUP ABD PEL RETRO SCROT COMPLETE   Final Result   1. Cirrhotic morphology of the liver with no evident focal lesion. 2. Patent main portal vein and hepatic veins. 3. At least small ascites potentially due to portal hypertension. 4. Cholelithiasis and biliary sludge. Gallbladder wall thickening could be   due to cholecystitis but may also be related to adjacent liver disease, the   presence of ascites, or a systemic process resulting in volume overload such   as hypoproteinemia.   Consider further evaluation with a nuclear medicine   hepatobiliary scan if there are clinical findings of cholecystitis. Additionally, there may be irregular wall thickening near the gallbladder   fundus potentially related to adenomyomatosis given the prior CT appearance,   although malignancy could appear similar. 5. Hyperechoic right kidney consistent with underlying parenchymal disease. XR CHEST PORTABLE   Final Result   1. Left-sided CVC tip overlies the proximal superior vena cava level. 2. No pneumothorax evident. 3. No acute pulmonary infiltrate is seen. 4. Calcific atherosclerosis aorta. 5. Cardiomegaly. CT CHEST WO CONTRAST   Final Result   1. Circumferential thickening of the transverse colon and descending colon   indicative of colitis. No evidence of pneumatosis intestinalis or portal   venous gas. 2. Cholelithiasis with mild thickening of the gallbladder wall. If patient   has right upper quadrant abdominal pain, ultrasound would better assess the   gallbladder. 3. Left lower lobe tree-in-bud nodules. Appearance favors an infectious or   inflammatory etiology, including bronchiolitis. 4. Persistent small free fluid in the upper quadrant of the abdomen and   pelvis, not significantly changed since prior exam.   5. IMV to IVC shunt. CT ABDOMEN PELVIS WO CONTRAST Additional Contrast? None   Final Result   1. Circumferential thickening of the transverse colon and descending colon   indicative of colitis. No evidence of pneumatosis intestinalis or portal   venous gas. 2. Cholelithiasis with mild thickening of the gallbladder wall. If patient   has right upper quadrant abdominal pain, ultrasound would better assess the   gallbladder. 3. Left lower lobe tree-in-bud nodules. Appearance favors an infectious or   inflammatory etiology, including bronchiolitis. 4. Persistent small free fluid in the upper quadrant of the abdomen and   pelvis, not significantly changed since prior exam.   5. IMV to IVC shunt.               Scheduled Medicines   Medications: insulin lispro  0-12 Units SubCUTAneous TID WC    insulin lispro  0-6 Units SubCUTAneous 2 times per day    insulin lispro  10 Units SubCUTAneous TID WC    lidocaine  1 patch TransDERmal Daily    pantoprazole  40 mg Oral QAM AC    sodium zirconium cyclosilicate  10 g Oral Once    sodium chloride flush  5-40 mL IntraVENous 2 times per day    lidocaine PF  5 mL IntraDERmal Once    sodium chloride flush  5-40 mL IntraVENous 2 times per day    cefTRIAXone (ROCEPHIN) IV  1,000 mg IntraVENous Q24H    insulin glargine  15 Units SubCUTAneous Nightly    latanoprost  1 drop Both Eyes Nightly    risperiDONE  0.25 mg Oral Nightly    levothyroxine  88 mcg Oral Daily      Infusions:    sodium chloride      sodium chloride      dextrose      sodium chloride      sodium chloride Stopped (07/11/22 0615)    sodium chloride           Objective:   Vitals: /66   Pulse 88   Temp 98.2 °F (36.8 °C) (Oral)   Resp 12   Ht 5' (1.524 m)   Wt 173 lb 4.5 oz (78.6 kg)   SpO2 94%   BMI 33.84 kg/m²   General appearance: alert and cooperative with exam  Neck: no JVD or bruit  Thyroid : Normal lobes   Lungs: Has Vesicular Breath sounds   Heart:  regular rate and rhythm  Abdomen: soft, non-tender; bowel sounds normal; no masses,  no organomegaly  Musculoskeletal: Normal  Extremities: extremities normal, , no edema  Neurologic:  Awake, alert, oriented to name, place and time. Cranial nerves II-XII are grossly intact.   Dementia motor i weakness sensory ineuropathy t.,  and gait is abnormal.    Assessment:     Patient Active Problem List:     Acquired hypothyroidism     Depression     Diabetic neuropathy (HCC)     DM (diabetes mellitus) ([facility]     HTN (hypertension)     Hyperlipidemia     Osteopenia     Personal history of breast cancer     Allergic rhinitis     ANTNOINA (acute kidney injury) ([facility]     Gastroenteritis     GI bleed     Hematemesis with nausea     Cirrhosis of liver with ascites ([address]     Secondary esophageal varices with bleeding ([facility]      Plan:     Reviewed POC blood glucose . Labs and X ray results   Reviewed Current Medicines   On meal/ Correction bolus Humalog/ Basal Lantus Insulin regime   Monitor Blood glucose frequently   Modified  the dose of Insulin/ other medicines as needed   Will follow     .      Hiren Melendez MD, MD

## 2022-07-15 NOTE — PROGRESS NOTES
No complaints  Nausea has improved and she ate regular meal last night  No H/H since yesterday morning but no gross bleeding  Abdomen soft, non-tender, non-distended  Had paracentesis yesterday--- fluid SAAG > 1.1 consistent with portal hypertension- neutrophil count below threshhold for suspicion of SBP  IMPRESSION:  1) esophageal variceal bleeding- stable  2) Cirrhosis- likely secondary to fatty liver-  3) increased ascites  4) nausea- improved- likely drug-induced     RECOMMENDATIONS:  1) D/C sandostatin  3) OK from GI standpoint to discharge  3) repeat EGD with EVL scheduled for August 3--- I will see her regularly for this--- make office appt in 2-3 months

## 2022-07-15 NOTE — PROGRESS NOTES
Physical Therapy    Physical Therapy Treatment Note  Name: Sudhir Lambert MRN: [id number] :   1950   Date:  7/15/2022   Admission Date: 7/10/2022 Room:  [address] [place] [address]   Restrictions/Precautions:  Restrictions/Precautions  Restrictions/Precautions: Fall Risk, General Precautions   Communication with other providers:  ns handoff  Subjective:  Patient states:  \"I'm going home! \"  Pain:   Location, Type, Intensity (0/10 to 10/10):  denies pain  Objective:    Observation:  Supine, awake, alert, agreeable. She is progressing well, returning home w/ . Treatment, including education/measures:  Sit <-> stand: CGA to SBA  Standing balance: fair +  Stand <-> sit: SBA w/ cues  Positoined for comfort and pressure relief ; all needs met, call light in reach, left in chair, left in nsg care    Gait Training:  Cues were given for safety, sequence, device management, balance, posture, awareness, path. 120 + 120 + 100 ft w/ and w/o FWW at A ; she is guarded, demonstrates dec lj, dec endurance, dec trunk/arm rotation/swing, inc postural sway ; receptive to planning and safety education ; standing rest breaks x 2 between trials    Assessment / Impression:    Near est goals, demonstrates good progress towards goals and towards safety understanding. Safe for return home at her given level of function w/ appropriate supervision.     Patient's tolerance of treatment:  good  Barriers to improvement: comorbid conditions  Plan for Next Session:     Time in:  1110  Time out: 1128  Timed treatment minutes: 18 (GT)  Total treatment time:  18    Previously filed items:  Social/Functional History  Lives With: Spouse  Type of Home: House  Home Layout: One level  Home Access: Stairs to enter without rails  Entrance Stairs - Number of Steps: 2  Bathroom Shower/Tub: Tub/Shower unit  Bathroom Toilet: Standard  Bathroom Equipment: Grab bars in shower  Has the patient had two or more falls in the past year or any fall with injury in the past year?: Yes (1 fall d/t weakness)  ADL Assistance: Independent  Homemaking Assistance: Independent  Homemaking Responsibilities: Yes  Ambulation Assistance: Independent  Transfer Assistance: Independent  Active : Yes  Patient Goals   Patient goals : return home     Short Term Goals  Time Frame for Short term goals: 1 week  Short term goal 1: pt will complete bed mobility at mod ind  Short term goal 2: pt will complete transfers at [facility] term goal 3: pt will ambulate 100 ft using LRAD at [facility]  Additional Goals?: No    Electronically signed by:    Flores Loza PT, DPT  7/15/2022, 12:26 PM

## 2022-07-15 NOTE — DISCHARGE SUMMARY
Discharge Summary    Name:  Smita Hyatt /Age/Sex: 1950  (67 y.o. female)   MRN & CSN:  [id number] & [id number] Admission Date/Time: 7/10/2022  9:22 AM   Attending:  Doug Lockhart MD Discharging Physician: Doug Lockhart MD       Admission Diagnosis:   Shock  Bleeding esophageal varices  Acute on chronic blood loss anemia  Acute decompensated liver cirrhosis  Acute kidney injury  Insulin-dependent type 2 diabetes mellitus  Elevated troponin  History of hypertension  History of breast cancer  History of hypothyroidism  Anxiety  Gout    Discharge Diagnosis:  Shock  Bleeding esophageal varices  Acute on chronic blood loss anemia  Acute decompensated liver cirrhosis  Acute kidney injury  Insulin-dependent type 2 diabetes mellitus  Elevated troponin  History of hypertension  History of breast cancer  History of hypothyroidism  Anxiety  Gout        Discharge Exam  Physical Exam  Constitutional:       Appearance: Normal appearance. HENT:      Head: Normocephalic and atraumatic. Nose: Nose normal.      Mouth/Throat:      Mouth: Mucous membranes are moist.      Pharynx: Oropharynx is clear. Eyes:      Extraocular Movements: Extraocular movements intact. Pupils: Pupils are equal, round, and reactive to light. Cardiovascular:      Rate and Rhythm: Normal rate and regular rhythm. Pulses: Normal pulses. Heart sounds: Normal heart sounds. Pulmonary:      Effort: Pulmonary effort is normal.      Breath sounds: Normal breath sounds. Abdominal:      General: Abdomen is flat. Palpations: Abdomen is soft. Musculoskeletal:         General: Normal range of motion. Skin:     General: Skin is warm and dry. Neurological:      General: No focal deficit present. Mental Status: She is alert. Mental status is at baseline.    Psychiatric:         Mood and Affect: Mood normal.         Behavior: Behavior normal.         Hospital Course:   Smita Hyatt is a 67 y.o.  female  who presents with GI bleed    Above patient presented to hospital on 7/11/2022 for symptoms of hematemesis and dark liquid stools. Her initial hemoglobin on presentation was 3.8G/DL and was in shock. She was admitted to ICU requiring pressors and blood transfusion. The patient has history of liver cirrhosis likely due to NAFLD. Her initial ultrasound was on 6/23/2022 which showed findings suspicious for cirrhosis. She had FibroScan done on 6/14/2022 which was suggestive of advanced fibrosis and cirrhosis. She was thought to be bleeding from her esophageal varices. Hence octreotide drip was started. She was evaluated by GI inpatient. She underwent EGD with EVL over 6 bands were placed. She had no evidence of gastric varices but had mild portal hypertensive gastropathy. She was continued on octreotide drip for 5 days. Post banding, patient hemoglobin was stable and she did not show signs of bleeding. She will have GI follow-up outpatient for continued EVL every 2 to 6 weeks until varices are decompressed. She is getting discharged on beta-blocker, Coreg 6.25 mg twice a day. This was discussed with gastroenterology. Patient was advised to stop ACE inhibitor and aspirin. The patient expressed appropriate understanding of and agreement with the discharge recommendations, medications, and plan.      Consults this admission:  IP CONSULT TO GI  IP CONSULT TO HOSPITALIST  IP CONSULT TO CRITICAL CARE  IP CONSULT TO CARDIOLOGY  IP CONSULT TO ENDOCRINOLOGY  IP CONSULT TO INTERVENTIONAL RADIOLOGY  IP CONSULT TO DIETITIAN  IP CONSULT TO IV TEAM  IP CONSULT TO HOME CARE NEEDS      Discharge Instruction:   Handoff to PCP:     Follow up appointments: Dr Grant Sandra on Aug 2   Primary care physician:     Diet:  diabetic diet   Activity: activity as tolerated  Disposition: Discharged to:   [x]Home, []C, []SNF, []Acute Rehab, []Hospice   Condition on discharge: Stable    Discharge Medications:        Medication List START taking these medications      carvedilol 6.25 MG tablet  Commonly known as: Coreg  Take 1 tablet by mouth in the morning and 1 tablet before bedtime. CHANGE how you take these medications      NovoLOG FlexPen 100 UNIT/ML injection pen  Generic drug: insulin aspart  14 units with largest meal of the day, 7 units with each smaller meal  What changed:   how to take this  additional instructions            CONTINUE taking these medications      bimatoprost 0.03 % ophthalmic drops  Commonly known as: LUMIGAN     * blood glucose test strips strip  Commonly known as: ASCENSIA AUTODISC VI;ONE TOUCH ULTRA TEST VI  1 each by In Vitro route daily As needed. Dx: E11.40     * blood glucose test strips  Test 2 times a day & as needed for symptoms of irregular blood glucose. Dispense sufficient amount for indicated testing frequency plus additional to accommodate PRN testing needs. Dx: E11.40, z79,4, n18.3     gabapentin 100 MG capsule  Commonly known as: NEURONTIN  Take 1 capsule by mouth 3 times daily for 90 days. levothyroxine 88 MCG tablet  Commonly known as: SYNTHROID  Take 1 tablet by mouth Daily     LORazepam 0.5 MG tablet  Commonly known as: ATIVAN     lovastatin 40 MG tablet  Commonly known as: MEVACOR  Take 1 tablet by mouth nightly     metFORMIN 500 MG extended release tablet  Commonly known as: GLUCOPHAGE-XR  Take 1 tablet by mouth 3 times daily     OneTouch Delica Lancets 51N Misc  Test blood sugar 1-2 times a day as directed. Dx: E11.65     risperiDONE 0.25 MG tablet  Commonly known as: RISPERDAL  Take 1 tablet by mouth nightly           * This list has 2 medication(s) that are the same as other medications prescribed for you. Read the directions carefully, and ask your doctor or other care provider to review them with you.                 STOP taking these medications      Acid Reducer 150 MG tablet  Generic drug: raNITIdine     aspirin 81 MG tablet     B2 PO     calcium carbonate 500 MG Tabs tablet  Commonly known as: OSCAL     Cetirizine HCl 10 MG Caps     Cranberry 12427 MG Caps     D3-1000 PO     donepezil 10 MG tablet  Commonly known as: ARICEPT     ferrous sulfate 325 (65 Fe) MG tablet  Commonly known as: IRON 752     GARLIC PO     ICAPS AREDS 2 PO     lisinopril 10 MG tablet  Commonly known as: PRINIVIL;ZESTRIL     memantine 5 MG tablet  Commonly known as: NAMENDA     POMEGRANATE PO     Stool Softener 100 MG Tabs  Generic drug: Docusate Sodium     UNABLE TO FIND     UNABLE TO FIND            ASK your doctor about these medications      Basaglar KwikPen 100 UNIT/ML injection pen  Generic drug: insulin glargine  Inject 35 Units into the skin 2 times daily 35 units in the morning and 25 units at bedtime               Where to Get Your Medications        These medications were sent to [address] [place] [address] [address], [address] [facility] [address] [id number]      Phone: [phone]   carvedilol 6.25 MG tablet         Objective Findings at Discharge:       BMP/CBC  Recent Labs     07/13/22  0352 07/13/22  1558 07/14/22  0635 07/15/22  0621     --  139 137   K 4.4  --  4.4 4.1     --  109 105   CO2 23  --  19* 21   BUN 38*  --  31* 29*   CREATININE 1.3*  --  1.2* 1.2*   HCT 24.1* 23.1* 23.5* 23.7*           Additional Information: Patient seen and examined day of discharge.  For more information regarding patient's care please contact Jese Bañuelos [facility] records [phone]    Discharge Time of 35 minutes    Electronically signed by Fito Kaplan MD on 7/15/2022 at 11:47 AM

## 2022-07-15 NOTE — CONSULTS
Consult completed. Nexiva 20g 1 inch catheter inserted via ultrasound in patient's LFA. Brisk blood return noted and catheter flushes with ease. Patient tolerated well. Michael Hamilton, primary RN notified. Consult IV/PICC team if patient's needs change.

## 2022-07-15 NOTE — PLAN OF CARE
Problem: Discharge Planning  Goal: Discharge to home or other facility with appropriate resources  7/15/2022 0059 by Feli Medina RN  Outcome: Progressing  7/14/2022 1115 by Quintin Primrose, LPN  Outcome: Progressing     Problem: Chronic Conditions and Co-morbidities  Goal: Patient's chronic conditions and co-morbidity symptoms are monitored and maintained or improved  7/15/2022 0059 by Feli Medina RN  Outcome: Progressing  7/14/2022 1115 by Quintin Primrose, LPN  Outcome: Progressing     Problem: Pain  Goal: Verbalizes/displays adequate comfort level or baseline comfort level  7/15/2022 0059 by Feli Medina RN  Outcome: Progressing  7/14/2022 1115 by Quintin Primrose, LPN  Outcome: Progressing     Problem: Skin/Tissue Integrity  Goal: Absence of new skin breakdown  Description: 1. Monitor for areas of redness and/or skin breakdown  2. Assess vascular access sites hourly  3. Every 4-6 hours minimum:  Change oxygen saturation probe site  4. Every 4-6 hours:  If on nasal continuous positive airway pressure, respiratory therapy assess nares and determine need for appliance change or resting period.   7/15/2022 0059 by Feli Medina RN  Outcome: Progressing  7/14/2022 1115 by Quintin Primrose, LPN  Outcome: Progressing     Problem: Safety - Adult  Goal: Free from fall injury  7/15/2022 0059 by Feli Medina RN  Outcome: Progressing  7/14/2022 1115 by Quintin Primrose, LPN  Outcome: Progressing     Problem: Cardiovascular - Adult  Goal: Maintains optimal cardiac output and hemodynamic stability  7/15/2022 0059 by Feli Medina RN  Outcome: Progressing  7/14/2022 1115 by Quintin Primrose, LPN  Outcome: Progressing  Goal: Absence of cardiac dysrhythmias or at baseline  7/15/2022 0059 by Feli Medina RN  Outcome: Progressing  7/14/2022 1115 by Quintin Primrose, LPN  Outcome: Progressing     Problem: Skin/Tissue Integrity - Adult  Goal: Skin integrity remains intact  7/15/2022 0059 by Feli Medina RN  Outcome: Progressing  7/14/2022 1115 by Sushma Kumari LPN  Outcome: Progressing     Problem: Gastrointestinal - Adult  Goal: Minimal or absence of nausea and vomiting  7/15/2022 0059 by Mariano Matson RN  Outcome: Progressing  7/14/2022 1115 by Sushma Kumari LPN  Outcome: Progressing  Goal: Maintains or returns to baseline bowel function  7/15/2022 0059 by Maraino Matson RN  Outcome: Progressing  7/14/2022 1115 by Sushma Kumari LPN  Outcome: Progressing  Goal: Maintains adequate nutritional intake  7/15/2022 0059 by Mariano Matson RN  Outcome: Progressing  7/14/2022 1115 by Sushma Kumari LPN  Outcome: Progressing     Problem: Infection - Adult  Goal: Absence of infection at discharge  7/15/2022 0059 by Mariano Matson RN  Outcome: Progressing  7/14/2022 1115 by Sushma Kumari LPN  Outcome: Progressing  Goal: Absence of infection during hospitalization  7/15/2022 0059 by Mariano Matson RN  Outcome: Progressing  7/14/2022 1115 by Sushma Kumari LPN  Outcome: Progressing     Problem: Metabolic/Fluid and Electrolytes - Adult  Goal: Hemodynamic stability and optimal renal function maintained  7/15/2022 0059 by Mariano Matson RN  Outcome: Progressing  7/14/2022 1115 by Sushma Kumari LPN  Outcome: Progressing  Goal: Glucose maintained within prescribed range  7/15/2022 0059 by Mariano Matson RN  Outcome: Progressing  7/14/2022 1115 by Sushma Kumari LPN  Outcome: Progressing     Problem: Hematologic - Adult  Goal: Maintains hematologic stability  7/15/2022 0059 by Mariano Matson RN  Outcome: Progressing  7/14/2022 1115 by Sushma Kumari LPN  Outcome: Progressing     Problem: Musculoskeletal - Adult  Goal: Return mobility to safest level of function  7/15/2022 0059 by Mariano Matson RN  Outcome: Progressing  7/14/2022 1115 by Sushma Kumari LPN  Outcome: Progressing  Goal: Return ADL status to a safe level of function  7/15/2022 0059 by Mariano Matson RN  Outcome: Progressing  7/14/2022 1115 by Sushma Kumari, LPN  Outcome: Progressing     Problem: Nutrition Deficit:  Goal: Optimize nutritional status  7/15/2022 0059 by Alin Sheppard RN  Outcome: Progressing  7/14/2022 1115 by Shirley Barriga LPN  Outcome: Progressing

## 2022-07-15 NOTE — PROGRESS NOTES
Outpatient Pharmacy Progress Note for Meds-to-Beds    Total number of Prescriptions Filled: 1  The following medications were dispensed to the patient during the discharge process:  carvedilol (Coreg)     Additional Documentation:  Patient picked-up the medication(s) in the OP Pharmacy      Thank you for letting us serve your patients.   [address]    [address], [address] [facility]    Phone: [phone]    Fax: [phone]

## 2022-07-15 NOTE — CARE COORDINATION
Notified [facility] of d/c. They are agreeable to accept pt. AVS and HHC order faxed to [facility].   TE

## 2022-07-16 LAB
CULTURE: NORMAL
Lab: NORMAL
SPECIMEN: NORMAL

## 2022-07-17 LAB
CULTURE: NORMAL
Lab: NORMAL
SPECIMEN: NORMAL

## 2022-07-26 RX ORDER — FUROSEMIDE 40 MG/1
40 TABLET ORAL DAILY
Status: ON HOLD | COMMUNITY
End: 2022-09-21 | Stop reason: SDUPTHER

## 2022-07-26 NOTE — PROGRESS NOTES
Patient will be called with an arrival time on 8/2/2022 for her procedure at [facility] on 8/3/2022. 1. Do not eat or drink anything after midnight - unless instructed by your doctor prior to surgery. This includes                   no water, chewing gum or mints. 2. Follow your directions as prescribed by the doctor for your procedure and medications. 3. Check with your Doctor regarding stopping vitamins, supplements, blood thinners (Plavix, Coumadin, Lovenox, Effient, Pradaxa, Xarelto, Fragmin or                   other blood thinners) and follow their instructions. 4. Do not smoke, vape or use chewing tobacco morning of surgery. Do not drink any alcoholic beverages 24 hours prior to surgery. This includes NA Beer. No street drugs 7 days prior to surgery. 5. You may brush your teeth and gargle the morning of surgery. DO NOT SWALLOW WATER   6. You MUST make arrangements for a responsible adult to take you home after your surgery and be able to check on you every couple                   hours for the day. You will not be allowed to leave alone or drive yourself home. It is strongly suggested someone stay with you the first 24                   hrs. Your surgery will be cancelled if you do not have a ride home. 7. Please wear simple, loose fitting clothing to the hospital.  Renate Garcial not bring valuables (money, credit cards, checkbooks, etc.) Do not wear any                   makeup (including no eye makeup) or nail polish on your fingers or toes. 8. DO NOT wear any jewelry or piercings on day of surgery. All body piercing jewelry must be removed. 9. If you have dentures, they will be removed before going to the OR; we will provide you a container. If you wear contact lenses or glasses,                  they will be removed; please bring a case for them.            10. If you  have a Living Will and Durable Power of  for Healthcare, please bring in a copy.           11. Please bring picture ID,  insurance card, paperwork from the doctors office    (H & P, Consent, & card for implantable devices). 12. Take a shower the morning of your procedure with Hibiclens or an anti-bacterial soap. Do not apply any make-up, deodorant, lotion, oil or powder. 13.  Enter thru the main entrance wearing a mask on the day of surgery. Patient will take her metformin, synthroid, gabapentin and coreg the morning of her procedure. Patient verbalized understanding concerning her EGD instructions and had no questions at this time.

## 2022-08-01 NOTE — H&P
Original H &P in soft chart. I have examined the patient immediately before the procedure and there is no change in the previous history and physical exam, which has been reviewed. There is no history of sleep apnea, snoring, or stridor. There has been no  previous adverse experience with sedation/anesthesia. There is no increased risk for aspiration of gastric contents. The patient has been instructed that all resuscitative measures (during the operative and immediate perioperative period) will be instituted in the unlikely event that they will be needed. The patient has no pertinent past surgical or family history other than listed in the original H&P. The patient was counseled about the risks of antonio Covid-19 during their perioperative period and any recovery window from their procedure. The patient was made aware that antonio Covid-19  may worsen their prognosis for recovering from their procedure  and lend to a higher morbidity and/or mortality risk. All material risks, benefits, and reasonable alternatives including postponing the procedure were discussed. The patient does wish to proceed with the procedure at this time.     ASA Class: 3  AIRWAY Class: 1

## 2022-08-02 ENCOUNTER — ANESTHESIA EVENT (OUTPATIENT)
Dept: ENDOSCOPY | Age: 72
End: 2022-08-02
Payer: MEDICARE

## 2022-08-02 NOTE — ANESTHESIA PRE PROCEDURE
Department of Anesthesiology  Preprocedure Note       Name:  Guillermina Martínez   Age:  67 y.o.  :  1950                                          MRN:  [id number]         Date:  2022      Surgeon: Dominik Query):  Bailey Rodarte MD    Procedure: Procedure(s):  EGD BAND LIGATION    Medications prior to admission:   Prior to Admission medications    Medication Sig Start Date End Date Taking? Authorizing Provider   furosemide (LASIX) 40 MG tablet Take 40 mg by mouth in the morning. Historical Provider, MD   carvedilol (COREG) 6.25 MG tablet Take 1 tablet by mouth in the morning and 1 tablet before bedtime.  7/15/22   Susan Trinh MD   [facility] 5 MG tablet Take 1 tablet by mouth 2 times daily  Patient not taking: Reported on 2022 6/8/22 7/15/22  Toñito Díaz DO   LORazepam (ATIVAN) 0.5 MG tablet 1 tablet at bedtime as needed    Historical Provider, MD   donepezil (ARICEPT) 10 MG tablet Take 1 tablet by mouth daily NOTE TO PHARMACY: DO NOT FILL UNTIL 5 MG RX IS COMPLETE  Patient not taking: Reported on 2022 3/15/22 7/15/22  Toñito Díaz DO   levothyroxine (SYNTHROID) 88 MCG tablet Take 1 tablet by mouth Daily 21   Maciej Garcia MD   lisinopril (PRINIVIL;ZESTRIL) 10 MG tablet Take 1 tablet by mouth daily  Patient not taking: Reported on 2022 12/21/20 7/15/22  ATILIO Gutierrez CNP   risperiDONE (RISPERDAL) 0.25 MG tablet Take 1 tablet by mouth nightly 20   ATILIO Gutierrez CNP   Riboflavin (B2 PO) Take by mouth  Patient not taking: Reported on 2022  7/15/22  Historical Provider, MD   Cholecalciferol (D3-1000 PO) Take by mouth  Patient not taking: Reported on 2022  7/15/22  Historical Provider, MD   insulin glargine (BASAGLAR KWIKPEN) 100 UNIT/ML injection pen Inject 35 Units into the skin 2 times daily 35 units in the morning and 25 units at bedtime  Patient taking differently: Inject 30 Units into the skin 2 times daily 35 units in the morning and 25 units at bedtime 9/15/20   Lucia Laughlin APRN - CNP   gabapentin (NEURONTIN) 100 MG capsule Take 1 capsule by mouth 3 times daily for 90 days. 9/15/20 12/14/20  Lucia Laughlin APRN - CNP   metFORMIN (GLUCOPHAGE-XR) 500 MG extended release tablet Take 1 tablet by mouth 3 times daily 9/15/20   ATILIO Mchugh - CNP   NOVOLOG FLEXPEN 100 UNIT/ML injection pen 14 units with largest meal of the day, 7 units with each smaller meal 8/14/20   Whitley Michelle MD   blood glucose monitor strips Test 2 times a day & as needed for symptoms of irregular blood glucose. Dispense sufficient amount for indicated testing frequency plus additional to accommodate PRN testing needs. Dx: E11.40, z79,4, n18.3 6/22/20   Whitley Michelle MD   lovastatin (MEVACOR) 40 MG tablet Take 1 tablet by mouth nightly 6/18/20   Whitley Michelle MD   OneTouch Delica Lancets 43Q MISC Test blood sugar 1-2 times a day as directed. Dx: E11.65 4/17/20   Whitley Michelle MD   bimatoprost (LUMIGAN) 0.03 % ophthalmic drops Place 1 drop into both eyes nightly    Historical Provider, MD   blood glucose test strips (ASCENSIA AUTODISC VI;ONE TOUCH ULTRA TEST VI) strip 1 each by In Vitro route daily As needed.  Dx: E11.40 4/5/19   Whitley Michelle MD   calcium carbonate (OSCAL) 500 MG TABS tablet Take 500 mg by mouth daily  Patient not taking: Reported on 7/13/2022  7/15/22  Historical Provider, MD   aspirin 81 MG tablet Take 1 tablet by mouth  7/15/22  Historical Provider, MD   Cranberry 36807 MG CAPS Take by mouth  Patient not taking: Reported on 7/13/2022  7/15/22  Historical Provider, MD   GARLIC PO Take by mouth  Patient not taking: Reported on 7/13/2022  7/15/22  Historical Provider, MD   Multiple Vitamins-Minerals (ICAPS AREDS 2 PO) Take by mouth  Patient not taking: Reported on 7/13/2022  7/15/22  Historical Provider, MD   ferrous sulfate 325 (65 Fe) MG tablet Take 325 mg by mouth daily (with breakfast)  Patient not taking: Reported on 7/13/2022 7/15/22  Historical Provider, MD   ranitidine (ACID REDUCER) 150 MG tablet Take 150 mg by mouth 2 times daily  7/15/22  Historical Provider, MD   Pomegranate, [place] granatum, (POMEGRANATE PO) Take 500 mg by mouth daily  Patient not taking: Reported on 7/13/2022  7/15/22  Historical Provider, MD       Current medications:    Current Outpatient Medications   Medication Sig Dispense Refill    furosemide (LASIX) 40 MG tablet Take 40 mg by mouth in the morning.  carvedilol (COREG) 6.25 MG tablet Take 1 tablet by mouth in the morning and 1 tablet before bedtime. 60 tablet 3    LORazepam (ATIVAN) 0.5 MG tablet 1 tablet at bedtime as needed      levothyroxine (SYNTHROID) 88 MCG tablet Take 1 tablet by mouth Daily 30 tablet 0    risperiDONE (RISPERDAL) 0.25 MG tablet Take 1 tablet by mouth nightly 90 tablet 1    insulin glargine (BASAGLAR KWIKPEN) 100 UNIT/ML injection pen Inject 35 Units into the skin 2 times daily 35 units in the morning and 25 units at bedtime (Patient taking differently: Inject 30 Units into the skin 2 times daily 35 units in the morning and 25 units at bedtime) 15 pen 3    gabapentin (NEURONTIN) 100 MG capsule Take 1 capsule by mouth 3 times daily for 90 days. 90 capsule 2    metFORMIN (GLUCOPHAGE-XR) 500 MG extended release tablet Take 1 tablet by mouth 3 times daily 90 tablet 2    NOVOLOG FLEXPEN 100 UNIT/ML injection pen 14 units with largest meal of the day, 7 units with each smaller meal 7 pen 0    blood glucose monitor strips Test 2 times a day & as needed for symptoms of irregular blood glucose. Dispense sufficient amount for indicated testing frequency plus additional to accommodate PRN testing needs. Dx: E11.40, z79,4, n18.3 100 strip 5    lovastatin (MEVACOR) 40 MG tablet Take 1 tablet by mouth nightly 90 tablet 1    OneTouch Delica Lancets 93G MISC Test blood sugar 1-2 times a day as directed.  Dx: E11.65 100 each 5    bimatoprost (LUMIGAN) 0.03 % ophthalmic drops Place 1 drop into both eyes nightly      blood glucose test strips (ASCENSIA AUTODISC VI;ONE TOUCH ULTRA TEST VI) strip 1 each by In Vitro route daily As needed. Dx: E11.40 100 each 5     No current facility-administered medications for this visit. Allergies: Allergies   Allergen Reactions    Bupropion      Other reaction(s): Other - comment required  Suicidal ideation    Nsaids      Other reaction(s): Other - comment required  Elevated Serum Creatinine    Amlodipine     Nateglinide Nausea Only    Ofloxacin Hives    Paroxetine Hcl Hives     Other reaction(s): Other - comment required  Shakey/nerveous    Pioglitazone      Other reaction(s): Other - comment required  Dizzy / nauseated    Citalopram Nausea And Vomiting    Escitalopram Nausea And Vomiting    Flonase [Fluticasone] Nausea And Vomiting    Paxil [Paroxetine] Nausea And Vomiting       Problem List:    Patient Active Problem List   Diagnosis Code    Acquired hypothyroidism E03.9    Depression F32. A    Diabetic neuropathy (HCC) E11.40    DM (diabetes mellitus) ([facility] [address] E11.9    HTN (hypertension) I10    Hyperlipidemia E78.5    Osteopenia M85.80    Personal history of breast cancer Z85.3    Allergic rhinitis J30.9    ANTONINA (acute kidney injury) ([facility] [address] N17.9    Gastroenteritis K52.9    GI bleed K92.2    Hematemesis with nausea K92.0    Cirrhosis of liver with ascites (HCC) K74.60, R18.8    Secondary esophageal varices with bleeding (HCC) I85.11       Past Medical History:        Diagnosis Date    Cancer ([facility] [address]     right breast.    Diabetes mellitus ([facility] [address]     Fatty liver     Glaucoma     Gout     patient states Joseph Nieto has never had gout\".     Hyperlipidemia     Hypertension     Neuropathy     Thyroid disease        Past Surgical History:        Procedure Laterality Date     SECTION      MASTECTOMY, BILATERAL  10/26/2007    UPPER GASTROINTESTINAL ENDOSCOPY N/A 2022    EGD BAND LIGATION with 6 bands placed performed by Elton Schultz MD at [facility] ENDOSCOPY       Social History:    Social History     Tobacco Use    Smoking status: Never    Smokeless tobacco: Never   Substance Use Topics    Alcohol use: No                                Counseling given: Not Answered      Vital Signs (Current): There were no vitals filed for this visit. BP Readings from Last 3 Encounters:   07/15/22 132/66   06/23/22 (!) 190/96   04/06/22 136/60       NPO Status:                                                                                 BMI:   Wt Readings from Last 3 Encounters:   07/15/22 173 lb 4.5 oz (78.6 kg)   06/23/22 145 lb (65.8 kg)   04/06/22 145 lb (65.8 kg)     There is no height or weight on file to calculate BMI.    CBC:   Lab Results   Component Value Date/Time    WBC 11.2 07/12/2022 05:25 AM    RBC 2.09 07/12/2022 05:25 AM    HGB 7.6 07/15/2022 06:21 AM    HCT 23.7 07/15/2022 06:21 AM    MCV 99.5 07/12/2022 05:25 AM    RDW 19.6 07/12/2022 05:25 AM     07/12/2022 05:25 AM       CMP:   Lab Results   Component Value Date/Time     07/15/2022 06:21 AM    K 4.1 07/15/2022 06:21 AM     07/15/2022 06:21 AM    CO2 21 07/15/2022 06:21 AM    BUN 29 07/15/2022 06:21 AM    CREATININE 1.2 07/15/2022 06:21 AM    GFRAA 53 07/15/2022 06:21 AM    AGRATIO 0.9 01/27/2022 08:55 AM    LABGLOM 44 07/15/2022 06:21 AM    GLUCOSE 77 07/15/2022 06:21 AM    PROT 5.4 07/15/2022 06:19 AM    CALCIUM 8.1 07/15/2022 06:21 AM    BILITOT 0.6 07/15/2022 06:19 AM    ALKPHOS 42 07/15/2022 06:19 AM    AST 19 07/15/2022 06:19 AM    ALT 14 07/15/2022 06:19 AM       POC Tests:   No results for input(s): POCGLU, POCNA, POCK, POCCL, POCBUN, POCHEMO, POCHCT in the last 72 hours.     Coags:   Lab Results   Component Value Date/Time    PROTIME 16.9 07/11/2022 04:50 AM    INR 1.31 07/11/2022 04:50 AM    APTT 27.4 07/11/2022 04:50 AM       HCG (If Applicable): No results found for: PREGTESTUR, PREGSERUM, HCG, HCGQUANT     ABGs:   Lab Results   Component Value Date/Time    PO2ART 63 03/21/2019 06:15 AM    WXL3XZA 39.0 03/21/2019 06:15 AM    ZVG9MVN 29.0 03/21/2019 06:15 AM        Type & Screen (If Applicable):  No results found for: LABABO, LABRH    Drug/Infectious Status (If Applicable):  Lab Results   Component Value Date/Time    HEPCAB NON REACTIVE 07/11/2022 08:45 AM       COVID-19 Screening (If Applicable): No results found for: COVID19        Anesthesia Evaluation  Patient summary reviewed and Nursing notes reviewed  Airway:           Dental:          Pulmonary:                              Cardiovascular:  Exercise tolerance: good (>4 METS),   (+) hypertension:, hyperlipidemia      ECG reviewed      Echocardiogram reviewed         Beta Blocker:  Not on Beta Blocker      ROS comment: ECHO on 07/14/2022   Summary   Left ventricular systolic function is normal.   Ejection fraction is visually estimated at 50-55%. Mild left ventricular hypertrophy. No evidence of any pericardial effusion. EKG on 07/10/22  Normal sinus rhythm   Nonspecific ST abnormality      Neuro/Psych:   (+) depression/anxiety             GI/Hepatic/Renal:   (+) liver disease:, renal disease: ARF,          ROS comment: GI bleed   Esophageal varices . Endo/Other:    (+) Diabetes, hypothyroidism::., malignancy/cancer. Abdominal:             Vascular: Other Findings:             Anesthesia Plan      MAC     ASA 3     (Chart reviewed on 08/02/22)  Induction: intravenous.                             ATILIO León CRNA   8/2/2022

## 2022-08-02 NOTE — PROGRESS NOTES
Left message for patient to arrive at 0700 at [address] [place] [address] on 8/3/2022 for her procedure at 0900.

## 2022-08-03 ENCOUNTER — ANESTHESIA (OUTPATIENT)
Dept: ENDOSCOPY | Age: 72
End: 2022-08-03
Payer: MEDICARE

## 2022-08-03 ENCOUNTER — HOSPITAL ENCOUNTER (OUTPATIENT)
Age: 72
Setting detail: OUTPATIENT SURGERY
Discharge: HOME | End: 2022-08-03
Attending: SPECIALIST | Admitting: SPECIALIST
Payer: MEDICARE

## 2022-08-03 VITALS
HEIGHT: 60 IN | OXYGEN SATURATION: 96 % | HEART RATE: 75 BPM | RESPIRATION RATE: 18 BRPM | BODY MASS INDEX: 32 KG/M2 | SYSTOLIC BLOOD PRESSURE: 161 MMHG | WEIGHT: 163 LBS | DIASTOLIC BLOOD PRESSURE: 72 MMHG | TEMPERATURE: 97 F

## 2022-08-03 PROBLEM — I85.10 SECONDARY ESOPHAGEAL VARICES WITHOUT BLEEDING (HCC): Status: ACTIVE | Noted: 2022-08-03

## 2022-08-03 LAB
ALBUMIN SERPL-MCNC: 3.3 GM/DL (ref 3.4–5)
ALP BLD-CCNC: 66 IU/L (ref 40–129)
ALT SERPL-CCNC: 10 U/L (ref 10–40)
AMMONIA: 85 UMOL/L (ref 11–51)
ANION GAP SERPL CALCULATED.3IONS-SCNC: 9 MMOL/L (ref 4–16)
AST SERPL-CCNC: 18 IU/L (ref 15–37)
BILIRUB SERPL-MCNC: 0.4 MG/DL (ref 0–1)
BUN BLDV-MCNC: 10 MG/DL (ref 6–23)
CALCIUM SERPL-MCNC: 7.7 MG/DL (ref 8.3–10.6)
CHLORIDE BLD-SCNC: 101 MMOL/L (ref 99–110)
CO2: 29 MMOL/L (ref 21–32)
CREAT SERPL-MCNC: 1.1 MG/DL (ref 0.6–1.1)
GFR AFRICAN AMERICAN: 59 ML/MIN/1.73M2
GFR NON-AFRICAN AMERICAN: 49 ML/MIN/1.73M2
GLUCOSE BLD-MCNC: 107 MG/DL (ref 70–99)
GLUCOSE BLD-MCNC: 114 MG/DL (ref 70–99)
GLUCOSE BLD-MCNC: 114 MG/DL (ref 70–99)
GLUCOSE BLD-MCNC: 133 MG/DL (ref 70–99)
HCT VFR BLD CALC: 23.8 % (ref 37–47)
HEMOGLOBIN: 7.5 GM/DL (ref 12.5–16)
MCH RBC QN AUTO: 31.4 PG (ref 27–31)
MCHC RBC AUTO-ENTMCNC: 31.5 % (ref 32–36)
MCV RBC AUTO: 99.6 FL (ref 78–100)
PDW BLD-RTO: 18.5 % (ref 11.7–14.9)
PLATELET # BLD: 90 K/CU MM (ref 140–440)
PMV BLD AUTO: 11.4 FL (ref 7.5–11.1)
POTASSIUM SERPL-SCNC: 3.6 MMOL/L (ref 3.5–5.1)
RBC # BLD: 2.39 M/CU MM (ref 4.2–5.4)
SODIUM BLD-SCNC: 139 MMOL/L (ref 135–145)
TOTAL PROTEIN: 6.5 GM/DL (ref 6.4–8.2)
WBC # BLD: 3 K/CU MM (ref 4–10.5)

## 2022-08-03 PROCEDURE — 80053 COMPREHEN METABOLIC PANEL: CPT

## 2022-08-03 PROCEDURE — 43243 EGD INJECTION VARICES: CPT | Performed by: SPECIALIST

## 2022-08-03 PROCEDURE — 3700000001 HC ADD 15 MINUTES (ANESTHESIA): Performed by: SPECIALIST

## 2022-08-03 PROCEDURE — 2720000010 HC SURG SUPPLY STERILE: Performed by: SPECIALIST

## 2022-08-03 PROCEDURE — 3609012300 HC EGD BAND LIGATION ESOPHGEAL/GASTRIC VARICES: Performed by: SPECIALIST

## 2022-08-03 PROCEDURE — 85027 COMPLETE CBC AUTOMATED: CPT

## 2022-08-03 PROCEDURE — 43244 EGD VARICES LIGATION: CPT | Performed by: SPECIALIST

## 2022-08-03 PROCEDURE — 6360000002 HC RX W HCPCS

## 2022-08-03 PROCEDURE — 7100000011 HC PHASE II RECOVERY - ADDTL 15 MIN: Performed by: SPECIALIST

## 2022-08-03 PROCEDURE — 2500000003 HC RX 250 WO HCPCS: Performed by: SPECIALIST

## 2022-08-03 PROCEDURE — 82962 GLUCOSE BLOOD TEST: CPT

## 2022-08-03 PROCEDURE — 82140 ASSAY OF AMMONIA: CPT

## 2022-08-03 PROCEDURE — 7100000000 HC PACU RECOVERY - FIRST 15 MIN: Performed by: SPECIALIST

## 2022-08-03 PROCEDURE — 7100000001 HC PACU RECOVERY - ADDTL 15 MIN: Performed by: SPECIALIST

## 2022-08-03 PROCEDURE — 7100000010 HC PHASE II RECOVERY - FIRST 15 MIN: Performed by: SPECIALIST

## 2022-08-03 PROCEDURE — 2709999900 HC NON-CHARGEABLE SUPPLY: Performed by: SPECIALIST

## 2022-08-03 PROCEDURE — 2580000003 HC RX 258: Performed by: SPECIALIST

## 2022-08-03 PROCEDURE — 3700000000 HC ANESTHESIA ATTENDED CARE: Performed by: SPECIALIST

## 2022-08-03 PROCEDURE — 2500000003 HC RX 250 WO HCPCS

## 2022-08-03 RX ORDER — PROPOFOL 10 MG/ML
INJECTION, EMULSION INTRAVENOUS PRN
Status: DISCONTINUED | OUTPATIENT
Start: 2022-08-03 | End: 2022-08-03 | Stop reason: SDUPTHER

## 2022-08-03 RX ORDER — SODIUM TETRADECYL SULFATE 30 MG/ML
INJECTION, SOLUTION INTRAVENOUS PRN
Status: DISCONTINUED | OUTPATIENT
Start: 2022-08-03 | End: 2022-08-03 | Stop reason: ALTCHOICE

## 2022-08-03 RX ORDER — SUCRALFATE 1 G/1
1 TABLET ORAL 4 TIMES DAILY
Qty: 120 TABLET | Refills: 0 | Status: SHIPPED | OUTPATIENT
Start: 2022-08-03 | End: 2022-08-31

## 2022-08-03 RX ORDER — SODIUM CHLORIDE 9 MG/ML
INJECTION, SOLUTION INTRAVENOUS CONTINUOUS PRN
Status: DISCONTINUED | OUTPATIENT
Start: 2022-08-03 | End: 2022-08-03 | Stop reason: SDUPTHER

## 2022-08-03 RX ORDER — LACTULOSE 10 G/15ML
20 SOLUTION ORAL 2 TIMES DAILY
Qty: 1800 ML | Refills: 5 | Status: SHIPPED | OUTPATIENT
Start: 2022-08-03

## 2022-08-03 RX ORDER — SODIUM CHLORIDE, SODIUM LACTATE, POTASSIUM CHLORIDE, CALCIUM CHLORIDE 600; 310; 30; 20 MG/100ML; MG/100ML; MG/100ML; MG/100ML
INJECTION, SOLUTION INTRAVENOUS CONTINUOUS
Status: DISCONTINUED | OUTPATIENT
Start: 2022-08-03 | End: 2022-08-03 | Stop reason: HOSPADM

## 2022-08-03 RX ORDER — LIDOCAINE HYDROCHLORIDE 20 MG/ML
INJECTION, SOLUTION EPIDURAL; INFILTRATION; INTRACAUDAL; PERINEURAL PRN
Status: DISCONTINUED | OUTPATIENT
Start: 2022-08-03 | End: 2022-08-03 | Stop reason: SDUPTHER

## 2022-08-03 RX ADMIN — SODIUM CHLORIDE: 9 INJECTION, SOLUTION INTRAVENOUS at 09:05

## 2022-08-03 RX ADMIN — PHENYLEPHRINE HYDROCHLORIDE 100 MCG: 10 INJECTION INTRAVENOUS at 09:26

## 2022-08-03 RX ADMIN — PHENYLEPHRINE HYDROCHLORIDE 100 MCG: 10 INJECTION INTRAVENOUS at 09:21

## 2022-08-03 RX ADMIN — SODIUM CHLORIDE, POTASSIUM CHLORIDE, SODIUM LACTATE AND CALCIUM CHLORIDE: 600; 310; 30; 20 INJECTION, SOLUTION INTRAVENOUS at 07:45

## 2022-08-03 RX ADMIN — LIDOCAINE HYDROCHLORIDE 100 MG: 20 INJECTION, SOLUTION EPIDURAL; INFILTRATION; INTRACAUDAL; PERINEURAL at 09:15

## 2022-08-03 RX ADMIN — PROPOFOL 360 MG: 10 INJECTION, EMULSION INTRAVENOUS at 09:15

## 2022-08-03 RX ADMIN — PHENYLEPHRINE HYDROCHLORIDE 100 MCG: 10 INJECTION INTRAVENOUS at 09:30

## 2022-08-03 RX ADMIN — PHENYLEPHRINE HYDROCHLORIDE 100 MCG: 10 INJECTION INTRAVENOUS at 09:23

## 2022-08-03 RX ADMIN — PHENYLEPHRINE HYDROCHLORIDE 100 MCG: 10 INJECTION INTRAVENOUS at 09:28

## 2022-08-03 ASSESSMENT — PAIN - FUNCTIONAL ASSESSMENT: PAIN_FUNCTIONAL_ASSESSMENT: 0-10

## 2022-08-03 ASSESSMENT — PAIN SCALES - GENERAL
PAINLEVEL_OUTOF10: 0

## 2022-08-03 NOTE — BRIEF OP NOTE
BRIEF EGD REPORT:     Photos and full EGD report available by going to [facility] review\" then \"procedures\" then  \"EGD\" then \"View Endoscopy Report\"     Impression:    1) medium-sized esophageal varices with overlying red markings    2) 3 bands appled but a fourth band woulkd not hold with resultant oozing- controlled with injection of 2 cc tetradecyl   3) PHG   4) small gastric varices        Suggest:   1) Carafate suspension QID x 1 month    2) repeat EVL in 3-6 weeks

## 2022-08-03 NOTE — DISCHARGE INSTRUCTIONS
EGD    DR. CHAIDEZ    OFFICE NUMBER     FOLLOW UP APPOINTMENT IN 3-4 WEEKS AS NEEDED. REPEAT PROCEDURE IN  AS  NEEDED. TEST ORDERED: NONE. What to Expect at Home  Your Recovery:  The only discomfort after your EGD is generally limited to a mild soreness of the throat, which may last a day or two. Call your physician immediately if you have severe chest pain, shortness of breath or a temperature of 100 degrees or higher if taken orally. How can you care for yourself at home? Activity  Rest as much as you need to after you go home. You should be able to go back to your usual activities the day after the test.  Diet  Follow your doctor's directions for eating after the test.  Drink plenty of fluids (unless your doctor has told you not to). Medications  If you have a sore throat the day after the test, use an over-the-counter spray to numb your throat. Your doctor will tell you if and when you can restart your medicines. He or she will also give you instructions about taking any new medicines. If you take blood thinners, such as warfarin (Coumadin), clopidogrel (Plavix), or aspirin, be sure to talk to your doctor. He or she will tell you if and when to start taking those medicines again. Make sure that you understand exactly what your doctor wants you to do. If a biopsy was done during the test, your doctor may tell you not to take aspirin or other anti-inflammatory medicines for a few days. These include ibuprofen (Advil, Motrin) and naproxen (Aleve). DO NOT DRINK ANYTHING WITH ALCOHOL TODAY. Other instructions:Anesthesia  For your safety, do not drive or operate machinery for 24 hours. Do not sign legal documents or make major decisions for 24 hours. The anesthesia can make it hard for you to fully understand what you are agreeing to. Follow-up care is a key part of your treatment and safety. Be sure to make and go to all appointments, and call your doctor if you are having problems. It's also a good idea to know your test results and keep a list of the medicines you take. When should you call for help? [facility] Ronda Saini Brian Sarah Dimitri Harman [phone]  Call 911 anytime you think you may need emergency care. For example, call if:  You passed out (lost consciousness). You cough up blood. You vomit blood or what looks like coffee grounds. You pass maroon or very bloody stools. Call your doctor now or seek immediate medical care if:  You have trouble swallowing. You have belly pain. Your stools are black and tarlike or have streaks of blood. You are sick to your stomach or cannot keep fluids down. Watch closely for changes in your health, and be sure to contact your doctor if:  Your throat still hurts after a day or two. You do not get better as expected. Where can you learn more? Go to https://[employer]Nualight. org and sign in to your [employer] account. Enter V920 in the BiiCode box to learn more about Upper GI Endoscopy: What to Expect at Home.     If you do not have an account, please click on the Sign Up Now link. © 6645-8019 Healthwise, Incorporated. Care instructions adapted under license by [facility] ([facility]). This care instruction is for use with your licensed healthcare professional. If you have questions about a medical condition or this instruction, always ask your healthcare professional. [place] [address] any warranty or liability for your use of this information. Content Version: 73.7.538550; Current as of: November 20, 2015             [facility]  [phone]    Do not drive, work around [address] [place] [address] or use equipment. Do not drink any alcoholic beverages. Do not smoke while alone. Avoid making important decisions. Plan to spend a quiet, relaxed evening @ home. Resume normal activities as you begin to feel better.   Eat lightly for your first meal, then gradually increase your diet to what is normal for you. In case of nausea, avoid food and drink only clear liquids. Resume food as nausea ceases. Notify your surgeon if you experience fever, chills, large amount of bleeding, difficulty breathing, persistent nausea and vomiting or any other disturbing problem. Call for a follow-up appointment with your surgeon. Advance Care Planning  People with COVID-19 may have no symptoms, mild symptoms, such as fever, cough, and shortness of breath or they may have more severe illness, developing severe and fatal pneumonia. As a result, Advance Care Planning with attention to naming a health care decision maker (someone you trust to make healthcare decisions for you if you could not speak for yourself) and sharing other health care preferences is important BEFORE a possible health crisis. Please contact your Primary Care Provider to discuss Advance Care Planning. Preventing the Spread of Coronavirus Disease 2019 in Homes and Residential Communities  For the most recent information go to [employer].[email or web address]    Prevention steps for People with confirmed or suspected COVID-19 (including persons under investigation) who do not need to be hospitalized  and   People with confirmed COVID-19 who were hospitalized and determined to be medically stable to go home    Your healthcare provider and public health staff will evaluate whether you can be cared for at home. If it is determined that you do not need to be hospitalized and can be isolated at home, you will be monitored by staff from your local or state health department. You should follow the prevention steps below until a healthcare provider or local or state health department says you can return to your normal activities. Stay home except to get medical care  People who are mildly ill with COVID-19 are able to isolate at home during their illness.  You should restrict activities outside your home, except for getting medical care. Do not go to work, school, or public areas. Avoid using public transportation, ride-sharing, or taxis. Separate yourself from other people and animals in your home  People: As much as possible, you should stay in a specific room and away from other people in your home. Also, you should use a separate bathroom, if available. Animals: You should restrict contact with pets and other animals while you are sick with COVID-19, just like you would around other people. Although there have not been reports of pets or other animals becoming sick with COVID-19, it is still recommended that people sick with COVID-19 limit contact with animals until more information is known about the virus. When possible, have another member of your household care for your animals while you are sick. If you are sick with COVID-19, avoid contact with your pet, including petting, snuggling, being kissed or licked, and sharing food. If you must care for your pet or be around animals while you are sick, wash your hands before and after you interact with pets and wear a facemask. Call ahead before visiting your doctor  If you have a medical appointment, call the healthcare provider and tell them that you have or may have COVID-19. This will help the healthcare providers office take steps to keep other people from getting infected or exposed. Wear a facemask  You should wear a facemask when you are around other people (e.g., sharing a room or vehicle) or pets and before you enter a healthcare providers office. If you are not able to wear a facemask (for example, because it causes trouble breathing), then people who live with you should not stay in the same room with you, or they should wear a facemask if they enter your room. Cover your coughs and sneezes  Cover your mouth and nose with a tissue when you cough or sneeze. Throw used tissues in a lined trash can.  Immediately wash your hands with soap and water for at least 20 seconds or, if soap and water are not available, clean your hands with an alcohol-based hand  that contains at least 60% alcohol. Clean your hands often  Wash your hands often with soap and water for at least 20 seconds, especially after blowing your nose, coughing, or sneezing; going to the bathroom; and before eating or preparing food. If soap and water are not readily available, use an alcohol-based hand  with at least 60% alcohol, covering all surfaces of your hands and rubbing them together until they feel dry. Soap and water are the best option if hands are visibly dirty. Avoid touching your eyes, nose, and mouth with unwashed hands. Avoid sharing personal household items  You should not share dishes, drinking glasses, cups, eating utensils, towels, or bedding with other people or pets in your home. After using these items, they should be washed thoroughly with soap and water. Clean all high-touch surfaces everyday  High touch surfaces include counters, tabletops, doorknobs, bathroom fixtures, toilets, phones, keyboards, tablets, and bedside tables. Also, clean any surfaces that may have blood, stool, or body fluids on them. Use a household cleaning spray or wipe, according to the label instructions. Labels contain instructions for safe and effective use of the cleaning product including precautions you should take when applying the product, such as wearing gloves and making sure you have good ventilation during use of the product. Monitor your symptoms  Seek prompt medical attention if your illness is worsening (e.g., difficulty breathing). Before seeking care, call your healthcare provider and tell them that you have, or are being evaluated for, COVID-19. Put on a facemask before you enter the facility.  These steps will help the healthcare providers office to keep other people in the office or waiting room from getting infected or exposed. Ask your healthcare provider to call the local or state health department. Persons who are placed under active monitoring or facilitated self-monitoring should follow instructions provided by their local health department or occupational health professionals, as appropriate. When working with your local health department check their available hours. If you have a medical emergency and need to call 911, notify the dispatch personnel that you have, or are being evaluated for COVID-19. If possible, put on a facemask before emergency medical services arrive. Discontinuing home isolation  Patients with confirmed COVID-19 should remain under home isolation precautions until the risk of secondary transmission to others is thought to be low. The decision to discontinue home isolation precautions should be made on a case-by-case basis, in consultation with healthcare providers and state and local health departments.

## 2022-08-03 NOTE — PROGRESS NOTES
0188- pt received from Endo. Monitors placed and alarms on. Report received from [facility].  1000- blood drawn and sent to lab. 1005- pt resting comfortably with eyes closed. Pt arouses easily to name being called. Shakes head yes when asked if she is comfortable. 1008- blood glucose 107.  1013- pt resting comfortably. Denies any needs currently. 1024- pt transported to [facility] by RN and bedside report given to [facility], RN.

## 2022-08-03 NOTE — PROGRESS NOTES
1023- Pt returned to SDS rm 18, respirations even and unlabored, VSS, pt alert and oriented, family at bedside. Report at bedside w/ Amy RN   1025- Ice chips provided to pt, tolerating well   1035- Pt assisted to restroom, tolerated well, Void X1   1100- Beverage provided to pt , tolerating well   1118- Lab results called to Dr. Ming Robles   [phone]- Dr. Ming Robles at bedside. Med scripts handed to pt   [phone]- Discharge instructions reviewed w/ pt and pt  at bedside, verbalized understanding.    1140- Pt dressing w/ assistance from    56- Pt discharged via car w/ pt  driving

## 2022-08-03 NOTE — PROGRESS NOTES
REPORT RECEIVED FROM PHILIPPE ROBLES IN SDS. PREOP QUESTIONS, NPO STATUS AND ALLERGIES VERIFIED WITH PT. H/P AND CONSENT COMPLETED. DENIES TAKING BLOOD THINNERS. COREG TAKEN AT 0730. PTS  AT BEDSIDE AND ASSISTED WITH MOST INTERVIEW QUESTIONS AS PT LETHARGIC AT THIS TIME. PT ORIENTED, HOWEVER OCCASIONALLY NOTED TO DRIFT TO SLEEP DURING QUESTIONS. PTS  STATES THAT IS \"TYPICAL\" FOR HER AT THIS TIME OF THE MORNING. ANESTHESIA AND DR. CHAIDEZ TO BE UPDATED.

## 2022-08-03 NOTE — ANESTHESIA PRE PROCEDURE
Department of Anesthesiology  Preprocedure Note       Name:  Van Lanier   Age:  67 y.o.  :  1950                                          MRN:  [id number]         Date:  8/3/2022      Surgeon: Nicholas Sanchez):  Tiffany Johns MD    Procedure: Procedure(s):  EGD BAND LIGATION    Medications prior to admission:   Prior to Admission medications    Medication Sig Start Date End Date Taking? Authorizing Provider   furosemide (LASIX) 40 MG tablet Take 40 mg by mouth in the morning. Yes Historical Provider, MD   carvedilol (COREG) 6.25 MG tablet Take 1 tablet by mouth in the morning and 1 tablet before bedtime.  7/15/22   Elsa Noyola MD   [facility]) 5 MG tablet Take 1 tablet by mouth 2 times daily  Patient not taking: Reported on 2022 6/8/22 7/15/22  Donna Díaz DO   LORazepam (ATIVAN) 0.5 MG tablet 1 tablet at bedtime as needed    Historical Provider, MD   donepezil (ARICEPT) 10 MG tablet Take 1 tablet by mouth daily NOTE TO PHARMACY: DO NOT FILL UNTIL 5 MG RX IS COMPLETE  Patient not taking: Reported on 2022 3/15/22 7/15/22  Donna Díaz DO   levothyroxine (SYNTHROID) 88 MCG tablet Take 1 tablet by mouth Daily 21   Fior Walker MD   lisinopril (PRINIVIL;ZESTRIL) 10 MG tablet Take 1 tablet by mouth daily  Patient not taking: Reported on 2022 12/21/20 7/15/22  ATILIO Michel CNP   risperiDONE (RISPERDAL) 0.25 MG tablet Take 1 tablet by mouth nightly 20   ATILIO Michel CNP   Riboflavin (B2 PO) Take by mouth  Patient not taking: Reported on 2022  7/15/22  Historical Provider, MD   Cholecalciferol (D3-1000 PO) Take by mouth  Patient not taking: Reported on 2022  7/15/22  Historical Provider, MD   insulin glargine (BASAGLAR KWIKPEN) 100 UNIT/ML injection pen Inject 35 Units into the skin 2 times daily 35 units in the morning and 25 units at bedtime  Patient taking differently: Inject 30 Units into the skin 2 times daily 35 units in the morning and 7/15/22  Historical Provider, MD   ranitidine (ACID REDUCER) 150 MG tablet Take 150 mg by mouth 2 times daily  7/15/22  Historical Provider, MD   Pomegranate, [place] granatum, (POMEGRANATE PO) Take 500 mg by mouth daily  Patient not taking: Reported on 7/13/2022  7/15/22  Historical Provider, MD       Current medications:    Current Facility-Administered Medications   Medication Dose Route Frequency Provider Last Rate Last Admin    lactated ringers infusion   IntraVENous Continuous Nathalie Mccartnye  mL/hr at 08/03/22 0745 New Bag at 08/03/22 0745       Allergies: Allergies   Allergen Reactions    Bupropion      Other reaction(s): Other - comment required  Suicidal ideation    Nsaids      Other reaction(s): Other - comment required  Elevated Serum Creatinine    Amlodipine     Nateglinide Nausea Only    Ofloxacin Hives    Paroxetine Hcl Hives     Other reaction(s): Other - comment required  Shakey/nerveous    Pioglitazone      Other reaction(s): Other - comment required  Dizzy / nauseated    Citalopram Nausea And Vomiting    Escitalopram Nausea And Vomiting    Flonase [Fluticasone] Nausea And Vomiting    Paxil [Paroxetine] Nausea And Vomiting       Problem List:    Patient Active Problem List   Diagnosis Code    Acquired hypothyroidism E03.9    Depression F32. A    Diabetic neuropathy (HCC) E11.40    DM (diabetes mellitus) ([facility] E11.9    HTN (hypertension) I10    Hyperlipidemia E78.5    Osteopenia M85.80    Personal history of breast cancer Z85.3    Allergic rhinitis J30.9    ANTONINA (acute kidney injury) ([facility] N17.9    Gastroenteritis K52.9    GI bleed K92.2    Hematemesis with nausea K92.0    Cirrhosis of liver with ascites (HCC) K74.60, R18.8    Secondary esophageal varices with bleeding (HCC) I85.11       Past Medical History:        Diagnosis Date    Cancer ([facility]     right breast.    Diabetes mellitus ([facility]     Fatty liver     Glaucoma     Gout     patient states Maynor Julien has never had gout\".  Hyperlipidemia     Hypertension     Neuropathy     Thyroid disease        Past Surgical History:        Procedure Laterality Date     SECTION      MASTECTOMY, BILATERAL  10/26/2007    UPPER GASTROINTESTINAL ENDOSCOPY N/A 2022    EGD BAND LIGATION with 6 bands placed performed by Anel Trammell MD at [facility] ENDOSCOPY       Social History:    Social History     Tobacco Use    Smoking status: Never    Smokeless tobacco: Never   Substance Use Topics    Alcohol use: No                                Counseling given: Not Answered      Vital Signs (Current):   Vitals:    22 1121 22 0730   BP:  (!) 156/74   Pulse:  77   Resp:  16   Temp:  36.6 °C (97.8 °F)   SpO2:  99%   Weight: 163 lb (73.9 kg)    Height: 5' (1.524 m)                                               BP Readings from Last 3 Encounters:   22 (!) 156/74   07/15/22 132/66   22 (!) 190/96       NPO Status: Time of last liquid consumption:                         Time of last solid consumption:                         Date of last liquid consumption: 22                        Date of last solid food consumption: 22    BMI:   Wt Readings from Last 3 Encounters:   22 163 lb (73.9 kg)   07/15/22 173 lb 4.5 oz (78.6 kg)   22 145 lb (65.8 kg)     Body mass index is 31.83 kg/m².     CBC:   Lab Results   Component Value Date/Time    WBC 11.2 2022 05:25 AM    RBC 2.09 2022 05:25 AM    HGB 7.6 07/15/2022 06:21 AM    HCT 23.7 07/15/2022 06:21 AM    MCV 99.5 2022 05:25 AM    RDW 19.6 2022 05:25 AM     2022 05:25 AM       CMP:   Lab Results   Component Value Date/Time     07/15/2022 06:21 AM    K 4.1 07/15/2022 06:21 AM     07/15/2022 06:21 AM    CO2 21 07/15/2022 06:21 AM    BUN 29 07/15/2022 06:21 AM    CREATININE 1.2 07/15/2022 06:21 AM    GFRAA 53 07/15/2022 06:21 AM    AGRATIO 0.9 2022 08:55 AM    LABGLOM 44 07/15/2022 06:21 AM GLUCOSE 77 07/15/2022 06:21 AM    PROT 5.4 07/15/2022 06:19 AM    CALCIUM 8.1 07/15/2022 06:21 AM    BILITOT 0.6 07/15/2022 06:19 AM    ALKPHOS 42 07/15/2022 06:19 AM    AST 19 07/15/2022 06:19 AM    ALT 14 07/15/2022 06:19 AM       POC Tests:   Recent Labs     08/03/22  0734   POCGLU 133*       Coags:   Lab Results   Component Value Date/Time    PROTIME 16.9 07/11/2022 04:50 AM    INR 1.31 07/11/2022 04:50 AM    APTT 27.4 07/11/2022 04:50 AM       HCG (If Applicable): No results found for: PREGTESTUR, PREGSERUM, HCG, HCGQUANT     ABGs:   Lab Results   Component Value Date/Time    PO2ART 63 03/21/2019 06:15 AM    GJA0EMY 39.0 03/21/2019 06:15 AM    KMW0QGP 29.0 03/21/2019 06:15 AM        Type & Screen (If Applicable):  No results found for: LABABO, LABRH    Drug/Infectious Status (If Applicable):  Lab Results   Component Value Date/Time    HEPCAB NON REACTIVE 07/11/2022 08:45 AM       COVID-19 Screening (If Applicable): No results found for: COVID19        Anesthesia Evaluation  Patient summary reviewed and Nursing notes reviewed  Airway: Mallampati: II  TM distance: >3 FB   Neck ROM: full  Mouth opening: > = 3 FB   Dental:          Pulmonary:                              Cardiovascular:  Exercise tolerance: good (>4 METS),   (+) hypertension:, hyperlipidemia      ECG reviewed      Echocardiogram reviewed         Beta Blocker:  Not on Beta Blocker      ROS comment: ECHO on 07/14/2022   Summary   Left ventricular systolic function is normal.   Ejection fraction is visually estimated at 50-55%. Mild left ventricular hypertrophy. No evidence of any pericardial effusion. EKG on 07/10/22  Normal sinus rhythm   Nonspecific ST abnormality      Neuro/Psych:   (+) depression/anxiety             GI/Hepatic/Renal:   (+) liver disease:, renal disease: ARF,          ROS comment: GI bleed   Esophageal varices . Endo/Other:    (+) Diabetes, hypothyroidism::., malignancy/cancer.                  Abdominal: Vascular: Other Findings:           Anesthesia Plan      MAC     ASA 3     (Chart reviewed on 08/02/22)  Induction: intravenous. Anesthetic plan and risks discussed with patient. Plan discussed with CRNA.     Attending anesthesiologist reviewed and agrees with Preprocedure content                ATILIO Lincoln - JURGEN   8/3/2022

## 2022-08-04 ENCOUNTER — HOSPITAL ENCOUNTER (OUTPATIENT)
Age: 72
Discharge: HOME | End: 2022-08-04
Payer: MEDICARE

## 2022-08-04 LAB
ALBUMIN SERPL-MCNC: 3.3 GM/DL (ref 3.4–5)
ALP BLD-CCNC: 72 IU/L (ref 40–128)
ALT SERPL-CCNC: 12 U/L (ref 10–40)
ANION GAP SERPL CALCULATED.3IONS-SCNC: 11 MMOL/L (ref 4–16)
AST SERPL-CCNC: 20 IU/L (ref 15–37)
BILIRUB SERPL-MCNC: 0.9 MG/DL (ref 0–1)
BUN BLDV-MCNC: 11 MG/DL (ref 6–23)
CALCIUM SERPL-MCNC: 8.4 MG/DL (ref 8.3–10.6)
CHLORIDE BLD-SCNC: 98 MMOL/L (ref 99–110)
CHOLESTEROL: 139 MG/DL
CO2: 27 MMOL/L (ref 21–32)
CREAT SERPL-MCNC: 1.1 MG/DL (ref 0.6–1.1)
ESTIMATED AVERAGE GLUCOSE: 131 MG/DL
GFR AFRICAN AMERICAN: 59 ML/MIN/1.73M2
GFR NON-AFRICAN AMERICAN: 49 ML/MIN/1.73M2
GLUCOSE BLD-MCNC: 184 MG/DL (ref 70–99)
HBA1C MFR BLD: 6.2 % (ref 4.2–6.3)
HDLC SERPL-MCNC: 45 MG/DL
LDL CHOLESTEROL CALCULATED: 77 MG/DL
POTASSIUM SERPL-SCNC: 4.1 MMOL/L (ref 3.5–5.1)
SODIUM BLD-SCNC: 136 MMOL/L (ref 135–145)
T4 FREE: 1.72 NG/DL (ref 0.9–1.8)
TOTAL PROTEIN: 7.3 GM/DL (ref 6.4–8.2)
TRIGL SERPL-MCNC: 84 MG/DL
TSH HIGH SENSITIVITY: 0.72 UIU/ML (ref 0.27–4.2)

## 2022-08-04 PROCEDURE — 84439 ASSAY OF FREE THYROXINE: CPT

## 2022-08-04 PROCEDURE — 36415 COLL VENOUS BLD VENIPUNCTURE: CPT

## 2022-08-04 PROCEDURE — 80053 COMPREHEN METABOLIC PANEL: CPT

## 2022-08-04 PROCEDURE — 83036 HEMOGLOBIN GLYCOSYLATED A1C: CPT

## 2022-08-04 PROCEDURE — 84443 ASSAY THYROID STIM HORMONE: CPT

## 2022-08-04 PROCEDURE — 80061 LIPID PANEL: CPT

## 2022-08-04 NOTE — ANESTHESIA POSTPROCEDURE EVALUATION
Department of Anesthesiology  Postprocedure Note    Patient: Sheldon Fermin  MRN: [id number]  YOB: 1950  Date of evaluation: 8/4/2022      Procedure Summary     Date: 08/03/22 Room / Location: [address] [place] [address]    Anesthesia Start: 5545 Anesthesia Stop: 2383    Procedure: EGD BAND LIGATION WITH 3 BANDS PLACED, 4TH BAND DID NOT HOLD WITH SUBSEQUENT OOZING, TETRADECAL INJECTION GIVEN Diagnosis:       Bleeding esophageal varices, unspecified esophageal varices type ([address]      (Bleeding esophageal varices, unspecified esophageal varices type ([address] [I85.01])    Surgeons: Arlet Arauz MD Responsible Provider: Damaso Gray MD    Anesthesia Type: MAC ASA Status: 3          Anesthesia Type: No value filed.     Jez Phase I: Jez Score: 10    Jez Phase II: Jez Score: 10      Anesthesia Post Evaluation    Patient location during evaluation: PACU  Patient participation: complete - patient participated  Level of consciousness: awake  Pain score: 3  Airway patency: patent  Nausea & Vomiting: no vomiting and no nausea  Complications: no  Cardiovascular status: blood pressure returned to baseline and hemodynamically stable  Respiratory status: acceptable  Hydration status: stable

## 2022-08-19 ENCOUNTER — HOSPITAL ENCOUNTER (OUTPATIENT)
Age: 72
Discharge: HOME | End: 2022-08-19
Payer: MEDICARE

## 2022-08-19 LAB
ALBUMIN SERPL-MCNC: 4 GM/DL (ref 3.4–5)
ALP BLD-CCNC: 76 IU/L (ref 40–128)
ALT SERPL-CCNC: 13 U/L (ref 10–40)
ANION GAP SERPL CALCULATED.3IONS-SCNC: 13 MMOL/L (ref 4–16)
AST SERPL-CCNC: 22 IU/L (ref 15–37)
BASOPHILS ABSOLUTE: 0 K/CU MM
BASOPHILS RELATIVE PERCENT: 0.6 % (ref 0–1)
BILIRUB SERPL-MCNC: 0.7 MG/DL (ref 0–1)
BUN BLDV-MCNC: 21 MG/DL (ref 6–23)
CALCIUM SERPL-MCNC: 9.1 MG/DL (ref 8.3–10.6)
CHLORIDE BLD-SCNC: 93 MMOL/L (ref 99–110)
CO2: 26 MMOL/L (ref 21–32)
CREAT SERPL-MCNC: 1.5 MG/DL (ref 0.6–1.1)
DIFFERENTIAL TYPE: ABNORMAL
EOSINOPHILS ABSOLUTE: 0.1 K/CU MM
EOSINOPHILS RELATIVE PERCENT: 2.3 % (ref 0–3)
GFR AFRICAN AMERICAN: 41 ML/MIN/1.73M2
GFR NON-AFRICAN AMERICAN: 34 ML/MIN/1.73M2
GLUCOSE BLD-MCNC: 383 MG/DL (ref 70–99)
HCT VFR BLD CALC: 26.6 % (ref 37–47)
HEMOGLOBIN: 8.2 GM/DL (ref 12.5–16)
IMMATURE NEUTROPHIL %: 0.6 % (ref 0–0.43)
LYMPHOCYTES ABSOLUTE: 1.1 K/CU MM
LYMPHOCYTES RELATIVE PERCENT: 23.8 % (ref 24–44)
MCH RBC QN AUTO: 31.2 PG (ref 27–31)
MCHC RBC AUTO-ENTMCNC: 30.8 % (ref 32–36)
MCV RBC AUTO: 101.1 FL (ref 78–100)
MONOCYTES ABSOLUTE: 0.4 K/CU MM
MONOCYTES RELATIVE PERCENT: 7.4 % (ref 0–4)
NUCLEATED RBC %: 0 %
PDW BLD-RTO: 19.9 % (ref 11.7–14.9)
PLATELET # BLD: 108 K/CU MM (ref 140–440)
PMV BLD AUTO: 11.7 FL (ref 7.5–11.1)
POTASSIUM SERPL-SCNC: 4.9 MMOL/L (ref 3.5–5.1)
RBC # BLD: 2.63 M/CU MM (ref 4.2–5.4)
SEGMENTED NEUTROPHILS ABSOLUTE COUNT: 3.1 K/CU MM
SEGMENTED NEUTROPHILS RELATIVE PERCENT: 65.3 % (ref 36–66)
SODIUM BLD-SCNC: 132 MMOL/L (ref 135–145)
TOTAL IMMATURE NEUTOROPHIL: 0.03 K/CU MM
TOTAL NUCLEATED RBC: 0 K/CU MM
TOTAL PROTEIN: 7.5 GM/DL (ref 6.4–8.2)
WBC # BLD: 4.7 K/CU MM (ref 4–10.5)

## 2022-08-19 PROCEDURE — 36415 COLL VENOUS BLD VENIPUNCTURE: CPT

## 2022-08-19 PROCEDURE — 80053 COMPREHEN METABOLIC PANEL: CPT

## 2022-08-19 PROCEDURE — 85025 COMPLETE CBC W/AUTO DIFF WBC: CPT

## 2022-08-31 ENCOUNTER — OFFICE VISIT (OUTPATIENT)
Dept: CARDIOLOGY CLINIC | Age: 72
End: 2022-08-31
Payer: MEDICARE

## 2022-08-31 VITALS
BODY MASS INDEX: 26.17 KG/M2 | SYSTOLIC BLOOD PRESSURE: 118 MMHG | DIASTOLIC BLOOD PRESSURE: 62 MMHG | WEIGHT: 134 LBS | HEART RATE: 70 BPM

## 2022-08-31 DIAGNOSIS — E11.40 TYPE 2 DIABETES MELLITUS WITH DIABETIC NEUROPATHY, WITH LONG-TERM CURRENT USE OF INSULIN (HCC): ICD-10-CM

## 2022-08-31 DIAGNOSIS — R06.02 SHORTNESS OF BREATH: ICD-10-CM

## 2022-08-31 DIAGNOSIS — R18.8 CIRRHOSIS OF LIVER WITH ASCITES, UNSPECIFIED HEPATIC CIRRHOSIS TYPE (HCC): ICD-10-CM

## 2022-08-31 DIAGNOSIS — K92.0 GASTROINTESTINAL HEMORRHAGE WITH HEMATEMESIS: ICD-10-CM

## 2022-08-31 DIAGNOSIS — K74.60 CIRRHOSIS OF LIVER WITH ASCITES, UNSPECIFIED HEPATIC CIRRHOSIS TYPE (HCC): ICD-10-CM

## 2022-08-31 DIAGNOSIS — R77.8 ELEVATED TROPONIN: Primary | ICD-10-CM

## 2022-08-31 DIAGNOSIS — Z79.4 TYPE 2 DIABETES MELLITUS WITH DIABETIC NEUROPATHY, WITH LONG-TERM CURRENT USE OF INSULIN (HCC): ICD-10-CM

## 2022-08-31 PROCEDURE — G8400 PT W/DXA NO RESULTS DOC: HCPCS | Performed by: INTERNAL MEDICINE

## 2022-08-31 PROCEDURE — 1123F ACP DISCUSS/DSCN MKR DOCD: CPT | Performed by: INTERNAL MEDICINE

## 2022-08-31 PROCEDURE — 3017F COLORECTAL CA SCREEN DOC REV: CPT | Performed by: INTERNAL MEDICINE

## 2022-08-31 PROCEDURE — 3044F HG A1C LEVEL LT 7.0%: CPT | Performed by: INTERNAL MEDICINE

## 2022-08-31 PROCEDURE — 99214 OFFICE O/P EST MOD 30 MIN: CPT | Performed by: INTERNAL MEDICINE

## 2022-08-31 PROCEDURE — 1090F PRES/ABSN URINE INCON ASSESS: CPT | Performed by: INTERNAL MEDICINE

## 2022-08-31 PROCEDURE — 1036F TOBACCO NON-USER: CPT | Performed by: INTERNAL MEDICINE

## 2022-08-31 PROCEDURE — G8417 CALC BMI ABV UP PARAM F/U: HCPCS | Performed by: INTERNAL MEDICINE

## 2022-08-31 PROCEDURE — G8427 DOCREV CUR MEDS BY ELIG CLIN: HCPCS | Performed by: INTERNAL MEDICINE

## 2022-08-31 PROCEDURE — 2022F DILAT RTA XM EVC RTNOPTHY: CPT | Performed by: INTERNAL MEDICINE

## 2022-08-31 NOTE — PROGRESS NOTES
Simran Johnston MD                                  CARDIOLOGY  NOTE      Chief Complaint:    Chief Complaint   Patient presents with    Follow-Up from Hospital     Was in hosp for loss of blood, had banding in esophagus by . pt denies any cardiac sx but is very fatigued. Does not smoke or drink. No future procedures         HPI:     Zka Gibson is a 67y.o. year old female who was recently evaluated in the hospital for GI bleeding and elevated troponin. Patient has prior medical history significant for liver cirrhosis who was admitted to the hospital with esophageal variceal bleeds, acute blood loss anemia. Cardiology was consulted to evaluate patient for elevated troponin which was noted to be trivial.  Patient denies any prior history of established CAD CHF or arrhythmias. EKG showed sinus rhythm without any ischemic changes. ECHO 7/14/2022     Left ventricular systolic function is normal.   Ejection fraction is visually estimated at 50-55%. Mild left ventricular hypertrophy. No evidence of any pericardial effusion. Current Outpatient Medications   Medication Sig Dispense Refill    lactulose (CHRONULAC) 10 GM/15ML solution Take 30 mLs by mouth in the morning and 30 mLs before bedtime. 1800 mL 5    furosemide (LASIX) 40 MG tablet Take 40 mg by mouth in the morning. carvedilol (COREG) 6.25 MG tablet Take 1 tablet by mouth in the morning and 1 tablet before bedtime.  60 tablet 3    LORazepam (ATIVAN) 0.5 MG tablet 1 tablet at bedtime as needed      levothyroxine (SYNTHROID) 88 MCG tablet Take 1 tablet by mouth Daily 30 tablet 0    risperiDONE (RISPERDAL) 0.25 MG tablet Take 1 tablet by mouth nightly 90 tablet 1    insulin glargine (BASAGLAR KWIKPEN) 100 UNIT/ML injection pen Inject 35 Units into the skin 2 times daily 35 units in the morning and 25 units at bedtime (Patient taking differently: Inject 30 Units into the skin 2 times daily 35 units in the morning and 25 units at bedtime) 15 pen 3    gabapentin (NEURONTIN) 100 MG capsule Take 1 capsule by mouth 3 times daily for 90 days. 90 capsule 2    NOVOLOG FLEXPEN 100 UNIT/ML injection pen 14 units with largest meal of the day, 7 units with each smaller meal 7 pen 0    lovastatin (MEVACOR) 40 MG tablet Take 1 tablet by mouth nightly 90 tablet 1    OneTouch Delica Lancets 25X MISC Test blood sugar 1-2 times a day as directed. Dx: E11.65 100 each 5    bimatoprost (LUMIGAN) 0.03 % ophthalmic drops Place 1 drop into both eyes nightly      metFORMIN (GLUCOPHAGE-XR) 500 MG extended release tablet Take 1 tablet by mouth 3 times daily (Patient not taking: Reported on 2022) 90 tablet 2     No current facility-administered medications for this visit. Allergies:     Bupropion, Nsaids, Amlodipine, Nateglinide, Ofloxacin, Paroxetine hcl, Pioglitazone, Citalopram, Escitalopram, Flonase [fluticasone], and Paxil [paroxetine]    Patient History:    Past Medical History:   Diagnosis Date    Cancer ([facility] [address]     right breast.    Diabetes mellitus ([facility] [address]     Fatty liver     Glaucoma     Gout     patient states Vira Rich has never had gout\". Hyperlipidemia     Hypertension     Neuropathy     Thyroid disease      Past Surgical History:   Procedure Laterality Date     SECTION      MASTECTOMY, BILATERAL  10/26/2007    UPPER GASTROINTESTINAL ENDOSCOPY N/A 2022    EGD BAND LIGATION with 6 bands placed performed by Carmen Benjamin MD at [place] [address] N/A 8/3/2022    EGD BAND LIGATION WITH 3 BANDS PLACED, 4TH BAND DID NOT HOLD WITH SUBSEQUENT OOZING, TETRADECAL INJECTION GIVEN performed by Carmen Benjamin MD at [facility] ENDOSCOPY     No family history on file.   Social History     Tobacco Use    Smoking status: Never    Smokeless tobacco: Never   Substance Use Topics    Alcohol use: No        Review of Systems:     Constitutional:  No Fever or Weight Loss   Eyes: No Decreased Vision  ENT: No Headaches, Hearing Loss behavior appropriate       Medical decision making and Data review:    DATA:    Lab Results   Component Value Date    TROPONINT 0.074 (H) 07/12/2022     BNP:    Lab Results   Component Value Date    PROBNP 799.9 (H) 07/10/2022     PT/INR:  No results found for: PTINR  Lab Results   Component Value Date    LABA1C 6.2 08/04/2022    LABA1C 5.5 07/12/2022     Lab Results   Component Value Date    CHOL 139 08/04/2022    TRIG 84 08/04/2022    HDL 45 08/04/2022    LDLCALC 77 08/04/2022    LDLDIRECT 62 04/28/2022     Lab Results   Component Value Date    WBC 4.7 08/19/2022    HGB 8.2 (L) 08/19/2022    HCT 26.6 (L) 08/19/2022    .1 (H) 08/19/2022     (L) 08/19/2022     TSH:   Lab Results   Component Value Date    TSH 6.23 (H) 01/27/2022     Lab Results   Component Value Date    AST 22 08/19/2022    ALT 13 08/19/2022    BILIDIR 0.2 07/15/2022    BILITOT 0.7 08/19/2022    ALKPHOS 76 08/19/2022         All labs, medications and tests reviewed by myself including data and history from outside source , patient and available family . 1. Elevated troponin    2. Type 2 diabetes mellitus with diabetic neuropathy, with long-term current use of insulin ([facility] [address]    3. Cirrhosis of liver with ascites, unspecified hepatic cirrhosis type ([facility] [address]    4. Gastrointestinal hemorrhage with hematemesis    5. Shortness of breath         Impression and Plan:      Elevated troponin in the setting of renal failure sepsis acute infection and bleeding. Likely demand ischemia ( type II MI)  in the setting of above  Echocardiogram was performed during hospitalization which was noted to be normal.    Last hemoglobin 8.2 compared to 7.5 earlier this morning. Last hematocrit 26.6 compared to 23.8 earlier this month    Patient is currently asymptomatic, without any chest pain or shortness of breath. She remains anemic and has had trouble walking/dizziness and falls.     Would recommend to complete GI work-up  Refer patient to hematology for evaluation. Symptomatic anemia limiting lifestyle    Discussed option of stress test with the patient and family. At this point no immediate indication to perform any ischemic work-up. Once her hemoglobin is stabilized above 10, and if has any symptoms, will further pursue. History of acute GI bleeding with esophageal varices    Blood loss anemia s/p recent packed RBC transfusion in the setting of liver cirrhosis/CKD    Liver cirrhosis: GI follow-up          Return in about 6 months (around 2/28/2023). Counseled extensively and medication compliance urged. We discussed that for the  prevention of ASCVD our  goal is aggressive risk modification. Patient is encouraged to exercise even a brisk walk for 30 minutes  at least 3 to 4 times a week   Various goals were discussed and questions answered. Continue current medications. Appropriate prescriptions are addressed and refills ordered. Questions answered and patient verbalizes understanding. Call for any problems, questions, or concerns.

## 2022-09-09 ENCOUNTER — OFFICE VISIT (OUTPATIENT)
Dept: GASTROENTEROLOGY | Age: 72
End: 2022-09-09
Payer: MEDICARE

## 2022-09-09 VITALS
WEIGHT: 135.5 LBS | OXYGEN SATURATION: 99 % | RESPIRATION RATE: 16 BRPM | HEIGHT: 62 IN | HEART RATE: 72 BPM | SYSTOLIC BLOOD PRESSURE: 122 MMHG | BODY MASS INDEX: 24.93 KG/M2 | DIASTOLIC BLOOD PRESSURE: 68 MMHG

## 2022-09-09 DIAGNOSIS — R18.8 CIRRHOSIS OF LIVER WITH ASCITES, UNSPECIFIED HEPATIC CIRRHOSIS TYPE (HCC): Primary | ICD-10-CM

## 2022-09-09 DIAGNOSIS — I85.11 SECONDARY ESOPHAGEAL VARICES WITH BLEEDING (HCC): ICD-10-CM

## 2022-09-09 DIAGNOSIS — K74.60 CIRRHOSIS OF LIVER WITH ASCITES, UNSPECIFIED HEPATIC CIRRHOSIS TYPE (HCC): Primary | ICD-10-CM

## 2022-09-09 PROCEDURE — 1090F PRES/ABSN URINE INCON ASSESS: CPT | Performed by: SPECIALIST

## 2022-09-09 PROCEDURE — 1123F ACP DISCUSS/DSCN MKR DOCD: CPT | Performed by: SPECIALIST

## 2022-09-09 PROCEDURE — G8400 PT W/DXA NO RESULTS DOC: HCPCS | Performed by: SPECIALIST

## 2022-09-09 PROCEDURE — 3017F COLORECTAL CA SCREEN DOC REV: CPT | Performed by: SPECIALIST

## 2022-09-09 PROCEDURE — G8420 CALC BMI NORM PARAMETERS: HCPCS | Performed by: SPECIALIST

## 2022-09-09 PROCEDURE — G8427 DOCREV CUR MEDS BY ELIG CLIN: HCPCS | Performed by: SPECIALIST

## 2022-09-09 PROCEDURE — 1036F TOBACCO NON-USER: CPT | Performed by: SPECIALIST

## 2022-09-09 PROCEDURE — 99214 OFFICE O/P EST MOD 30 MIN: CPT | Performed by: SPECIALIST

## 2022-09-09 RX ORDER — NADOLOL 20 MG/1
20 TABLET ORAL DAILY
Qty: 30 TABLET | Refills: 11 | Status: SHIPPED | OUTPATIENT
Start: 2022-09-09

## 2022-09-09 NOTE — PROGRESS NOTES
CC: Follow up for cirrhosis    SUBJECTIVE:  coreg gives her a headache so not taking. Not taking lactulose for same reason. Has never had a colonoscopy but has had stool screening tests- last time was last year    Last AFP:    22-  3.0      Last liver imagin/13/22-- ascites, gallstones, no mass  Last EGD for varices:  8/3/22-- 3 bands  Hepatitis A immunity: Yes -- Had vaccine # 1 22  Hepatitis B immunity: Yes- had Hep B vaccine # 1 22  . alfts     ROS: non-contributory    OBJECTIVE:   PHYSICAL EXAM:    Vitals:  /68   Pulse 72   Resp 16   Ht 5' 2\" (1.575 m)   Wt 135 lb 8 oz (61.5 kg)   SpO2 99%   BMI 24.78 kg/m²   CONSTITUTIONAL: alert  EYES:  pupils equal, round and reactive to light and sclera clear  LUNGS:  clear to auscultation  CARDIOVASCULAR:  regular rate and rhythm and no murmur noted  ABDOMEN:  normal bowel sounds, soft, mild distention with ascites, non-tender and no masses palpated, no hepatosplenomegally  NEUROLOGIC: no asterixis or focal deficit detected - no asterixis, can copy star diagram normally  EXTREMITIES: no clubbing, cyanosis, or edema    ASSESSMENT:    1) cirrhosis with ascites, PSE  2) esoph varices  3) intolerant to Coreg    PLAN:   1) stop Coreg and start nadolol 20 mg/d   2) restart lactulose 30 cc BID   3) continue lasix 40 mg/d- no Aldactone as creat 1.5 and K+ 4.9   4) EVL scheduled for 22- increased risk due to cirrhosis   5) will check labs on 22   6) advised to get Hep B vaccine # 2 now and final Hep A and B in 4 months   7) return 3 months

## 2022-09-15 RX ORDER — FAMOTIDINE 20 MG/1
20 TABLET, FILM COATED ORAL 2 TIMES DAILY
COMMUNITY
End: 2022-09-21

## 2022-09-15 NOTE — PROGRESS NOTES
.Surgery @ [facility] on 9/21/22 you will be called 9/20/22 with times               1. Do not eat or drink anything after midnight - unless instructed by your doctor prior to surgery. This includes                   no water, chewing gum or mints. 2. Follow your directions as prescribed by the doctor for your procedure and medications. Take Nadolol, Gabapentin Famotidine and  Levothyroxine                   Morning of surgery with sips of water. 3. Check with your Doctor regarding stopping vitamins, supplements, blood thinners (Plavix, Coumadin, Lovenox, Effient, Pradaxa, Xarelto, Fragmin or                   other blood thinners) and follow their instructions. Stop vitamins, supplements and NSAIDS: 9/15/22   4. Do not smoke, vape or use chewing tobacco morning of surgery. Do not drink any alcoholic beverages 24 hours prior to surgery. This includes NA Beer. No street drugs 7 days prior to surgery. 5. You may brush your teeth and gargle the morning of surgery. DO NOT SWALLOW WATER   6. You MUST make arrangements for a responsible adult to take you home after your surgery and be able to check on you every couple                   hours for the day. You will not be allowed to leave alone or drive yourself home. It is strongly suggested someone stay with you the first 24                   hrs. Your surgery will be cancelled if you do not have a ride home. 7. Please wear simple, loose fitting clothing to the hospital.  Yosef Fairchild not bring valuables (money, credit cards, checkbooks, etc.) Do not wear any                   makeup (including no eye makeup) or nail polish on your fingers or toes. 8. DO NOT wear any jewelry or piercings on day of surgery. All body piercing jewelry must be removed. 9. If you have dentures, they will be removed before going to the OR; we will provide you a container.   If you wear contact lenses or glasses,                  they will be removed; please bring a case for them. 10. If you  have a Living Will and Durable Power of  for Healthcare, please bring in a copy. 11. Please bring picture ID,  insurance card, paperwork from the doctors office    (H & P, Consent, & card for implantable devices). 12. Take a shower the morning of your procedure. Do not apply any make-up, deodorant, lotion, oil or powder. 13.  Enter thru the main entrance wearing a mask on the day of surgery.

## 2022-09-18 RX ORDER — METOCLOPRAMIDE 10 MG/1
5 TABLET ORAL
Qty: 45 TABLET | Refills: 3 | Status: SHIPPED | OUTPATIENT
Start: 2022-09-18

## 2022-09-18 NOTE — PROGRESS NOTES
Nausea and non-bloody emesis  Weak, not eating   says she can copy a star diagram normally  Impression:    1) possible diabetic gastroparesis        Plan:   1) start Reglan 5 mg ac tid--  instructed and warned about potential neurotoxicity   2) if no improvement by tonight or tomorrow then go to the ED

## 2022-09-20 ENCOUNTER — ANESTHESIA EVENT (OUTPATIENT)
Dept: ENDOSCOPY | Age: 72
End: 2022-09-20
Payer: MEDICARE

## 2022-09-20 NOTE — PROGRESS NOTES
Spoke with patient's  Tad and patient will arrive at [phone] at [facility] on 9/21/2022 for her procedure at [address] [place] [address]

## 2022-09-20 NOTE — ANESTHESIA PRE PROCEDURE
Department of Anesthesiology  Preprocedure Note       Name:  Izabela Ward   Age:  67 y.o.  :  1950                                          MRN:  [id number]         Date:  2022      Surgeon: Ирина Signs):  Lauren Almanza MD    Procedure: Procedure(s):  EGD BAND LIGATION    Medications prior to admission:   Prior to Admission medications    Medication Sig Start Date End Date Taking? Authorizing Provider   metoclopramide (REGLAN) 10 MG tablet Take 0.5 tablets by mouth 3 times daily (with meals) 22   Lauren Almanza MD   famotidine (PEPCID) 20 MG tablet Take 20 mg by mouth 2 times daily    Historical Provider, MD   nadolol (CORGARD) 20 MG tablet Take 1 tablet by mouth daily 22   Lauren Almanza MD   lactulose (CHRONULAC) 10 GM/15ML solution Take 30 mLs by mouth in the morning and 30 mLs before bedtime. Patient not taking: Reported on 2022 8/3/22   Lauren Almanza MD   furosemide (LASIX) 40 MG tablet Take 40 mg by mouth in the morning. Historical Provider, MD   carvedilol (COREG) 6.25 MG tablet Take 1 tablet by mouth in the morning and 1 tablet before bedtime.   Patient not taking: Reported on 9/15/2022 7/15/22   Kaleigh Garcia MD   [facility]) 5 MG tablet Take 1 tablet by mouth 2 times daily 6/8/22 7/15/22  Leonard Díaz DO   LORazepam (ATIVAN) 0.5 MG tablet 1 tablet at bedtime as needed    Historical Provider, MD   donepezil (ARICEPT) 10 MG tablet Take 1 tablet by mouth daily NOTE TO PHARMACY: DO NOT FILL UNTIL 5 MG RX IS COMPLETE  Patient not taking: No sig reported 3/15/22 7/15/22  Julian Sahu DO   levothyroxine (SYNTHROID) 88 MCG tablet Take 1 tablet by mouth Daily 21   Sade Kelly MD   lisinopril (PRINIVIL;ZESTRIL) 10 MG tablet Take 1 tablet by mouth daily  Patient not taking: Reported on 2022 12/21/20 7/15/22  Ellan Saint, APRN - CNP   risperiDONE (RISPERDAL) 0.25 MG tablet Take 1 tablet by mouth nightly 20   Ellan Saint, APRN - SEJAL   Riboflavin units with largest meal of the day, 7 units with each smaller meal 7 pen 0    lovastatin (MEVACOR) 40 MG tablet Take 1 tablet by mouth nightly 90 tablet 1    OneTouch Delica Lancets 19Y MISC Test blood sugar 1-2 times a day as directed. Dx: E11.65 100 each 5    bimatoprost (LUMIGAN) 0.03 % ophthalmic drops Place 1 drop into both eyes nightly       No current facility-administered medications for this visit. Allergies: Allergies   Allergen Reactions    Bupropion      Other reaction(s): Other - comment required  Suicidal ideation    Nsaids      Other reaction(s): Other - comment required  Elevated Serum Creatinine    Amlodipine     Nateglinide Nausea Only    Ofloxacin Hives    Paroxetine Hcl Hives     Other reaction(s): Other - comment required  Shakey/nerveous    Pioglitazone      Other reaction(s): Other - comment required  Dizzy / nauseated    Citalopram Nausea And Vomiting    Escitalopram Nausea And Vomiting    Flonase [Fluticasone] Nausea And Vomiting    Paxil [Paroxetine] Nausea And Vomiting       Problem List:    Patient Active Problem List   Diagnosis Code    Acquired hypothyroidism E03.9    Depression F32. A    Diabetic neuropathy (HCC) E11.40    DM (diabetes mellitus) ([facility] [address] E11.9    HTN (hypertension) I10    Hyperlipidemia E78.5    Osteopenia M85.80    Personal history of breast cancer Z85.3    Allergic rhinitis J30.9    ANTONINA (acute kidney injury) ([facility] [address] N17.9    Gastroenteritis K52.9    GI bleed K92.2    Hematemesis with nausea K92.0    Cirrhosis of liver with ascites (HCC) K74.60, R18.8    Secondary esophageal varices with bleeding (HCC) I85.11    Secondary esophageal varices without bleeding (HCC) I85.10       Past Medical History:        Diagnosis Date    Cancer ([facility] [address]     right breast.    Diabetes mellitus ([facility] [address]     Esophageal varices (HCC)     Fatty liver     Glaucoma     Gout     patient states Loraine Celaya has never had gout\".     Hyperlipidemia     Hypertension     Neuropathy     Thyroid disease        Past Surgical History:        Procedure Laterality Date     SECTION      MASTECTOMY, BILATERAL  10/26/2007    UPPER GASTROINTESTINAL ENDOSCOPY N/A 2022    EGD BAND LIGATION with 6 bands placed performed by Bailey Rodarte MD at [place] [address] N/A 8/3/2022    EGD BAND LIGATION WITH 3 BANDS PLACED, 4TH BAND DID NOT HOLD WITH SUBSEQUENT OOZING, TETRADECAL INJECTION GIVEN performed by Bailey Rodarte MD at [facility] ENDOSCOPY       Social History:    Social History     Tobacco Use    Smoking status: Never    Smokeless tobacco: Never   Substance Use Topics    Alcohol use: No                                Counseling given: Not Answered      Vital Signs (Current): There were no vitals filed for this visit.                                            BP Readings from Last 3 Encounters:   22 122/68   22 118/62   22 (!) 161/72       NPO Status:                                                                                 BMI:   Wt Readings from Last 3 Encounters:   22 135 lb 8 oz (61.5 kg)   22 134 lb (60.8 kg)   22 163 lb (73.9 kg)     There is no height or weight on file to calculate BMI.    CBC:   Lab Results   Component Value Date/Time    WBC 4.7 2022 03:31 PM    RBC 2.63 2022 03:31 PM    HGB 8.2 2022 03:31 PM    HCT 26.6 2022 03:31 PM    .1 2022 03:31 PM    RDW 19.9 2022 03:31 PM     2022 03:31 PM       CMP:   Lab Results   Component Value Date/Time     2022 03:31 PM    K 4.9 2022 03:31 PM    CL 93 2022 03:31 PM    CO2 26 2022 03:31 PM    BUN 21 2022 03:31 PM    CREATININE 1.5 2022 03:31 PM    GFRAA 41 2022 03:31 PM    AGRATIO 0.9 2022 08:55 AM    LABGLOM 34 2022 03:31 PM    GLUCOSE 383 2022 03:31 PM    PROT 7.5 2022 03:31 PM    CALCIUM 9.1 2022 03:31 PM BILITOT 0.7 08/19/2022 03:31 PM    ALKPHOS 76 08/19/2022 03:31 PM    AST 22 08/19/2022 03:31 PM    ALT 13 08/19/2022 03:31 PM       POC Tests:   No results for input(s): POCGLU, POCNA, POCK, POCCL, POCBUN, POCHEMO, POCHCT in the last 72 hours. Coags:   Lab Results   Component Value Date/Time    PROTIME 16.9 07/11/2022 04:50 AM    INR 1.31 07/11/2022 04:50 AM    APTT 27.4 07/11/2022 04:50 AM       HCG (If Applicable): No results found for: PREGTESTUR, PREGSERUM, HCG, HCGQUANT     ABGs:   Lab Results   Component Value Date/Time    PO2ART 63 03/21/2019 06:15 AM    GIA4ZDH 39.0 03/21/2019 06:15 AM    IOB5NFO 29.0 03/21/2019 06:15 AM        Type & Screen (If Applicable):  No results found for: LABABO, LABRH    Drug/Infectious Status (If Applicable):  Lab Results   Component Value Date/Time    HEPCAB NON REACTIVE 07/11/2022 08:45 AM       COVID-19 Screening (If Applicable): No results found for: COVID19        Anesthesia Evaluation  Patient summary reviewed and Nursing notes reviewed no history of anesthetic complications:   Airway: Mallampati: II  TM distance: >3 FB   Neck ROM: full  Mouth opening: < 3 FB   Dental: normal exam         Pulmonary:Negative Pulmonary ROS                              Cardiovascular:  Exercise tolerance: poor (<4 METS),   (+) hypertension:, hyperlipidemia      ECG reviewed      Echocardiogram reviewed         Beta Blocker:  Dose within 24 Hrs      ROS comment: ECHO on 07/14/2022   Summary   Left ventricular systolic function is normal.   Ejection fraction is visually estimated at 50-55%. Mild left ventricular hypertrophy. No evidence of any pericardial effusion. EKG on 07/10/22  Normal sinus rhythm   Nonspecific ST abnormality      Neuro/Psych:   (+) depression/anxiety             GI/Hepatic/Renal:   (+) liver disease:, renal disease: CRI,          ROS comment: GI bleed   Esophageal varices .    Endo/Other:    (+) DiabetesType II DM, , hypothyroidism, blood dyscrasia: anemia:., electrolyte abnormalities, malignancy/cancer. Abdominal:             Vascular: Other Findings:             Anesthesia Plan      MAC     ASA 3       Induction: intravenous. Anesthetic plan and risks discussed with patient. Plan discussed with CRNA. Pre Anesthesia Evaluation complete. Anesthesia plan, risks, benefits, alternatives, and personal involved discussed with patient. Patients and/or legal guardian verbalized an understanding  and agreed to proceed.   Garfield Santiago DO  9/21/2022

## 2022-09-21 ENCOUNTER — HOSPITAL ENCOUNTER (OUTPATIENT)
Age: 72
Setting detail: OUTPATIENT SURGERY
Discharge: HOME | End: 2022-09-21
Attending: SPECIALIST | Admitting: SPECIALIST
Payer: MEDICARE

## 2022-09-21 ENCOUNTER — ANESTHESIA (OUTPATIENT)
Dept: ENDOSCOPY | Age: 72
End: 2022-09-21
Payer: MEDICARE

## 2022-09-21 ENCOUNTER — INITIAL CONSULT (OUTPATIENT)
Dept: ONCOLOGY | Age: 72
End: 2022-09-21
Payer: MEDICARE

## 2022-09-21 ENCOUNTER — HOSPITAL ENCOUNTER (OUTPATIENT)
Dept: INFUSION THERAPY | Age: 72
Discharge: HOME | End: 2022-09-21
Payer: MEDICARE

## 2022-09-21 VITALS
OXYGEN SATURATION: 96 % | HEIGHT: 62 IN | TEMPERATURE: 98.1 F | HEART RATE: 62 BPM | RESPIRATION RATE: 18 BRPM | WEIGHT: 129.2 LBS | BODY MASS INDEX: 23.77 KG/M2 | DIASTOLIC BLOOD PRESSURE: 60 MMHG | SYSTOLIC BLOOD PRESSURE: 124 MMHG

## 2022-09-21 VITALS
HEART RATE: 60 BPM | WEIGHT: 135 LBS | RESPIRATION RATE: 16 BRPM | SYSTOLIC BLOOD PRESSURE: 156 MMHG | DIASTOLIC BLOOD PRESSURE: 69 MMHG | BODY MASS INDEX: 24.84 KG/M2 | HEIGHT: 62 IN | TEMPERATURE: 97 F | OXYGEN SATURATION: 93 %

## 2022-09-21 DIAGNOSIS — D64.9 ANEMIA, UNSPECIFIED TYPE: ICD-10-CM

## 2022-09-21 DIAGNOSIS — Z85.3 PERSONAL HISTORY OF BREAST CANCER: Primary | ICD-10-CM

## 2022-09-21 LAB
ALBUMIN SERPL-MCNC: 3.9 GM/DL (ref 3.4–5)
ALP BLD-CCNC: 134 IU/L (ref 40–129)
ALT SERPL-CCNC: 12 U/L (ref 10–40)
ANION GAP SERPL CALCULATED.3IONS-SCNC: 8 MMOL/L (ref 4–16)
AST SERPL-CCNC: 27 IU/L (ref 15–37)
BILIRUB SERPL-MCNC: 1.2 MG/DL (ref 0–1)
BUN BLDV-MCNC: 26 MG/DL (ref 6–23)
CALCIUM SERPL-MCNC: 8.9 MG/DL (ref 8.3–10.6)
CHLORIDE BLD-SCNC: 93 MMOL/L (ref 99–110)
CO2: 29 MMOL/L (ref 21–32)
CREAT SERPL-MCNC: 1.5 MG/DL (ref 0.6–1.1)
GFR AFRICAN AMERICAN: 41 ML/MIN/1.73M2
GFR NON-AFRICAN AMERICAN: 34 ML/MIN/1.73M2
GLUCOSE BLD-MCNC: 243 MG/DL (ref 70–99)
GLUCOSE BLD-MCNC: 257 MG/DL (ref 70–99)
HCT VFR BLD CALC: 26.7 % (ref 37–47)
HEMOGLOBIN: 8.8 GM/DL (ref 12.5–16)
MCH RBC QN AUTO: 33.5 PG (ref 27–31)
MCHC RBC AUTO-ENTMCNC: 33 % (ref 32–36)
MCV RBC AUTO: 101.5 FL (ref 78–100)
PDW BLD-RTO: 18.9 % (ref 11.7–14.9)
PLATELET # BLD: 141 K/CU MM (ref 140–440)
PMV BLD AUTO: 11.9 FL (ref 7.5–11.1)
POTASSIUM SERPL-SCNC: 4.3 MMOL/L (ref 3.5–5.1)
RBC # BLD: 2.63 M/CU MM (ref 4.2–5.4)
SODIUM BLD-SCNC: 130 MMOL/L (ref 135–145)
TOTAL PROTEIN: 8.3 GM/DL (ref 6.4–8.2)
WBC # BLD: 6.5 K/CU MM (ref 4–10.5)

## 2022-09-21 PROCEDURE — G8400 PT W/DXA NO RESULTS DOC: HCPCS | Performed by: INTERNAL MEDICINE

## 2022-09-21 PROCEDURE — 1090F PRES/ABSN URINE INCON ASSESS: CPT | Performed by: INTERNAL MEDICINE

## 2022-09-21 PROCEDURE — 3609012300 HC EGD BAND LIGATION ESOPHGEAL/GASTRIC VARICES: Performed by: SPECIALIST

## 2022-09-21 PROCEDURE — 1123F ACP DISCUSS/DSCN MKR DOCD: CPT | Performed by: INTERNAL MEDICINE

## 2022-09-21 PROCEDURE — 99211 OFF/OP EST MAY X REQ PHY/QHP: CPT

## 2022-09-21 PROCEDURE — 2580000003 HC RX 258: Performed by: SPECIALIST

## 2022-09-21 PROCEDURE — 2720000010 HC SURG SUPPLY STERILE: Performed by: SPECIALIST

## 2022-09-21 PROCEDURE — 1036F TOBACCO NON-USER: CPT | Performed by: INTERNAL MEDICINE

## 2022-09-21 PROCEDURE — 7100000010 HC PHASE II RECOVERY - FIRST 15 MIN: Performed by: SPECIALIST

## 2022-09-21 PROCEDURE — 3700000000 HC ANESTHESIA ATTENDED CARE: Performed by: SPECIALIST

## 2022-09-21 PROCEDURE — 99204 OFFICE O/P NEW MOD 45 MIN: CPT | Performed by: INTERNAL MEDICINE

## 2022-09-21 PROCEDURE — 2500000003 HC RX 250 WO HCPCS

## 2022-09-21 PROCEDURE — 2709999900 HC NON-CHARGEABLE SUPPLY: Performed by: SPECIALIST

## 2022-09-21 PROCEDURE — 3017F COLORECTAL CA SCREEN DOC REV: CPT | Performed by: INTERNAL MEDICINE

## 2022-09-21 PROCEDURE — 82962 GLUCOSE BLOOD TEST: CPT

## 2022-09-21 PROCEDURE — G8427 DOCREV CUR MEDS BY ELIG CLIN: HCPCS | Performed by: INTERNAL MEDICINE

## 2022-09-21 PROCEDURE — 7100000011 HC PHASE II RECOVERY - ADDTL 15 MIN: Performed by: SPECIALIST

## 2022-09-21 PROCEDURE — 85610 PROTHROMBIN TIME: CPT

## 2022-09-21 PROCEDURE — 3700000001 HC ADD 15 MINUTES (ANESTHESIA): Performed by: SPECIALIST

## 2022-09-21 PROCEDURE — 2580000003 HC RX 258: Performed by: ANESTHESIOLOGY

## 2022-09-21 PROCEDURE — 6360000002 HC RX W HCPCS

## 2022-09-21 PROCEDURE — 43244 EGD VARICES LIGATION: CPT | Performed by: SPECIALIST

## 2022-09-21 PROCEDURE — G8420 CALC BMI NORM PARAMETERS: HCPCS | Performed by: INTERNAL MEDICINE

## 2022-09-21 PROCEDURE — 80053 COMPREHEN METABOLIC PANEL: CPT

## 2022-09-21 PROCEDURE — 85027 COMPLETE CBC AUTOMATED: CPT

## 2022-09-21 RX ORDER — SODIUM CHLORIDE, SODIUM LACTATE, POTASSIUM CHLORIDE, CALCIUM CHLORIDE 600; 310; 30; 20 MG/100ML; MG/100ML; MG/100ML; MG/100ML
INJECTION, SOLUTION INTRAVENOUS CONTINUOUS
Status: DISCONTINUED | OUTPATIENT
Start: 2022-09-21 | End: 2022-09-21 | Stop reason: HOSPADM

## 2022-09-21 RX ORDER — LIDOCAINE HYDROCHLORIDE 20 MG/ML
INJECTION, SOLUTION EPIDURAL; INFILTRATION; INTRACAUDAL; PERINEURAL PRN
Status: DISCONTINUED | OUTPATIENT
Start: 2022-09-21 | End: 2022-09-21 | Stop reason: SDUPTHER

## 2022-09-21 RX ORDER — FUROSEMIDE 40 MG/1
20 TABLET ORAL DAILY
Qty: 60 TABLET | Refills: 3
Start: 2022-09-21

## 2022-09-21 RX ORDER — ASPIRIN 81 MG/1
81 TABLET ORAL DAILY
COMMUNITY

## 2022-09-21 RX ORDER — PROPOFOL 10 MG/ML
INJECTION, EMULSION INTRAVENOUS PRN
Status: DISCONTINUED | OUTPATIENT
Start: 2022-09-21 | End: 2022-09-21 | Stop reason: SDUPTHER

## 2022-09-21 RX ADMIN — PROPOFOL 100 MG: 10 INJECTION, EMULSION INTRAVENOUS at 08:36

## 2022-09-21 RX ADMIN — SODIUM CHLORIDE, POTASSIUM CHLORIDE, SODIUM LACTATE AND CALCIUM CHLORIDE: 600; 310; 30; 20 INJECTION, SOLUTION INTRAVENOUS at 08:27

## 2022-09-21 RX ADMIN — SODIUM CHLORIDE, POTASSIUM CHLORIDE, SODIUM LACTATE AND CALCIUM CHLORIDE: 600; 310; 30; 20 INJECTION, SOLUTION INTRAVENOUS at 07:40

## 2022-09-21 RX ADMIN — LIDOCAINE HYDROCHLORIDE 100 MG: 20 INJECTION, SOLUTION EPIDURAL; INFILTRATION; INTRACAUDAL; PERINEURAL at 08:36

## 2022-09-21 RX ADMIN — PHENYLEPHRINE HYDROCHLORIDE 100 MCG: 10 INJECTION INTRAVENOUS at 08:42

## 2022-09-21 ASSESSMENT — PAIN SCALES - GENERAL: PAINLEVEL_OUTOF10: 0

## 2022-09-21 ASSESSMENT — PAIN - FUNCTIONAL ASSESSMENT
PAIN_FUNCTIONAL_ASSESSMENT: 0-10
PAIN_FUNCTIONAL_ASSESSMENT: PREVENTS OR INTERFERES SOME ACTIVE ACTIVITIES AND ADLS

## 2022-09-21 ASSESSMENT — PAIN DESCRIPTION - DESCRIPTORS: DESCRIPTORS: DISCOMFORT

## 2022-09-21 NOTE — PROGRESS NOTES
Patient Name:  Bri Stratton  Patient :  1950  Patient MRN:  [id number]     Primary Oncologist: Jeremy Odell MD  Referring Provider: Jamee Flores MD     Date of Service: 2022      Reason for Consult: To evaluate the patient with anemia. Chief Complaint:    Chief Complaint   Patient presents with    New Patient     Patient Active Problem List:     Acquired hypothyroidism     Depression     Diabetic neuropathy ([address]     DM (diabetes mellitus) ([address]     HTN (hypertension)     Hyperlipidemia     Osteopenia     Personal history of breast cancer     Allergic rhinitis     ANTONINA (acute kidney injury) ([address]     Gastroenteritis     GI bleed     Hematemesis with nausea     Cirrhosis of liver with ascites ([address]     Secondary esophageal varices with bleeding ([address]    HPI:   Dayana Pacheco is a 70-year-old very pleasant female with medical history significant for hypertension, hyperlipidemia, diabetes mellitus, history of right breast cancer, status post bilateral mastectomies in  for lobular cancer, status post adjuvant endocrine therapy with Femara for 5 years, hypothyroidism and cirrhosis of the liver, referred to me on 2022 for evaluation of her anemia. She had banding of esophageal varices by Dr. Elisa Lynn this morning. She has been having anemia and she was referred to me for further management. I recognized that her anemia has gotten better lately after esophageal varices banding. She complains of tiredness and fatigue. Past Medical History:     Significant for  1. Hypertension  2. Hyperlipidemia  3. Diabetes mellitus  4. History of right breast cancer, s/p bilateral mastectomies in   5. Cirrhosis of the liver with ascites and esophageal varices  6. Hypothyroidism    Past Surgery History:    Significant for  1. Bilateral mastectomies  3. Cataract extraction  3.    4.   Banding of esophageal varices    Social History:   She denies smoking, alcohol drinking or illicit drug abuse. Family History:    Significant for cancer in her mother, diabetes and CAD in her father. Allergies   Allergen Reactions    Bupropion      Other reaction(s): Other - comment required  Suicidal ideation    Nsaids      Other reaction(s): Other - comment required  Elevated Serum Creatinine    Amlodipine     Nateglinide Nausea Only    Ofloxacin Hives    Paroxetine Hcl Hives     Other reaction(s): Other - comment required  Shakey/nerveous    Pioglitazone      Other reaction(s): Other - comment required  Dizzy / nauseated    Citalopram Nausea And Vomiting    Escitalopram Nausea And Vomiting    Flonase [Fluticasone] Nausea And Vomiting    Paxil [Paroxetine] Nausea And Vomiting       Current Outpatient Medications on File Prior to Visit   Medication Sig Dispense Refill    furosemide (LASIX) 40 MG tablet Take 0.5 tablets by mouth daily 60 tablet 3    aspirin 81 MG EC tablet Take 81 mg by mouth daily      nadolol (CORGARD) 20 MG tablet Take 1 tablet by mouth daily 30 tablet 11    lactulose (CHRONULAC) 10 GM/15ML solution Take 30 mLs by mouth in the morning and 30 mLs before bedtime. 1800 mL 5    carvedilol (COREG) 6.25 MG tablet Take 1 tablet by mouth in the morning and 1 tablet before bedtime.  60 tablet 3    LORazepam (ATIVAN) 0.5 MG tablet 1 tablet at bedtime as needed      levothyroxine (SYNTHROID) 88 MCG tablet Take 1 tablet by mouth Daily 30 tablet 0    risperiDONE (RISPERDAL) 0.25 MG tablet Take 1 tablet by mouth nightly 90 tablet 1    insulin glargine (BASAGLAR KWIKPEN) 100 UNIT/ML injection pen Inject 35 Units into the skin 2 times daily 35 units in the morning and 25 units at bedtime (Patient taking differently: Inject 30 Units into the skin 2 times daily 35 units in the morning and 25 units at bedtime) 15 pen 3    metFORMIN (GLUCOPHAGE-XR) 500 MG extended release tablet Take 1 tablet by mouth 3 times daily 90 tablet 2    NOVOLOG FLEXPEN 100 UNIT/ML injection pen 14 units with largest meal of the day, 7 units with each smaller meal 7 pen 0    lovastatin (MEVACOR) 40 MG tablet Take 1 tablet by mouth nightly 90 tablet 1    bimatoprost (LUMIGAN) 0.03 % ophthalmic drops Place 1 drop into both eyes nightly      metoclopramide (REGLAN) 10 MG tablet Take 0.5 tablets by mouth 3 times daily (with meals) 45 tablet 3    [DISCONTINUED] memantine (NAMENDA) 5 MG tablet Take 1 tablet by mouth 2 times daily 180 tablet 1    [DISCONTINUED] donepezil (ARICEPT) 10 MG tablet Take 1 tablet by mouth daily NOTE TO PHARMACY: DO NOT FILL UNTIL 5 MG RX IS COMPLETE (Patient not taking: No sig reported) 30 tablet 5    [DISCONTINUED] lisinopril (PRINIVIL;ZESTRIL) 10 MG tablet Take 1 tablet by mouth daily (Patient not taking: No sig reported) 90 tablet 0    [DISCONTINUED] Riboflavin (B2 PO) Take by mouth (Patient not taking: No sig reported)      [DISCONTINUED] Cholecalciferol (D3-1000 PO) Take by mouth (Patient not taking: Reported on 7/13/2022)      gabapentin (NEURONTIN) 100 MG capsule Take 1 capsule by mouth 3 times daily for 90 days. 90 capsule 2    OneTouch Delica Lancets 05F MISC Test blood sugar 1-2 times a day as directed.  Dx: E11.65 100 each 5    [DISCONTINUED] calcium carbonate (OSCAL) 500 MG TABS tablet Take 500 mg by mouth daily (Patient not taking: Reported on 7/13/2022)      [DISCONTINUED] Cranberry 94213 MG CAPS Take by mouth (Patient not taking: Reported on 7/13/2022)      [DISCONTINUED] GARLIC PO Take by mouth (Patient not taking: No sig reported)      [DISCONTINUED] Multiple Vitamins-Minerals (ICAPS AREDS 2 PO) Take by mouth (Patient not taking: Reported on 7/13/2022)      [DISCONTINUED] ferrous sulfate 325 (65 Fe) MG tablet Take 325 mg by mouth daily (with breakfast) (Patient not taking: Reported on 7/13/2022)      [DISCONTINUED] raNITIdine (ZANTAC) 150 MG tablet Take 150 mg by mouth 2 times daily      [DISCONTINUED] Pomegranate, Pun[place] granatum, (POMEGRANATE PO) Take 500 mg by mouth daily (Patient not taking: No sig reported)       No current facility-administered medications on file prior to visit. Review of Systems:  Constitutional:  No weight loss, No fever, No chills, No night sweats. Energy level is low. Eyes:  No diplopia, No transient or permanent loss of vision, No scotomata. ENT / Mouth:  No epistaxis, No dysphagia, No hoarseness, No oral ulcers, No gingival bleeding. No sore throat, No postnasal drip, No nasal drip, No mouth pain, No sinus pain, No tinnitus, Normal hearing. Cardiovascular:  No chest pain, No palpitations, No syncope, No upper extremity edema, No lower extremity edema, No calf discomfort. Respiratory:  No cough. No hemoptysis, No pleurisy, No wheezing, No dyspnea. Breast:  No breast mass, No pain, No nipple discharge, No change in size, No change in shape. Gastrointestinal:  No abdominal pain, No abdominal cramping, No nausea, No vomiting, No constipation, No diarrhea, No hematochezia, No melena, No jaundice, No dyspepsia, No dysphagia. Urinary:  No dysuria, No hematuria, No urinary incontinence. Gynecological:  No vaginal discharge, No suprapubic pain, No abnormal vaginal bleeding. Musculoskeletal:  No muscle pain, No swollen joints, No joint redness, No bone pain, No spine tenderness. Skin:  No rash, No nodules, No pruritus, No lesions. Neurologic:  No confusion, No seizures, No syncope, No tremor, No speech change, No headache, No hiccups, No abnormal gait, No sensory changes, No weakness. Psychiatric:  No depression, No anxiety, Concentration normal.  Endocrine:  No polyuria, No polydipsia, No hot flashes, No thyroid symptoms. Hematologic:  No epistaxis, No gingival bleeding, No petechiae, No ecchymosis. Lymphatic:  No lymphadenopathy, No lymphedema. Allergy / Immunologic:  No eczema, No frequent mucous infections, No frequent respiratory infections, No recurrent urticarial, No frequent skin infections.      Vital Signs: /60 (Site: Left Upper Arm, Position: Sitting, Cuff Size: Medium Adult)   Pulse 62   Temp 98.1 °F (36.7 °C) (Temporal)   Resp 18   Ht 5' 2\" (1.575 m)   Wt 129 lb 3.2 oz (58.6 kg)   SpO2 96%   BMI 23.63 kg/m²      Physical Exam:  CONSTITUTIONAL: awake, alert, cooperative, no apparent distress   EYES: pupils equal, round and reactive to light, sclera clear, normal conjunctiva  ENT: Normocephalic, without obvious abnormality, atraumatic  NECK: supple, symmetrical, no jugular venous distension, no carotid bruits   HEMATOLOGIC/LYMPHATIC: no cervical, supraclavicular or axillary lymphadenopathy   LUNGS: VBS, no wheezes, no increased work of breathing, no rhonchi, clear to auscultation, no crackles,    CARDIOVASCULAR: regular rate and rhythm, normal S1 and S2, no murmur noted  ABDOMEN: normal bowel sounds x 4, soft, non-distended, non-tender, no masses palpated, no hepatosplenomegaly   MUSCULOSKELETAL: full range of motion noted, tone is normal  NEUROLOGIC: awake, alert, oriented to name, place and time. Motor skills grossly intact. SKIN: appears intact, normal skin color, normal texture, normal turgor, no jaundice.    EXTREMITIES: no LE edema, no leg swelling, no cyanosis, no clubbing,       Labs:  Hematology:  Lab Results   Component Value Date    WBC 6.5 09/21/2022    RBC 2.63 (L) 09/21/2022    HGB 8.8 (L) 09/21/2022    HCT 26.7 (L) 09/21/2022    .5 (H) 09/21/2022    MCH 33.5 (H) 09/21/2022    MCHC 33.0 09/21/2022    RDW 18.9 (H) 09/21/2022     09/21/2022    MPV 11.9 (H) 09/21/2022    SEGSPCT 65.3 08/19/2022    EOSRELPCT 2.3 08/19/2022    BASOPCT 0.6 08/19/2022    LYMPHOPCT 23.8 (L) 08/19/2022    MONOPCT 7.4 (H) 08/19/2022    SEGSABS 3.1 08/19/2022    EOSABS 0.1 08/19/2022    BASOSABS 0.0 08/19/2022    LYMPHSABS 1.1 08/19/2022    MONOSABS 0.4 08/19/2022    DIFFTYPE AUTOMATED DIFFERENTIAL 08/19/2022     No results found for: ESR  Chemistry:  Lab Results   Component Value Date     (L) 09/21/2022    K 4.3 09/21/2022    CL 93 (L) 09/21/2022    CO2 29 09/21/2022    BUN 26 (H) 09/21/2022    CREATININE 1.5 (H) 09/21/2022    GLUCOSE 257 (H) 09/21/2022    CALCIUM 8.9 09/21/2022    PROT 8.3 (H) 09/21/2022    LABALBU 3.9 09/21/2022    BILITOT 1.2 (H) 09/21/2022    ALKPHOS 134 (H) 09/21/2022    AST 27 09/21/2022    ALT 12 09/21/2022    LABGLOM 34 (L) 09/21/2022    GFRAA 41 (L) 09/21/2022    AGRATIO 0.9 (L) 01/27/2022    GLOB 2.9 10/20/2020    MG 2.0 04/06/2022    POCGLU 243 (H) 09/21/2022     Lab Results   Component Value Date     (H) 02/23/2022     No components found for: LD  Lab Results   Component Value Date    TSHHS 0.720 08/04/2022    T4FREE 1.72 08/04/2022     Immunology:  Lab Results   Component Value Date    PROT 8.3 (H) 09/21/2022    SPEP  07/11/2022     INTERPRETATION - Decreased albumin and total protein. No monoclonal bands are detected. SAF    ALBUMINELP 2.8 (L) 07/11/2022    LABALPH 0.2 07/11/2022    LABALPH 0.5 07/11/2022    LABBETA 0.8 07/11/2022    GAMGLOB 1.2 07/11/2022     No results found for: FAHAD BennettR  No results found for: B2M  Coagulation Panel:  Lab Results   Component Value Date    PROTIME 16.9 (H) 07/11/2022    INR 1.31 07/11/2022    APTT 27.4 07/11/2022    DDIMER 344 (H) 07/10/2022     Anemia Panel:  Lab Results   Component Value Date    GIHIVECI99 315.7 07/12/2022    FOLATE 7.0 07/12/2022     Tumor Markers:  Lab Results   Component Value Date     472 (H) 02/23/2022        Observations:  No data recorded     Assessment   Anemia     Plan:  She has been having persistent anemia (hemoglobin as low as 3.8 on 7/10/22) since 3/2022. I reviewed anemia panel done on 3/27/22, 7/12/22 and 9/6/22. She has cirrhosis of liver with esophageal varices. I believe her anemia is combination of anemia due to chronic disease and blood loss anemia. I recommend to continue to monitor her blood count and transfuse with PRBC as needed basis for symptomatic anemia.      Also recommend to monitor her iron status periodically. If she develops iron deficiency, one should consider to start iron supplementation. She has been seeing Dr. Governor Hammer (Hem/Onc) at [facility]. I recommend her to continue to follow up with Dr. Governor Hammer for her breast cancer and anemia. I will see her as needed basis only. I answered all her questions and concerns for today. I asked her to follow up with primary care physician on regular basis. I will continue to keep you updated on her progress. Thank you for allowing me to participate in the care of this very pleasant patient. Recent imaging and labs were reviewed and discussed with the patient.

## 2022-09-21 NOTE — ANESTHESIA POSTPROCEDURE EVALUATION
Department of Anesthesiology  Postprocedure Note    Patient: Jazmyn Edmondson  MRN: [id number]  YOB: 1950  Date of evaluation: 9/21/2022      Procedure Summary     Date: 09/21/22 Room / Location: [address] [place] [address]    Anesthesia Start: [address] [facility] Anesthesia Stop: 0848    Procedure: EGD BAND LIGATION WITH 3 BANDS APPLIED Diagnosis:       Esophageal varices without bleeding, unspecified esophageal varices type ([address]      (Esophageal varices without bleeding, unspecified esophageal varices type ([address] [I85.00])    Surgeons: Elton Schultz MD Responsible Provider: Silverio Leggett DO    Anesthesia Type: MAC ASA Status: 3          Anesthesia Type: No value filed.     Jez Phase I:      Jez Phase II:        Anesthesia Post Evaluation    Patient location during evaluation: bedside  Patient participation: complete - patient participated  Level of consciousness: awake and alert  Pain score: 0  Airway patency: patent  Nausea & Vomiting: no nausea and no vomiting  Complications: no  Cardiovascular status: blood pressure returned to baseline and hemodynamically stable  Respiratory status: acceptable, room air and spontaneous ventilation  Hydration status: euvolemic

## 2022-09-21 NOTE — DISCHARGE INSTRUCTIONS
Repeat procedure on Nov 2 2022      [place]  [phone]    Do not drive, work around [address] or use equipment. Do not drink any alcoholic beverages. Do not smoke while alone. Avoid making important decisions. Plan to spend a quiet, relaxed evening @ home. Resume normal activities as you begin to feel better. Eat lightly for your first meal, then gradually increase your diet to what is normal for you. In case of nausea, avoid food and drink only clear liquids. Resume food as nausea ceases. Notify your surgeon if you experience fever, chills, large amount of bleeding, difficulty breathing, persistent nausea and vomiting or any other disturbing problem. Call for a follow-up appointment with your surgeon.

## 2022-09-21 NOTE — PROGRESS NOTES
1010:  Pt discharged to home alert and oriented with no c/o pain or discomfort. Pt tolerating PO, VSS. Pt discharged via hospital [facility].

## 2022-09-26 ENCOUNTER — OFFICE VISIT (OUTPATIENT)
Dept: NEUROLOGY | Age: 72
End: 2022-09-26
Payer: MEDICARE

## 2022-09-26 VITALS
OXYGEN SATURATION: 96 % | SYSTOLIC BLOOD PRESSURE: 126 MMHG | HEART RATE: 67 BPM | HEIGHT: 62 IN | DIASTOLIC BLOOD PRESSURE: 80 MMHG | WEIGHT: 129 LBS | BODY MASS INDEX: 23.74 KG/M2

## 2022-09-26 DIAGNOSIS — F03.90 DEMENTIA WITHOUT BEHAVIORAL DISTURBANCE, UNSPECIFIED DEMENTIA TYPE: Primary | ICD-10-CM

## 2022-09-26 PROCEDURE — G8400 PT W/DXA NO RESULTS DOC: HCPCS | Performed by: NURSE PRACTITIONER

## 2022-09-26 PROCEDURE — 3017F COLORECTAL CA SCREEN DOC REV: CPT | Performed by: NURSE PRACTITIONER

## 2022-09-26 PROCEDURE — 1123F ACP DISCUSS/DSCN MKR DOCD: CPT | Performed by: NURSE PRACTITIONER

## 2022-09-26 PROCEDURE — 99214 OFFICE O/P EST MOD 30 MIN: CPT | Performed by: NURSE PRACTITIONER

## 2022-09-26 PROCEDURE — G8427 DOCREV CUR MEDS BY ELIG CLIN: HCPCS | Performed by: NURSE PRACTITIONER

## 2022-09-26 PROCEDURE — G8420 CALC BMI NORM PARAMETERS: HCPCS | Performed by: NURSE PRACTITIONER

## 2022-09-26 PROCEDURE — 1090F PRES/ABSN URINE INCON ASSESS: CPT | Performed by: NURSE PRACTITIONER

## 2022-09-26 PROCEDURE — 1036F TOBACCO NON-USER: CPT | Performed by: NURSE PRACTITIONER

## 2022-09-26 RX ORDER — RIVASTIGMINE 9.5 MG/24H
1 PATCH, EXTENDED RELEASE TRANSDERMAL DAILY
Qty: 30 PATCH | Refills: 5 | Status: SHIPPED | OUTPATIENT
Start: 2022-09-26

## 2022-09-26 RX ORDER — RIVASTIGMINE 4.6 MG/24H
1 PATCH, EXTENDED RELEASE TRANSDERMAL DAILY
Qty: 30 PATCH | Refills: 0 | Status: SHIPPED | OUTPATIENT
Start: 2022-09-26

## 2022-09-26 NOTE — PROGRESS NOTES
9/26/22    Dayana Pacheco  1950    Chief Complaint   Patient presents with    Follow-up     Dementia seems to be stable       History of Present Illness  Verner Gable is a 67 y.o. female presenting today for follow-up of:  memory impairment. Previous MRI showed chronic lacunar infarct, cerebellum. She could not tolerate Aricept and was started on Namenda 5 mg twice daily as she could not tolerate 10 mg twice daily. She participated in balance therapy. Last MMSE was 19/30. At today's visit, Regi Hernandez is doing well. Her spouse states there is no concern for safety issues. She has a good appetite, sleeps better, she is not incontinent. Tad has to help her get dressed and manage finances and medications. Sugey no longer drives. She denies mood changes. Current Outpatient Medications   Medication Sig Dispense Refill    rivastigmine (EXELON) 4.6 MG/24HR Place 1 patch onto the skin daily 30 patch 0    rivastigmine (EXELON) 9.5 MG/24HR Place 1 patch onto the skin daily Do not fill until 4.6 script is complete 30 patch 5    furosemide (LASIX) 40 MG tablet Take 0.5 tablets by mouth daily 60 tablet 3    aspirin 81 MG EC tablet Take 81 mg by mouth daily      metoclopramide (REGLAN) 10 MG tablet Take 0.5 tablets by mouth 3 times daily (with meals) 45 tablet 3    nadolol (CORGARD) 20 MG tablet Take 1 tablet by mouth daily 30 tablet 11    lactulose (CHRONULAC) 10 GM/15ML solution Take 30 mLs by mouth in the morning and 30 mLs before bedtime. 1800 mL 5    carvedilol (COREG) 6.25 MG tablet Take 1 tablet by mouth in the morning and 1 tablet before bedtime.  60 tablet 3    LORazepam (ATIVAN) 0.5 MG tablet 1 tablet at bedtime as needed      levothyroxine (SYNTHROID) 88 MCG tablet Take 1 tablet by mouth Daily 30 tablet 0    risperiDONE (RISPERDAL) 0.25 MG tablet Take 1 tablet by mouth nightly 90 tablet 1    insulin glargine (BASAGLAR KWIKPEN) 100 UNIT/ML injection pen Inject 35 Units into the skin 2 times daily 35 units in the morning and 25 units at bedtime (Patient taking differently: Inject 30 Units into the skin 2 times daily 35 units in the morning and 25 units at bedtime) 15 pen 3    metFORMIN (GLUCOPHAGE-XR) 500 MG extended release tablet Take 1 tablet by mouth 3 times daily 90 tablet 2    NOVOLOG FLEXPEN 100 UNIT/ML injection pen 14 units with largest meal of the day, 7 units with each smaller meal 7 pen 0    lovastatin (MEVACOR) 40 MG tablet Take 1 tablet by mouth nightly 90 tablet 1    OneTouch Delica Lancets 37P MISC Test blood sugar 1-2 times a day as directed. Dx: E11.65 100 each 5    bimatoprost (LUMIGAN) 0.03 % ophthalmic drops Place 1 drop into both eyes nightly      gabapentin (NEURONTIN) 100 MG capsule Take 1 capsule by mouth 3 times daily for 90 days. 90 capsule 2     No current facility-administered medications for this visit. Physical Exam:  Also present during visit: spouse.     Constitutional              Weight: well nourished  Mental Status              Orientation: oriented to person, oriented to place, oriented to problem and oriented to time              Mood/Affect: appropriate mood and appropriate affect              Memory/Other: remote memory intact, attention span normal, concentration normal, recent memory impaired and reduced fund of knowledge    Language              Language: (normal) language, no dysarthria, (normal) articulation and no dysphasia/aphasia  Cranial Nerves              CN II Right: visual fields appear intact              CN II Left: visual fields appear intact              CN III, IV, VI: EOM no nystagmus, normal pursuit and extraocular muscle strength normal              CN III: pupil normal size, pupil reactive to light and dark, pupil accomodates and no ptosis              CN IV: normal              CN VI: normal              CN V Right: normal sensation and muscles of mastication intact              CN V Left: normal sensation and muscles of mastication intact              CN VII Right: normal facial expression              CN VII Left: normal facial expression              CN VIII Right: reduced hearing moderate              CN VIII Left: reduced hearing moderate              CN IX,X: normal palatal movement              CN XI Right: normal sternocleidomastoid and normal trapezius              CN XI Left: normal sternocleidomastoid and normal trapezius              CN XII: no tremors of the tongue, no fasciculation of the tongue, tongue protrudes midline, normal power to left and normal power to right  Gait and Stance              Gait/Posture: station normal, ambulates independently, gait normal and Romberg's test normal      27 Point MMSE Screen:   Orientation (Time): 3/5   Orientation (Place):    Learning 3 Objects: 3/3   Attention: 3/5   Recall 3 Objects: 1/3, was able to get all 3 after hints   Namin/2   Repitition:    3-Step Command: 2/3   TOTAL:                 /80 (Site: Right Upper Arm, Position: Sitting, Cuff Size: Medium Adult)   Pulse 67   Ht 5' 2\" (1.575 m)   Wt 129 lb (58.5 kg)   SpO2 96%   BMI 23.59 kg/m²     Assessment and Plan     Diagnosis Orders   1. Dementia without behavioral disturbance, unspecified dementia type (HCC)  rivastigmine (EXELON) 4.6 MG/24HR    rivastigmine (EXELON) 9.5 MG/24HR        Dayana was seen in neurological follow up in regards to vascular dementia. Sugey will continue Namenda 5 mg twice daily as she cannot tolerate 10 mg twice daily. Due to being unable to tolerate Aricept, I will start her on Exelon patch slowly titrating to 9.5 mg/24hr. Her spouse, Zhang, was given information on resources for dementia. We discussed nonpharmacologic interventions including staying active cognitively, socially, and physically to help slow down the progression of memory loss. Medications prescribed for the patient were discussed in detail.  This included a discussion of the potential risks vs the potential benefits of the medications. The patient was given time to ask questions and these were answered to the best of my ability. The patient appeared to understand the information provided. Return in about 3 months (around 12/26/2022).     ATILIO Mtz - CNP

## 2022-09-26 NOTE — PATIENT INSTRUCTIONS
Please contact our office around 10/15/22 to get your follow up appointment made. Our scheduling phone number is [phone]. Thank you!

## 2022-09-27 ENCOUNTER — TELEPHONE (OUTPATIENT)
Dept: NEUROLOGY | Age: 72
End: 2022-09-27

## 2022-09-27 NOTE — TELEPHONE ENCOUNTER
Received call from pt  stating they went to have Exelon patches filled and they cost $90, which they can't afford. He is inquiring if there is an alternative. Please advise.

## 2022-09-28 ENCOUNTER — TELEPHONE (OUTPATIENT)
Dept: GASTROENTEROLOGY | Age: 72
End: 2022-09-28

## 2022-09-28 RX ORDER — GALANTAMINE HYDROBROMIDE 4 MG/1
4 TABLET, FILM COATED ORAL 2 TIMES DAILY
Qty: 60 TABLET | Refills: 0 | Status: SHIPPED | OUTPATIENT
Start: 2022-09-28

## 2022-09-28 NOTE — TELEPHONE ENCOUNTER
Pt called in with questions about their medications. They first wanted to say they have been taking a whole tab of Reglan TID instead of only half a tab as directed. Do you want her to continue that or go down to half a tab? Next they were wondering why she was rx 40mg of lasix but only taking 20mg (half a tab). She wanted to know why she couldn't just take a full 20mg tab. Please advise.

## 2022-10-06 RX ORDER — ONDANSETRON 4 MG/1
4 TABLET, FILM COATED ORAL EVERY 6 HOURS PRN
Qty: 50 TABLET | Refills: 3 | Status: SHIPPED | OUTPATIENT
Start: 2022-10-06

## 2022-10-06 NOTE — PROGRESS NOTES
Having nausea, weakness, restlessness  Taking Reglan 5 mg ac tid  - am reluctant to increase Regl;an- will try Zofran q6h prn first

## 2022-11-01 ENCOUNTER — ANESTHESIA EVENT (OUTPATIENT)
Dept: ENDOSCOPY | Age: 72
End: 2022-11-01
Payer: MEDICARE

## 2022-11-01 RX ORDER — FAMOTIDINE 20 MG/1
20 TABLET, FILM COATED ORAL 2 TIMES DAILY
COMMUNITY

## 2022-11-01 NOTE — PROGRESS NOTES
Patient will arrive at 1100 at [facility] on 11/2/2022 for her procedure at 1230. Orders in epic, placed by Dr Shelly Millan. 1. Do not eat or drink anything after midnight - unless instructed by your doctor prior to surgery. This includes                   no water, chewing gum or mints. 2. Follow your directions as prescribed by the doctor for your procedure and medications. 3. Check with your Doctor regarding stopping vitamins, supplements, blood thinners and follow their instructions. Stop vitamins, supplements and NSAIDS:    4. Do not smoke, vape or use chewing tobacco morning of surgery. Do not drink any alcoholic beverages 24 hours prior to surgery. This includes NA Beer. No street drugs 7 days prior to surgery. 5. You may brush your teeth and gargle the morning of surgery. DO NOT SWALLOW WATER   6. You MUST make arrangements for a responsible adult to take you home after your surgery and be able to check on you every couple                   hours for the day. You will not be allowed to leave alone or drive yourself home. It is strongly suggested someone stay with you the first 24                   hrs. Your surgery will be cancelled if you do not have a ride home. 7. Please wear simple, loose fitting clothing to the hospital.  Shelby Kirbyugh not bring valuables (money, credit cards, checkbooks, etc.) Do not wear any                   makeup (including no eye makeup) or nail polish on your fingers or toes. 8. DO NOT wear any jewelry or piercings on day of surgery. All body piercing jewelry must be removed. 9. If you have dentures, they will be removed before going to the OR; we will provide you a container. If you wear contact lenses or glasses,                  they will be removed; please bring a case for them. 10. If you  have a Living Will and Durable Power of  for Healthcare, please bring in a copy.            11. Please bring picture ID, insurance card, paperwork from the doctors office    (H & P, Consent, & card for implantable devices). 12. Take a shower the morning of your procedure with Hibiclens or an anti-bacterial soap. Do not apply any make-up, deodorant, lotion, oil or powder. 15.  Enter thru the main entrance on the day of surgery. Spoke with patient's  Tad and patient will take her gabapentin, synthroid and nadolol the morning of her procedure. Patient's  Tad, had no questions concerning egd instructions at this time.

## 2022-11-01 NOTE — ANESTHESIA PRE PROCEDURE
Department of Anesthesiology  Preprocedure Note       Name:  Andrew Feliz   Age:  67 y.o.  :  1950                                          MRN:  [id number]         Date:  2022      Surgeon: Sharifa Woodward):  Imani Patel MD    Procedure: Procedure(s):  EGD BAND LIGATION    Medications prior to admission:   Prior to Admission medications    Medication Sig Start Date End Date Taking? Authorizing Provider   famotidine (PEPCID) 20 MG tablet Take 20 mg by mouth 2 times daily    Historical Provider, MD   ondansetron (ZOFRAN) 4 MG tablet Take 1 tablet by mouth every 6 hours as needed for Vomiting or Nausea 10/6/22   Imani Patel MD   galantamine (RAZADYNE) 4 MG tablet Take 1 tablet by mouth 2 times daily 22   ATILIO Amezquita CNP   rivastigmine (EXELON) 4.6 MG/24HR Place 1 patch onto the skin daily 22   ATILIO Amezquita CNP   rivastigmine (EXELON) 9.5 MG/24HR Place 1 patch onto the skin daily Do not fill until 4.6 script is complete 22   ATILIO Amezquita CNP   furosemide (LASIX) 40 MG tablet Take 0.5 tablets by mouth daily 22   Imani Patel MD   aspirin 81 MG EC tablet Take 81 mg by mouth daily    Historical Provider, MD   metoclopramide (REGLAN) 10 MG tablet Take 0.5 tablets by mouth 3 times daily (with meals) 22   Imani Patel MD   nadolol (CORGARD) 20 MG tablet Take 1 tablet by mouth daily 22   Imani Patel MD   lactulose (CHRONULAC) 10 GM/15ML solution Take 30 mLs by mouth in the morning and 30 mLs before bedtime. 8/3/22   Imani Patel MD   carvedilol (COREG) 6.25 MG tablet Take 1 tablet by mouth in the morning and 1 tablet before bedtime.  7/15/22   Jose Antonio Smyth MD   memantine [facility]) 5 MG tablet Take 1 tablet by mouth 2 times daily 6/8/22 7/15/22  Zoye Díaz DO   LORazepam (ATIVAN) 0.5 MG tablet 1 tablet at bedtime as needed    Historical Provider, MD   donepezil (ARICEPT) 10 MG tablet Take 1 tablet by mouth daily NOTE TO PHARMACY: DO NOT FILL UNTIL 5 MG RX IS COMPLETE  Patient not taking: No sig reported 3/15/22 7/15/22  Stevo Rutherford DO   levothyroxine (SYNTHROID) 88 MCG tablet Take 1 tablet by mouth Daily 1/8/21   Davina Wagner MD   lisinopril (PRINIVIL;ZESTRIL) 10 MG tablet Take 1 tablet by mouth daily  Patient not taking: No sig reported 12/21/20 7/15/22  ATILIO Fuller CNP   risperiDONE (RISPERDAL) 0.25 MG tablet Take 1 tablet by mouth nightly 12/1/20   ATILIO Fuller CNP   Riboflavin (B2 PO) Take by mouth  Patient not taking: No sig reported  7/15/22  Historical Provider, MD   Cholecalciferol (D3-1000 PO) Take by mouth  Patient not taking: Reported on 7/13/2022  7/15/22  Historical Provider, MD   insulin glargine (BASAGLAR KWIKPEN) 100 UNIT/ML injection pen Inject 35 Units into the skin 2 times daily 35 units in the morning and 25 units at bedtime  Patient taking differently: Inject 30 Units into the skin 2 times daily 35 units in the morning and 25 units at bedtime 9/15/20   ATILIO Fuller CNP   gabapentin (NEURONTIN) 100 MG capsule Take 1 capsule by mouth 3 times daily for 90 days. 9/15/20 9/9/22  ATILIO Fuller CNP   NOVOLOG FLEXPEN 100 UNIT/ML injection pen 14 units with largest meal of the day, 7 units with each smaller meal 8/14/20   Jamila Vivar MD   lovastatin (MEVACOR) 40 MG tablet Take 1 tablet by mouth nightly 6/18/20   [facility] Lancets 73B MISC Test blood sugar 1-2 times a day as directed.  Dx: E11.65 4/17/20   Jamila Vivar MD   bimatoprost (LUMIGAN) 0.03 % ophthalmic drops Place 1 drop into both eyes nightly    Historical Provider, MD   calcium carbonate (OSCAL) 500 MG TABS tablet Take 500 mg by mouth daily  Patient not taking: Reported on 7/13/2022  7/15/22  Historical Provider, MD   Cranberry 92240 MG CAPS Take by mouth  Patient not taking: Reported on 7/13/2022  7/15/22  Historical Provider, MD BEE PO Take by mouth  Patient not taking: No sig reported  7/15/22  Historical Provider, MD   Multiple Vitamins-Minerals (ICAPS AREDS 2 PO) Take by mouth  Patient not taking: Reported on 7/13/2022  7/15/22  Historical Provider, MD   ferrous sulfate 325 (65 Fe) MG tablet Take 325 mg by mouth daily (with breakfast)  Patient not taking: Reported on 7/13/2022  7/15/22  Historical Provider, MD   raNITIdine (ZANTAC) 150 MG tablet Take 150 mg by mouth 2 times daily  7/15/22  Historical Provider, MD   Pomegranate, [place] granatum, (POMEGRANATE PO) Take 500 mg by mouth daily  Patient not taking: No sig reported  7/15/22  Historical Provider, MD       Current medications:    Current Outpatient Medications   Medication Sig Dispense Refill    famotidine (PEPCID) 20 MG tablet Take 20 mg by mouth 2 times daily      ondansetron (ZOFRAN) 4 MG tablet Take 1 tablet by mouth every 6 hours as needed for Vomiting or Nausea 50 tablet 3    galantamine (RAZADYNE) 4 MG tablet Take 1 tablet by mouth 2 times daily 60 tablet 0    rivastigmine (EXELON) 4.6 MG/24HR Place 1 patch onto the skin daily 30 patch 0    rivastigmine (EXELON) 9.5 MG/24HR Place 1 patch onto the skin daily Do not fill until 4.6 script is complete 30 patch 5    furosemide (LASIX) 40 MG tablet Take 0.5 tablets by mouth daily 60 tablet 3    aspirin 81 MG EC tablet Take 81 mg by mouth daily      metoclopramide (REGLAN) 10 MG tablet Take 0.5 tablets by mouth 3 times daily (with meals) 45 tablet 3    nadolol (CORGARD) 20 MG tablet Take 1 tablet by mouth daily 30 tablet 11    lactulose (CHRONULAC) 10 GM/15ML solution Take 30 mLs by mouth in the morning and 30 mLs before bedtime. 1800 mL 5    carvedilol (COREG) 6.25 MG tablet Take 1 tablet by mouth in the morning and 1 tablet before bedtime.  60 tablet 3    LORazepam (ATIVAN) 0.5 MG tablet 1 tablet at bedtime as needed      levothyroxine (SYNTHROID) 88 MCG tablet Take 1 tablet by mouth Daily 30 tablet 0    risperiDONE (RISPERDAL) 0.25 MG tablet Take 1 tablet by mouth nightly 90 tablet 1    insulin glargine (BASAGLAR KWIKPEN) 100 UNIT/ML injection pen Inject 35 Units into the skin 2 times daily 35 units in the morning and 25 units at bedtime (Patient taking differently: Inject 30 Units into the skin 2 times daily 35 units in the morning and 25 units at bedtime) 15 pen 3    gabapentin (NEURONTIN) 100 MG capsule Take 1 capsule by mouth 3 times daily for 90 days. 90 capsule 2    NOVOLOG FLEXPEN 100 UNIT/ML injection pen 14 units with largest meal of the day, 7 units with each smaller meal 7 pen 0    lovastatin (MEVACOR) 40 MG tablet Take 1 tablet by mouth nightly 90 tablet 1    OneTouch Delica Lancets 85U MISC Test blood sugar 1-2 times a day as directed. Dx: E11.65 100 each 5    bimatoprost (LUMIGAN) 0.03 % ophthalmic drops Place 1 drop into both eyes nightly       No current facility-administered medications for this visit. Allergies: Allergies   Allergen Reactions    Bupropion      Other reaction(s): Other - comment required  Suicidal ideation    Nsaids      Other reaction(s): Other - comment required  Elevated Serum Creatinine    Amlodipine     Nateglinide Nausea Only    Ofloxacin Hives    Paroxetine Hcl Hives     Other reaction(s): Other - comment required  Shakey/nerveous    Pioglitazone      Other reaction(s): Other - comment required  Dizzy / nauseated    Citalopram Nausea And Vomiting    Escitalopram Nausea And Vomiting    Flonase [Fluticasone] Nausea And Vomiting    Paxil [Paroxetine] Nausea And Vomiting       Problem List:    Patient Active Problem List   Diagnosis Code    Acquired hypothyroidism E03.9    Depression F32. A    Diabetic neuropathy (HCC) E11.40    DM (diabetes mellitus) ([facility] [address] E11.9    HTN (hypertension) I10    Hyperlipidemia E78.5    Osteopenia M85.80    Personal history of breast cancer Z85.3    Allergic rhinitis J30.9    ANTONINA (acute kidney injury) ([facility] [address] N17.9    Gastroenteritis K52.9    GI bleed K92.2    Hematemesis with nausea K92.0    Cirrhosis of liver with ascites (HCC) K74.60, R18.8    Secondary esophageal varices with bleeding (HCC) I85.11    Secondary esophageal varices without bleeding (HCC) I85.10    Anemia D64.9       Past Medical History:        Diagnosis Date    Arthritis     Cancer ([facility] [address]     right breast.    Diabetes mellitus ([facility] [address]     Esophageal varices (HCC)     Fatty liver     Glaucoma     Gout     patient states Heather Olivier has never had gout\".  History of blood transfusion     Hyperlipidemia     Hypertension     Neuropathy     Thyroid disease        Past Surgical History:        Procedure Laterality Date    BREAST BIOPSY      CATARACT EXTRACTION       SECTION      MASTECTOMY, BILATERAL  10/26/2007    UPPER GASTROINTESTINAL ENDOSCOPY N/A 2022    EGD BAND LIGATION with 6 bands placed performed by Kaitlyn Hu MD at [place] [address] N/A 2022    EGD BAND LIGATION WITH 3 BANDS PLACED, 4TH BAND DID NOT HOLD WITH SUBSEQUENT OOZING, TETRADECAL INJECTION GIVEN performed by Kaitlyn Hu MD at [place] [address] N/A 2022    EGD BAND LIGATION WITH 3 BANDS APPLIED performed by Kaitlyn Hu MD at [facility] ENDOSCOPY       Social History:    Social History     Tobacco Use    Smoking status: Never    Smokeless tobacco: Never   Substance Use Topics    Alcohol use: No                                Counseling given: Not Answered      Vital Signs (Current): There were no vitals filed for this visit.                                            BP Readings from Last 3 Encounters:   22 126/80   22 124/60   22 (!) 156/69       NPO Status:                                                                                 BMI:   Wt Readings from Last 3 Encounters:   22 129 lb (58.5 kg)   22 129 lb 3.2 oz (58.6 kg)   09/15/22 135 lb (61.2 kg)     There is no height or weight on file to calculate BMI.    CBC:   Lab Results   Component Value Date/Time    WBC 6.5 09/21/2022 07:44 AM    RBC 2.63 09/21/2022 07:44 AM    HGB 8.8 09/21/2022 07:44 AM    HCT 26.7 09/21/2022 07:44 AM    .5 09/21/2022 07:44 AM    RDW 18.9 09/21/2022 07:44 AM     09/21/2022 07:44 AM       CMP:   Lab Results   Component Value Date/Time     09/21/2022 07:44 AM    K 4.3 09/21/2022 07:44 AM    CL 93 09/21/2022 07:44 AM    CO2 29 09/21/2022 07:44 AM    BUN 26 09/21/2022 07:44 AM    CREATININE 1.5 09/21/2022 07:44 AM    GFRAA 41 09/21/2022 07:44 AM    AGRATIO 0.9 01/27/2022 08:55 AM    LABGLOM 34 09/21/2022 07:44 AM    GLUCOSE 257 09/21/2022 07:44 AM    PROT 8.3 09/21/2022 07:44 AM    CALCIUM 8.9 09/21/2022 07:44 AM    BILITOT 1.2 09/21/2022 07:44 AM    ALKPHOS 134 09/21/2022 07:44 AM    AST 27 09/21/2022 07:44 AM    ALT 12 09/21/2022 07:44 AM       POC Tests:   No results for input(s): POCGLU, POCNA, POCK, POCCL, POCBUN, POCHEMO, POCHCT in the last 72 hours.     Coags:   Lab Results   Component Value Date/Time    PROTIME 16.9 07/11/2022 04:50 AM    INR 1.31 07/11/2022 04:50 AM    APTT 27.4 07/11/2022 04:50 AM       HCG (If Applicable): No results found for: PREGTESTUR, PREGSERUM, HCG, HCGQUANT     ABGs:   Lab Results   Component Value Date/Time    PO2ART 63 03/21/2019 06:15 AM    SBI6PGO 39.0 03/21/2019 06:15 AM    NTZ1UBI 29.0 03/21/2019 06:15 AM        Type & Screen (If Applicable):  No results found for: LABABO, LABRH    Drug/Infectious Status (If Applicable):  Lab Results   Component Value Date/Time    HEPCAB NON REACTIVE 07/11/2022 08:45 AM       COVID-19 Screening (If Applicable): No results found for: COVID19        Anesthesia Evaluation  Patient summary reviewed  Airway: Mallampati: II  TM distance: >3 FB   Neck ROM: limited  Mouth opening: > = 3 FB   Dental: normal exam         Pulmonary:Negative Pulmonary ROS                              Cardiovascular:  Exercise tolerance: poor (<4 METS),   (+) hypertension:, hyperlipidemia      ECG reviewed  Rhythm: regular  Rate: normal  Echocardiogram reviewed         Beta Blocker:  Dose within 24 Hrs      ROS comment: ECHO on 07/14/2022   Summary   Left ventricular systolic function is normal.   Ejection fraction is visually estimated at 50-55%. Mild left ventricular hypertrophy. No evidence of any pericardial effusion. EKG on 07/10/22  Normal sinus rhythm   Nonspecific ST abnormality      Neuro/Psych:   (+) depression/anxiety             GI/Hepatic/Renal:   (+) liver disease:, renal disease: CRI,          ROS comment: GI bleed   Esophageal varices . Endo/Other:    (+) DiabetesType II DM, , hypothyroidism, blood dyscrasia: anemia:., electrolyte abnormalities, malignancy/cancer. Abdominal:             Vascular: Other Findings:             Anesthesia Plan      MAC     ASA 3       Induction: intravenous. Anesthetic plan and risks discussed with patient. Use of blood products discussed with patient whom consented to blood products. Plan discussed with attending. Attending anesthesiologist reviewed and agrees with Preprocedure content          Pre Anesthesia Evaluation complete. Anesthesia plan, risks, benefits, alternatives, and personal involved discussed with patient. Patients and/or legal guardian verbalized an understanding  and agreed to proceed.   ATILIO Graham - CRNA  11/1/2022

## 2022-11-02 ENCOUNTER — HOSPITAL ENCOUNTER (OUTPATIENT)
Age: 72
Setting detail: OUTPATIENT SURGERY
Discharge: HOME | End: 2022-11-02
Attending: SPECIALIST | Admitting: SPECIALIST
Payer: MEDICARE

## 2022-11-02 ENCOUNTER — ANESTHESIA (OUTPATIENT)
Dept: ENDOSCOPY | Age: 72
End: 2022-11-02
Payer: MEDICARE

## 2022-11-02 VITALS
BODY MASS INDEX: 23 KG/M2 | TEMPERATURE: 97.8 F | RESPIRATION RATE: 16 BRPM | SYSTOLIC BLOOD PRESSURE: 172 MMHG | HEART RATE: 69 BPM | OXYGEN SATURATION: 100 % | WEIGHT: 125 LBS | DIASTOLIC BLOOD PRESSURE: 81 MMHG | HEIGHT: 62 IN

## 2022-11-02 DIAGNOSIS — K74.60 CIRRHOSIS OF LIVER WITH ASCITES, UNSPECIFIED HEPATIC CIRRHOSIS TYPE (HCC): Primary | ICD-10-CM

## 2022-11-02 DIAGNOSIS — R18.8 CIRRHOSIS OF LIVER WITH ASCITES, UNSPECIFIED HEPATIC CIRRHOSIS TYPE (HCC): Primary | ICD-10-CM

## 2022-11-02 LAB
AMMONIA: 26 UMOL/L (ref 11–51)
GLUCOSE BLD-MCNC: 149 MG/DL (ref 70–99)

## 2022-11-02 PROCEDURE — 7100000011 HC PHASE II RECOVERY - ADDTL 15 MIN: Performed by: SPECIALIST

## 2022-11-02 PROCEDURE — 2500000003 HC RX 250 WO HCPCS: Performed by: NURSE ANESTHETIST, CERTIFIED REGISTERED

## 2022-11-02 PROCEDURE — 3700000001 HC ADD 15 MINUTES (ANESTHESIA): Performed by: SPECIALIST

## 2022-11-02 PROCEDURE — 43244 EGD VARICES LIGATION: CPT | Performed by: SPECIALIST

## 2022-11-02 PROCEDURE — 82962 GLUCOSE BLOOD TEST: CPT

## 2022-11-02 PROCEDURE — 7100000010 HC PHASE II RECOVERY - FIRST 15 MIN: Performed by: SPECIALIST

## 2022-11-02 PROCEDURE — 3700000000 HC ANESTHESIA ATTENDED CARE: Performed by: SPECIALIST

## 2022-11-02 PROCEDURE — 2720000010 HC SURG SUPPLY STERILE: Performed by: SPECIALIST

## 2022-11-02 PROCEDURE — 82140 ASSAY OF AMMONIA: CPT

## 2022-11-02 PROCEDURE — 6360000002 HC RX W HCPCS: Performed by: NURSE ANESTHETIST, CERTIFIED REGISTERED

## 2022-11-02 PROCEDURE — 2580000003 HC RX 258: Performed by: SPECIALIST

## 2022-11-02 PROCEDURE — 3609012300 HC EGD BAND LIGATION ESOPHGEAL/GASTRIC VARICES: Performed by: SPECIALIST

## 2022-11-02 PROCEDURE — 2709999900 HC NON-CHARGEABLE SUPPLY: Performed by: SPECIALIST

## 2022-11-02 RX ORDER — SODIUM CHLORIDE, SODIUM LACTATE, POTASSIUM CHLORIDE, CALCIUM CHLORIDE 600; 310; 30; 20 MG/100ML; MG/100ML; MG/100ML; MG/100ML
INJECTION, SOLUTION INTRAVENOUS CONTINUOUS
Status: DISCONTINUED | OUTPATIENT
Start: 2022-11-02 | End: 2022-11-02 | Stop reason: HOSPADM

## 2022-11-02 RX ORDER — LIDOCAINE HYDROCHLORIDE 20 MG/ML
INJECTION, SOLUTION EPIDURAL; INFILTRATION; INTRACAUDAL; PERINEURAL PRN
Status: DISCONTINUED | OUTPATIENT
Start: 2022-11-02 | End: 2022-11-02 | Stop reason: SDUPTHER

## 2022-11-02 RX ORDER — PROPOFOL 10 MG/ML
INJECTION, EMULSION INTRAVENOUS PRN
Status: DISCONTINUED | OUTPATIENT
Start: 2022-11-02 | End: 2022-11-02 | Stop reason: SDUPTHER

## 2022-11-02 RX ADMIN — PHENYLEPHRINE HYDROCHLORIDE 200 MCG: 10 INJECTION INTRAVENOUS at 13:27

## 2022-11-02 RX ADMIN — LIDOCAINE HYDROCHLORIDE 50 MG: 20 INJECTION, SOLUTION EPIDURAL; INFILTRATION; INTRACAUDAL; PERINEURAL at 13:01

## 2022-11-02 RX ADMIN — SODIUM CHLORIDE, POTASSIUM CHLORIDE, SODIUM LACTATE AND CALCIUM CHLORIDE: 600; 310; 30; 20 INJECTION, SOLUTION INTRAVENOUS at 12:11

## 2022-11-02 RX ADMIN — PROPOFOL 50 MG: 10 INJECTION, EMULSION INTRAVENOUS at 13:08

## 2022-11-02 RX ADMIN — PROPOFOL 50 MG: 10 INJECTION, EMULSION INTRAVENOUS at 13:06

## 2022-11-02 RX ADMIN — PROPOFOL 50 MG: 10 INJECTION, EMULSION INTRAVENOUS at 13:04

## 2022-11-02 RX ADMIN — PROPOFOL 100 MG: 10 INJECTION, EMULSION INTRAVENOUS at 13:01

## 2022-11-02 RX ADMIN — PHENYLEPHRINE HYDROCHLORIDE 100 MCG: 10 INJECTION INTRAVENOUS at 13:16

## 2022-11-02 RX ADMIN — PHENYLEPHRINE HYDROCHLORIDE 100 MCG: 10 INJECTION INTRAVENOUS at 13:12

## 2022-11-02 RX ADMIN — PROPOFOL 50 MG: 10 INJECTION, EMULSION INTRAVENOUS at 13:03

## 2022-11-02 ASSESSMENT — PAIN - FUNCTIONAL ASSESSMENT: PAIN_FUNCTIONAL_ASSESSMENT: 0-10

## 2022-11-02 ASSESSMENT — PAIN SCALES - GENERAL: PAINLEVEL_OUTOF10: 0

## 2022-11-02 NOTE — PROGRESS NOTES
1332 Pt. Brought back to unit, bedside report received from Violet Galvan [facility]. Pt. Is drowsy upon arriving back, PHUC Cunha at bedside. 1336 Pt. Awaken when verbally spoken, denies needs. 1340 Pt. Provided with juice and crackers, Dr. Ming Fajardo at bedside, spouse. 1410 [place]monia level lab sent down to lab on ice. 305 [facility] instructions reviewed with pt and spouse,all parties express understanding. 1432 Pt. To DC home in private vehicle.

## 2022-11-02 NOTE — ANESTHESIA POSTPROCEDURE EVALUATION
Department of Anesthesiology  Postprocedure Note    Patient: Aaron Day  MRN: [id number]  YOB: 1950  Date of evaluation: 11/2/2022      Procedure Summary     Date: 11/02/22 Room / Location: [address] [place] [address]    Anesthesia Start: 1253 Anesthesia Stop: 1329    Procedure: EGD BAND LIGATION WITH X1 BAND PLACEMENT Diagnosis:       Esophageal varices without bleeding, unspecified esophageal varices type ([address]      (Esophageal varices without bleeding, unspecified esophageal varices type ([address] [I85.00])    Surgeons: Betito Hensley MD Responsible Provider: Champ Callaway MD    Anesthesia Type: MAC ASA Status: 3          Anesthesia Type: MAC    Jez Phase I:      Jez Phase II:        Anesthesia Post Evaluation    Patient location during evaluation: bedside  Patient participation: waiting for patient participation  Level of consciousness: sleepy but conscious  Pain score: 0  Airway patency: patent  Nausea & Vomiting: no nausea and no vomiting  Complications: no  Cardiovascular status: hemodynamically stable  Respiratory status: acceptable, room air and nonlabored ventilation  Hydration status: euvolemic

## 2022-11-02 NOTE — DISCHARGE INSTRUCTIONS
EGD    DR. CHAIDEZ    OFFICE NUMBER [phone]    FOLLOW UP AS NEEDED. REPEAT PROCEDURE AS  NEEDED. What to Expect at Home  Your Recovery:  The only discomfort after your EGD is generally limited to a mild soreness of the throat, which may last a day or two. Call your physician immediately if you have severe chest pain, shortness of breath or a temperature of 100 degrees or higher if taken orally. How can you care for yourself at home? Activity  Rest as much as you need to after you go home. You should be able to go back to your usual activities the day after the test.  Diet  Follow your doctor's directions for eating after the test.  Drink plenty of fluids (unless your doctor has told you not to). Medications  If you have a sore throat the day after the test, use an over-the-counter spray to numb your throat. Your doctor will tell you if and when you can restart your medicines. He or she will also give you instructions about taking any new medicines. If you take blood thinners, such as warfarin (Coumadin), clopidogrel (Plavix), or aspirin, be sure to talk to your doctor. He or she will tell you if and when to start taking those medicines again. Make sure that you understand exactly what your doctor wants you to do. If a biopsy was done during the test, your doctor may tell you not to take aspirin or other anti-inflammatory medicines for a few days. These include ibuprofen (Advil, Motrin) and naproxen (Aleve). DO NOT DRINK ANYTHING WITH ALCOHOL TODAY. Other instructions:Anesthesia  For your safety, do not drive or operate machinery for 24 hours. Do not sign legal documents or make major decisions for 24 hours. The anesthesia can make it hard for you to fully understand what you are agreeing to. Follow-up care is a key part of your treatment and safety. Be sure to make and go to all appointments, and call your doctor if you are having problems.  It's also a good idea to know your test results and keep a list of the medicines you take. When should you call for help? [facility] Ronda Saini Brian Mosley Kimani [phone]  Call 911 anytime you think you may need emergency care. For example, call if:  You passed out (lost consciousness). You cough up blood. You vomit blood or what looks like coffee grounds. You pass maroon or very bloody stools. Call your doctor now or seek immediate medical care if:  You have trouble swallowing. You have belly pain. Your stools are black and tarlike or have streaks of blood. You are sick to your stomach or cannot keep fluids down. Watch closely for changes in your health, and be sure to contact your doctor if:  Your throat still hurts after a day or two. You do not get better as expected. Where can you learn more? Go to https://[employer]iceweb.iCrossing. org and sign in to your [employer] account. Enter V153 in the [place] box to learn more about Upper GI Endoscopy: What to Expect at Home.     If you do not have an account, please click on the Sign Up Now link. © 0332-7021 Healthwise, Incorporated. Care instructions adapted under license by [facility] ([facility]). This care instruction is for use with your licensed healthcare professional. If you have questions about a medical condition or this instruction, always ask your healthcare professional. [place] [address] any warranty or liability for your use of this information. Content Version: 24.4.056524; Current as of: November 20, 2015             [facility]  [phone]    Do not drive, work around [address] [place] [address] or use equipment. Do not drink any alcoholic beverages. Do not smoke while alone. Avoid making important decisions. Plan to spend a quiet, relaxed evening @ home. Resume normal activities as you begin to feel better.   Eat lightly for your first meal, then gradually increase your diet to what is normal for you. In case of nausea, avoid food and drink only clear liquids. Resume food as nausea ceases. Notify your surgeon if you experience fever, chills, large amount of bleeding, difficulty breathing, persistent nausea and vomiting or any other disturbing problem. Call for a follow-up appointment with your surgeon.

## 2022-11-02 NOTE — BRIEF OP NOTE
SCANNED EGD REPORT:     The original EGD report with photos in higher definition available by going to \"chart review\" then \"procedures\" then double click on \"EGD\"

## 2022-11-03 ENCOUNTER — HOSPITAL ENCOUNTER (OUTPATIENT)
Age: 72
Discharge: HOME | End: 2022-11-03
Payer: MEDICARE

## 2022-11-03 LAB
ALBUMIN SERPL-MCNC: 3.6 GM/DL (ref 3.4–5)
ALP BLD-CCNC: 122 IU/L (ref 40–128)
ALT SERPL-CCNC: 13 U/L (ref 10–40)
ANION GAP SERPL CALCULATED.3IONS-SCNC: 10 MMOL/L (ref 4–16)
AST SERPL-CCNC: 26 IU/L (ref 15–37)
BILIRUB SERPL-MCNC: 0.8 MG/DL (ref 0–1)
BUN BLDV-MCNC: 11 MG/DL (ref 6–23)
CALCIUM SERPL-MCNC: 9.3 MG/DL (ref 8.3–10.6)
CHLORIDE BLD-SCNC: 99 MMOL/L (ref 99–110)
CHOLESTEROL: 128 MG/DL
CO2: 26 MMOL/L (ref 21–32)
CREAT SERPL-MCNC: 1.2 MG/DL (ref 0.6–1.1)
ESTIMATED AVERAGE GLUCOSE: 160 MG/DL
GFR SERPL CREATININE-BSD FRML MDRD: 48 ML/MIN/1.73M2
GLUCOSE BLD-MCNC: 97 MG/DL (ref 70–99)
HBA1C MFR BLD: 7.2 % (ref 4.2–6.3)
HDLC SERPL-MCNC: 61 MG/DL
LDL CHOLESTEROL CALCULATED: 53 MG/DL
LDL CHOLESTEROL DIRECT: 55 MG/DL
POTASSIUM SERPL-SCNC: 4.9 MMOL/L (ref 3.5–5.1)
SODIUM BLD-SCNC: 135 MMOL/L (ref 135–145)
T4 FREE: 1.52 NG/DL (ref 0.9–1.8)
TOTAL PROTEIN: 7.4 GM/DL (ref 6.4–8.2)
TRIGL SERPL-MCNC: 69 MG/DL
TSH HIGH SENSITIVITY: 0.58 UIU/ML (ref 0.27–4.2)

## 2022-11-03 PROCEDURE — 83036 HEMOGLOBIN GLYCOSYLATED A1C: CPT

## 2022-11-03 PROCEDURE — 84439 ASSAY OF FREE THYROXINE: CPT

## 2022-11-03 PROCEDURE — 80053 COMPREHEN METABOLIC PANEL: CPT

## 2022-11-03 PROCEDURE — 80061 LIPID PANEL: CPT

## 2022-11-03 PROCEDURE — 84443 ASSAY THYROID STIM HORMONE: CPT

## 2022-11-03 PROCEDURE — 83721 ASSAY OF BLOOD LIPOPROTEIN: CPT

## 2022-11-03 PROCEDURE — 36415 COLL VENOUS BLD VENIPUNCTURE: CPT

## 2022-11-14 RX ORDER — GALANTAMINE HYDROBROMIDE 4 MG/1
TABLET, FILM COATED ORAL
Qty: 60 TABLET | Refills: 3 | Status: SHIPPED | OUTPATIENT
Start: 2022-11-14

## 2022-11-14 NOTE — TELEPHONE ENCOUNTER
Patient is due for a refill of this med.      Requested Prescriptions     Pending Prescriptions Disp Refills    galantamine (RAZADYNE) 4 MG tablet [Pharmacy Med Name: Galantamine Hydrobromide 4 MG Oral Tablet] 60 tablet 0     Sig: Take 1 tablet by mouth twice daily

## 2022-12-12 DIAGNOSIS — G31.84 MCI (MILD COGNITIVE IMPAIRMENT): ICD-10-CM

## 2022-12-12 RX ORDER — MEMANTINE HYDROCHLORIDE 5 MG/1
5 TABLET ORAL 2 TIMES DAILY
Qty: 180 TABLET | Refills: 1 | Status: SHIPPED | OUTPATIENT
Start: 2022-12-12

## 2022-12-12 NOTE — TELEPHONE ENCOUNTER
Patient's  called and requested a refill of the following med.     Requested Prescriptions     Pending Prescriptions Disp Refills    memantine (NAMENDA) 5 MG tablet 180 tablet 1     Sig: Take 1 tablet by mouth 2 times daily

## 2022-12-21 RX ORDER — METOCLOPRAMIDE 10 MG/1
TABLET ORAL
Qty: 45 TABLET | Refills: 0 | Status: SHIPPED | OUTPATIENT
Start: 2022-12-21

## 2023-01-09 ENCOUNTER — OFFICE VISIT (OUTPATIENT)
Dept: GASTROENTEROLOGY | Age: 73
End: 2023-01-09
Payer: MEDICARE

## 2023-01-09 VITALS
WEIGHT: 134.2 LBS | TEMPERATURE: 97.3 F | SYSTOLIC BLOOD PRESSURE: 138 MMHG | HEART RATE: 58 BPM | BODY MASS INDEX: 24.69 KG/M2 | OXYGEN SATURATION: 96 % | HEIGHT: 62 IN | DIASTOLIC BLOOD PRESSURE: 72 MMHG

## 2023-01-09 DIAGNOSIS — K74.60 CIRRHOSIS OF LIVER WITH ASCITES, UNSPECIFIED HEPATIC CIRRHOSIS TYPE (HCC): ICD-10-CM

## 2023-01-09 DIAGNOSIS — R18.8 CIRRHOSIS OF LIVER WITH ASCITES, UNSPECIFIED HEPATIC CIRRHOSIS TYPE (HCC): ICD-10-CM

## 2023-01-09 DIAGNOSIS — K74.60 CIRRHOSIS OF LIVER WITH ASCITES, UNSPECIFIED HEPATIC CIRRHOSIS TYPE (HCC): Primary | ICD-10-CM

## 2023-01-09 DIAGNOSIS — R18.8 CIRRHOSIS OF LIVER WITH ASCITES, UNSPECIFIED HEPATIC CIRRHOSIS TYPE (HCC): Primary | ICD-10-CM

## 2023-01-09 LAB
A/G RATIO: 1 (ref 1.1–2.2)
ALBUMIN SERPL-MCNC: 4.1 G/DL (ref 3.4–5)
ALP BLD-CCNC: 97 U/L (ref 40–129)
ALT SERPL-CCNC: 15 U/L (ref 10–40)
ANION GAP SERPL CALCULATED.3IONS-SCNC: 17 MMOL/L (ref 3–16)
AST SERPL-CCNC: 25 U/L (ref 15–37)
BILIRUB SERPL-MCNC: 0.8 MG/DL (ref 0–1)
BUN BLDV-MCNC: 20 MG/DL (ref 7–20)
CALCIUM SERPL-MCNC: 9.6 MG/DL (ref 8.3–10.6)
CHLORIDE BLD-SCNC: 102 MMOL/L (ref 99–110)
CO2: 23 MMOL/L (ref 21–32)
CREAT SERPL-MCNC: 1.3 MG/DL (ref 0.6–1.2)
GFR SERPL CREATININE-BSD FRML MDRD: 44 ML/MIN/{1.73_M2}
GLUCOSE BLD-MCNC: 148 MG/DL (ref 70–99)
HCT VFR BLD CALC: 29.6 % (ref 36–48)
HEMOGLOBIN: 9.8 G/DL (ref 12–16)
INR BLD: 1.17 (ref 0.87–1.14)
MCH RBC QN AUTO: 34.1 PG (ref 26–34)
MCHC RBC AUTO-ENTMCNC: 33 G/DL (ref 31–36)
MCV RBC AUTO: 103.1 FL (ref 80–100)
PDW BLD-RTO: 15.4 % (ref 12.4–15.4)
PLATELET # BLD: 116 K/UL (ref 135–450)
PMV BLD AUTO: 10.8 FL (ref 5–10.5)
POTASSIUM SERPL-SCNC: 4.2 MMOL/L (ref 3.5–5.1)
PROTHROMBIN TIME: 14.8 SEC (ref 11.7–14.5)
RBC # BLD: 2.87 M/UL (ref 4–5.2)
SODIUM BLD-SCNC: 142 MMOL/L (ref 136–145)
TOTAL PROTEIN: 8.3 G/DL (ref 6.4–8.2)
WBC # BLD: 5.2 K/UL (ref 4–11)

## 2023-01-09 PROCEDURE — 3078F DIAST BP <80 MM HG: CPT | Performed by: SPECIALIST

## 2023-01-09 PROCEDURE — 1036F TOBACCO NON-USER: CPT | Performed by: SPECIALIST

## 2023-01-09 PROCEDURE — G8484 FLU IMMUNIZE NO ADMIN: HCPCS | Performed by: SPECIALIST

## 2023-01-09 PROCEDURE — 3017F COLORECTAL CA SCREEN DOC REV: CPT | Performed by: SPECIALIST

## 2023-01-09 PROCEDURE — 3075F SYST BP GE 130 - 139MM HG: CPT | Performed by: SPECIALIST

## 2023-01-09 PROCEDURE — G8427 DOCREV CUR MEDS BY ELIG CLIN: HCPCS | Performed by: SPECIALIST

## 2023-01-09 PROCEDURE — 99214 OFFICE O/P EST MOD 30 MIN: CPT | Performed by: SPECIALIST

## 2023-01-09 PROCEDURE — G8420 CALC BMI NORM PARAMETERS: HCPCS | Performed by: SPECIALIST

## 2023-01-09 PROCEDURE — 1123F ACP DISCUSS/DSCN MKR DOCD: CPT | Performed by: SPECIALIST

## 2023-01-09 PROCEDURE — G8400 PT W/DXA NO RESULTS DOC: HCPCS | Performed by: SPECIALIST

## 2023-01-09 PROCEDURE — 1090F PRES/ABSN URINE INCON ASSESS: CPT | Performed by: SPECIALIST

## 2023-01-09 NOTE — PROGRESS NOTES
CC: Follow up for cirrhosis    SUBJECTIVE:  No problems- denies abdominal pain,nausea,vomiting, constipation,melena, hematochezia, or hematemesis.  Denies abdominal swelling, peripheral edema, periods of confusion  Having 1-4 BM/d   complained of sleepiness- on Reglan, Risperdal, prn Ativan (seldom)      Last AFP:    22-  3.0      Last liver imagin/13/22-- ascites, gallstones, no mass  Last EGD for varices:  22--1 band--- next scheduled 2023  Hepatitis A immunity: Yes -- Had vaccine # 1 22  Hepatitis B immunity: Yes- had Hep B vaccine # 1 22  Lab Results   Component Value Date/Time    LABALBU 3.6 2022 08:36 AM    LABALBU 4.4 2020 07:59 AM    AST 26 2022 08:36 AM    ALT 13 2022 08:36 AM    ALKPHOS 122 2022 08:36 AM    BILITOT 0.8 2022 08:36 AM    BILIDIR 0.2 07/15/2022 06:19 AM    IBILI 0.4 07/15/2022 06:19 AM    AMMONIA 26 2022 02:00 PM            ROS: non-contributory    OBJECTIVE:   PHYSICAL EXAM:    Vitals:  /72 (Site: Left Upper Arm, Position: Sitting, Cuff Size: Medium Adult)   Pulse 58   Temp 97.3 °F (36.3 °C) (Infrared)   Ht 5' 2\" (1.575 m)   Wt 134 lb 3.2 oz (60.9 kg)   SpO2 96%   BMI 24.55 kg/m²   CONSTITUTIONAL: alert  EYES:  pupils equal, round and reactive to light and sclera clear  LUNGS:  clear to auscultation  CARDIOVASCULAR:  regular rate and rhythm and no murmur noted  ABDOMEN:  normal bowel sounds, soft, non-distended, non-tender and no masses palpated, no hepatosplenomegally  NEUROLOGIC: no asterixis or focal deficit detected   EXTREMITIES: no clubbing, cyanosis, or edema    ASSESSMENT:    1) cirrhosis with ascites, PSE  2) esoph varices    PLAN:   1) CBC,CMP,PT   2) AFP   3) Ultrasound liver for hepatoma surveillance   4) return visit 3 months

## 2023-01-11 LAB — AFP: 2.4 UG/L

## 2023-01-16 ENCOUNTER — HOSPITAL ENCOUNTER (OUTPATIENT)
Dept: ULTRASOUND IMAGING | Age: 73
Discharge: HOME | End: 2023-01-16
Payer: MEDICARE

## 2023-01-16 DIAGNOSIS — K74.60 CIRRHOSIS OF LIVER WITH ASCITES, UNSPECIFIED HEPATIC CIRRHOSIS TYPE (HCC): ICD-10-CM

## 2023-01-16 DIAGNOSIS — R18.8 CIRRHOSIS OF LIVER WITH ASCITES, UNSPECIFIED HEPATIC CIRRHOSIS TYPE (HCC): ICD-10-CM

## 2023-01-16 PROCEDURE — 76705 ECHO EXAM OF ABDOMEN: CPT

## 2023-01-30 RX ORDER — METOCLOPRAMIDE 10 MG/1
TABLET ORAL
Qty: 45 TABLET | Refills: 0 | Status: SHIPPED | OUTPATIENT
Start: 2023-01-30

## 2023-02-02 ENCOUNTER — HOSPITAL ENCOUNTER (OUTPATIENT)
Age: 73
Discharge: HOME | End: 2023-02-02
Payer: MEDICARE

## 2023-02-02 LAB
ALBUMIN SERPL-MCNC: 3.6 GM/DL (ref 3.4–5)
ALP BLD-CCNC: 74 IU/L (ref 40–128)
ALT SERPL-CCNC: 16 U/L (ref 10–40)
ANION GAP SERPL CALCULATED.3IONS-SCNC: 9 MMOL/L (ref 4–16)
AST SERPL-CCNC: 38 IU/L (ref 15–37)
BILIRUB SERPL-MCNC: 0.7 MG/DL (ref 0–1)
BUN SERPL-MCNC: 15 MG/DL (ref 6–23)
CALCIUM SERPL-MCNC: 9 MG/DL (ref 8.3–10.6)
CHLORIDE BLD-SCNC: 100 MMOL/L (ref 99–110)
CHOLEST SERPL-MCNC: 134 MG/DL
CO2: 27 MMOL/L (ref 21–32)
CREAT SERPL-MCNC: 1.3 MG/DL (ref 0.6–1.1)
GFR SERPL CREATININE-BSD FRML MDRD: 44 ML/MIN/1.73M2
GLUCOSE SERPL-MCNC: 179 MG/DL (ref 70–99)
HDLC SERPL-MCNC: 68 MG/DL
LDLC SERPL CALC-MCNC: 53 MG/DL
POTASSIUM SERPL-SCNC: 5.6 MMOL/L (ref 3.5–5.1)
REASON FOR REJECTION: NORMAL
REJECTED TEST: NORMAL
SODIUM BLD-SCNC: 136 MMOL/L (ref 135–145)
TOTAL PROTEIN: 7 GM/DL (ref 6.4–8.2)
TRIGL SERPL-MCNC: 66 MG/DL
TSH SERPL DL<=0.005 MIU/L-ACNC: 1.52 UIU/ML (ref 0.27–4.2)

## 2023-02-02 PROCEDURE — 36415 COLL VENOUS BLD VENIPUNCTURE: CPT

## 2023-02-02 PROCEDURE — 80053 COMPREHEN METABOLIC PANEL: CPT

## 2023-02-02 PROCEDURE — 84443 ASSAY THYROID STIM HORMONE: CPT

## 2023-02-02 PROCEDURE — 80061 LIPID PANEL: CPT

## 2023-02-10 RX ORDER — SODIUM CHLORIDE, SODIUM LACTATE, POTASSIUM CHLORIDE, CALCIUM CHLORIDE 600; 310; 30; 20 MG/100ML; MG/100ML; MG/100ML; MG/100ML
INJECTION, SOLUTION INTRAVENOUS CONTINUOUS
Status: CANCELLED | OUTPATIENT
Start: 2023-02-15

## 2023-02-10 NOTE — PROGRESS NOTES
Patient will be called with an arrival time on 2/14/2023 for her procedure at [facility] on 2/15/2023.               1. Do not eat or drink anything after midnight - unless instructed by your doctor prior to surgery. This includes                   no water, chewing gum or mints. 2. Follow your directions as prescribed by the doctor for your procedure and medications. 3. Check with your Doctor regarding stopping vitamins, supplements, blood thinners and follow their instructions. Stop vitamins, supplements and NSAIDS:    4. Do not smoke, vape or use chewing tobacco morning of surgery. Do not drink any alcoholic beverages 24 hours prior to surgery. This includes NA Beer. No street drugs 7 days prior to surgery. 5. You may brush your teeth and gargle the morning of surgery. DO NOT SWALLOW WATER   6. You MUST make arrangements for a responsible adult to take you home after your surgery and be able to check on you every couple                   hours for the day. You will not be allowed to leave alone or drive yourself home. It is strongly suggested someone stay with you the first 24                   hrs. Your surgery will be cancelled if you do not have a ride home. 7. Please wear simple, loose fitting clothing to the hospital.  Ronak Kathleen not bring valuables (money, credit cards, checkbooks, etc.) Do not wear any                   makeup (including no eye makeup) or nail polish on your fingers or toes. 8. DO NOT wear any jewelry or piercings on day of surgery. All body piercing jewelry must be removed. 9. If you have dentures, they will be removed before going to the OR; we will provide you a container. If you wear contact lenses or glasses,                  they will be removed; please bring a case for them. 10. If you  have a Living Will and Durable Power of  for Healthcare, please bring in a copy.            11. Please bring picture ID,  insurance card, paperwork from the doctors office    (H & P, Consent, & card for implantable devices). 12. Take a shower with Hibiclens or an antibacterial soap the night before your surgery (put clean sheets on your bed). Take a 2nd shower with the antibacterial soap the morning of surgery. Do not apply any make-up, deodorant, lotion, oil or powder after the    morning shower. 15.  Enter thru the main entrance on the day of surgery. Patient will take her nadolol, gabapentin and synthroid the morning of her procedure. Patient had no questions concerning egd instructions at this time.

## 2023-02-13 ENCOUNTER — ANESTHESIA EVENT (OUTPATIENT)
Dept: ENDOSCOPY | Age: 73
End: 2023-02-13
Payer: MEDICARE

## 2023-02-13 NOTE — ANESTHESIA PRE PROCEDURE
Department of Anesthesiology  Preprocedure Note       Name:  Umm Palacios   Age:  67 y.o.  :  1950                                          MRN:  [id number]         Date:  2023      Surgeon: Tiffani Boucher):  Gladis Cano MD    Procedure: Procedure(s):  EGD BAND LIGATION    Medications prior to admission:   Prior to Admission medications    Medication Sig Start Date End Date Taking? Authorizing Provider   metoclopramide (REGLAN) 10 MG tablet TAKE 1/2 (ONE-HALF) TABLET BY MOUTH THREE TIMES DAILY WITH MEALS 23   Gladis Caon MD   memantine [facility]) 5 MG tablet Take 1 tablet by mouth 2 times daily 22   Unknown ATILIO Albrecht CNP   galantamine (RAZADYNE) 4 MG tablet Take 1 tablet by mouth twice daily 22   Unknown ATILIO Albrecht CNP   famotidine (PEPCID) 20 MG tablet Take 20 mg by mouth 2 times daily    Historical Provider, MD   ondansetron (ZOFRAN) 4 MG tablet Take 1 tablet by mouth every 6 hours as needed for Vomiting or Nausea 10/6/22   Gladis Cano MD   rivastigmine (EXELON) 4.6 MG/24HR Place 1 patch onto the skin daily  Patient not taking: No sig reported 22   Unknown ATILIO Albrecht CNP   rivastigmine (EXELON) 9.5 MG/24HR Place 1 patch onto the skin daily Do not fill until 4.6 script is complete  Patient not taking: No sig reported 22   Unknown ATILIO Albrecht CNP   furosemide (LASIX) 40 MG tablet Take 0.5 tablets by mouth daily  Patient not taking: Reported on 2023   Gladis Cano MD   nadolol (CORGARD) 20 MG tablet Take 1 tablet by mouth daily 22   Gladis Cano MD   lactulose (CHRONULAC) 10 GM/15ML solution Take 30 mLs by mouth in the morning and 30 mLs before bedtime. 8/3/22   Gladis Cano MD   carvedilol (COREG) 6.25 MG tablet Take 1 tablet by mouth in the morning and 1 tablet before bedtime.   Patient not taking: No sig reported 7/15/22   Franco Ruvalcaba MD   LORazepam (ATIVAN) 0.5 MG tablet 1 tablet at bedtime as needed    Historical Provider, MD   donepezil (ARICEPT) 10 MG tablet Take 1 tablet by mouth daily NOTE TO PHARMACY: DO NOT FILL UNTIL 5 MG RX IS COMPLETE  Patient not taking: No sig reported 3/15/22 7/15/22  Luc Salazar DO   levothyroxine (SYNTHROID) 88 MCG tablet Take 1 tablet by mouth Daily 1/8/21   Kiara Herbert MD   lisinopril (PRINIVIL;ZESTRIL) 10 MG tablet Take 1 tablet by mouth daily  Patient not taking: No sig reported 12/21/20 7/15/22  ATILIO rOnelas CNP   risperiDONE (RISPERDAL) 0.25 MG tablet Take 1 tablet by mouth nightly 12/1/20   ATILIO Ornelas CNP   Riboflavin (B2 PO) Take by mouth  Patient not taking: No sig reported  7/15/22  Historical Provider, MD   Cholecalciferol (D3-1000 PO) Take by mouth  Patient not taking: Reported on 7/13/2022  7/15/22  Historical Provider, MD   insulin glargine (BASAGLAR KWIKPEN) 100 UNIT/ML injection pen Inject 35 Units into the skin 2 times daily 35 units in the morning and 25 units at bedtime  Patient taking differently: Inject 30 Units into the skin 2 times daily 35 units in the morning and 25 units at bedtime 9/15/20   ATILIO Ornelas CNP   gabapentin (NEURONTIN) 100 MG capsule Take 1 capsule by mouth 3 times daily for 90 days. 9/15/20 1/9/23  ATILIO Ornelas CNP   NOVOLOG FLEXPEN 100 UNIT/ML injection pen 14 units with largest meal of the day, 7 units with each smaller meal 8/14/20   Janae Harrison MD   lovastatin (MEVACOR) 40 MG tablet Take 1 tablet by mouth nightly 6/18/20   Janae Harrison MD   OneTouch Delica Lancets 60O MISC Test blood sugar 1-2 times a day as directed.  Dx: E11.65 4/17/20   Janae Harrison MD   bimatoprost (LUMIGAN) 0.03 % ophthalmic drops Place 1 drop into both eyes nightly    Historical Provider, MD   calcium carbonate (OSCAL) 500 MG TABS tablet Take 500 mg by mouth daily  Patient not taking: Reported on 7/13/2022  7/15/22  Historical Provider, MD   Cranberry 10631 MG CAPS Take by mouth  Patient not taking: Reported on 7/13/2022  7/15/22  Historical Provider, MD   GARLIC PO Take by mouth  Patient not taking: No sig reported  7/15/22  Historical Provider, MD   Multiple Vitamins-Minerals (ICAPS AREDS 2 PO) Take by mouth  Patient not taking: Reported on 7/13/2022  7/15/22  Historical Provider, MD   ferrous sulfate 325 (65 Fe) MG tablet Take 325 mg by mouth daily (with breakfast)  Patient not taking: Reported on 7/13/2022  7/15/22  Historical Provider, MD   raNITIdine (ZANTAC) 150 MG tablet Take 150 mg by mouth 2 times daily  7/15/22  Historical Provider, MD   Pomegranate, [place] gran[place], (POMEGRANATE PO) Take 500 mg by mouth daily  Patient not taking: No sig reported  7/15/22  Historical Provider, MD       Current medications:    No current facility-administered medications for this encounter. Current Outpatient Medications   Medication Sig Dispense Refill    metoclopramide (REGLAN) 10 MG tablet TAKE 1/2 (ONE-HALF) TABLET BY MOUTH THREE TIMES DAILY WITH MEALS 45 tablet 0    memantine (NAMENDA) 5 MG tablet Take 1 tablet by mouth 2 times daily 180 tablet 1    galantamine (RAZADYNE) 4 MG tablet Take 1 tablet by mouth twice daily 60 tablet 3    famotidine (PEPCID) 20 MG tablet Take 20 mg by mouth 2 times daily      ondansetron (ZOFRAN) 4 MG tablet Take 1 tablet by mouth every 6 hours as needed for Vomiting or Nausea 50 tablet 3    rivastigmine (EXELON) 4.6 MG/24HR Place 1 patch onto the skin daily (Patient not taking: No sig reported) 30 patch 0    rivastigmine (EXELON) 9.5 MG/24HR Place 1 patch onto the skin daily Do not fill until 4.6 script is complete (Patient not taking: No sig reported) 30 patch 5    furosemide (LASIX) 40 MG tablet Take 0.5 tablets by mouth daily (Patient not taking: Reported on 1/9/2023) 60 tablet 3    nadolol (CORGARD) 20 MG tablet Take 1 tablet by mouth daily 30 tablet 11    lactulose (CHRONULAC) 10 GM/15ML solution Take 30 mLs by mouth in the morning and 30 mLs before bedtime.  1800 mL 5    carvedilol (COREG) 6.25 MG tablet Take 1 tablet by mouth in the morning and 1 tablet before bedtime. (Patient not taking: No sig reported) 60 tablet 3    LORazepam (ATIVAN) 0.5 MG tablet 1 tablet at bedtime as needed      levothyroxine (SYNTHROID) 88 MCG tablet Take 1 tablet by mouth Daily 30 tablet 0    risperiDONE (RISPERDAL) 0.25 MG tablet Take 1 tablet by mouth nightly 90 tablet 1    insulin glargine (BASAGLAR KWIKPEN) 100 UNIT/ML injection pen Inject 35 Units into the skin 2 times daily 35 units in the morning and 25 units at bedtime (Patient taking differently: Inject 30 Units into the skin 2 times daily 35 units in the morning and 25 units at bedtime) 15 pen 3    gabapentin (NEURONTIN) 100 MG capsule Take 1 capsule by mouth 3 times daily for 90 days. 90 capsule 2    NOVOLOG FLEXPEN 100 UNIT/ML injection pen 14 units with largest meal of the day, 7 units with each smaller meal 7 pen 0    lovastatin (MEVACOR) 40 MG tablet Take 1 tablet by mouth nightly 90 tablet 1    OneTouch Delica Lancets 24E MISC Test blood sugar 1-2 times a day as directed. Dx: E11.65 100 each 5    bimatoprost (LUMIGAN) 0.03 % ophthalmic drops Place 1 drop into both eyes nightly         Allergies: Allergies   Allergen Reactions    Bupropion      Other reaction(s): Other - comment required  Suicidal ideation    Nsaids      Other reaction(s): Other - comment required  Elevated Serum Creatinine    Amlodipine     Nateglinide Nausea Only    Ofloxacin Hives    Paroxetine Hcl Hives     Other reaction(s): Other - comment required  Shakey/nerveous    Pioglitazone      Other reaction(s): Other - comment required  Dizzy / nauseated    Citalopram Nausea And Vomiting    Escitalopram Nausea And Vomiting    Flonase [Fluticasone] Nausea And Vomiting    Paxil [Paroxetine] Nausea And Vomiting       Problem List:    Patient Active Problem List   Diagnosis Code    Acquired hypothyroidism E03.9    Depression F32. A    Diabetic neuropathy ([facility] E11.40    [facility] E11.9    HTN (hypertension) I10    Hyperlipidemia E78.5    Osteopenia M85.80    Personal history of breast cancer Z85.3    Allergic rhinitis J30.9    ANTONINA (acute kidney injury) [facility] N17.9    Gastroenteritis K52.9    GI bleed K92.2    Hematemesis with nausea K92.0    Cirrhosis of liver with ascites (HCC) K74.60, R18.8    Secondary esophageal varices with bleeding (HCC) I85.11    Secondary esophageal varices without bleeding (HCC) I85.10    Anemia D64.9       Past Medical History:        Diagnosis Date    Arthritis     Cancer ([facility]     right breast.    Diabetes mellitus ([facility]     Esophageal varices (HCC)     Fatty liver     Glaucoma     Gout     patient states Dayana Rodriguez has never had gout\".  History of blood transfusion     Hyperlipidemia     Hypertension     Neuropathy     Thyroid disease        Past Surgical History:        Procedure Laterality Date    BREAST BIOPSY      CATARACT EXTRACTION Bilateral      SECTION      MASTECTOMY, BILATERAL  10/26/2007    UPPER GASTROINTESTINAL ENDOSCOPY N/A 2022    EGD BAND LIGATION with 6 bands placed performed by Kelin Andrews MD at [place] [address] N/A 2022    EGD BAND LIGATION WITH 3 BANDS PLACED, 4TH BAND DID NOT HOLD WITH SUBSEQUENT OOZING, TETRADECAL INJECTION GIVEN performed by Kelin Andrews MD at [place] [address] N/A 2022    EGD BAND LIGATION WITH 3 BANDS APPLIED performed by Kelin Andrews MD at [place] [address] N/A 2022    EGD BAND LIGATION WITH X1 BAND PLACEMENT performed by Kelin Andrews MD at [address] [place] [address] ENDOSCOPY       Social History:    Social History     Tobacco Use    Smoking status: Never    Smokeless tobacco: Never   Substance Use Topics    Alcohol use:  No                                Counseling given: Not Answered      Vital Signs (Current):   Vitals:    02/10/23 0839   Weight: 136 lb (61.7 kg)   Height: 5' 4\" (1.626 m)                                              BP Readings from Last 3 Encounters:   01/09/23 138/72   11/02/22 (!) 172/81   09/26/22 126/80       NPO Status:                                                                                 BMI:   Wt Readings from Last 3 Encounters:   01/09/23 134 lb 3.2 oz (60.9 kg)   11/02/22 125 lb (56.7 kg)   09/26/22 129 lb (58.5 kg)     Body mass index is 23.34 kg/m². CBC:   Lab Results   Component Value Date/Time    WBC 5.2 01/09/2023 03:06 PM    RBC 2.87 01/09/2023 03:06 PM    HGB 9.8 01/09/2023 03:06 PM    HCT 29.6 01/09/2023 03:06 PM    .1 01/09/2023 03:06 PM    RDW 15.4 01/09/2023 03:06 PM     01/09/2023 03:06 PM       CMP:   Lab Results   Component Value Date/Time     02/02/2023 10:27 AM    K 5.6 02/02/2023 10:27 AM     02/02/2023 10:27 AM    CO2 27 02/02/2023 10:27 AM    BUN 15 02/02/2023 10:27 AM    CREATININE 1.3 02/02/2023 10:27 AM    GFRAA 41 09/21/2022 07:44 AM    AGRATIO 1.0 01/09/2023 03:06 PM    LABGLOM 44 02/02/2023 10:27 AM    GLUCOSE 179 02/02/2023 10:27 AM    PROT 7.0 02/02/2023 10:27 AM    CALCIUM 9.0 02/02/2023 10:27 AM    BILITOT 0.7 02/02/2023 10:27 AM    ALKPHOS 74 02/02/2023 10:27 AM    AST 38 02/02/2023 10:27 AM    ALT 16 02/02/2023 10:27 AM       POC Tests: No results for input(s): POCGLU, POCNA, POCK, POCCL, POCBUN, POCHEMO, POCHCT in the last 72 hours.     Coags:   Lab Results   Component Value Date/Time    PROTIME 14.8 01/09/2023 03:06 PM    INR 1.17 01/09/2023 03:06 PM    APTT 27.4 07/11/2022 04:50 AM       HCG (If Applicable): No results found for: PREGTESTUR, PREGSERUM, HCG, HCGQUANT     ABGs:   Lab Results   Component Value Date/Time    PO2ART 63 03/21/2019 06:15 AM    MCD0IOK 39.0 03/21/2019 06:15 AM    UDL8RWU 29.0 03/21/2019 06:15 AM        Type & Screen (If Applicable):  No results found for: LABABO, LABRH    Drug/Infectious Status (If Applicable):  Lab Results   Component Value Date/Time    HEPCAB NON REACTIVE 07/11/2022 08:45 AM       COVID-19 Screening (If Applicable): No results found for: COVID19        Anesthesia Evaluation  Patient summary reviewed no history of anesthetic complications:   Airway: Mallampati: II  TM distance: >3 FB   Neck ROM: limited  Mouth opening: > = 3 FB   Dental: normal exam         Pulmonary:Negative Pulmonary ROS breath sounds clear to auscultation                             Cardiovascular:  Exercise tolerance: poor (<4 METS),   (+) hypertension:, hyperlipidemia        Rhythm: regular  Rate: normal           Beta Blocker:  Not on Beta Blocker      ROS comment: Echo 7/2022:  Summary   Left ventricular systolic function is normal.   Ejection fraction is visually estimated at 50-55%. Mild left ventricular hypertrophy. No evidence of any pericardial effusion. Neuro/Psych:   (+) psychiatric history:depression/anxiety             GI/Hepatic/Renal:   (+) liver disease ([place]):,          ROS comment: Cirrhosis of liver with ascites   Secondary esophageal varices with bleeding     . Endo/Other:    (+) Diabetes, hypothyroidism, blood dyscrasia: anemia:., malignancy/cancer. ROS comment: Cancer ([address] right breast. - MASTECTOMY, BILATERAL Abdominal:             Vascular: negative vascular ROS. Other Findings:           Anesthesia Plan      MAC     ASA 3       Induction: intravenous. Anesthetic plan and risks discussed with patient. Pre Anesthesia Evaluation complete. Anesthesia plan, risks, benefits, alternatives, and personnel involved discussed with patient. Patients and/or legal guardian verbalized an understanding and agreed to proceed.   Maximo Gudino DO  2/15/2023

## 2023-02-14 NOTE — PROGRESS NOTES
Left message for patient to arrive at 0800 at [address] [place] [address] on 2/15/2023 for her procedure at 0930 Orders in epic, placed by Dr Edwige Carlos.

## 2023-02-15 ENCOUNTER — HOSPITAL ENCOUNTER (OUTPATIENT)
Age: 73
Setting detail: OUTPATIENT SURGERY
Discharge: HOME | End: 2023-02-15
Attending: SPECIALIST | Admitting: SPECIALIST
Payer: MEDICARE

## 2023-02-15 ENCOUNTER — ANESTHESIA (OUTPATIENT)
Dept: ENDOSCOPY | Age: 73
End: 2023-02-15
Payer: MEDICARE

## 2023-02-15 VITALS
WEIGHT: 136 LBS | BODY MASS INDEX: 23.22 KG/M2 | OXYGEN SATURATION: 96 % | RESPIRATION RATE: 16 BRPM | HEIGHT: 64 IN | SYSTOLIC BLOOD PRESSURE: 154 MMHG | HEART RATE: 64 BPM | TEMPERATURE: 97.1 F | DIASTOLIC BLOOD PRESSURE: 69 MMHG

## 2023-02-15 LAB — GLUCOSE BLD-MCNC: 199 MG/DL (ref 70–99)

## 2023-02-15 PROCEDURE — 3700000001 HC ADD 15 MINUTES (ANESTHESIA): Performed by: SPECIALIST

## 2023-02-15 PROCEDURE — 2500000003 HC RX 250 WO HCPCS

## 2023-02-15 PROCEDURE — 82962 GLUCOSE BLOOD TEST: CPT

## 2023-02-15 PROCEDURE — 2709999900 HC NON-CHARGEABLE SUPPLY: Performed by: SPECIALIST

## 2023-02-15 PROCEDURE — 3700000000 HC ANESTHESIA ATTENDED CARE: Performed by: SPECIALIST

## 2023-02-15 PROCEDURE — 7100000011 HC PHASE II RECOVERY - ADDTL 15 MIN: Performed by: SPECIALIST

## 2023-02-15 PROCEDURE — 7100000010 HC PHASE II RECOVERY - FIRST 15 MIN: Performed by: SPECIALIST

## 2023-02-15 PROCEDURE — 3609017100 HC EGD: Performed by: SPECIALIST

## 2023-02-15 PROCEDURE — 6360000002 HC RX W HCPCS

## 2023-02-15 PROCEDURE — 2580000003 HC RX 258: Performed by: SPECIALIST

## 2023-02-15 PROCEDURE — 43235 EGD DIAGNOSTIC BRUSH WASH: CPT | Performed by: SPECIALIST

## 2023-02-15 RX ORDER — LIDOCAINE HYDROCHLORIDE 20 MG/ML
INJECTION, SOLUTION EPIDURAL; INFILTRATION; INTRACAUDAL; PERINEURAL PRN
Status: DISCONTINUED | OUTPATIENT
Start: 2023-02-15 | End: 2023-02-15 | Stop reason: SDUPTHER

## 2023-02-15 RX ORDER — PROPOFOL 10 MG/ML
INJECTION, EMULSION INTRAVENOUS PRN
Status: DISCONTINUED | OUTPATIENT
Start: 2023-02-15 | End: 2023-02-15 | Stop reason: SDUPTHER

## 2023-02-15 RX ORDER — SODIUM CHLORIDE, SODIUM LACTATE, POTASSIUM CHLORIDE, CALCIUM CHLORIDE 600; 310; 30; 20 MG/100ML; MG/100ML; MG/100ML; MG/100ML
INJECTION, SOLUTION INTRAVENOUS CONTINUOUS
Status: DISCONTINUED | OUTPATIENT
Start: 2023-02-15 | End: 2023-02-15 | Stop reason: HOSPADM

## 2023-02-15 RX ADMIN — LIDOCAINE HYDROCHLORIDE 100 MG: 20 INJECTION, SOLUTION EPIDURAL; INFILTRATION; INTRACAUDAL; PERINEURAL at 09:19

## 2023-02-15 RX ADMIN — PROPOFOL 150 MG: 10 INJECTION, EMULSION INTRAVENOUS at 09:19

## 2023-02-15 RX ADMIN — SODIUM CHLORIDE, POTASSIUM CHLORIDE, SODIUM LACTATE AND CALCIUM CHLORIDE: 600; 310; 30; 20 INJECTION, SOLUTION INTRAVENOUS at 08:52

## 2023-02-15 ASSESSMENT — PAIN SCALES - GENERAL: PAINLEVEL_OUTOF10: 0

## 2023-02-15 ASSESSMENT — PAIN - FUNCTIONAL ASSESSMENT: PAIN_FUNCTIONAL_ASSESSMENT: NONE - DENIES PAIN

## 2023-02-15 NOTE — PROGRESS NOTES
0940  Patient arrived back to [facility]. Report given to this nurse from [facility]. Patient A&O no c/o pain. Beverage of choice offered to patient. Call light in reach and bed in lowest position. 1015 IV removed. Discharge instructions given to Tad.  1020 Patient sitting on side of bed getting dressed assisted by Tad.  1038 Patient escorted to car via wheelchair transported home by Tad.

## 2023-02-15 NOTE — DISCHARGE INSTRUCTIONS
EGD         OFFICE NUMBER [phone]    FOLLOW UP APPOINTMENT   in 3 MONTHS OR AS NEEDED.    REPEAT PROCEDURE  IN 6 MONTHS OR AS  NEEDED.    TEST ORDERED:NONE  _.    What to Expect at Home  Your Recovery:  The only discomfort after your EGD is generally limited to a mild soreness of the throat, which may last a day or two. Call your physician immediately if you have severe chest pain, shortness of breath or a temperature of 100 degrees or higher if taken orally.    How can you care for yourself at home?  Activity  Rest as much as you need to after you go home.  You should be able to go back to your usual activities the day after the test.  Diet  Follow your doctor's directions for eating after the test.  Drink plenty of fluids (unless your doctor has told you not to).  Medications  If you have a sore throat the day after the test, use an over-the-counter spray to numb your throat.  Your doctor will tell you if and when you can restart your medicines. He or she will also give you instructions about taking any new medicines.  If you take blood thinners, such as warfarin (Coumadin), clopidogrel (Plavix), or aspirin, be sure to talk to your doctor. He or she will tell you if and when to start taking those medicines again. Make sure that you understand exactly what your doctor wants you to do.  If a biopsy was done during the test, your doctor may tell you not to take aspirin or other anti-inflammatory medicines for a few days. These include ibuprofen (Advil, Motrin) and naproxen (Aleve).  DO NOT DRINK ANYTHING WITH ALCOHOL TODAY.    Other instructions:Anesthesia  For your safety, do not drive or operate machinery for 24 hours.   Do not sign legal documents or make major decisions for 24 hours. The anesthesia can make it hard for you to fully understand what you are agreeing to.      Follow-up care is a key part of your treatment and safety. Be sure to make and go to all appointments, and call your doctor  if you are having problems. It's also a good idea to know your test results and keep a list of the medicines you take. When should you call for help? [address] [facility] Ronda Saini Brian Mosley Kimani [phone]  Call 911 anytime you think you may need emergency care. For example, call if:  You passed out (lost consciousness). You cough up blood. You vomit blood or what looks like coffee grounds. You pass maroon or very bloody stools. Call your doctor now or seek immediate medical care if:  You have trouble swallowing. You have belly pain. Your stools are black and tarlike or have streaks of blood. You are sick to your stomach or cannot keep fluids down. Watch closely for changes in your health, and be sure to contact your doctor if:  Your throat still hurts after a day or two. You do not get better as expected. Where can you learn more? Go to [email or web address]://[email or web address] org and sign in to your [employer] account. Enter O141 in the FlightCaster box to learn more about Upper GI Endoscopy: What to Expect at Home.     If you do not have an account, please click on the Sign Up Now link. © 0533-5684 Healthwise, Incorporated. Care instructions adapted under license by [facility] [place] [facility] This care instruction is for use with your licensed healthcare professional. If you have questions about a medical condition or this instruction, always ask your healthcare professional. [facility]  any warranty or liability for your use of this information. Content Version: 66.3.787866; Current as of: November 20, 2015       Advance Care Planning  People with COVID-19 may have no symptoms, mild symptoms, such as fever, cough, and shortness of breath or they may have more severe illness, developing severe and fatal pneumonia.   As a result, Advance Care Planning with attention to naming a health care decision maker (someone you trust to make healthcare decisions for you if you could not speak for yourself) and sharing other health care preferences is important BEFORE a possible health crisis. Please contact your Primary Care Provider to discuss Advance Care Planning.    Preventing the Spread of Coronavirus Disease 2019 in Homes and Residential Communities  For the most recent information go to https://www.cdc.gov/coronavirus/2019-ncov/hcp/guidance-prevent-spread.html    Prevention steps for People with confirmed or suspected COVID-19 (including persons under investigation) who do not need to be hospitalized  and   People with confirmed COVID-19 who were hospitalized and determined to be medically stable to go home    Your healthcare provider and public health staff will evaluate whether you can be cared for at home. If it is determined that you do not need to be hospitalized and can be isolated at home, you will be monitored by staff from your local or state health department. You should follow the prevention steps below until a healthcare provider or local or state health department says you can return to your normal activities.  Stay home except to get medical care  People who are mildly ill with COVID-19 are able to isolate at home during their illness. You should restrict activities outside your home, except for getting medical care. Do not go to work, school, or public areas. Avoid using public transportation, ride-sharing, or taxis.  Separate yourself from other people and animals in your home  People: As much as possible, you should stay in a specific room and away from other people in your home. Also, you should use a separate bathroom, if available.  Animals: You should restrict contact with pets and other animals while you are sick with COVID-19, just like you would around other people. Although there have not been reports of pets or other animals becoming sick with COVID-19, it is still recommended that people sick with COVID-19 limit contact  with animals until more information is known about the virus. When possible, have another member of your household care for your animals while you are sick. If you are sick with COVID-19, avoid contact with your pet, including petting, snuggling, being kissed or licked, and sharing food. If you must care for your pet or be around animals while you are sick, wash your hands before and after you interact with pets and wear a facemask. Call ahead before visiting your doctor  If you have a medical appointment, call the healthcare provider and tell them that you have or may have COVID-19. This will help the healthcare providers office take steps to keep other people from getting infected or exposed. Wear a facemask  You should wear a facemask when you are around other people (e.g., sharing a room or vehicle) or pets and before you enter a healthcare providers office. If you are not able to wear a facemask (for example, because it causes trouble breathing), then people who live with you should not stay in the same room with you, or they should wear a facemask if they enter your room. Cover your coughs and sneezes  Cover your mouth and nose with a tissue when you cough or sneeze. Throw used tissues in a lined trash can. Immediately wash your hands with soap and water for at least 20 seconds or, if soap and water are not available, clean your hands with an alcohol-based hand  that contains at least 60% alcohol. Clean your hands often  Wash your hands often with soap and water for at least 20 seconds, especially after blowing your nose, coughing, or sneezing; going to the bathroom; and before eating or preparing food. If soap and water are not readily available, use an alcohol-based hand  with at least 60% alcohol, covering all surfaces of your hands and rubbing them together until they feel dry. Soap and water are the best option if hands are visibly dirty.  Avoid touching your eyes, nose, and mouth with unwashed hands. Avoid sharing personal household items  You should not share dishes, drinking glasses, cups, eating utensils, towels, or bedding with other people or pets in your home. After using these items, they should be washed thoroughly with soap and water. Clean all high-touch surfaces everyday  High touch surfaces include counters, tabletops, doorknobs, bathroom fixtures, toilets, phones, keyboards, tablets, and bedside tables. Also, clean any surfaces that may have blood, stool, or body fluids on them. Use a household cleaning spray or wipe, according to the label instructions. Labels contain instructions for safe and effective use of the cleaning product including precautions you should take when applying the product, such as wearing gloves and making sure you have good ventilation during use of the product. Monitor your symptoms  Seek prompt medical attention if your illness is worsening (e.g., difficulty breathing). Before seeking care, call your healthcare provider and tell them that you have, or are being evaluated for, COVID-19. Put on a facemask before you enter the facility. These steps will help the healthcare providers office to keep other people in the office or waiting room from getting infected or exposed. Ask your healthcare provider to call the local or state health department. Persons who are placed under active monitoring or facilitated self-monitoring should follow instructions provided by their local health department or occupational health professionals, as appropriate. When working with your local health department check their available hours. If you have a medical emergency and need to call 911, notify the dispatch personnel that you have, or are being evaluated for COVID-19. If possible, put on a facemask before emergency medical services arrive.   Discontinuing home isolation  Patients with confirmed COVID-19 should remain under home isolation precautions until the risk of secondary transmission to others is thought to be low. The decision to discontinue home isolation precautions should be made on a case-by-case basis, in consultation with healthcare providers and state and local health departments.

## 2023-02-15 NOTE — ANESTHESIA POSTPROCEDURE EVALUATION
Department of Anesthesiology  Postprocedure Note    Patient: Osvaldo Tabor  MRN: [id number]  YOB: 1950  Date of evaluation: 2/15/2023      Procedure Summary     Date: 02/15/23 Room / Location: [address]    Anesthesia Start: 8831 Anesthesia Stop: 0932    Procedure: EGD DIAGNOSTIC ONLY Diagnosis:       Esophageal varices without bleeding, unspecified esophageal varices type ([address]      (Esophageal varices without bleeding, unspecified esophageal varices type ([address] [I85.00])    Surgeons: Jacobo Lewis MD Responsible Provider: Maximo Gudino DO    Anesthesia Type: MAC ASA Status: 3          Anesthesia Type: MAC    Jez Phase I: Jez Score: 9    Jez Phase II:        Anesthesia Post Evaluation    Patient location during evaluation: bedside  Patient participation: complete - patient participated  Level of consciousness: awake  Pain score: 0  Airway patency: patent  Nausea & Vomiting: no vomiting and no nausea  Complications: no  Cardiovascular status: hemodynamically stable  Respiratory status: acceptable, airway suctioned, spontaneous ventilation and room air  Hydration status: stable

## 2023-03-01 ENCOUNTER — OFFICE VISIT (OUTPATIENT)
Dept: CARDIOLOGY CLINIC | Age: 73
End: 2023-03-01
Payer: MEDICARE

## 2023-03-01 VITALS
DIASTOLIC BLOOD PRESSURE: 64 MMHG | SYSTOLIC BLOOD PRESSURE: 122 MMHG | BODY MASS INDEX: 23.87 KG/M2 | HEART RATE: 60 BPM | HEIGHT: 64 IN | WEIGHT: 139.8 LBS

## 2023-03-01 DIAGNOSIS — K76.6 PORTAL HYPERTENSION (HCC): Primary | ICD-10-CM

## 2023-03-01 DIAGNOSIS — D64.9 ANEMIA, UNSPECIFIED TYPE: ICD-10-CM

## 2023-03-01 DIAGNOSIS — R18.8 CIRRHOSIS OF LIVER WITH ASCITES, UNSPECIFIED HEPATIC CIRRHOSIS TYPE (HCC): ICD-10-CM

## 2023-03-01 DIAGNOSIS — K74.60 CIRRHOSIS OF LIVER WITH ASCITES, UNSPECIFIED HEPATIC CIRRHOSIS TYPE (HCC): ICD-10-CM

## 2023-03-01 DIAGNOSIS — I10 PRIMARY HYPERTENSION: ICD-10-CM

## 2023-03-01 PROCEDURE — G8428 CUR MEDS NOT DOCUMENT: HCPCS | Performed by: INTERNAL MEDICINE

## 2023-03-01 PROCEDURE — 1123F ACP DISCUSS/DSCN MKR DOCD: CPT | Performed by: INTERNAL MEDICINE

## 2023-03-01 PROCEDURE — G8420 CALC BMI NORM PARAMETERS: HCPCS | Performed by: INTERNAL MEDICINE

## 2023-03-01 PROCEDURE — G8484 FLU IMMUNIZE NO ADMIN: HCPCS | Performed by: INTERNAL MEDICINE

## 2023-03-01 PROCEDURE — 3017F COLORECTAL CA SCREEN DOC REV: CPT | Performed by: INTERNAL MEDICINE

## 2023-03-01 PROCEDURE — G8400 PT W/DXA NO RESULTS DOC: HCPCS | Performed by: INTERNAL MEDICINE

## 2023-03-01 PROCEDURE — 3074F SYST BP LT 130 MM HG: CPT | Performed by: INTERNAL MEDICINE

## 2023-03-01 PROCEDURE — 99214 OFFICE O/P EST MOD 30 MIN: CPT | Performed by: INTERNAL MEDICINE

## 2023-03-01 PROCEDURE — 1090F PRES/ABSN URINE INCON ASSESS: CPT | Performed by: INTERNAL MEDICINE

## 2023-03-01 PROCEDURE — 1036F TOBACCO NON-USER: CPT | Performed by: INTERNAL MEDICINE

## 2023-03-01 PROCEDURE — 3078F DIAST BP <80 MM HG: CPT | Performed by: INTERNAL MEDICINE

## 2023-03-01 NOTE — PROGRESS NOTES
Jarred Reynolds MD                                  CARDIOLOGY  NOTE      Chief Complaint:    Chief Complaint   Patient presents with    6 Month Follow-Up     PT denies any new cardiac sx no surgeries or procedures scheduled that she is aware of Patient did not bring medication bottles or list. PT  states that Dr. Quezada had prescribed lisinopril for her last week and she took 1 pill and is not taking any more she stated she didn't feel up to par       Denies any CP, Dyspnea or palpitationns  Recently underwent EGD with Dr. Lawrence no significant varicies noted for banding    HPI:     Dayana is a 72 y.o. year old female who was recently evaluated in the hospital for GI bleeding and elevated troponin.    Patient has prior medical history significant for liver cirrhosis who was admitted to the hospital with esophageal variceal bleeds, acute blood loss anemia.  Cardiology was consulted to evaluate patient for elevated troponin which was noted to be trivial.  Patient denies any prior history of established CAD CHF or arrhythmias.  EKG showed sinus rhythm without any ischemic changes.      ECHO 7/14/2022     Left ventricular systolic function is normal.   Ejection fraction is visually estimated at 50-55%.   Mild left ventricular hypertrophy.   No evidence of any pericardial effusion.        Current Outpatient Medications   Medication Sig Dispense Refill    metoclopramide (REGLAN) 10 MG tablet TAKE 1/2 (ONE-HALF) TABLET BY MOUTH THREE TIMES DAILY WITH MEALS 45 tablet 0    memantine (NAMENDA) 5 MG tablet Take 1 tablet by mouth 2 times daily 180 tablet 1    galantamine (RAZADYNE) 4 MG tablet Take 1 tablet by mouth twice daily 60 tablet 3    famotidine (PEPCID) 20 MG tablet Take 20 mg by mouth 2 times daily      nadolol (CORGARD) 20 MG tablet Take 1 tablet by mouth daily 30 tablet 11    lactulose (CHRONULAC) 10 GM/15ML solution Take 30 mLs by mouth in the morning and 30 mLs before bedtime. 1800 mL 5     LORazepam (ATIVAN) 0.5 MG tablet 1 tablet at bedtime as needed      levothyroxine (SYNTHROID) 88 MCG tablet Take 1 tablet by mouth Daily 30 tablet 0    risperiDONE (RISPERDAL) 0.25 MG tablet Take 1 tablet by mouth nightly 90 tablet 1    insulin glargine (BASAGLAR KWIKPEN) 100 UNIT/ML injection pen Inject 35 Units into the skin 2 times daily 35 units in the morning and 25 units at bedtime (Patient taking differently: Inject 30 Units into the skin 2 times daily 35 units in the morning and 25 units at bedtime) 15 pen 3    gabapentin (NEURONTIN) 100 MG capsule Take 1 capsule by mouth 3 times daily for 90 days. 90 capsule 2    NOVOLOG FLEXPEN 100 UNIT/ML injection pen 14 units with largest meal of the day, 7 units with each smaller meal 7 pen 0    OneTouch Delica Lancets 79M MISC Test blood sugar 1-2 times a day as directed. Dx: E11.65 100 each 5    bimatoprost (LUMIGAN) 0.03 % ophthalmic drops Place 1 drop into both eyes nightly       No current facility-administered medications for this visit. Allergies:     Bupropion, Nsaids, Amlodipine, Nateglinide, Ofloxacin, Paroxetine hcl, Pioglitazone, Citalopram, Escitalopram, Flonase [fluticasone], and Paxil [paroxetine]    Patient History:    Past Medical History:   Diagnosis Date    Arthritis     Cancer ([facility]     right breast.    Diabetes mellitus ([facility]     Esophageal varices ([facility] [facility]     Fatty liver     Glaucoma     Gout     patient states Joel Kelly has never had gout\".     History of blood transfusion     Hyperlipidemia     Hypertension     Neuropathy     Thyroid disease      Past Surgical History:   Procedure Laterality Date    BREAST BIOPSY      CATARACT EXTRACTION Bilateral      SECTION      MASTECTOMY, BILATERAL  10/26/2007    UPPER GASTROINTESTINAL ENDOSCOPY N/A 2022    EGD BAND LIGATION with 6 bands placed performed by Edwige Bruno MD at [address] [facility] 2022    EGD BAND LIGATION WITH 3 BANDS PLACED, 4TH BAND DID NOT HOLD WITH SUBSEQUENT OOZING, TETRADECAL INJECTION GIVEN performed by Emir Stewart MD at [address] N/A 09/21/2022    EGD BAND LIGATION WITH 3 BANDS APPLIED performed by Emir Stewart MD at [address] N/A 11/02/2022    EGD BAND LIGATION WITH X1 BAND PLACEMENT performed by Emir Stewart MD at [address] N/A 2/15/2023    EGD DIAGNOSTIC ONLY performed by Emir Stewart MD at [address] [facility] ENDOSCOPY     Family History   Problem Relation Age of Onset    Cancer Mother     Diabetes Father     Coronary Art Dis Father      Social History     Tobacco Use    Smoking status: Never    Smokeless tobacco: Never   Substance Use Topics    Alcohol use: No        Review of Systems:     Constitutional:  No Fever or Weight Loss   Eyes: No Decreased Vision  ENT: No Headaches, Hearing Loss or Vertigo  Cardiovascular: No Chest Pain,  No Shortness of breath, No Palpitations. No Edema   Respiratory: No cough or wheezing .  No Respiratory distress   Gastrointestinal: No abdominal pain, appetite loss, blood in stools, constipation, diarrhea or heartburn  Genitourinary: No dysuria, trouble voiding, or hematuria  Musculoskeletal:  denies any new  joint aches , or pain   Integumentary: No rash or pruritis  Neurological: No TIA or stroke symptoms  Psychiatric: No anxiety or depression  Endocrine: No malaise, fatigue or temperature intolerance  Hematologic/Lymphatic: No bleeding problems, blood clots or swollen lymph nodes  Allergic/Immunologic: No nasal congestion or hives        Objective:      Physical Exam:    /64 (Site: Left Upper Arm, Position: Sitting, Cuff Size: Medium Adult)   Pulse 60   Ht 5' 4\" (1.626 m)   Wt 139 lb 12.8 oz (63.4 kg)   BMI 24.00 kg/m²   Wt Readings from Last 3 Encounters:   03/01/23 139 lb 12.8 oz (63.4 kg)   02/15/23 136 lb (61.7 kg)   01/09/23 134 lb 3.2 oz (60.9 kg)     Body mass index is 24 kg/m². Vitals:    03/01/23 1141   BP: 122/64   Pulse: 60        General Appearance and Constitutional: Conversant, Well developed, Well nourished, No acute distress, Non-toxic appearance. HEENT:  Normocephalic, Atraumatic, Bilateral external ears normal, Oropharynx moist, No oral exudates,   Nose normal.   Neck- Normal range of motion, No tenderness, Supple  Eyes:  EOMI, Conjunctiva normal, No discharge. Respiratory:  Normal breath sounds, No respiratory distress, No wheezing, No Rales, No Ronchi. No chest tenderness. Cardiovascular: S1-S2, no added heart sounds, No Mumurs appreciated. No gallops, rubs. No Pedal Edema   GI:  Bowel sounds normal, Soft, No tenderness,  :  No costovertebral angle tenderness   Musculoskeletal:  No gross deformities.  Back- No tenderness  Integument:  Well hydrated, no rash   Lymphatic:  No lymphadenopathy noted   Neurologic:  Alert & oriented x 3, Normal motor function, normal sensory function, no focal deficits noted   Psychiatric:  Speech and behavior appropriate       Medical decision making and Data review:    DATA:    Lab Results   Component Value Date    TROPONINT 0.074 (H) 07/12/2022     BNP:    Lab Results   Component Value Date    PROBNP 799.9 (H) 07/10/2022     PT/INR:  No results found for: PTINR  Lab Results   Component Value Date    LABA1C 7.2 (H) 11/03/2022    LABA1C 6.2 08/04/2022     Lab Results   Component Value Date    CHOL 134 02/02/2023    TRIG 66 02/02/2023    HDL 68 02/02/2023    LDLCALC 53 02/02/2023    LDLDIRECT 55 11/03/2022     Lab Results   Component Value Date    WBC 5.2 01/09/2023    HGB 9.8 (L) 01/09/2023    HCT 29.6 (L) 01/09/2023    .1 (H) 01/09/2023     (L) 01/09/2023     TSH:   Lab Results   Component Value Date    TSH 6.23 (H) 01/27/2022     Lab Results   Component Value Date    AST 38 (H) 02/02/2023    ALT 16 02/02/2023    BILIDIR 0.2 07/15/2022    BILITOT 0.7 02/02/2023    ALKPHOS 74 02/02/2023         All labs, medications and tests reviewed by myself including data and history from outside source , patient and available family . 1. Portal hypertension ([facility] [address]    2. Primary hypertension    3. Anemia, unspecified type    4. Cirrhosis of liver with ascites, unspecified hepatic cirrhosis type ([facility] [address]           Impression and Plan:      Elevated troponin in the setting of renal failure sepsis acute infection and bleeding. Likely demand ischemia ( type II MI)  in the setting of above  Echocardiogram was performed during hospitalization which was noted to be normal.    Last hemoglobin 10.8    Patient is currently asymptomatic, without any chest pain or shortness of breath. Discussed option of stress test with the patient and family. At this point no immediate indication to perform any ischemic work-up as patient is asymptomatic from cardiovascular stand point     History of acute GI bleeding with esophageal varices / Liver cirrhosis - follows with Dr. Zakiya Jones  - On Nadolol    Hx of Blood loss anemia and anemia of Ch. Disease  s/p packed RBC transfusion in the setting of liver cirrhosis/CKD             Return in about 1 year (around 3/1/2024). Counseled extensively and medication compliance urged. We discussed that for the  prevention of ASCVD our  goal is aggressive risk modification. Patient is encouraged to exercise even a brisk walk for 30 minutes  at least 3 to 4 times a week   Various goals were discussed and questions answered. Continue current medications. Appropriate prescriptions are addressed and refills ordered. Questions answered and patient verbalizes understanding. Call for any problems, questions, or concerns.

## 2023-03-06 RX ORDER — ONDANSETRON 4 MG/1
4 TABLET, FILM COATED ORAL DAILY PRN
Qty: 30 TABLET | Refills: 3 | Status: SHIPPED | OUTPATIENT
Start: 2023-03-06

## 2023-03-13 RX ORDER — METOCLOPRAMIDE 10 MG/1
TABLET ORAL
Qty: 45 TABLET | Refills: 0 | Status: SHIPPED | OUTPATIENT
Start: 2023-03-13 | End: 2023-03-18 | Stop reason: SDUPTHER

## 2023-03-13 RX ORDER — GALANTAMINE HYDROBROMIDE 4 MG/1
TABLET, FILM COATED ORAL
Qty: 60 TABLET | Refills: 0 | OUTPATIENT
Start: 2023-03-13

## 2023-03-18 RX ORDER — METOCLOPRAMIDE 10 MG/1
5 TABLET ORAL
Qty: 30 TABLET | Refills: 5 | Status: SHIPPED | OUTPATIENT
Start: 2023-03-18

## 2023-03-20 ENCOUNTER — APPOINTMENT (OUTPATIENT)
Dept: CT IMAGING | Age: 73
End: 2023-03-20
Payer: MEDICARE

## 2023-03-20 ENCOUNTER — APPOINTMENT (OUTPATIENT)
Dept: ULTRASOUND IMAGING | Age: 73
End: 2023-03-20
Payer: MEDICARE

## 2023-03-20 ENCOUNTER — HOSPITAL ENCOUNTER (EMERGENCY)
Age: 73
Discharge: HOME | End: 2023-03-20
Attending: EMERGENCY MEDICINE
Payer: MEDICARE

## 2023-03-20 VITALS
WEIGHT: 140 LBS | SYSTOLIC BLOOD PRESSURE: 147 MMHG | HEIGHT: 64 IN | OXYGEN SATURATION: 95 % | TEMPERATURE: 98 F | RESPIRATION RATE: 16 BRPM | HEART RATE: 60 BPM | BODY MASS INDEX: 23.9 KG/M2 | DIASTOLIC BLOOD PRESSURE: 53 MMHG

## 2023-03-20 DIAGNOSIS — R10.11 ABDOMINAL PAIN, RIGHT UPPER QUADRANT: Primary | ICD-10-CM

## 2023-03-20 LAB
ABO/RH: NORMAL
ALBUMIN SERPL-MCNC: 3.8 GM/DL (ref 3.4–5)
ALP BLD-CCNC: 78 IU/L (ref 40–128)
ALT SERPL-CCNC: 15 U/L (ref 10–40)
ANION GAP SERPL CALCULATED.3IONS-SCNC: 7 MMOL/L (ref 4–16)
ANTIBODY SCREEN: NEGATIVE
AST SERPL-CCNC: 25 IU/L (ref 15–37)
BACTERIA: NEGATIVE /HPF
BASOPHILS ABSOLUTE: 0.1 K/CU MM
BASOPHILS RELATIVE PERCENT: 0.8 % (ref 0–1)
BILIRUB SERPL-MCNC: 0.6 MG/DL (ref 0–1)
BILIRUBIN URINE: NEGATIVE MG/DL
BLOOD, URINE: NEGATIVE
BUN SERPL-MCNC: 19 MG/DL (ref 6–23)
CALCIUM SERPL-MCNC: 9.1 MG/DL (ref 8.3–10.6)
CHLORIDE BLD-SCNC: 102 MMOL/L (ref 99–110)
CLARITY: ABNORMAL
CO2: 27 MMOL/L (ref 21–32)
COLOR: YELLOW
CREAT SERPL-MCNC: 1.5 MG/DL (ref 0.6–1.1)
DIFFERENTIAL TYPE: ABNORMAL
EOSINOPHILS ABSOLUTE: 0.1 K/CU MM
EOSINOPHILS RELATIVE PERCENT: 2 % (ref 0–3)
GFR SERPL CREATININE-BSD FRML MDRD: 37 ML/MIN/1.73M2
GLUCOSE BLD-MCNC: 102 MG/DL (ref 70–99)
GLUCOSE BLD-MCNC: 79 MG/DL (ref 70–99)
GLUCOSE SERPL-MCNC: 154 MG/DL (ref 70–99)
GLUCOSE, URINE: NEGATIVE MG/DL
HCT VFR BLD CALC: 29.2 % (ref 37–47)
HEMOGLOBIN: 9.3 GM/DL (ref 12.5–16)
HYALINE CASTS: 5 /LPF
IMMATURE NEUTROPHIL %: 0.2 % (ref 0–0.43)
KETONES, URINE: ABNORMAL MG/DL
LACTATE: 1 MMOL/L (ref 0.5–1.9)
LEUKOCYTE ESTERASE, URINE: ABNORMAL
LIPASE: 26 IU/L (ref 13–60)
LYMPHOCYTES ABSOLUTE: 1.4 K/CU MM
LYMPHOCYTES RELATIVE PERCENT: 21.7 % (ref 24–44)
MCH RBC QN AUTO: 34.1 PG (ref 27–31)
MCHC RBC AUTO-ENTMCNC: 31.8 % (ref 32–36)
MCV RBC AUTO: 107 FL (ref 78–100)
MONOCYTES ABSOLUTE: 0.4 K/CU MM
MONOCYTES RELATIVE PERCENT: 5.3 % (ref 0–4)
MUCUS: ABNORMAL HPF
NITRITE URINE, QUANTITATIVE: NEGATIVE
NUCLEATED RBC %: 0 %
PDW BLD-RTO: 15.1 % (ref 11.7–14.9)
PH, URINE: 6.5 (ref 5–8)
PLATELET # BLD: 127 K/CU MM (ref 140–440)
PMV BLD AUTO: 12.1 FL (ref 7.5–11.1)
POTASSIUM SERPL-SCNC: 4.6 MMOL/L (ref 3.5–5.1)
PROTEIN UA: NEGATIVE MG/DL
RBC # BLD: 2.73 M/CU MM (ref 4.2–5.4)
RBC URINE: 2 /HPF (ref 0–6)
SEGMENTED NEUTROPHILS ABSOLUTE COUNT: 4.6 K/CU MM
SEGMENTED NEUTROPHILS RELATIVE PERCENT: 70 % (ref 36–66)
SODIUM BLD-SCNC: 136 MMOL/L (ref 135–145)
SPECIFIC GRAVITY UA: 1.02 (ref 1–1.03)
SQUAMOUS EPITHELIAL: 29 /HPF
TOTAL IMMATURE NEUTOROPHIL: 0.01 K/CU MM
TOTAL NUCLEATED RBC: 0 K/CU MM
TOTAL PROTEIN: 7.1 GM/DL (ref 6.4–8.2)
TRICHOMONAS: ABNORMAL /HPF
UROBILINOGEN, URINE: 4 MG/DL (ref 0.2–1)
WBC # BLD: 6.6 K/CU MM (ref 4–10.5)
WBC UA: 20 /HPF (ref 0–5)
YEAST: ABNORMAL /HPF

## 2023-03-20 PROCEDURE — 82962 GLUCOSE BLOOD TEST: CPT

## 2023-03-20 PROCEDURE — 83605 ASSAY OF LACTIC ACID: CPT

## 2023-03-20 PROCEDURE — 83690 ASSAY OF LIPASE: CPT

## 2023-03-20 PROCEDURE — 93975 VASCULAR STUDY: CPT

## 2023-03-20 PROCEDURE — 86850 RBC ANTIBODY SCREEN: CPT

## 2023-03-20 PROCEDURE — 81001 URINALYSIS AUTO W/SCOPE: CPT

## 2023-03-20 PROCEDURE — 86900 BLOOD TYPING SEROLOGIC ABO: CPT

## 2023-03-20 PROCEDURE — 86901 BLOOD TYPING SEROLOGIC RH(D): CPT

## 2023-03-20 PROCEDURE — 74177 CT ABD & PELVIS W/CONTRAST: CPT

## 2023-03-20 PROCEDURE — 80053 COMPREHEN METABOLIC PANEL: CPT

## 2023-03-20 PROCEDURE — 6360000004 HC RX CONTRAST MEDICATION: Performed by: EMERGENCY MEDICINE

## 2023-03-20 PROCEDURE — 99285 EMERGENCY DEPT VISIT HI MDM: CPT

## 2023-03-20 PROCEDURE — 76705 ECHO EXAM OF ABDOMEN: CPT

## 2023-03-20 PROCEDURE — 85025 COMPLETE CBC W/AUTO DIFF WBC: CPT

## 2023-03-20 RX ORDER — DICYCLOMINE HYDROCHLORIDE 10 MG/1
10 CAPSULE ORAL EVERY 6 HOURS PRN
Qty: 20 CAPSULE | Refills: 0 | Status: SHIPPED | OUTPATIENT
Start: 2023-03-20 | End: 2023-03-25

## 2023-03-20 RX ADMIN — IOPAMIDOL 75 ML: 755 INJECTION, SOLUTION INTRAVENOUS at 08:57

## 2023-03-20 ASSESSMENT — PAIN DESCRIPTION - ORIENTATION: ORIENTATION: RIGHT;UPPER

## 2023-03-20 ASSESSMENT — PAIN DESCRIPTION - PAIN TYPE: TYPE: ACUTE PAIN

## 2023-03-20 ASSESSMENT — PAIN SCALES - GENERAL: PAINLEVEL_OUTOF10: 5

## 2023-03-20 ASSESSMENT — PAIN DESCRIPTION - ONSET: ONSET: PROGRESSIVE

## 2023-03-20 ASSESSMENT — PAIN DESCRIPTION - LOCATION: LOCATION: ABDOMEN

## 2023-03-20 ASSESSMENT — PAIN DESCRIPTION - FREQUENCY: FREQUENCY: INTERMITTENT

## 2023-03-20 ASSESSMENT — PAIN DESCRIPTION - DESCRIPTORS: DESCRIPTORS: SHARP

## 2023-03-20 ASSESSMENT — PAIN - FUNCTIONAL ASSESSMENT: PAIN_FUNCTIONAL_ASSESSMENT: 0-10

## 2023-03-20 NOTE — ED NOTES
Patient ambulating to restroom with assist x1 with . Fernanda Parsons.  MARGARET Nash  03/20/23 8231

## 2023-03-20 NOTE — ED TRIAGE NOTES
Pt having abdominal pain, r sided flank pain, headache and nausea. Pt states that he abdominal pain is sharp. Pt is a diabetic and has had poor intake that past three days. Pt of Dr. Pawan Goddard.

## 2023-03-20 NOTE — ED PROVIDER NOTES
ALLERGIES     Bupropion, Nsaids, Amlodipine, Nateglinide, Ofloxacin, Paroxetine hcl, Pioglitazone, Citalopram, Escitalopram, Flonase [fluticasone], and Paxil [paroxetine]    FAMILY HISTORY       Family History   Problem Relation Age of Onset    Cancer Mother     Diabetes Father     Coronary Art Dis Father           SOCIAL HISTORY       Social History     Socioeconomic History    Marital status:      Spouse name: None    Number of children: None    Years of education: None    Highest education level: None   Tobacco Use    Smoking status: Never    Smokeless tobacco: Never   Vaping Use    Vaping Use: Never used   Substance and Sexual Activity    Alcohol use: No    Drug use: No       PHYSICAL EXAM       ED Triage Vitals   BP Temp Temp src Pulse Resp SpO2 Height Weight   -- -- -- -- -- -- -- --       Physical Exam  Vitals and nursing note reviewed. Constitutional:       Appearance: She is well-developed. HENT:      Head: Normocephalic. Eyes:      Extraocular Movements: Extraocular movements intact. Cardiovascular:      Rate and Rhythm: Normal rate and regular rhythm. Pulmonary:      Effort: Pulmonary effort is normal. No respiratory distress. Breath sounds: Normal breath sounds. No stridor. No wheezing, rhonchi or rales. Chest:      Chest wall: No tenderness. Abdominal:      Palpations: Abdomen is soft. Tenderness: There is abdominal tenderness in the right upper quadrant. There is no right CVA tenderness, left CVA tenderness, guarding or rebound. Negative signs include Hernandez's sign, Rovsing's sign and McBurney's sign. Skin:     General: Skin is warm and dry. Capillary Refill: Capillary refill takes less than 2 seconds. Coloration: Skin is not jaundiced or pale. Neurological:      General: No focal deficit present. Mental Status: She is alert.        Vitals:    Vitals:    03/20/23 0728 03/20/23 1000 03/20/23 1030 03/20/23 1100   BP: 137/68 (!) 144/58 (!) 145/61 (!)

## 2023-03-24 ENCOUNTER — HOSPITAL ENCOUNTER (OUTPATIENT)
Dept: GENERAL RADIOLOGY | Age: 73
Discharge: HOME | End: 2023-03-24
Payer: MEDICARE

## 2023-03-24 ENCOUNTER — HOSPITAL ENCOUNTER (OUTPATIENT)
Age: 73
Discharge: HOME | End: 2023-03-24
Payer: MEDICARE

## 2023-03-24 DIAGNOSIS — R93.7 ABNORMAL X-RAY OF LUMBAR SPINE: ICD-10-CM

## 2023-03-24 DIAGNOSIS — R93.7 ABNORMAL X-RAY OF THORACIC SPINE: ICD-10-CM

## 2023-03-24 LAB
AMMONIA: 59 UMOL/L (ref 11–51)
FOLATE SERPL-MCNC: 12.6 NG/ML (ref 3.1–17.5)
VITAMIN B-12: 460.7 PG/ML (ref 211–911)

## 2023-03-24 PROCEDURE — 72072 X-RAY EXAM THORAC SPINE 3VWS: CPT

## 2023-03-24 PROCEDURE — 72110 X-RAY EXAM L-2 SPINE 4/>VWS: CPT

## 2023-03-24 PROCEDURE — 36415 COLL VENOUS BLD VENIPUNCTURE: CPT

## 2023-03-24 PROCEDURE — 82746 ASSAY OF FOLIC ACID SERUM: CPT

## 2023-03-24 PROCEDURE — 82607 VITAMIN B-12: CPT

## 2023-03-30 ENCOUNTER — OFFICE VISIT (OUTPATIENT)
Dept: GASTROENTEROLOGY | Age: 73
End: 2023-03-30

## 2023-03-30 VITALS
HEIGHT: 64 IN | WEIGHT: 140 LBS | HEART RATE: 64 BPM | DIASTOLIC BLOOD PRESSURE: 82 MMHG | OXYGEN SATURATION: 97 % | SYSTOLIC BLOOD PRESSURE: 148 MMHG | BODY MASS INDEX: 23.9 KG/M2

## 2023-03-30 DIAGNOSIS — R11.0 NAUSEA: Primary | ICD-10-CM

## 2023-03-30 DIAGNOSIS — R10.84 GENERALIZED ABDOMINAL PAIN: ICD-10-CM

## 2023-03-30 RX ORDER — PANTOPRAZOLE SODIUM 40 MG/1
40 TABLET, DELAYED RELEASE ORAL
Qty: 30 TABLET | Refills: 5 | Status: SHIPPED | OUTPATIENT
Start: 2023-03-30

## 2023-03-30 NOTE — PROGRESS NOTES
Gastroenterology Office Note            Aiede Lawrence MD      Subjective:      Patient ID:      Bubba Britt                 1950    CC: nausea and abd pain    HPI:    called me on my cell phone today and said she was having abd pain and was considering taking her to Urgent Care-- I added her her on today's schedule-- now she denies abd pain and says she has a sinus infection. She does admit to nausea and  says she frequently has generailzed abd pain . She has had no vomiting. She was in ED 10 days ago with RUQ pain- CT, US and Doppler US then showed gallstones but no acute melonie and a patent portal vein. Review of Systems:    see HPI for positives and pertinent negatives.  All other systems reviewed and are negative     Objective:   PHYSICAL EXAM:    Vitals:  BP (!) 148/82 (Site: Left Upper Arm, Position: Sitting, Cuff Size: Medium Adult)   Pulse 64   Ht 5' 4\" (1.626 m)   Wt 140 lb (63.5 kg)   SpO2 97%   BMI 24.03 kg/m²   CONSTITUTIONAL: alert    LUNGS:  clear to auscultation  CARDIOVASCULAR:  regular rate and rhythm and no murmur noted  ABDOMEN:  normal bowel sounds, non-distended, non-tender and no masses palpated, no hepatosplenomegaly  EXTREMITIES: no clubbing, cyanosis, or edema    Assessment:      Cirrhosis without ascites at present  Nausea, vague abd pain- ? etiology      Plan:      See PCP about sinuses  Continue Zofran prn  D/C Bentyl (aggravate gastroparesis and nausea)  D/C Pepcid and start Protonix 40 mg daily  Call if any problems

## 2023-04-06 RX ORDER — GALANTAMINE HYDROBROMIDE 8 MG/1
8 TABLET, FILM COATED ORAL 2 TIMES DAILY
Qty: 60 TABLET | Refills: 3 | Status: SHIPPED | OUTPATIENT
Start: 2023-04-06 | End: 2023-04-06

## 2023-04-16 ENCOUNTER — HOSPITAL ENCOUNTER (EMERGENCY)
Age: 73
Discharge: HOME | End: 2023-04-16
Payer: MEDICARE

## 2023-04-16 ENCOUNTER — APPOINTMENT (OUTPATIENT)
Dept: GENERAL RADIOLOGY | Age: 73
End: 2023-04-16
Payer: MEDICARE

## 2023-04-16 VITALS
SYSTOLIC BLOOD PRESSURE: 193 MMHG | RESPIRATION RATE: 16 BRPM | DIASTOLIC BLOOD PRESSURE: 81 MMHG | WEIGHT: 140 LBS | TEMPERATURE: 97.7 F | OXYGEN SATURATION: 98 % | BODY MASS INDEX: 24.03 KG/M2 | HEART RATE: 58 BPM

## 2023-04-16 DIAGNOSIS — E87.1 HYPONATREMIA: ICD-10-CM

## 2023-04-16 DIAGNOSIS — R68.83 CHILLS: Primary | ICD-10-CM

## 2023-04-16 LAB
ALBUMIN SERPL-MCNC: 3.9 GM/DL (ref 3.4–5)
ALP BLD-CCNC: 79 IU/L (ref 40–129)
ALT SERPL-CCNC: 18 U/L (ref 10–40)
ANION GAP SERPL CALCULATED.3IONS-SCNC: 9 MMOL/L (ref 4–16)
AST SERPL-CCNC: 25 IU/L (ref 15–37)
BACTERIA: ABNORMAL /HPF
BASOPHILS ABSOLUTE: 0 K/CU MM
BASOPHILS RELATIVE PERCENT: 0.6 % (ref 0–1)
BILIRUB SERPL-MCNC: 0.8 MG/DL (ref 0–1)
BILIRUBIN URINE: NEGATIVE MG/DL
BLOOD, URINE: NEGATIVE
BUN SERPL-MCNC: 16 MG/DL (ref 6–23)
CALCIUM SERPL-MCNC: 8.9 MG/DL (ref 8.3–10.6)
CHLORIDE BLD-SCNC: 92 MMOL/L (ref 99–110)
CLARITY: CLEAR
CO2: 26 MMOL/L (ref 21–32)
COLOR: YELLOW
CREAT SERPL-MCNC: 1.4 MG/DL (ref 0.6–1.1)
DIFFERENTIAL TYPE: ABNORMAL
EOSINOPHILS ABSOLUTE: 0.1 K/CU MM
EOSINOPHILS RELATIVE PERCENT: 1.9 % (ref 0–3)
GFR SERPL CREATININE-BSD FRML MDRD: 40 ML/MIN/1.73M2
GLUCOSE SERPL-MCNC: 213 MG/DL (ref 70–99)
GLUCOSE, URINE: NEGATIVE MG/DL
HCT VFR BLD CALC: 26.4 % (ref 37–47)
HEMOGLOBIN: 8.7 GM/DL (ref 12.5–16)
IMMATURE NEUTROPHIL %: 0.4 % (ref 0–0.43)
KETONES, URINE: NEGATIVE MG/DL
LACTATE: 1.4 MMOL/L (ref 0.5–1.9)
LEUKOCYTE ESTERASE, URINE: ABNORMAL
LYMPHOCYTES ABSOLUTE: 1.3 K/CU MM
LYMPHOCYTES RELATIVE PERCENT: 26.4 % (ref 24–44)
MCH RBC QN AUTO: 34.3 PG (ref 27–31)
MCHC RBC AUTO-ENTMCNC: 33 % (ref 32–36)
MCV RBC AUTO: 103.9 FL (ref 78–100)
MONOCYTES ABSOLUTE: 0.3 K/CU MM
MONOCYTES RELATIVE PERCENT: 5.4 % (ref 0–4)
NITRITE URINE, QUANTITATIVE: NEGATIVE
NUCLEATED RBC %: 0 %
PDW BLD-RTO: 15.2 % (ref 11.7–14.9)
PH, URINE: 7.5 (ref 5–8)
PLATELET # BLD: 118 K/CU MM (ref 140–440)
PMV BLD AUTO: 11.6 FL (ref 7.5–11.1)
POTASSIUM SERPL-SCNC: 4.8 MMOL/L (ref 3.5–5.1)
PROTEIN UA: NEGATIVE MG/DL
RBC # BLD: 2.54 M/CU MM (ref 4.2–5.4)
RBC URINE: <1 /HPF (ref 0–6)
SARS-COV-2 RDRP RESP QL NAA+PROBE: NOT DETECTED
SEGMENTED NEUTROPHILS ABSOLUTE COUNT: 3.1 K/CU MM
SEGMENTED NEUTROPHILS RELATIVE PERCENT: 65.3 % (ref 36–66)
SODIUM BLD-SCNC: 127 MMOL/L (ref 135–145)
SOURCE: NORMAL
SPECIFIC GRAVITY UA: 1.01 (ref 1–1.03)
SQUAMOUS EPITHELIAL: 4 /HPF
TOTAL IMMATURE NEUTOROPHIL: 0.02 K/CU MM
TOTAL NUCLEATED RBC: 0 K/CU MM
TOTAL PROTEIN: 7.6 GM/DL (ref 6.4–8.2)
TRICHOMONAS: ABNORMAL /HPF
UROBILINOGEN, URINE: 0.2 MG/DL (ref 0.2–1)
WBC # BLD: 4.8 K/CU MM (ref 4–10.5)
WBC UA: 5 /HPF (ref 0–5)

## 2023-04-16 PROCEDURE — 83605 ASSAY OF LACTIC ACID: CPT

## 2023-04-16 PROCEDURE — 87635 SARS-COV-2 COVID-19 AMP PRB: CPT

## 2023-04-16 PROCEDURE — 80053 COMPREHEN METABOLIC PANEL: CPT

## 2023-04-16 PROCEDURE — 71045 X-RAY EXAM CHEST 1 VIEW: CPT

## 2023-04-16 PROCEDURE — 2580000003 HC RX 258: Performed by: PHYSICIAN ASSISTANT

## 2023-04-16 PROCEDURE — 87040 BLOOD CULTURE FOR BACTERIA: CPT

## 2023-04-16 PROCEDURE — 99284 EMERGENCY DEPT VISIT MOD MDM: CPT

## 2023-04-16 PROCEDURE — 81001 URINALYSIS AUTO W/SCOPE: CPT

## 2023-04-16 PROCEDURE — 85025 COMPLETE CBC W/AUTO DIFF WBC: CPT

## 2023-04-16 RX ORDER — 0.9 % SODIUM CHLORIDE 0.9 %
1000 INTRAVENOUS SOLUTION INTRAVENOUS ONCE
Status: COMPLETED | OUTPATIENT
Start: 2023-04-16 | End: 2023-04-16

## 2023-04-16 RX ADMIN — SODIUM CHLORIDE 1000 ML: 9 INJECTION, SOLUTION INTRAVENOUS at 19:26

## 2023-04-21 LAB
CULTURE: NORMAL
CULTURE: NORMAL
Lab: NORMAL
Lab: NORMAL
SPECIMEN: NORMAL
SPECIMEN: NORMAL

## 2023-05-01 ENCOUNTER — HOSPITAL ENCOUNTER (OUTPATIENT)
Age: 73
Discharge: HOME | End: 2023-05-01
Payer: MEDICARE

## 2023-05-01 LAB
ANION GAP SERPL CALCULATED.3IONS-SCNC: 10 MMOL/L (ref 4–16)
BUN SERPL-MCNC: 23 MG/DL (ref 6–23)
CALCIUM SERPL-MCNC: 9.5 MG/DL (ref 8.3–10.6)
CHLORIDE BLD-SCNC: 98 MMOL/L (ref 99–110)
CO2: 26 MMOL/L (ref 21–32)
CREAT SERPL-MCNC: 1.4 MG/DL (ref 0.6–1.1)
GFR SERPL CREATININE-BSD FRML MDRD: 40 ML/MIN/1.73M2
GLUCOSE SERPL-MCNC: 120 MG/DL (ref 70–99)
POTASSIUM SERPL-SCNC: 4.6 MMOL/L (ref 3.5–5.1)
SODIUM BLD-SCNC: 134 MMOL/L (ref 135–145)

## 2023-05-01 PROCEDURE — 80048 BASIC METABOLIC PNL TOTAL CA: CPT

## 2023-05-01 PROCEDURE — 36415 COLL VENOUS BLD VENIPUNCTURE: CPT

## 2023-05-04 ENCOUNTER — HOSPITAL ENCOUNTER (OUTPATIENT)
Dept: MRI IMAGING | Age: 73
Discharge: HOME | End: 2023-05-04
Payer: MEDICARE

## 2023-05-04 ENCOUNTER — HOSPITAL ENCOUNTER (OUTPATIENT)
Age: 73
Discharge: HOME | End: 2023-05-04
Payer: MEDICARE

## 2023-05-04 DIAGNOSIS — G62.9 PERIPHERAL NERVE DISORDER: ICD-10-CM

## 2023-05-04 DIAGNOSIS — G89.29 CHRONIC LUMBOSACRAL PAIN: ICD-10-CM

## 2023-05-04 DIAGNOSIS — R93.7 MUSCULOSKELETAL SYSTEM IMAGING ABNORMALITY: ICD-10-CM

## 2023-05-04 DIAGNOSIS — M54.50 CHRONIC LUMBOSACRAL PAIN: ICD-10-CM

## 2023-05-04 LAB
ALBUMIN SERPL-MCNC: 3.9 GM/DL (ref 3.4–5)
ALP BLD-CCNC: 78 IU/L (ref 40–128)
ALT SERPL-CCNC: 23 U/L (ref 10–40)
ANION GAP SERPL CALCULATED.3IONS-SCNC: 11 MMOL/L (ref 4–16)
AST SERPL-CCNC: 29 IU/L (ref 15–37)
BILIRUB SERPL-MCNC: 1 MG/DL (ref 0–1)
BUN SERPL-MCNC: 21 MG/DL (ref 6–23)
CALCIUM SERPL-MCNC: 9.2 MG/DL (ref 8.3–10.6)
CHLORIDE BLD-SCNC: 97 MMOL/L (ref 99–110)
CHOLEST SERPL-MCNC: 114 MG/DL
CO2: 25 MMOL/L (ref 21–32)
CREAT SERPL-MCNC: 1.3 MG/DL (ref 0.6–1.1)
ESTIMATED AVERAGE GLUCOSE: 137 MG/DL
GFR SERPL CREATININE-BSD FRML MDRD: 44 ML/MIN/1.73M2
GLUCOSE SERPL-MCNC: 201 MG/DL (ref 70–99)
HBA1C MFR BLD: 6.4 % (ref 4.2–6.3)
HDLC SERPL-MCNC: 66 MG/DL
LDLC SERPL CALC-MCNC: 36 MG/DL
LDLC SERPL-MCNC: 45 MG/DL
POTASSIUM SERPL-SCNC: 5.1 MMOL/L (ref 3.5–5.1)
SODIUM BLD-SCNC: 133 MMOL/L (ref 135–145)
TOTAL PROTEIN: 7.3 GM/DL (ref 6.4–8.2)
TRIGL SERPL-MCNC: 61 MG/DL
TSH SERPL DL<=0.005 MIU/L-ACNC: 1.33 UIU/ML (ref 0.27–4.2)

## 2023-05-04 PROCEDURE — 80053 COMPREHEN METABOLIC PANEL: CPT

## 2023-05-04 PROCEDURE — 72148 MRI LUMBAR SPINE W/O DYE: CPT

## 2023-05-04 PROCEDURE — 83721 ASSAY OF BLOOD LIPOPROTEIN: CPT

## 2023-05-04 PROCEDURE — 36415 COLL VENOUS BLD VENIPUNCTURE: CPT

## 2023-05-04 PROCEDURE — 83036 HEMOGLOBIN GLYCOSYLATED A1C: CPT

## 2023-05-04 PROCEDURE — 84443 ASSAY THYROID STIM HORMONE: CPT

## 2023-05-04 PROCEDURE — 80061 LIPID PANEL: CPT

## 2023-05-08 RX ORDER — METOCLOPRAMIDE 10 MG/1
TABLET ORAL
Qty: 45 TABLET | Refills: 0 | OUTPATIENT
Start: 2023-05-08

## 2023-05-09 RX ORDER — METOCLOPRAMIDE 10 MG/1
5 TABLET ORAL
Qty: 30 TABLET | Refills: 5 | Status: ON HOLD | OUTPATIENT
Start: 2023-05-09 | End: 2023-07-03 | Stop reason: HOSPADM

## 2023-05-11 ENCOUNTER — TELEPHONE (OUTPATIENT)
Dept: NEUROLOGY | Age: 73
End: 2023-05-11

## 2023-05-11 NOTE — TELEPHONE ENCOUNTER
Pt's  Tad called and reports the pt is not taking the Razadyne and she will no longer be taking the Namenda after her current rx is gone so there is no reason to follow up per pt's .

## 2023-05-26 ENCOUNTER — HOSPITAL ENCOUNTER (OUTPATIENT)
Age: 73
Discharge: HOME | End: 2023-05-26
Payer: MEDICARE

## 2023-05-26 ENCOUNTER — OFFICE VISIT (OUTPATIENT)
Dept: GASTROENTEROLOGY | Age: 73
End: 2023-05-26
Payer: MEDICARE

## 2023-05-26 VITALS
DIASTOLIC BLOOD PRESSURE: 70 MMHG | BODY MASS INDEX: 23.32 KG/M2 | TEMPERATURE: 97.4 F | OXYGEN SATURATION: 98 % | SYSTOLIC BLOOD PRESSURE: 138 MMHG | WEIGHT: 136.6 LBS | HEIGHT: 64 IN | HEART RATE: 68 BPM

## 2023-05-26 DIAGNOSIS — R18.8 CIRRHOSIS OF LIVER WITH ASCITES, UNSPECIFIED HEPATIC CIRRHOSIS TYPE (HCC): ICD-10-CM

## 2023-05-26 DIAGNOSIS — K74.60 CIRRHOSIS OF LIVER WITH ASCITES, UNSPECIFIED HEPATIC CIRRHOSIS TYPE (HCC): Primary | ICD-10-CM

## 2023-05-26 DIAGNOSIS — K74.60 CIRRHOSIS OF LIVER WITH ASCITES, UNSPECIFIED HEPATIC CIRRHOSIS TYPE (HCC): ICD-10-CM

## 2023-05-26 DIAGNOSIS — R18.8 CIRRHOSIS OF LIVER WITH ASCITES, UNSPECIFIED HEPATIC CIRRHOSIS TYPE (HCC): Primary | ICD-10-CM

## 2023-05-26 LAB
INR BLD: 0.96 INDEX
PROTHROMBIN TIME: 12.2 SECONDS (ref 11.7–14.5)

## 2023-05-26 PROCEDURE — 3078F DIAST BP <80 MM HG: CPT | Performed by: SPECIALIST

## 2023-05-26 PROCEDURE — 1090F PRES/ABSN URINE INCON ASSESS: CPT | Performed by: SPECIALIST

## 2023-05-26 PROCEDURE — 85610 PROTHROMBIN TIME: CPT

## 2023-05-26 PROCEDURE — 36415 COLL VENOUS BLD VENIPUNCTURE: CPT

## 2023-05-26 PROCEDURE — 1123F ACP DISCUSS/DSCN MKR DOCD: CPT | Performed by: SPECIALIST

## 2023-05-26 PROCEDURE — 1036F TOBACCO NON-USER: CPT | Performed by: SPECIALIST

## 2023-05-26 PROCEDURE — 99214 OFFICE O/P EST MOD 30 MIN: CPT | Performed by: SPECIALIST

## 2023-05-26 PROCEDURE — 82105 ALPHA-FETOPROTEIN SERUM: CPT

## 2023-05-26 PROCEDURE — 3017F COLORECTAL CA SCREEN DOC REV: CPT | Performed by: SPECIALIST

## 2023-05-26 PROCEDURE — G8400 PT W/DXA NO RESULTS DOC: HCPCS | Performed by: SPECIALIST

## 2023-05-26 PROCEDURE — G8427 DOCREV CUR MEDS BY ELIG CLIN: HCPCS | Performed by: SPECIALIST

## 2023-05-26 PROCEDURE — 3075F SYST BP GE 130 - 139MM HG: CPT | Performed by: SPECIALIST

## 2023-05-26 PROCEDURE — G8420 CALC BMI NORM PARAMETERS: HCPCS | Performed by: SPECIALIST

## 2023-05-26 NOTE — PROGRESS NOTES
CC: Follow up for cirrhosis    SUBJECTIVE:  No problems- denies abdominal pain,nausea,vomiting, diarrhea, constipation,melena, hematochezia, or hematemesis.  Denies abdominal swelling, peripheral edema, periods of confusion    Last AFP:      1/2023--- 2.4    Last liver imaging:  CT 3/2023-- no mass    Last EGD for varices:  Hepatitis A immunity: Yes   Hepatitis B immunity: Yes  Lab Results   Component Value Date/Time    LABALBU 3.9 05/04/2023 09:02 AM    LABALBU 4.4 07/02/2020 07:59 AM    AST 29 05/04/2023 09:02 AM    ALT 23 05/04/2023 09:02 AM    ALKPHOS 78 05/04/2023 09:02 AM    BILITOT 1.0 05/04/2023 09:02 AM    BILIDIR 0.2 07/15/2022 06:19 AM    IBILI 0.4 07/15/2022 06:19 AM    AMMONIA 59 03/23/2023 03:26 PM          ROS: non-contributory    OBJECTIVE:   PHYSICAL EXAM:    Vitals:  /70 (Site: Left Upper Arm, Position: Sitting, Cuff Size: Medium Adult)   Pulse 68   Temp 97.4 °F (36.3 °C) (Infrared)   Ht 5' 4\" (1.626 m)   Wt 136 lb 9.6 oz (62 kg)   SpO2 98%   BMI 23.45 kg/m²   CONSTITUTIONAL: alert  EYES:  pupils equal, round and reactive to light and sclera clear  LUNGS:  clear to auscultation  CARDIOVASCULAR:  regular rate and rhythm and no murmur noted  ABDOMEN:  normal bowel sounds, soft, non-distended, non-tender and no masses palpated, no hepatosplenomegally  NEUROLOGIC: no asterixis or focal deficit detected   EXTREMITIES: no clubbing, cyanosis, or edema    ASSESSMENT:    1) cirrhosis- stable    PLAN:   1) AFP, INR   2) return 3 months

## 2023-05-28 LAB — AFP-TM SERPL-MCNC: 3 NG/ML (ref 0–9)

## 2023-06-07 ENCOUNTER — TELEPHONE (OUTPATIENT)
Dept: GASTROENTEROLOGY | Age: 73
End: 2023-06-07

## 2023-06-27 ENCOUNTER — TELEPHONE (OUTPATIENT)
Dept: GASTROENTEROLOGY | Age: 73
End: 2023-06-27

## 2023-07-02 ENCOUNTER — HOSPITAL ENCOUNTER (OUTPATIENT)
Age: 73
Setting detail: OBSERVATION
Discharge: HOME | End: 2023-07-03
Attending: EMERGENCY MEDICINE | Admitting: STUDENT IN AN ORGANIZED HEALTH CARE EDUCATION/TRAINING PROGRAM
Payer: MEDICARE

## 2023-07-02 ENCOUNTER — APPOINTMENT (OUTPATIENT)
Dept: GENERAL RADIOLOGY | Age: 73
End: 2023-07-02
Payer: MEDICARE

## 2023-07-02 DIAGNOSIS — I95.9 HYPOTENSION, UNSPECIFIED HYPOTENSION TYPE: ICD-10-CM

## 2023-07-02 DIAGNOSIS — R11.0 NAUSEA: Primary | ICD-10-CM

## 2023-07-02 DIAGNOSIS — R55 NEAR SYNCOPE: ICD-10-CM

## 2023-07-02 DIAGNOSIS — D64.9 ANEMIA, UNSPECIFIED TYPE: ICD-10-CM

## 2023-07-02 LAB
25(OH)D3 SERPL-MCNC: 45.07 NG/ML
ABO/RH: NORMAL
ALBUMIN SERPL-MCNC: 4.1 GM/DL (ref 3.4–5)
ALP BLD-CCNC: 76 IU/L (ref 40–129)
ALT SERPL-CCNC: 26 U/L (ref 10–40)
AMMONIA: 58 UMOL/L (ref 11–51)
ANION GAP SERPL CALCULATED.3IONS-SCNC: 9 MMOL/L (ref 4–16)
ANTIBODY SCREEN: NEGATIVE
APTT: 30 SECONDS (ref 25.1–37.1)
AST SERPL-CCNC: 34 IU/L (ref 15–37)
BASE EXCESS MIXED: 2.8 (ref 0–2.3)
BASOPHILS ABSOLUTE: 0 K/CU MM
BASOPHILS RELATIVE PERCENT: 0.8 % (ref 0–1)
BILIRUB SERPL-MCNC: 0.8 MG/DL (ref 0–1)
BILIRUBIN URINE: NEGATIVE MG/DL
BLOOD, URINE: NEGATIVE
BUN SERPL-MCNC: 14 MG/DL (ref 6–23)
CALCIUM SERPL-MCNC: 9.5 MG/DL (ref 8.3–10.6)
CHLORIDE BLD-SCNC: 98 MMOL/L (ref 99–110)
CLARITY: CLEAR
CO2: 27 MMOL/L (ref 21–32)
COLOR: YELLOW
COMMENT UA: NORMAL
COMMENT: ABNORMAL
CREAT SERPL-MCNC: 1.5 MG/DL (ref 0.6–1.1)
DIFFERENTIAL TYPE: ABNORMAL
EOSINOPHILS ABSOLUTE: 0.1 K/CU MM
EOSINOPHILS RELATIVE PERCENT: 2.5 % (ref 0–3)
FOLATE SERPL-MCNC: >20 NG/ML (ref 3.1–17.5)
GFR SERPL CREATININE-BSD FRML MDRD: 37 ML/MIN/1.73M2
GLUCOSE BLD-MCNC: 157 MG/DL (ref 70–99)
GLUCOSE BLD-MCNC: 198 MG/DL (ref 70–99)
GLUCOSE SERPL-MCNC: 69 MG/DL (ref 70–99)
GLUCOSE, URINE: NEGATIVE MG/DL
HCO3 VENOUS: 30.4 MMOL/L (ref 19–25)
HCT VFR BLD CALC: 25.9 % (ref 37–47)
HEMOGLOBIN: 8.6 GM/DL (ref 12.5–16)
IMMATURE NEUTROPHIL %: 0.2 % (ref 0–0.43)
INR BLD: 1.1 INDEX
KETONES, URINE: NEGATIVE MG/DL
LACTIC ACID, SEPSIS: 1.4 MMOL/L (ref 0.5–1.9)
LACTIC ACID, SEPSIS: 1.5 MMOL/L (ref 0.5–1.9)
LEUKOCYTE ESTERASE, URINE: NEGATIVE
LYMPHOCYTES ABSOLUTE: 1.3 K/CU MM
LYMPHOCYTES RELATIVE PERCENT: 24.6 % (ref 24–44)
MCH RBC QN AUTO: 35.7 PG (ref 27–31)
MCHC RBC AUTO-ENTMCNC: 33.2 % (ref 32–36)
MCV RBC AUTO: 107.5 FL (ref 78–100)
MONOCYTES ABSOLUTE: 0.4 K/CU MM
MONOCYTES RELATIVE PERCENT: 7.8 % (ref 0–4)
NITRITE URINE, QUANTITATIVE: NEGATIVE
NUCLEATED RBC %: 0 %
O2 SAT, VEN: 83.7 % (ref 50–70)
PCO2, VEN: 59 MMHG (ref 38–52)
PDW BLD-RTO: 15 % (ref 11.7–14.9)
PH VENOUS: 7.32 (ref 7.32–7.42)
PH, URINE: 7 (ref 5–8)
PLATELET # BLD: 133 K/CU MM (ref 140–440)
PMV BLD AUTO: 11.5 FL (ref 7.5–11.1)
PO2, VEN: 58 MMHG (ref 28–48)
POTASSIUM SERPL-SCNC: 4.4 MMOL/L (ref 3.5–5.1)
PROTEIN UA: NEGATIVE MG/DL
PROTHROMBIN TIME: 14.7 SECONDS (ref 11.7–14.5)
RBC # BLD: 2.41 M/CU MM (ref 4.2–5.4)
SEGMENTED NEUTROPHILS ABSOLUTE COUNT: 3.4 K/CU MM
SEGMENTED NEUTROPHILS RELATIVE PERCENT: 64.1 % (ref 36–66)
SODIUM BLD-SCNC: 134 MMOL/L (ref 135–145)
SPECIFIC GRAVITY UA: 1.01 (ref 1–1.03)
TOTAL IMMATURE NEUTOROPHIL: 0.01 K/CU MM
TOTAL NUCLEATED RBC: 0 K/CU MM
TOTAL PROTEIN: 7.4 GM/DL (ref 6.4–8.2)
TROPONIN T: 0.01 NG/ML
TROPONIN T: 0.02 NG/ML
TROPONIN T: 0.02 NG/ML
TSH SERPL DL<=0.005 MIU/L-ACNC: 0.26 UIU/ML (ref 0.27–4.2)
UROBILINOGEN, URINE: 0.2 MG/DL (ref 0.2–1)
VITAMIN B-12: 699.7 PG/ML (ref 211–911)
WBC # BLD: 5.3 K/CU MM (ref 4–10.5)

## 2023-07-02 PROCEDURE — 86850 RBC ANTIBODY SCREEN: CPT

## 2023-07-02 PROCEDURE — G0378 HOSPITAL OBSERVATION PER HR: HCPCS

## 2023-07-02 PROCEDURE — 83605 ASSAY OF LACTIC ACID: CPT

## 2023-07-02 PROCEDURE — 84443 ASSAY THYROID STIM HORMONE: CPT

## 2023-07-02 PROCEDURE — 6360000002 HC RX W HCPCS: Performed by: NURSE PRACTITIONER

## 2023-07-02 PROCEDURE — 87040 BLOOD CULTURE FOR BACTERIA: CPT

## 2023-07-02 PROCEDURE — 82607 VITAMIN B-12: CPT

## 2023-07-02 PROCEDURE — C9113 INJ PANTOPRAZOLE SODIUM, VIA: HCPCS | Performed by: NURSE PRACTITIONER

## 2023-07-02 PROCEDURE — 85025 COMPLETE CBC W/AUTO DIFF WBC: CPT

## 2023-07-02 PROCEDURE — 82962 GLUCOSE BLOOD TEST: CPT

## 2023-07-02 PROCEDURE — 2580000003 HC RX 258: Performed by: NURSE PRACTITIONER

## 2023-07-02 PROCEDURE — 82746 ASSAY OF FOLIC ACID SERUM: CPT

## 2023-07-02 PROCEDURE — 6370000000 HC RX 637 (ALT 250 FOR IP): Performed by: NURSE PRACTITIONER

## 2023-07-02 PROCEDURE — 80053 COMPREHEN METABOLIC PANEL: CPT

## 2023-07-02 PROCEDURE — 94761 N-INVAS EAR/PLS OXIMETRY MLT: CPT

## 2023-07-02 PROCEDURE — 84484 ASSAY OF TROPONIN QUANT: CPT

## 2023-07-02 PROCEDURE — 36415 COLL VENOUS BLD VENIPUNCTURE: CPT

## 2023-07-02 PROCEDURE — 82306 VITAMIN D 25 HYDROXY: CPT

## 2023-07-02 PROCEDURE — 93005 ELECTROCARDIOGRAM TRACING: CPT | Performed by: EMERGENCY MEDICINE

## 2023-07-02 PROCEDURE — 99285 EMERGENCY DEPT VISIT HI MDM: CPT

## 2023-07-02 PROCEDURE — 86900 BLOOD TYPING SEROLOGIC ABO: CPT

## 2023-07-02 PROCEDURE — 71045 X-RAY EXAM CHEST 1 VIEW: CPT

## 2023-07-02 PROCEDURE — 86901 BLOOD TYPING SEROLOGIC RH(D): CPT

## 2023-07-02 PROCEDURE — 2580000003 HC RX 258: Performed by: EMERGENCY MEDICINE

## 2023-07-02 PROCEDURE — 6360000002 HC RX W HCPCS: Performed by: EMERGENCY MEDICINE

## 2023-07-02 PROCEDURE — 82140 ASSAY OF AMMONIA: CPT

## 2023-07-02 PROCEDURE — 85610 PROTHROMBIN TIME: CPT

## 2023-07-02 PROCEDURE — 85730 THROMBOPLASTIN TIME PARTIAL: CPT

## 2023-07-02 PROCEDURE — 82805 BLOOD GASES W/O2 SATURATION: CPT

## 2023-07-02 PROCEDURE — 81003 URINALYSIS AUTO W/O SCOPE: CPT

## 2023-07-02 RX ORDER — GLUCAGON 1 MG/ML
1 KIT INJECTION PRN
Status: DISCONTINUED | OUTPATIENT
Start: 2023-07-02 | End: 2023-07-03 | Stop reason: HOSPADM

## 2023-07-02 RX ORDER — RISPERIDONE 0.25 MG/1
0.25 TABLET ORAL NIGHTLY
Status: DISCONTINUED | OUTPATIENT
Start: 2023-07-02 | End: 2023-07-03 | Stop reason: HOSPADM

## 2023-07-02 RX ORDER — SODIUM CHLORIDE 9 MG/ML
INJECTION, SOLUTION INTRAVENOUS CONTINUOUS
Status: DISCONTINUED | OUTPATIENT
Start: 2023-07-02 | End: 2023-07-03

## 2023-07-02 RX ORDER — INSULIN LISPRO 100 [IU]/ML
0-8 INJECTION, SOLUTION INTRAVENOUS; SUBCUTANEOUS
Status: DISCONTINUED | OUTPATIENT
Start: 2023-07-02 | End: 2023-07-03 | Stop reason: HOSPADM

## 2023-07-02 RX ORDER — SODIUM CHLORIDE 0.9 % (FLUSH) 0.9 %
5-40 SYRINGE (ML) INJECTION PRN
Status: DISCONTINUED | OUTPATIENT
Start: 2023-07-02 | End: 2023-07-03 | Stop reason: HOSPADM

## 2023-07-02 RX ORDER — POTASSIUM CHLORIDE 750 MG/1
10 CAPSULE, EXTENDED RELEASE ORAL 2 TIMES DAILY
COMMUNITY

## 2023-07-02 RX ORDER — INSULIN LISPRO 100 [IU]/ML
0-4 INJECTION, SOLUTION INTRAVENOUS; SUBCUTANEOUS NIGHTLY
Status: DISCONTINUED | OUTPATIENT
Start: 2023-07-02 | End: 2023-07-03 | Stop reason: HOSPADM

## 2023-07-02 RX ORDER — LORAZEPAM 0.5 MG/1
0.5 TABLET ORAL NIGHTLY PRN
Status: DISCONTINUED | OUTPATIENT
Start: 2023-07-02 | End: 2023-07-03 | Stop reason: HOSPADM

## 2023-07-02 RX ORDER — GALANTAMINE HYDROBROMIDE 4 MG/1
4 TABLET, FILM COATED ORAL 2 TIMES DAILY
Status: DISCONTINUED | OUTPATIENT
Start: 2023-07-02 | End: 2023-07-03 | Stop reason: HOSPADM

## 2023-07-02 RX ORDER — SODIUM CHLORIDE 9 MG/ML
INJECTION, SOLUTION INTRAVENOUS PRN
Status: DISCONTINUED | OUTPATIENT
Start: 2023-07-02 | End: 2023-07-03 | Stop reason: HOSPADM

## 2023-07-02 RX ORDER — 0.9 % SODIUM CHLORIDE 0.9 %
1000 INTRAVENOUS SOLUTION INTRAVENOUS ONCE
Status: COMPLETED | OUTPATIENT
Start: 2023-07-02 | End: 2023-07-02

## 2023-07-02 RX ORDER — PANTOPRAZOLE SODIUM 40 MG/10ML
40 INJECTION, POWDER, LYOPHILIZED, FOR SOLUTION INTRAVENOUS DAILY
Status: DISCONTINUED | OUTPATIENT
Start: 2023-07-02 | End: 2023-07-03 | Stop reason: HOSPADM

## 2023-07-02 RX ORDER — SODIUM CHLORIDE 0.9 % (FLUSH) 0.9 %
5-40 SYRINGE (ML) INJECTION EVERY 12 HOURS SCHEDULED
Status: DISCONTINUED | OUTPATIENT
Start: 2023-07-02 | End: 2023-07-03 | Stop reason: HOSPADM

## 2023-07-02 RX ORDER — MEMANTINE HYDROCHLORIDE 5 MG/1
5 TABLET ORAL 2 TIMES DAILY
Status: DISCONTINUED | OUTPATIENT
Start: 2023-07-02 | End: 2023-07-03 | Stop reason: HOSPADM

## 2023-07-02 RX ORDER — ONDANSETRON 2 MG/ML
4 INJECTION INTRAMUSCULAR; INTRAVENOUS EVERY 30 MIN PRN
Status: DISCONTINUED | OUTPATIENT
Start: 2023-07-02 | End: 2023-07-03 | Stop reason: HOSPADM

## 2023-07-02 RX ORDER — POTASSIUM CHLORIDE 20 MEQ/1
40 TABLET, EXTENDED RELEASE ORAL PRN
Status: DISCONTINUED | OUTPATIENT
Start: 2023-07-02 | End: 2023-07-03 | Stop reason: HOSPADM

## 2023-07-02 RX ORDER — ACETAMINOPHEN 650 MG/1
650 SUPPOSITORY RECTAL EVERY 6 HOURS PRN
Status: DISCONTINUED | OUTPATIENT
Start: 2023-07-02 | End: 2023-07-03 | Stop reason: HOSPADM

## 2023-07-02 RX ORDER — MAGNESIUM SULFATE IN WATER 40 MG/ML
2000 INJECTION, SOLUTION INTRAVENOUS PRN
Status: DISCONTINUED | OUTPATIENT
Start: 2023-07-02 | End: 2023-07-03 | Stop reason: HOSPADM

## 2023-07-02 RX ORDER — ACETAMINOPHEN 325 MG/1
650 TABLET ORAL EVERY 6 HOURS PRN
Status: DISCONTINUED | OUTPATIENT
Start: 2023-07-02 | End: 2023-07-03 | Stop reason: HOSPADM

## 2023-07-02 RX ORDER — LACTULOSE 10 G/15ML
20 SOLUTION ORAL 2 TIMES DAILY
Status: DISCONTINUED | OUTPATIENT
Start: 2023-07-02 | End: 2023-07-03 | Stop reason: HOSPADM

## 2023-07-02 RX ORDER — LEVOTHYROXINE SODIUM 88 UG/1
88 TABLET ORAL DAILY
Status: DISCONTINUED | OUTPATIENT
Start: 2023-07-03 | End: 2023-07-03 | Stop reason: HOSPADM

## 2023-07-02 RX ORDER — INSULIN GLARGINE 100 [IU]/ML
30 INJECTION, SOLUTION SUBCUTANEOUS 2 TIMES DAILY
Status: DISCONTINUED | OUTPATIENT
Start: 2023-07-02 | End: 2023-07-03 | Stop reason: HOSPADM

## 2023-07-02 RX ORDER — DEXTROSE MONOHYDRATE 100 MG/ML
INJECTION, SOLUTION INTRAVENOUS CONTINUOUS PRN
Status: DISCONTINUED | OUTPATIENT
Start: 2023-07-02 | End: 2023-07-03 | Stop reason: HOSPADM

## 2023-07-02 RX ORDER — POTASSIUM CHLORIDE 7.45 MG/ML
10 INJECTION INTRAVENOUS PRN
Status: DISCONTINUED | OUTPATIENT
Start: 2023-07-02 | End: 2023-07-03 | Stop reason: HOSPADM

## 2023-07-02 RX ORDER — LATANOPROST 50 UG/ML
1 SOLUTION/ DROPS OPHTHALMIC NIGHTLY
Status: DISCONTINUED | OUTPATIENT
Start: 2023-07-02 | End: 2023-07-03 | Stop reason: HOSPADM

## 2023-07-02 RX ORDER — ENOXAPARIN SODIUM 100 MG/ML
40 INJECTION SUBCUTANEOUS DAILY
Status: DISCONTINUED | OUTPATIENT
Start: 2023-07-02 | End: 2023-07-03 | Stop reason: HOSPADM

## 2023-07-02 RX ORDER — POLYETHYLENE GLYCOL 3350 17 G/17G
17 POWDER, FOR SOLUTION ORAL DAILY PRN
Status: DISCONTINUED | OUTPATIENT
Start: 2023-07-02 | End: 2023-07-03 | Stop reason: HOSPADM

## 2023-07-02 RX ADMIN — ENOXAPARIN SODIUM 40 MG: 100 INJECTION SUBCUTANEOUS at 18:25

## 2023-07-02 RX ADMIN — SODIUM CHLORIDE 1000 ML: 9 INJECTION, SOLUTION INTRAVENOUS at 14:42

## 2023-07-02 RX ADMIN — ONDANSETRON 4 MG: 2 INJECTION INTRAMUSCULAR; INTRAVENOUS at 14:43

## 2023-07-02 RX ADMIN — LATANOPROST 1 DROP: 50 SOLUTION/ DROPS OPHTHALMIC at 21:05

## 2023-07-02 RX ADMIN — RISPERIDONE 0.25 MG: 0.25 TABLET, FILM COATED ORAL at 21:05

## 2023-07-02 RX ADMIN — MEMANTINE HYDROCHLORIDE 5 MG: 5 TABLET ORAL at 21:05

## 2023-07-02 RX ADMIN — PANTOPRAZOLE SODIUM 40 MG: 40 INJECTION, POWDER, FOR SOLUTION INTRAVENOUS at 18:24

## 2023-07-02 RX ADMIN — SODIUM CHLORIDE: 9 INJECTION, SOLUTION INTRAVENOUS at 18:30

## 2023-07-02 RX ADMIN — GALANTAMINE 4 MG: 4 TABLET, FILM COATED ORAL at 21:05

## 2023-07-02 ASSESSMENT — PAIN - FUNCTIONAL ASSESSMENT: PAIN_FUNCTIONAL_ASSESSMENT: NONE - DENIES PAIN

## 2023-07-02 ASSESSMENT — PAIN SCALES - GENERAL
PAINLEVEL_OUTOF10: 0
PAINLEVEL_OUTOF10: 0

## 2023-07-03 VITALS
OXYGEN SATURATION: 96 % | HEART RATE: 75 BPM | HEIGHT: 64 IN | SYSTOLIC BLOOD PRESSURE: 159 MMHG | WEIGHT: 130 LBS | RESPIRATION RATE: 12 BRPM | TEMPERATURE: 98.5 F | BODY MASS INDEX: 22.2 KG/M2 | DIASTOLIC BLOOD PRESSURE: 57 MMHG

## 2023-07-03 LAB
ALBUMIN SERPL-MCNC: 3.3 GM/DL (ref 3.4–5)
ALP BLD-CCNC: 62 IU/L (ref 40–129)
ALT SERPL-CCNC: 20 U/L (ref 10–40)
ANION GAP SERPL CALCULATED.3IONS-SCNC: 10 MMOL/L (ref 4–16)
AST SERPL-CCNC: 30 IU/L (ref 15–37)
BILIRUB SERPL-MCNC: 0.7 MG/DL (ref 0–1)
BUN SERPL-MCNC: 11 MG/DL (ref 6–23)
CALCIUM SERPL-MCNC: 8.6 MG/DL (ref 8.3–10.6)
CHLORIDE BLD-SCNC: 103 MMOL/L (ref 99–110)
CO2: 23 MMOL/L (ref 21–32)
CREAT SERPL-MCNC: 1.2 MG/DL (ref 0.6–1.1)
EKG ATRIAL RATE: 61 BPM
EKG DIAGNOSIS: NORMAL
EKG P AXIS: 56 DEGREES
EKG P-R INTERVAL: 188 MS
EKG Q-T INTERVAL: 440 MS
EKG QRS DURATION: 82 MS
EKG QTC CALCULATION (BAZETT): 442 MS
EKG R AXIS: 44 DEGREES
EKG T AXIS: 55 DEGREES
EKG VENTRICULAR RATE: 61 BPM
FERRITIN: 404 NG/ML (ref 15–150)
GFR SERPL CREATININE-BSD FRML MDRD: 48 ML/MIN/1.73M2
GLUCOSE BLD-MCNC: 101 MG/DL (ref 70–99)
GLUCOSE BLD-MCNC: 287 MG/DL (ref 70–99)
GLUCOSE SERPL-MCNC: 94 MG/DL (ref 70–99)
HCT VFR BLD CALC: 22.5 % (ref 37–47)
HCT VFR BLD CALC: 23 % (ref 37–47)
HEMOGLOBIN: 7.1 GM/DL (ref 12.5–16)
HEMOGLOBIN: 7.4 GM/DL (ref 12.5–16)
IRON: 86 UG/DL (ref 37–145)
MAGNESIUM: 1.8 MG/DL (ref 1.8–2.4)
MCH RBC QN AUTO: 34.6 PG (ref 27–31)
MCHC RBC AUTO-ENTMCNC: 31.6 % (ref 32–36)
MCV RBC AUTO: 109.8 FL (ref 78–100)
PCT TRANSFERRIN: 43 % (ref 10–44)
PDW BLD-RTO: 15.1 % (ref 11.7–14.9)
PHOSPHORUS: 3 MG/DL (ref 2.5–4.9)
PLATELET # BLD: 107 K/CU MM (ref 140–440)
PMV BLD AUTO: 12.6 FL (ref 7.5–11.1)
POTASSIUM SERPL-SCNC: 4.6 MMOL/L (ref 3.5–5.1)
RBC # BLD: 2.05 M/CU MM (ref 4.2–5.4)
SODIUM BLD-SCNC: 136 MMOL/L (ref 135–145)
TOTAL IRON BINDING CAPACITY: 198 UG/DL (ref 250–450)
TOTAL PROTEIN: 6.2 GM/DL (ref 6.4–8.2)
UNSATURATED IRON BINDING CAPACITY: 112 UG/DL (ref 110–370)
WBC # BLD: 3.1 K/CU MM (ref 4–10.5)

## 2023-07-03 PROCEDURE — 83540 ASSAY OF IRON: CPT

## 2023-07-03 PROCEDURE — 83735 ASSAY OF MAGNESIUM: CPT

## 2023-07-03 PROCEDURE — 93010 ELECTROCARDIOGRAM REPORT: CPT | Performed by: INTERNAL MEDICINE

## 2023-07-03 PROCEDURE — 82728 ASSAY OF FERRITIN: CPT

## 2023-07-03 PROCEDURE — 80053 COMPREHEN METABOLIC PANEL: CPT

## 2023-07-03 PROCEDURE — 6370000000 HC RX 637 (ALT 250 FOR IP): Performed by: NURSE PRACTITIONER

## 2023-07-03 PROCEDURE — 85018 HEMOGLOBIN: CPT

## 2023-07-03 PROCEDURE — 36415 COLL VENOUS BLD VENIPUNCTURE: CPT

## 2023-07-03 PROCEDURE — 2580000003 HC RX 258: Performed by: NURSE PRACTITIONER

## 2023-07-03 PROCEDURE — 85027 COMPLETE CBC AUTOMATED: CPT

## 2023-07-03 PROCEDURE — 85014 HEMATOCRIT: CPT

## 2023-07-03 PROCEDURE — 82962 GLUCOSE BLOOD TEST: CPT

## 2023-07-03 PROCEDURE — 94761 N-INVAS EAR/PLS OXIMETRY MLT: CPT

## 2023-07-03 PROCEDURE — 84100 ASSAY OF PHOSPHORUS: CPT

## 2023-07-03 PROCEDURE — 6360000002 HC RX W HCPCS: Performed by: NURSE PRACTITIONER

## 2023-07-03 PROCEDURE — 83550 IRON BINDING TEST: CPT

## 2023-07-03 PROCEDURE — C9113 INJ PANTOPRAZOLE SODIUM, VIA: HCPCS | Performed by: NURSE PRACTITIONER

## 2023-07-03 PROCEDURE — G0378 HOSPITAL OBSERVATION PER HR: HCPCS

## 2023-07-03 RX ADMIN — ENOXAPARIN SODIUM 40 MG: 100 INJECTION SUBCUTANEOUS at 08:30

## 2023-07-03 RX ADMIN — LEVOTHYROXINE SODIUM 88 MCG: 0.09 TABLET ORAL at 06:36

## 2023-07-03 RX ADMIN — SODIUM CHLORIDE: 9 INJECTION, SOLUTION INTRAVENOUS at 08:46

## 2023-07-03 RX ADMIN — INSULIN LISPRO 4 UNITS: 100 INJECTION, SOLUTION INTRAVENOUS; SUBCUTANEOUS at 13:12

## 2023-07-03 RX ADMIN — PANTOPRAZOLE SODIUM 40 MG: 40 INJECTION, POWDER, FOR SOLUTION INTRAVENOUS at 08:33

## 2023-07-03 RX ADMIN — MEMANTINE HYDROCHLORIDE 5 MG: 5 TABLET ORAL at 08:33

## 2023-07-03 RX ADMIN — LACTULOSE 20 G: 20 SOLUTION ORAL at 08:31

## 2023-07-03 NOTE — PROGRESS NOTES
Discharge instructions reviewed with patient. Pt left with all belongings. Pt escorted to car via wheelchair by RN student.  here to take patient home.

## 2023-07-03 NOTE — DISCHARGE SUMMARY
appropriate understanding of and agreement with the discharge recommendations, medications, and plan. Consults this admission:  None    Discharge Instruction:   Follow up appointments: GI  Primary care physician:  within 2 weeks    Diet:  regular diet   Activity: activity as tolerated  Disposition: Discharged to:   [x]Home, []C, []SNF, []Acute Rehab, []Hospice   Condition on discharge: Stable    Discharge Medications:        Medication List        CONTINUE taking these medications      Basaglar KwikPen 100 UNIT/ML injection pen  Generic drug: insulin glargine  Inject 35 Units into the skin 2 times daily 35 units in the morning and 25 units at bedtime     bimatoprost 0.03 % ophthalmic drops  Commonly known as: LUMIGAN     dicyclomine 10 MG capsule  Commonly known as: BENTYL  Take 1 capsule by mouth every 6 hours as needed (Bowel spasms)     gabapentin 100 MG capsule  Commonly known as: NEURONTIN  Take 1 capsule by mouth 3 times daily for 90 days. lactulose 10 GM/15ML solution  Commonly known as: CHRONULAC  Take 30 mLs by mouth in the morning and 30 mLs before bedtime. levothyroxine 88 MCG tablet  Commonly known as: SYNTHROID  Take 1 tablet by mouth Daily     LORazepam 0.5 MG tablet  Commonly known as: ATIVAN     NovoLOG FlexPen 100 UNIT/ML injection pen  Generic drug: insulin aspart  14 units with largest meal of the day, 7 units with each smaller meal     ondansetron 4 MG tablet  Commonly known as: ZOFRAN  Take 1 tablet by mouth daily as needed for Nausea or Vomiting     OneTouch Delica Lancets 94F Misc  Test blood sugar 1-2 times a day as directed.  Dx: E11.65     pantoprazole 40 MG tablet  Commonly known as: PROTONIX  Take 1 tablet by mouth every morning (before breakfast)     potassium chloride 10 MEQ extended release capsule  Commonly known as: MICRO-K     risperiDONE 0.25 MG tablet  Commonly known as: RisperDAL  Take 1 tablet by mouth nightly            STOP taking these medications      B2 PO

## 2023-07-06 ENCOUNTER — HOSPITAL ENCOUNTER (OUTPATIENT)
Dept: INFUSION THERAPY | Age: 73
Discharge: HOME | End: 2023-07-06
Payer: MEDICARE

## 2023-07-06 ENCOUNTER — OFFICE VISIT (OUTPATIENT)
Dept: ONCOLOGY | Age: 73
End: 2023-07-06

## 2023-07-06 VITALS
HEART RATE: 84 BPM | HEIGHT: 64 IN | OXYGEN SATURATION: 97 % | DIASTOLIC BLOOD PRESSURE: 44 MMHG | TEMPERATURE: 97.2 F | WEIGHT: 133.2 LBS | SYSTOLIC BLOOD PRESSURE: 93 MMHG | BODY MASS INDEX: 22.74 KG/M2

## 2023-07-06 DIAGNOSIS — D64.9 ANEMIA, UNSPECIFIED TYPE: ICD-10-CM

## 2023-07-06 DIAGNOSIS — Z85.3 PERSONAL HISTORY OF BREAST CANCER: Primary | ICD-10-CM

## 2023-07-06 DIAGNOSIS — Z85.3 PERSONAL HISTORY OF BREAST CANCER: ICD-10-CM

## 2023-07-06 LAB
BASOPHILS ABSOLUTE: 0 K/CU MM
BASOPHILS RELATIVE PERCENT: 0.4 % (ref 0–1)
DIFFERENTIAL TYPE: ABNORMAL
EOSINOPHILS ABSOLUTE: 0.1 K/CU MM
EOSINOPHILS RELATIVE PERCENT: 2 % (ref 0–3)
HCT VFR BLD CALC: 24.5 % (ref 37–47)
HEMOGLOBIN: 8 GM/DL (ref 12.5–16)
LYMPHOCYTES ABSOLUTE: 1.4 K/CU MM
LYMPHOCYTES RELATIVE PERCENT: 29.8 % (ref 24–44)
MCH RBC QN AUTO: 34.9 PG (ref 27–31)
MCHC RBC AUTO-ENTMCNC: 32.7 % (ref 32–36)
MCV RBC AUTO: 107 FL (ref 78–100)
MONOCYTES ABSOLUTE: 0.3 K/CU MM
MONOCYTES RELATIVE PERCENT: 7 % (ref 0–4)
PDW BLD-RTO: 14.3 % (ref 11.7–14.9)
PLATELET # BLD: 127 K/CU MM (ref 140–440)
PMV BLD AUTO: 11.6 FL (ref 7.5–11.1)
RBC # BLD: 2.29 M/CU MM (ref 4.2–5.4)
RETICULOCYTE COUNT PCT: 2.8 % (ref 0.2–2.2)
SEGMENTED NEUTROPHILS ABSOLUTE COUNT: 2.8 K/CU MM
SEGMENTED NEUTROPHILS RELATIVE PERCENT: 60.8 % (ref 36–66)
WBC # BLD: 4.6 K/CU MM (ref 4–10.5)

## 2023-07-06 PROCEDURE — 99211 OFF/OP EST MAY X REQ PHY/QHP: CPT

## 2023-07-06 PROCEDURE — 85025 COMPLETE CBC W/AUTO DIFF WBC: CPT

## 2023-07-06 PROCEDURE — 85045 AUTOMATED RETICULOCYTE COUNT: CPT

## 2023-07-06 PROCEDURE — 36415 COLL VENOUS BLD VENIPUNCTURE: CPT

## 2023-07-06 ASSESSMENT — PATIENT HEALTH QUESTIONNAIRE - PHQ9
SUM OF ALL RESPONSES TO PHQ QUESTIONS 1-9: 0
SUM OF ALL RESPONSES TO PHQ QUESTIONS 1-9: 0
1. LITTLE INTEREST OR PLEASURE IN DOING THINGS: 0
SUM OF ALL RESPONSES TO PHQ QUESTIONS 1-9: 0
2. FEELING DOWN, DEPRESSED OR HOPELESS: 0
SUM OF ALL RESPONSES TO PHQ QUESTIONS 1-9: 0
SUM OF ALL RESPONSES TO PHQ9 QUESTIONS 1 & 2: 0

## 2023-07-07 LAB
CULTURE: NORMAL
CULTURE: NORMAL
Lab: NORMAL
Lab: NORMAL
SPECIMEN: NORMAL
SPECIMEN: NORMAL

## 2023-07-13 ENCOUNTER — OFFICE VISIT (OUTPATIENT)
Dept: GASTROENTEROLOGY | Age: 73
End: 2023-07-13
Payer: MEDICARE

## 2023-07-13 ENCOUNTER — HOSPITAL ENCOUNTER (OUTPATIENT)
Age: 73
Discharge: HOME | End: 2023-07-13
Payer: MEDICARE

## 2023-07-13 VITALS
HEIGHT: 64 IN | BODY MASS INDEX: 22.57 KG/M2 | HEART RATE: 69 BPM | DIASTOLIC BLOOD PRESSURE: 68 MMHG | TEMPERATURE: 97.5 F | WEIGHT: 132.2 LBS | SYSTOLIC BLOOD PRESSURE: 126 MMHG | OXYGEN SATURATION: 99 %

## 2023-07-13 DIAGNOSIS — K74.60 CIRRHOSIS OF LIVER WITH ASCITES, UNSPECIFIED HEPATIC CIRRHOSIS TYPE (HCC): ICD-10-CM

## 2023-07-13 DIAGNOSIS — R18.8 CIRRHOSIS OF LIVER WITH ASCITES, UNSPECIFIED HEPATIC CIRRHOSIS TYPE (HCC): Primary | ICD-10-CM

## 2023-07-13 DIAGNOSIS — R18.8 CIRRHOSIS OF LIVER WITH ASCITES, UNSPECIFIED HEPATIC CIRRHOSIS TYPE (HCC): ICD-10-CM

## 2023-07-13 DIAGNOSIS — K74.60 CIRRHOSIS OF LIVER WITH ASCITES, UNSPECIFIED HEPATIC CIRRHOSIS TYPE (HCC): Primary | ICD-10-CM

## 2023-07-13 LAB
HCT VFR BLD CALC: 24 % (ref 37–47)
HEMOGLOBIN: 7.9 GM/DL (ref 12.5–16)
INR BLD: 1.2 INDEX
MCH RBC QN AUTO: 35.4 PG (ref 27–31)
MCHC RBC AUTO-ENTMCNC: 32.9 % (ref 32–36)
MCV RBC AUTO: 107.6 FL (ref 78–100)
PDW BLD-RTO: 15.2 % (ref 11.7–14.9)
PLATELET # BLD: 135 K/CU MM (ref 140–440)
PMV BLD AUTO: 11.4 FL (ref 7.5–11.1)
PROTHROMBIN TIME: 15.2 SECONDS (ref 11.7–14.5)
RBC # BLD: 2.23 M/CU MM (ref 4.2–5.4)
WBC # BLD: 4.8 K/CU MM (ref 4–10.5)

## 2023-07-13 PROCEDURE — 82105 ALPHA-FETOPROTEIN SERUM: CPT

## 2023-07-13 PROCEDURE — 99214 OFFICE O/P EST MOD 30 MIN: CPT | Performed by: SPECIALIST

## 2023-07-13 PROCEDURE — 1090F PRES/ABSN URINE INCON ASSESS: CPT | Performed by: SPECIALIST

## 2023-07-13 PROCEDURE — 85027 COMPLETE CBC AUTOMATED: CPT

## 2023-07-13 PROCEDURE — G8400 PT W/DXA NO RESULTS DOC: HCPCS | Performed by: SPECIALIST

## 2023-07-13 PROCEDURE — 3017F COLORECTAL CA SCREEN DOC REV: CPT | Performed by: SPECIALIST

## 2023-07-13 PROCEDURE — 1036F TOBACCO NON-USER: CPT | Performed by: SPECIALIST

## 2023-07-13 PROCEDURE — 1123F ACP DISCUSS/DSCN MKR DOCD: CPT | Performed by: SPECIALIST

## 2023-07-13 PROCEDURE — 3074F SYST BP LT 130 MM HG: CPT | Performed by: SPECIALIST

## 2023-07-13 PROCEDURE — 86708 HEPATITIS A ANTIBODY: CPT

## 2023-07-13 PROCEDURE — 36415 COLL VENOUS BLD VENIPUNCTURE: CPT

## 2023-07-13 PROCEDURE — G8427 DOCREV CUR MEDS BY ELIG CLIN: HCPCS | Performed by: SPECIALIST

## 2023-07-13 PROCEDURE — 85610 PROTHROMBIN TIME: CPT

## 2023-07-13 PROCEDURE — 3078F DIAST BP <80 MM HG: CPT | Performed by: SPECIALIST

## 2023-07-13 PROCEDURE — G8420 CALC BMI NORM PARAMETERS: HCPCS | Performed by: SPECIALIST

## 2023-07-13 RX ORDER — ACETAMINOPHEN 160 MG
TABLET,DISINTEGRATING ORAL
COMMUNITY

## 2023-07-13 RX ORDER — CARVEDILOL 3.12 MG/1
3.12 TABLET ORAL DAILY
Qty: 120 TABLET | Refills: 3 | Status: SHIPPED | OUTPATIENT
Start: 2023-07-13 | End: 2023-07-14 | Stop reason: DRUGHIGH

## 2023-07-13 RX ORDER — METOCLOPRAMIDE 10 MG/1
TABLET ORAL
COMMUNITY
Start: 2023-07-08

## 2023-07-14 LAB — AFP-TM SERPL-MCNC: 3 NG/ML (ref 0–9)

## 2023-07-14 RX ORDER — CARVEDILOL 6.25 MG/1
6.25 TABLET ORAL 2 TIMES DAILY
Qty: 180 TABLET | Refills: 1 | Status: SHIPPED | OUTPATIENT
Start: 2023-07-14

## 2023-07-15 LAB — HAV AB SER QL IA: NEGATIVE

## 2023-08-04 ENCOUNTER — HOSPITAL ENCOUNTER (OUTPATIENT)
Age: 73
Discharge: HOME | End: 2023-08-04
Payer: MEDICARE

## 2023-08-04 LAB
ALBUMIN SERPL-MCNC: 3.7 GM/DL (ref 3.4–5)
ALP BLD-CCNC: 110 IU/L (ref 40–128)
ALT SERPL-CCNC: 40 U/L (ref 10–40)
ANION GAP SERPL CALCULATED.3IONS-SCNC: 14 MMOL/L (ref 4–16)
AST SERPL-CCNC: 49 IU/L (ref 15–37)
BILIRUB SERPL-MCNC: 0.9 MG/DL (ref 0–1)
BUN SERPL-MCNC: 14 MG/DL (ref 6–23)
CALCIUM SERPL-MCNC: 9.3 MG/DL (ref 8.3–10.6)
CHLORIDE BLD-SCNC: 100 MMOL/L (ref 99–110)
CHOLEST SERPL-MCNC: 140 MG/DL
CO2: 24 MMOL/L (ref 21–32)
CREAT SERPL-MCNC: 1.4 MG/DL (ref 0.6–1.1)
ESTIMATED AVERAGE GLUCOSE: 146 MG/DL
GFR SERPL CREATININE-BSD FRML MDRD: 40 ML/MIN/1.73M2
GLUCOSE SERPL-MCNC: 136 MG/DL (ref 70–99)
HBA1C MFR BLD: 6.7 % (ref 4.2–6.3)
HDLC SERPL-MCNC: 78 MG/DL
LDLC SERPL CALC-MCNC: 49 MG/DL
POTASSIUM SERPL-SCNC: 4.6 MMOL/L (ref 3.5–5.1)
SODIUM BLD-SCNC: 138 MMOL/L (ref 135–145)
T4 FREE SERPL-MCNC: 1.56 NG/DL (ref 0.9–1.8)
TOTAL PROTEIN: 7.1 GM/DL (ref 6.4–8.2)
TRIGL SERPL-MCNC: 64 MG/DL
TSH SERPL DL<=0.005 MIU/L-ACNC: 0.71 UIU/ML (ref 0.27–4.2)

## 2023-08-04 PROCEDURE — 36415 COLL VENOUS BLD VENIPUNCTURE: CPT

## 2023-08-04 PROCEDURE — 80061 LIPID PANEL: CPT

## 2023-08-04 PROCEDURE — 84443 ASSAY THYROID STIM HORMONE: CPT

## 2023-08-04 PROCEDURE — 84439 ASSAY OF FREE THYROXINE: CPT

## 2023-08-04 PROCEDURE — 80053 COMPREHEN METABOLIC PANEL: CPT

## 2023-08-04 PROCEDURE — 83036 HEMOGLOBIN GLYCOSYLATED A1C: CPT

## 2023-09-01 RX ORDER — KETOROLAC TROMETHAMINE 5 MG/ML
1 SOLUTION OPHTHALMIC 3 TIMES DAILY
COMMUNITY

## 2023-09-05 ENCOUNTER — ANESTHESIA EVENT (OUTPATIENT)
Dept: ENDOSCOPY | Age: 73
End: 2023-09-05
Payer: MEDICARE

## 2023-09-06 ENCOUNTER — ANESTHESIA (OUTPATIENT)
Dept: ENDOSCOPY | Age: 73
End: 2023-09-06
Payer: MEDICARE

## 2023-09-06 ENCOUNTER — HOSPITAL ENCOUNTER (OUTPATIENT)
Age: 73
Setting detail: OUTPATIENT SURGERY
Discharge: HOME | End: 2023-09-06
Attending: SPECIALIST | Admitting: SPECIALIST
Payer: MEDICARE

## 2023-09-06 VITALS
OXYGEN SATURATION: 99 % | TEMPERATURE: 97.7 F | HEART RATE: 78 BPM | DIASTOLIC BLOOD PRESSURE: 70 MMHG | SYSTOLIC BLOOD PRESSURE: 149 MMHG | RESPIRATION RATE: 18 BRPM | BODY MASS INDEX: 23.56 KG/M2 | WEIGHT: 138 LBS | HEIGHT: 64 IN

## 2023-09-06 PROBLEM — K74.60 CIRRHOSIS OF LIVER WITHOUT ASCITES (HCC): Status: ACTIVE | Noted: 2023-09-06

## 2023-09-06 LAB — GLUCOSE BLD-MCNC: 186 MG/DL (ref 70–99)

## 2023-09-06 PROCEDURE — 3609017100 HC EGD: Performed by: SPECIALIST

## 2023-09-06 PROCEDURE — 82962 GLUCOSE BLOOD TEST: CPT

## 2023-09-06 PROCEDURE — 43235 EGD DIAGNOSTIC BRUSH WASH: CPT | Performed by: SPECIALIST

## 2023-09-06 PROCEDURE — 2580000003 HC RX 258: Performed by: ANESTHESIOLOGY

## 2023-09-06 PROCEDURE — 6360000002 HC RX W HCPCS

## 2023-09-06 PROCEDURE — 7100000011 HC PHASE II RECOVERY - ADDTL 15 MIN: Performed by: SPECIALIST

## 2023-09-06 PROCEDURE — 3700000000 HC ANESTHESIA ATTENDED CARE: Performed by: SPECIALIST

## 2023-09-06 PROCEDURE — 3700000001 HC ADD 15 MINUTES (ANESTHESIA): Performed by: SPECIALIST

## 2023-09-06 PROCEDURE — 7100000010 HC PHASE II RECOVERY - FIRST 15 MIN: Performed by: SPECIALIST

## 2023-09-06 PROCEDURE — 2709999900 HC NON-CHARGEABLE SUPPLY: Performed by: SPECIALIST

## 2023-09-06 RX ORDER — SODIUM CHLORIDE, SODIUM LACTATE, POTASSIUM CHLORIDE, CALCIUM CHLORIDE 600; 310; 30; 20 MG/100ML; MG/100ML; MG/100ML; MG/100ML
INJECTION, SOLUTION INTRAVENOUS CONTINUOUS
Status: DISCONTINUED | OUTPATIENT
Start: 2023-09-06 | End: 2023-09-06 | Stop reason: HOSPADM

## 2023-09-06 RX ORDER — LIDOCAINE HYDROCHLORIDE 20 MG/ML
INJECTION, SOLUTION INTRAVENOUS PRN
Status: DISCONTINUED | OUTPATIENT
Start: 2023-09-06 | End: 2023-09-06 | Stop reason: SDUPTHER

## 2023-09-06 RX ORDER — PROPOFOL 10 MG/ML
INJECTION, EMULSION INTRAVENOUS PRN
Status: DISCONTINUED | OUTPATIENT
Start: 2023-09-06 | End: 2023-09-06 | Stop reason: SDUPTHER

## 2023-09-06 RX ORDER — CARVEDILOL 6.25 MG/1
3.12 TABLET ORAL DAILY
Qty: 30 TABLET | Refills: 5 | Status: SHIPPED | OUTPATIENT
Start: 2023-09-06 | End: 2023-09-14

## 2023-09-06 RX ADMIN — PROPOFOL 80 MG: 10 INJECTION, EMULSION INTRAVENOUS at 08:43

## 2023-09-06 RX ADMIN — SODIUM CHLORIDE, POTASSIUM CHLORIDE, SODIUM LACTATE AND CALCIUM CHLORIDE: 600; 310; 30; 20 INJECTION, SOLUTION INTRAVENOUS at 07:18

## 2023-09-06 RX ADMIN — LIDOCAINE HYDROCHLORIDE 100 MG: 20 INJECTION, SOLUTION INTRAVENOUS at 08:43

## 2023-09-06 ASSESSMENT — PAIN SCALES - GENERAL: PAINLEVEL_OUTOF10: 0

## 2023-09-06 ASSESSMENT — PAIN - FUNCTIONAL ASSESSMENT: PAIN_FUNCTIONAL_ASSESSMENT: 0-10

## 2023-09-06 NOTE — PROGRESS NOTES
Received report from Nay Muniz. Patient alert and oriented. Verified patient's name, , allergies and procedure. Took Carvedilol on 23, no blood thinners, no implants. H&P on chart. , Tad at bedside.

## 2023-09-06 NOTE — DISCHARGE INSTRUCTIONS
EGD    ____DM______    OFFICE NDTTZN__[phone]_________________    FOLLOW UP APPOINTMENT IN ____AS NEEDED. REPEAT PROCEDURE IN ____6 MONTHS____ OR AS  NEEDED. TEST ORDERED:     NONE ________________. What to Expect at Home  Your Recovery:  The only discomfort after your EGD is generally limited to a mild soreness of the throat, which may last a day or two. Call your physician immediately if you have severe chest pain, shortness of breath or a temperature of 100 degrees or higher if taken orally. How can you care for yourself at home? Activity  Rest as much as you need to after you go home. You should be able to go back to your usual activities the day after the test.  Diet  Follow your doctor's directions for eating after the test.  Drink plenty of fluids (unless your doctor has told you not to). Medications  If you have a sore throat the day after the test, use an over-the-counter spray to numb your throat. Your doctor will tell you if and when you can restart your medicines. He or she will also give you instructions about taking any new medicines. If you take blood thinners, such as warfarin (Coumadin), clopidogrel (Plavix), or aspirin, be sure to talk to your doctor. He or she will tell you if and when to start taking those medicines again. Make sure that you understand exactly what your doctor wants you to do. If a biopsy was done during the test, your doctor may tell you not to take aspirin or other anti-inflammatory medicines for a few days. These include ibuprofen (Advil, Motrin) and naproxen (Aleve). DO NOT DRINK ANYTHING WITH ALCOHOL TODAY. Other instructions:Anesthesia  For your safety, do not drive or operate machinery for 24 hours. Do not sign legal documents or make major decisions for 24 hours. The anesthesia can make it hard for you to fully understand what you are agreeing to. Follow-up care is a key part of your treatment and safety.  Be sure to make and go

## 2023-09-06 NOTE — ANESTHESIA POSTPROCEDURE EVALUATION
Department of Anesthesiology  Postprocedure Note    Patient: Luis Rosenberg  MRN: [id number]  YOB: 1950  Date of evaluation: 9/6/2023      Procedure Summary     Date: 09/06/23 Room / Location: 2010 [facility]    Anesthesia Start: 9361 Anesthesia Stop: 8047    Procedure: EVL EGD ESOPHAGOGASTRODUODENOSCOPY Diagnosis:       Colonic varices      Oral varices      (Colonic varices [I86.8])      (Oral varices [I86.8])    Surgeons: Kunal Maurice MD Responsible Provider: Lobito Stein MD    Anesthesia Type: MAC ASA Status: 4          Anesthesia Type: No value filed.     Jez Phase I: Jez Score: 10    Jez Phase II:        Anesthesia Post Evaluation    Patient location during evaluation: bedside  Patient participation: complete - patient participated  Level of consciousness: awake  Pain score: 0  Airway patency: patent  Nausea & Vomiting: no nausea and no vomiting  Complications: no  Cardiovascular status: blood pressure returned to baseline and hemodynamically stable  Respiratory status: acceptable and room air  Hydration status: euvolemic  Pain management: adequate

## 2023-09-06 NOTE — PROGRESS NOTES
Patient is back to baseline. Alert and oriented. Side rails up x 2, call light in reach. Report given to MARGARET Pizarro. , Zhang at bedside.

## 2023-09-06 NOTE — PROGRESS NOTES
0901  Patient arrived back to [facility]\"". Report given to this nurse from [facility], RN. Patient A&O, no complaint of pain. Beverage of choice offered to patient. Call light in reach and bed in lowest position. IV removed. Discharge instructions given to . ... Patient sitting on side of bed getting dressed assisted by. .. Patient escorted to car via wheelchair transported home by. ..

## 2023-09-14 ENCOUNTER — OFFICE VISIT (OUTPATIENT)
Dept: ONCOLOGY | Age: 73
End: 2023-09-14
Payer: MEDICARE

## 2023-09-14 ENCOUNTER — HOSPITAL ENCOUNTER (OUTPATIENT)
Dept: INFUSION THERAPY | Age: 73
Discharge: HOME | End: 2023-09-14
Payer: MEDICARE

## 2023-09-14 VITALS
TEMPERATURE: 97.8 F | DIASTOLIC BLOOD PRESSURE: 51 MMHG | HEIGHT: 64 IN | OXYGEN SATURATION: 98 % | WEIGHT: 133.8 LBS | BODY MASS INDEX: 22.84 KG/M2 | HEART RATE: 110 BPM | SYSTOLIC BLOOD PRESSURE: 96 MMHG

## 2023-09-14 DIAGNOSIS — Z85.3 PERSONAL HISTORY OF BREAST CANCER: ICD-10-CM

## 2023-09-14 DIAGNOSIS — D64.9 ANEMIA, UNSPECIFIED TYPE: Primary | ICD-10-CM

## 2023-09-14 PROCEDURE — G8400 PT W/DXA NO RESULTS DOC: HCPCS | Performed by: INTERNAL MEDICINE

## 2023-09-14 PROCEDURE — 3078F DIAST BP <80 MM HG: CPT | Performed by: INTERNAL MEDICINE

## 2023-09-14 PROCEDURE — 99211 OFF/OP EST MAY X REQ PHY/QHP: CPT

## 2023-09-14 PROCEDURE — 3017F COLORECTAL CA SCREEN DOC REV: CPT | Performed by: INTERNAL MEDICINE

## 2023-09-14 PROCEDURE — 1123F ACP DISCUSS/DSCN MKR DOCD: CPT | Performed by: INTERNAL MEDICINE

## 2023-09-14 PROCEDURE — 1036F TOBACCO NON-USER: CPT | Performed by: INTERNAL MEDICINE

## 2023-09-14 PROCEDURE — G8427 DOCREV CUR MEDS BY ELIG CLIN: HCPCS | Performed by: INTERNAL MEDICINE

## 2023-09-14 PROCEDURE — 3074F SYST BP LT 130 MM HG: CPT | Performed by: INTERNAL MEDICINE

## 2023-09-14 PROCEDURE — 1090F PRES/ABSN URINE INCON ASSESS: CPT | Performed by: INTERNAL MEDICINE

## 2023-09-14 PROCEDURE — G8420 CALC BMI NORM PARAMETERS: HCPCS | Performed by: INTERNAL MEDICINE

## 2023-09-14 PROCEDURE — 99213 OFFICE O/P EST LOW 20 MIN: CPT | Performed by: INTERNAL MEDICINE

## 2023-09-14 RX ORDER — INSULIN PMP CART,AUT,G6/7,CNTR
EACH SUBCUTANEOUS
COMMUNITY
Start: 2023-09-13

## 2023-09-14 RX ORDER — BIMATOPROST 0.1 MG/ML
SOLUTION/ DROPS OPHTHALMIC
COMMUNITY
Start: 2023-07-31

## 2023-09-14 RX ORDER — CARVEDILOL PHOSPHATE 10 MG/1
10 CAPSULE, EXTENDED RELEASE ORAL DAILY
COMMUNITY
Start: 2023-08-16

## 2023-09-14 RX ORDER — LOVASTATIN 40 MG/1
TABLET ORAL
COMMUNITY
Start: 2023-09-13

## 2023-09-14 ASSESSMENT — PATIENT HEALTH QUESTIONNAIRE - PHQ9
2. FEELING DOWN, DEPRESSED OR HOPELESS: 0
SUM OF ALL RESPONSES TO PHQ QUESTIONS 1-9: 0
SUM OF ALL RESPONSES TO PHQ9 QUESTIONS 1 & 2: 0
SUM OF ALL RESPONSES TO PHQ QUESTIONS 1-9: 0
1. LITTLE INTEREST OR PLEASURE IN DOING THINGS: 0
SUM OF ALL RESPONSES TO PHQ QUESTIONS 1-9: 0
SUM OF ALL RESPONSES TO PHQ QUESTIONS 1-9: 0

## 2023-09-14 NOTE — PROGRESS NOTES
MA Rooming Questions  Patient: Marlon Whitt  MRN: [id number]    Date: 9/14/2023        1. Do you have any new issues?   no         2. Do you need any refills on medications?    no    3. Have you had any imaging done since your last visit? yes - labs @ [facility]    4. Have you been hospitalized or seen in the emergency room since your last visit here?   no    5. Did the patient have a depression screening completed today?  Yes    PHQ-9 Total Score: 0 (9/14/2023  1:59 PM)       PHQ-9 Given to (if applicable):               PHQ-9 Score (if applicable):                     [] Positive     []  Negative              Does question #9 need addressed (if applicable)                     [] Yes    []  No               Addison Wallace, CMA
Repeat labs on 7/6/23 showed slight improvement in her hemoglobin (Hb 8). In view of her worsening anemia, I d/w her about having BM biopsy to r/o underlying MDS. She prefers not to have any interventions and she chose to continue with observation. I recognized that her hemoglobin is getting better on 9/13/23 labs (Hb 9.3). We decide to continue with observation. Will repeat CBC in 6 months. She was a patient of Dr. Diamante Mccormack (Hem/Onc) at [facility]. He saw her before for breast cancer and anemia. She stopped seeing him (stated he retired). I answered all her questions and concerns for today. Recent imaging and labs were reviewed and discussed with the patient.

## 2023-09-18 RX ORDER — PANTOPRAZOLE SODIUM 40 MG/1
TABLET, DELAYED RELEASE ORAL
Qty: 30 TABLET | Refills: 0 | Status: SHIPPED | OUTPATIENT
Start: 2023-09-18

## 2023-10-16 RX ORDER — PANTOPRAZOLE SODIUM 40 MG/1
TABLET, DELAYED RELEASE ORAL
Qty: 30 TABLET | Refills: 0 | Status: SHIPPED | OUTPATIENT
Start: 2023-10-16

## 2023-10-20 ENCOUNTER — OFFICE VISIT (OUTPATIENT)
Dept: GASTROENTEROLOGY | Age: 73
End: 2023-10-20
Payer: MEDICARE

## 2023-10-20 VITALS — HEART RATE: 95 BPM | OXYGEN SATURATION: 98 % | DIASTOLIC BLOOD PRESSURE: 66 MMHG | SYSTOLIC BLOOD PRESSURE: 136 MMHG

## 2023-10-20 DIAGNOSIS — K74.60 CIRRHOSIS OF LIVER WITH ASCITES, UNSPECIFIED HEPATIC CIRRHOSIS TYPE (HCC): Primary | ICD-10-CM

## 2023-10-20 DIAGNOSIS — R18.8 CIRRHOSIS OF LIVER WITH ASCITES, UNSPECIFIED HEPATIC CIRRHOSIS TYPE (HCC): Primary | ICD-10-CM

## 2023-10-20 PROCEDURE — G8400 PT W/DXA NO RESULTS DOC: HCPCS | Performed by: SPECIALIST

## 2023-10-20 PROCEDURE — 3075F SYST BP GE 130 - 139MM HG: CPT | Performed by: SPECIALIST

## 2023-10-20 PROCEDURE — 1123F ACP DISCUSS/DSCN MKR DOCD: CPT | Performed by: SPECIALIST

## 2023-10-20 PROCEDURE — 3078F DIAST BP <80 MM HG: CPT | Performed by: SPECIALIST

## 2023-10-20 PROCEDURE — 3017F COLORECTAL CA SCREEN DOC REV: CPT | Performed by: SPECIALIST

## 2023-10-20 PROCEDURE — G8420 CALC BMI NORM PARAMETERS: HCPCS | Performed by: SPECIALIST

## 2023-10-20 PROCEDURE — 1036F TOBACCO NON-USER: CPT | Performed by: SPECIALIST

## 2023-10-20 PROCEDURE — 99214 OFFICE O/P EST MOD 30 MIN: CPT | Performed by: SPECIALIST

## 2023-10-20 PROCEDURE — 1090F PRES/ABSN URINE INCON ASSESS: CPT | Performed by: SPECIALIST

## 2023-10-20 PROCEDURE — G8427 DOCREV CUR MEDS BY ELIG CLIN: HCPCS | Performed by: SPECIALIST

## 2023-10-20 PROCEDURE — G8484 FLU IMMUNIZE NO ADMIN: HCPCS | Performed by: SPECIALIST

## 2023-10-20 RX ORDER — TRAMADOL HYDROCHLORIDE 50 MG/1
50 TABLET ORAL EVERY 6 HOURS PRN
COMMUNITY
Start: 2023-10-12

## 2023-10-20 NOTE — PROGRESS NOTES
CC: Follow up for cirrhosis    SUBJECTIVE:  No problems- denies abdominal pain,nausea,vomiting, diarrhea, constipation,melena, hematochezia, or hematemesis.  Denies abdominal swelling, peripheral edema, periods of confusion    Last AFP:      7/2023--- 2.4    Last liver imaging:   3/2023-- CT no mass   Last EGD for varices: 2/2023-- decompressed varices  Hepatitis A immunity: had one injection   Hepatitis B immunity: had one injection     ROS: non-contributory    OBJECTIVE:   PHYSICAL EXAM:    Vitals:  /66 (Site: Left Upper Arm, Position: Sitting, Cuff Size: Medium Adult)   Pulse 95   SpO2 98%   CONSTITUTIONAL: alert  EYES:  pupils equal, round and reactive to light and sclera clear  LUNGS:  clear to auscultation  CARDIOVASCULAR:  regular rate and rhythm and no murmur noted  ABDOMEN:  normal bowel sounds, soft, non-distended, non-tender and no masses palpated, no hepatosplenomegally  NEUROLOGIC: no asterixis or focal deficit detected   EXTREMITIES: no clubbing, cyanosis, or edema    ASSESSMENT:    1) cirrhosis- stable    PLAN:   1) CBC,CMP,PT   2) Ultrasound liver for hepatoma surveillance   3) return visit 3 months

## 2023-11-07 RX ORDER — PANTOPRAZOLE SODIUM 40 MG/1
TABLET, DELAYED RELEASE ORAL
Qty: 30 TABLET | Refills: 0 | Status: SHIPPED | OUTPATIENT
Start: 2023-11-07

## 2023-11-08 RX ORDER — LACTULOSE 10 G/15ML
20 SOLUTION ORAL 2 TIMES DAILY
Qty: 1800 ML | Refills: 5 | Status: SHIPPED | OUTPATIENT
Start: 2023-11-08

## 2023-11-09 ENCOUNTER — HOSPITAL ENCOUNTER (OUTPATIENT)
Dept: ULTRASOUND IMAGING | Age: 73
Discharge: HOME | End: 2023-11-09
Attending: SPECIALIST
Payer: MEDICARE

## 2023-11-09 ENCOUNTER — HOSPITAL ENCOUNTER (OUTPATIENT)
Age: 73
Discharge: HOME | End: 2023-11-09
Attending: SPECIALIST
Payer: MEDICARE

## 2023-11-09 DIAGNOSIS — K74.60 CIRRHOSIS OF LIVER WITH ASCITES, UNSPECIFIED HEPATIC CIRRHOSIS TYPE (HCC): ICD-10-CM

## 2023-11-09 DIAGNOSIS — R18.8 CIRRHOSIS OF LIVER WITH ASCITES, UNSPECIFIED HEPATIC CIRRHOSIS TYPE (HCC): ICD-10-CM

## 2023-11-09 LAB
ALBUMIN SERPL-MCNC: 3.5 GM/DL (ref 3.4–5)
ALP BLD-CCNC: 76 IU/L (ref 40–128)
ALT SERPL-CCNC: 19 U/L (ref 10–40)
ANION GAP SERPL CALCULATED.3IONS-SCNC: 11 MMOL/L (ref 4–16)
AST SERPL-CCNC: 29 IU/L (ref 15–37)
BILIRUB SERPL-MCNC: 0.9 MG/DL (ref 0–1)
BUN SERPL-MCNC: 17 MG/DL (ref 6–23)
CALCIUM SERPL-MCNC: 9.7 MG/DL (ref 8.3–10.6)
CHLORIDE BLD-SCNC: 102 MMOL/L (ref 99–110)
CO2: 25 MMOL/L (ref 21–32)
CREAT SERPL-MCNC: 1.4 MG/DL (ref 0.6–1.1)
GFR SERPL CREATININE-BSD FRML MDRD: 40 ML/MIN/1.73M2
GLUCOSE SERPL-MCNC: 127 MG/DL (ref 70–99)
HCT VFR BLD CALC: 28 % (ref 37–47)
HEMOGLOBIN: 8.8 GM/DL (ref 12.5–16)
INR BLD: 1.1 INDEX
MCH RBC QN AUTO: 34.4 PG (ref 27–31)
MCHC RBC AUTO-ENTMCNC: 31.4 % (ref 32–36)
MCV RBC AUTO: 109.4 FL (ref 78–100)
PDW BLD-RTO: 15.4 % (ref 11.7–14.9)
PLATELET # BLD: 141 K/CU MM (ref 140–440)
PMV BLD AUTO: 11.9 FL (ref 7.5–11.1)
POTASSIUM SERPL-SCNC: 4.5 MMOL/L (ref 3.5–5.1)
PROTHROMBIN TIME: 14.8 SECONDS (ref 11.7–14.5)
RBC # BLD: 2.56 M/CU MM (ref 4.2–5.4)
SODIUM BLD-SCNC: 138 MMOL/L (ref 135–145)
TOTAL PROTEIN: 7.2 GM/DL (ref 6.4–8.2)
WBC # BLD: 3.9 K/CU MM (ref 4–10.5)

## 2023-11-09 PROCEDURE — 36415 COLL VENOUS BLD VENIPUNCTURE: CPT

## 2023-11-09 PROCEDURE — 85027 COMPLETE CBC AUTOMATED: CPT

## 2023-11-09 PROCEDURE — 85610 PROTHROMBIN TIME: CPT

## 2023-11-09 PROCEDURE — 76705 ECHO EXAM OF ABDOMEN: CPT

## 2023-11-09 PROCEDURE — 80053 COMPREHEN METABOLIC PANEL: CPT

## 2023-11-14 ENCOUNTER — TELEPHONE (OUTPATIENT)
Dept: GASTROENTEROLOGY | Age: 73
End: 2023-11-14

## 2023-11-14 NOTE — TELEPHONE ENCOUNTER
Pt.  called in stating they were at the [employer] [facility] and they mentioned pt doing water exercise classes if dr. Bryant Austin would write a recommendation for her. He also wanted to know if the lab work or US sound anything significant. Please advise.

## 2023-11-28 ENCOUNTER — HOSPITAL ENCOUNTER (OUTPATIENT)
Age: 73
Discharge: HOME | End: 2023-11-28
Payer: MEDICARE

## 2023-11-28 LAB
ALBUMIN SERPL-MCNC: 3.7 GM/DL (ref 3.4–5)
ALP BLD-CCNC: 68 IU/L (ref 40–128)
ALT SERPL-CCNC: 22 U/L (ref 10–40)
ANION GAP SERPL CALCULATED.3IONS-SCNC: 12 MMOL/L (ref 4–16)
AST SERPL-CCNC: 32 IU/L (ref 15–37)
BILIRUB SERPL-MCNC: 0.9 MG/DL (ref 0–1)
BUN SERPL-MCNC: 18 MG/DL (ref 6–23)
CALCIUM SERPL-MCNC: 9.1 MG/DL (ref 8.3–10.6)
CHLORIDE BLD-SCNC: 103 MMOL/L (ref 99–110)
CHOLEST SERPL-MCNC: 152 MG/DL
CO2: 24 MMOL/L (ref 21–32)
CREAT SERPL-MCNC: 1.2 MG/DL (ref 0.6–1.1)
ESTIMATED AVERAGE GLUCOSE: 154 MG/DL
GFR SERPL CREATININE-BSD FRML MDRD: 48 ML/MIN/1.73M2
GLUCOSE SERPL-MCNC: 198 MG/DL (ref 70–99)
HBA1C MFR BLD: 7 % (ref 4.2–6.3)
HDLC SERPL-MCNC: 71 MG/DL
LDLC SERPL CALC-MCNC: 69 MG/DL
POTASSIUM SERPL-SCNC: 4.5 MMOL/L (ref 3.5–5.1)
SODIUM BLD-SCNC: 139 MMOL/L (ref 135–145)
T4 FREE SERPL-MCNC: 2 NG/DL (ref 0.9–1.8)
TOTAL PROTEIN: 7.5 GM/DL (ref 6.4–8.2)
TRIGL SERPL-MCNC: 60 MG/DL
TSH SERPL DL<=0.005 MIU/L-ACNC: 0.11 UIU/ML (ref 0.27–4.2)

## 2023-11-28 PROCEDURE — 83036 HEMOGLOBIN GLYCOSYLATED A1C: CPT

## 2023-11-28 PROCEDURE — 84443 ASSAY THYROID STIM HORMONE: CPT

## 2023-11-28 PROCEDURE — 84439 ASSAY OF FREE THYROXINE: CPT

## 2023-11-28 PROCEDURE — 80061 LIPID PANEL: CPT

## 2023-11-28 PROCEDURE — 80053 COMPREHEN METABOLIC PANEL: CPT

## 2023-11-28 PROCEDURE — 36415 COLL VENOUS BLD VENIPUNCTURE: CPT

## 2023-12-06 RX ORDER — PANTOPRAZOLE SODIUM 40 MG/1
TABLET, DELAYED RELEASE ORAL
Qty: 30 TABLET | Refills: 0 | Status: SHIPPED | OUTPATIENT
Start: 2023-12-06

## 2024-01-02 ENCOUNTER — HOSPITAL ENCOUNTER (OUTPATIENT)
Age: 74
Setting detail: OBSERVATION
Discharge: HOME | End: 2024-01-04
Attending: STUDENT IN AN ORGANIZED HEALTH CARE EDUCATION/TRAINING PROGRAM | Admitting: STUDENT IN AN ORGANIZED HEALTH CARE EDUCATION/TRAINING PROGRAM
Payer: MEDICARE

## 2024-01-02 ENCOUNTER — APPOINTMENT (OUTPATIENT)
Dept: GENERAL RADIOLOGY | Age: 74
End: 2024-01-02
Payer: MEDICARE

## 2024-01-02 DIAGNOSIS — R79.89 ELEVATED TROPONIN: ICD-10-CM

## 2024-01-02 DIAGNOSIS — R05.1 ACUTE COUGH: Primary | ICD-10-CM

## 2024-01-02 DIAGNOSIS — Z79.4 TYPE 2 DIABETES MELLITUS WITH DIABETIC NEUROPATHY, WITH LONG-TERM CURRENT USE OF INSULIN (HCC): ICD-10-CM

## 2024-01-02 DIAGNOSIS — E11.40 TYPE 2 DIABETES MELLITUS WITH DIABETIC NEUROPATHY, WITH LONG-TERM CURRENT USE OF INSULIN (HCC): ICD-10-CM

## 2024-01-02 LAB
ALBUMIN SERPL-MCNC: 4 GM/DL (ref 3.4–5)
ALP BLD-CCNC: 69 IU/L (ref 40–129)
ALT SERPL-CCNC: 38 U/L (ref 10–40)
ANION GAP SERPL CALCULATED.3IONS-SCNC: 13 MMOL/L (ref 7–16)
AST SERPL-CCNC: 38 IU/L (ref 15–37)
BASOPHILS ABSOLUTE: 0.1 K/CU MM
BASOPHILS RELATIVE PERCENT: 0.5 % (ref 0–1)
BILIRUB SERPL-MCNC: 0.6 MG/DL (ref 0–1)
BUN SERPL-MCNC: 32 MG/DL (ref 6–23)
CALCIUM SERPL-MCNC: 9.3 MG/DL (ref 8.3–10.6)
CHLORIDE BLD-SCNC: 97 MMOL/L (ref 99–110)
CO2: 26 MMOL/L (ref 21–32)
CREAT SERPL-MCNC: 1.5 MG/DL (ref 0.6–1.1)
DIFFERENTIAL TYPE: ABNORMAL
EOSINOPHILS ABSOLUTE: 0.1 K/CU MM
EOSINOPHILS RELATIVE PERCENT: 1 % (ref 0–3)
GFR SERPL CREATININE-BSD FRML MDRD: 37 ML/MIN/1.73M2
GLUCOSE BLD-MCNC: 138 MG/DL (ref 70–99)
GLUCOSE SERPL-MCNC: 125 MG/DL (ref 70–99)
HCT VFR BLD CALC: 33 % (ref 37–47)
HEMOGLOBIN: 10.8 GM/DL (ref 12.5–16)
IMMATURE NEUTROPHIL %: 1.3 % (ref 0–0.43)
INFLUENZA A ANTIGEN: NOT DETECTED
INFLUENZA B ANTIGEN: NOT DETECTED
LIPASE: 46 IU/L (ref 13–60)
LYMPHOCYTES ABSOLUTE: 2.4 K/CU MM
LYMPHOCYTES RELATIVE PERCENT: 26 % (ref 24–44)
MCH RBC QN AUTO: 35.1 PG (ref 27–31)
MCHC RBC AUTO-ENTMCNC: 32.7 % (ref 32–36)
MCV RBC AUTO: 107.1 FL (ref 78–100)
MONOCYTES ABSOLUTE: 0.5 K/CU MM
MONOCYTES RELATIVE PERCENT: 5.4 % (ref 0–4)
NUCLEATED RBC %: 0.3 %
PDW BLD-RTO: 15 % (ref 11.7–14.9)
PLATELET # BLD: 217 K/CU MM (ref 140–440)
PMV BLD AUTO: 11.1 FL (ref 7.5–11.1)
POTASSIUM SERPL-SCNC: 4.3 MMOL/L (ref 3.5–5.1)
RBC # BLD: 3.08 M/CU MM (ref 4.2–5.4)
SARS-COV-2 RDRP RESP QL NAA+PROBE: NOT DETECTED
SEGMENTED NEUTROPHILS ABSOLUTE COUNT: 6.1 K/CU MM
SEGMENTED NEUTROPHILS RELATIVE PERCENT: 65.8 % (ref 36–66)
SODIUM BLD-SCNC: 136 MMOL/L (ref 135–145)
SOURCE: NORMAL
TOTAL IMMATURE NEUTOROPHIL: 0.12 K/CU MM
TOTAL NUCLEATED RBC: 0 K/CU MM
TOTAL PROTEIN: 8.4 GM/DL (ref 6.4–8.2)
TROPONIN, HIGH SENSITIVITY: 51 NG/L (ref 0–14)
TROPONIN, HIGH SENSITIVITY: 52 NG/L (ref 0–14)
WBC # BLD: 9.2 K/CU MM (ref 4–10.5)

## 2024-01-02 PROCEDURE — 80053 COMPREHEN METABOLIC PANEL: CPT

## 2024-01-02 PROCEDURE — 83690 ASSAY OF LIPASE: CPT

## 2024-01-02 PROCEDURE — 87635 SARS-COV-2 COVID-19 AMP PRB: CPT

## 2024-01-02 PROCEDURE — 71045 X-RAY EXAM CHEST 1 VIEW: CPT

## 2024-01-02 PROCEDURE — 87502 INFLUENZA DNA AMP PROBE: CPT

## 2024-01-02 PROCEDURE — 85025 COMPLETE CBC W/AUTO DIFF WBC: CPT

## 2024-01-02 PROCEDURE — 93005 ELECTROCARDIOGRAM TRACING: CPT | Performed by: EMERGENCY MEDICINE

## 2024-01-02 PROCEDURE — 99285 EMERGENCY DEPT VISIT HI MDM: CPT

## 2024-01-02 PROCEDURE — 84484 ASSAY OF TROPONIN QUANT: CPT

## 2024-01-02 PROCEDURE — 82962 GLUCOSE BLOOD TEST: CPT

## 2024-01-02 RX ORDER — INSULIN LISPRO 100 [IU]/ML
0-4 INJECTION, SOLUTION INTRAVENOUS; SUBCUTANEOUS
Status: DISCONTINUED | OUTPATIENT
Start: 2024-01-03 | End: 2024-01-04 | Stop reason: HOSPADM

## 2024-01-02 RX ORDER — DEXTROSE MONOHYDRATE 100 MG/ML
INJECTION, SOLUTION INTRAVENOUS CONTINUOUS PRN
Status: DISCONTINUED | OUTPATIENT
Start: 2024-01-02 | End: 2024-01-04 | Stop reason: HOSPADM

## 2024-01-02 RX ORDER — BENZONATATE 100 MG/1
100 CAPSULE ORAL 3 TIMES DAILY
Status: DISCONTINUED | OUTPATIENT
Start: 2024-01-03 | End: 2024-01-04 | Stop reason: HOSPADM

## 2024-01-02 RX ORDER — GLUCAGON 1 MG/ML
1 KIT INJECTION PRN
Status: DISCONTINUED | OUTPATIENT
Start: 2024-01-02 | End: 2024-01-04 | Stop reason: HOSPADM

## 2024-01-02 RX ORDER — INSULIN LISPRO 100 [IU]/ML
0-4 INJECTION, SOLUTION INTRAVENOUS; SUBCUTANEOUS NIGHTLY
Status: DISCONTINUED | OUTPATIENT
Start: 2024-01-03 | End: 2024-01-04 | Stop reason: HOSPADM

## 2024-01-02 ASSESSMENT — PAIN - FUNCTIONAL ASSESSMENT: PAIN_FUNCTIONAL_ASSESSMENT: 0-10

## 2024-01-02 ASSESSMENT — PAIN DESCRIPTION - LOCATION: LOCATION: HEAD

## 2024-01-02 ASSESSMENT — PAIN DESCRIPTION - PAIN TYPE: TYPE: ACUTE PAIN

## 2024-01-02 ASSESSMENT — PAIN SCALES - GENERAL: PAINLEVEL_OUTOF10: 5

## 2024-01-03 ENCOUNTER — APPOINTMENT (OUTPATIENT)
Dept: NON INVASIVE DIAGNOSTICS | Age: 74
End: 2024-01-03
Attending: INTERNAL MEDICINE
Payer: MEDICARE

## 2024-01-03 LAB
ALBUMIN SERPL-MCNC: 3.2 GM/DL (ref 3.4–5)
ALP BLD-CCNC: 52 IU/L (ref 40–129)
ALT SERPL-CCNC: 30 U/L (ref 10–40)
ANION GAP SERPL CALCULATED.3IONS-SCNC: 9 MMOL/L (ref 7–16)
AST SERPL-CCNC: 42 IU/L (ref 15–37)
B PARAP IS1001 DNA NPH QL NAA+NON-PROBE: NOT DETECTED
B PERT.PT PRMT NPH QL NAA+NON-PROBE: NOT DETECTED
BASOPHILS ABSOLUTE: 0 K/CU MM
BASOPHILS RELATIVE PERCENT: 0.3 % (ref 0–1)
BILIRUB SERPL-MCNC: 0.5 MG/DL (ref 0–1)
BUN SERPL-MCNC: 34 MG/DL (ref 6–23)
C PNEUM DNA NPH QL NAA+NON-PROBE: NOT DETECTED
CALCIUM SERPL-MCNC: 8.6 MG/DL (ref 8.3–10.6)
CHLORIDE BLD-SCNC: 100 MMOL/L (ref 99–110)
CHP ED QC CHECK: YES
CO2: 26 MMOL/L (ref 21–32)
CREAT SERPL-MCNC: 1.3 MG/DL (ref 0.6–1.1)
DIFFERENTIAL TYPE: ABNORMAL
ECHO AO ROOT DIAM: 2.6 CM
ECHO AO ROOT INDEX: 1.57 CM/M2
ECHO AV AREA PEAK VELOCITY: 2 CM2
ECHO AV AREA VTI: 2.1 CM2
ECHO AV AREA/BSA PEAK VELOCITY: 1.2 CM2/M2
ECHO AV AREA/BSA VTI: 1.3 CM2/M2
ECHO AV MEAN GRADIENT: 3 MMHG
ECHO AV MEAN VELOCITY: 0.9 M/S
ECHO AV PEAK GRADIENT: 6 MMHG
ECHO AV PEAK VELOCITY: 1.2 M/S
ECHO AV VELOCITY RATIO: 0.92
ECHO AV VTI: 24.4 CM
ECHO BSA: 1.66 M2
ECHO IVC PROX: 1.3 CM
ECHO LA AREA 4C: 11 CM2
ECHO LA DIAMETER INDEX: 1.93 CM/M2
ECHO LA DIAMETER: 3.2 CM
ECHO LA MAJOR AXIS: 4.6 CM
ECHO LA TO AORTIC ROOT RATIO: 1.23
ECHO LA VOL MOD A4C: 22 ML (ref 22–52)
ECHO LA VOLUME INDEX MOD A4C: 13 ML/M2 (ref 16–34)
ECHO LV E' LATERAL VELOCITY: 7 CM/S
ECHO LV E' SEPTAL VELOCITY: 7 CM/S
ECHO LV EDV A4C: 30 ML
ECHO LV EDV INDEX A4C: 18 ML/M2
ECHO LV EJECTION FRACTION A4C: 49 %
ECHO LV ESV A4C: 15 ML
ECHO LV ESV INDEX A4C: 9 ML/M2
ECHO LV FRACTIONAL SHORTENING: 38 % (ref 28–44)
ECHO LV INTERNAL DIMENSION DIASTOLE INDEX: 2.23 CM/M2
ECHO LV INTERNAL DIMENSION DIASTOLIC: 3.7 CM (ref 3.9–5.3)
ECHO LV INTERNAL DIMENSION SYSTOLIC INDEX: 1.39 CM/M2
ECHO LV INTERNAL DIMENSION SYSTOLIC: 2.3 CM
ECHO LV IVSD: 1 CM (ref 0.6–0.9)
ECHO LV MASS 2D: 89.5 G (ref 67–162)
ECHO LV MASS INDEX 2D: 53.9 G/M2 (ref 43–95)
ECHO LV POSTERIOR WALL DIASTOLIC: 0.7 CM (ref 0.6–0.9)
ECHO LV RELATIVE WALL THICKNESS RATIO: 0.38
ECHO LVOT AREA: 2.3 CM2
ECHO LVOT AV VTI INDEX: 0.91
ECHO LVOT DIAM: 1.7 CM
ECHO LVOT MEAN GRADIENT: 2 MMHG
ECHO LVOT PEAK GRADIENT: 5 MMHG
ECHO LVOT PEAK VELOCITY: 1.1 M/S
ECHO LVOT STROKE VOLUME INDEX: 30.2 ML/M2
ECHO LVOT SV: 50.1 ML
ECHO LVOT VTI: 22.1 CM
ECHO MV A VELOCITY: 1.09 M/S
ECHO MV E DECELERATION TIME (DT): 215 MS
ECHO MV E VELOCITY: 0.72 M/S
ECHO MV E/A RATIO: 0.66
ECHO MV E/E' LATERAL: 10.29
ECHO MV E/E' RATIO (AVERAGED): 10.29
ECHO RV MID DIMENSION: 2.4 CM
EOSINOPHILS ABSOLUTE: 0.1 K/CU MM
EOSINOPHILS RELATIVE PERCENT: 1.2 % (ref 0–3)
FLUAV H1 2009 PAN RNA NPH NAA+NON-PROBE: NOT DETECTED
FLUAV H1 RNA NPH QL NAA+NON-PROBE: NOT DETECTED
FLUAV H3 RNA NPH QL NAA+NON-PROBE: NOT DETECTED
FLUAV RNA NPH QL NAA+NON-PROBE: NOT DETECTED
FLUBV RNA NPH QL NAA+NON-PROBE: NOT DETECTED
FOLATE SERPL-MCNC: >20 NG/ML (ref 3.1–17.5)
GFR SERPL CREATININE-BSD FRML MDRD: 43 ML/MIN/1.73M2
GLUCOSE BLD-MCNC: 115 MG/DL
GLUCOSE BLD-MCNC: 115 MG/DL (ref 70–99)
GLUCOSE BLD-MCNC: 134 MG/DL (ref 70–99)
GLUCOSE BLD-MCNC: 144 MG/DL (ref 70–99)
GLUCOSE BLD-MCNC: 145 MG/DL (ref 70–99)
GLUCOSE BLD-MCNC: 95 MG/DL (ref 70–99)
GLUCOSE SERPL-MCNC: 104 MG/DL (ref 70–99)
HADV DNA NPH QL NAA+NON-PROBE: NOT DETECTED
HCOV 229E RNA NPH QL NAA+NON-PROBE: NOT DETECTED
HCOV HKU1 RNA NPH QL NAA+NON-PROBE: NOT DETECTED
HCOV NL63 RNA NPH QL NAA+NON-PROBE: NOT DETECTED
HCOV OC43 RNA NPH QL NAA+NON-PROBE: NOT DETECTED
HCT VFR BLD CALC: 27 % (ref 37–47)
HEMOGLOBIN: 9 GM/DL (ref 12.5–16)
HMPV RNA NPH QL NAA+NON-PROBE: NOT DETECTED
HPIV1 RNA NPH QL NAA+NON-PROBE: NOT DETECTED
HPIV2 RNA NPH QL NAA+NON-PROBE: NOT DETECTED
HPIV3 RNA NPH QL NAA+NON-PROBE: NOT DETECTED
HPIV4 RNA NPH QL NAA+NON-PROBE: NOT DETECTED
IMMATURE NEUTROPHIL %: 0.8 % (ref 0–0.43)
INR BLD: 1.2 INDEX
LYMPHOCYTES ABSOLUTE: 2 K/CU MM
LYMPHOCYTES RELATIVE PERCENT: 29.9 % (ref 24–44)
M PNEUMO DNA NPH QL NAA+NON-PROBE: NOT DETECTED
MCH RBC QN AUTO: 35 PG (ref 27–31)
MCHC RBC AUTO-ENTMCNC: 33.3 % (ref 32–36)
MCV RBC AUTO: 105.1 FL (ref 78–100)
MONOCYTES ABSOLUTE: 0.4 K/CU MM
MONOCYTES RELATIVE PERCENT: 6.4 % (ref 0–4)
NUCLEATED RBC %: 0 %
PDW BLD-RTO: 14.8 % (ref 11.7–14.9)
PLATELET # BLD: 138 K/CU MM (ref 140–440)
PMV BLD AUTO: 11.4 FL (ref 7.5–11.1)
POTASSIUM SERPL-SCNC: 4.9 MMOL/L (ref 3.5–5.1)
PROTHROMBIN TIME: 15.5 SECONDS (ref 11.7–14.5)
RBC # BLD: 2.57 M/CU MM (ref 4.2–5.4)
RSV RNA NPH QL NAA+NON-PROBE: ABNORMAL
RV+EV RNA NPH QL NAA+NON-PROBE: NOT DETECTED
SARS-COV-2 RNA NPH QL NAA+NON-PROBE: ABNORMAL
SEGMENTED NEUTROPHILS ABSOLUTE COUNT: 4.1 K/CU MM
SEGMENTED NEUTROPHILS RELATIVE PERCENT: 61.4 % (ref 36–66)
SODIUM BLD-SCNC: 135 MMOL/L (ref 135–145)
TOTAL CK: 65 IU/L (ref 26–140)
TOTAL IMMATURE NEUTOROPHIL: 0.05 K/CU MM
TOTAL NUCLEATED RBC: 0 K/CU MM
TOTAL PROTEIN: 6.9 GM/DL (ref 6.4–8.2)
TROPONIN, HIGH SENSITIVITY: 47 NG/L (ref 0–14)
VITAMIN B-12: 936.7 PG/ML (ref 211–911)
WBC # BLD: 6.6 K/CU MM (ref 4–10.5)

## 2024-01-03 PROCEDURE — 2580000003 HC RX 258: Performed by: STUDENT IN AN ORGANIZED HEALTH CARE EDUCATION/TRAINING PROGRAM

## 2024-01-03 PROCEDURE — G0378 HOSPITAL OBSERVATION PER HR: HCPCS

## 2024-01-03 PROCEDURE — 84484 ASSAY OF TROPONIN QUANT: CPT

## 2024-01-03 PROCEDURE — 82607 VITAMIN B-12: CPT

## 2024-01-03 PROCEDURE — 80053 COMPREHEN METABOLIC PANEL: CPT

## 2024-01-03 PROCEDURE — 85610 PROTHROMBIN TIME: CPT

## 2024-01-03 PROCEDURE — 6370000000 HC RX 637 (ALT 250 FOR IP): Performed by: STUDENT IN AN ORGANIZED HEALTH CARE EDUCATION/TRAINING PROGRAM

## 2024-01-03 PROCEDURE — 96361 HYDRATE IV INFUSION ADD-ON: CPT

## 2024-01-03 PROCEDURE — 85025 COMPLETE CBC W/AUTO DIFF WBC: CPT

## 2024-01-03 PROCEDURE — 82962 GLUCOSE BLOOD TEST: CPT

## 2024-01-03 PROCEDURE — 94761 N-INVAS EAR/PLS OXIMETRY MLT: CPT

## 2024-01-03 PROCEDURE — 99222 1ST HOSP IP/OBS MODERATE 55: CPT | Performed by: INTERNAL MEDICINE

## 2024-01-03 PROCEDURE — 96360 HYDRATION IV INFUSION INIT: CPT

## 2024-01-03 PROCEDURE — 0202U NFCT DS 22 TRGT SARS-COV-2: CPT

## 2024-01-03 PROCEDURE — 82746 ASSAY OF FOLIC ACID SERUM: CPT

## 2024-01-03 PROCEDURE — 36415 COLL VENOUS BLD VENIPUNCTURE: CPT

## 2024-01-03 PROCEDURE — 2580000003 HC RX 258: Performed by: INTERNAL MEDICINE

## 2024-01-03 PROCEDURE — 82550 ASSAY OF CK (CPK): CPT

## 2024-01-03 PROCEDURE — 93306 TTE W/DOPPLER COMPLETE: CPT

## 2024-01-03 PROCEDURE — 83036 HEMOGLOBIN GLYCOSYLATED A1C: CPT

## 2024-01-03 RX ORDER — ACETAMINOPHEN 650 MG/1
650 SUPPOSITORY RECTAL EVERY 6 HOURS PRN
Status: DISCONTINUED | OUTPATIENT
Start: 2024-01-03 | End: 2024-01-04 | Stop reason: HOSPADM

## 2024-01-03 RX ORDER — SODIUM CHLORIDE, SODIUM LACTATE, POTASSIUM CHLORIDE, CALCIUM CHLORIDE 600; 310; 30; 20 MG/100ML; MG/100ML; MG/100ML; MG/100ML
INJECTION, SOLUTION INTRAVENOUS CONTINUOUS
Status: DISPENSED | OUTPATIENT
Start: 2024-01-03 | End: 2024-01-04

## 2024-01-03 RX ORDER — SODIUM CHLORIDE 0.9 % (FLUSH) 0.9 %
5-40 SYRINGE (ML) INJECTION PRN
Status: DISCONTINUED | OUTPATIENT
Start: 2024-01-03 | End: 2024-01-04 | Stop reason: HOSPADM

## 2024-01-03 RX ORDER — ONDANSETRON 4 MG/1
4 TABLET, ORALLY DISINTEGRATING ORAL EVERY 8 HOURS PRN
Status: DISCONTINUED | OUTPATIENT
Start: 2024-01-03 | End: 2024-01-04 | Stop reason: HOSPADM

## 2024-01-03 RX ORDER — ATORVASTATIN CALCIUM 10 MG/1
10 TABLET, FILM COATED ORAL DAILY
Status: DISCONTINUED | OUTPATIENT
Start: 2024-01-03 | End: 2024-01-04 | Stop reason: HOSPADM

## 2024-01-03 RX ORDER — MAGNESIUM SULFATE IN WATER 40 MG/ML
2000 INJECTION, SOLUTION INTRAVENOUS PRN
Status: DISCONTINUED | OUTPATIENT
Start: 2024-01-03 | End: 2024-01-04 | Stop reason: HOSPADM

## 2024-01-03 RX ORDER — SODIUM CHLORIDE 0.9 % (FLUSH) 0.9 %
5-40 SYRINGE (ML) INJECTION EVERY 12 HOURS SCHEDULED
Status: DISCONTINUED | OUTPATIENT
Start: 2024-01-03 | End: 2024-01-04 | Stop reason: HOSPADM

## 2024-01-03 RX ORDER — LACTULOSE 10 G/15ML
20 SOLUTION ORAL 2 TIMES DAILY
Status: DISCONTINUED | OUTPATIENT
Start: 2024-01-03 | End: 2024-01-04 | Stop reason: HOSPADM

## 2024-01-03 RX ORDER — POLYETHYLENE GLYCOL 3350 17 G/17G
17 POWDER, FOR SOLUTION ORAL DAILY PRN
Status: DISCONTINUED | OUTPATIENT
Start: 2024-01-03 | End: 2024-01-04 | Stop reason: HOSPADM

## 2024-01-03 RX ORDER — SODIUM CHLORIDE 9 MG/ML
INJECTION, SOLUTION INTRAVENOUS PRN
Status: DISCONTINUED | OUTPATIENT
Start: 2024-01-03 | End: 2024-01-04 | Stop reason: HOSPADM

## 2024-01-03 RX ORDER — LEVOTHYROXINE SODIUM 88 UG/1
88 TABLET ORAL DAILY
Status: DISCONTINUED | OUTPATIENT
Start: 2024-01-03 | End: 2024-01-04 | Stop reason: HOSPADM

## 2024-01-03 RX ORDER — ONDANSETRON 2 MG/ML
4 INJECTION INTRAMUSCULAR; INTRAVENOUS EVERY 6 HOURS PRN
Status: DISCONTINUED | OUTPATIENT
Start: 2024-01-03 | End: 2024-01-04 | Stop reason: HOSPADM

## 2024-01-03 RX ORDER — PANTOPRAZOLE SODIUM 40 MG/1
40 TABLET, DELAYED RELEASE ORAL
Status: DISCONTINUED | OUTPATIENT
Start: 2024-01-03 | End: 2024-01-04 | Stop reason: HOSPADM

## 2024-01-03 RX ORDER — ACETAMINOPHEN 325 MG/1
650 TABLET ORAL EVERY 6 HOURS PRN
Status: DISCONTINUED | OUTPATIENT
Start: 2024-01-03 | End: 2024-01-04 | Stop reason: HOSPADM

## 2024-01-03 RX ORDER — LATANOPROST 50 UG/ML
1 SOLUTION/ DROPS OPHTHALMIC EVERY EVENING
Status: DISCONTINUED | OUTPATIENT
Start: 2024-01-03 | End: 2024-01-04 | Stop reason: HOSPADM

## 2024-01-03 RX ORDER — SODIUM CHLORIDE, SODIUM LACTATE, POTASSIUM CHLORIDE, CALCIUM CHLORIDE 600; 310; 30; 20 MG/100ML; MG/100ML; MG/100ML; MG/100ML
INJECTION, SOLUTION INTRAVENOUS CONTINUOUS
Status: ACTIVE | OUTPATIENT
Start: 2024-01-03 | End: 2024-01-03

## 2024-01-03 RX ORDER — TRAMADOL HYDROCHLORIDE 50 MG/1
50 TABLET ORAL EVERY 6 HOURS PRN
Status: DISCONTINUED | OUTPATIENT
Start: 2024-01-03 | End: 2024-01-04 | Stop reason: HOSPADM

## 2024-01-03 RX ORDER — RISPERIDONE 0.25 MG/1
0.25 TABLET ORAL EVERY EVENING
Status: DISCONTINUED | OUTPATIENT
Start: 2024-01-03 | End: 2024-01-04 | Stop reason: HOSPADM

## 2024-01-03 RX ORDER — ENOXAPARIN SODIUM 100 MG/ML
30 INJECTION SUBCUTANEOUS EVERY EVENING
Status: DISCONTINUED | OUTPATIENT
Start: 2024-01-03 | End: 2024-01-04 | Stop reason: HOSPADM

## 2024-01-03 RX ORDER — POTASSIUM CHLORIDE 7.45 MG/ML
10 INJECTION INTRAVENOUS PRN
Status: DISCONTINUED | OUTPATIENT
Start: 2024-01-03 | End: 2024-01-04 | Stop reason: HOSPADM

## 2024-01-03 RX ADMIN — LEVOTHYROXINE SODIUM 88 MCG: 0.09 TABLET ORAL at 07:54

## 2024-01-03 RX ADMIN — LATANOPROST 1 DROP: 50 SOLUTION/ DROPS OPHTHALMIC at 03:27

## 2024-01-03 RX ADMIN — ATORVASTATIN CALCIUM 10 MG: 10 TABLET, FILM COATED ORAL at 09:20

## 2024-01-03 RX ADMIN — RISPERIDONE 0.25 MG: 0.25 TABLET ORAL at 21:19

## 2024-01-03 RX ADMIN — PANTOPRAZOLE SODIUM 40 MG: 40 TABLET, DELAYED RELEASE ORAL at 05:46

## 2024-01-03 RX ADMIN — SODIUM CHLORIDE, POTASSIUM CHLORIDE, SODIUM LACTATE AND CALCIUM CHLORIDE: 600; 310; 30; 20 INJECTION, SOLUTION INTRAVENOUS at 18:40

## 2024-01-03 RX ADMIN — RISPERIDONE 0.25 MG: 0.25 TABLET ORAL at 03:27

## 2024-01-03 RX ADMIN — SODIUM CHLORIDE, PRESERVATIVE FREE 10 ML: 5 INJECTION INTRAVENOUS at 09:20

## 2024-01-03 RX ADMIN — LATANOPROST 1 DROP: 50 SOLUTION/ DROPS OPHTHALMIC at 21:16

## 2024-01-03 RX ADMIN — BENZONATATE 100 MG: 100 CAPSULE ORAL at 00:40

## 2024-01-03 RX ADMIN — BENZONATATE 100 MG: 100 CAPSULE ORAL at 18:40

## 2024-01-03 RX ADMIN — SODIUM CHLORIDE, POTASSIUM CHLORIDE, SODIUM LACTATE AND CALCIUM CHLORIDE: 600; 310; 30; 20 INJECTION, SOLUTION INTRAVENOUS at 03:24

## 2024-01-03 RX ADMIN — BENZONATATE 100 MG: 100 CAPSULE ORAL at 21:18

## 2024-01-03 RX ADMIN — BENZONATATE 100 MG: 100 CAPSULE ORAL at 09:20

## 2024-01-03 RX ADMIN — LACTULOSE 20 G: 10 SOLUTION ORAL at 09:20

## 2024-01-03 ASSESSMENT — PAIN SCALES - GENERAL
PAINLEVEL_OUTOF10: 0
PAINLEVEL_OUTOF10: 0

## 2024-01-03 NOTE — CONSULTS
CARDIOLOGY CONSULT NOTE         Reason for consultation: Elevated troponin      Primary care physician: Leo Quezada MD      Chief Complaints :  Chief Complaint   Patient presents with    Cough     Congestion     Dizziness    Fatigue        History of present illness:Dayana is a 73 y.o.year old female who has prior history of hypertension, dyslipidemia, diabetes mellitus who is now admitted with ongoing cough for past few days.  We were asked to see her because of elevated troponin.  Patient denies any active chest pain.  She is little bit vague historian but denies any previous coronary history.    Review of Systems:     All systems negative except as marked.        Physical Examination:    Vitals:    01/03/24 0738   BP: 107/86   Pulse: 89   Resp: 16   Temp: 97.8 °F (36.6 °C)   SpO2: 95%        General Appearance:  No distress, conversant    Constitutional:  No acute distress, non-toxic appearance.    HENT:  Normocephalic, Atraumatic,   Eyes:  PERRL, EOMI, Conjunctiva normal, No discharge.   Respiratory:  No respiratory distress, No wheezing  Cardiovascular: S1, S2, no murmurs, gallops. JVD wnl  Abdomen /GI:   Soft, No tenderness   Genitourinary: No costovertebral angle tenderness   Musculoskeletal:  No edema, no tenderness, no deformities.   Integument:  Well hydrated, no rash   Neurologic:  Alert & oriented x 3, no focal deficits noted       Medical decision making and Data review:    Lab Review   Recent Labs     01/03/24  0553   WBC 6.6   HGB 9.0*   HCT 27.0*   *      Recent Labs     01/03/24  0553      K 4.9      CO2 26   BUN 34*   CREATININE 1.3*     Recent Labs     01/03/24  0553   AST 42*   ALT 30   BILITOT 0.5   ALKPHOS 52     No results for input(s): \"TROPONINT\" in the last 72 hours.    No results for input(s): \"PROBNP\" in the last 72 hours.  Lab Results   Component Value Date    INR 1.2 01/03/2024    PROTIME 15.5 (H) 01/03/2024       EKG: (reviewed by myself): Her ECG shows

## 2024-01-03 NOTE — ED PROVIDER NOTES
12 lead EKG per my interpretation:  Normal Sinus Rhythm 93  Axis is   Normal  QTc is   452  There is no specific T wave changes appreciated.  There is no specific ST wave changes appreciated.    Prior EKG to compare with was not available        Darrick Hardy DO  01/02/24 1935

## 2024-01-03 NOTE — H&P
History and Physical      Name:  Dayana Pacheco /Age/Sex: 1950  (73 y.o. female)   MRN & CSN:  [id number] & [id number] Encounter Date/Time: 2024 11:23 PM   Location:  ED20/ED-20 PCP: Leo Quezada MD       Hospital Day: 1    Assessment and Plan:     Patient is a 73-year-old female who presented with cough.     # Non-MI troponin elevation  - Denied any chest pain.  - Initial Tn elevated, repeat flat. ECG without acute ischemic changes.  - Likely secondary to decreased clearance in setting of ANTONINA. Cardiology consulted for evaluation, although not candidate for aggressive intervention due to hx of anemia/GIB.     # Cough  - Endorsed non-productive cough for past week. No SOB, fever or known sick contacts.   - On RA, Flu and COVID negative, CXR non-acute.   - Continue supportive care.     # ANTONINA on CKD3a. mild  - Has decreased PO intake over past few days. No recent NSAIDs.   - Clinically hypovolemic. Cr 1.5, baseline ~ 1.2.   - Held Lisinopril. Will challenge with IVF. Strict I/O's. Bladder scan if decreased voiding.    # Essential hypretension  - Hold Lisinopril as above.    # Macrocytic anemia  - Denied any source of bleeding.  - Labs stable.    # T2DM with hyperglycemia  - Last A1c 7.0% in 2023.  - Has Insulin pump, LCSI.    Checklist:  Advanced directive: full  Diet: NPO pending Cardiology evaluation  DVT ppx: Lovenox  Sugar: BG goal of 140-180 while inpatient    Disposition: place in observation.  Estimated discharge: 1-2 day(s).  Current living situation: home.  Expected disposition: home.    Spoke with ED provider who recommended admission for the patient and I agree with that plan.  Personally reviewed lab studies and imaging.  EKG interpreted personally and results as stated above.  Imaging that was interpreted personally and results as stated above.    History of Present Illness:     Chief Complaint: cough    Patient is a 73-year-old female with a PMHx of HTN, CKD3a, macrocytic

## 2024-01-03 NOTE — ED PROVIDER NOTES
[facility] EMERGENCY DEPARTMENT  EMERGENCY DEPARTMENT ENCOUNTER      Pt Name: Dayana Pacheco  MRN: [id number]  Birthdate 1950  Date of evaluation: 2024  Provider: ATILIO Lewis - SEJAL  PCP: Leo Quezada MD  Note Started: 11:01 PM EST 24    I am the Primary Clinician of Record.   I have seen and evaluated this patient with my supervising physician No att. providers found.  CHIEF COMPLAINT       Chief Complaint   Patient presents with    Cough     Congestion     Dizziness    Fatigue       HISTORY OF PRESENT ILLNESS: 1 or more Elements   Dayana Pacheco is a 73 y.o. female with past medical history of liver cirrhosis, esophageal varices, anemia, hypothyroidism, depression, diabetes mellitus, hypertension, hyperlipidemia ANTONINA who presents to the ER with chief complaint of cough, dizziness and fatigue.  This been ongoing for the past several days.  Patient was seen at urgent care several days ago.  She was treated with 2 pills but neither she nor her  can tell me what medication she was given.  She reports that the cough is nonproductive.  She has some congestion associated with it.   No focal weakness or numbness.  No headache.  No vision changes.  No chest pain or shortness of breath.      I have reviewed the nursing triage documentation and agree unless otherwise noted.  REVIEW OF SYSTEMS :    Review of Systems  Positives and Pertinent negatives as per HPI.   SURGICAL HISTORY     Past Surgical History:   Procedure Laterality Date    BREAST BIOPSY      CATARACT EXTRACTION Bilateral      SECTION      HYSTERECTOMY (CERVIX STATUS UNKNOWN) N/A     abdominal    MASTECTOMY, BILATERAL  10/26/2007    UPPER GASTROINTESTINAL ENDOSCOPY N/A 2022    EGD BAND LIGATION with 6 bands placed performed by Haresh Lawrence MD at [facility] ENDOSCOPY    UPPER GASTROINTESTINAL ENDOSCOPY N/A 2022    EGD BAND LIGATION WITH 3 BANDS PLACED, 4TH

## 2024-01-03 NOTE — ED NOTES
Lab called with critical troponin of 52 notified Hayley at this time,.     
Pt to xray at this time     
return noted;Flushed;Normal saline locked 01/02/24 1382       Pertinent or High Risk Medications/Drips: no   If Yes, please provide details:   Blood Product Administration: no  If Yes, please provide details:     Recommendation    Incomplete orders   Additional Comments:    If any further questions, please call Sending RN at [phone]    Electronically signed by: Electronically signed by Benita Latif RN on 1/3/2024 at 6:58 AM

## 2024-01-04 VITALS
BODY MASS INDEX: 23.05 KG/M2 | DIASTOLIC BLOOD PRESSURE: 59 MMHG | HEART RATE: 88 BPM | OXYGEN SATURATION: 92 % | TEMPERATURE: 98.4 F | SYSTOLIC BLOOD PRESSURE: 120 MMHG | HEIGHT: 64 IN | RESPIRATION RATE: 18 BRPM | WEIGHT: 135 LBS

## 2024-01-04 LAB
ANION GAP SERPL CALCULATED.3IONS-SCNC: 10 MMOL/L (ref 7–16)
BUN SERPL-MCNC: 23 MG/DL (ref 6–23)
CALCIUM SERPL-MCNC: 8.4 MG/DL (ref 8.3–10.6)
CHLORIDE BLD-SCNC: 101 MMOL/L (ref 99–110)
CO2: 24 MMOL/L (ref 21–32)
CREAT SERPL-MCNC: 1.4 MG/DL (ref 0.6–1.1)
EKG ATRIAL RATE: 93 BPM
EKG DIAGNOSIS: NORMAL
EKG P AXIS: 58 DEGREES
EKG P-R INTERVAL: 144 MS
EKG Q-T INTERVAL: 364 MS
EKG QRS DURATION: 76 MS
EKG QTC CALCULATION (BAZETT): 452 MS
EKG R AXIS: -1 DEGREES
EKG T AXIS: 60 DEGREES
EKG VENTRICULAR RATE: 93 BPM
ESTIMATED AVERAGE GLUCOSE: 131 MG/DL
GFR SERPL CREATININE-BSD FRML MDRD: 40 ML/MIN/1.73M2
GLUCOSE BLD-MCNC: 107 MG/DL (ref 70–99)
GLUCOSE BLD-MCNC: 186 MG/DL (ref 70–99)
GLUCOSE SERPL-MCNC: 208 MG/DL (ref 70–99)
HBA1C MFR BLD: 6.2 % (ref 4.2–6.3)
POTASSIUM SERPL-SCNC: 4.3 MMOL/L (ref 3.5–5.1)
SODIUM BLD-SCNC: 135 MMOL/L (ref 135–145)

## 2024-01-04 PROCEDURE — 6370000000 HC RX 637 (ALT 250 FOR IP): Performed by: STUDENT IN AN ORGANIZED HEALTH CARE EDUCATION/TRAINING PROGRAM

## 2024-01-04 PROCEDURE — 82962 GLUCOSE BLOOD TEST: CPT

## 2024-01-04 PROCEDURE — 80048 BASIC METABOLIC PNL TOTAL CA: CPT

## 2024-01-04 PROCEDURE — APPSS30 APP SPLIT SHARED TIME 16-30 MINUTES

## 2024-01-04 PROCEDURE — 94761 N-INVAS EAR/PLS OXIMETRY MLT: CPT

## 2024-01-04 PROCEDURE — G0378 HOSPITAL OBSERVATION PER HR: HCPCS

## 2024-01-04 PROCEDURE — 93010 ELECTROCARDIOGRAM REPORT: CPT | Performed by: INTERNAL MEDICINE

## 2024-01-04 PROCEDURE — 96361 HYDRATE IV INFUSION ADD-ON: CPT

## 2024-01-04 PROCEDURE — 2580000003 HC RX 258: Performed by: STUDENT IN AN ORGANIZED HEALTH CARE EDUCATION/TRAINING PROGRAM

## 2024-01-04 PROCEDURE — 36415 COLL VENOUS BLD VENIPUNCTURE: CPT

## 2024-01-04 RX ORDER — INSULIN GLARGINE 100 [IU]/ML
30 INJECTION, SOLUTION SUBCUTANEOUS 2 TIMES DAILY
Qty: 1 ADJUSTABLE DOSE PRE-FILLED PEN SYRINGE | Refills: 0
Start: 2024-01-04

## 2024-01-04 RX ORDER — BENZONATATE 100 MG/1
100 CAPSULE ORAL 3 TIMES DAILY
Qty: 21 CAPSULE | Refills: 0 | Status: SHIPPED | OUTPATIENT
Start: 2024-01-04 | End: 2024-01-11

## 2024-01-04 RX ADMIN — ATORVASTATIN CALCIUM 10 MG: 10 TABLET, FILM COATED ORAL at 09:50

## 2024-01-04 RX ADMIN — SODIUM CHLORIDE, PRESERVATIVE FREE 10 ML: 5 INJECTION INTRAVENOUS at 09:53

## 2024-01-04 RX ADMIN — PANTOPRAZOLE SODIUM 40 MG: 40 TABLET, DELAYED RELEASE ORAL at 03:50

## 2024-01-04 RX ADMIN — BENZONATATE 100 MG: 100 CAPSULE ORAL at 09:50

## 2024-01-04 NOTE — PROGRESS NOTES
[facility] [place] [address]  Phone: [phone]    Fax [phone]                  Jairo Hubbard MD, [facility]       Pino Coulter MD, [facility]  Yfn Norton MD, [facility]    MD Nellie Albert MD Tariq Rizvi, MD Bilal Alam, MD Dr. Waseem Sajjad MD Melissa Kellis, APRN      Gladis Roldan, APRHAZEL Lopez, APRHAZEL Giordano, APRN  Roby Baptiste PA-C    CARDIOLOGY  NOTE      Name:  Dayana Pacheco /Age/Sex: 1950  (73 y.o. female)   MRN & CSN:  [id number] & [id number] Admission Date/Time: 2024  8:40 PM   Location:  [facility]  PCP: Leo Quezada MD       Hospital Day: 3    - Cardiology consult is for: Elevated troponin      ASSESSMENT/ PLAN:  Mildly elevated troponin in setting of RSV + COVID infection  -EKG is nonischemic.  Troponin trend is flat, not ACS.  Likely secondary to below  -Echo performed yesterday showing preserved ejection fraction without any significant wall motion abnormalities.  -Patient does have some shortness of breath however likely secondary to respiratory infection.  Does not complain of any chest pain.  -Can consider further outpatient Ischemic workup upon resolution of viral infection  Hypertension  -Blood pressure stable at this time.  -Lisinopril held due to ANTONINA  Acute kidney injury on chronic kidney disease.  Chronic anemia  Type II Diabetes mellitus: A1c 6.2        Cardiology will sign off, please call with any questions.  Patient to follow-up in clinic     Echo 24:    Left Ventricle: Normal left ventricular systolic function with a visually estimated EF of 55 - 60%. Left ventricle size is normal. Normal wall thickness. Normal wall motion.    No significant valvular disease noted.    Pericardium: No pericardial effusion.    Image quality is fair.    Subjective:  Dayana is a 73 y.o.year old     Spoke with patient and  at bedside.  
4 Eyes Skin Assessment     NAME:  Dayana Pacheco  YOB: 1950  MEDICAL RECORD NUMBER:  [id number]    The patient is being assessed for  Admission    I agree that at least one RN has performed a thorough Head to Toe Skin Assessment on the patient. ALL assessment sites listed below have been assessed.      Areas assessed by both nurses:    Head, Face, Ears, Shoulders, Back, Chest, Arms, Elbows, Hands, Sacrum. Buttock, Coccyx, Ischium, and Legs. Feet and Heels        Does the Patient have a Wound? No noted wound(s)       Kunal Prevention initiated by RN: No  Wound Care Orders initiated by RN: No    Pressure Injury (Stage 3,4, Unstageable, DTI, NWPT, and Complex wounds) if present, place Wound referral order by RN under : No    New Ostomies, if present place, Ostomy referral order under : No     Nurse 1 eSignature: Electronically signed by Angela Akhtar RN on 1/3/24 at 5:17 PM EST    **SHARE this note so that the co-signing nurse can place an eSignature**    Nurse 2 eSignature: {Esignature:[id number]}   
Patient IV removed and given discharge instructions and follow up information taken to the Ed exit in wheelchair by fernandez Moy and  will  prescriptions at the outpatient pharmacy.   
  Respiratory Panel, Molecular, with COVID-19 (Restricted: peds pts or suitable admitted adults)    Collection Time: 01/03/24  9:22 AM    Specimen: Nasopharyngeal   Result Value Ref Range    Adenovirus Detection by PCR NOT DETECTED NOT DETECTED    Coronavirus 229E PCR NOT DETECTED NOT DETECTED    Coronavirus HKU1 PCR NOT DETECTED NOT DETECTED    Coronavirus NL63 PCR NOT DETECTED NOT DETECTED    Coronavirus OC43 PCR NOT DETECTED NOT DETECTED    SARS-CoV-2 DETECTED BY PCR (A) NOT DETECTED    Human Metapneumovirus PCR NOT DETECTED NOT DETECTED    Rhinovirus Enterovirus PCR NOT DETECTED NOT DETECTED    Influenza A by PCR NOT DETECTED NOT DETECTED    Influenza A H1 Pandemic PCR NOT DETECTED NOT DETECTED    Influenza A H1 (2009) PCR NOT DETECTED NOT DETECTED    Influenza A H3 PCR NOT DETECTED NOT DETECTED    Influenza B by PCR NOT DETECTED NOT DETECTED    Parainfluenza 1 PCR NOT DETECTED NOT DETECTED    Parainfluenza 2 PCR NOT DETECTED NOT DETECTED    Parainfluenza 3 PCR NOT DETECTED NOT DETECTED    Parainfluenza 4 PCR NOT DETECTED NOT DETECTED    RSV PCR DETECTED BY PCR (A) NOT DETECTED    Bordetella parapertussis by PCR NOT DETECTED NOT DETECTED    B Pertussis by PCR NOT DETECTED NOT DETECTED    Chlamydophila Pneumonia PCR NOT DETECTED NOT DETECTED    Mycoplasma pneumo by PCR NOT DETECTED NOT DETECTED   POCT Glucose    Collection Time: 01/03/24 12:33 PM   Result Value Ref Range    POC Glucose 134 (H) 70 - 99 MG/DL   Echo (TTE) complete (PRN contrast/bubble/strain/3D)    Collection Time: 01/03/24  2:10 PM   Result Value Ref Range    LV EDV A4C 30 mL    LV ESV A4C 15 mL    IVSd 1.0 (A) 0.6 - 0.9 cm    LVIDd 3.7 (A) 3.9 - 5.3 cm    LVIDs 2.3 cm    LVOT Diameter 1.7 cm    LVOT Mean Gradient 2 mmHg    LVOT VTI 22.1 cm    LVOT Peak Velocity 1.1 m/s    LVOT Peak Gradient 5 mmHg    LVPWd 0.7 0.6 - 0.9 cm    LV E' Lateral Velocity 7 cm/s    LV E' Septal Velocity 7 cm/s    LV Ejection Fraction A4C 49 %    LVOT Area 2.3 cm2    
- 16.0 GM/DL    Hematocrit 27.0 (L) 37 - 47 %    .1 (H) 78 - 100 FL    MCH 35.0 (H) 27 - 31 PG    MCHC 33.3 32.0 - 36.0 %    RDW 14.8 11.7 - 14.9 %    Platelets 138 (L) 140 - 440 K/CU MM    MPV 11.4 (H) 7.5 - 11.1 FL    Differential Type AUTOMATED DIFFERENTIAL     Segs Relative 61.4 36 - 66 %    Lymphocytes % 29.9 24 - 44 %    Monocytes % 6.4 (H) 0 - 4 %    Eosinophils % 1.2 0 - 3 %    Basophils % 0.3 0 - 1 %    Segs Absolute 4.1 K/CU MM    Lymphocytes Absolute 2.0 K/CU MM    Monocytes Absolute 0.4 K/CU MM    Eosinophils Absolute 0.1 K/CU MM    Basophils Absolute 0.0 K/CU MM    Nucleated RBC % 0.0 %    Total Nucleated RBC 0.0 K/CU MM    Total Immature Neutrophil 0.05 K/CU MM    Immature Neutrophil % 0.8 (H) 0 - 0.43 %   Protime-INR    Collection Time: 01/03/24  5:53 AM   Result Value Ref Range    Protime 15.5 (H) 11.7 - 14.5 SECONDS    INR 1.2 INDEX   Troponin    Collection Time: 01/03/24  5:53 AM   Result Value Ref Range    Troponin, High Sensitivity 47 (H) 0 - 14 ng/L   Vitamin B12 & Folate    Collection Time: 01/03/24  5:53 AM   Result Value Ref Range    Vitamin B-12 936.7 (H) 211 - 911 pg/ml    Folate >20.0 (H) 3.1 - 17.5 NG/ML        Imaging/Diagnostics Last 24 Hours   XR CHEST PORTABLE    Result Date: 1/2/2024  EXAMINATION: ONE XRAY VIEW OF THE CHEST 1/2/2024 8:00 pm COMPARISON: 07/02/2023 HISTORY: ORDERING SYSTEM PROVIDED HISTORY: cough TECHNOLOGIST PROVIDED HISTORY: Reason for exam:->cough Reason for Exam: cough congested Additional signs and symptoms: nausea Relevant Medical/Surgical History: htn FINDINGS: Remote bilateral rib fractures. No lung infiltrate or consolidation. No pneumothorax or pleural effusion. Heart size is normal.     No acute abnormality.       Electronically signed by Carmela Lorenzo MD on 1/3/2024 at 7:36 AM

## 2024-01-04 NOTE — DISCHARGE INSTRUCTIONS
You were admitted for COVID and RSV infection. You were also found to have mild kidney injury which is improving. Please continue drinking plenty of fluids and eating well, this will help your kidney function as well. Please avoid any cold medication with ibuprofen (Advil, Motrin) or naproxen (Aleve) in it as this can worsen kidney function. Tylenol (acetaminophen) is okay to use.    Your heart function on your heart ultrasound was good.    Please follow up with your primary care provider in 1 week.    Please continue to wear a mask around other people until 10 days after your symptoms started.     If your symptoms come back or worsen, please return to the emergency room.

## 2024-01-05 ENCOUNTER — CARE COORDINATION (OUTPATIENT)
Dept: CASE MANAGEMENT | Age: 74
End: 2024-01-05

## 2024-01-05 DIAGNOSIS — U07.1 COVID-19: Primary | ICD-10-CM

## 2024-01-05 PROCEDURE — 1111F DSCHRG MED/CURRENT MED MERGE: CPT | Performed by: INTERNAL MEDICINE

## 2024-01-05 NOTE — CARE COORDINATION
Care Transitions Initial Follow Up Call    Call within 2 business days of discharge: Yes    Patient Current Location:  Home: [address] [place] [address]    Care Transition Nurse contacted Patient's  by telephone to perform post hospital discharge assessment. Verified name and  with    as identifiers. Provided introduction to self, and explanation of the Care Transition Nurse role.     Patient: Dayana Pacheco Patient : 1950   MRN: [id number]  Reason for Admission: Covid19, RSV  Discharge Date: 24 RARS: No data recorded  Facility: [facility] 24-24    Last Discharge Facility       Date Complaint Diagnosis Description Type Department Provider    24 Cough; Dizziness; Fatigue Acute cough ... ED to Hosp-Admission (Discharged) (ADMITTED) [facility] OBSRV Carmela Lorenzo MD; [facility]...          Was this an external facility discharge? No     Challenges to be reviewed by the provider   Additional needs identified to be addressed with provider: No     Method of communication with provider: none.     provides the following Patient information as she is sleeping. Reports Patient \"doing better, some improvement\". Reports Patient tired. Reports no cough during hs. Denies sob, fever, chills, ac distress.  Reports taking Tessalon as directed.  Reviewed Covid19. Advised proper hydration, nutrition. Reports appetite, fluid intake good w/ b&b wnl. Advised avoiding nsaids. Lives w/  who assists w/ adls, provides transportation. Patient uses walker. Denies resource needs including hhc, dme, community assistance.     Care Transition Nurse reviewed discharge instructions, medical action plan, and red flags with patient who verbalized understanding. The patient was given an opportunity to ask questions and does not have any further questions or concerns at this time. Were discharge instructions available to patient? Yes. Reviewed appropriate site of care based on symptoms

## 2024-01-18 ENCOUNTER — APPOINTMENT (OUTPATIENT)
Dept: ULTRASOUND IMAGING | Age: 74
DRG: 445 | End: 2024-01-18
Payer: MEDICARE

## 2024-01-18 ENCOUNTER — APPOINTMENT (OUTPATIENT)
Dept: GENERAL RADIOLOGY | Age: 74
DRG: 445 | End: 2024-01-18
Payer: MEDICARE

## 2024-01-18 ENCOUNTER — APPOINTMENT (OUTPATIENT)
Dept: CT IMAGING | Age: 74
DRG: 445 | End: 2024-01-18
Payer: MEDICARE

## 2024-01-18 ENCOUNTER — APPOINTMENT (OUTPATIENT)
Dept: MRI IMAGING | Age: 74
DRG: 445 | End: 2024-01-18
Payer: MEDICARE

## 2024-01-18 ENCOUNTER — HOSPITAL ENCOUNTER (INPATIENT)
Age: 74
LOS: 3 days | Discharge: HOME | DRG: 445 | End: 2024-01-21
Attending: INTERNAL MEDICINE | Admitting: INTERNAL MEDICINE
Payer: MEDICARE

## 2024-01-18 DIAGNOSIS — R17 TOTAL BILIRUBIN, ELEVATED: ICD-10-CM

## 2024-01-18 DIAGNOSIS — R10.13 ABDOMINAL PAIN, EPIGASTRIC: Primary | ICD-10-CM

## 2024-01-18 DIAGNOSIS — K81.0 ACUTE CHOLECYSTITIS: ICD-10-CM

## 2024-01-18 DIAGNOSIS — K80.20 CALCULUS OF GALLBLADDER WITHOUT CHOLECYSTITIS WITHOUT OBSTRUCTION: ICD-10-CM

## 2024-01-18 DIAGNOSIS — R11.2 NAUSEA AND VOMITING, UNSPECIFIED VOMITING TYPE: ICD-10-CM

## 2024-01-18 DIAGNOSIS — K81.9 CHOLECYSTITIS: ICD-10-CM

## 2024-01-18 LAB
ALBUMIN SERPL-MCNC: 3.7 GM/DL (ref 3.4–5)
ALP BLD-CCNC: 70 IU/L (ref 40–129)
ALT SERPL-CCNC: 22 U/L (ref 10–40)
ANION GAP SERPL CALCULATED.3IONS-SCNC: 14 MMOL/L (ref 7–16)
AST SERPL-CCNC: 26 IU/L (ref 15–37)
BACTERIA: ABNORMAL /HPF
BASOPHILS ABSOLUTE: 0 K/CU MM
BASOPHILS ABSOLUTE: 0 K/CU MM
BASOPHILS RELATIVE PERCENT: 0.5 % (ref 0–1)
BASOPHILS RELATIVE PERCENT: 0.8 % (ref 0–1)
BILIRUB SERPL-MCNC: 1.1 MG/DL (ref 0–1)
BILIRUBIN URINE: NEGATIVE MG/DL
BLOOD, URINE: NEGATIVE
BUN SERPL-MCNC: 15 MG/DL (ref 6–23)
CALCIUM SERPL-MCNC: 9.4 MG/DL (ref 8.3–10.6)
CHLORIDE BLD-SCNC: 101 MMOL/L (ref 99–110)
CLARITY: CLEAR
CO2: 21 MMOL/L (ref 21–32)
COLOR: YELLOW
CREAT SERPL-MCNC: 1.6 MG/DL (ref 0.6–1.1)
DIFFERENTIAL TYPE: ABNORMAL
DIFFERENTIAL TYPE: ABNORMAL
EOSINOPHILS ABSOLUTE: 0 K/CU MM
EOSINOPHILS ABSOLUTE: 0 K/CU MM
EOSINOPHILS RELATIVE PERCENT: 0.5 % (ref 0–3)
EOSINOPHILS RELATIVE PERCENT: 1 % (ref 0–3)
GFR SERPL CREATININE-BSD FRML MDRD: 34 ML/MIN/1.73M2
GLUCOSE BLD-MCNC: 164 MG/DL (ref 70–99)
GLUCOSE SERPL-MCNC: 153 MG/DL (ref 70–99)
GLUCOSE, URINE: NEGATIVE MG/DL
HCT VFR BLD CALC: 25.6 % (ref 37–47)
HCT VFR BLD CALC: 28.2 % (ref 37–47)
HEMOGLOBIN: 7.9 GM/DL (ref 12.5–16)
HEMOGLOBIN: 8.9 GM/DL (ref 12.5–16)
IMMATURE NEUTROPHIL %: 0 % (ref 0–0.43)
IMMATURE NEUTROPHIL %: 0.3 % (ref 0–0.43)
KETONES, URINE: NEGATIVE MG/DL
LEUKOCYTE ESTERASE, URINE: ABNORMAL
LIPASE: 40 IU/L (ref 13–60)
LYMPHOCYTES ABSOLUTE: 1 K/CU MM
LYMPHOCYTES ABSOLUTE: 1.1 K/CU MM
LYMPHOCYTES RELATIVE PERCENT: 27.8 % (ref 24–44)
LYMPHOCYTES RELATIVE PERCENT: 28.4 % (ref 24–44)
MAGNESIUM: 1.8 MG/DL (ref 1.8–2.4)
MCH RBC QN AUTO: 34.5 PG (ref 27–31)
MCH RBC QN AUTO: 34.6 PG (ref 27–31)
MCHC RBC AUTO-ENTMCNC: 30.9 % (ref 32–36)
MCHC RBC AUTO-ENTMCNC: 31.6 % (ref 32–36)
MCV RBC AUTO: 109.3 FL (ref 78–100)
MCV RBC AUTO: 112.3 FL (ref 78–100)
MONOCYTES ABSOLUTE: 0.2 K/CU MM
MONOCYTES ABSOLUTE: 0.2 K/CU MM
MONOCYTES RELATIVE PERCENT: 4.6 % (ref 0–4)
MONOCYTES RELATIVE PERCENT: 5.8 % (ref 0–4)
MUCUS: ABNORMAL HPF
NITRITE URINE, QUANTITATIVE: NEGATIVE
NUCLEATED RBC %: 0 %
NUCLEATED RBC %: 0 %
PDW BLD-RTO: 14.8 % (ref 11.7–14.9)
PDW BLD-RTO: 14.8 % (ref 11.7–14.9)
PH, URINE: 7 (ref 5–8)
PLATELET # BLD: 102 K/CU MM (ref 140–440)
PLATELET # BLD: 94 K/CU MM (ref 140–440)
PMV BLD AUTO: 11.2 FL (ref 7.5–11.1)
PMV BLD AUTO: 11.8 FL (ref 7.5–11.1)
POTASSIUM SERPL-SCNC: 4.5 MMOL/L (ref 3.5–5.1)
PROTEIN UA: NEGATIVE MG/DL
RBC # BLD: 2.28 M/CU MM (ref 4.2–5.4)
RBC # BLD: 2.58 M/CU MM (ref 4.2–5.4)
RBC URINE: 1 /HPF (ref 0–6)
SEGMENTED NEUTROPHILS ABSOLUTE COUNT: 2.4 K/CU MM
SEGMENTED NEUTROPHILS ABSOLUTE COUNT: 2.5 K/CU MM
SEGMENTED NEUTROPHILS RELATIVE PERCENT: 64.3 % (ref 36–66)
SEGMENTED NEUTROPHILS RELATIVE PERCENT: 66 % (ref 36–66)
SODIUM BLD-SCNC: 136 MMOL/L (ref 135–145)
SPECIFIC GRAVITY UA: 1.01 (ref 1–1.03)
SQUAMOUS EPITHELIAL: 14 /HPF
TOTAL IMMATURE NEUTOROPHIL: 0 K/CU MM
TOTAL IMMATURE NEUTOROPHIL: 0.01 K/CU MM
TOTAL NUCLEATED RBC: 0 K/CU MM
TOTAL NUCLEATED RBC: 0 K/CU MM
TOTAL PROTEIN: 7.7 GM/DL (ref 6.4–8.2)
TRICHOMONAS: ABNORMAL /HPF
TROPONIN, HIGH SENSITIVITY: 40 NG/L (ref 0–14)
TROPONIN, HIGH SENSITIVITY: 42 NG/L (ref 0–14)
UROBILINOGEN, URINE: 4 MG/DL (ref 0.2–1)
WBC # BLD: 3.7 K/CU MM (ref 4–10.5)
WBC # BLD: 3.8 K/CU MM (ref 4–10.5)
WBC UA: 18 /HPF (ref 0–5)

## 2024-01-18 PROCEDURE — 2580000003 HC RX 258: Performed by: PHYSICIAN ASSISTANT

## 2024-01-18 PROCEDURE — 6370000000 HC RX 637 (ALT 250 FOR IP): Performed by: NURSE PRACTITIONER

## 2024-01-18 PROCEDURE — 2500000003 HC RX 250 WO HCPCS: Performed by: PHYSICIAN ASSISTANT

## 2024-01-18 PROCEDURE — 96374 THER/PROPH/DIAG INJ IV PUSH: CPT

## 2024-01-18 PROCEDURE — 85025 COMPLETE CBC W/AUTO DIFF WBC: CPT

## 2024-01-18 PROCEDURE — 36415 COLL VENOUS BLD VENIPUNCTURE: CPT

## 2024-01-18 PROCEDURE — 87086 URINE CULTURE/COLONY COUNT: CPT

## 2024-01-18 PROCEDURE — 74176 CT ABD & PELVIS W/O CONTRAST: CPT

## 2024-01-18 PROCEDURE — 71045 X-RAY EXAM CHEST 1 VIEW: CPT

## 2024-01-18 PROCEDURE — 96375 TX/PRO/DX INJ NEW DRUG ADDON: CPT

## 2024-01-18 PROCEDURE — 6360000002 HC RX W HCPCS: Performed by: INTERNAL MEDICINE

## 2024-01-18 PROCEDURE — 87040 BLOOD CULTURE FOR BACTERIA: CPT

## 2024-01-18 PROCEDURE — 76705 ECHO EXAM OF ABDOMEN: CPT

## 2024-01-18 PROCEDURE — 74181 MRI ABDOMEN W/O CONTRAST: CPT

## 2024-01-18 PROCEDURE — 2580000003 HC RX 258: Performed by: INTERNAL MEDICINE

## 2024-01-18 PROCEDURE — 94761 N-INVAS EAR/PLS OXIMETRY MLT: CPT

## 2024-01-18 PROCEDURE — 93005 ELECTROCARDIOGRAM TRACING: CPT | Performed by: PHYSICIAN ASSISTANT

## 2024-01-18 PROCEDURE — 6370000000 HC RX 637 (ALT 250 FOR IP): Performed by: INTERNAL MEDICINE

## 2024-01-18 PROCEDURE — 6360000002 HC RX W HCPCS: Performed by: PHYSICIAN ASSISTANT

## 2024-01-18 PROCEDURE — 83690 ASSAY OF LIPASE: CPT

## 2024-01-18 PROCEDURE — 82962 GLUCOSE BLOOD TEST: CPT

## 2024-01-18 PROCEDURE — 80053 COMPREHEN METABOLIC PANEL: CPT

## 2024-01-18 PROCEDURE — 99285 EMERGENCY DEPT VISIT HI MDM: CPT

## 2024-01-18 PROCEDURE — 83735 ASSAY OF MAGNESIUM: CPT

## 2024-01-18 PROCEDURE — 1200000000 HC SEMI PRIVATE

## 2024-01-18 PROCEDURE — 81001 URINALYSIS AUTO W/SCOPE: CPT

## 2024-01-18 PROCEDURE — 84145 PROCALCITONIN (PCT): CPT

## 2024-01-18 PROCEDURE — 84484 ASSAY OF TROPONIN QUANT: CPT

## 2024-01-18 RX ORDER — ACETAMINOPHEN 325 MG/1
650 TABLET ORAL EVERY 6 HOURS PRN
Status: DISCONTINUED | OUTPATIENT
Start: 2024-01-18 | End: 2024-01-21 | Stop reason: HOSPADM

## 2024-01-18 RX ORDER — INSULIN GLARGINE 100 [IU]/ML
20 INJECTION, SOLUTION SUBCUTANEOUS 2 TIMES DAILY
Status: DISCONTINUED | OUTPATIENT
Start: 2024-01-18 | End: 2024-01-20

## 2024-01-18 RX ORDER — ATORVASTATIN CALCIUM 10 MG/1
10 TABLET, FILM COATED ORAL EVERY EVENING
Status: DISCONTINUED | OUTPATIENT
Start: 2024-01-18 | End: 2024-01-21 | Stop reason: HOSPADM

## 2024-01-18 RX ORDER — 0.9 % SODIUM CHLORIDE 0.9 %
1000 INTRAVENOUS SOLUTION INTRAVENOUS ONCE
Status: COMPLETED | OUTPATIENT
Start: 2024-01-18 | End: 2024-01-18

## 2024-01-18 RX ORDER — LEVOTHYROXINE SODIUM 88 UG/1
88 TABLET ORAL DAILY
Status: DISCONTINUED | OUTPATIENT
Start: 2024-01-19 | End: 2024-01-21 | Stop reason: HOSPADM

## 2024-01-18 RX ORDER — SODIUM CHLORIDE, SODIUM LACTATE, POTASSIUM CHLORIDE, CALCIUM CHLORIDE 600; 310; 30; 20 MG/100ML; MG/100ML; MG/100ML; MG/100ML
INJECTION, SOLUTION INTRAVENOUS CONTINUOUS
Status: DISPENSED | OUTPATIENT
Start: 2024-01-18 | End: 2024-01-19

## 2024-01-18 RX ORDER — POLYETHYLENE GLYCOL 3350 17 G/17G
17 POWDER, FOR SOLUTION ORAL DAILY PRN
Status: DISCONTINUED | OUTPATIENT
Start: 2024-01-18 | End: 2024-01-21 | Stop reason: HOSPADM

## 2024-01-18 RX ORDER — LATANOPROST 50 UG/ML
1 SOLUTION/ DROPS OPHTHALMIC NIGHTLY
Status: DISCONTINUED | OUTPATIENT
Start: 2024-01-18 | End: 2024-01-21 | Stop reason: HOSPADM

## 2024-01-18 RX ORDER — FAMOTIDINE 10 MG/ML
20 INJECTION, SOLUTION INTRAVENOUS ONCE
Status: COMPLETED | OUTPATIENT
Start: 2024-01-18 | End: 2024-01-18

## 2024-01-18 RX ORDER — ONDANSETRON 2 MG/ML
4 INJECTION INTRAMUSCULAR; INTRAVENOUS EVERY 6 HOURS PRN
Status: DISCONTINUED | OUTPATIENT
Start: 2024-01-18 | End: 2024-01-21 | Stop reason: HOSPADM

## 2024-01-18 RX ORDER — ONDANSETRON 4 MG/1
4 TABLET, ORALLY DISINTEGRATING ORAL EVERY 8 HOURS PRN
Status: DISCONTINUED | OUTPATIENT
Start: 2024-01-18 | End: 2024-01-21 | Stop reason: HOSPADM

## 2024-01-18 RX ORDER — ACETAMINOPHEN 650 MG/1
650 SUPPOSITORY RECTAL EVERY 6 HOURS PRN
Status: DISCONTINUED | OUTPATIENT
Start: 2024-01-18 | End: 2024-01-21 | Stop reason: HOSPADM

## 2024-01-18 RX ORDER — PANTOPRAZOLE SODIUM 40 MG/1
40 TABLET, DELAYED RELEASE ORAL
Status: DISCONTINUED | OUTPATIENT
Start: 2024-01-19 | End: 2024-01-21 | Stop reason: HOSPADM

## 2024-01-18 RX ORDER — SODIUM CHLORIDE 0.9 % (FLUSH) 0.9 %
5-40 SYRINGE (ML) INJECTION EVERY 12 HOURS SCHEDULED
Status: DISCONTINUED | OUTPATIENT
Start: 2024-01-18 | End: 2024-01-21 | Stop reason: HOSPADM

## 2024-01-18 RX ORDER — GLUCAGON 1 MG/ML
1 KIT INJECTION PRN
Status: DISCONTINUED | OUTPATIENT
Start: 2024-01-18 | End: 2024-01-21 | Stop reason: HOSPADM

## 2024-01-18 RX ORDER — ONDANSETRON 2 MG/ML
4 INJECTION INTRAMUSCULAR; INTRAVENOUS EVERY 30 MIN PRN
Status: DISCONTINUED | OUTPATIENT
Start: 2024-01-18 | End: 2024-01-18 | Stop reason: SDUPTHER

## 2024-01-18 RX ORDER — LATANOPROST 50 UG/ML
1 SOLUTION/ DROPS OPHTHALMIC NIGHTLY
Status: DISCONTINUED | OUTPATIENT
Start: 2024-01-18 | End: 2024-01-18 | Stop reason: SDUPTHER

## 2024-01-18 RX ORDER — INSULIN LISPRO 100 [IU]/ML
0-4 INJECTION, SOLUTION INTRAVENOUS; SUBCUTANEOUS NIGHTLY
Status: DISCONTINUED | OUTPATIENT
Start: 2024-01-18 | End: 2024-01-20

## 2024-01-18 RX ORDER — ENOXAPARIN SODIUM 100 MG/ML
30 INJECTION SUBCUTANEOUS DAILY
Status: DISCONTINUED | OUTPATIENT
Start: 2024-01-18 | End: 2024-01-21 | Stop reason: HOSPADM

## 2024-01-18 RX ORDER — PROCHLORPERAZINE EDISYLATE 5 MG/ML
10 INJECTION INTRAMUSCULAR; INTRAVENOUS EVERY 6 HOURS
Status: DISCONTINUED | OUTPATIENT
Start: 2024-01-18 | End: 2024-01-19

## 2024-01-18 RX ORDER — INSULIN LISPRO 100 [IU]/ML
0-8 INJECTION, SOLUTION INTRAVENOUS; SUBCUTANEOUS
Status: DISCONTINUED | OUTPATIENT
Start: 2024-01-19 | End: 2024-01-21 | Stop reason: HOSPADM

## 2024-01-18 RX ORDER — DEXTROSE MONOHYDRATE 100 MG/ML
INJECTION, SOLUTION INTRAVENOUS CONTINUOUS PRN
Status: DISCONTINUED | OUTPATIENT
Start: 2024-01-18 | End: 2024-01-21 | Stop reason: HOSPADM

## 2024-01-18 RX ORDER — SODIUM CHLORIDE 9 MG/ML
INJECTION, SOLUTION INTRAVENOUS CONTINUOUS
Status: DISCONTINUED | OUTPATIENT
Start: 2024-01-18 | End: 2024-01-18

## 2024-01-18 RX ORDER — SODIUM CHLORIDE 9 MG/ML
INJECTION, SOLUTION INTRAVENOUS PRN
Status: DISCONTINUED | OUTPATIENT
Start: 2024-01-18 | End: 2024-01-21 | Stop reason: HOSPADM

## 2024-01-18 RX ORDER — SODIUM CHLORIDE 0.9 % (FLUSH) 0.9 %
5-40 SYRINGE (ML) INJECTION PRN
Status: DISCONTINUED | OUTPATIENT
Start: 2024-01-18 | End: 2024-01-21 | Stop reason: HOSPADM

## 2024-01-18 RX ORDER — LACTULOSE 10 G/15ML
20 SOLUTION ORAL 2 TIMES DAILY
Status: DISCONTINUED | OUTPATIENT
Start: 2024-01-18 | End: 2024-01-21 | Stop reason: HOSPADM

## 2024-01-18 RX ADMIN — CEFTRIAXONE 1000 MG: 1 INJECTION, POWDER, FOR SOLUTION INTRAMUSCULAR; INTRAVENOUS at 21:19

## 2024-01-18 RX ADMIN — LATANOPROST 1 DROP: 50 SOLUTION OPHTHALMIC at 21:27

## 2024-01-18 RX ADMIN — SODIUM CHLORIDE: 9 INJECTION, SOLUTION INTRAVENOUS at 19:06

## 2024-01-18 RX ADMIN — FAMOTIDINE 20 MG: 10 INJECTION, SOLUTION INTRAVENOUS at 13:57

## 2024-01-18 RX ADMIN — ATORVASTATIN CALCIUM 10 MG: 10 TABLET, FILM COATED ORAL at 21:15

## 2024-01-18 RX ADMIN — ONDANSETRON 4 MG: 2 INJECTION INTRAMUSCULAR; INTRAVENOUS at 13:57

## 2024-01-18 RX ADMIN — SODIUM CHLORIDE 1000 ML: 9 INJECTION, SOLUTION INTRAVENOUS at 13:54

## 2024-01-18 RX ADMIN — SODIUM CHLORIDE, POTASSIUM CHLORIDE, SODIUM LACTATE AND CALCIUM CHLORIDE 990 ML: 600; 310; 30; 20 INJECTION, SOLUTION INTRAVENOUS at 21:30

## 2024-01-18 RX ADMIN — ONDANSETRON 4 MG: 2 INJECTION INTRAMUSCULAR; INTRAVENOUS at 21:37

## 2024-01-18 RX ADMIN — INSULIN GLARGINE 20 UNITS: 100 INJECTION, SOLUTION SUBCUTANEOUS at 21:15

## 2024-01-18 RX ADMIN — PROCHLORPERAZINE EDISYLATE 10 MG: 5 INJECTION INTRAMUSCULAR; INTRAVENOUS at 18:12

## 2024-01-18 ASSESSMENT — LIFESTYLE VARIABLES
HOW OFTEN DO YOU HAVE A DRINK CONTAINING ALCOHOL: NEVER
HOW MANY STANDARD DRINKS CONTAINING ALCOHOL DO YOU HAVE ON A TYPICAL DAY: PATIENT DOES NOT DRINK

## 2024-01-18 ASSESSMENT — PAIN SCALES - GENERAL: PAINLEVEL_OUTOF10: 0

## 2024-01-18 NOTE — ED PROVIDER NOTES
EMERGENCY DEPARTMENT ENCOUNTER        Pt Name: Dayana Pacheco  MRN: [id number]  Birthdate 1950  Date of evaluation: 1/18/2024  Provider: DEISY FERNANDEZ  PCP: Leo Quezada MD    RAFIQ. I have evaluated this patient.        Triage CHIEF COMPLAINT       Chief Complaint   Patient presents with    Emesis    Cough         HISTORY OF PRESENT ILLNESS      Chief Complaint: Nausea vomiting, decreased oral intake, generalized fatigue weakness    Dayana Pacheco is a 73 y.o.  female who presents to the emergency department today via private vehicle with her son at bedside who is giving majority of the history stating that patient has had nausea and vomiting over the last 24 to 48 hours, has not been able to tolerate much food and/or liquid, having decreased activity level, decreased intake.  Patient recently admitted and hospitalized testing positive for COVID and RSV, at that time was just generally weak, had a mild ANTONINA.  Was admitted and subsequently discharged.  Follow-up with primary care, has had this nausea has been on Zofran as an outpatient.  Is following up with Dr. Lawrence-has an appointment tomorrow for this ongoing nausea vomiting decreased oral intake.  Patient also has some mildly elevated troponin monitoring, will need follow-up with cardiology at some point.  Essentially having worsening today.  No hematemesis, no diarrhea, no fevers or chills, mild abdominal discomfort, no overt tenderness.  Is producing urine.  Son states that she usually gets around using a walker but has been able to do that because she is so weak.  Feels that she may be dehydrated or has ongoing symptoms.  Patient is a diabetic, states that her blood sugars been around 130-150.  No signs of GI bleeding according to son.    Nursing Notes were all reviewed and agreed with or any disagreements were addressed in the HPI.    REVIEW OF SYSTEMS     At least 10 systems reviewed and otherwise acutely negative except as  cholecystitis    EKG (if obtained): See supervising physician's note for EKG interpretation     Discussion with Other Profesionals : Admitting Team   and Consultant GI-Dr. Lawrence-does recommend MRCP, ultrasound, admission for fluids antiemetics and he will see patient in consultation.    Social Determinants : None    Records Reviewed : Source reviewed recent admission, reviewed patient synopsis medication list.  Reviewed labs imaging    Admission to hospital    I am the Primary Clinician of Record.        CLINICAL IMPRESSION      1. Abdominal pain, epigastric    2. Nausea and vomiting, unspecified vomiting type    3. Calculus of gallbladder without cholecystitis without obstruction    4. Total bilirubin, elevated          DISPOSITION/PLAN   DISPOSITION Decision To Admit 01/18/2024 04:22:36 PM      PATIENT REFERREDTO:  No follow-up provider specified.    DISCHARGE MEDICATIONS:  New Prescriptions    No medications on file       DISCONTINUED MEDICATIONS:  Discontinued Medications    No medications on file              (Please note that portions ofthis note were completed with a voice recognition program.  Efforts were made to edit the dictations but occasionally words are mis-transcribed.)    DEISY FERNANDEZ (electronically signed)             Mango Shea PA  01/18/24 [id number]

## 2024-01-18 NOTE — H&P
V2.0  History and Physical      Name:  Dayana Pacheco /Age/Sex: 1950  (73 y.o. female)   MRN & CSN:  [id number] & [id number] Encounter Date/Time: 2024 4:31 PM EST   Location:  ED15/ED-15 PCP: Leo Quezada MD       Hospital Day: 1    Assessment and Plan:   Dayana Pacheco is a 73 y.o. female who presents with Epigastric pain    Hospital Problems             Last Modified POA    * (Principal) Epigastric pain 2024 Yes     # Nausea and vomiting  -Patient with 1 day of nausea and vomiting  -CT abdomen pelvis showing no acute process however does have chronic liver disease and cholelithiasis with similar mild gallbladder wall thickening, either related to underlying liver dysfunction or chronic cholecystitis  -Abdominal ultrasound shows unremarkable right upper quadrant  -T. bili of 1.1, normal lipase level  -GI consulted by the ED  MRCP read pending  N.p.o. for now    # Cough likely residual from COVID-19 and RSV infection  -Chest x-ray with bibasilar atelectasis  Incentive spirometer  Acapella  Procalcitonin level ordered, if elevated can start antibiotics for possible bacterial superinfection    # Elevated troponin likely in the setting of demand ischemia  -Troponin: 42--> 40  -Patient without chest pain  -EKG without acute ischemic changes  Continue to monitor    # Possible UTI  -UA with small leukocyte esterase and 18 WBCs  Start on ceftriaxone  Follow-up on urine culture    # ANTONINA on CKD likely prerenal in the setting of nausea and vomiting  -Creatinine 1.6  -Baseline around 1.2  IV fluids ordered  Recheck BMP in the morning    # Type 2 diabetes  -Uses insulin pump  Start on Lantus 20 units twice daily with medium dose sliding scale and hypoglycemia protocol      Disposition:   Current Living situation: Home  Expected Disposition: Home  Estimated D/C: 2 to 3 days    Diet No diet orders on file   DVT Prophylaxis [x] Lovenox, []  Heparin, [] SCDs, [] Ambulation,  [] Eliquis, [] Xarelto,  01:58 PM    BILIRUBINUR NEGATIVE 01/18/2024 01:58 PM    BILIRUBINUR Negative 11/30/2018 12:00 AM    BLOODU NEGATIVE 01/18/2024 01:58 PM    GLUCOSEU Negative 12/17/2019 09:58 AM    KETUA NEGATIVE 01/18/2024 01:58 PM     Urine Cultures: No results found for: \"LABURIN\"  Blood Cultures: No results found for: \"BC\"  No results found for: \"BLOODCULT2\"  Organism: No results found for: \"ORG\"    Imaging/Diagnostics Last 24 Hours   CT ABDOMEN PELVIS WO CONTRAST Additional Contrast? None    Result Date: 1/18/2024  EXAMINATION: CT OF THE ABDOMEN AND PELVIS WITHOUT CONTRAST 1/18/2024 3:19 pm TECHNIQUE: CT of the abdomen and pelvis was performed without the administration of intravenous contrast. Multiplanar reformatted images are provided for review. Automated exposure control, iterative reconstruction, and/or weight based adjustment of the mA/kV was utilized to reduce the radiation dose to as low as reasonably achievable. COMPARISON: 03/20/2023 HISTORY: ORDERING SYSTEM PROVIDED HISTORY: abdominal pain, nausea vomiting, cough, sob TECHNOLOGIST PROVIDED HISTORY: Reason for exam:->abdominal pain, nausea vomiting, cough, sob Additional Contrast?->None Decision Support Exception - unselect if not a suspected or confirmed emergency medical condition->Emergency Medical Condition (MA) Reason for Exam: abdominal pain, nausea vomiting, cough, sob FINDINGS: Lower Chest: Mild coronary artery calcification. Organs: Suspected changes of chronic liver disease.  Cholelithiasis with similar mild gallbladder wall thickening.  No pericholecystic fluid or fat stranding.  No biliary ductal dilatation.  Pancreas, adrenals, kidneys, spleen are unremarkable.  Mild calcific atherosclerosis. GI/Bowel: The bowel is nondilated without wall thickening.  Appendix is normal. Pelvis: Unremarkable. Peritoneum/Retroperitoneum: No free air, free fluid, organized fluid collection, lymphadenopathy. Bones/Soft Tissues: No acute bone or soft tissue abnormality.

## 2024-01-18 NOTE — ED NOTES
Patient notified of need for urine specimen; reports unable to provide at this time. Specimen cup remains at bedside; pt to notify staff via call light when they need to void.

## 2024-01-18 NOTE — ED NOTES
ED TO INPATIENT SBAR HANDOFF    Patient Name: Dayana Pacheco   :  1950  73 y.o.   Preferred Name  Dayana  Family/Caregiver Present yes   Restraints no   C-SSRS: Risk of Suicide: No Risk  Sitter no   Sepsis Risk Score Sepsis Risk Score: 2.5      Situation  Chief Complaint   Patient presents with    Emesis    Cough     Brief Description of Patient's Condition: Patient presented with complaint of emesis and cough.   Mental Status: oriented, alert, coherent, logical, thought processes intact, and able to concentrate and follow conversation  Arrived from: home    Imaging:   CT ABDOMEN PELVIS WO CONTRAST Additional Contrast? None   Final Result   1. No acute intra-abdominal or pelvic process.   2. Suspected changes of chronic liver disease.  Clinical correlation advised.   3. Cholelithiasis with similar mild gallbladder wall thickening.  This is   either related to underlying liver dysfunction or chronic cholecystitis.         XR CHEST PORTABLE   Final Result   Bibasilar atelectasis.         US ABDOMEN LIMITED    (Results Pending)   MRI ABDOMEN WO CONTRAST MRCP    (Results Pending)     Abnormal labs:   Abnormal Labs Reviewed   CBC WITH AUTO DIFFERENTIAL - Abnormal; Notable for the following components:       Result Value    WBC 3.8 (*)     RBC 2.58 (*)     Hemoglobin 8.9 (*)     Hematocrit 28.2 (*)     .3 (*)     MCH 34.5 (*)     MCHC 31.6 (*)     Platelets 102 (*)     MPV 11.8 (*)     Monocytes % 5.8 (*)     All other components within normal limits   COMPREHENSIVE METABOLIC PANEL - Abnormal; Notable for the following components:    Glucose 153 (*)     Creatinine 1.6 (*)     Est, Glom Filt Rate 34 (*)     Total Bilirubin 1.1 (*)     All other components within normal limits   TROPONIN - Abnormal; Notable for the following components:    Troponin, High Sensitivity 42 (*)     All other components within normal limits   TROPONIN - Abnormal; Notable for the following components:    Troponin, High

## 2024-01-18 NOTE — PROGRESS NOTES
4 Eyes Skin Assessment     NAME:  Dayana Pacheco  YOB: 1950  MEDICAL RECORD NUMBER:  [id number]    The patient is being assessed for  Admission    I agree that at least one RN has performed a thorough Head to Toe Skin Assessment on the patient. ALL assessment sites listed below have been assessed.      Areas assessed by both nurses:    Head, Face, Ears, Shoulders, Back, Chest, Arms, Elbows, Hands, Sacrum. Buttock, Coccyx, Ischium, Legs. Feet and Heels, and Under Medical Devices         Does the Patient have a Wound? No noted wound(s)       Kunal Prevention initiated by RN: No  Wound Care Orders initiated by RN: No    Pressure Injury (Stage 3,4, Unstageable, DTI, NWPT, and Complex wounds) if present, place Wound referral order by RN under : No    New Ostomies, if present place, Ostomy referral order under : No     Nurse 1 eSignature: Electronically signed by Skye Rice RN on 1/18/24 at 6:38 PM EST    **SHARE this note so that the co-signing nurse can place an eSignature**    Nurse 2 eSignature: Electronically signed by Mily Harper RN on 1/18/24 at 6:41 PM EST

## 2024-01-19 ENCOUNTER — APPOINTMENT (OUTPATIENT)
Dept: NUCLEAR MEDICINE | Age: 74
DRG: 445 | End: 2024-01-19
Payer: MEDICARE

## 2024-01-19 LAB
ABO/RH: NORMAL
ALBUMIN SERPL-MCNC: 3.2 GM/DL (ref 3.4–5)
ALP BLD-CCNC: 58 IU/L (ref 40–128)
ALT SERPL-CCNC: 18 U/L (ref 10–40)
ANION GAP SERPL CALCULATED.3IONS-SCNC: 10 MMOL/L (ref 7–16)
ANTIBODY SCREEN: NEGATIVE
AST SERPL-CCNC: 22 IU/L (ref 15–37)
BASOPHILS ABSOLUTE: 0 K/CU MM
BASOPHILS ABSOLUTE: 0 K/CU MM
BASOPHILS RELATIVE PERCENT: 0.4 % (ref 0–1)
BASOPHILS RELATIVE PERCENT: 0.4 % (ref 0–1)
BILIRUB SERPL-MCNC: 0.7 MG/DL (ref 0–1)
BUN SERPL-MCNC: 15 MG/DL (ref 6–23)
CALCIUM SERPL-MCNC: 8.3 MG/DL (ref 8.3–10.6)
CHLORIDE BLD-SCNC: 103 MMOL/L (ref 99–110)
CO2: 21 MMOL/L (ref 21–32)
CREAT SERPL-MCNC: 1.5 MG/DL (ref 0.6–1.1)
CULTURE: NORMAL
DIFFERENTIAL TYPE: ABNORMAL
DIFFERENTIAL TYPE: ABNORMAL
EKG ATRIAL RATE: 97 BPM
EKG DIAGNOSIS: NORMAL
EKG P AXIS: 36 DEGREES
EKG P-R INTERVAL: 156 MS
EKG Q-T INTERVAL: 368 MS
EKG QRS DURATION: 84 MS
EKG QTC CALCULATION (BAZETT): 467 MS
EKG R AXIS: 18 DEGREES
EKG T AXIS: 46 DEGREES
EKG VENTRICULAR RATE: 97 BPM
EOSINOPHILS ABSOLUTE: 0.1 K/CU MM
EOSINOPHILS ABSOLUTE: 0.1 K/CU MM
EOSINOPHILS RELATIVE PERCENT: 1.8 % (ref 0–3)
EOSINOPHILS RELATIVE PERCENT: 2.2 % (ref 0–3)
ERYTHROCYTE SEDIMENTATION RATE: 29 MM/HR (ref 0–30)
GFR SERPL CREATININE-BSD FRML MDRD: 37 ML/MIN/1.73M2
GLUCOSE BLD-MCNC: 182 MG/DL (ref 70–99)
GLUCOSE BLD-MCNC: 183 MG/DL (ref 70–99)
GLUCOSE BLD-MCNC: 276 MG/DL (ref 70–99)
GLUCOSE BLD-MCNC: 342 MG/DL (ref 70–99)
GLUCOSE BLD-MCNC: 343 MG/DL (ref 70–99)
GLUCOSE SERPL-MCNC: 242 MG/DL (ref 70–99)
HCT VFR BLD CALC: 21.7 % (ref 37–47)
HCT VFR BLD CALC: 23 % (ref 37–47)
HEMOGLOBIN: 7 GM/DL (ref 12.5–16)
HEMOGLOBIN: 7.5 GM/DL (ref 12.5–16)
HIGH SENSITIVE C-REACTIVE PROTEIN: 3.6 MG/L (ref 0–5)
IMMATURE NEUTROPHIL %: 0 % (ref 0–0.43)
IMMATURE NEUTROPHIL %: 0 % (ref 0–0.43)
LACTIC ACID, SEPSIS: 2 MMOL/L (ref 0.4–2)
LACTIC ACID, SEPSIS: 2.3 MMOL/L (ref 0.4–2)
LACTIC ACID, SEPSIS: 4 MMOL/L (ref 0.4–2)
LYMPHOCYTES ABSOLUTE: 1 K/CU MM
LYMPHOCYTES ABSOLUTE: 1.1 K/CU MM
LYMPHOCYTES RELATIVE PERCENT: 36.2 % (ref 24–44)
LYMPHOCYTES RELATIVE PERCENT: 39.3 % (ref 24–44)
Lab: NORMAL
MAGNESIUM: 2 MG/DL (ref 1.8–2.4)
MCH RBC QN AUTO: 35 PG (ref 27–31)
MCH RBC QN AUTO: 35.2 PG (ref 27–31)
MCHC RBC AUTO-ENTMCNC: 32.3 % (ref 32–36)
MCHC RBC AUTO-ENTMCNC: 32.6 % (ref 32–36)
MCV RBC AUTO: 107.5 FL (ref 78–100)
MCV RBC AUTO: 109 FL (ref 78–100)
MONOCYTES ABSOLUTE: 0.2 K/CU MM
MONOCYTES ABSOLUTE: 0.2 K/CU MM
MONOCYTES RELATIVE PERCENT: 7.5 % (ref 0–4)
MONOCYTES RELATIVE PERCENT: 7.9 % (ref 0–4)
NUCLEATED RBC %: 0 %
NUCLEATED RBC %: 0 %
PDW BLD-RTO: 14.7 % (ref 11.7–14.9)
PDW BLD-RTO: 15 % (ref 11.7–14.9)
PHOSPHORUS: 2.6 MG/DL (ref 2.5–4.9)
PLATELET # BLD: 84 K/CU MM (ref 140–440)
PLATELET # BLD: 91 K/CU MM (ref 140–440)
PMV BLD AUTO: 10.8 FL (ref 7.5–11.1)
PMV BLD AUTO: 11.3 FL (ref 7.5–11.1)
POTASSIUM SERPL-SCNC: 4.6 MMOL/L (ref 3.5–5.1)
PROCALCITONIN SERPL-MCNC: 0.08 NG/ML
RBC # BLD: 1.99 M/CU MM (ref 4.2–5.4)
RBC # BLD: 2.14 M/CU MM (ref 4.2–5.4)
SEGMENTED NEUTROPHILS ABSOLUTE COUNT: 1.4 K/CU MM
SEGMENTED NEUTROPHILS ABSOLUTE COUNT: 1.4 K/CU MM
SEGMENTED NEUTROPHILS RELATIVE PERCENT: 50.6 % (ref 36–66)
SEGMENTED NEUTROPHILS RELATIVE PERCENT: 53.7 % (ref 36–66)
SODIUM BLD-SCNC: 134 MMOL/L (ref 135–145)
SPECIMEN: NORMAL
TOTAL IMMATURE NEUTOROPHIL: 0 K/CU MM
TOTAL IMMATURE NEUTOROPHIL: 0 K/CU MM
TOTAL NUCLEATED RBC: 0 K/CU MM
TOTAL NUCLEATED RBC: 0 K/CU MM
TOTAL PROTEIN: 5.7 GM/DL (ref 6.4–8.2)
WBC # BLD: 2.7 K/CU MM (ref 4–10.5)
WBC # BLD: 2.8 K/CU MM (ref 4–10.5)

## 2024-01-19 PROCEDURE — 36415 COLL VENOUS BLD VENIPUNCTURE: CPT

## 2024-01-19 PROCEDURE — 6370000000 HC RX 637 (ALT 250 FOR IP): Performed by: NURSE PRACTITIONER

## 2024-01-19 PROCEDURE — 94640 AIRWAY INHALATION TREATMENT: CPT

## 2024-01-19 PROCEDURE — 85025 COMPLETE CBC W/AUTO DIFF WBC: CPT

## 2024-01-19 PROCEDURE — 2580000003 HC RX 258: Performed by: INTERNAL MEDICINE

## 2024-01-19 PROCEDURE — 3430000000 HC RX DIAGNOSTIC RADIOPHARMACEUTICAL: Performed by: SPECIALIST

## 2024-01-19 PROCEDURE — 82962 GLUCOSE BLOOD TEST: CPT

## 2024-01-19 PROCEDURE — 93010 ELECTROCARDIOGRAM REPORT: CPT | Performed by: INTERNAL MEDICINE

## 2024-01-19 PROCEDURE — 86900 BLOOD TYPING SEROLOGIC ABO: CPT

## 2024-01-19 PROCEDURE — 86901 BLOOD TYPING SEROLOGIC RH(D): CPT

## 2024-01-19 PROCEDURE — 83605 ASSAY OF LACTIC ACID: CPT

## 2024-01-19 PROCEDURE — 83735 ASSAY OF MAGNESIUM: CPT

## 2024-01-19 PROCEDURE — 6360000002 HC RX W HCPCS: Performed by: INTERNAL MEDICINE

## 2024-01-19 PROCEDURE — 94761 N-INVAS EAR/PLS OXIMETRY MLT: CPT

## 2024-01-19 PROCEDURE — 80053 COMPREHEN METABOLIC PANEL: CPT

## 2024-01-19 PROCEDURE — 85652 RBC SED RATE AUTOMATED: CPT

## 2024-01-19 PROCEDURE — 99223 1ST HOSP IP/OBS HIGH 75: CPT | Performed by: SPECIALIST

## 2024-01-19 PROCEDURE — 84100 ASSAY OF PHOSPHORUS: CPT

## 2024-01-19 PROCEDURE — 78226 HEPATOBILIARY SYSTEM IMAGING: CPT

## 2024-01-19 PROCEDURE — 6360000002 HC RX W HCPCS

## 2024-01-19 PROCEDURE — 94669 MECHANICAL CHEST WALL OSCILL: CPT

## 2024-01-19 PROCEDURE — 86141 C-REACTIVE PROTEIN HS: CPT

## 2024-01-19 PROCEDURE — 6370000000 HC RX 637 (ALT 250 FOR IP): Performed by: INTERNAL MEDICINE

## 2024-01-19 PROCEDURE — 6370000000 HC RX 637 (ALT 250 FOR IP): Performed by: SPECIALIST

## 2024-01-19 PROCEDURE — 1200000000 HC SEMI PRIVATE

## 2024-01-19 PROCEDURE — A9537 TC99M MEBROFENIN: HCPCS | Performed by: SPECIALIST

## 2024-01-19 PROCEDURE — 2580000003 HC RX 258

## 2024-01-19 PROCEDURE — 86850 RBC ANTIBODY SCREEN: CPT

## 2024-01-19 RX ORDER — 0.9 % SODIUM CHLORIDE 0.9 %
500 INTRAVENOUS SOLUTION INTRAVENOUS ONCE
Status: DISCONTINUED | OUTPATIENT
Start: 2024-01-19 | End: 2024-01-19

## 2024-01-19 RX ORDER — METOCLOPRAMIDE 5 MG/1
5 TABLET ORAL
Status: DISCONTINUED | OUTPATIENT
Start: 2024-01-19 | End: 2024-01-21 | Stop reason: HOSPADM

## 2024-01-19 RX ORDER — SODIUM CHLORIDE, SODIUM LACTATE, POTASSIUM CHLORIDE, CALCIUM CHLORIDE 600; 310; 30; 20 MG/100ML; MG/100ML; MG/100ML; MG/100ML
INJECTION, SOLUTION INTRAVENOUS CONTINUOUS
Status: DISPENSED | OUTPATIENT
Start: 2024-01-19 | End: 2024-01-20

## 2024-01-19 RX ORDER — 0.9 % SODIUM CHLORIDE 0.9 %
500 INTRAVENOUS SOLUTION INTRAVENOUS ONCE
Status: COMPLETED | OUTPATIENT
Start: 2024-01-19 | End: 2024-01-19

## 2024-01-19 RX ORDER — SODIUM CHLORIDE 9 MG/ML
INJECTION, SOLUTION INTRAVENOUS CONTINUOUS
Status: DISCONTINUED | OUTPATIENT
Start: 2024-01-19 | End: 2024-01-21

## 2024-01-19 RX ORDER — METRONIDAZOLE 500 MG/100ML
500 INJECTION, SOLUTION INTRAVENOUS EVERY 8 HOURS
Status: DISCONTINUED | OUTPATIENT
Start: 2024-01-19 | End: 2024-01-21

## 2024-01-19 RX ADMIN — SODIUM CHLORIDE 25 ML: 9 INJECTION, SOLUTION INTRAVENOUS at 17:47

## 2024-01-19 RX ADMIN — SODIUM CHLORIDE, PRESERVATIVE FREE 10 ML: 5 INJECTION INTRAVENOUS at 11:30

## 2024-01-19 RX ADMIN — METOCLOPRAMIDE 5 MG: 5 TABLET ORAL at 06:50

## 2024-01-19 RX ADMIN — METOCLOPRAMIDE 5 MG: 5 TABLET ORAL at 17:36

## 2024-01-19 RX ADMIN — Medication 3.3 MILLICURIE: at 08:35

## 2024-01-19 RX ADMIN — ONDANSETRON 4 MG: 2 INJECTION INTRAMUSCULAR; INTRAVENOUS at 11:29

## 2024-01-19 RX ADMIN — SODIUM CHLORIDE 500 ML: 9 INJECTION, SOLUTION INTRAVENOUS at 11:36

## 2024-01-19 RX ADMIN — ATORVASTATIN CALCIUM 10 MG: 10 TABLET, FILM COATED ORAL at 17:36

## 2024-01-19 RX ADMIN — PANTOPRAZOLE SODIUM 40 MG: 40 TABLET, DELAYED RELEASE ORAL at 05:55

## 2024-01-19 RX ADMIN — PROCHLORPERAZINE EDISYLATE 10 MG: 5 INJECTION INTRAMUSCULAR; INTRAVENOUS at 00:28

## 2024-01-19 RX ADMIN — INSULIN GLARGINE 20 UNITS: 100 INJECTION, SOLUTION SUBCUTANEOUS at 22:35

## 2024-01-19 RX ADMIN — LEVOTHYROXINE SODIUM 88 MCG: 0.09 TABLET ORAL at 05:55

## 2024-01-19 RX ADMIN — SODIUM CHLORIDE, POTASSIUM CHLORIDE, SODIUM LACTATE AND CALCIUM CHLORIDE: 600; 310; 30; 20 INJECTION, SOLUTION INTRAVENOUS at 17:58

## 2024-01-19 RX ADMIN — LATANOPROST 1 DROP: 50 SOLUTION OPHTHALMIC at 22:33

## 2024-01-19 RX ADMIN — INSULIN LISPRO 6 UNITS: 100 INJECTION, SOLUTION INTRAVENOUS; SUBCUTANEOUS at 17:36

## 2024-01-19 RX ADMIN — CEFTRIAXONE 1000 MG: 1 INJECTION, POWDER, FOR SOLUTION INTRAMUSCULAR; INTRAVENOUS at 22:35

## 2024-01-19 RX ADMIN — SODIUM CHLORIDE: 9 INJECTION, SOLUTION INTRAVENOUS at 17:41

## 2024-01-19 RX ADMIN — METRONIDAZOLE 500 MG: 500 INJECTION, SOLUTION INTRAVENOUS at 17:51

## 2024-01-19 ASSESSMENT — PAIN SCALES - GENERAL: PAINLEVEL_OUTOF10: 0

## 2024-01-19 NOTE — PLAN OF CARE
I spoke with patient's nurse concerning Hepatobiliary scan. Patient needs to remain NPO and no pain medications until after the 4 hr delay scan is complete @ 1230/1.      JAY Mcdonough, (RT)N

## 2024-01-19 NOTE — CONSULTS
[facility]             MD Aury Cruz MD Carol Christensen, APRN-CNP             Gladis Rowell, APRN-CNP             [address] [place] [address]             [phone] fax [phone]           Reason for Consult:  nausea and vomiting    Primary Care Physician:  Leo Quezada MD    History Obtained From:  patient, spouse    CC: nausea and vomiting    HISTORY OF PRESENT ILLNESS:              The patient is a 73 y.o.  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 She has had ascites and PSE in the past, which have been controlled with diet and lactulose. She is on nadolol- and intolerant to Coreg.  She also has a past history of DM, HTN, chronic anemia and mild ANTONINA. She also has some baseline hepatic encephalopathy controlled with lactulose and suspected gastroparesis controlled with low dose Reglan, which she tolerates without evident neurotoxicity. There may be an element of mild underlying dementia. She has had one injection of Hep A vaccine 7/2022   She was admitted 2 weeks ago with Covid and an NSTEMI. Since then she has has some nausea and vomiting. This improved but worsened again a few days ago. She has had no hematemesis, diarrhea, constipation, melena, hematochezia.  She came to the ED yesterday with epig pain and increased nausea and vomiting.where her BS and creat were mildly elevated. CT and MRCP showed gallstones (chronic finding) There was mild GB thickening raising the possibility of acute cholecystitis. No CBD stone was seen on MRCP   This morning she says that she feels better and wants to go home.     Past Medical History:        Diagnosis Date    Arthritis     Cancer (HCC)     right breast.    Diabetes mellitus (HCC)     Esophageal  sulfate 325 (65 Fe) MG tablet Take 325 mg by mouth daily (with breakfast)  Patient not taking: Reported on 7/13/2022  7/15/22  Provider, MD Shannan   raNITIdine (ZANTAC) 150 MG tablet Take 150 mg by mouth 2 times daily  7/15/22  ProviderShannan MD   Pomegranate, Punica granatum, (POMEGRANATE PO) Take 500 mg by mouth daily  Patient not taking: No sig reported  7/15/22  ProviderShannan MD       Allergies:    Allergies   Allergen Reactions    Bupropion      Other reaction(s): Other - comment required  Suicidal ideation    Nsaids      Other reaction(s): Other - comment required  Elevated Serum Creatinine    Amlodipine     Nateglinide Nausea Only    Ofloxacin Hives    Paroxetine Hcl Hives     Other reaction(s): Other - comment required  Shakey/nerveous    Pioglitazone      Other reaction(s): Other - comment required  Dizzy / nauseated    Citalopram Nausea And Vomiting    Escitalopram Nausea And Vomiting    Flonase [Fluticasone] Nausea And Vomiting    Paxil [Paroxetine] Nausea And Vomiting   .    Social History:    TOBACCO:  No  ETOH:  No    Family History: reviewed and positives included in HPI- all other pertinent GI family history negative      REVIEW OF SYSTEMS: see HPI for positives and pertinent negatives. All other systems reviewed and are negative    PHYSICAL EXAM:    Vitals:  BP (!) 150/57   Pulse (!) 111   Temp 97.9 °F (36.6 °C) (Oral)   Resp 16   Ht 1.626 m (5' 4\")   Wt 59.9 kg (132 lb)   SpO2 94%   BMI 22.66 kg/m²   CONSTITUTIONAL: cooperative, no apparent distress- slightly sluggish mentally which is normal for her   EYES:  pupils equal, round and reactive to light and sclera clear  ENT:  normocepalic, without obvious abnormality  NECK:  supple, symmetrical, trachea midline  HEMATOLOGIC/LYMPHATICS:  no cervical lymphadenopathy and no supraclavicular lymphadenopathy  LUNGS:  clear to auscultation  CARDIOVASCULAR:  regular rate and rhythm and no murmur noted  ABDOMEN:  Soft, non-tender

## 2024-01-19 NOTE — PROGRESS NOTES
Received PerfectServe from bedside RN with repeat lactic acid 4.0.  Chart reviewed, patient not tachycardic, not hypotensive, not tachypneic, no leukocytosis.  Blood cultures pending,  Will order repeat CBC, urine antigens, and inflammatory markers.  Will start on gentle IV fluids, consult general surgery.  HIDA scan results are noted in chart, GI recommends antibiotic therapy, will start on Flagyl, patient is on Rocephin for UTI, was going to sto due urinary cultures being negative, however will keep on Rocephin 2/2 possible abdominal infection.  Cannot use Cipro, as patient has allergies to ofloxacin.    Patient and case discussed in detail with supervising MD. Elyssa Chaidez, Acute Care CNP

## 2024-01-19 NOTE — PROGRESS NOTES
HIDA shows liver uptake and excretion into biliary tree and small bowel- though no visualization of the GB- suggesting cystic duct obstruction and possible acute cholecystitis  -She is not very tender or symptomatic currently so may respond to antibiotic Rx  -would consider surgical consult but since she is high risk for surgery, may be prudent to attempt conservative management before surgery

## 2024-01-19 NOTE — PROGRESS NOTES
V2.0  [facility] Hospitalist Progress Note      Name:  Dayana Pacheco /Age/Sex: 1950  (73 y.o. female)   MRN & CSN:  [id number] & [id number] Encounter Date/Time: 2024 8:21 AM EST    Location:  [address] [place] [address]-A PCP: Leo Quezada MD       Hospital Day: 2    Assessment and Plan:   Dayana Pacheco is a 73 y.o. female with pmh  who presents with Epigastric pain      Plan:    Nausea and vomiting  -Patient with 1 day of nausea and vomiting  -CT abdomen pelvis showing no acute process however does have chronic liver disease and cholelithiasis with similar mild gallbladder wall thickening, either related to underlying liver dysfunction or chronic cholecystitis  -Abdominal ultrasound shows unremarkable right upper quadrant  -T. bili of 1.1, normal lipase level  -GI consulted by the ED  MRCP shows Suspected changes of acute calculus cholecystitis.  No choledocholithiasis. Suspected changes of chronic liver disease.  Clinical correlation advised.  Cystic lesion of the pancreatic uncinate measuring 0.8 cm, low risk by imaging.  Recommend follow-up pancreas protocol MRI in 2 years.   N.p.o.    Anemia  - Hemoglobin 7  - Type and screen  - Transfuse less than 7  - No evidence of bleeding  - Will order occult stool  - GI following     Cough likely residual from COVID-19 and RSV infection  -Chest x-ray with bibasilar atelectasis  Incentive spirometer  Acapella  Procalcitonin level wnl,      Elevated troponin likely in the setting of demand ischemia  -Troponin: 42--> 40  -Patient without chest pain  -EKG without acute ischemic changes  Continue to monitor     Possible UTI  -UA with small leukocyte esterase and 18 WBCs  Continue on ceftriaxone IV  Follow-up on urine culture      ANTONINA on CKD likely prerenal in the setting of nausea and vomiting  -Creatinine improving some 1.5 today  -Baseline around 1.2  IV fluids ordered  - Mild nausea through the night, no vomiting       Type 2 diabetes  -Uses insulin pump  Start on  Decision Support Exception - unselect if not a suspected or confirmed emergency medical condition->Emergency Medical Condition (MA) Reason for Exam: on going abominal pain, cholelithiasis, nausea . Melinda like test for further evaluation Relevant Medical/Surgical History: none FINDINGS: Lower chest: Unremarkable. Organs: Faintly nodular contour of the liver with widening of the fissures. Cholelithiasis with mild gallbladder wall thickening and trace pericholecystic fluid.  No biliary ductal dilatation.  Cystic lesion of the pancreatic uncinate measuring 0.8 cm on series 4, image 19.  No main pancreatic ductal dilatation.  Adrenals, kidneys, spleen, vasculature unremarkable. GI: Visualized bowel is nondilated without wall thickening. Peritoneum/retroperitoneum: Trace free fluid.  No free air, organized fluid collection, lymphadenopathy. Bones/soft tissues: Moderate degenerative disc disease.     1. Suspected changes of acute calculus cholecystitis.  No choledocholithiasis. 2. Suspected changes of chronic liver disease.  Clinical correlation advised. 3. Cystic lesion of the pancreatic uncinate measuring 0.8 cm, low risk by imaging.  Recommend follow-up pancreas protocol MRI in 2 years.     US ABDOMEN LIMITED    Result Date: 1/18/2024  EXAMINATION: RIGHT UPPER QUADRANT ULTRASOUND 1/18/2024 4:04 pm COMPARISON: None. HISTORY: ORDERING SYSTEM PROVIDED HISTORY: RUQ pain TECHNOLOGIST PROVIDED HISTORY: Reason for exam:->RUQ pain FINDINGS: LIVER:  The liver demonstrates normal echogenicity without evidence of intrahepatic biliary ductal dilatation. BILIARY SYSTEM:  Gallbladder is unremarkable without evidence of pericholecystic fluid, wall thickening or stones.  Negative sonographic Hernandez's sign. Common bile duct is within normal limits measuring 7 mm. RIGHT KIDNEY: The right kidney is grossly unremarkable without evidence of hydronephrosis. PANCREAS:  Visualized portions of the pancreas are unremarkable. OTHER: No evidence

## 2024-01-19 NOTE — CARE COORDINATION
01/19/24 1237   Service Assessment   Patient Orientation Alert and Oriented   Cognition Alert   History Provided By Medical Record;Patient   Primary Caregiver Self   Support Systems Spouse/Significant Other   Patient's Healthcare Decision Maker is: Legal Next of Kin   PCP Verified by CM Yes   Last Visit to PCP Within last 6 months   Prior Functional Level Independent in ADLs/IADLs   Current Functional Level Independent in ADLs/IADLs   Can patient return to prior living arrangement Yes   Ability to make needs known: Good   Family able to assist with home care needs: Yes   Would you like for me to discuss the discharge plan with any other family members/significant others, and if so, who? No   Financial Resources Medicare Community Resources None

## 2024-01-20 ENCOUNTER — ANESTHESIA EVENT (OUTPATIENT)
Dept: OPERATING ROOM | Age: 74
End: 2024-01-20
Payer: MEDICARE

## 2024-01-20 ENCOUNTER — ANESTHESIA (OUTPATIENT)
Dept: OPERATING ROOM | Age: 74
End: 2024-01-20
Payer: MEDICARE

## 2024-01-20 PROBLEM — K81.0 ACUTE CHOLECYSTITIS: Status: ACTIVE | Noted: 2024-01-20

## 2024-01-20 LAB
ALBUMIN SERPL-MCNC: 3.6 GM/DL (ref 3.4–5)
ALP BLD-CCNC: 62 IU/L (ref 40–129)
ALT SERPL-CCNC: 20 U/L (ref 10–40)
ANION GAP SERPL CALCULATED.3IONS-SCNC: 11 MMOL/L (ref 7–16)
AST SERPL-CCNC: 25 IU/L (ref 15–37)
BASOPHILS ABSOLUTE: 0 K/CU MM
BASOPHILS RELATIVE PERCENT: 0.7 % (ref 0–1)
BILIRUB SERPL-MCNC: 1 MG/DL (ref 0–1)
BUN SERPL-MCNC: 12 MG/DL (ref 6–23)
CALCIUM SERPL-MCNC: 8.8 MG/DL (ref 8.3–10.6)
CHLORIDE BLD-SCNC: 105 MMOL/L (ref 99–110)
CO2: 22 MMOL/L (ref 21–32)
CREAT SERPL-MCNC: 1.3 MG/DL (ref 0.6–1.1)
DIFFERENTIAL TYPE: ABNORMAL
EOSINOPHILS ABSOLUTE: 0.1 K/CU MM
EOSINOPHILS RELATIVE PERCENT: 1.8 % (ref 0–3)
GFR SERPL CREATININE-BSD FRML MDRD: 43 ML/MIN/1.73M2
GLUCOSE BLD-MCNC: 102 MG/DL (ref 70–99)
GLUCOSE BLD-MCNC: 117 MG/DL (ref 70–99)
GLUCOSE BLD-MCNC: 118 MG/DL (ref 70–99)
GLUCOSE BLD-MCNC: 298 MG/DL (ref 70–99)
GLUCOSE BLD-MCNC: 313 MG/DL (ref 70–99)
GLUCOSE BLD-MCNC: 387 MG/DL (ref 70–99)
GLUCOSE SERPL-MCNC: 113 MG/DL (ref 70–99)
HCT VFR BLD CALC: 23.6 % (ref 37–47)
HEMOGLOBIN: 7.5 GM/DL (ref 12.5–16)
IMMATURE NEUTROPHIL %: 0 % (ref 0–0.43)
LACTATE: 1.4 MMOL/L (ref 0.5–1.9)
LYMPHOCYTES ABSOLUTE: 1.2 K/CU MM
LYMPHOCYTES RELATIVE PERCENT: 43.4 % (ref 24–44)
MAGNESIUM: 1.7 MG/DL (ref 1.8–2.4)
MCH RBC QN AUTO: 34.6 PG (ref 27–31)
MCHC RBC AUTO-ENTMCNC: 31.8 % (ref 32–36)
MCV RBC AUTO: 108.8 FL (ref 78–100)
MONOCYTES ABSOLUTE: 0.2 K/CU MM
MONOCYTES RELATIVE PERCENT: 8.6 % (ref 0–4)
NUCLEATED RBC %: 0 %
PDW BLD-RTO: 14.6 % (ref 11.7–14.9)
PHOSPHORUS: 2.9 MG/DL (ref 2.5–4.9)
PLATELET # BLD: 96 K/CU MM (ref 140–440)
PMV BLD AUTO: 11.1 FL (ref 7.5–11.1)
POTASSIUM SERPL-SCNC: 4.1 MMOL/L (ref 3.5–5.1)
RBC # BLD: 2.17 M/CU MM (ref 4.2–5.4)
SEGMENTED NEUTROPHILS ABSOLUTE COUNT: 1.3 K/CU MM
SEGMENTED NEUTROPHILS RELATIVE PERCENT: 45.5 % (ref 36–66)
SODIUM BLD-SCNC: 138 MMOL/L (ref 135–145)
TOTAL IMMATURE NEUTOROPHIL: 0 K/CU MM
TOTAL NUCLEATED RBC: 0 K/CU MM
TOTAL PROTEIN: 6.5 GM/DL (ref 6.4–8.2)
WBC # BLD: 2.8 K/CU MM (ref 4–10.5)

## 2024-01-20 PROCEDURE — 6360000002 HC RX W HCPCS

## 2024-01-20 PROCEDURE — 6360000002 HC RX W HCPCS: Performed by: PHYSICIAN ASSISTANT

## 2024-01-20 PROCEDURE — 2580000003 HC RX 258

## 2024-01-20 PROCEDURE — 3700000001 HC ADD 15 MINUTES (ANESTHESIA): Performed by: SURGERY

## 2024-01-20 PROCEDURE — 6360000002 HC RX W HCPCS: Performed by: ANESTHESIOLOGY

## 2024-01-20 PROCEDURE — 6370000000 HC RX 637 (ALT 250 FOR IP): Performed by: INTERNAL MEDICINE

## 2024-01-20 PROCEDURE — 94761 N-INVAS EAR/PLS OXIMETRY MLT: CPT

## 2024-01-20 PROCEDURE — 7100000001 HC PACU RECOVERY - ADDTL 15 MIN: Performed by: SURGERY

## 2024-01-20 PROCEDURE — 6370000000 HC RX 637 (ALT 250 FOR IP): Performed by: PHYSICIAN ASSISTANT

## 2024-01-20 PROCEDURE — 36415 COLL VENOUS BLD VENIPUNCTURE: CPT

## 2024-01-20 PROCEDURE — 0DNW4ZZ RELEASE PERITONEUM, PERCUTANEOUS ENDOSCOPIC APPROACH: ICD-10-PCS | Performed by: SURGERY

## 2024-01-20 PROCEDURE — 85025 COMPLETE CBC W/AUTO DIFF WBC: CPT

## 2024-01-20 PROCEDURE — 3600000014 HC SURGERY LEVEL 4 ADDTL 15MIN: Performed by: SURGERY

## 2024-01-20 PROCEDURE — BF121ZZ FLUOROSCOPY OF GALLBLADDER USING LOW OSMOLAR CONTRAST: ICD-10-PCS | Performed by: SURGERY

## 2024-01-20 PROCEDURE — 99232 SBSQ HOSP IP/OBS MODERATE 35: CPT | Performed by: SPECIALIST

## 2024-01-20 PROCEDURE — 6360000002 HC RX W HCPCS: Performed by: SURGERY

## 2024-01-20 PROCEDURE — 1200000000 HC SEMI PRIVATE

## 2024-01-20 PROCEDURE — 47562 LAPAROSCOPIC CHOLECYSTECTOMY: CPT | Performed by: PHYSICIAN ASSISTANT

## 2024-01-20 PROCEDURE — 83605 ASSAY OF LACTIC ACID: CPT

## 2024-01-20 PROCEDURE — 7100000000 HC PACU RECOVERY - FIRST 15 MIN: Performed by: SURGERY

## 2024-01-20 PROCEDURE — 88307 TISSUE EXAM BY PATHOLOGIST: CPT

## 2024-01-20 PROCEDURE — 3600000004 HC SURGERY LEVEL 4 BASE: Performed by: SURGERY

## 2024-01-20 PROCEDURE — 2580000003 HC RX 258: Performed by: PHYSICIAN ASSISTANT

## 2024-01-20 PROCEDURE — APPSS180 APP SPLIT SHARED TIME > 60 MINUTES: Performed by: PHYSICIAN ASSISTANT

## 2024-01-20 PROCEDURE — 88304 TISSUE EXAM BY PATHOLOGIST: CPT

## 2024-01-20 PROCEDURE — 82962 GLUCOSE BLOOD TEST: CPT

## 2024-01-20 PROCEDURE — 0FT44ZZ RESECTION OF GALLBLADDER, PERCUTANEOUS ENDOSCOPIC APPROACH: ICD-10-PCS | Performed by: SURGERY

## 2024-01-20 PROCEDURE — 80053 COMPREHEN METABOLIC PANEL: CPT

## 2024-01-20 PROCEDURE — 83735 ASSAY OF MAGNESIUM: CPT

## 2024-01-20 PROCEDURE — 3700000000 HC ANESTHESIA ATTENDED CARE: Performed by: SURGERY

## 2024-01-20 PROCEDURE — 88313 SPECIAL STAINS GROUP 2: CPT

## 2024-01-20 PROCEDURE — 88312 SPECIAL STAINS GROUP 1: CPT

## 2024-01-20 PROCEDURE — APPNB180 APP NON BILLABLE TIME > 60 MINS: Performed by: PHYSICIAN ASSISTANT

## 2024-01-20 PROCEDURE — 2709999900 HC NON-CHARGEABLE SUPPLY: Performed by: SURGERY

## 2024-01-20 PROCEDURE — 0FB04ZX EXCISION OF LIVER, PERCUTANEOUS ENDOSCOPIC APPROACH, DIAGNOSTIC: ICD-10-PCS | Performed by: SURGERY

## 2024-01-20 PROCEDURE — 84100 ASSAY OF PHOSPHORUS: CPT

## 2024-01-20 PROCEDURE — 2500000003 HC RX 250 WO HCPCS

## 2024-01-20 DEVICE — CLIP INT XL YEL POLYMER HEM-O-LOK WECK: Type: IMPLANTABLE DEVICE | Site: ABDOMEN | Status: FUNCTIONAL

## 2024-01-20 RX ORDER — FENTANYL CITRATE 50 UG/ML
INJECTION, SOLUTION INTRAMUSCULAR; INTRAVENOUS PRN
Status: DISCONTINUED | OUTPATIENT
Start: 2024-01-20 | End: 2024-01-20 | Stop reason: SDUPTHER

## 2024-01-20 RX ORDER — PHENYLEPHRINE HCL IN 0.9% NACL 1 MG/10 ML
SYRINGE (ML) INTRAVENOUS PRN
Status: DISCONTINUED | OUTPATIENT
Start: 2024-01-20 | End: 2024-01-20 | Stop reason: SDUPTHER

## 2024-01-20 RX ORDER — ONDANSETRON 2 MG/ML
4 INJECTION INTRAMUSCULAR; INTRAVENOUS
Status: COMPLETED | OUTPATIENT
Start: 2024-01-20 | End: 2024-01-20

## 2024-01-20 RX ORDER — ONDANSETRON 2 MG/ML
INJECTION INTRAMUSCULAR; INTRAVENOUS PRN
Status: DISCONTINUED | OUTPATIENT
Start: 2024-01-20 | End: 2024-01-20 | Stop reason: SDUPTHER

## 2024-01-20 RX ORDER — BUPIVACAINE HYDROCHLORIDE 5 MG/ML
INJECTION, SOLUTION EPIDURAL; INTRACAUDAL
Status: COMPLETED | OUTPATIENT
Start: 2024-01-20 | End: 2024-01-20

## 2024-01-20 RX ORDER — SODIUM CHLORIDE 0.9 % (FLUSH) 0.9 %
5-40 SYRINGE (ML) INJECTION PRN
Status: DISCONTINUED | OUTPATIENT
Start: 2024-01-20 | End: 2024-01-20 | Stop reason: HOSPADM

## 2024-01-20 RX ORDER — FENTANYL CITRATE 50 UG/ML
25 INJECTION, SOLUTION INTRAMUSCULAR; INTRAVENOUS EVERY 5 MIN PRN
Status: DISCONTINUED | OUTPATIENT
Start: 2024-01-20 | End: 2024-01-20 | Stop reason: HOSPADM

## 2024-01-20 RX ORDER — SODIUM CHLORIDE 9 MG/ML
INJECTION, SOLUTION INTRAVENOUS PRN
Status: DISCONTINUED | OUTPATIENT
Start: 2024-01-20 | End: 2024-01-20 | Stop reason: HOSPADM

## 2024-01-20 RX ORDER — DEXAMETHASONE SODIUM PHOSPHATE 4 MG/ML
INJECTION, SOLUTION INTRA-ARTICULAR; INTRALESIONAL; INTRAMUSCULAR; INTRAVENOUS; SOFT TISSUE PRN
Status: DISCONTINUED | OUTPATIENT
Start: 2024-01-20 | End: 2024-01-20 | Stop reason: SDUPTHER

## 2024-01-20 RX ORDER — FENTANYL CITRATE 50 UG/ML
50 INJECTION, SOLUTION INTRAMUSCULAR; INTRAVENOUS EVERY 5 MIN PRN
Status: DISCONTINUED | OUTPATIENT
Start: 2024-01-20 | End: 2024-01-20 | Stop reason: HOSPADM

## 2024-01-20 RX ORDER — ONDANSETRON 2 MG/ML
4 INJECTION INTRAMUSCULAR; INTRAVENOUS
Status: DISCONTINUED | OUTPATIENT
Start: 2024-01-20 | End: 2024-01-20 | Stop reason: HOSPADM

## 2024-01-20 RX ORDER — HYDRALAZINE HYDROCHLORIDE 20 MG/ML
10 INJECTION INTRAMUSCULAR; INTRAVENOUS
Status: DISCONTINUED | OUTPATIENT
Start: 2024-01-20 | End: 2024-01-20 | Stop reason: HOSPADM

## 2024-01-20 RX ORDER — SODIUM CHLORIDE, SODIUM LACTATE, POTASSIUM CHLORIDE, CALCIUM CHLORIDE 600; 310; 30; 20 MG/100ML; MG/100ML; MG/100ML; MG/100ML
INJECTION, SOLUTION INTRAVENOUS CONTINUOUS PRN
Status: DISCONTINUED | OUTPATIENT
Start: 2024-01-20 | End: 2024-01-20 | Stop reason: SDUPTHER

## 2024-01-20 RX ORDER — LABETALOL HYDROCHLORIDE 5 MG/ML
10 INJECTION, SOLUTION INTRAVENOUS
Status: DISCONTINUED | OUTPATIENT
Start: 2024-01-20 | End: 2024-01-20 | Stop reason: HOSPADM

## 2024-01-20 RX ORDER — PROPOFOL 10 MG/ML
INJECTION, EMULSION INTRAVENOUS PRN
Status: DISCONTINUED | OUTPATIENT
Start: 2024-01-20 | End: 2024-01-20 | Stop reason: SDUPTHER

## 2024-01-20 RX ORDER — ROCURONIUM BROMIDE 10 MG/ML
INJECTION, SOLUTION INTRAVENOUS PRN
Status: DISCONTINUED | OUTPATIENT
Start: 2024-01-20 | End: 2024-01-20 | Stop reason: SDUPTHER

## 2024-01-20 RX ORDER — SODIUM CHLORIDE 0.9 % (FLUSH) 0.9 %
5-40 SYRINGE (ML) INJECTION EVERY 12 HOURS SCHEDULED
Status: DISCONTINUED | OUTPATIENT
Start: 2024-01-20 | End: 2024-01-20 | Stop reason: HOSPADM

## 2024-01-20 RX ORDER — INSULIN LISPRO 100 [IU]/ML
0-4 INJECTION, SOLUTION INTRAVENOUS; SUBCUTANEOUS
Status: DISCONTINUED | OUTPATIENT
Start: 2024-01-20 | End: 2024-01-20

## 2024-01-20 RX ORDER — INSULIN LISPRO 100 [IU]/ML
5 INJECTION, SOLUTION INTRAVENOUS; SUBCUTANEOUS
Status: DISCONTINUED | OUTPATIENT
Start: 2024-01-20 | End: 2024-01-21

## 2024-01-20 RX ORDER — INSULIN GLARGINE 100 [IU]/ML
20 INJECTION, SOLUTION SUBCUTANEOUS NIGHTLY
Status: DISCONTINUED | OUTPATIENT
Start: 2024-01-21 | End: 2024-01-21

## 2024-01-20 RX ORDER — INSULIN LISPRO 100 [IU]/ML
0-8 INJECTION, SOLUTION INTRAVENOUS; SUBCUTANEOUS
Status: DISCONTINUED | OUTPATIENT
Start: 2024-01-20 | End: 2024-01-21 | Stop reason: HOSPADM

## 2024-01-20 RX ORDER — LIDOCAINE HYDROCHLORIDE 20 MG/ML
INJECTION, SOLUTION INTRAVENOUS PRN
Status: DISCONTINUED | OUTPATIENT
Start: 2024-01-20 | End: 2024-01-20 | Stop reason: SDUPTHER

## 2024-01-20 RX ADMIN — ROCURONIUM BROMIDE 50 MG: 10 INJECTION INTRAVENOUS at 12:17

## 2024-01-20 RX ADMIN — Medication 0.05 MG: at 12:46

## 2024-01-20 RX ADMIN — ATORVASTATIN CALCIUM 10 MG: 10 TABLET, FILM COATED ORAL at 17:41

## 2024-01-20 RX ADMIN — FENTANYL CITRATE 25 MCG: 50 INJECTION, SOLUTION INTRAMUSCULAR; INTRAVENOUS at 12:17

## 2024-01-20 RX ADMIN — INSULIN LISPRO 5 UNITS: 100 INJECTION, SOLUTION INTRAVENOUS; SUBCUTANEOUS at 17:42

## 2024-01-20 RX ADMIN — METRONIDAZOLE 500 MG: 500 INJECTION, SOLUTION INTRAVENOUS at 22:24

## 2024-01-20 RX ADMIN — CEFTRIAXONE 1000 MG: 1 INJECTION, POWDER, FOR SOLUTION INTRAMUSCULAR; INTRAVENOUS at 20:55

## 2024-01-20 RX ADMIN — SODIUM CHLORIDE, PRESERVATIVE FREE 10 ML: 5 INJECTION INTRAVENOUS at 20:52

## 2024-01-20 RX ADMIN — METRONIDAZOLE 500 MG: 500 INJECTION, SOLUTION INTRAVENOUS at 04:03

## 2024-01-20 RX ADMIN — Medication 0.05 MG: at 12:41

## 2024-01-20 RX ADMIN — ONDANSETRON 4 MG: 2 INJECTION INTRAMUSCULAR; INTRAVENOUS at 12:21

## 2024-01-20 RX ADMIN — METRONIDAZOLE 500 MG: 500 INJECTION, SOLUTION INTRAVENOUS at 13:37

## 2024-01-20 RX ADMIN — SODIUM CHLORIDE, SODIUM LACTATE, POTASSIUM CHLORIDE, CALCIUM CHLORIDE: 600; 310; 30; 20 INJECTION, SOLUTION INTRAVENOUS at 12:04

## 2024-01-20 RX ADMIN — ONDANSETRON 4 MG: 2 INJECTION INTRAMUSCULAR; INTRAVENOUS at 13:27

## 2024-01-20 RX ADMIN — FENTANYL CITRATE 25 MCG: 50 INJECTION, SOLUTION INTRAMUSCULAR; INTRAVENOUS at 13:42

## 2024-01-20 RX ADMIN — SUGAMMADEX 200 MG: 100 INJECTION, SOLUTION INTRAVENOUS at 12:50

## 2024-01-20 RX ADMIN — DEXAMETHASONE SODIUM PHOSPHATE 4 MG: 4 INJECTION, SOLUTION INTRAMUSCULAR; INTRAVENOUS at 12:21

## 2024-01-20 RX ADMIN — SODIUM CHLORIDE: 9 INJECTION, SOLUTION INTRAVENOUS at 13:35

## 2024-01-20 RX ADMIN — FENTANYL CITRATE 25 MCG: 50 INJECTION, SOLUTION INTRAMUSCULAR; INTRAVENOUS at 13:27

## 2024-01-20 RX ADMIN — METOCLOPRAMIDE 5 MG: 5 TABLET ORAL at 17:41

## 2024-01-20 RX ADMIN — LIDOCAINE HYDROCHLORIDE 40 MG: 20 INJECTION, SOLUTION INTRAVENOUS at 12:17

## 2024-01-20 RX ADMIN — CEFAZOLIN 2000 MG: 2 INJECTION, POWDER, FOR SOLUTION INTRAMUSCULAR; INTRAVENOUS at 12:21

## 2024-01-20 RX ADMIN — LACTULOSE 20 G: 20 SOLUTION ORAL at 20:52

## 2024-01-20 RX ADMIN — INSULIN LISPRO 6 UNITS: 100 INJECTION, SOLUTION INTRAVENOUS; SUBCUTANEOUS at 17:42

## 2024-01-20 RX ADMIN — DEXMEDETOMIDINE 4 MCG: 100 INJECTION, SOLUTION INTRAVENOUS at 12:57

## 2024-01-20 RX ADMIN — INSULIN LISPRO 4 UNITS: 100 INJECTION, SOLUTION INTRAVENOUS; SUBCUTANEOUS at 20:52

## 2024-01-20 RX ADMIN — METRONIDAZOLE 500 MG: 500 INJECTION, SOLUTION INTRAVENOUS at 22:14

## 2024-01-20 RX ADMIN — Medication 0.05 MG: at 12:21

## 2024-01-20 RX ADMIN — LATANOPROST 1 DROP: 50 SOLUTION OPHTHALMIC at 20:52

## 2024-01-20 RX ADMIN — PROPOFOL 120 MG: 10 INJECTION, EMULSION INTRAVENOUS at 12:17

## 2024-01-20 ASSESSMENT — PAIN DESCRIPTION - PAIN TYPE
TYPE: SURGICAL PAIN

## 2024-01-20 ASSESSMENT — PAIN - FUNCTIONAL ASSESSMENT
PAIN_FUNCTIONAL_ASSESSMENT: PREVENTS OR INTERFERES SOME ACTIVE ACTIVITIES AND ADLS

## 2024-01-20 ASSESSMENT — PAIN DESCRIPTION - LOCATION
LOCATION: ABDOMEN

## 2024-01-20 ASSESSMENT — PAIN SCALES - GENERAL
PAINLEVEL_OUTOF10: 4
PAINLEVEL_OUTOF10: 4
PAINLEVEL_OUTOF10: 6
PAINLEVEL_OUTOF10: 6

## 2024-01-20 ASSESSMENT — PAIN DESCRIPTION - DESCRIPTORS
DESCRIPTORS: DISCOMFORT

## 2024-01-20 ASSESSMENT — PAIN DESCRIPTION - FREQUENCY
FREQUENCY: CONTINUOUS

## 2024-01-20 ASSESSMENT — PAIN DESCRIPTION - ORIENTATION
ORIENTATION: MID;RIGHT
ORIENTATION: RIGHT;MID
ORIENTATION: RIGHT;MID

## 2024-01-20 ASSESSMENT — PAIN DESCRIPTION - ONSET
ONSET: ON-GOING
ONSET: ON-GOING

## 2024-01-20 NOTE — PROGRESS NOTES
V2.0  [facility] Hospitalist Progress Note      Name:  Dayana Pacheco /Age/Sex: 1950  (73 y.o. female)   MRN & CSN:  [id number] & [id number] Encounter Date/Time: 2024 8:21 AM EST    Location:  [address] [place] [address]-A PCP: Leo Quezada MD       Hospital Day: 3    Assessment and Plan:   Dayana Pacheco is a 73 y.o. female with pmh  who presents with Epigastric pain      Plan:    Abdominal pain  Nausea and vomiting  Possible acute cholecystitis  -Patient with 1 day of nausea and vomiting  -CT abdomen pelvis showing no acute process however does have chronic liver disease and cholelithiasis with similar mild gallbladder wall thickening, either related to underlying liver dysfunction or chronic cholecystitis  -Abdominal ultrasound-unremarkable- HIDA scan Suspected changes of acute calculus cholecystitis.  No choledocholithiasis, suspected change of chronic liver disease  -T. bili of 1.1, normal lipase level  -GI consulted by the ED  -General surgery consulted   -Continue n.p.o.  -Continue Rocephin/Flagyl  -Disp: TBD   -Plan for lab cholecystectomy     Pancytopenia   Anemia, chronic   -H/H 7.5/23.6-overall stable  - PLT declining trend since admission overall stable   - Type and screen completed   - AC held   -Transfuse less than 7  - No evidence of bleeding  - GI following     Persistent cough   -likely residual from COVID-19 and RSV infection  -Chest x-ray with bibasilar atelectasis  -Continue pulmonary hygiene/bronchodilators  -Procalcitonin level wnl,      Elevated troponin likely in the setting of demand ischemia  -Troponin: 42--> 40-flat trend  -Patient without chest pain  -EKG without acute ischemic changes  -Continue to monitor     Suspected Acute cystitis   -UA with small leukocyte esterase and 18 WBCs  -Denies urinary symptoms at present  -Continue on ceftriaxone IV  -Urine Cx NGTD      ANTONINA on CKD suspect prerenal  -Creatinine improving some 1.3 and improving  -Baseline around 1.2  -Continue gentle  Faintly nodular contour of the liver with widening of the fissures. Cholelithiasis with mild gallbladder wall thickening and trace pericholecystic fluid.  No biliary ductal dilatation.  Cystic lesion of the pancreatic uncinate measuring 0.8 cm on series 4, image 19.  No main pancreatic ductal dilatation.  Adrenals, kidneys, spleen, vasculature unremarkable. GI: Visualized bowel is nondilated without wall thickening. Peritoneum/retroperitoneum: Trace free fluid.  No free air, organized fluid collection, lymphadenopathy. Bones/soft tissues: Moderate degenerative disc disease.     1. Suspected changes of acute calculus cholecystitis.  No choledocholithiasis. 2. Suspected changes of chronic liver disease.  Clinical correlation advised. 3. Cystic lesion of the pancreatic uncinate measuring 0.8 cm, low risk by imaging.  Recommend follow-up pancreas protocol MRI in 2 years.     US ABDOMEN LIMITED    Result Date: 1/18/2024  EXAMINATION: RIGHT UPPER QUADRANT ULTRASOUND 1/18/2024 4:04 pm COMPARISON: None. HISTORY: ORDERING SYSTEM PROVIDED HISTORY: RUQ pain TECHNOLOGIST PROVIDED HISTORY: Reason for exam:->RUQ pain FINDINGS: LIVER:  The liver demonstrates normal echogenicity without evidence of intrahepatic biliary ductal dilatation. BILIARY SYSTEM:  Gallbladder is unremarkable without evidence of pericholecystic fluid, wall thickening or stones.  Negative sonographic Hernandez's sign. Common bile duct is within normal limits measuring 7 mm. RIGHT KIDNEY: The right kidney is grossly unremarkable without evidence of hydronephrosis. PANCREAS:  Visualized portions of the pancreas are unremarkable. OTHER: No evidence of right upper quadrant ascites.     Unremarkable right upper quadrant ultrasound.     CT ABDOMEN PELVIS WO CONTRAST Additional Contrast? None    Result Date: 1/18/2024  EXAMINATION: CT OF THE ABDOMEN AND PELVIS WITHOUT CONTRAST 1/18/2024 3:19 pm TECHNIQUE: CT of the abdomen and pelvis was performed without the

## 2024-01-20 NOTE — PROGRESS NOTES
1305: Arrived to PACU from OR. Monitors applied, alarms on. Report obtained from Michael RN and JEREMY CRNA. Oral airway in, good air exchange noted.  1310: Responds, oral airway removed.  1327: Medicated for nausea and abdominal pain. Reassurance given.  1342: Medicated for abdominal pain, warm blankets on.  1355: Dozing.  1403: Transported to room 1107.

## 2024-01-20 NOTE — ANESTHESIA PRE PROCEDURE
Department of Anesthesiology  Preprocedure Note       Name:  Dayana Pacheco   Age:  73 y.o.  :  1950                                          MRN:  [id number]         Date:  2024      Surgeon: Surgeon(s):  Goran Daniels MD    Procedure: Procedure(s):  CHOLECYSTECTOMY LAPAROSCOPIC    Medications prior to admission:   Prior to Admission medications    Medication Sig Start Date End Date Taking? Authorizing Provider   insulin glargine (BASAGLAR KWIKPEN) 100 UNIT/ML injection pen Inject 30 Units into the skin 2 times daily 35 units in the morning and 25 units at bedtime 24   Carmela Lorenzo MD   metoclopramide (REGLAN) 10 MG tablet TAKE 1/2 (ONE-HALF) TABLET BY MOUTH TWICE DAILY BEFORE MEAL(S) 23   Haresh Lawrence MD   pantoprazole (PROTONIX) 40 MG tablet TAKE 1 TABLET BY MOUTH ONCE DAILY IN THE MORNING BEFORE BREAKFAST 23   Haresh Lawrence MD   lactulose (CHRONULAC) 10 GM/15ML solution Take 30 mLs by mouth 2 times daily 23   Haresh Lawrence MD   traMADol (ULTRAM) 50 MG tablet Take 1 tablet by mouth every 6 hours as needed. 10/12/23   Shannan Mai MD LUMIGAN 0.01 % SOLN ophthalmic drops INSTILL 1 DROP INTO EACH EYE ONCE DAILY IN THE EVENING 23   Shannan Mai MD   Insulin Disposable Pump (OMNIPOD 5 G6 INTRO, GEN 5,) KIT  23   JO-ANN Rosenthal MD   lovastatin (MEVACOR) 40 MG tablet  23   Leo Quezada MD   ondansetron (ZOFRAN) 4 MG tablet Take 1 tablet by mouth daily as needed for Nausea or Vomiting 3/6/23   Haresh Lawrence MD   donepezil (ARICEPT) 10 MG tablet Take 1 tablet by mouth daily NOTE TO PHARMACY: DO NOT FILL UNTIL 5 MG RX IS COMPLETE  Patient not taking: No sig reported 3/15/22 7/15/22  Margarito Díaz DO   levothyroxine (SYNTHROID) 88 MCG tablet Take 1 tablet by mouth Daily 21   Darrick Ma MD   lisinopril (PRINIVIL;ZESTRIL) 10 MG tablet Take 1 tablet by mouth daily  Patient not taking: No sig reported 12/21/20 7/15/22

## 2024-01-20 NOTE — ANESTHESIA POSTPROCEDURE EVALUATION
Department of Anesthesiology  Postprocedure Note    Patient: Dayana Pacheco  MRN: [id number]  YOB: 1950  Date of evaluation: 1/20/2024    Procedure Summary       Date: 01/20/24 Room / Location: [address]    Anesthesia Start: 1204 Anesthesia Stop: 1309    Procedure: CHOLECYSTECTOMY LAPAROSCOPIC, LIVER BIOPSY (Abdomen) Diagnosis:       Cholecystitis      (Cholecystitis [K81.9])    Surgeons: Goran Daniels MD Responsible Provider: Saul Mancilla MD    Anesthesia Type: General ASA Status: 3 - Emergent            Anesthesia Type: General    Jez Phase I:      Jez Phase II:      Anesthesia Post Evaluation    Patient location during evaluation: PACU  Patient participation: complete - patient participated  Level of consciousness: awake and alert  Airway patency: patent  Nausea & Vomiting: no nausea and no vomiting  Cardiovascular status: hemodynamically stable  Respiratory status: spontaneous ventilation and room air  Hydration status: stable  Pain management: adequate    No notable events documented.

## 2024-01-20 NOTE — PROGRESS NOTES
Denies pain or nausea this am- no vomiting or fever  EXAM:  Vital signs: BP (!) 139/53   Pulse 92   Temp 97.7 °F (36.5 °C) (Oral)   Resp 14   Ht 1.626 m (5' 4\")   Wt 59.9 kg (132 lb)   SpO2 94%   BMI 22.66 kg/m²   Alert but slightlu sluggish mentally (her baseline), NAD  Lungs clear to auscultation  Cor: regular rhythm w/o murmer  Abd: soft, non distended- minimally tender RUQ  Extrem: no edema  WBC 2.8K, LFT's normal    Impression:    1) gallstones- ??acute cholecystitis vs imaging abnormalities secondary to cirrhosis    2) cirrhosis- mild PSE, history of varices but obliterated at last EGD 9/2023, good biosynthetic function, no ascites- not a huge operative risk but still some    Plan:   1) surgical consult pending- decide on conservative vs surgical therapy after that

## 2024-01-20 NOTE — CONSULTS
Department of GeneralSurgery   Surgical Service   Dr Goran Daniels, Evi Razo PA-C   Consult Note      Date of Consult: 24    Reason for Consult:  Acute cholecystitis  Requesting Physician:  Dr. Lawrence    CHIEF COMPLAINT:  Abd pain, N/V.    History Obtained From:  patient, electronic medical record    HISTORY OF PRESENT ILLNESS:                The patient is a 73 y.o. female who presents with epigastric abd pain. Pt with hx of liver cirrhosis and varices, and is followed by Dr. Lawrence. Pt with intermittent RUQ and epigastric pain for several years. Reports that she has difficulty tolerating spicy food and sometimes fatty foods. Pain is described as pressure-like. Pain level is minimal today. W/u this admission included RUQ US, MRCP and HIDA. HIDA with no gallbladder uptake concerning for cholecystitis.     Past Medical History:    Past Medical History:   Diagnosis Date    Arthritis     Cancer (HCC)     right breast.    Diabetes mellitus (HCC)     Esophageal varices (HCC)     Fatty liver     Glaucoma     Gout     patient states \"she has never had gout\".    History of blood transfusion     Hyperlipidemia     Hypertension     Neuropathy     Thyroid disease        Past Surgical History:    Past Surgical History:   Procedure Laterality Date    BREAST BIOPSY      CATARACT EXTRACTION Bilateral      SECTION      HYSTERECTOMY (CERVIX STATUS UNKNOWN) N/A     abdominal    MASTECTOMY, BILATERAL  10/26/2007    UPPER GASTROINTESTINAL ENDOSCOPY N/A 2022    EGD BAND LIGATION with 6 bands placed performed by Haresh Lawrence MD at [facility] ENDOSCOPY    UPPER GASTROINTESTINAL ENDOSCOPY N/A 2022    EGD BAND LIGATION WITH 3 BANDS PLACED, 4TH BAND DID NOT HOLD WITH SUBSEQUENT OOZING, TETRADECAL INJECTION GIVEN performed by Haresh Lawrence MD at [facility] ENDOSCOPY    UPPER GASTROINTESTINAL ENDOSCOPY N/A 2022    EGD BAND LIGATION WITH 3 BANDS APPLIED performed by Harehs Lawrence MD at [facility] ENDOSCOPY    UPPER  12:59 AM    AST 25 01/20/2024 12:59 AM    ALT 20 01/20/2024 12:59 AM       IMPRESSION:        Patient Active Problem List:     Acquired hypothyroidism     Depression     Diabetic neuropathy (HCC)     DM (diabetes mellitus) (HCC)     HTN (hypertension)     Hyperlipidemia     Osteopenia     Personal history of breast cancer     Allergic rhinitis     ANTONINA (acute kidney injury) (HCC)     Gastroenteritis     GI bleed     Hematemesis with nausea     Cirrhosis of liver with ascites (HCC)     Secondary esophageal varices with bleeding (HCC)     Secondary esophageal varices without bleeding (HCC)     Anemia     Syncope, near     Cirrhosis of liver without ascites (HCC)     Epigastric pain     Acute cholecystitis      PLAN:    Will proceed with lap melonie with liver biopsy today for acute cholecystitis.    Pt has been NPO. Tech on floor to perform Hibiclens bath today. Ancef on call to OR.     Patient and plan of care discussed with Dr. Daniels.    Evi Razo PA-C    ========    Pt seen and examined  RUQ pain with nausea  U/s GB was -ve for stone but MRI shows acute cholecystitis with non visulalized GB on HIDA scan. Pt with liver cirrhosis but LFTs normal.   Will proceed with lap melonie and liver bx today    RAUL DANIELS MD

## 2024-01-20 NOTE — CONSULTS
Endocrinology   Consult Note  2024  1:08 PM     Primary Care provider: Leo Quezada MD     Referring physician:  Carmela Lorenzo MD     Dear Doctor Arnulfo / Reece    Thank You for the Consult     Pt. Was Admitted for : Abdominal pain with nausea and vomiting    Reason for Consult: Better control of blood glucose      History Obtained From:  Patient/ EMR       HISTORY OF PRESENT ILLNESS:                The patient is a 73 y.o. female with significant past medical history of type 1 diabetes mellitus, right breast cancer, esophageal varices, with fatty liver possible liver cirrhosis hypertension, hyperlipidemia, hypothyroidism and peripheral neuropathy of diabetes mellitus, also has recurrent nausea and vomiting possibly due to gastroparesis was admitted to hospital for abdominal pain with nausea and vomiting.  Workup showed the patient has gallbladder disease and she underwent laparoscopic cholecystectomy this morning patient diabetes was controlled with using a Omnipod insulin pump and blood sugar was measured by attached to the pump and the Dexcom 6 CGM.  ( continuous glucose monitoring ).I was  consulted for better control of blood glucose.       ROS:   Pt's ROS done in detail.  Abnormal ROS are noted in Medical and Surgical History Section below:     Other Medical History:        Diagnosis Date    Arthritis     Cancer (HCC)     right breast.    Diabetes mellitus (HCC)     Esophageal varices (HCC)     Fatty liver     Glaucoma     Gout     patient states \"she has never had gout\".    History of blood transfusion     Hyperlipidemia     Hypertension     Neuropathy     Thyroid disease      Surgical History:        Procedure Laterality Date    BREAST BIOPSY      CATARACT EXTRACTION Bilateral      SECTION      HYSTERECTOMY (CERVIX STATUS UNKNOWN) N/A     abdominal    MASTECTOMY, BILATERAL  10/26/2007    UPPER GASTROINTESTINAL ENDOSCOPY N/A 2022    EGD BAND LIGATION with 6 bands placed

## 2024-01-21 VITALS
HEART RATE: 95 BPM | TEMPERATURE: 98.1 F | RESPIRATION RATE: 18 BRPM | DIASTOLIC BLOOD PRESSURE: 70 MMHG | SYSTOLIC BLOOD PRESSURE: 125 MMHG | HEIGHT: 64 IN | OXYGEN SATURATION: 98 % | BODY MASS INDEX: 22.53 KG/M2 | WEIGHT: 132 LBS

## 2024-01-21 LAB
ALBUMIN SERPL-MCNC: 3.8 GM/DL (ref 3.4–5)
ALP BLD-CCNC: 62 IU/L (ref 40–129)
ALT SERPL-CCNC: 25 U/L (ref 10–40)
ANION GAP SERPL CALCULATED.3IONS-SCNC: 12 MMOL/L (ref 7–16)
AST SERPL-CCNC: 37 IU/L (ref 15–37)
BASOPHILS ABSOLUTE: 0 K/CU MM
BASOPHILS RELATIVE PERCENT: 0.2 % (ref 0–1)
BILIRUB SERPL-MCNC: 0.6 MG/DL (ref 0–1)
BUN SERPL-MCNC: 15 MG/DL (ref 6–23)
CALCIUM SERPL-MCNC: 8.5 MG/DL (ref 8.3–10.6)
CHLORIDE BLD-SCNC: 106 MMOL/L (ref 99–110)
CO2: 21 MMOL/L (ref 21–32)
CREAT SERPL-MCNC: 1.3 MG/DL (ref 0.6–1.1)
DIFFERENTIAL TYPE: ABNORMAL
EOSINOPHILS ABSOLUTE: 0 K/CU MM
EOSINOPHILS RELATIVE PERCENT: 0 % (ref 0–3)
GFR SERPL CREATININE-BSD FRML MDRD: 43 ML/MIN/1.73M2
GLUCOSE BLD-MCNC: 242 MG/DL (ref 70–99)
GLUCOSE BLD-MCNC: 251 MG/DL (ref 70–99)
GLUCOSE BLD-MCNC: 288 MG/DL (ref 70–99)
GLUCOSE SERPL-MCNC: 236 MG/DL (ref 70–99)
HCT VFR BLD CALC: 24.7 % (ref 37–47)
HEMOGLOBIN: 8 GM/DL (ref 12.5–16)
IMMATURE NEUTROPHIL %: 0.5 % (ref 0–0.43)
LYMPHOCYTES ABSOLUTE: 0.8 K/CU MM
LYMPHOCYTES RELATIVE PERCENT: 17.7 % (ref 24–44)
MAGNESIUM: 1.7 MG/DL (ref 1.8–2.4)
MCH RBC QN AUTO: 34.9 PG (ref 27–31)
MCHC RBC AUTO-ENTMCNC: 32.4 % (ref 32–36)
MCV RBC AUTO: 107.9 FL (ref 78–100)
MONOCYTES ABSOLUTE: 0.2 K/CU MM
MONOCYTES RELATIVE PERCENT: 5.5 % (ref 0–4)
NUCLEATED RBC %: 0 %
PDW BLD-RTO: 14.4 % (ref 11.7–14.9)
PHOSPHORUS: 2.8 MG/DL (ref 2.5–4.9)
PLATELET # BLD: 110 K/CU MM (ref 140–440)
PMV BLD AUTO: 11.2 FL (ref 7.5–11.1)
POTASSIUM SERPL-SCNC: 4.3 MMOL/L (ref 3.5–5.1)
RBC # BLD: 2.29 M/CU MM (ref 4.2–5.4)
SEGMENTED NEUTROPHILS ABSOLUTE COUNT: 3.3 K/CU MM
SEGMENTED NEUTROPHILS RELATIVE PERCENT: 76.1 % (ref 36–66)
SODIUM BLD-SCNC: 139 MMOL/L (ref 135–145)
TOTAL IMMATURE NEUTOROPHIL: 0.02 K/CU MM
TOTAL NUCLEATED RBC: 0 K/CU MM
TOTAL PROTEIN: 6.8 GM/DL (ref 6.4–8.2)
WBC # BLD: 4.4 K/CU MM (ref 4–10.5)

## 2024-01-21 PROCEDURE — 6370000000 HC RX 637 (ALT 250 FOR IP): Performed by: PHYSICIAN ASSISTANT

## 2024-01-21 PROCEDURE — 90632 HEPA VACCINE ADULT IM: CPT | Performed by: PHYSICIAN ASSISTANT

## 2024-01-21 PROCEDURE — 36415 COLL VENOUS BLD VENIPUNCTURE: CPT

## 2024-01-21 PROCEDURE — 82962 GLUCOSE BLOOD TEST: CPT

## 2024-01-21 PROCEDURE — 2580000003 HC RX 258: Performed by: PHYSICIAN ASSISTANT

## 2024-01-21 PROCEDURE — 85025 COMPLETE CBC W/AUTO DIFF WBC: CPT

## 2024-01-21 PROCEDURE — 6360000002 HC RX W HCPCS: Performed by: PHYSICIAN ASSISTANT

## 2024-01-21 PROCEDURE — 84100 ASSAY OF PHOSPHORUS: CPT

## 2024-01-21 PROCEDURE — 99024 POSTOP FOLLOW-UP VISIT: CPT | Performed by: PHYSICIAN ASSISTANT

## 2024-01-21 PROCEDURE — 80053 COMPREHEN METABOLIC PANEL: CPT

## 2024-01-21 PROCEDURE — 6370000000 HC RX 637 (ALT 250 FOR IP): Performed by: INTERNAL MEDICINE

## 2024-01-21 PROCEDURE — 76937 US GUIDE VASCULAR ACCESS: CPT

## 2024-01-21 PROCEDURE — 90471 IMMUNIZATION ADMIN: CPT | Performed by: PHYSICIAN ASSISTANT

## 2024-01-21 PROCEDURE — 83735 ASSAY OF MAGNESIUM: CPT

## 2024-01-21 PROCEDURE — 94761 N-INVAS EAR/PLS OXIMETRY MLT: CPT

## 2024-01-21 PROCEDURE — APPNB45 APP NON BILLABLE 31-45 MINUTES: Performed by: PHYSICIAN ASSISTANT

## 2024-01-21 RX ORDER — INSULIN LISPRO 100 [IU]/ML
10 INJECTION, SOLUTION INTRAVENOUS; SUBCUTANEOUS
Status: DISCONTINUED | OUTPATIENT
Start: 2024-01-21 | End: 2024-01-21 | Stop reason: HOSPADM

## 2024-01-21 RX ORDER — INSULIN GLARGINE 100 [IU]/ML
25 INJECTION, SOLUTION SUBCUTANEOUS NIGHTLY
Status: DISCONTINUED | OUTPATIENT
Start: 2024-01-21 | End: 2024-01-21 | Stop reason: HOSPADM

## 2024-01-21 RX ADMIN — INSULIN LISPRO 4 UNITS: 100 INJECTION, SOLUTION INTRAVENOUS; SUBCUTANEOUS at 09:06

## 2024-01-21 RX ADMIN — SODIUM CHLORIDE: 9 INJECTION, SOLUTION INTRAVENOUS at 11:15

## 2024-01-21 RX ADMIN — LACTULOSE 20 G: 20 SOLUTION ORAL at 11:08

## 2024-01-21 RX ADMIN — SODIUM CHLORIDE, PRESERVATIVE FREE 10 ML: 5 INJECTION INTRAVENOUS at 11:09

## 2024-01-21 RX ADMIN — PANTOPRAZOLE SODIUM 40 MG: 40 TABLET, DELAYED RELEASE ORAL at 06:58

## 2024-01-21 RX ADMIN — INSULIN LISPRO 5 UNITS: 100 INJECTION, SOLUTION INTRAVENOUS; SUBCUTANEOUS at 09:07

## 2024-01-21 RX ADMIN — LEVOTHYROXINE SODIUM 88 MCG: 0.09 TABLET ORAL at 06:58

## 2024-01-21 RX ADMIN — HEPATITIS A VACCINE 1440 UNITS: 1440 INJECTION, SUSPENSION INTRAMUSCULAR at 16:20

## 2024-01-21 RX ADMIN — METOCLOPRAMIDE 5 MG: 5 TABLET ORAL at 06:58

## 2024-01-21 RX ADMIN — SODIUM CHLORIDE: 9 INJECTION, SOLUTION INTRAVENOUS at 02:10

## 2024-01-21 RX ADMIN — INSULIN LISPRO 4 UNITS: 100 INJECTION, SOLUTION INTRAVENOUS; SUBCUTANEOUS at 02:03

## 2024-01-21 RX ADMIN — METOCLOPRAMIDE 5 MG: 5 TABLET ORAL at 11:08

## 2024-01-21 NOTE — PROGRESS NOTES
GENERAL SURGERY PROGRESS NOTE    Dayana Pacheco is a 73 y.o. female with 1 Day Post-Op lap melonie.                 Subjective:    Pain: Well controlled  BM: +ve   Diet: ADULT DIET; Regular; Low Fat (less than or equal to 50 gm/day)  Activity: Tolerating    Pt sleepy today. Reports some abd soreness, but well controlled. Denies N/V.     Review of Systems   Constitutional:  Negative for chills and fever.   HENT:  Negative for ear pain, mouth sores, sore throat and tinnitus.    Eyes:  Negative for photophobia, redness and itching.   Respiratory:  Negative for apnea, choking and stridor.    Cardiovascular:  Negative for chest pain and palpitations.   Gastrointestinal:  Positive for abdominal pain. Negative for anal bleeding, constipation, nausea and rectal pain.   Endocrine: Negative for polydipsia.   Genitourinary:  Negative for enuresis, flank pain and hematuria.   Musculoskeletal:  Negative for back pain, joint swelling and myalgias.   Skin:  Negative for color change and pallor.   Allergic/Immunologic: Negative for environmental allergies.   Neurological:  Negative for syncope and speech difficulty.   Psychiatric/Behavioral:  Negative for confusion and hallucinations.        Objective:    Vitals: VITALS:  /63   Pulse 98   Temp 98.3 °F (36.8 °C) (Oral)   Resp 14   Ht 1.626 m (5' 4\")   Wt 59.9 kg (132 lb)   SpO2 98%   BMI 22.66 kg/m²   TEMPERATURE:  Current - Temp: 98.3 °F (36.8 °C); Max - Temp  Av °F (36.7 °C)  Min: 97.1 °F (36.2 °C)  Max: 98.9 °F (37.2 °C)    I/O:  0701 -  07  In: 550 [I.V.:500]  Out: 10     Labs/Imaging Results:   Lab Results   Component Value Date    WBC 4.4 2024    HGB 8.0 (L) 2024    HCT 24.7 (L) 2024    .9 (H) 2024     (L) 2024     Lab Results   Component Value Date     2024    K 4.3 2024     2024    CO2 21 2024    BUN 15 2024    CREATININE 1.3 (H) 2024    GLUCOSE 236 (H)  neuropathy (HCC)     DM (diabetes mellitus) (HCC)     HTN (hypertension)     Hyperlipidemia     Osteopenia     Personal history of breast cancer     Allergic rhinitis     ANTONINA (acute kidney injury) (HCC)     Gastroenteritis     GI bleed     Hematemesis with nausea     Cirrhosis of liver with ascites (HCC)     Secondary esophageal varices with bleeding (HCC)     Secondary esophageal varices without bleeding (HCC)     Anemia     Syncope, near     Cirrhosis of liver without ascites (HCC)     Abdominal pain, epigastric     Acute cholecystitis      Diet: As tolerated  Wound care: Incisions ELIOT. Ok to shower.  Activity: As tolerated  Abx: None needed from surgery standpoint  Med changes: None   Labs/Imaging: Reviewed  Disposition: Ok for d/c from surgery standpoint. Will f/u in the office in 2 weeks.       Evi Razo PA-C

## 2024-01-21 NOTE — CONSULTS
Consult completed. Nexiva Diffusics 20g 1.25\" peripheral IV initiated into right forearm x 1 attempt using ultrasound guided technique. Brisk blood return, flushes without resistance. Patient tolerated well. Primary nurse Skye notified.     Consult the Vascular Access Team for questions, concerns, or change in patient's condition.

## 2024-01-21 NOTE — PROGRESS NOTES
Collection Time: 01/21/24  4:56 AM   Result Value Ref Range    WBC 4.4 4.0 - 10.5 K/CU MM    RBC 2.29 (L) 4.2 - 5.4 M/CU MM    Hemoglobin 8.0 (L) 12.5 - 16.0 GM/DL    Hematocrit 24.7 (L) 37 - 47 %    .9 (H) 78 - 100 FL    MCH 34.9 (H) 27 - 31 PG    MCHC 32.4 32.0 - 36.0 %    RDW 14.4 11.7 - 14.9 %    Platelets 110 (L) 140 - 440 K/CU MM    MPV 11.2 (H) 7.5 - 11.1 FL    Differential Type AUTOMATED DIFFERENTIAL     Segs Relative 76.1 (H) 36 - 66 %    Lymphocytes % 17.7 (L) 24 - 44 %    Monocytes % 5.5 (H) 0 - 4 %    Eosinophils % 0.0 0 - 3 %    Basophils % 0.2 0 - 1 %    Segs Absolute 3.3 K/CU MM    Lymphocytes Absolute 0.8 K/CU MM    Monocytes Absolute 0.2 K/CU MM    Eosinophils Absolute 0.0 K/CU MM    Basophils Absolute 0.0 K/CU MM    Nucleated RBC % 0.0 %    Total Nucleated RBC 0.0 K/CU MM    Total Immature Neutrophil 0.02 K/CU MM    Immature Neutrophil % 0.5 (H) 0 - 0.43 %   POCT Glucose    Collection Time: 01/21/24  6:57 AM   Result Value Ref Range    POC Glucose 251 (H) 70 - 99 MG/DL   POCT Glucose    Collection Time: 01/21/24 11:13 AM   Result Value Ref Range    POC Glucose 242 (H) 70 - 99 MG/DL        Imaging/Diagnostics Last 24 Hours   NM HEPATOBILIARY    Result Date: 1/19/2024  EXAMINATION: NUCLEAR MEDICINE HEPATOBILIARY SCINTIGRAPHY (HIDA SCAN). 1/19/2024 10:01 am TECHNIQUE: Approximately 3.3 mCi Tc-99m Mebrofenin (Choletec) was administered IV. Then, dynamic images of the abdomen were obtained in the anterior projection for 60 min(s).  Delayed anterior images were obtained at 90 minutes and 4 hours. COMPARISON: No prior for comparison. HISTORY: ORDERING SYSTEM PROVIDED HISTORY: acute cholecystitis TECHNOLOGIST PROVIDED HISTORY: Reason for exam:->acute cholecystitis Reason for Exam: acut cholecystitis FINDINGS: Upon injection of radiopharmaceutical, there is prompt visualization of the liver.  Activity is seen within common duct and small bowel during the 1st hour of imaging though not within  gallbladder.  Gallbladder is not visualized through 4 hours.  Mild residual activity is seen within the liver at 4 hours.     Gallbladder is nonvisualized, for which cholecystitis should be considered in the appropriate clinical setting. Mild retention of hepatic activity is seen which could reflect hepatocellular dysfunction; correlate with LFT.       Electronically signed by Jeromy Burnett MD on 1/21/2024 at 11:26 AM

## 2024-01-21 NOTE — PROGRESS NOTES
Discharge Summary    Name:  Dayana Pacheco /Age/Sex: 1950  (73 y.o. female)   MRN & CSN:  [id number] & [id number] Admission Date/Time: 2024  1:08 PM   Attending:  No att. providers found Discharging Physician: Jeromy Burnett MD     Hospital Course:   Dayana Pacheco is a 73 y.o.  female  who presents with Abdominal pain, epigastric    HPI  \"Dayana Pacheco is a 73 y.o. female with pmh of CKD, type 2 diabetes who presents with nausea and vomiting and cough.  Patient states that it has been going on since yesterday.  No associated abdominal pain.  Denies diarrhea, constipation, chest pain, shortness of breath.  Patient has not eaten in a few days.  Patient does not smoke, no alcohol or drug use.     Patient did recently go to urgent care thinking that she had a UTI for which her PCP had started her on Bactrim for however when urgent care tested her if she did not have a UTI.     Patient was also discharged on 2024 after being admitted for type II NSTEMI in the setting of COVID-19 and RSV infection as well as a mild ANTONINA.     On arrival patient was tachycardic to 109, rest of the vitals were stable.  Labs are significant for WBCs of 3.8, hemoglobin of 8.9, platelets of 102, creatinine of 1.6, T. bili of 1.1, troponin of 42, normal lipase level, UA with small leukocyte esterase and 18 WBCs, repeat troponin was 40.  Chest x-ray showed bibasilar atelectasis.  CT abdomen pelvis showed no acute intraabdominal or pelvic process, suspected changes of chronic liver disease, cholelithiasis with similar mild gallbladder wall thickening, this is either related to underlying liver dysfunction or chronic cholecystitis.    Patient received famotidine, IV fluids and Zofran.\"       The following problems have been addressed during this hospitalization.  acute cholecystitis  P/w n/v abd pain.   Abdominal ultrasound-unremarkable- HIDA scan Suspected changes of acute calculus cholecystitis.  No choledocholithiasis,  suspected change of chronic liver disease  -GI consulted by the ED  -General surgery consulted   - s/p lap chol 1/20/2024  -was on Rocephin/Flagyl; no indications to continue abx     Pancytopenia   Anemia, chronic   -H/H 7.5/23.6-overall stable  - PLT declining trend since admission overall stable   - Type and screen completed 1/19  - AC held   -Transfuse less than 7  - No evidence of bleeding       Persistent cough   -likely residual from COVID-19 and RSV infection  -Chest x-ray with bibasilar atelectasis  -Continue pulmonary hygiene/bronchodilators  -Procalcitonin level wnl,      Elevated troponin likely in the setting of demand ischemia  -Troponin: 42--> 40-flat trend  -Patient without chest pain  -EKG without acute ischemic changes  -Continue to monitor     Suspected Acute cystitis   -UA with small leukocyte esterase and 18 WBCs  -Denies urinary symptoms at present  -no indication for abx         ANTONINA on CKD suspect prerenal  -Creatinine improving some 1.3 and improving  -Baseline around 1.2  -received gentle hydration     Hypomagnesemia (1.7)  Replete and trend     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   -Uses insulin pump was stopped on admission  Resumed    Pt cleared for discharge by general surgery.  Pt didn't want to wait for PT/OT evaluation. Decided to go home with home health. Discharge order placed.     Physical Exam  Vitals:   Vitals:    01/21/24 1115   BP: 125/70   Pulse: 95   Resp: 18   Temp: 98.1 °F (36.7 °C)   SpO2:        General: NAD  Eyes: EOMI  ENT: neck supple  Cardiovascular: Regular rate.  Respiratory: Clear to auscultation  Gastrointestinal: Soft, non tender  Genitourinary: no suprapubic tenderness  Musculoskeletal: No edema  Skin: warm, dry  Neuro: Alert.  Psych: Mood appropriate.     The patient expressed appropriate understanding of and agreement with the discharge recommendations, medications, and plan.     Consults this admission:  IP CONSULT TO GI  IP  CONSULT TO GENERAL SURGERY  IP CONSULT TO ENDOCRINOLOGY  IP CONSULT TO IV TEAM  IP CONSULT TO HOME CARE NEEDS      Discharge Instruction:   Handoff to PCP:     Follow up appointments: general surgery  Primary care physician: Leo Quezada MD      Diet:  low fat, low cholesterol diet   Activity: activity as tolerated  Disposition: Discharged to:    []Home, [x][facility], []SNF, []Acute Rehab, []Hospice   Condition on discharge: Stable    Discharge Medications:        Medication List        CONTINUE taking these medications      Basaglar KwikPen 100 UNIT/ML injection pen  Generic drug: insulin glargine  Inject 30 Units into the skin 2 times daily 35 units in the morning and 25 units at bedtime     * Lumigan 0.01 % Soln ophthalmic drops  Generic drug: bimatoprost     * bimatoprost 0.03 % ophthalmic drops  Commonly known as: LUMIGAN     lactulose 10 GM/15ML solution  Commonly known as: CHRONULAC  Take 30 mLs by mouth 2 times daily     levothyroxine 88 MCG tablet  Commonly known as: SYNTHROID  Take 1 tablet by mouth Daily     lovastatin 40 MG tablet  Commonly known as: MEVACOR     metoclopramide 10 MG tablet  Commonly known as: REGLAN  TAKE 1/2 (ONE-HALF) TABLET BY MOUTH TWICE DAILY BEFORE MEAL(S)     NovoLOG FlexPen 100 UNIT/ML injection pen  Generic drug: insulin aspart  14 units with largest meal of the day, 7 units with each smaller meal     Omnipod 5 G6 Intro (Gen 5) Kit     ondansetron 4 MG tablet  Commonly known as: ZOFRAN  Take 1 tablet by mouth daily as needed for Nausea or Vomiting     OneTouch Delica Lancets 33G Misc  Test blood sugar 1-2 times a day as directed. Dx: E11.65     pantoprazole 40 MG tablet  Commonly known as: PROTONIX  TAKE 1 TABLET BY MOUTH ONCE DAILY IN THE MORNING BEFORE BREAKFAST     traMADol 50 MG tablet  Commonly known as: ULTRAM           * This list has 2 medication(s) that are the same as other medications prescribed for you. Read the directions carefully, and ask your doctor or other care

## 2024-01-21 NOTE — DISCHARGE INSTRUCTIONS
- please schedule an appointment to see your PCP  - please schedule an appointment to see general surgeon  - please go to lab in one week for blood work  - please take medications as prescribed        Patient Discharge Instructions  Dr. Goran Razo PA-C  [phone]    Discharge Date:  1/21/2024    Discharged To:  Home      RESUME ACTIVITY:      BATHING:   Recommend showering daily but no bath tub or submerging incision under water    Wound:    Keep wound dry and clean.  May shower as instructed above.    Dressing:    Your incisions are covered with glue that will fall off with time. You may leave these incisions uncovered with any additional dressings    DRIVING:   3-5 days. No driving until off narcotic pain medications and walking comfortably    RETURN TO WORK: when cleared after your follow up office visit    WALKING:    As tolerated     STAIRS:    As tolerated    LIFTING:   Less than 10 pounds for one week    DIET:    Regular diet as tolerated.     SPECIAL INSTRUCTIONS:     Call the office at [phone]  if you have a fever greater than or equal to 101 oF or if your incision becomes red, tender, or has drainage of pus.      If follow up appointment was not given to you, call the Surgical [facility] at [phone] for follow up appointment with Dr. Jeremiah Steve in:  1-2 weeks.

## 2024-01-21 NOTE — PROGRESS NOTES
Progress Note( Dr. Rosenthal)  1/21/2024  Subjective:   Admit Date: 1/18/2024  PCP: Leo Quezada MD    Admitted For :  Abdominal pain with nausea and vomiting       Consulted For: Better control of blood glucose     Interval History: Patient had laparoscopy cholecystectomy on 1/20/2024  Feeling somewhat better allowed to eat  Blood glucose are running a bit higher    Denies any chest pains,     Denies SOB .   Denies nausea or vomiting.  Now eating  No new bowel or bladder symptoms.       Intake/Output Summary (Last 24 hours) at 1/21/2024 1138  Last data filed at 1/20/2024 1347  Gross per 24 hour   Intake 550 ml   Output 10 ml   Net 540 ml       DATA    CBC:   Recent Labs     01/19/24  1813 01/20/24  0059 01/21/24  0456   WBC 2.7* 2.8* 4.4   HGB 7.5* 7.5* 8.0*   PLT 91* 96* 110*    CMP:  Recent Labs     01/19/24  0545 01/20/24  0059 01/21/24  0456   * 138 139   K 4.6 4.1 4.3    105 106   CO2 21 22 21   BUN 15 12 15   CREATININE 1.5* 1.3* 1.3*   CALCIUM 8.3 8.8 8.5   PROT 5.7* 6.5 6.8   LABALBU 3.2* 3.6 3.8   BILITOT 0.7 1.0 0.6   ALKPHOS 58 62 62   AST 22 25 37   ALT 18 20 25     Lipids:   Lab Results   Component Value Date/Time    CHOL 152 11/28/2023 11:39 AM    CHOL 148 01/27/2022 08:55 AM    HDL 71 11/28/2023 11:39 AM    TRIG 60 11/28/2023 11:39 AM     Glucose:  Recent Labs     01/21/24  0159 01/21/24  0657 01/21/24  1113   POCGLU 288* 251* 242*     OxldwytmgiT3F:  Lab Results   Component Value Date/Time    LABA1C 6.2 01/03/2024 04:09 PM     High Sensitivity TSH:   Lab Results   Component Value Date/Time    TSHHS 0.106 11/28/2023 11:39 AM     Free T3: No results found for: \"FT3\"  Free T4:  Lab Results   Component Value Date/Time    T4FREE 2.00 11/28/2023 11:39 AM       NM HEPATOBILIARY   Final Result   Gallbladder is nonvisualized, for which cholecystitis should be considered in   the appropriate clinical setting.      Mild retention of hepatic activity is seen which could reflect hepatocellular

## 2024-01-24 LAB
CULTURE: NORMAL
CULTURE: NORMAL
Lab: NORMAL
Lab: NORMAL
SPECIMEN: NORMAL
SPECIMEN: NORMAL

## 2024-01-26 RX ORDER — PANTOPRAZOLE SODIUM 40 MG/1
TABLET, DELAYED RELEASE ORAL
Qty: 30 TABLET | Refills: 0 | Status: SHIPPED | OUTPATIENT
Start: 2024-01-26

## 2024-01-26 RX ORDER — METOCLOPRAMIDE 10 MG/1
TABLET ORAL
Qty: 30 TABLET | Refills: 0 | Status: SHIPPED | OUTPATIENT
Start: 2024-01-26

## 2024-01-26 NOTE — DISCHARGE SUMMARY
suspected change of chronic liver disease  -GI consulted by the ED  -General surgery consulted   - s/p lap chol 1/20/2024  -was on Rocephin/Flagyl; no indications to continue abx     Pancytopenia   Anemia, chronic   -H/H 7.5/23.6-overall stable  - PLT declining trend since admission overall stable   - Type and screen completed 1/19  - AC held   -Transfuse less than 7  - No evidence of bleeding        Persistent cough   -likely residual from COVID-19 and RSV infection  -Chest x-ray with bibasilar atelectasis  -Continue pulmonary hygiene/bronchodilators  -Procalcitonin level wnl,      Elevated troponin likely in the setting of demand ischemia  -Troponin: 42--> 40-flat trend  -Patient without chest pain  -EKG without acute ischemic changes  -Continue to monitor     Suspected Acute cystitis   -UA with small leukocyte esterase and 18 WBCs  -Denies urinary symptoms at present  -no indication for abx         ANTONINA on CKD suspect prerenal  -Creatinine improving some 1.3 and improving  -Baseline around 1.2  -received gentle hydration     Hypomagnesemia (1.7)  Replete and trend     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   -Uses insulin pump was stopped on admission  Resumed     Pt cleared for discharge by general surgery.  Pt didn't want to wait for PT/OT evaluation. Decided to go home with home health. Discharge order placed.     Physical Exam  Vitals:   Vitals:    01/21/24 1115   BP: 125/70   Pulse: 95   Resp: 18   Temp: 98.1 °F (36.7 °C)   SpO2:          General: NAD  Eyes: EOMI  ENT: neck supple  Cardiovascular: Regular rate.  Respiratory: Clear to auscultation  Gastrointestinal: Soft, non tender  Genitourinary: no suprapubic tenderness  Musculoskeletal: No edema  Skin: warm, dry  Neuro: Alert.  Psych: Mood appropriate.     The patient expressed appropriate understanding of and agreement with the discharge recommendations, medications, and plan.     Consults this admission:  IP CONSULT TO GI  IP  CONSULT TO GENERAL SURGERY  IP CONSULT TO ENDOCRINOLOGY  IP CONSULT TO IV TEAM  IP CONSULT TO HOME CARE NEEDS      Discharge Instruction:   Handoff to PCP:     Follow up appointments: general surgery  Primary care physician: Leo Quezada MD      Diet:  low fat, low cholesterol diet   Activity: activity as tolerated  Disposition: Discharged to:    []Home, [x][facility], []SNF, []Acute Rehab, []Hospice   Condition on discharge: Stable    Discharge Medications:        Medication List        CONTINUE taking these medications      Basaglar KwikPen 100 UNIT/ML injection pen  Generic drug: insulin glargine  Inject 30 Units into the skin 2 times daily 35 units in the morning and 25 units at bedtime     * Lumigan 0.01 % Soln ophthalmic drops  Generic drug: bimatoprost     * bimatoprost 0.03 % ophthalmic drops  Commonly known as: LUMIGAN     lactulose 10 GM/15ML solution  Commonly known as: CHRONULAC  Take 30 mLs by mouth 2 times daily     levothyroxine 88 MCG tablet  Commonly known as: SYNTHROID  Take 1 tablet by mouth Daily     lovastatin 40 MG tablet  Commonly known as: MEVACOR     metoclopramide 10 MG tablet  Commonly known as: REGLAN  TAKE 1/2 (ONE-HALF) TABLET BY MOUTH TWICE DAILY BEFORE MEAL(S)     NovoLOG FlexPen 100 UNIT/ML injection pen  Generic drug: insulin aspart  14 units with largest meal of the day, 7 units with each smaller meal     Omnipod 5 G6 Intro (Gen 5) Kit     ondansetron 4 MG tablet  Commonly known as: ZOFRAN  Take 1 tablet by mouth daily as needed for Nausea or Vomiting     OneTouch Delica Lancets 33G Misc  Test blood sugar 1-2 times a day as directed. Dx: E11.65     pantoprazole 40 MG tablet  Commonly known as: PROTONIX  TAKE 1 TABLET BY MOUTH ONCE DAILY IN THE MORNING BEFORE BREAKFAST     traMADol 50 MG tablet  Commonly known as: ULTRAM           * This list has 2 medication(s) that are the same as other medications prescribed for you. Read the directions carefully, and ask your doctor or other care  provider to review them with you.                STOP taking these medications      B2 PO     calcium carbonate 500 MG Tabs tablet  Commonly known as: OSCAL     Cranberry 86466 MG Caps     donepezil 10 MG tablet  Commonly known as: ARICEPT     ferrous sulfate 325 (65 Fe) MG tablet  Commonly known as: IRON 325     GARLIC PO     lisinopril 10 MG tablet  Commonly known as: PRINIVIL;ZESTRIL     POMEGRANATE PO     raNITIdine 150 MG tablet  Commonly known as: ZANTAC              Objective Findings at Discharge:       BMP/CBC  No results for input(s): \"NA\", \"K\", \"CL\", \"CO2\", \"BUN\", \"CREATININE\", \"GLU\", \"WBC\", \"HEMOGLOBIN\", \"HCT\", \"PLT\" in the last 72 hours.    IMAGING:      Additional Information: Patient seen and examined day of discharge. For more information regarding patient's care please contact [facility] medical records [phone]    Discharge Time of 35 minutes    Electronically signed by Jeromy Burnett MD on 1/25/2024 at 9:00 PM

## 2024-01-29 NOTE — OP NOTE
Operative Note    Patient ID:  Dayana Pacheco  [id number]  73 y.o.  1950      Indications: This patient presents with a symptomatic gallbladder disease and will undergo laparoscopic cholecystecomy.    Pre-operative Diagnosis:  Chronic Calculous Cholecystitis with liver cirrhosis     Post-operative Diagnosis:  Same    Procedure:  Laparoscopic Cholecystectomy with liver biopsy    Surgeon: RAUL TELLEZ MD    First Assistant: Evi Razo PA-C  The  Use of a first assistant was necessary for the proper positioning, prepping, and draping of the patient, as well as the safe and expeditious execution of the case and closure of skin and subcutaneous tissues.     Anesthesia: General endotracheal anesthesia    ASA Class: 4    Findings: Cholecystitis with Cholelithiasis, liver cirrhosis    Estimated Blood Loss:  Minimal           Drains:  none           Total IV Fluids: 500 ml           Specimens: Gallbladder , liver biopsy         Complications:  None; patient tolerated the procedure well.           Disposition: PACU - hemodynamically stable.           Condition: stable        Procedure Details   The patient was seen again in the Holding Room. The risks, benefits, complications, treatment options, and expected outcomes were discussed with the patient. The possibilities of reaction to medication, pulmonary aspiration, perforation of viscus, bleeding, recurrent infection, finding a normal gallbladder, the need for additional procedures, failure to diagnose a condition, the possible need to convert to an open procedure, and creating a complication requiring transfusion or operation were discussed with the patient. The patient and/or family concurred with the proposed plan, giving informed consent.  The site of surgery properly noted/marked. The patient was taken to Operating Room, identified as Dayana Pacheco and the procedure verified as Laparoscopic Cholecystectomy with possible Intraoperative Cholangiograms. A

## 2024-01-31 ENCOUNTER — OFFICE VISIT (OUTPATIENT)
Dept: CARDIOLOGY CLINIC | Age: 74
End: 2024-01-31

## 2024-01-31 VITALS
HEIGHT: 64 IN | BODY MASS INDEX: 22.53 KG/M2 | HEART RATE: 92 BPM | WEIGHT: 132 LBS | SYSTOLIC BLOOD PRESSURE: 122 MMHG | DIASTOLIC BLOOD PRESSURE: 58 MMHG

## 2024-01-31 DIAGNOSIS — K70.30 ALCOHOLIC CIRRHOSIS OF LIVER WITHOUT ASCITES (HCC): ICD-10-CM

## 2024-01-31 DIAGNOSIS — K81.0 ACUTE CHOLECYSTITIS: Primary | ICD-10-CM

## 2024-01-31 DIAGNOSIS — I10 PRIMARY HYPERTENSION: ICD-10-CM

## 2024-01-31 NOTE — PROGRESS NOTES
respiratory distress, No wheezing, No Rales, No Ronchi.  No chest tenderness.   Cardiovascular: S1-S2, no added heart sounds, No Mumurs appreciated. No gallops, rubs. No Pedal Edema   GI:  Bowel sounds normal, Soft, No tenderness,  :  No costovertebral angle tenderness   Musculoskeletal:  No gross deformities. Back- No tenderness  Integument:  Well hydrated, no rash   Lymphatic:  No lymphadenopathy noted   Neurologic:  Alert & oriented x 3, Normal motor function, normal sensory function, no focal deficits noted   Psychiatric:  Speech and behavior appropriate       Medical decision making and Data review:    DATA:    Lab Results   Component Value Date    TROPONINT 0.020 (H) 07/02/2023     BNP:    Lab Results   Component Value Date    PROBNP 799.9 (H) 07/10/2022     PT/INR:  No results found for: \"PTINR\"  Lab Results   Component Value Date    LABA1C 6.2 01/03/2024    LABA1C 7.0 (H) 11/28/2023     Lab Results   Component Value Date    CHOL 152 11/28/2023    TRIG 60 11/28/2023    HDL 71 11/28/2023    LDLCALC 69 11/28/2023    LDLDIRECT 45 05/04/2023     Lab Results   Component Value Date    WBC 4.4 01/21/2024    HGB 8.0 (L) 01/21/2024    HCT 24.7 (L) 01/21/2024    .9 (H) 01/21/2024     (L) 01/21/2024     TSH:   Lab Results   Component Value Date    TSH 6.23 (H) 01/27/2022     Lab Results   Component Value Date    AST 37 01/21/2024    ALT 25 01/21/2024    BILIDIR 0.2 07/15/2022    BILITOT 0.6 01/21/2024    ALKPHOS 62 01/21/2024         All labs, medications and tests reviewed by myself including data and history from outside source , patient and available family .      1. Acute cholecystitis    2. Alcoholic cirrhosis of liver without ascites (HCC)    3. Primary hypertension             Impression and Plan:      Elevated troponin, demand ischemia stable troponin.    Patient is currently recovering from laparoscopic cholecystectomy.  We will reevaluate  in 6-month, consider stress test if

## 2024-01-31 NOTE — PATIENT INSTRUCTIONS
We are committed to providing you the best care possible.    If you receive a survey after visiting one of our offices, please take time to share your experience concerning your physician office visit.  These surveys are confidential and no health information about you is shared.    We are eager to improve for you and we are counting on your feedback to help make that happen.      Thank you for allowing us to care for you today!   We want to ensure we can follow your treatment plan and we strive to give you the best outcomes and experience possible.   If you ever have a life threatening emergency and call 911 - for an ambulance (EMS)   Our providers can only care for you at:   [facility] or [facility].   Even if you have someone take you or you drive yourself we can only care for you in a [facility] facility. Our providers are not setup at the other healthcare locations!   **It is YOUR responsibilty to bring medication bottles and/or updated medication list to EACH APPOINTMENT. This will allow us to better serve you and all your healthcare needs**  Please be informed that if you contact our office outside of normal business hours the physician on call cannot help with any scheduling or rescheduling issues, procedure instruction questions or any type of medication issue.    We advise you for any urgent/emergency that you go to the nearest emergency room!    PLEASE CALL OUR OFFICE DURING NORMAL BUSINESS HOURS    Monday - Friday   8 am to 5 pm    [place]: [phone]    [place]: [phone]    [place]:  [phone]

## 2024-02-03 NOTE — PROGRESS NOTES
Physician Progress Note      PATIENT:               ZENOBIA JI  [facility] #:                  [id number]  :                       1950  ADMIT DATE:       2024 1:08 PM  DISCH DATE:        2024 5:57 PM  RESPONDING  PROVIDER #:        Jeromy Burnett MD          QUERY TEXT:    Patient admitted with calculus of gallbladder. Noted documentation of \"ANTONINA on   CKD\" in H&P note on  and labs noted Cr 1.6, baseline Cr 1.2, GFR 34. In   order to support the diagnosis of ANTONINA, please include additional clinical   indicators in your documentation.? Or please document if the diagnosis of ANTONINA   has been ruled out after further study.    The medical record reflects the following:  Risk Factors: 74 yo, CKD  Clinical Indicators: Creat 1.5 on , 1.3 on   Treatment: IVF, Labs    Defined by Kidney Disease Improving Global Outcomes (KDIGO) clinical practice   guideline for acute kidney injury:  -Increase in SCr by greater than or equal to 0.3 mg/dl within 48 hours; or  -Increase or decrease in SCr to greater than or equal to 1.5 times baseline,   which is known or presumed to have occurred within the prior 7 days; or  -Urine volume < 0.5ml/kg/h for 6 hours.    Tanika Bell, BSN, RN, CRCR  Clinical   [phone]  Options provided:  -- Acute kidney injury evidenced by, Please document evidence as well as a   numerical baseline creatinine, if known.  -- Acute kidney injury ruled out after study  -- Other - I will add my own diagnosis  -- Disagree - Not applicable / Not valid  -- Disagree - Clinically unable to determine / Unknown  -- Refer to Clinical Documentation Reviewer    PROVIDER RESPONSE TEXT:    Acute kidney injury was ruled out after study.    Query created by: Tanika Bell on 2024 3:20 PM      Electronically signed by:  Jeromy Burnett MD 2024 11:04 PM

## 2024-02-08 ENCOUNTER — HOSPITAL ENCOUNTER (OUTPATIENT)
Age: 74
Discharge: HOME | End: 2024-02-08
Payer: MEDICARE

## 2024-02-08 ENCOUNTER — OFFICE VISIT (OUTPATIENT)
Dept: SURGERY | Age: 74
End: 2024-02-08

## 2024-02-08 VITALS
HEART RATE: 93 BPM | SYSTOLIC BLOOD PRESSURE: 94 MMHG | HEIGHT: 64 IN | BODY MASS INDEX: 23.02 KG/M2 | OXYGEN SATURATION: 93 % | DIASTOLIC BLOOD PRESSURE: 54 MMHG

## 2024-02-08 DIAGNOSIS — K80.10 CCC (CHRONIC CALCULOUS CHOLECYSTITIS): Primary | ICD-10-CM

## 2024-02-08 DIAGNOSIS — K74.5 BILIARY CIRRHOSIS (HCC): ICD-10-CM

## 2024-02-08 LAB
DIFFERENTIAL TYPE: NORMAL
SEGMENTED NEUTROPHILS RELATIVE PERCENT: NORMAL %

## 2024-02-08 PROCEDURE — 85027 COMPLETE CBC AUTOMATED: CPT

## 2024-02-08 PROCEDURE — 85025 COMPLETE CBC W/AUTO DIFF WBC: CPT

## 2024-02-08 PROCEDURE — 36415 COLL VENOUS BLD VENIPUNCTURE: CPT

## 2024-02-08 NOTE — PROGRESS NOTES
encounter.       No orders of the defined types were placed in this encounter.       Follow Up: No follow-ups on file.    RAUL TELLEZ MD

## 2024-02-09 LAB
HCT VFR BLD CALC: 27.2 % (ref 37–47)
HEMOGLOBIN: 8.4 GM/DL (ref 12.5–16)
MCH RBC QN AUTO: 35 PG (ref 27–31)
MCHC RBC AUTO-ENTMCNC: 30.9 % (ref 32–36)
MCV RBC AUTO: 113.3 FL (ref 78–100)
PDW BLD-RTO: 15.5 % (ref 11.7–14.9)
PLATELET # BLD: 164 K/CU MM (ref 140–440)
PMV BLD AUTO: 12.3 FL (ref 7.5–11.1)
RBC # BLD: 2.4 M/CU MM (ref 4.2–5.4)
WBC # BLD: 4.9 K/CU MM (ref 4–10.5)

## 2024-02-15 ENCOUNTER — OFFICE VISIT (OUTPATIENT)
Dept: GASTROENTEROLOGY | Age: 74
End: 2024-02-15
Payer: MEDICARE

## 2024-02-15 VITALS
SYSTOLIC BLOOD PRESSURE: 136 MMHG | DIASTOLIC BLOOD PRESSURE: 68 MMHG | BODY MASS INDEX: 22.53 KG/M2 | HEART RATE: 98 BPM | WEIGHT: 132 LBS | TEMPERATURE: 97.4 F | HEIGHT: 64 IN | OXYGEN SATURATION: 98 %

## 2024-02-15 DIAGNOSIS — K74.60 CIRRHOSIS OF LIVER WITHOUT ASCITES, UNSPECIFIED HEPATIC CIRRHOSIS TYPE (HCC): Primary | ICD-10-CM

## 2024-02-15 PROCEDURE — G8400 PT W/DXA NO RESULTS DOC: HCPCS | Performed by: SPECIALIST

## 2024-02-15 PROCEDURE — G8427 DOCREV CUR MEDS BY ELIG CLIN: HCPCS | Performed by: SPECIALIST

## 2024-02-15 PROCEDURE — G8484 FLU IMMUNIZE NO ADMIN: HCPCS | Performed by: SPECIALIST

## 2024-02-15 PROCEDURE — 3078F DIAST BP <80 MM HG: CPT | Performed by: SPECIALIST

## 2024-02-15 PROCEDURE — 1036F TOBACCO NON-USER: CPT | Performed by: SPECIALIST

## 2024-02-15 PROCEDURE — G8420 CALC BMI NORM PARAMETERS: HCPCS | Performed by: SPECIALIST

## 2024-02-15 PROCEDURE — 1123F ACP DISCUSS/DSCN MKR DOCD: CPT | Performed by: SPECIALIST

## 2024-02-15 PROCEDURE — 99214 OFFICE O/P EST MOD 30 MIN: CPT | Performed by: SPECIALIST

## 2024-02-15 PROCEDURE — 1111F DSCHRG MED/CURRENT MED MERGE: CPT | Performed by: SPECIALIST

## 2024-02-15 PROCEDURE — 3017F COLORECTAL CA SCREEN DOC REV: CPT | Performed by: SPECIALIST

## 2024-02-15 PROCEDURE — 1090F PRES/ABSN URINE INCON ASSESS: CPT | Performed by: SPECIALIST

## 2024-02-15 PROCEDURE — 3077F SYST BP >= 140 MM HG: CPT | Performed by: SPECIALIST

## 2024-02-15 NOTE — PROGRESS NOTES
CC: Follow up for cirrhosis    SUBJECTIVE:  No problems- denies abdominal pain,nausea,vomiting, diarrhea, constipation,melena, hematochezia, or hematemesis. Denies abdominal swelling, peripheral edema, periods of confusion   Has some nausea and occasional -- takes Reglan ac tid  Last AFP:      7/2023--- 2.4    Last liver imaging:  MRI 1/2024- no mass  Last EGD for varices: 2/2023-- decompressed varices  Hepatitis A immunity: had both  injections   Hepatitis B immunity: had one injection  Liver biopsy at time of lap melonie--- confirmed cirrhosis-no obvious etiology other than steatosis  Has refused colonoscopy in the past    Lab Results   Component Value Date/Time    LABALBU 3.8 01/21/2024 04:56 AM    LABALBU 4.4 07/02/2020 07:59 AM    AST 37 01/21/2024 04:56 AM    ALT 25 01/21/2024 04:56 AM    ALKPHOS 62 01/21/2024 04:56 AM    BILITOT 0.6 01/21/2024 04:56 AM    BILIDIR 0.2 07/15/2022 06:19 AM    IBILI 0.4 07/15/2022 06:19 AM    AMMONIA 58 07/02/2023 05:16 PM          ROS: non-contributory    OBJECTIVE:   PHYSICAL EXAM:    Vitals:  BP (!) 146/68 (Site: Left Upper Arm, Position: Sitting, Cuff Size: Medium Adult)   Pulse 98   Temp 97.4 °F (36.3 °C) (Infrared)   Ht 1.613 m (5' 3.5\")   Wt 59.9 kg (132 lb)   SpO2 98%   BMI 23.01 kg/m²   CONSTITUTIONAL: alert  EYES:  pupils equal, round and reactive to light and sclera clear  LUNGS:  clear to auscultation  CARDIOVASCULAR:  regular rate and rhythm and no murmur noted  ABDOMEN:  normal bowel sounds, soft, non-distended, non-tender and no masses palpated, no hepatosplenomegally  NEUROLOGIC: no asterixis or focal deficit detected   EXTREMITIES: no clubbing, cyanosis, or edema    ASSESSMENT:    1) cirrhosis- prob due to MASH- stable  2) prob gastroparesis- reasonably controlled with low-dose Reglan ac tid  3) S/P recent lap melonie    PLAN:   1)  return visit 3 months

## 2024-02-19 RX ORDER — PANTOPRAZOLE SODIUM 40 MG/1
TABLET, DELAYED RELEASE ORAL
Qty: 30 TABLET | Refills: 0 | Status: SHIPPED | OUTPATIENT
Start: 2024-02-19

## 2024-02-19 RX ORDER — METOCLOPRAMIDE 10 MG/1
TABLET ORAL
Qty: 30 TABLET | Refills: 0 | Status: SHIPPED | OUTPATIENT
Start: 2024-02-19

## 2024-02-21 ENCOUNTER — HOSPITAL ENCOUNTER (OUTPATIENT)
Age: 74
Discharge: HOME | End: 2024-02-21
Payer: MEDICARE

## 2024-02-21 LAB
ALBUMIN SERPL-MCNC: 3.8 GM/DL (ref 3.4–5)
ALP BLD-CCNC: 75 IU/L (ref 40–129)
ALT SERPL-CCNC: 22 U/L (ref 10–40)
ANION GAP SERPL CALCULATED.3IONS-SCNC: 12 MMOL/L (ref 7–16)
AST SERPL-CCNC: 31 IU/L (ref 15–37)
BILIRUB SERPL-MCNC: 0.9 MG/DL (ref 0–1)
BUN SERPL-MCNC: 19 MG/DL (ref 6–23)
CALCIUM SERPL-MCNC: 9 MG/DL (ref 8.3–10.6)
CHLORIDE BLD-SCNC: 103 MMOL/L (ref 99–110)
CHOLESTEROL, FASTING: 134 MG/DL
CO2: 25 MMOL/L (ref 21–32)
CREAT SERPL-MCNC: 1 MG/DL (ref 0.6–1.1)
ESTIMATED AVERAGE GLUCOSE: 123 MG/DL
GFR SERPL CREATININE-BSD FRML MDRD: 59 ML/MIN/1.73M2
GLUCOSE SERPL-MCNC: 110 MG/DL (ref 70–99)
HBA1C MFR BLD: 5.9 % (ref 4.2–6.3)
HDLC SERPL-MCNC: 65 MG/DL
LDLC SERPL CALC-MCNC: 59 MG/DL
POTASSIUM SERPL-SCNC: 4.4 MMOL/L (ref 3.5–5.1)
SODIUM BLD-SCNC: 140 MMOL/L (ref 135–145)
T4 FREE SERPL-MCNC: 1.83 NG/DL (ref 0.9–1.8)
TOTAL PROTEIN: 7.3 GM/DL (ref 6.4–8.2)
TRIGLYCERIDE, FASTING: 49 MG/DL
TSH SERPL DL<=0.005 MIU/L-ACNC: 0.02 UIU/ML (ref 0.27–4.2)

## 2024-02-21 PROCEDURE — 84443 ASSAY THYROID STIM HORMONE: CPT

## 2024-02-21 PROCEDURE — 80053 COMPREHEN METABOLIC PANEL: CPT

## 2024-02-21 PROCEDURE — 80061 LIPID PANEL: CPT

## 2024-02-21 PROCEDURE — 83036 HEMOGLOBIN GLYCOSYLATED A1C: CPT

## 2024-02-21 PROCEDURE — 84439 ASSAY OF FREE THYROXINE: CPT

## 2024-02-21 PROCEDURE — 36415 COLL VENOUS BLD VENIPUNCTURE: CPT

## 2024-03-07 ENCOUNTER — OFFICE VISIT (OUTPATIENT)
Dept: ONCOLOGY | Age: 74
End: 2024-03-07

## 2024-03-07 ENCOUNTER — HOSPITAL ENCOUNTER (OUTPATIENT)
Dept: INFUSION THERAPY | Age: 74
Discharge: HOME | End: 2024-03-07
Payer: MEDICARE

## 2024-03-07 VITALS
TEMPERATURE: 97.7 F | WEIGHT: 133 LBS | DIASTOLIC BLOOD PRESSURE: 50 MMHG | SYSTOLIC BLOOD PRESSURE: 104 MMHG | OXYGEN SATURATION: 96 % | HEIGHT: 64 IN | BODY MASS INDEX: 22.71 KG/M2 | HEART RATE: 104 BPM

## 2024-03-07 DIAGNOSIS — D64.9 ANEMIA, UNSPECIFIED TYPE: Primary | ICD-10-CM

## 2024-03-07 DIAGNOSIS — Z85.3 PERSONAL HISTORY OF BREAST CANCER: ICD-10-CM

## 2024-03-07 PROCEDURE — 99211 OFF/OP EST MAY X REQ PHY/QHP: CPT

## 2024-03-07 ASSESSMENT — PATIENT HEALTH QUESTIONNAIRE - PHQ9
SUM OF ALL RESPONSES TO PHQ QUESTIONS 1-9: 0
SUM OF ALL RESPONSES TO PHQ9 QUESTIONS 1 & 2: 0
SUM OF ALL RESPONSES TO PHQ QUESTIONS 1-9: 0
1. LITTLE INTEREST OR PLEASURE IN DOING THINGS: 0
2. FEELING DOWN, DEPRESSED OR HOPELESS: 0
SUM OF ALL RESPONSES TO PHQ QUESTIONS 1-9: 0
SUM OF ALL RESPONSES TO PHQ QUESTIONS 1-9: 0

## 2024-03-07 NOTE — PROGRESS NOTES
MA Rooming Questions  Patient: Dayana Pacheco  MRN: [id number]    Date: 3/7/2024        1. Do you have any new issues?   yes - feels weak, discuss physical therapy         2. Do you need any refills on medications?    no    3. Have you had any imaging done since your last visit?   yes - [facility]    4. Have you been hospitalized or seen in the emergency room since your last visit here?   yes - [facility]    5. Did the patient have a depression screening completed today? Yes    PHQ-9 Total Score: 0 (3/7/2024  2:26 PM)       PHQ-9 Given to (if applicable):               PHQ-9 Score (if applicable):                     [] Positive     []  Negative              Does question #9 need addressed (if applicable)                     [] Yes    []  No               Herminia Manning CMA

## 2024-03-07 NOTE — PROGRESS NOTES
Patient Name:  Dayana Pacheco  Patient :  1950  Patient MRN:  [id number]     Primary Oncologist: Salvatore Burch MD  Referring Provider: Leo Quezada MD     Date of Service: 3/7/2024     Chief Complaint:    Chief Complaint   Patient presents with    Follow-up     Patient Active Problem List:     Acquired hypothyroidism     Depression     Diabetic neuropathy (HCC)     DM (diabetes mellitus) (HCC)     HTN (hypertension)     Hyperlipidemia     Osteopenia     Personal history of breast cancer     Allergic rhinitis     ANTONINA (acute kidney injury) (HCC)     Gastroenteritis     GI bleed     Hematemesis with nausea     Cirrhosis of liver with ascites (HCC)     Secondary esophageal varices with bleeding (HCC)    HPI:   Dayana Pacheco is a 73-year-old very pleasant female with medical history significant for hypertension, hyperlipidemia, diabetes mellitus, history of right breast cancer, status post bilateral mastectomies in  for lobular cancer, status post adjuvant endocrine therapy with Femara for 5 years, hypothyroidism and cirrhosis of the liver, referred to me on 2022 for evaluation of her anemia.    She had banding of esophageal varices by Dr. Lawrence on 22. She has been having anemia and she was referred to me for further management.     She has been having persistent anemia (hemoglobin as low as 3.8 on 7/10/22) since 3/2022. I reviewed anemia panel done on 3/27/22, 22 and 22. She has cirrhosis of liver with esophageal varices.      I believe her anemia is combination of anemia due to chronic disease and blood loss anemia.     She missed follow up with me between 22 and 23.     On 2023, she was referred back to me for evaluation of her worsening anemia. She also has leukopenia and thrombocytopenia.     Anemia panel done on 7/3/23 was consistent with anemia d/t chronic disease (Hb 7.4, ferritin 404 ng/ml, iron 86, TIBC 198, transferrin 43%) .     Repeat labs on 23 showed

## 2024-03-25 RX ORDER — PANTOPRAZOLE SODIUM 40 MG/1
TABLET, DELAYED RELEASE ORAL
Qty: 30 TABLET | Refills: 0 | Status: SHIPPED | OUTPATIENT
Start: 2024-03-25

## 2024-03-25 RX ORDER — METOCLOPRAMIDE 10 MG/1
TABLET ORAL
Qty: 30 TABLET | Refills: 0 | Status: SHIPPED | OUTPATIENT
Start: 2024-03-25

## 2024-04-11 RX ORDER — PANTOPRAZOLE SODIUM 40 MG/1
TABLET, DELAYED RELEASE ORAL
Qty: 30 TABLET | Refills: 0 | Status: SHIPPED | OUTPATIENT
Start: 2024-04-11

## 2024-04-11 RX ORDER — METOCLOPRAMIDE 10 MG/1
TABLET ORAL
Qty: 30 TABLET | Refills: 0 | Status: SHIPPED | OUTPATIENT
Start: 2024-04-11

## 2024-04-24 RX ORDER — PANTOPRAZOLE SODIUM 40 MG/1
TABLET, DELAYED RELEASE ORAL
Qty: 30 TABLET | Refills: 0 | OUTPATIENT
Start: 2024-04-24

## 2024-05-02 ENCOUNTER — HOSPITAL ENCOUNTER (OUTPATIENT)
Age: 74
Discharge: HOME | End: 2024-05-02
Payer: MEDICARE

## 2024-05-02 ENCOUNTER — OFFICE VISIT (OUTPATIENT)
Dept: GASTROENTEROLOGY | Age: 74
End: 2024-05-02
Payer: MEDICARE

## 2024-05-02 VITALS
WEIGHT: 132.4 LBS | BODY MASS INDEX: 22.61 KG/M2 | SYSTOLIC BLOOD PRESSURE: 122 MMHG | HEIGHT: 64 IN | DIASTOLIC BLOOD PRESSURE: 60 MMHG | HEART RATE: 92 BPM | TEMPERATURE: 98.1 F | OXYGEN SATURATION: 91 %

## 2024-05-02 DIAGNOSIS — K74.60 CIRRHOSIS OF LIVER WITHOUT ASCITES, UNSPECIFIED HEPATIC CIRRHOSIS TYPE (HCC): ICD-10-CM

## 2024-05-02 DIAGNOSIS — K74.60 CIRRHOSIS OF LIVER WITHOUT ASCITES, UNSPECIFIED HEPATIC CIRRHOSIS TYPE (HCC): Primary | ICD-10-CM

## 2024-05-02 LAB
ALBUMIN SERPL-MCNC: 3.9 GM/DL (ref 3.4–5)
ALP BLD-CCNC: 83 IU/L (ref 40–128)
ALT SERPL-CCNC: 17 U/L (ref 10–40)
ANION GAP SERPL CALCULATED.3IONS-SCNC: 14 MMOL/L (ref 7–16)
AST SERPL-CCNC: 27 IU/L (ref 15–37)
BILIRUB SERPL-MCNC: 0.6 MG/DL (ref 0–1)
BUN SERPL-MCNC: 21 MG/DL (ref 6–23)
CALCIUM SERPL-MCNC: 8.6 MG/DL (ref 8.3–10.6)
CHLORIDE BLD-SCNC: 101 MMOL/L (ref 99–110)
CO2: 22 MMOL/L (ref 21–32)
CREAT SERPL-MCNC: 1.2 MG/DL (ref 0.6–1.1)
GFR, ESTIMATED: 48 ML/MIN/1.73M2
GLUCOSE SERPL-MCNC: 263 MG/DL (ref 70–99)
HCT VFR BLD CALC: 26.6 % (ref 37–47)
HEMOGLOBIN: 8.4 GM/DL (ref 12.5–16)
INR BLD: 1.1 INDEX
MCH RBC QN AUTO: 34.6 PG (ref 27–31)
MCHC RBC AUTO-ENTMCNC: 31.6 % (ref 32–36)
MCV RBC AUTO: 109.5 FL (ref 78–100)
PDW BLD-RTO: 14.6 % (ref 11.7–14.9)
PLATELET # BLD: 129 K/CU MM (ref 140–440)
PMV BLD AUTO: 12.1 FL (ref 7.5–11.1)
POTASSIUM SERPL-SCNC: 4.6 MMOL/L (ref 3.5–5.1)
PROTHROMBIN TIME: 15.1 SECONDS (ref 11.7–14.5)
RBC # BLD: 2.43 M/CU MM (ref 4.2–5.4)
SODIUM BLD-SCNC: 137 MMOL/L (ref 135–145)
TOTAL PROTEIN: 7.4 GM/DL (ref 6.4–8.2)
WBC # BLD: 4.6 K/CU MM (ref 4–10.5)

## 2024-05-02 PROCEDURE — 1090F PRES/ABSN URINE INCON ASSESS: CPT | Performed by: SPECIALIST

## 2024-05-02 PROCEDURE — 1036F TOBACCO NON-USER: CPT | Performed by: SPECIALIST

## 2024-05-02 PROCEDURE — 85027 COMPLETE CBC AUTOMATED: CPT

## 2024-05-02 PROCEDURE — 80053 COMPREHEN METABOLIC PANEL: CPT

## 2024-05-02 PROCEDURE — 82105 ALPHA-FETOPROTEIN SERUM: CPT

## 2024-05-02 PROCEDURE — 85610 PROTHROMBIN TIME: CPT

## 2024-05-02 PROCEDURE — G8400 PT W/DXA NO RESULTS DOC: HCPCS | Performed by: SPECIALIST

## 2024-05-02 PROCEDURE — 99213 OFFICE O/P EST LOW 20 MIN: CPT | Performed by: SPECIALIST

## 2024-05-02 PROCEDURE — G8420 CALC BMI NORM PARAMETERS: HCPCS | Performed by: SPECIALIST

## 2024-05-02 PROCEDURE — 36415 COLL VENOUS BLD VENIPUNCTURE: CPT

## 2024-05-02 PROCEDURE — 1123F ACP DISCUSS/DSCN MKR DOCD: CPT | Performed by: SPECIALIST

## 2024-05-02 PROCEDURE — 3078F DIAST BP <80 MM HG: CPT | Performed by: SPECIALIST

## 2024-05-02 PROCEDURE — G8427 DOCREV CUR MEDS BY ELIG CLIN: HCPCS | Performed by: SPECIALIST

## 2024-05-02 PROCEDURE — 3074F SYST BP LT 130 MM HG: CPT | Performed by: SPECIALIST

## 2024-05-02 PROCEDURE — 3017F COLORECTAL CA SCREEN DOC REV: CPT | Performed by: SPECIALIST

## 2024-05-02 NOTE — PROGRESS NOTES
CC: Follow up for cirrhosis    SUBJECTIVE:  No problems- denies abdominal pain,nausea,vomiting, diarrhea, constipation,melena, hematochezia, or hematemesis. Denies abdominal swelling, peripheral edema, periods of confusion    Last AFP:      7/2023--- 2.4    Last liver imaging:  MRI 1/2024- no mass  Last EGD for varices: 2/2023-- decompressed varices  Hepatitis A immunity: had both  injections   Hepatitis B immunity: had one injection  Liver biopsy at time of lap melonie--- confirmed cirrhosis-no obvious etiology other than steatosis  Has refused colonoscopy in the past    Lab Results   Component Value Date/Time    AST 31 02/21/2024 10:44 AM    ALT 22 02/21/2024 10:44 AM    ALKPHOS 75 02/21/2024 10:44 AM    BILITOT 0.9 02/21/2024 10:44 AM    BILIDIR 0.2 07/15/2022 06:19 AM    IBILI 0.4 07/15/2022 06:19 AM    AMMONIA 58 07/02/2023 05:16 PM    INR 1.2 01/03/2024 05:53 AM        ROS: non-contributory    OBJECTIVE:   PHYSICAL EXAM:    Vitals:  There were no vitals taken for this visit.  CONSTITUTIONAL: alert  EYES:  pupils equal, round and reactive to light and sclera clear  LUNGS:  clear to auscultation  CARDIOVASCULAR:  regular rate and rhythm and no murmur noted  ABDOMEN:  normal bowel sounds, soft, non-distended, non-tender and no masses palpated, no hepatosplenomegally  NEUROLOGIC: no asterixis or focal deficit detected   EXTREMITIES: no clubbing, cyanosis, or edema    ASSESSMENT:    1) cirrhosis- prob due to MASH- stable  2) prob gastroparesis- reasonably controlled with low-dose Reglan ac tid  3) S/P recent lap melonie     PLAN:    1) CBC, CMP, INR, AFP  2) Advised increased dietary protein, mild resistance training, HS snack to retard sarcopenia  3) return visit 3 months

## 2024-05-04 LAB — AFP-TM SERPL-MCNC: 3 NG/ML (ref 0–9)

## 2024-05-08 RX ORDER — METOCLOPRAMIDE 10 MG/1
TABLET ORAL
Qty: 30 TABLET | Refills: 0 | Status: SHIPPED | OUTPATIENT
Start: 2024-05-08

## 2024-05-14 RX ORDER — PANTOPRAZOLE SODIUM 40 MG/1
TABLET, DELAYED RELEASE ORAL
Qty: 30 TABLET | Refills: 0 | Status: SHIPPED | OUTPATIENT
Start: 2024-05-14

## 2024-05-22 ENCOUNTER — HOSPITAL ENCOUNTER (OUTPATIENT)
Age: 74
Discharge: HOME | End: 2024-05-22
Payer: MEDICARE

## 2024-05-22 LAB
ALBUMIN SERPL-MCNC: 3.8 GM/DL (ref 3.4–5)
ALP BLD-CCNC: 85 IU/L (ref 40–128)
ALT SERPL-CCNC: 20 U/L (ref 10–40)
ANION GAP SERPL CALCULATED.3IONS-SCNC: 12 MMOL/L (ref 7–16)
AST SERPL-CCNC: 32 IU/L (ref 15–37)
BILIRUB SERPL-MCNC: 0.7 MG/DL (ref 0–1)
BUN SERPL-MCNC: 18 MG/DL (ref 6–23)
CALCIUM SERPL-MCNC: 8.7 MG/DL (ref 8.3–10.6)
CHLORIDE BLD-SCNC: 104 MMOL/L (ref 99–110)
CHOLESTEROL, FASTING: 145 MG/DL
CO2: 23 MMOL/L (ref 21–32)
CREAT SERPL-MCNC: 1.2 MG/DL (ref 0.6–1.1)
CREAT UR-MCNC: 141.2 MG/DL (ref 28–217)
ESTIMATED AVERAGE GLUCOSE: 120 MG/DL
GFR, ESTIMATED: 48 ML/MIN/1.73M2
GLUCOSE SERPL-MCNC: 139 MG/DL (ref 70–99)
HBA1C MFR BLD: 5.8 % (ref 4.2–6.3)
HDLC SERPL-MCNC: 67 MG/DL
LDLC SERPL CALC-MCNC: 65 MG/DL
LDLC SERPL-MCNC: 72 MG/DL
MICROALBUMIN 24H UR-MCNC: 1.5 MG/DL
MICROALBUMIN/CREAT UR-RTO: 10.6 MG/G CREAT (ref 0–30)
POTASSIUM SERPL-SCNC: 4.6 MMOL/L (ref 3.5–5.1)
SODIUM BLD-SCNC: 139 MMOL/L (ref 135–145)
T4 FREE SERPL-MCNC: 1.42 NG/DL (ref 0.9–1.8)
TOTAL PROTEIN: 7.3 GM/DL (ref 6.4–8.2)
TRIGLYCERIDE, FASTING: 65 MG/DL
TSH SERPL DL<=0.005 MIU/L-ACNC: 0.39 UIU/ML (ref 0.27–4.2)

## 2024-05-22 PROCEDURE — 84439 ASSAY OF FREE THYROXINE: CPT

## 2024-05-22 PROCEDURE — 84443 ASSAY THYROID STIM HORMONE: CPT

## 2024-05-22 PROCEDURE — 80053 COMPREHEN METABOLIC PANEL: CPT

## 2024-05-22 PROCEDURE — 80061 LIPID PANEL: CPT

## 2024-05-22 PROCEDURE — 83721 ASSAY OF BLOOD LIPOPROTEIN: CPT

## 2024-05-22 PROCEDURE — 82043 UR ALBUMIN QUANTITATIVE: CPT

## 2024-05-22 PROCEDURE — 36415 COLL VENOUS BLD VENIPUNCTURE: CPT

## 2024-05-22 PROCEDURE — 83036 HEMOGLOBIN GLYCOSYLATED A1C: CPT

## 2024-05-22 PROCEDURE — 82570 ASSAY OF URINE CREATININE: CPT

## 2024-06-24 RX ORDER — PANTOPRAZOLE SODIUM 40 MG/1
TABLET, DELAYED RELEASE ORAL
Qty: 30 TABLET | Refills: 3 | Status: SHIPPED | OUTPATIENT
Start: 2024-06-24

## 2024-08-07 ENCOUNTER — OFFICE VISIT (OUTPATIENT)
Dept: CARDIOLOGY CLINIC | Age: 74
End: 2024-08-07
Payer: MEDICARE

## 2024-08-07 VITALS
OXYGEN SATURATION: 95 % | DIASTOLIC BLOOD PRESSURE: 64 MMHG | HEIGHT: 64 IN | BODY MASS INDEX: 23.49 KG/M2 | HEART RATE: 83 BPM | WEIGHT: 137.6 LBS | SYSTOLIC BLOOD PRESSURE: 118 MMHG

## 2024-08-07 DIAGNOSIS — I10 PRIMARY HYPERTENSION: ICD-10-CM

## 2024-08-07 DIAGNOSIS — R79.89 ELEVATED TROPONIN: Primary | ICD-10-CM

## 2024-08-07 DIAGNOSIS — K74.60 CIRRHOSIS OF LIVER WITHOUT ASCITES, UNSPECIFIED HEPATIC CIRRHOSIS TYPE (HCC): ICD-10-CM

## 2024-08-07 PROCEDURE — 1090F PRES/ABSN URINE INCON ASSESS: CPT | Performed by: INTERNAL MEDICINE

## 2024-08-07 PROCEDURE — 3074F SYST BP LT 130 MM HG: CPT | Performed by: INTERNAL MEDICINE

## 2024-08-07 PROCEDURE — 3017F COLORECTAL CA SCREEN DOC REV: CPT | Performed by: INTERNAL MEDICINE

## 2024-08-07 PROCEDURE — G8420 CALC BMI NORM PARAMETERS: HCPCS | Performed by: INTERNAL MEDICINE

## 2024-08-07 PROCEDURE — 99214 OFFICE O/P EST MOD 30 MIN: CPT | Performed by: INTERNAL MEDICINE

## 2024-08-07 PROCEDURE — 3078F DIAST BP <80 MM HG: CPT | Performed by: INTERNAL MEDICINE

## 2024-08-07 PROCEDURE — G8400 PT W/DXA NO RESULTS DOC: HCPCS | Performed by: INTERNAL MEDICINE

## 2024-08-07 PROCEDURE — 1036F TOBACCO NON-USER: CPT | Performed by: INTERNAL MEDICINE

## 2024-08-07 PROCEDURE — 1123F ACP DISCUSS/DSCN MKR DOCD: CPT | Performed by: INTERNAL MEDICINE

## 2024-08-07 PROCEDURE — G8427 DOCREV CUR MEDS BY ELIG CLIN: HCPCS | Performed by: INTERNAL MEDICINE

## 2024-08-07 RX ORDER — RISPERIDONE 0.25 MG/1
0.25 TABLET ORAL DAILY
COMMUNITY
Start: 2024-06-17

## 2024-08-07 NOTE — PROGRESS NOTES
Blood loss anemia and anemia of Ch. Disease.  Patient remains anemic, recommend close followup with PCP/hematology as previously established.      D/w family in depth.     Return in about 1 year (around 8/7/2025).

## 2024-08-07 NOTE — PATIENT INSTRUCTIONS
We are committed to providing you the best care possible.    If you receive a survey after visiting one of our offices, please take time to share your experience concerning your physician office visit.  These surveys are confidential and no health information about you is shared.    We are eager to improve for you and we are counting on your feedback to help make that happen.      **It is YOUR responsibilty to bring medication bottles and/or updated medication list to EACH APPOINTMENT. This will allow us to better serve you and all your healthcare needs**    Thank you for allowing us to care for you today!   We want to ensure we can follow your treatment plan and we strive to give you the best outcomes and experience possible.   If you ever have a life threatening emergency and call 911 - for an ambulance (EMS)   Our providers can only care for you at:   [facility] or [facility].   Even if you have someone take you or you drive yourself we can only care for you in a [facility] facility. Our providers are not setup at the other healthcare locations!     Please be informed that if you contact our office outside of normal business hours the physician on call cannot help with any scheduling or rescheduling issues, procedure instruction questions or any type of medication issue.    We advise you for any urgent/emergency that you go to the nearest emergency room!    PLEASE CALL OUR OFFICE DURING NORMAL BUSINESS HOURS    Monday - Friday   8 am to 5 pm    [place]: [phone]    [place]: [phone]    [place]:  [phone]

## 2024-08-08 ENCOUNTER — OFFICE VISIT (OUTPATIENT)
Dept: GASTROENTEROLOGY | Age: 74
End: 2024-08-08
Payer: MEDICARE

## 2024-08-08 ENCOUNTER — HOSPITAL ENCOUNTER (OUTPATIENT)
Age: 74
Discharge: HOME | End: 2024-08-08
Payer: MEDICARE

## 2024-08-08 VITALS
DIASTOLIC BLOOD PRESSURE: 56 MMHG | HEART RATE: 76 BPM | WEIGHT: 132 LBS | OXYGEN SATURATION: 99 % | TEMPERATURE: 97.3 F | BODY MASS INDEX: 23.02 KG/M2 | SYSTOLIC BLOOD PRESSURE: 112 MMHG

## 2024-08-08 DIAGNOSIS — K74.60 CIRRHOSIS OF LIVER WITHOUT ASCITES, UNSPECIFIED HEPATIC CIRRHOSIS TYPE (HCC): ICD-10-CM

## 2024-08-08 DIAGNOSIS — K74.60 CIRRHOSIS OF LIVER WITHOUT ASCITES, UNSPECIFIED HEPATIC CIRRHOSIS TYPE (HCC): Primary | ICD-10-CM

## 2024-08-08 LAB
ALBUMIN SERPL-MCNC: 3.6 GM/DL (ref 3.4–5)
ALP BLD-CCNC: 69 IU/L (ref 40–129)
ALT SERPL-CCNC: 19 U/L (ref 10–40)
ANION GAP SERPL CALCULATED.3IONS-SCNC: 11 MMOL/L (ref 7–16)
AST SERPL-CCNC: 30 IU/L (ref 15–37)
BUN SERPL-MCNC: 19 MG/DL (ref 6–23)
CALCIUM SERPL-MCNC: 9.5 MG/DL (ref 8.3–10.6)
CHLORIDE BLD-SCNC: 100 MMOL/L (ref 99–110)
CO2: 24 MMOL/L (ref 21–32)
CREAT SERPL-MCNC: 1.4 MG/DL (ref 0.6–1.1)
GFR, ESTIMATED: 39 ML/MIN/1.73M2
GLUCOSE SERPL-MCNC: 173 MG/DL (ref 70–99)
HCT VFR BLD CALC: 27.3 % (ref 37–47)
HEMOGLOBIN: 9 GM/DL (ref 12.5–16)
INR BLD: 1.1 INDEX
MCH RBC QN AUTO: 36 PG (ref 27–31)
MCHC RBC AUTO-ENTMCNC: 33 % (ref 32–36)
MCV RBC AUTO: 109.2 FL (ref 78–100)
PDW BLD-RTO: 14.9 % (ref 11.7–14.9)
PLATELET # BLD: 118 K/CU MM (ref 140–440)
PMV BLD AUTO: 12.5 FL (ref 7.5–11.1)
POTASSIUM SERPL-SCNC: 5.3 MMOL/L (ref 3.5–5.1)
PROTHROMBIN TIME: 14.2 SECONDS (ref 11.7–14.5)
RBC # BLD: 2.5 M/CU MM (ref 4.2–5.4)
SODIUM BLD-SCNC: 135 MMOL/L (ref 135–145)
TOTAL PROTEIN: 7.1 GM/DL (ref 6.4–8.2)
WBC # BLD: 3.9 K/CU MM (ref 4–10.5)

## 2024-08-08 PROCEDURE — 85027 COMPLETE CBC AUTOMATED: CPT

## 2024-08-08 PROCEDURE — 80053 COMPREHEN METABOLIC PANEL: CPT

## 2024-08-08 PROCEDURE — 85610 PROTHROMBIN TIME: CPT

## 2024-08-08 PROCEDURE — 1123F ACP DISCUSS/DSCN MKR DOCD: CPT | Performed by: SPECIALIST

## 2024-08-08 PROCEDURE — 36415 COLL VENOUS BLD VENIPUNCTURE: CPT

## 2024-08-08 PROCEDURE — G8420 CALC BMI NORM PARAMETERS: HCPCS | Performed by: SPECIALIST

## 2024-08-08 PROCEDURE — 1090F PRES/ABSN URINE INCON ASSESS: CPT | Performed by: SPECIALIST

## 2024-08-08 PROCEDURE — 99214 OFFICE O/P EST MOD 30 MIN: CPT | Performed by: SPECIALIST

## 2024-08-08 PROCEDURE — 1036F TOBACCO NON-USER: CPT | Performed by: SPECIALIST

## 2024-08-08 PROCEDURE — 3074F SYST BP LT 130 MM HG: CPT | Performed by: SPECIALIST

## 2024-08-08 PROCEDURE — G8427 DOCREV CUR MEDS BY ELIG CLIN: HCPCS | Performed by: SPECIALIST

## 2024-08-08 PROCEDURE — 3078F DIAST BP <80 MM HG: CPT | Performed by: SPECIALIST

## 2024-08-08 PROCEDURE — G8400 PT W/DXA NO RESULTS DOC: HCPCS | Performed by: SPECIALIST

## 2024-08-08 PROCEDURE — 3017F COLORECTAL CA SCREEN DOC REV: CPT | Performed by: SPECIALIST

## 2024-08-08 NOTE — PROGRESS NOTES
CC: Follow up for cirrhosis    SUBJECTIVE:  No problems- denies abdominal pain,nausea,vomiting, diarrhea, constipation,melena, hematochezia, or hematemesis. Denies abdominal swelling, peripheral edema, periods of confusion  Was a little tremulous so cut back on Reglan to BID ac and it resolved    Last AFP:      5/2024--- 3    Last liver imaging:  MRI 1/2024- no mass  Last EGD for varices: 2/2023-- decompressed varices  Hepatitis A immunity: had both  injections   Hepatitis B immunity: had one injection  Liver biopsy at time of lap melonie--- confirmed cirrhosis-no obvious etiology other than steatosis    Has refused colonoscopy in the past  Lab Results   Component Value Date/Time    AST 32 05/22/2024 09:04 AM    ALT 20 05/22/2024 09:04 AM    ALKPHOS 85 05/22/2024 09:04 AM    BILITOT 0.7 05/22/2024 09:04 AM    BILIDIR 0.2 07/15/2022 06:19 AM    IBILI 0.4 07/15/2022 06:19 AM    AMMONIA 58 07/02/2023 05:16 PM    INR 1.1 05/02/2024 03:51 PM        ROS: non-contributory    OBJECTIVE:   PHYSICAL EXAM:    Vitals:  BP (!) 112/56   Pulse 76   Temp 97.3 °F (36.3 °C) (Infrared)   Wt 59.9 kg (132 lb)   SpO2 99%   BMI 23.02 kg/m²   CONSTITUTIONAL: alert  EYES:  pupils equal, round and reactive to light and sclera clear  LUNGS:  clear to auscultation  CARDIOVASCULAR:  regular rate and rhythm and no murmur noted  ABDOMEN:  normal bowel sounds, soft, non-distended, non-tender and no masses palpated, no hepatosplenomegally  NEUROLOGIC: no asterixis or focal deficit detected   EXTREMITIES: no clubbing, cyanosis, or edema    ASSESSMENT:    1) cirrhosis- prob due to MASH- stable  2) prob gastroparesis- reasonably controlled with low-dose Reglan ac BID  3) S/P recent lap melonie    PLAN:   1) CBC,CMP,PT   2) AFP   3) Ultrasound liver for hepatoma surveillance   4) return visit 3 months

## 2024-08-12 ENCOUNTER — HOSPITAL ENCOUNTER (OUTPATIENT)
Dept: ULTRASOUND IMAGING | Age: 74
Discharge: HOME | End: 2024-08-12
Payer: MEDICARE

## 2024-08-12 DIAGNOSIS — K74.60 CIRRHOSIS OF LIVER WITHOUT ASCITES, UNSPECIFIED HEPATIC CIRRHOSIS TYPE (HCC): ICD-10-CM

## 2024-08-12 PROCEDURE — 76705 ECHO EXAM OF ABDOMEN: CPT

## 2024-08-22 ENCOUNTER — HOSPITAL ENCOUNTER (OUTPATIENT)
Age: 74
Discharge: HOME | End: 2024-08-22
Payer: MEDICARE

## 2024-08-22 LAB
ALBUMIN SERPL-MCNC: 3.6 GM/DL (ref 3.4–5)
ALP BLD-CCNC: 67 IU/L (ref 40–128)
ALT SERPL-CCNC: 18 U/L (ref 10–40)
ANION GAP SERPL CALCULATED.3IONS-SCNC: 11 MMOL/L (ref 7–16)
AST SERPL-CCNC: 30 IU/L (ref 15–37)
BILIRUB SERPL-MCNC: 0.8 MG/DL (ref 0–1)
BUN SERPL-MCNC: 21 MG/DL (ref 6–23)
CALCIUM SERPL-MCNC: 8.8 MG/DL (ref 8.3–10.6)
CHLORIDE BLD-SCNC: 102 MMOL/L (ref 99–110)
CHOLESTEROL, FASTING: 124 MG/DL
CO2: 23 MMOL/L (ref 21–32)
CREAT SERPL-MCNC: 1.3 MG/DL (ref 0.6–1.1)
ESTIMATED AVERAGE GLUCOSE: 114 MG/DL
GFR, ESTIMATED: 43 ML/MIN/1.73M2
GLUCOSE SERPL-MCNC: 271 MG/DL (ref 70–99)
HBA1C MFR BLD: 5.6 % (ref 4.2–6.3)
HDLC SERPL-MCNC: 58 MG/DL
LDLC SERPL CALC-MCNC: 44 MG/DL
POTASSIUM SERPL-SCNC: 5 MMOL/L (ref 3.5–5.1)
SODIUM BLD-SCNC: 136 MMOL/L (ref 135–145)
T4 FREE SERPL-MCNC: 1.36 NG/DL (ref 0.9–1.8)
TOTAL PROTEIN: 7.7 GM/DL (ref 6.4–8.2)
TRIGLYCERIDE, FASTING: 109 MG/DL
TSH SERPL DL<=0.005 MIU/L-ACNC: 1.35 UIU/ML (ref 0.27–4.2)

## 2024-08-22 PROCEDURE — 36415 COLL VENOUS BLD VENIPUNCTURE: CPT

## 2024-08-22 PROCEDURE — 84443 ASSAY THYROID STIM HORMONE: CPT

## 2024-08-22 PROCEDURE — 82043 UR ALBUMIN QUANTITATIVE: CPT

## 2024-08-22 PROCEDURE — 82570 ASSAY OF URINE CREATININE: CPT

## 2024-08-22 PROCEDURE — 80061 LIPID PANEL: CPT

## 2024-08-22 PROCEDURE — 83036 HEMOGLOBIN GLYCOSYLATED A1C: CPT

## 2024-08-22 PROCEDURE — 80053 COMPREHEN METABOLIC PANEL: CPT

## 2024-08-22 PROCEDURE — 84439 ASSAY OF FREE THYROXINE: CPT

## 2024-08-23 LAB
CREAT UR-MCNC: 65.5 MG/DL (ref 28–217)
MICROALBUMIN 24H UR-MCNC: <1.2 MG/DL
MICROALBUMIN/CREAT UR-RTO: NORMAL MG/G CREAT (ref 0–30)

## 2024-09-10 ENCOUNTER — OFFICE VISIT (OUTPATIENT)
Dept: ONCOLOGY | Age: 74
End: 2024-09-10
Payer: MEDICARE

## 2024-09-10 ENCOUNTER — HOSPITAL ENCOUNTER (OUTPATIENT)
Dept: INFUSION THERAPY | Age: 74
Discharge: HOME | End: 2024-09-10
Payer: MEDICARE

## 2024-09-10 VITALS
WEIGHT: 135 LBS | OXYGEN SATURATION: 97 % | TEMPERATURE: 98.1 F | SYSTOLIC BLOOD PRESSURE: 121 MMHG | BODY MASS INDEX: 23.05 KG/M2 | DIASTOLIC BLOOD PRESSURE: 56 MMHG | HEIGHT: 64 IN | HEART RATE: 87 BPM

## 2024-09-10 DIAGNOSIS — D64.9 ANEMIA, UNSPECIFIED TYPE: Primary | ICD-10-CM

## 2024-09-10 DIAGNOSIS — R18.8 CIRRHOSIS OF LIVER WITH ASCITES, UNSPECIFIED HEPATIC CIRRHOSIS TYPE (HCC): ICD-10-CM

## 2024-09-10 DIAGNOSIS — Z85.3 PERSONAL HISTORY OF BREAST CANCER: ICD-10-CM

## 2024-09-10 DIAGNOSIS — K74.60 CIRRHOSIS OF LIVER WITH ASCITES, UNSPECIFIED HEPATIC CIRRHOSIS TYPE (HCC): ICD-10-CM

## 2024-09-10 PROCEDURE — 99211 OFF/OP EST MAY X REQ PHY/QHP: CPT

## 2024-09-10 PROCEDURE — 1123F ACP DISCUSS/DSCN MKR DOCD: CPT | Performed by: INTERNAL MEDICINE

## 2024-09-10 PROCEDURE — 99213 OFFICE O/P EST LOW 20 MIN: CPT | Performed by: INTERNAL MEDICINE

## 2024-09-10 PROCEDURE — 3017F COLORECTAL CA SCREEN DOC REV: CPT | Performed by: INTERNAL MEDICINE

## 2024-09-10 PROCEDURE — G8420 CALC BMI NORM PARAMETERS: HCPCS | Performed by: INTERNAL MEDICINE

## 2024-09-10 PROCEDURE — 3078F DIAST BP <80 MM HG: CPT | Performed by: INTERNAL MEDICINE

## 2024-09-10 PROCEDURE — G8400 PT W/DXA NO RESULTS DOC: HCPCS | Performed by: INTERNAL MEDICINE

## 2024-09-10 PROCEDURE — 1090F PRES/ABSN URINE INCON ASSESS: CPT | Performed by: INTERNAL MEDICINE

## 2024-09-10 PROCEDURE — 1036F TOBACCO NON-USER: CPT | Performed by: INTERNAL MEDICINE

## 2024-09-10 PROCEDURE — G8427 DOCREV CUR MEDS BY ELIG CLIN: HCPCS | Performed by: INTERNAL MEDICINE

## 2024-09-10 PROCEDURE — 3074F SYST BP LT 130 MM HG: CPT | Performed by: INTERNAL MEDICINE

## 2024-09-10 ASSESSMENT — PATIENT HEALTH QUESTIONNAIRE - PHQ9
1. LITTLE INTEREST OR PLEASURE IN DOING THINGS: NOT AT ALL
2. FEELING DOWN, DEPRESSED OR HOPELESS: NOT AT ALL
SUM OF ALL RESPONSES TO PHQ QUESTIONS 1-9: 0
SUM OF ALL RESPONSES TO PHQ9 QUESTIONS 1 & 2: 0

## 2024-10-23 RX ORDER — PANTOPRAZOLE SODIUM 40 MG/1
TABLET, DELAYED RELEASE ORAL
Qty: 30 TABLET | Refills: 3 | Status: SHIPPED | OUTPATIENT
Start: 2024-10-23

## 2024-11-08 ENCOUNTER — OFFICE VISIT (OUTPATIENT)
Dept: GASTROENTEROLOGY | Age: 74
End: 2024-11-08

## 2024-11-08 ENCOUNTER — HOSPITAL ENCOUNTER (OUTPATIENT)
Age: 74
Discharge: HOME | End: 2024-11-08
Payer: MEDICARE

## 2024-11-08 VITALS
BODY MASS INDEX: 23.54 KG/M2 | OXYGEN SATURATION: 96 % | RESPIRATION RATE: 16 BRPM | HEIGHT: 64 IN | SYSTOLIC BLOOD PRESSURE: 120 MMHG | DIASTOLIC BLOOD PRESSURE: 58 MMHG | HEART RATE: 69 BPM

## 2024-11-08 DIAGNOSIS — R18.8 CIRRHOSIS OF LIVER WITH ASCITES, UNSPECIFIED HEPATIC CIRRHOSIS TYPE (HCC): ICD-10-CM

## 2024-11-08 DIAGNOSIS — K74.60 CIRRHOSIS OF LIVER WITH ASCITES, UNSPECIFIED HEPATIC CIRRHOSIS TYPE (HCC): ICD-10-CM

## 2024-11-08 DIAGNOSIS — R18.8 CIRRHOSIS OF LIVER WITH ASCITES, UNSPECIFIED HEPATIC CIRRHOSIS TYPE (HCC): Primary | ICD-10-CM

## 2024-11-08 DIAGNOSIS — K74.60 CIRRHOSIS OF LIVER WITH ASCITES, UNSPECIFIED HEPATIC CIRRHOSIS TYPE (HCC): Primary | ICD-10-CM

## 2024-11-08 LAB
ALBUMIN SERPL-MCNC: 3.6 G/DL (ref 3.4–5)
ALBUMIN/GLOB SERPL: 1 {RATIO} (ref 1.1–2.2)
ALP SERPL-CCNC: 75 U/L (ref 40–129)
ALT SERPL-CCNC: 23 U/L (ref 10–40)
ANION GAP SERPL CALCULATED.3IONS-SCNC: 14 MMOL/L (ref 9–17)
AST SERPL-CCNC: 32 U/L (ref 15–37)
BILIRUB SERPL-MCNC: 0.7 MG/DL (ref 0–1)
BUN SERPL-MCNC: 15 MG/DL (ref 7–20)
CALCIUM SERPL-MCNC: 9.2 MG/DL (ref 8.3–10.6)
CHLORIDE SERPL-SCNC: 102 MMOL/L (ref 99–110)
CO2 SERPL-SCNC: 22 MMOL/L (ref 21–32)
CREAT SERPL-MCNC: 1.4 MG/DL (ref 0.6–1.2)
ERYTHROCYTE [DISTWIDTH] IN BLOOD BY AUTOMATED COUNT: 14.3 % (ref 11.7–14.9)
GFR, ESTIMATED: 37 ML/MIN/1.73M2
GLUCOSE SERPL-MCNC: 271 MG/DL (ref 74–99)
HCT VFR BLD AUTO: 28 % (ref 37–47)
HGB BLD-MCNC: 8.9 G/DL (ref 12.5–16)
INR PPP: 1.2
MCH RBC QN AUTO: 35.7 PG (ref 27–31)
MCHC RBC AUTO-ENTMCNC: 31.8 G/DL (ref 32–36)
MCV RBC AUTO: 112.4 FL (ref 78–100)
PLATELET # BLD AUTO: 135 K/UL (ref 140–440)
PMV BLD AUTO: 11.5 FL (ref 7.5–11.1)
POTASSIUM SERPL-SCNC: 4.6 MMOL/L (ref 3.5–5.1)
PROT SERPL-MCNC: 7 G/DL (ref 6.4–8.2)
PROTHROMBIN TIME: 15.1 SEC (ref 11.7–14.5)
RBC # BLD AUTO: 2.49 M/UL (ref 4.2–5.4)
SODIUM SERPL-SCNC: 139 MMOL/L (ref 136–145)
WBC OTHER # BLD: 3.8 K/UL (ref 4–10.5)

## 2024-11-08 PROCEDURE — 80053 COMPREHEN METABOLIC PANEL: CPT

## 2024-11-08 PROCEDURE — 85610 PROTHROMBIN TIME: CPT

## 2024-11-08 PROCEDURE — 85027 COMPLETE CBC AUTOMATED: CPT

## 2024-11-08 PROCEDURE — 82105 ALPHA-FETOPROTEIN SERUM: CPT

## 2024-11-08 PROCEDURE — 36415 COLL VENOUS BLD VENIPUNCTURE: CPT

## 2024-11-08 NOTE — PROGRESS NOTES
CC: Follow up for cirrhosis    SUBJECTIVE:  No problems- denies abdominal pain,nausea,vomiting, diarrhea, constipation,melena, hematochezia, or hematemesis. Denies abdominal swelling, peripheral edema, periods of confusion    Tolerating low dose Reglan    Last AFP:      5/2024--- 3    Last liver imaging:  US 8/2024- no mass  Last EGD for varices: 2/2023-- decompressed varices  Hepatitis A immunity: had both  injections   Hepatitis B immunity: had one injection  Liver biopsy at time of lap melonie--- confirmed cirrhosis-no obvious etiology other than steatosis    MELD 3.0: 14 at 8/8/2024  3:19 PM  MELD-Na: 11 at 8/8/2024  3:19 PM  Calculated from:  Serum Creatinine: 1.4 MG/DL at 8/8/2024  3:19 PM  Serum Sodium: 135 MMOL/L at 8/8/2024  3:19 PM  Total Bilirubin: 0.6 MG/DL (Using min of 1 MG/DL) at 8/8/2024  3:19 PM  Serum Albumin: 3.6 GM/DL (Using max of 3.5 GM/DL) at 8/8/2024  3:19 PM  INR(ratio): 1.1 INDEX at 8/8/2024  3:19 PM  Age at listing (hypothetical): 74 years  Sex: Female at 8/8/2024  3:19 PM           ROS: non-contributory    OBJECTIVE:   PHYSICAL EXAM:    Vitals:  BP (!) 120/58 (Site: Right Upper Arm, Position: Sitting, Cuff Size: Medium Adult)   Pulse 69   Resp 16   Ht 1.613 m (5' 3.5\")   SpO2 96%   BMI 23.54 kg/m²   CONSTITUTIONAL: alert  EYES:  pupils equal, round and reactive to light and sclera clear  LUNGS:  clear to auscultation  CARDIOVASCULAR:  regular rate and rhythm and no murmur noted  ABDOMEN:  normal bowel sounds, soft, non-distended, non-tender and no masses palpated, no hepatosplenomegally  NEUROLOGIC: no asterixis or focal deficit detected   EXTREMITIES: no clubbing, cyanosis, or edema    ASSESSMENT:    1) cirrhosis- prob due to MASH- stable  2) prob gastroparesis- reasonably controlled with low-dose Reglan ac BID    PLAN:   1) CBC,CMP,PT   2) AFP   3) continue Reglan 5 mg BID   4) return visit 3 months

## 2024-11-11 LAB — AFP-TM SERPL-MCNC: 5 NG/ML (ref 0–9)

## 2025-01-07 ENCOUNTER — TELEPHONE (OUTPATIENT)
Dept: GASTROENTEROLOGY | Age: 75
End: 2025-01-07

## 2025-01-07 NOTE — TELEPHONE ENCOUNTER
Pt  called expressing concern about his wife getting tremors in her legs. They think it's due to a medication, possibly, metoclopramide (REGLAN) 10 MG tablet ,   They are wondering if she can change her dose to maybe something lower or fewer times a day?

## 2025-01-08 NOTE — TELEPHONE ENCOUNTER
Called pt,  answered. Per josé's request, pt is to stop medication to see if tremors go away. Pt  expressed understanding and said pt will stop medication.

## 2025-01-29 ENCOUNTER — HOSPITAL ENCOUNTER (OUTPATIENT)
Age: 75
Discharge: HOME | End: 2025-01-29
Payer: MEDICARE

## 2025-01-29 LAB
ALBUMIN SERPL-MCNC: 3.6 G/DL (ref 3.4–5)
ALBUMIN/GLOB SERPL: 1.1 {RATIO} (ref 1.1–2.2)
ALP SERPL-CCNC: 63 U/L (ref 40–129)
ALT SERPL-CCNC: 22 U/L (ref 10–40)
ANION GAP SERPL CALCULATED.3IONS-SCNC: 12 MMOL/L (ref 9–17)
AST SERPL-CCNC: 34 U/L (ref 15–37)
BILIRUB SERPL-MCNC: 0.7 MG/DL (ref 0–1)
BUN SERPL-MCNC: 19 MG/DL (ref 7–20)
CALCIUM SERPL-MCNC: 8.8 MG/DL (ref 8.3–10.6)
CHLORIDE SERPL-SCNC: 107 MMOL/L (ref 99–110)
CHOLEST SERPL-MCNC: 135 MG/DL (ref 125–199)
CO2 SERPL-SCNC: 22 MMOL/L (ref 21–32)
CREAT SERPL-MCNC: 1.5 MG/DL (ref 0.6–1.2)
EST. AVERAGE GLUCOSE BLD GHB EST-MCNC: 143 MG/DL
GFR, ESTIMATED: 33 ML/MIN/1.73M2
GLUCOSE SERPL-MCNC: 244 MG/DL (ref 74–99)
HBA1C MFR BLD: 6.6 % (ref 4.2–6.3)
HDLC SERPL-MCNC: 72 MG/DL
LDLC SERPL CALC-MCNC: 44 MG/DL
POTASSIUM SERPL-SCNC: 4.6 MMOL/L (ref 3.5–5.1)
PROT SERPL-MCNC: 6.8 G/DL (ref 6.4–8.2)
SODIUM SERPL-SCNC: 141 MMOL/L (ref 136–145)
T4 FREE SERPL-MCNC: 1.4 NG/DL (ref 0.9–1.8)
TRIGL SERPL-MCNC: 98 MG/DL
TSH SERPL DL<=0.05 MIU/L-ACNC: 1.25 UIU/ML (ref 0.27–4.2)

## 2025-01-29 PROCEDURE — 84443 ASSAY THYROID STIM HORMONE: CPT

## 2025-01-29 PROCEDURE — 80053 COMPREHEN METABOLIC PANEL: CPT

## 2025-01-29 PROCEDURE — 80061 LIPID PANEL: CPT

## 2025-01-29 PROCEDURE — 84439 ASSAY OF FREE THYROXINE: CPT

## 2025-01-29 PROCEDURE — 83036 HEMOGLOBIN GLYCOSYLATED A1C: CPT

## 2025-02-24 ENCOUNTER — OFFICE VISIT (OUTPATIENT)
Dept: GASTROENTEROLOGY | Age: 75
End: 2025-02-24
Payer: MEDICARE

## 2025-02-24 VITALS
DIASTOLIC BLOOD PRESSURE: 60 MMHG | OXYGEN SATURATION: 98 % | HEIGHT: 64 IN | RESPIRATION RATE: 16 BRPM | HEART RATE: 68 BPM | BODY MASS INDEX: 23.54 KG/M2 | SYSTOLIC BLOOD PRESSURE: 122 MMHG

## 2025-02-24 DIAGNOSIS — Z87.19 HISTORY OF ESOPHAGEAL VARICES: ICD-10-CM

## 2025-02-24 DIAGNOSIS — K21.9 GASTROESOPHAGEAL REFLUX DISEASE WITHOUT ESOPHAGITIS: ICD-10-CM

## 2025-02-24 DIAGNOSIS — D64.9 ANEMIA, UNSPECIFIED TYPE: ICD-10-CM

## 2025-02-24 DIAGNOSIS — K74.60 CIRRHOSIS OF LIVER WITHOUT ASCITES, UNSPECIFIED HEPATIC CIRRHOSIS TYPE (HCC): Primary | ICD-10-CM

## 2025-02-24 PROCEDURE — 3017F COLORECTAL CA SCREEN DOC REV: CPT | Performed by: NURSE PRACTITIONER

## 2025-02-24 PROCEDURE — G8420 CALC BMI NORM PARAMETERS: HCPCS | Performed by: NURSE PRACTITIONER

## 2025-02-24 PROCEDURE — 99215 OFFICE O/P EST HI 40 MIN: CPT | Performed by: NURSE PRACTITIONER

## 2025-02-24 PROCEDURE — 1123F ACP DISCUSS/DSCN MKR DOCD: CPT | Performed by: NURSE PRACTITIONER

## 2025-02-24 PROCEDURE — 1090F PRES/ABSN URINE INCON ASSESS: CPT | Performed by: NURSE PRACTITIONER

## 2025-02-24 PROCEDURE — 1036F TOBACCO NON-USER: CPT | Performed by: NURSE PRACTITIONER

## 2025-02-24 PROCEDURE — G8400 PT W/DXA NO RESULTS DOC: HCPCS | Performed by: NURSE PRACTITIONER

## 2025-02-24 PROCEDURE — G8427 DOCREV CUR MEDS BY ELIG CLIN: HCPCS | Performed by: NURSE PRACTITIONER

## 2025-02-24 PROCEDURE — G2211 COMPLEX E/M VISIT ADD ON: HCPCS | Performed by: NURSE PRACTITIONER

## 2025-02-24 PROCEDURE — 3078F DIAST BP <80 MM HG: CPT | Performed by: NURSE PRACTITIONER

## 2025-02-24 PROCEDURE — 3074F SYST BP LT 130 MM HG: CPT | Performed by: NURSE PRACTITIONER

## 2025-02-24 NOTE — PROGRESS NOTES
Dayana Pacheco 74 y.o. female was seen by CLARIBEL Hoang on 2/24/2025     Wt Readings from Last 3 Encounters:   09/10/24 61.2 kg (135 lb)   08/08/24 59.9 kg (132 lb)   08/07/24 62.4 kg (137 lb 9.6 oz)       HPI  Dayana Pacheco is a pleasant 74 y.o.  female who presents today with her  for follow-up on cirrhosis.  She was diagnosed after her gallbladder was removed on 1- by Dr. Daniels with liver biopsy showing cirrhosis.  She denies alcohol use.  No NSAID use.  She denies current jaundice, lower extremity swelling, or increase in abdominal girth.  Her lab work done on 11-8-2024 showed PT 15.1, INR 1.2, AFP tumor marker 5, Total Bilirubin 0.7, Alkaline Phosphatase 75, ALT 23, AST 32, RBC 2.49, Hgb 8.9, Hct 28, .4, and Platelets 135.  Her appetite is good and weight is stable.  Nausea occurs almost daily.  Reglan helps.  Intermittent dry heaving.  Her heartburn and acid reflux is controlled with Protonix.  No nocturnal awakenings with acid reflux.  No dysphagia or pain with swallowing.  No abdominal pain, bloating or distention.  No excess belching or flatulence.  She denies changes in her bowel pattern.  Her typical bowel pattern is daily with soft brown formed stools.  Lactulse once per day.  She is not taking Lasix. No family history of stomach or colon cancer.    Last AFP: 11-8-2024- normal at 5  Last liver imaging:  US 8/2024- no mass  Last EGD for varices: 9/6//2023--  no significant esophageal varices noted         -  Moderate portal hypertensive gastropathy was found in the gastric fundus and in the gastric body.         -  The exam of the stomach was otherwise normal.         -  The examined duodenum was normal.  Hepatitis A immunity: had both  injections   Hepatitis B immunity: had one injection  Liver biopsy done on 1- showed cirrhosis-no obvious etiology other than steatosis     MELD 3.0: 14 at 8/8/2024  3:19 PM  MELD-Na: 11 at 8/8/2024  3:19 PM  Calculated

## 2025-02-26 PROBLEM — K52.9 GASTROENTERITIS: Status: RESOLVED | Noted: 2022-03-26 | Resolved: 2025-02-26

## 2025-02-26 ASSESSMENT — ENCOUNTER SYMPTOMS
ABDOMINAL PAIN: 0
PHOTOPHOBIA: 0
COUGH: 0
VOMITING: 0
DIARRHEA: 0
BLOOD IN STOOL: 0
COLOR CHANGE: 0
BACK PAIN: 0
NAUSEA: 0
SHORTNESS OF BREATH: 0
CONSTIPATION: 0
EYE PAIN: 0

## 2025-02-27 PROBLEM — K81.0 ACUTE CHOLECYSTITIS: Status: RESOLVED | Noted: 2024-01-20 | Resolved: 2025-02-27

## 2025-03-03 RX ORDER — METOCLOPRAMIDE 10 MG/1
TABLET ORAL
Qty: 90 TABLET | Refills: 0 | Status: SHIPPED | OUTPATIENT
Start: 2025-03-03

## 2025-03-06 ENCOUNTER — TELEPHONE (OUTPATIENT)
Dept: ONCOLOGY | Age: 75
End: 2025-03-06

## 2025-03-10 ENCOUNTER — HOSPITAL ENCOUNTER (OUTPATIENT)
Age: 75
Discharge: HOME | End: 2025-03-10
Payer: MEDICARE

## 2025-03-10 ENCOUNTER — HOSPITAL ENCOUNTER (OUTPATIENT)
Dept: ULTRASOUND IMAGING | Age: 75
Discharge: HOME | End: 2025-03-10
Payer: MEDICARE

## 2025-03-10 DIAGNOSIS — K74.60 CIRRHOSIS OF LIVER WITHOUT ASCITES, UNSPECIFIED HEPATIC CIRRHOSIS TYPE (HCC): ICD-10-CM

## 2025-03-10 LAB
ALBUMIN SERPL-MCNC: 3.9 G/DL (ref 3.4–5)
ALBUMIN/GLOB SERPL: 1.1 {RATIO} (ref 1.1–2.2)
ALP SERPL-CCNC: 55 U/L (ref 40–129)
ALT SERPL-CCNC: 25 U/L (ref 10–40)
ANION GAP SERPL CALCULATED.3IONS-SCNC: 12 MMOL/L (ref 9–17)
AST SERPL-CCNC: 41 U/L (ref 15–37)
BASOPHILS # BLD: 0.04 K/UL
BASOPHILS NFR BLD: 1 % (ref 0–1)
BILIRUB SERPL-MCNC: 1.2 MG/DL (ref 0–1)
BUN SERPL-MCNC: 21 MG/DL (ref 7–20)
CALCIUM SERPL-MCNC: 9.4 MG/DL (ref 8.3–10.6)
CHLORIDE SERPL-SCNC: 104 MMOL/L (ref 99–110)
CO2 SERPL-SCNC: 23 MMOL/L (ref 21–32)
CREAT SERPL-MCNC: 1.8 MG/DL (ref 0.6–1.2)
EOSINOPHIL # BLD: 0.06 K/UL
EOSINOPHILS RELATIVE PERCENT: 2 % (ref 0–3)
ERYTHROCYTE [DISTWIDTH] IN BLOOD BY AUTOMATED COUNT: 14.5 % (ref 11.7–14.9)
FERRITIN SERPL-MCNC: 352 NG/ML (ref 15–150)
GFR, ESTIMATED: 28 ML/MIN/1.73M2
GLUCOSE SERPL-MCNC: 136 MG/DL (ref 74–99)
HCT VFR BLD AUTO: 29.9 % (ref 37–47)
HGB BLD-MCNC: 9.7 G/DL (ref 12.5–16)
IMM GRANULOCYTES # BLD AUTO: 0 K/UL
IMM GRANULOCYTES NFR BLD: 0 %
INR PPP: 1
IRON SATN MFR SERPL: 54 % (ref 15–50)
IRON SERPL-MCNC: 147 UG/DL (ref 37–145)
LYMPHOCYTES NFR BLD: 1.09 K/UL
LYMPHOCYTES RELATIVE PERCENT: 31 % (ref 24–44)
MCH RBC QN AUTO: 36.2 PG (ref 27–31)
MCHC RBC AUTO-ENTMCNC: 32.4 G/DL (ref 32–36)
MCV RBC AUTO: 111.6 FL (ref 78–100)
MONOCYTES NFR BLD: 0.19 K/UL
MONOCYTES NFR BLD: 5 % (ref 0–4)
NEUTROPHILS NFR BLD: 61 % (ref 36–66)
NEUTS SEG NFR BLD: 2.13 K/UL
PLATELET # BLD AUTO: 108 K/UL (ref 140–440)
PMV BLD AUTO: 11.7 FL (ref 7.5–11.1)
POTASSIUM SERPL-SCNC: 4.7 MMOL/L (ref 3.5–5.1)
PROT SERPL-MCNC: 7.5 G/DL (ref 6.4–8.2)
PROTHROMBIN TIME: 13.8 SEC (ref 11.7–14.5)
RBC # BLD AUTO: 2.68 M/UL (ref 4.2–5.4)
SODIUM SERPL-SCNC: 139 MMOL/L (ref 136–145)
TIBC SERPL-MCNC: 270 UG/DL (ref 260–445)
UNSATURATED IRON BINDING CAPACITY: 123 UG/DL (ref 110–370)
WBC OTHER # BLD: 3.5 K/UL (ref 4–10.5)

## 2025-03-10 PROCEDURE — 82105 ALPHA-FETOPROTEIN SERUM: CPT

## 2025-03-10 PROCEDURE — 85025 COMPLETE CBC W/AUTO DIFF WBC: CPT

## 2025-03-10 PROCEDURE — 82728 ASSAY OF FERRITIN: CPT

## 2025-03-10 PROCEDURE — 36415 COLL VENOUS BLD VENIPUNCTURE: CPT

## 2025-03-10 PROCEDURE — 76705 ECHO EXAM OF ABDOMEN: CPT

## 2025-03-10 PROCEDURE — 80053 COMPREHEN METABOLIC PANEL: CPT

## 2025-03-10 PROCEDURE — 83540 ASSAY OF IRON: CPT

## 2025-03-10 PROCEDURE — 85610 PROTHROMBIN TIME: CPT

## 2025-03-10 PROCEDURE — 83550 IRON BINDING TEST: CPT

## 2025-03-11 ENCOUNTER — TELEPHONE (OUTPATIENT)
Dept: GASTROENTEROLOGY | Age: 75
End: 2025-03-11

## 2025-03-11 LAB — AFP-TM SERPL-MCNC: 6 NG/ML (ref 0–9)

## 2025-03-11 NOTE — TELEPHONE ENCOUNTER
Pt  came in asking for the results of her blood work from yesterday. I explained to pt  that the blood work has not been resulted yet but when it was I would give them a call to go over results. Pt  expressed that pt is still having abd pain, nausea, dry heaves, he is monitoring her diet. He explained concern with pt meds not being changed at her last appt. He would like to get the results of the blood work and would like to know what further care looks like.

## 2025-03-13 ENCOUNTER — RESULTS FOLLOW-UP (OUTPATIENT)
Dept: GASTROENTEROLOGY | Age: 75
End: 2025-03-13

## 2025-03-14 NOTE — TELEPHONE ENCOUNTER
Pt is still having nausea everyday, cannot eat very much, they are very concerned. Pt  wants to know if there is any medication she can try or if pt needs to do any other testing.

## 2025-03-19 ENCOUNTER — TELEPHONE (OUTPATIENT)
Dept: GASTROENTEROLOGY | Age: 75
End: 2025-03-19

## 2025-03-19 NOTE — TELEPHONE ENCOUNTER
Patient called to get the results from his wife's ultra sound and would like a call back.  Sending to Neo

## 2025-03-19 NOTE — TELEPHONE ENCOUNTER
Called pt to go over results, pt  answered, he expressed frustration because pt is already on these medications and is still having issues. I made pt a follow up appt in April to come in and discuss medication with provider.

## 2025-03-20 RX ORDER — METOCLOPRAMIDE 10 MG/1
5 TABLET ORAL 4 TIMES DAILY
Qty: 90 TABLET | Refills: 0 | Status: SHIPPED | OUTPATIENT
Start: 2025-03-20

## 2025-03-20 RX ORDER — ONDANSETRON 4 MG/1
4 TABLET, FILM COATED ORAL DAILY PRN
Qty: 30 TABLET | Refills: 3 | Status: SHIPPED | OUTPATIENT
Start: 2025-03-20

## 2025-03-21 ENCOUNTER — HOSPITAL ENCOUNTER (OUTPATIENT)
Dept: INFUSION THERAPY | Age: 75
Discharge: HOME | End: 2025-03-21

## 2025-03-21 ENCOUNTER — OFFICE VISIT (OUTPATIENT)
Dept: ONCOLOGY | Age: 75
End: 2025-03-21
Payer: MEDICARE

## 2025-03-21 ENCOUNTER — RESULTS FOLLOW-UP (OUTPATIENT)
Dept: ULTRASOUND IMAGING | Age: 75
End: 2025-03-21

## 2025-03-21 VITALS
TEMPERATURE: 98.1 F | DIASTOLIC BLOOD PRESSURE: 61 MMHG | OXYGEN SATURATION: 97 % | SYSTOLIC BLOOD PRESSURE: 125 MMHG | HEART RATE: 100 BPM

## 2025-03-21 DIAGNOSIS — D64.9 ANEMIA, UNSPECIFIED TYPE: Primary | ICD-10-CM

## 2025-03-21 DIAGNOSIS — Z85.3 PERSONAL HISTORY OF BREAST CANCER: ICD-10-CM

## 2025-03-21 DIAGNOSIS — K74.60 CIRRHOSIS OF LIVER WITH ASCITES, UNSPECIFIED HEPATIC CIRRHOSIS TYPE (HCC): ICD-10-CM

## 2025-03-21 DIAGNOSIS — R18.8 CIRRHOSIS OF LIVER WITH ASCITES, UNSPECIFIED HEPATIC CIRRHOSIS TYPE (HCC): ICD-10-CM

## 2025-03-21 PROCEDURE — 1159F MED LIST DOCD IN RCRD: CPT | Performed by: INTERNAL MEDICINE

## 2025-03-21 PROCEDURE — G8420 CALC BMI NORM PARAMETERS: HCPCS | Performed by: INTERNAL MEDICINE

## 2025-03-21 PROCEDURE — 3017F COLORECTAL CA SCREEN DOC REV: CPT | Performed by: INTERNAL MEDICINE

## 2025-03-21 PROCEDURE — 1090F PRES/ABSN URINE INCON ASSESS: CPT | Performed by: INTERNAL MEDICINE

## 2025-03-21 PROCEDURE — G8427 DOCREV CUR MEDS BY ELIG CLIN: HCPCS | Performed by: INTERNAL MEDICINE

## 2025-03-21 PROCEDURE — 1126F AMNT PAIN NOTED NONE PRSNT: CPT | Performed by: INTERNAL MEDICINE

## 2025-03-21 PROCEDURE — 3078F DIAST BP <80 MM HG: CPT | Performed by: INTERNAL MEDICINE

## 2025-03-21 PROCEDURE — 1036F TOBACCO NON-USER: CPT | Performed by: INTERNAL MEDICINE

## 2025-03-21 PROCEDURE — 99213 OFFICE O/P EST LOW 20 MIN: CPT | Performed by: INTERNAL MEDICINE

## 2025-03-21 PROCEDURE — G8400 PT W/DXA NO RESULTS DOC: HCPCS | Performed by: INTERNAL MEDICINE

## 2025-03-21 PROCEDURE — 1123F ACP DISCUSS/DSCN MKR DOCD: CPT | Performed by: INTERNAL MEDICINE

## 2025-03-21 PROCEDURE — 3074F SYST BP LT 130 MM HG: CPT | Performed by: INTERNAL MEDICINE

## 2025-03-21 NOTE — PROGRESS NOTES
MA Rooming Questions  Patient: Dayana Pacheco  MRN: [id number]    Date: 3/21/2025        1. Do you have any new issues?   yes - Patient's  states vomiting on and off for the last couple weeks. Also states this has been happening a couple times a day. Also states some dry heaving. States this morning ate breakfast and took morning medications and vomited them back up. States loss in Cognitive skills; progressively worse in the last year.          2. Do you need any refills on medications?    no    3. Have you had any imaging done since your last visit?   yes - US ABD 3/10    4. Have you been hospitalized or seen in the emergency room since your last visit here?   no    5. Did the patient have a depression screening completed today? No    No data recorded     PHQ-9 Given to (if applicable):               PHQ-9 Score (if applicable):                     [] Positive     []  Negative              Does question #9 need addressed (if applicable)                     [] Yes    []  No               Ruth Smith MA      
stable on 3/10/25 labs (Hb 9.7). We decide to continue with observation and she will consider to have bone marrow biopsy if she becomes transfusion dependent in future (she may benefit from ISIDRO if she is found to have MDS at that time). Will repeat CBC in 6 months.     She was a patient of Dr. Turner (Hem/Onc) at [facility]. He saw her before for breast cancer and anemia. She stopped seeing him (stated he retired).     I answered all her questions and concerns for today. Recent imaging and labs were reviewed and discussed with the patient.

## 2025-03-24 ENCOUNTER — TELEPHONE (OUTPATIENT)
Dept: INFUSION THERAPY | Age: 75
End: 2025-03-24

## 2025-03-24 RX ORDER — ONDANSETRON 4 MG/1
4 TABLET, FILM COATED ORAL EVERY 8 HOURS PRN
Qty: 90 TABLET | Refills: 0 | Status: SHIPPED | OUTPATIENT
Start: 2025-03-24

## 2025-03-24 NOTE — TELEPHONE ENCOUNTER
Pt's spouse requests a rx of ondansetron for pt. He states that Friday they told Dr. Burch they had some at home already but, taking it Q8, pt is out.

## 2025-04-21 ENCOUNTER — HOSPITAL ENCOUNTER (OUTPATIENT)
Age: 75
Discharge: HOME | End: 2025-04-21
Payer: MEDICARE

## 2025-04-21 LAB
ALBUMIN SERPL-MCNC: 3.5 G/DL (ref 3.4–5)
ALBUMIN/GLOB SERPL: 1.1 {RATIO} (ref 1.1–2.2)
ALP SERPL-CCNC: 66 U/L (ref 40–129)
ALT SERPL-CCNC: 27 U/L (ref 10–40)
ANION GAP SERPL CALCULATED.3IONS-SCNC: 11 MMOL/L (ref 9–17)
AST SERPL-CCNC: 33 U/L (ref 15–37)
BILIRUB SERPL-MCNC: 0.8 MG/DL (ref 0–1)
BUN SERPL-MCNC: 22 MG/DL (ref 7–20)
CALCIUM SERPL-MCNC: 9.4 MG/DL (ref 8.3–10.6)
CHLORIDE SERPL-SCNC: 103 MMOL/L (ref 99–110)
CHOLEST SERPL-MCNC: 114 MG/DL (ref 125–199)
CO2 SERPL-SCNC: 23 MMOL/L (ref 21–32)
CREAT SERPL-MCNC: 2.3 MG/DL (ref 0.6–1.2)
EST. AVERAGE GLUCOSE BLD GHB EST-MCNC: 107 MG/DL
GFR, ESTIMATED: 21 ML/MIN/1.73M2
GLUCOSE SERPL-MCNC: 121 MG/DL (ref 74–99)
HBA1C MFR BLD: 5.4 % (ref 4.2–6.3)
HDLC SERPL-MCNC: 67 MG/DL
LDLC SERPL CALC-MCNC: 36 MG/DL
POTASSIUM SERPL-SCNC: 4.5 MMOL/L (ref 3.5–5.1)
PROT SERPL-MCNC: 6.7 G/DL (ref 6.4–8.2)
SODIUM SERPL-SCNC: 137 MMOL/L (ref 136–145)
T4 FREE SERPL-MCNC: 1.9 NG/DL (ref 0.9–1.8)
TRIGL SERPL-MCNC: 60 MG/DL
TSH SERPL DL<=0.05 MIU/L-ACNC: 0.22 UIU/ML (ref 0.27–4.2)

## 2025-04-21 PROCEDURE — 80061 LIPID PANEL: CPT

## 2025-04-21 PROCEDURE — 84439 ASSAY OF FREE THYROXINE: CPT

## 2025-04-21 PROCEDURE — 83036 HEMOGLOBIN GLYCOSYLATED A1C: CPT

## 2025-04-21 PROCEDURE — 80053 COMPREHEN METABOLIC PANEL: CPT

## 2025-04-21 PROCEDURE — 84443 ASSAY THYROID STIM HORMONE: CPT

## 2025-04-24 ENCOUNTER — OFFICE VISIT (OUTPATIENT)
Dept: GASTROENTEROLOGY | Age: 75
End: 2025-04-24
Payer: MEDICARE

## 2025-04-24 ENCOUNTER — HOSPITAL ENCOUNTER (INPATIENT)
Age: 75
LOS: 5 days | Discharge: NURSING, REHAB OR LONG-TERM CARE | DRG: 442 | End: 2025-04-30
Attending: STUDENT IN AN ORGANIZED HEALTH CARE EDUCATION/TRAINING PROGRAM | Admitting: STUDENT IN AN ORGANIZED HEALTH CARE EDUCATION/TRAINING PROGRAM
Payer: MEDICARE

## 2025-04-24 VITALS
BODY MASS INDEX: 23.54 KG/M2 | RESPIRATION RATE: 16 BRPM | HEIGHT: 64 IN | HEART RATE: 83 BPM | DIASTOLIC BLOOD PRESSURE: 60 MMHG | OXYGEN SATURATION: 98 % | SYSTOLIC BLOOD PRESSURE: 120 MMHG

## 2025-04-24 DIAGNOSIS — D61.818 PANCYTOPENIA (HCC): ICD-10-CM

## 2025-04-24 DIAGNOSIS — N17.9 AKI (ACUTE KIDNEY INJURY): ICD-10-CM

## 2025-04-24 DIAGNOSIS — Z87.19 HISTORY OF ESOPHAGEAL VARICES: ICD-10-CM

## 2025-04-24 DIAGNOSIS — G93.40 ENCEPHALOPATHY, UNSPECIFIED TYPE: ICD-10-CM

## 2025-04-24 DIAGNOSIS — E72.20 HYPERAMMONEMIA: ICD-10-CM

## 2025-04-24 DIAGNOSIS — D64.9 ANEMIA, UNSPECIFIED TYPE: ICD-10-CM

## 2025-04-24 DIAGNOSIS — R41.82 ALTERED MENTAL STATUS, UNSPECIFIED ALTERED MENTAL STATUS TYPE: Primary | ICD-10-CM

## 2025-04-24 DIAGNOSIS — K74.60 CIRRHOSIS OF LIVER WITHOUT ASCITES, UNSPECIFIED HEPATIC CIRRHOSIS TYPE (HCC): Primary | ICD-10-CM

## 2025-04-24 DIAGNOSIS — K21.9 GASTROESOPHAGEAL REFLUX DISEASE WITHOUT ESOPHAGITIS: ICD-10-CM

## 2025-04-24 PROCEDURE — 1123F ACP DISCUSS/DSCN MKR DOCD: CPT | Performed by: NURSE PRACTITIONER

## 2025-04-24 PROCEDURE — 3078F DIAST BP <80 MM HG: CPT | Performed by: NURSE PRACTITIONER

## 2025-04-24 PROCEDURE — 1090F PRES/ABSN URINE INCON ASSESS: CPT | Performed by: NURSE PRACTITIONER

## 2025-04-24 PROCEDURE — G8400 PT W/DXA NO RESULTS DOC: HCPCS | Performed by: NURSE PRACTITIONER

## 2025-04-24 PROCEDURE — 99285 EMERGENCY DEPT VISIT HI MDM: CPT

## 2025-04-24 PROCEDURE — 3017F COLORECTAL CA SCREEN DOC REV: CPT | Performed by: NURSE PRACTITIONER

## 2025-04-24 PROCEDURE — 3074F SYST BP LT 130 MM HG: CPT | Performed by: NURSE PRACTITIONER

## 2025-04-24 PROCEDURE — 84145 PROCALCITONIN (PCT): CPT

## 2025-04-24 PROCEDURE — 99213 OFFICE O/P EST LOW 20 MIN: CPT | Performed by: NURSE PRACTITIONER

## 2025-04-24 PROCEDURE — 1036F TOBACCO NON-USER: CPT | Performed by: NURSE PRACTITIONER

## 2025-04-24 PROCEDURE — 83690 ASSAY OF LIPASE: CPT

## 2025-04-24 PROCEDURE — G8420 CALC BMI NORM PARAMETERS: HCPCS | Performed by: NURSE PRACTITIONER

## 2025-04-24 PROCEDURE — G2211 COMPLEX E/M VISIT ADD ON: HCPCS | Performed by: NURSE PRACTITIONER

## 2025-04-24 PROCEDURE — G8427 DOCREV CUR MEDS BY ELIG CLIN: HCPCS | Performed by: NURSE PRACTITIONER

## 2025-04-24 RX ORDER — 0.9 % SODIUM CHLORIDE 0.9 %
500 INTRAVENOUS SOLUTION INTRAVENOUS ONCE
Status: COMPLETED | OUTPATIENT
Start: 2025-04-24 | End: 2025-04-25

## 2025-04-24 RX ORDER — PANTOPRAZOLE SODIUM 40 MG/1
TABLET, DELAYED RELEASE ORAL
Qty: 30 TABLET | Refills: 5 | Status: ON HOLD | OUTPATIENT
Start: 2025-04-24

## 2025-04-24 RX ORDER — LACTULOSE 10 G/15ML
SOLUTION ORAL
Qty: 1800 ML | Refills: 3 | Status: ON HOLD | OUTPATIENT
Start: 2025-04-24

## 2025-04-24 ASSESSMENT — PAIN - FUNCTIONAL ASSESSMENT
PAIN_FUNCTIONAL_ASSESSMENT: 0-10
PAIN_FUNCTIONAL_ASSESSMENT: NONE - DENIES PAIN

## 2025-04-24 ASSESSMENT — PAIN SCALES - GENERAL: PAINLEVEL_OUTOF10: 0

## 2025-04-24 NOTE — PROGRESS NOTES
Dayana Pacheco 74 y.o. female was seen by CLARIBEL Hoang on 4/24/2025     Wt Readings from Last 3 Encounters:   09/10/24 61.2 kg (135 lb)   08/08/24 59.9 kg (132 lb)   08/07/24 62.4 kg (137 lb 9.6 oz)       HPI  Dayana Pacheco is a pleasant 74 y.o.  female who presents today with her  for follow-up on cirrhosis.  She reports feeling good.  Her appetite is fair and weight is stable.  Her  mentioned she has not been eating good and she has dry heaving on most days.  He had decreased Reglan to half a tablet due to concern that it was causing her leg twitching.  Her heartburn and acid reflux is controlled with Protonix.  No nocturnal awakenings with acid reflux.  No dysphagia or pain with swallowing.  No abdominal pain, bloating or distention.  No excess belching or flatulence.  Her nausea has improved with taking Zofran three times a day.  She is taking in one Glycerna daily and snacking.  No NSAID use.  She denies current jaundice, lower extremity swelling,pruritus and no increase in abdominal girth.  Her lab work done on 3- showed AFP tumor marker 6, PT 13.8, INR 1.0, Total Bilirubin 1.2, Alkaline Phosphatase 55, ALT 25, AST 41, RBC 2.68, Hgb 9.7, Hct 29.9, .6, and Platelets 108.  She denies changes in her bowel pattern.  Her typical bowel pattern is daily with soft brown formed stools.  Lactulse once per day.  She is not taking Lasix. No family history of stomach or colon cancer.     ROS  Review of Systems   Constitutional:  Negative for chills, diaphoresis, fatigue and fever.   HENT:  Negative for ear pain, hearing loss and tinnitus.    Eyes:  Negative for photophobia, pain and visual disturbance.   Respiratory:  Negative for cough and shortness of breath.    Cardiovascular:  Negative for chest pain, palpitations and leg swelling.   Gastrointestinal:  Negative for abdominal pain, blood in stool, constipation, diarrhea, nausea and vomiting.   Endocrine: Negative

## 2025-04-25 ENCOUNTER — TELEPHONE (OUTPATIENT)
Dept: GASTROENTEROLOGY | Age: 75
End: 2025-04-25

## 2025-04-25 ENCOUNTER — APPOINTMENT (OUTPATIENT)
Dept: GENERAL RADIOLOGY | Age: 75
DRG: 442 | End: 2025-04-25
Payer: MEDICARE

## 2025-04-25 ENCOUNTER — APPOINTMENT (OUTPATIENT)
Dept: CT IMAGING | Age: 75
DRG: 442 | End: 2025-04-25
Payer: MEDICARE

## 2025-04-25 PROBLEM — K76.82 HEPATIC ENCEPHALOPATHY (HCC): Status: ACTIVE | Noted: 2025-04-25

## 2025-04-25 LAB
ALBUMIN SERPL-MCNC: 3.4 G/DL (ref 3.4–5)
ALBUMIN SERPL-MCNC: 3.6 G/DL (ref 3.4–5)
ALBUMIN/GLOB SERPL: 1 {RATIO} (ref 1.1–2.2)
ALBUMIN/GLOB SERPL: 1.1 {RATIO} (ref 1.1–2.2)
ALP SERPL-CCNC: 63 U/L (ref 40–129)
ALP SERPL-CCNC: 67 U/L (ref 40–129)
ALT SERPL-CCNC: 28 U/L (ref 10–40)
ALT SERPL-CCNC: 32 U/L (ref 10–40)
AMMONIA PLAS-SCNC: 137 UMOL/L (ref 11–51)
ANION GAP SERPL CALCULATED.3IONS-SCNC: 10 MMOL/L (ref 9–17)
ANION GAP SERPL CALCULATED.3IONS-SCNC: 13 MMOL/L (ref 9–17)
AST SERPL-CCNC: 33 U/L (ref 15–37)
AST SERPL-CCNC: 48 U/L (ref 15–37)
BASOPHILS # BLD: 0.02 K/UL
BASOPHILS # BLD: 0.02 K/UL
BASOPHILS NFR BLD: 1 % (ref 0–1)
BASOPHILS NFR BLD: 1 % (ref 0–1)
BILIRUB SERPL-MCNC: 0.8 MG/DL (ref 0–1)
BILIRUB SERPL-MCNC: 1.2 MG/DL (ref 0–1)
BILIRUB UR QL STRIP: NEGATIVE
BUN SERPL-MCNC: 19 MG/DL (ref 7–20)
BUN SERPL-MCNC: 22 MG/DL (ref 7–20)
CALCIUM SERPL-MCNC: 9.1 MG/DL (ref 8.3–10.6)
CALCIUM SERPL-MCNC: 9.3 MG/DL (ref 8.3–10.6)
CHLORIDE SERPL-SCNC: 100 MMOL/L (ref 99–110)
CHLORIDE SERPL-SCNC: 107 MMOL/L (ref 99–110)
CLARITY UR: CLEAR
CO2 SERPL-SCNC: 22 MMOL/L (ref 21–32)
CO2 SERPL-SCNC: 23 MMOL/L (ref 21–32)
COLOR UR: YELLOW
COMMENT: ABNORMAL
CREAT SERPL-MCNC: 1.8 MG/DL (ref 0.6–1.2)
CREAT SERPL-MCNC: 2.2 MG/DL (ref 0.6–1.2)
EOSINOPHIL # BLD: 0.01 K/UL
EOSINOPHIL # BLD: 0.03 K/UL
EOSINOPHILS RELATIVE PERCENT: 0 % (ref 0–3)
EOSINOPHILS RELATIVE PERCENT: 1 % (ref 0–3)
ERYTHROCYTE [DISTWIDTH] IN BLOOD BY AUTOMATED COUNT: 14.8 % (ref 11.7–14.9)
ERYTHROCYTE [DISTWIDTH] IN BLOOD BY AUTOMATED COUNT: 15 % (ref 11.7–14.9)
GFR, ESTIMATED: 22 ML/MIN/1.73M2
GFR, ESTIMATED: 27 ML/MIN/1.73M2
GLUCOSE BLD-MCNC: 117 MG/DL (ref 74–99)
GLUCOSE BLD-MCNC: 127 MG/DL (ref 74–99)
GLUCOSE BLD-MCNC: 160 MG/DL (ref 74–99)
GLUCOSE BLD-MCNC: 172 MG/DL (ref 74–99)
GLUCOSE SERPL-MCNC: 130 MG/DL (ref 74–99)
GLUCOSE SERPL-MCNC: 136 MG/DL (ref 74–99)
GLUCOSE UR STRIP-MCNC: NEGATIVE MG/DL
HCT VFR BLD AUTO: 25.9 % (ref 37–47)
HCT VFR BLD AUTO: 26.1 % (ref 37–47)
HGB BLD-MCNC: 8.3 G/DL (ref 12.5–16)
HGB BLD-MCNC: 8.5 G/DL (ref 12.5–16)
HGB UR QL STRIP.AUTO: NEGATIVE
IMM GRANULOCYTES # BLD AUTO: 0 K/UL
IMM GRANULOCYTES # BLD AUTO: 0.01 K/UL
IMM GRANULOCYTES NFR BLD: 0 %
IMM GRANULOCYTES NFR BLD: 0 %
INR PPP: 1.2
KETONES UR STRIP-MCNC: ABNORMAL MG/DL
LACTATE BLDV-SCNC: 1.4 MMOL/L (ref 0.4–2)
LEUKOCYTE ESTERASE UR QL STRIP: NEGATIVE
LIPASE SERPL-CCNC: 41 U/L (ref 13–60)
LYMPHOCYTES NFR BLD: 0.98 K/UL
LYMPHOCYTES NFR BLD: 1 K/UL
LYMPHOCYTES RELATIVE PERCENT: 27 % (ref 24–44)
LYMPHOCYTES RELATIVE PERCENT: 35 % (ref 24–44)
MCH RBC QN AUTO: 36.2 PG (ref 27–31)
MCH RBC QN AUTO: 36.5 PG (ref 27–31)
MCHC RBC AUTO-ENTMCNC: 31.9 G/DL (ref 32–36)
MCHC RBC AUTO-ENTMCNC: 32.8 G/DL (ref 32–36)
MCV RBC AUTO: 111.2 FL (ref 78–100)
MCV RBC AUTO: 113.5 FL (ref 78–100)
MONOCYTES NFR BLD: 0.19 K/UL
MONOCYTES NFR BLD: 0.22 K/UL
MONOCYTES NFR BLD: 6 % (ref 0–5)
MONOCYTES NFR BLD: 7 % (ref 0–5)
NEUTROPHILS NFR BLD: 56 % (ref 36–66)
NEUTROPHILS NFR BLD: 66 % (ref 36–66)
NEUTS SEG NFR BLD: 1.56 K/UL
NEUTS SEG NFR BLD: 2.42 K/UL
NITRITE UR QL STRIP: NEGATIVE
PH UR STRIP: 7 [PH] (ref 5–8)
PLATELET # BLD AUTO: 106 K/UL (ref 140–440)
PLATELET CONFIRMATION: NORMAL
PLATELET, FLUORESCENCE: 89 K/UL (ref 140–440)
PMV BLD AUTO: 12 FL (ref 7.5–11.1)
PMV BLD AUTO: 12.1 FL (ref 7.5–11.1)
POTASSIUM SERPL-SCNC: 4.4 MMOL/L (ref 3.5–5.1)
POTASSIUM SERPL-SCNC: 4.8 MMOL/L (ref 3.5–5.1)
PROCALCITONIN SERPL-MCNC: 0.09 NG/ML
PROT SERPL-MCNC: 6.4 G/DL (ref 6.4–8.2)
PROT SERPL-MCNC: 7 G/DL (ref 6.4–8.2)
PROT UR STRIP-MCNC: NEGATIVE MG/DL
PROTHROMBIN TIME: 15.2 SEC (ref 11.7–14.5)
RBC # BLD AUTO: 2.29 M/UL (ref 4.2–5.4)
RBC # BLD AUTO: 2.33 M/UL (ref 4.2–5.4)
SODIUM SERPL-SCNC: 135 MMOL/L (ref 136–145)
SODIUM SERPL-SCNC: 140 MMOL/L (ref 136–145)
SP GR UR STRIP: 1.01 (ref 1–1.03)
UROBILINOGEN UR STRIP-ACNC: 1 EU/DL (ref 0–1)
WBC OTHER # BLD: 2.8 K/UL (ref 4–10.5)
WBC OTHER # BLD: 3.7 K/UL (ref 4–10.5)

## 2025-04-25 PROCEDURE — 71045 X-RAY EXAM CHEST 1 VIEW: CPT

## 2025-04-25 PROCEDURE — 97535 SELF CARE MNGMENT TRAINING: CPT

## 2025-04-25 PROCEDURE — 2580000003 HC RX 258: Performed by: STUDENT IN AN ORGANIZED HEALTH CARE EDUCATION/TRAINING PROGRAM

## 2025-04-25 PROCEDURE — 97166 OT EVAL MOD COMPLEX 45 MIN: CPT

## 2025-04-25 PROCEDURE — 83605 ASSAY OF LACTIC ACID: CPT

## 2025-04-25 PROCEDURE — 82140 ASSAY OF AMMONIA: CPT

## 2025-04-25 PROCEDURE — 97530 THERAPEUTIC ACTIVITIES: CPT

## 2025-04-25 PROCEDURE — 2500000003 HC RX 250 WO HCPCS: Performed by: STUDENT IN AN ORGANIZED HEALTH CARE EDUCATION/TRAINING PROGRAM

## 2025-04-25 PROCEDURE — 82962 GLUCOSE BLOOD TEST: CPT

## 2025-04-25 PROCEDURE — 87040 BLOOD CULTURE FOR BACTERIA: CPT

## 2025-04-25 PROCEDURE — 51798 US URINE CAPACITY MEASURE: CPT

## 2025-04-25 PROCEDURE — 6370000000 HC RX 637 (ALT 250 FOR IP): Performed by: STUDENT IN AN ORGANIZED HEALTH CARE EDUCATION/TRAINING PROGRAM

## 2025-04-25 PROCEDURE — 2580000003 HC RX 258: Performed by: PHYSICIAN ASSISTANT

## 2025-04-25 PROCEDURE — 85025 COMPLETE CBC W/AUTO DIFF WBC: CPT

## 2025-04-25 PROCEDURE — 96360 HYDRATION IV INFUSION INIT: CPT

## 2025-04-25 PROCEDURE — 1200000000 HC SEMI PRIVATE

## 2025-04-25 PROCEDURE — 81003 URINALYSIS AUTO W/O SCOPE: CPT

## 2025-04-25 PROCEDURE — 36415 COLL VENOUS BLD VENIPUNCTURE: CPT

## 2025-04-25 PROCEDURE — 85610 PROTHROMBIN TIME: CPT

## 2025-04-25 PROCEDURE — 87086 URINE CULTURE/COLONY COUNT: CPT

## 2025-04-25 PROCEDURE — 97162 PT EVAL MOD COMPLEX 30 MIN: CPT

## 2025-04-25 PROCEDURE — 94761 N-INVAS EAR/PLS OXIMETRY MLT: CPT

## 2025-04-25 PROCEDURE — 74176 CT ABD & PELVIS W/O CONTRAST: CPT

## 2025-04-25 PROCEDURE — 80053 COMPREHEN METABOLIC PANEL: CPT

## 2025-04-25 PROCEDURE — 71250 CT THORAX DX C-: CPT

## 2025-04-25 PROCEDURE — 51701 INSERT BLADDER CATHETER: CPT

## 2025-04-25 RX ORDER — LEVOTHYROXINE SODIUM 88 UG/1
88 TABLET ORAL DAILY
Status: DISCONTINUED | OUTPATIENT
Start: 2025-04-25 | End: 2025-04-30 | Stop reason: HOSPADM

## 2025-04-25 RX ORDER — PANTOPRAZOLE SODIUM 40 MG/1
40 TABLET, DELAYED RELEASE ORAL
Status: DISCONTINUED | OUTPATIENT
Start: 2025-04-25 | End: 2025-04-30 | Stop reason: HOSPADM

## 2025-04-25 RX ORDER — LACTULOSE 10 G/15ML
30 SOLUTION ORAL 3 TIMES DAILY
Status: DISCONTINUED | OUTPATIENT
Start: 2025-04-25 | End: 2025-04-29

## 2025-04-25 RX ORDER — 0.9 % SODIUM CHLORIDE 0.9 %
1000 INTRAVENOUS SOLUTION INTRAVENOUS ONCE
Status: COMPLETED | OUTPATIENT
Start: 2025-04-25 | End: 2025-04-25

## 2025-04-25 RX ORDER — SODIUM CHLORIDE 9 MG/ML
INJECTION, SOLUTION INTRAVENOUS PRN
Status: DISCONTINUED | OUTPATIENT
Start: 2025-04-25 | End: 2025-04-30 | Stop reason: HOSPADM

## 2025-04-25 RX ORDER — RISPERIDONE 0.5 MG/1
0.25 TABLET ORAL NIGHTLY
Status: DISCONTINUED | OUTPATIENT
Start: 2025-04-25 | End: 2025-04-30 | Stop reason: HOSPADM

## 2025-04-25 RX ORDER — GLUCAGON 1 MG/ML
1 KIT INJECTION PRN
Status: DISCONTINUED | OUTPATIENT
Start: 2025-04-25 | End: 2025-04-30 | Stop reason: HOSPADM

## 2025-04-25 RX ORDER — METOCLOPRAMIDE 5 MG/1
5 TABLET ORAL 4 TIMES DAILY
Status: DISCONTINUED | OUTPATIENT
Start: 2025-04-25 | End: 2025-04-30 | Stop reason: HOSPADM

## 2025-04-25 RX ORDER — ENOXAPARIN SODIUM 100 MG/ML
30 INJECTION SUBCUTANEOUS EVERY EVENING
Status: DISCONTINUED | OUTPATIENT
Start: 2025-04-26 | End: 2025-04-30 | Stop reason: HOSPADM

## 2025-04-25 RX ORDER — SODIUM CHLORIDE, SODIUM LACTATE, POTASSIUM CHLORIDE, CALCIUM CHLORIDE 600; 310; 30; 20 MG/100ML; MG/100ML; MG/100ML; MG/100ML
INJECTION, SOLUTION INTRAVENOUS CONTINUOUS
Status: DISPENSED | OUTPATIENT
Start: 2025-04-25 | End: 2025-04-25

## 2025-04-25 RX ORDER — DEXTROSE MONOHYDRATE 100 MG/ML
INJECTION, SOLUTION INTRAVENOUS CONTINUOUS PRN
Status: DISCONTINUED | OUTPATIENT
Start: 2025-04-25 | End: 2025-04-30 | Stop reason: HOSPADM

## 2025-04-25 RX ORDER — SODIUM CHLORIDE 0.9 % (FLUSH) 0.9 %
5-40 SYRINGE (ML) INJECTION PRN
Status: DISCONTINUED | OUTPATIENT
Start: 2025-04-25 | End: 2025-04-30 | Stop reason: HOSPADM

## 2025-04-25 RX ORDER — SODIUM CHLORIDE 0.9 % (FLUSH) 0.9 %
5-40 SYRINGE (ML) INJECTION EVERY 12 HOURS SCHEDULED
Status: DISCONTINUED | OUTPATIENT
Start: 2025-04-25 | End: 2025-04-30 | Stop reason: HOSPADM

## 2025-04-25 RX ORDER — ONDANSETRON 2 MG/ML
4 INJECTION INTRAMUSCULAR; INTRAVENOUS EVERY 6 HOURS PRN
Status: DISCONTINUED | OUTPATIENT
Start: 2025-04-25 | End: 2025-04-30 | Stop reason: HOSPADM

## 2025-04-25 RX ORDER — INSULIN LISPRO 100 [IU]/ML
0-4 INJECTION, SOLUTION INTRAVENOUS; SUBCUTANEOUS
Status: DISCONTINUED | OUTPATIENT
Start: 2025-04-25 | End: 2025-04-27 | Stop reason: DRUGHIGH

## 2025-04-25 RX ORDER — ATORVASTATIN CALCIUM 10 MG/1
10 TABLET, FILM COATED ORAL DAILY
Status: DISCONTINUED | OUTPATIENT
Start: 2025-04-25 | End: 2025-04-30 | Stop reason: HOSPADM

## 2025-04-25 RX ORDER — ACETAMINOPHEN 500 MG
500 TABLET ORAL EVERY 8 HOURS PRN
Status: DISCONTINUED | OUTPATIENT
Start: 2025-04-25 | End: 2025-04-30 | Stop reason: HOSPADM

## 2025-04-25 RX ORDER — ONDANSETRON 4 MG/1
4 TABLET, ORALLY DISINTEGRATING ORAL EVERY 8 HOURS PRN
Status: DISCONTINUED | OUTPATIENT
Start: 2025-04-25 | End: 2025-04-30 | Stop reason: HOSPADM

## 2025-04-25 RX ADMIN — LACTULOSE 30 G: 20 SOLUTION ORAL at 14:36

## 2025-04-25 RX ADMIN — RISPERIDONE 0.25 MG: 0.5 TABLET, FILM COATED ORAL at 20:18

## 2025-04-25 RX ADMIN — SODIUM CHLORIDE, PRESERVATIVE FREE 10 ML: 5 INJECTION INTRAVENOUS at 20:19

## 2025-04-25 RX ADMIN — RIFAXIMIN 400 MG: 200 TABLET ORAL at 09:51

## 2025-04-25 RX ADMIN — LACTULOSE 30 G: 20 SOLUTION ORAL at 09:51

## 2025-04-25 RX ADMIN — SODIUM CHLORIDE 500 ML: 0.9 INJECTION, SOLUTION INTRAVENOUS at 00:13

## 2025-04-25 RX ADMIN — SODIUM CHLORIDE 1000 ML: 0.9 INJECTION, SOLUTION INTRAVENOUS at 02:13

## 2025-04-25 RX ADMIN — METOCLOPRAMIDE HYDROCHLORIDE 5 MG: 5 TABLET ORAL at 17:06

## 2025-04-25 RX ADMIN — RIFAXIMIN 400 MG: 200 TABLET ORAL at 20:48

## 2025-04-25 RX ADMIN — METOCLOPRAMIDE HYDROCHLORIDE 5 MG: 5 TABLET ORAL at 09:51

## 2025-04-25 RX ADMIN — LEVOTHYROXINE SODIUM 88 MCG: 0.09 TABLET ORAL at 05:55

## 2025-04-25 RX ADMIN — LACTULOSE 30 G: 20 SOLUTION ORAL at 20:18

## 2025-04-25 RX ADMIN — RIFAXIMIN 400 MG: 200 TABLET ORAL at 14:40

## 2025-04-25 RX ADMIN — ATORVASTATIN CALCIUM 10 MG: 10 TABLET, FILM COATED ORAL at 09:51

## 2025-04-25 RX ADMIN — METOCLOPRAMIDE HYDROCHLORIDE 5 MG: 5 TABLET ORAL at 20:18

## 2025-04-25 RX ADMIN — SODIUM CHLORIDE, SODIUM LACTATE, POTASSIUM CHLORIDE, AND CALCIUM CHLORIDE: .6; .31; .03; .02 INJECTION, SOLUTION INTRAVENOUS at 05:57

## 2025-04-25 RX ADMIN — METOCLOPRAMIDE HYDROCHLORIDE 5 MG: 5 TABLET ORAL at 14:35

## 2025-04-25 RX ADMIN — PANTOPRAZOLE SODIUM 40 MG: 40 TABLET, DELAYED RELEASE ORAL at 05:55

## 2025-04-25 ASSESSMENT — PAIN SCALES - GENERAL: PAINLEVEL_OUTOF10: 0

## 2025-04-25 NOTE — ED PROVIDER NOTES
Triage Chief Complaint:   Dehydration    [place]:  Today in the ED I had the pleasure of caring for Dayana Pacheco who is a 74 y.o. female that presents today to the ED for evaluation.  Patient has a history of dementia.  Chronic confusion.   is primary historian states the patient been much more confused over the last 1 month or so.  Appetite and elimination have been roughly baseline no fevers or chills.  No shortness of breath no runny nose.   has some concern for dehydration given the patient only drinks about 2 glasses of water per day.  Patient was dry heaving earlier today with some vomiting.  She denies abdominal pain at this time.    ROS:  REVIEW OF SYSTEMS    At least 10 systems reviewed      All other review of systems are negative  See HPI and nursing notes for additional information       Past Medical History:   Diagnosis Date    Arthritis     Cancer (HCC)     right breast.    Diabetes mellitus (HCC)     Esophageal varices (HCC)     Fatty liver     Glaucoma     Gout     patient states \"she has never had gout\".    History of blood transfusion     Hyperlipidemia     Hypertension     Neuropathy     Thyroid disease      Past Surgical History:   Procedure Laterality Date    BREAST BIOPSY      CATARACT EXTRACTION Bilateral      SECTION      CHOLECYSTECTOMY, LAPAROSCOPIC N/A 2024    CHOLECYSTECTOMY LAPAROSCOPIC, LIVER BIOPSY performed by Goran Daniels MD at [facility] OR    HYSTERECTOMY (CERVIX STATUS UNKNOWN) N/A     abdominal    MASTECTOMY, BILATERAL  10/26/2007    UPPER GASTROINTESTINAL ENDOSCOPY N/A 2022    EGD BAND LIGATION with 6 bands placed performed by Haresh Lawrence MD at [facility] ENDOSCOPY    UPPER GASTROINTESTINAL ENDOSCOPY N/A 2022    EGD BAND LIGATION WITH 3 BANDS PLACED, 4TH BAND DID NOT HOLD WITH SUBSEQUENT OOZING, TETRADECAL INJECTION GIVEN performed by Haresh Lawrence MD at [facility] ENDOSCOPY    UPPER GASTROINTESTINAL ENDOSCOPY N/A 2022    EGD BAND LIGATION

## 2025-04-25 NOTE — ED PROVIDER NOTES
Emergency Department Encounter    Patient: Dayana Pacheco  MRN: [id number]  : 1950  Date of Evaluation: 2025  ED Provider:  Ellie Whitaker MD    Briefly, Dayana Pacheco is a 74 y.o. female presented to the emergency department for reported confusion.  She has a history of what seems to be metabolic associated liver disease causing cirrhosis.  She was seen by her gastroenterology midlevel 1 day ago and there was no report of any confusion in that note.  She is on lactulose daily and endorses daily bowel movements.  Her  reports that she has been more confused and seems to be declining recently.  Operative any infectious symptoms.  No report of any fall, trauma or injury    I have reviewed and interpreted all of the currently available lab results from this visit (if applicable)  Results for orders placed or performed during the hospital encounter of 25   CBC with Auto Differential   Result Value Ref Range    WBC 3.7 (L) 4.0 - 10.5 k/uL    RBC 2.33 (L) 4.20 - 5.40 m/uL    Hemoglobin 8.5 (L) 12.5 - 16.0 g/dL    Hematocrit 25.9 (L) 37.0 - 47.0 %    .2 (H) 78.0 - 100.0 fL    MCH 36.5 (H) 27.0 - 31.0 pg    MCHC 32.8 32.0 - 36.0 g/dL    RDW 15.0 (H) 11.7 - 14.9 %    Platelets 106 (L) 140 - 440 k/uL    MPV 12.1 (H) 7.5 - 11.1 fL    Neutrophils % 66 36 - 66 %    Lymphocytes % 27 24 - 44 %    Monocytes % 6 (H) 0 - 5 %    Eosinophils % 0 0 - 3 %    Basophils % 1 0 - 1 %    Immature Granulocytes % 0 0 %    Neutrophils Absolute 2.42 k/uL    Lymphocytes Absolute 1.00 k/uL    Monocytes Absolute 0.22 k/uL    Eosinophils Absolute 0.01 k/uL    Basophils Absolute 0.02 k/uL    Immature Granulocytes Absolute 0.00 k/uL   Comprehensive Metabolic Panel   Result Value Ref Range    Sodium 135 (L) 136 - 145 mmol/L    Potassium 4.8 3.5 - 5.1 mmol/L    Chloride 100 99 - 110 mmol/L    CO2 22 21 - 32 mmol/L    Anion Gap 13 9 - 17 mmol/L    Glucose 136 (H) 74 - 99 mg/dL    BUN 22 (H) 7 - 20 mg/dL

## 2025-04-25 NOTE — CARE COORDINATION
Chart reviewed. Pt is not alert and oriented at this time, assessment completed with pt's . Pt is from home with . Pt lives in a 1 story house with ramp to enter. Pt has a tub/shower with a SC. Pt normally uses a walker. Pt does not drive, pt's  drives. Pt has PCP and insurance to assist with medical expenses. Pt is currently max assist. Cm discussed possible need for inpt therapy with pt's . Pt's  stated that pt has had HC in the past, but has not gone somewhere for inpt therapy. PT/OT signed up to see pt today.     Pt's  asked about HC to assist with bathing. Cm informed pt that insurance does not pay for that, but they could private pay. Pt's  stated that pt would not want to private pay for that. No other questions or needs stated at this time. Cm to follow.    04/25/25 4767   Service Assessment   Patient Orientation Other (see comment)  (Pt not alert and oriented at this time)   Cognition Other (see comment)  (Pt not alert and oriented at this time.)   History Provided By Spouse;Medical Record   Primary Caregiver Self   Accompanied By/Relationship    Support Systems Spouse/Significant Other   Patient's Healthcare Decision Maker is: Named in Scanned ACP Document   PCP Verified by CM Yes   Last Visit to PCP Within last 3 months   Prior Functional Level Assistance with the following:;Mobility;Housework   Current Functional Level Assistance with the following:;Bathing;Dressing;Toileting;Cooking;Housework;Shopping;Mobility   Can patient return to prior living arrangement Unknown at present   Ability to make needs known: Unable   Family able to assist with home care needs: Yes   Would you like for me to discuss the discharge plan with any other family members/significant others, and if so, who? Yes  (Legal next of kin and family as needed)   Financial Resources Medicare   Community Resources None   CM/SW Referral Other (see comment)  (Discharge planning

## 2025-04-25 NOTE — ED NOTES
ED TO INPATIENT SBAR HANDOFF    Patient Name: Dayana Pacheco   :  1950  74 y.o.   Preferred Name    Family/Caregiver Present no   Restraints no   C-SSRS: Risk of Suicide: No Risk  Sitter no   Sepsis Risk Score Sepsis V2 Risk Score: 21.2      Situation  Chief Complaint   Patient presents with    Dehydration     Brief Description of Patient's Condition: Pt presented to the ed with altered mental status and potential dehydration. Pt vitals are stable at this time. No resp distress noted at this time. Equal rise and fall of the chest. Pt able to answer basic questions but is unable to follow a conversation. Pt is awake and alert but is not oriented to place or time.  Mental Status: disoriented and alert  Arrived from: home    Imaging:   CT CHEST ABDOMEN PELVIS WO CONTRAST Additional Contrast? None   Final Result      XR CHEST PORTABLE   Final Result        Abnormal labs:   Abnormal Labs Reviewed   CBC WITH AUTO DIFFERENTIAL - Abnormal; Notable for the following components:       Result Value    WBC 3.7 (*)     RBC 2.33 (*)     Hemoglobin 8.5 (*)     Hematocrit 25.9 (*)     .2 (*)     MCH 36.5 (*)     RDW 15.0 (*)     Platelets 106 (*)     MPV 12.1 (*)     Monocytes % 6 (*)     All other components within normal limits   COMPREHENSIVE METABOLIC PANEL - Abnormal; Notable for the following components:    Sodium 135 (*)     Glucose 136 (*)     BUN 22 (*)     Creatinine 2.2 (*)     Est, Glom Filt Rate 22 (*)     Albumin/Globulin Ratio 1.0 (*)     AST 48 (*)     All other components within normal limits   URINALYSIS - Abnormal; Notable for the following components:    Ketones, Urine TRACE (*)     All other components within normal limits   PROTIME-INR - Abnormal; Notable for the following components:    Protime 15.2 (*)     All other components within normal limits   AMMONIA - Abnormal; Notable for the following components:    Ammonia 137 (*)     All other components within normal limits

## 2025-04-25 NOTE — CARE COORDINATION
Chart reviewed. Pt is not alert and oriented. Cm attempted to see pt at bedside. Pt is not alert and oriented and no family at bedside.     Cm attempted to call pt's , no answer. Cm left  requesting return call. Cm to follow.

## 2025-04-25 NOTE — PLAN OF CARE
Problem: Chronic Conditions and Co-morbidities  Goal: Patient's chronic conditions and co-morbidity symptoms are monitored and maintained or improved  Outcome: Progressing     Problem: Discharge Planning  Goal: Discharge to home or other facility with appropriate resources  Outcome: Progressing     Problem: Safety - Adult  Goal: Free from fall injury  Outcome: Progressing     Problem: Skin/Tissue Integrity  Goal: Skin integrity remains intact  Description: 1.  Monitor for areas of redness and/or skin breakdown2.  Assess vascular access sites hourly3.  Every 4-6 hours minimum:  Change oxygen saturation probe site4.  Every 4-6 hours:  If on nasal continuous positive airway pressure, respiratory therapy assess nares and determine need for appliance change or resting period  Outcome: Progressing

## 2025-04-25 NOTE — H&P
History and Physical      Name:  Dayana Pacheco /Age/Sex: 1950  (74 y.o. female)   MRN & CSN:  [id number] & [id number] Encounter Date/Time: 2025 3:59 AM   Location:  ED15/ED-15 PCP: Raji Her MD       Hospital Day: 2    Assessment and Plan:     Patient is a 74 y.o. female who presented with confusion.     # Decompensated MASH cirrhosis with hepatic encephalopathy - increased confusion over past 1-2 weeks. On lactulose, takes once daily, has 1 BM day. EGD in 2023 showed moderate PHG, no EV. Followed by GI, last seen . Ammonia 137, alb 3.6, MELD-Na 18, HE grade 2, CT non-acute. Increase lactulose to TID, goal 2-3 soft BM/day, start rifaximin.   # Acute kidney injury on CKD 3b - no recent NSAIDs, clinically hypovolemic, Cr 2.2, baseline ~ 1.4, UA bland, will challenge with IV, strict I/O's.   # Pancytopenia - mixed etiology, followed by H/O, stable.   # Essential hypertension - consider starting BB in setting of PHG if BP remains elevated.  # HLD - continue statin.  # T2DM with hyperglycemia - LCSI.   # Acquired hypothyroidism - continue Synthroid.  # GERD - continue PPI.   # Mood disorder - continue home meds.     Checklist:  Advanced care planning: full  Diet: regular    Sugar: BG goal of 140-180 while inpatient  VTE ppx: Lovenox (hold if platelets <50k)    Disposition: admit to inpatient.  Estimated discharge: 3-4 day(s).  Current living situation: home.  Expected disposition: home.    Spoke with ED provider who recommended admission for the patient and I agree with that plan.  Personally reviewed lab studies and imaging.  Imaging that was interpreted personally and results as stated above.    Total time spent on H&P was 60 minutes.    History of Present Illness:     Chief Complaint: Confusion    Patient is a 74 y.o. female with a PMHx as above who presented to the ED with increased confusion over past 1-2 week. On lactulose, takes once daily, has 1 BM day.  reported that

## 2025-04-25 NOTE — CONSULTS
[facility] ACUTE CARE PHYSICAL THERAPY EVALUATION  Dayana Pacheco, 1950, 3002/3002-A, 2025    History  [place]:  The primary encounter diagnosis was Altered mental status, unspecified altered mental status type. Diagnoses of Encephalopathy, unspecified type, ANTONINA (acute kidney injury), Hyperammonemia, and Pancytopenia (HCC) were also pertinent to this visit.  Patient  has a past medical history of Arthritis, Cancer (HCC), Diabetes mellitus (HCC), Esophageal varices (HCC), Fatty liver, Glaucoma, Gout, History of blood transfusion, Hyperlipidemia, Hypertension, Neuropathy, and Thyroid disease.  Patient  has a past surgical history that includes  section; Mastectomy, bilateral (10/26/2007); Upper gastrointestinal endoscopy (N/A, 2022); Upper gastrointestinal endoscopy (N/A, 2022); Cataract extraction (Bilateral); Breast biopsy; Upper gastrointestinal endoscopy (N/A, 2022); Upper gastrointestinal endoscopy (N/A, 2022); Upper gastrointestinal endoscopy (N/A, 02/15/2023); Hysterectomy (N/A); Upper gastrointestinal endoscopy (N/A, 2023); and Cholecystectomy, laparoscopic (N/A, 2024).    Discharge Recommendation: Facility for moderate post-acute rehabilitation, anticipate 1-2 hours per day and 5 days per week.     Equipment: TBD at next level of care    Subjective:    Patient states:  \"I need to use the bathroom.\"      Pain:  denies pain.      Communication with other providers:  Handoff to RN, OT    Restrictions: general precautions, fall risk    Home Setup/Prior level of function   No family present. Pt states stand pivots with RW and assist from .    Examination of body systems (includes body structures/functions, activity/participation limitations):  Observation:  pt supine in bed with RN present upon arrival and agreeable to therapy  Vision:  WFL  Hearing:  [place]  Cardiopulmonary:  no O2 needs  Cognition: delayed processing, impaired sequencing, see

## 2025-04-25 NOTE — TELEPHONE ENCOUNTER
Please call and schedule patient for EGD for persistent nausea and esophageal varices surveillance.

## 2025-04-25 NOTE — PROGRESS NOTES
Occupational Therapy  [facility] ACUTE CARE OCCUPATIONAL THERAPY EVALUATION    Dayana Pacheco, 1950, 3002/3002-A, 4/25/2025    Discharge Recommendation: Facility for moderate post-acute rehabilitation, anticipate 1-2 hours per day and 5 days per week.        History:  [place]:  The primary encounter diagnosis was Altered mental status, unspecified altered mental status type. Diagnoses of Encephalopathy, unspecified type, ANTONINA (acute kidney injury), Hyperammonemia, and Pancytopenia (HCC) were also pertinent to this visit.  Past Medical History:   Diagnosis Date    Arthritis     Cancer (HCC)     right breast.    Diabetes mellitus (HCC)     Esophageal varices (HCC)     Fatty liver     Glaucoma     Gout     patient states \"she has never had gout\".    History of blood transfusion     Hyperlipidemia     Hypertension     Neuropathy     Thyroid disease          Subjective:  Patient states: \"I need to go to the bathroom\"  Pain:  denies  Communication with other providers: co-eval w/ PT, handoff to RN  Restrictions: General Precautions, Fall Risk    Home Setup/Prior level of function:    No family present. Pt states stand pivots with RW and assist from , requires assistance for all ADLs.     Examination:  Observation: Supine in bed upon arrival, agreeable to therapy eval.  Vision: WFL  Hearing: [place]  Vitals: Stable vitals throughout session on room air      Body Systems and functions:  ROM: WFL   Strength: B UE 3+/5 across all major joints   Sensation: WFL  Tone: Normal  Coordination: WFL  Perception: WNL      Cognitive and Psychosocial Functioning:  Overall cognitive status: alert, oriented to person and place.   Affect: Normal       Functional Mobility:  Bed mobility:  supine to sitting EOB with mod A, Vcs for initiation  Sitting balance:  SBA    Transfers: STS from EOB with min A  Standing balance:  min A with RW  Functional Mobility: NT - Stand step transfers only at baseline per pt3  Toilet/Shower

## 2025-04-25 NOTE — ED TRIAGE NOTES
Pt presents to the ed with  with c/o dehydration and acute altered mental status. Pt denies chest pains or shortness of breath. Pt has vomited today once with dry heaving.

## 2025-04-25 NOTE — PROGRESS NOTES
Pts  stated that he gave her 20 units of her long acting insulin at 9:00 am. Educated pt and  on not taking any home medications without prior approval from hospitalist. Expressed understanding.

## 2025-04-25 NOTE — PROGRESS NOTES
4 Eyes Skin Assessment     NAME:  Dayana Pacheco  YOB: 1950  MEDICAL RECORD NUMBER:  [id number]    The patient is being assessed for  Admission    I agree that at least one RN has performed a thorough Head to Toe Skin Assessment on the patient. ALL assessment sites listed below have been assessed.      Areas assessed by both nurses:    Head, Face, Ears, Shoulders, Back, Chest, Arms, Elbows, Hands, Sacrum. Buttock, Coccyx, Ischium, Legs. Feet and Heels, and Under Medical Devices         Does the Patient have a Wound? No noted wound(s)       Kunal Prevention initiated by RN: No  Wound Care Orders initiated by RN: No    Pressure Injury (Stage 3,4, Unstageable, DTI, NWPT, and Complex wounds) if present, place Wound referral order by RN under : No    New Ostomies, if present place, Ostomy referral order under : No     Nurse 1 eSignature: Electronically signed by TRESSA MILLER RN on 4/25/25 at 6:07 AM EDT    **SHARE this note so that the co-signing nurse can place an eSignature**    Nurse 2 eSignature: Electronically signed by Callie Rose RN on 4/25/25 at 6:09 AM EDT

## 2025-04-26 LAB
GLUCOSE BLD-MCNC: 100 MG/DL (ref 74–99)
GLUCOSE BLD-MCNC: 232 MG/DL (ref 74–99)
GLUCOSE BLD-MCNC: 243 MG/DL (ref 74–99)
GLUCOSE BLD-MCNC: 253 MG/DL (ref 74–99)

## 2025-04-26 PROCEDURE — 1200000000 HC SEMI PRIVATE

## 2025-04-26 PROCEDURE — 51798 US URINE CAPACITY MEASURE: CPT

## 2025-04-26 PROCEDURE — 6370000000 HC RX 637 (ALT 250 FOR IP): Performed by: STUDENT IN AN ORGANIZED HEALTH CARE EDUCATION/TRAINING PROGRAM

## 2025-04-26 PROCEDURE — 6360000002 HC RX W HCPCS: Performed by: STUDENT IN AN ORGANIZED HEALTH CARE EDUCATION/TRAINING PROGRAM

## 2025-04-26 PROCEDURE — 94761 N-INVAS EAR/PLS OXIMETRY MLT: CPT

## 2025-04-26 PROCEDURE — 82962 GLUCOSE BLOOD TEST: CPT

## 2025-04-26 PROCEDURE — 2500000003 HC RX 250 WO HCPCS: Performed by: STUDENT IN AN ORGANIZED HEALTH CARE EDUCATION/TRAINING PROGRAM

## 2025-04-26 RX ADMIN — LACTULOSE 30 G: 20 SOLUTION ORAL at 08:56

## 2025-04-26 RX ADMIN — PANTOPRAZOLE SODIUM 40 MG: 40 TABLET, DELAYED RELEASE ORAL at 06:25

## 2025-04-26 RX ADMIN — LACTULOSE 30 G: 20 SOLUTION ORAL at 14:00

## 2025-04-26 RX ADMIN — METOCLOPRAMIDE HYDROCHLORIDE 5 MG: 5 TABLET ORAL at 11:22

## 2025-04-26 RX ADMIN — ATORVASTATIN CALCIUM 10 MG: 10 TABLET, FILM COATED ORAL at 08:56

## 2025-04-26 RX ADMIN — METOCLOPRAMIDE HYDROCHLORIDE 5 MG: 5 TABLET ORAL at 08:56

## 2025-04-26 RX ADMIN — INSULIN LISPRO 2 UNITS: 100 INJECTION, SOLUTION INTRAVENOUS; SUBCUTANEOUS at 11:22

## 2025-04-26 RX ADMIN — RISPERIDONE 0.25 MG: 0.5 TABLET, FILM COATED ORAL at 20:42

## 2025-04-26 RX ADMIN — INSULIN LISPRO 1 UNITS: 100 INJECTION, SOLUTION INTRAVENOUS; SUBCUTANEOUS at 20:42

## 2025-04-26 RX ADMIN — ENOXAPARIN SODIUM 30 MG: 100 INJECTION SUBCUTANEOUS at 17:16

## 2025-04-26 RX ADMIN — METOCLOPRAMIDE HYDROCHLORIDE 5 MG: 5 TABLET ORAL at 16:12

## 2025-04-26 RX ADMIN — RIFAXIMIN 400 MG: 200 TABLET ORAL at 20:42

## 2025-04-26 RX ADMIN — INSULIN LISPRO 1 UNITS: 100 INJECTION, SOLUTION INTRAVENOUS; SUBCUTANEOUS at 17:17

## 2025-04-26 RX ADMIN — METOCLOPRAMIDE HYDROCHLORIDE 5 MG: 5 TABLET ORAL at 20:42

## 2025-04-26 RX ADMIN — SODIUM CHLORIDE, PRESERVATIVE FREE 10 ML: 5 INJECTION INTRAVENOUS at 20:44

## 2025-04-26 RX ADMIN — RIFAXIMIN 400 MG: 200 TABLET ORAL at 08:55

## 2025-04-26 RX ADMIN — RIFAXIMIN 400 MG: 200 TABLET ORAL at 14:01

## 2025-04-26 RX ADMIN — LACTULOSE 30 G: 20 SOLUTION ORAL at 20:42

## 2025-04-26 RX ADMIN — LEVOTHYROXINE SODIUM 88 MCG: 0.09 TABLET ORAL at 06:04

## 2025-04-26 RX ADMIN — SODIUM CHLORIDE, PRESERVATIVE FREE 10 ML: 5 INJECTION INTRAVENOUS at 08:56

## 2025-04-26 NOTE — PROGRESS NOTES
V2.0  [facility] [place]      Name:  Dayana Pacheco /Age/Sex: 1950  (74 y.o. female)   MRN & CSN:  [id number] & [id number] Encounter Date/Time: 2025 12:54 PM EDT    Location:  [address] [place] [address]-A PCP: Raji Her MD       Hospital Day: 3    Assessment and Plan:   Dayana Pacheco is a 74 y.o. female who presents with Hepatic encephalopathy (HCC)    Assessment and Plan:  # Decompensated MASH cirrhosis with hepatic encephalopathy - increased confusion over past 1-2 weeks. On lactulose, takes once daily, has 1 BM day. EGD in 2023 showed moderate PHG, no EV. Followed by GI, last seen . Ammonia 137, alb 3.6, MELD-Na 18, HE grade 2, CT non-acute. Increase lactulose to TID, goal 2-3 soft BM/day, start rifaximin.  PT OT been consulted likely placement  # Acute kidney injury on CKD 3b - no recent NSAIDs, clinically hypovolemic, Cr 2.2, baseline ~ 1.4, UA bland, will challenge with IV, strict I/O's.   # Pancytopenia - mixed etiology, followed by H/O, stable.   # Essential hypertension - consider starting BB in setting of PHG if BP remains elevated.  # HLD - continue statin.  # T2DM with hyperglycemia - LCSI.   # Acquired hypothyroidism - continue Synthroid.  # GERD - continue PPI.   # Mood disorder - continue home meds.    Medical Decision Making:  The following items were considered in medical decision making:  Discussion of patient care with other providers  Reviewed clinical lab tests if any  Reviewed radiology tests if any  Reviewed other diagnostic tests/interventions  Independent review of radiologic images if any  Microbiology cultures and other micro tests if any    Estimated time spent for medical decision-making encompassing complexity of the case, history taking, medication review, physical examination, communication with family, RN, , discussion with specialists, and ancillary staff members to provide accurate care for the patient was around 25 minutes.    JOHN  contact the dictating provider for clarification.   Diet ADULT DIET; Regular   DVT Prophylaxis [] Lovenox, []  Heparin, [] SCDs, [] Ambulation,  [] Eliquis, [] Xarelto  [] Coumadin   Code Status Full Code   Disposition From: home  Expected Disposition: SNF  Estimated Date of Discharge: placement  Patient requires continued admission due to placement   Surrogate Decision Maker/ POA      Subjective:     Chief Complaint: Dehydration     The patient was seen at bedside. All questions answered at bedside. NAEON. Tolerating diet. No BM last night. Good UOP.    Patient recently.  Likely placement of the plan discussed care in detail with the          Review of Systems:    Review of Systems  Negative except above    Objective:     Intake/Output Summary (Last 24 hours) at 4/26/2025 1254  Last data filed at 4/25/2025 2019  Gross per 24 hour   Intake 10 ml   Output 575 ml   Net -565 ml        Vitals:   Vitals:    04/26/25 0846   BP: (!) 138/55   Pulse: 89   Resp: 17   Temp: 99 °F (37.2 °C)   SpO2: 92%       Physical Exam:   Physical Exam  Vitals reviewed.   Constitutional:       Appearance: Normal appearance. She is normal weight.   HENT:      Head: Normocephalic.      Nose: Nose normal.      Mouth/Throat:      Mouth: Mucous membranes are moist.   Eyes:      Conjunctiva/sclera: Conjunctivae normal.      Pupils: Pupils are equal, round, and reactive to light.   Cardiovascular:      Rate and Rhythm: Normal rate and regular rhythm.      Pulses: Normal pulses.      Heart sounds: Normal heart sounds. No murmur heard.  Pulmonary:      Effort: Pulmonary effort is normal.      Breath sounds: Normal breath sounds. No wheezing, rhonchi or rales.   Abdominal:      General: Abdomen is flat. Bowel sounds are normal. There is no distension.      Palpations: Abdomen is soft.      Tenderness: There is no abdominal tenderness.   Musculoskeletal:         General: No deformity. Normal range of motion.      Cervical back: Normal range of

## 2025-04-27 LAB
ALBUMIN SERPL-MCNC: 2.8 G/DL (ref 3.4–5)
ALBUMIN/GLOB SERPL: 1 {RATIO} (ref 1.1–2.2)
ALP SERPL-CCNC: 59 U/L (ref 40–129)
ALT SERPL-CCNC: 24 U/L (ref 10–40)
ANION GAP SERPL CALCULATED.3IONS-SCNC: 12 MMOL/L (ref 9–17)
AST SERPL-CCNC: 31 U/L (ref 15–37)
BASOPHILS # BLD: 0.02 K/UL
BASOPHILS NFR BLD: 0 % (ref 0–1)
BILIRUB SERPL-MCNC: 0.5 MG/DL (ref 0–1)
BUN SERPL-MCNC: 23 MG/DL (ref 7–20)
CA-I BLD-SCNC: 1.09 MMOL/L (ref 1.15–1.33)
CALCIUM SERPL-MCNC: 8 MG/DL (ref 8.3–10.6)
CHLORIDE SERPL-SCNC: 104 MMOL/L (ref 99–110)
CO2 SERPL-SCNC: 19 MMOL/L (ref 21–32)
CREAT SERPL-MCNC: 2 MG/DL (ref 0.6–1.2)
EOSINOPHIL # BLD: 0.05 K/UL
EOSINOPHILS RELATIVE PERCENT: 1 % (ref 0–3)
ERYTHROCYTE [DISTWIDTH] IN BLOOD BY AUTOMATED COUNT: 14.7 % (ref 11.7–14.9)
GFR, ESTIMATED: 25 ML/MIN/1.73M2
GLUCOSE BLD-MCNC: 137 MG/DL (ref 74–99)
GLUCOSE BLD-MCNC: 206 MG/DL (ref 74–99)
GLUCOSE BLD-MCNC: 249 MG/DL (ref 74–99)
GLUCOSE BLD-MCNC: 296 MG/DL (ref 74–99)
GLUCOSE BLD-MCNC: 351 MG/DL (ref 74–99)
GLUCOSE SERPL-MCNC: 208 MG/DL (ref 74–99)
HCT VFR BLD AUTO: 25.3 % (ref 37–47)
HGB BLD-MCNC: 7.9 G/DL (ref 12.5–16)
IMM GRANULOCYTES # BLD AUTO: 0.01 K/UL
IMM GRANULOCYTES NFR BLD: 0 %
LACTATE BLDV-SCNC: 3 MMOL/L (ref 0.4–2)
LACTATE BLDV-SCNC: 3.3 MMOL/L (ref 0.4–2)
LYMPHOCYTES NFR BLD: 0.76 K/UL
LYMPHOCYTES RELATIVE PERCENT: 14 % (ref 24–44)
MAGNESIUM SERPL-MCNC: 1.8 MG/DL (ref 1.8–2.4)
MCH RBC QN AUTO: 36.9 PG (ref 27–31)
MCHC RBC AUTO-ENTMCNC: 31.2 G/DL (ref 32–36)
MCV RBC AUTO: 118.2 FL (ref 78–100)
MICROORGANISM SPEC CULT: NORMAL
MONOCYTES NFR BLD: 0.21 K/UL
MONOCYTES NFR BLD: 4 % (ref 0–5)
NEUTROPHILS NFR BLD: 81 % (ref 36–66)
NEUTS SEG NFR BLD: 4.48 K/UL
PHOSPHATE SERPL-MCNC: 1.8 MG/DL (ref 2.5–4.9)
PLATELET, FLUORESCENCE: 83 K/UL (ref 140–440)
PMV BLD AUTO: 12 FL (ref 7.5–11.1)
POTASSIUM SERPL-SCNC: 4.2 MMOL/L (ref 3.5–5.1)
PROCALCITONIN SERPL-MCNC: 0.44 NG/ML
PROT SERPL-MCNC: 5.6 G/DL (ref 6.4–8.2)
RBC # BLD AUTO: 2.14 M/UL (ref 4.2–5.4)
SERVICE CMNT-IMP: NORMAL
SODIUM SERPL-SCNC: 136 MMOL/L (ref 136–145)
SPECIMEN DESCRIPTION: NORMAL
WBC OTHER # BLD: 5.5 K/UL (ref 4–10.5)

## 2025-04-27 PROCEDURE — 82962 GLUCOSE BLOOD TEST: CPT

## 2025-04-27 PROCEDURE — 6370000000 HC RX 637 (ALT 250 FOR IP): Performed by: INTERNAL MEDICINE

## 2025-04-27 PROCEDURE — 2500000003 HC RX 250 WO HCPCS: Performed by: STUDENT IN AN ORGANIZED HEALTH CARE EDUCATION/TRAINING PROGRAM

## 2025-04-27 PROCEDURE — 84145 PROCALCITONIN (PCT): CPT

## 2025-04-27 PROCEDURE — 97535 SELF CARE MNGMENT TRAINING: CPT

## 2025-04-27 PROCEDURE — 2580000003 HC RX 258: Performed by: NURSE PRACTITIONER

## 2025-04-27 PROCEDURE — 83605 ASSAY OF LACTIC ACID: CPT

## 2025-04-27 PROCEDURE — 1200000000 HC SEMI PRIVATE

## 2025-04-27 PROCEDURE — 82330 ASSAY OF CALCIUM: CPT

## 2025-04-27 PROCEDURE — 84100 ASSAY OF PHOSPHORUS: CPT

## 2025-04-27 PROCEDURE — 83735 ASSAY OF MAGNESIUM: CPT

## 2025-04-27 PROCEDURE — 6370000000 HC RX 637 (ALT 250 FOR IP): Performed by: STUDENT IN AN ORGANIZED HEALTH CARE EDUCATION/TRAINING PROGRAM

## 2025-04-27 PROCEDURE — 87040 BLOOD CULTURE FOR BACTERIA: CPT

## 2025-04-27 PROCEDURE — 36415 COLL VENOUS BLD VENIPUNCTURE: CPT

## 2025-04-27 PROCEDURE — 80053 COMPREHEN METABOLIC PANEL: CPT

## 2025-04-27 PROCEDURE — 6360000002 HC RX W HCPCS: Performed by: STUDENT IN AN ORGANIZED HEALTH CARE EDUCATION/TRAINING PROGRAM

## 2025-04-27 PROCEDURE — 97530 THERAPEUTIC ACTIVITIES: CPT

## 2025-04-27 PROCEDURE — 94761 N-INVAS EAR/PLS OXIMETRY MLT: CPT

## 2025-04-27 PROCEDURE — 85025 COMPLETE CBC W/AUTO DIFF WBC: CPT

## 2025-04-27 RX ORDER — INSULIN LISPRO 100 [IU]/ML
0-8 INJECTION, SOLUTION INTRAVENOUS; SUBCUTANEOUS
Status: DISCONTINUED | OUTPATIENT
Start: 2025-04-27 | End: 2025-04-30 | Stop reason: HOSPADM

## 2025-04-27 RX ORDER — INSULIN GLARGINE 100 [IU]/ML
15 INJECTION, SOLUTION SUBCUTANEOUS NIGHTLY
Status: DISCONTINUED | OUTPATIENT
Start: 2025-04-27 | End: 2025-04-30

## 2025-04-27 RX ORDER — 0.9 % SODIUM CHLORIDE 0.9 %
1000 INTRAVENOUS SOLUTION INTRAVENOUS ONCE
Status: COMPLETED | OUTPATIENT
Start: 2025-04-27 | End: 2025-04-27

## 2025-04-27 RX ORDER — INSULIN LISPRO 100 [IU]/ML
0-20 INJECTION, SOLUTION INTRAVENOUS; SUBCUTANEOUS
Status: DISCONTINUED | OUTPATIENT
Start: 2025-04-27 | End: 2025-04-30 | Stop reason: HOSPADM

## 2025-04-27 RX ADMIN — LACTULOSE 30 G: 20 SOLUTION ORAL at 13:51

## 2025-04-27 RX ADMIN — RIFAXIMIN 400 MG: 200 TABLET ORAL at 22:55

## 2025-04-27 RX ADMIN — METOCLOPRAMIDE HYDROCHLORIDE 5 MG: 5 TABLET ORAL at 13:51

## 2025-04-27 RX ADMIN — RIFAXIMIN 400 MG: 200 TABLET ORAL at 08:38

## 2025-04-27 RX ADMIN — METOCLOPRAMIDE HYDROCHLORIDE 5 MG: 5 TABLET ORAL at 07:06

## 2025-04-27 RX ADMIN — SODIUM CHLORIDE, PRESERVATIVE FREE 10 ML: 5 INJECTION INTRAVENOUS at 08:29

## 2025-04-27 RX ADMIN — INSULIN GLARGINE 15 UNITS: 100 INJECTION, SOLUTION SUBCUTANEOUS at 20:49

## 2025-04-27 RX ADMIN — PANTOPRAZOLE SODIUM 40 MG: 40 TABLET, DELAYED RELEASE ORAL at 05:31

## 2025-04-27 RX ADMIN — RIFAXIMIN 400 MG: 200 TABLET ORAL at 13:51

## 2025-04-27 RX ADMIN — INSULIN LISPRO 5 UNITS: 100 INJECTION, SOLUTION INTRAVENOUS; SUBCUTANEOUS at 16:58

## 2025-04-27 RX ADMIN — INSULIN LISPRO 2 UNITS: 100 INJECTION, SOLUTION INTRAVENOUS; SUBCUTANEOUS at 07:57

## 2025-04-27 RX ADMIN — LEVOTHYROXINE SODIUM 88 MCG: 0.09 TABLET ORAL at 05:31

## 2025-04-27 RX ADMIN — METOCLOPRAMIDE HYDROCHLORIDE 5 MG: 5 TABLET ORAL at 16:59

## 2025-04-27 RX ADMIN — ATORVASTATIN CALCIUM 10 MG: 10 TABLET, FILM COATED ORAL at 08:29

## 2025-04-27 RX ADMIN — INSULIN LISPRO 4 UNITS: 100 INJECTION, SOLUTION INTRAVENOUS; SUBCUTANEOUS at 16:59

## 2025-04-27 RX ADMIN — WATER 1000 MG: 1 INJECTION INTRAMUSCULAR; INTRAVENOUS; SUBCUTANEOUS at 18:10

## 2025-04-27 RX ADMIN — SODIUM CHLORIDE 1000 ML: 0.9 INJECTION, SOLUTION INTRAVENOUS at 20:39

## 2025-04-27 RX ADMIN — METOCLOPRAMIDE HYDROCHLORIDE 5 MG: 5 TABLET ORAL at 22:54

## 2025-04-27 RX ADMIN — INSULIN LISPRO 2 UNITS: 100 INJECTION, SOLUTION INTRAVENOUS; SUBCUTANEOUS at 20:50

## 2025-04-27 RX ADMIN — ACETAMINOPHEN 500 MG: 500 TABLET ORAL at 16:04

## 2025-04-27 RX ADMIN — LACTULOSE 30 G: 20 SOLUTION ORAL at 22:55

## 2025-04-27 RX ADMIN — LACTULOSE 30 G: 20 SOLUTION ORAL at 08:29

## 2025-04-27 RX ADMIN — RISPERIDONE 0.25 MG: 0.5 TABLET, FILM COATED ORAL at 22:54

## 2025-04-27 RX ADMIN — ENOXAPARIN SODIUM 30 MG: 100 INJECTION SUBCUTANEOUS at 18:12

## 2025-04-27 RX ADMIN — SODIUM CHLORIDE, PRESERVATIVE FREE 10 ML: 5 INJECTION INTRAVENOUS at 20:53

## 2025-04-27 NOTE — CARE COORDINATION
CM reviewed pt’s medical record and discussed pt in IDR. CM in to see pt to discuss PT/OT recommendations for SNF placement at discharge. Pt is oriented to person only. Pt's  looked through the Medicare Compare list. Pt's  listed as their choice SNF provider 1.) [facility]. 2.) [facility]. 3.) [facility]. CM sent referral to [facility] via confidential VM. Plan for discharge is SNF. Referral to [facility]. Awaiting Reply. The patient's individualized treatment goals were discussed and they are in agreement with the transitional plan of care. The patient was provided with a choice in provider and understands the freedom of choice list that was provided to them. The patient and/or family verbalize understanding of treatment preferences and quality data associated with providers.

## 2025-04-27 NOTE — PROGRESS NOTES
Occupational Therapy    Occupational Therapy Treatment Note    Name: Dayana Pacheco MRN: [id number] :   1950   Date:  2025   Admission Date: 2025 Room:  3002/3002-A       Restrictions/Precautions:          fall risk, general precautions    Communication with other providers: RN    Subjective:  Patient states:  \"I can try\"  Pain:   reports pain but unable to specify location or rate    Objective:    Observation: supine in bed, agreeable. Spouse present, very involved in care   Objective Measures:  vitals stable on room air    Treatment, including education:  Self Care Training:   Cues were given for safety, sequence, UE/LE placement, visual cues, and balance.    Activities performed today included UB/LB dressing tasks, toileting     Therapeutic Activity Training:   Therapeutic activity training was instructed today.  Cues were given for safety, sequence, UE/LE placement, awareness, and balance.    Activities performed today included bed mobility training, sup-sit, sit-stand, ambulation.    Supine to sitting EOB with mod A, Vcs for initiation and sequencing. Static EOB sitting balance with mod A progressing to CGA with time on task and Vcs for posture.   Mod A to don/doff gown while seated EOB.   STS from EOB with mod A, stand step transfer from EOB < BSC < recliner using RW w/ mod A. Vcs for sequencing, initiation, and safety. STS to/from BSC with mod A, Vcs for sequencing and hand placement. Max A hygiene in standing, max A to don/doff depends in sitting/standing and max A for general clothing mgmt during toileting tasks.   Mod A for thoroughness while seated in recliner to brush hair.   STS to/from recliner with mod A (3x), Vcs for positioning/hand placement with each transfer. After each initial stand, requires mod A static standing progressing to min A with Vcs for posture and balance. Ambulated functional household distance x2 bouts ~20' using RW with min A, Vcs for pacing/step length, RW  24-Oct-2019 13:30

## 2025-04-27 NOTE — PROGRESS NOTES
Endocrinology   Consult Note  2025 11:51 PM     Primary Care provider: Raji Her MD     Referring physician:  Marcellus Villalta MD     Dear Doctor Griselda Jeffers    Thank You for the Consult     Pt. Was Admitted for : Altered mental status and confusion    Reason for Consult: Better control of blood glucose      History Obtained From:  Patient/ EMR       HISTORY OF PRESENT ILLNESS:                The patient is a 74 y.o. female with significant past medical history of diabetes mellitus, breast cancer, MASH cirrhosis of liver t CKD, pancytopenia, hypertension, hyperlipidemia, GERD, mood disorder, hypothyroidism brought into the hospital with symptoms history of UTI and also mental confusion workup showed the patient has elevated ammonia level and possible left lower lobe pneumonia.  Patient diabetes being managed by OmniPod insulin pump and Dexcom CGM.  Patient has been running high blood glucose levels.  I was  consulted for better control of blood glucose.       ROS:   Pt's ROS done in detail.  Abnormal ROS are noted in Medical and Surgical History Section below:     Other Medical History:        Diagnosis Date    Arthritis     Cancer (HCC)     right breast.    Diabetes mellitus (HCC)     Esophageal varices (HCC)     Fatty liver     Glaucoma     Gout     patient states \"she has never had gout\".    History of blood transfusion     Hyperlipidemia     Hypertension     Neuropathy     Thyroid disease      Surgical History:        Procedure Laterality Date    BREAST BIOPSY      CATARACT EXTRACTION Bilateral      SECTION      CHOLECYSTECTOMY, LAPAROSCOPIC N/A 2024    CHOLECYSTECTOMY LAPAROSCOPIC, LIVER BIOPSY performed by Goran Daniels MD at [facility] OR    HYSTERECTOMY (CERVIX STATUS UNKNOWN) N/A     abdominal    MASTECTOMY, BILATERAL  10/26/2007    UPPER GASTROINTESTINAL ENDOSCOPY N/A 2022    EGD BAND LIGATION with 6 bands placed performed by Haresh Lawrence MD at [facility] ENDOSCOPY    UPPER

## 2025-04-27 NOTE — PLAN OF CARE
Problem: Chronic Conditions and Co-morbidities  Goal: Patient's chronic conditions and co-morbidity symptoms are monitored and maintained or improved  4/27/2025 0937 by Edith Castañeda RN  Outcome: Progressing  4/26/2025 2324 by Belkis Stanford RN  Outcome: Progressing     Problem: Discharge Planning  Goal: Discharge to home or other facility with appropriate resources  4/27/2025 0937 by Edith Castañeda RN  Outcome: Progressing  4/26/2025 2324 by Belkis Stanford RN  Outcome: Progressing     Problem: Safety - Adult  Goal: Free from fall injury  4/27/2025 0937 by Edith Castañeda RN  Outcome: Progressing  4/26/2025 2324 by Belkis Stanford RN  Outcome: Progressing     Problem: Skin/Tissue Integrity  Goal: Skin integrity remains intact  Description: 1.  Monitor for areas of redness and/or skin breakdown2.  Assess vascular access sites hourly3.  Every 4-6 hours minimum:  Change oxygen saturation probe site4.  Every 4-6 hours:  If on nasal continuous positive airway pressure, respiratory therapy assess nares and determine need for appliance change or resting period  4/27/2025 0937 by Edith Castañeda RN  Outcome: Progressing  4/26/2025 2324 by Belkis Stanford RN  Outcome: Progressing

## 2025-04-28 LAB
ANION GAP SERPL CALCULATED.3IONS-SCNC: 11 MMOL/L (ref 9–17)
BUN SERPL-MCNC: 26 MG/DL (ref 7–20)
CALCIUM SERPL-MCNC: 7.8 MG/DL (ref 8.3–10.6)
CHLORIDE SERPL-SCNC: 106 MMOL/L (ref 99–110)
CO2 SERPL-SCNC: 20 MMOL/L (ref 21–32)
CREAT SERPL-MCNC: 2.1 MG/DL (ref 0.6–1.2)
ERYTHROCYTE [DISTWIDTH] IN BLOOD BY AUTOMATED COUNT: 14.9 % (ref 11.7–14.9)
GFR, ESTIMATED: 23 ML/MIN/1.73M2
GLUCOSE BLD-MCNC: 193 MG/DL (ref 74–99)
GLUCOSE BLD-MCNC: 243 MG/DL (ref 74–99)
GLUCOSE BLD-MCNC: 307 MG/DL (ref 74–99)
GLUCOSE SERPL-MCNC: 197 MG/DL (ref 74–99)
HCT VFR BLD AUTO: 22.5 % (ref 37–47)
HGB BLD-MCNC: 7.2 G/DL (ref 12.5–16)
LACTATE BLDV-SCNC: 2.2 MMOL/L (ref 0.4–2)
MAGNESIUM SERPL-MCNC: 1.7 MG/DL (ref 1.8–2.4)
MCH RBC QN AUTO: 37.1 PG (ref 27–31)
MCHC RBC AUTO-ENTMCNC: 32 G/DL (ref 32–36)
MCV RBC AUTO: 116 FL (ref 78–100)
PHOSPHATE SERPL-MCNC: 3.1 MG/DL (ref 2.5–4.9)
PLATELET, FLUORESCENCE: 70 K/UL (ref 140–440)
PMV BLD AUTO: 11.7 FL (ref 7.5–11.1)
POTASSIUM SERPL-SCNC: 4.2 MMOL/L (ref 3.5–5.1)
RBC # BLD AUTO: 1.94 M/UL (ref 4.2–5.4)
SODIUM SERPL-SCNC: 136 MMOL/L (ref 136–145)
WBC OTHER # BLD: 5.9 K/UL (ref 4–10.5)

## 2025-04-28 PROCEDURE — 6360000002 HC RX W HCPCS: Performed by: STUDENT IN AN ORGANIZED HEALTH CARE EDUCATION/TRAINING PROGRAM

## 2025-04-28 PROCEDURE — 36415 COLL VENOUS BLD VENIPUNCTURE: CPT

## 2025-04-28 PROCEDURE — 2500000003 HC RX 250 WO HCPCS: Performed by: NURSE PRACTITIONER

## 2025-04-28 PROCEDURE — 84100 ASSAY OF PHOSPHORUS: CPT

## 2025-04-28 PROCEDURE — 6370000000 HC RX 637 (ALT 250 FOR IP): Performed by: INTERNAL MEDICINE

## 2025-04-28 PROCEDURE — 6370000000 HC RX 637 (ALT 250 FOR IP): Performed by: STUDENT IN AN ORGANIZED HEALTH CARE EDUCATION/TRAINING PROGRAM

## 2025-04-28 PROCEDURE — 2500000003 HC RX 250 WO HCPCS: Performed by: STUDENT IN AN ORGANIZED HEALTH CARE EDUCATION/TRAINING PROGRAM

## 2025-04-28 PROCEDURE — 97116 GAIT TRAINING THERAPY: CPT

## 2025-04-28 PROCEDURE — 82962 GLUCOSE BLOOD TEST: CPT

## 2025-04-28 PROCEDURE — 83605 ASSAY OF LACTIC ACID: CPT

## 2025-04-28 PROCEDURE — 94761 N-INVAS EAR/PLS OXIMETRY MLT: CPT

## 2025-04-28 PROCEDURE — 85027 COMPLETE CBC AUTOMATED: CPT

## 2025-04-28 PROCEDURE — 2580000003 HC RX 258: Performed by: NURSE PRACTITIONER

## 2025-04-28 PROCEDURE — 1200000000 HC SEMI PRIVATE

## 2025-04-28 PROCEDURE — 80048 BASIC METABOLIC PNL TOTAL CA: CPT

## 2025-04-28 PROCEDURE — 97530 THERAPEUTIC ACTIVITIES: CPT

## 2025-04-28 PROCEDURE — 83735 ASSAY OF MAGNESIUM: CPT

## 2025-04-28 RX ADMIN — SODIUM CHLORIDE, PRESERVATIVE FREE 10 ML: 5 INJECTION INTRAVENOUS at 21:25

## 2025-04-28 RX ADMIN — ATORVASTATIN CALCIUM 10 MG: 10 TABLET, FILM COATED ORAL at 09:40

## 2025-04-28 RX ADMIN — RIFAXIMIN 400 MG: 200 TABLET ORAL at 09:40

## 2025-04-28 RX ADMIN — SODIUM CHLORIDE, PRESERVATIVE FREE 10 ML: 5 INJECTION INTRAVENOUS at 10:04

## 2025-04-28 RX ADMIN — CALCIUM CHLORIDE 1000 MG: 100 INJECTION, SOLUTION INTRAVENOUS at 04:02

## 2025-04-28 RX ADMIN — INSULIN LISPRO 2 UNITS: 100 INJECTION, SOLUTION INTRAVENOUS; SUBCUTANEOUS at 18:41

## 2025-04-28 RX ADMIN — INSULIN LISPRO 2 UNITS: 100 INJECTION, SOLUTION INTRAVENOUS; SUBCUTANEOUS at 07:00

## 2025-04-28 RX ADMIN — PANTOPRAZOLE SODIUM 40 MG: 40 TABLET, DELAYED RELEASE ORAL at 06:53

## 2025-04-28 RX ADMIN — METOCLOPRAMIDE HYDROCHLORIDE 5 MG: 5 TABLET ORAL at 09:40

## 2025-04-28 RX ADMIN — INSULIN LISPRO 6 UNITS: 100 INJECTION, SOLUTION INTRAVENOUS; SUBCUTANEOUS at 18:41

## 2025-04-28 RX ADMIN — WATER 1000 MG: 1 INJECTION INTRAMUSCULAR; INTRAVENOUS; SUBCUTANEOUS at 18:41

## 2025-04-28 RX ADMIN — LACTULOSE 30 G: 20 SOLUTION ORAL at 14:41

## 2025-04-28 RX ADMIN — METOCLOPRAMIDE HYDROCHLORIDE 5 MG: 5 TABLET ORAL at 14:41

## 2025-04-28 RX ADMIN — ENOXAPARIN SODIUM 30 MG: 100 INJECTION SUBCUTANEOUS at 19:00

## 2025-04-28 RX ADMIN — SODIUM PHOSPHATE, MONOBASIC, MONOHYDRATE AND SODIUM PHOSPHATE, DIBASIC, ANHYDROUS 15 MMOL: 142; 276 INJECTION, SOLUTION INTRAVENOUS at 00:42

## 2025-04-28 RX ADMIN — RISPERIDONE 0.25 MG: 0.5 TABLET, FILM COATED ORAL at 21:12

## 2025-04-28 RX ADMIN — INSULIN LISPRO 2 UNITS: 100 INJECTION, SOLUTION INTRAVENOUS; SUBCUTANEOUS at 11:34

## 2025-04-28 RX ADMIN — LACTULOSE 30 G: 20 SOLUTION ORAL at 09:42

## 2025-04-28 RX ADMIN — INSULIN GLARGINE 15 UNITS: 100 INJECTION, SOLUTION SUBCUTANEOUS at 21:21

## 2025-04-28 RX ADMIN — METOCLOPRAMIDE HYDROCHLORIDE 5 MG: 5 TABLET ORAL at 21:13

## 2025-04-28 RX ADMIN — METOCLOPRAMIDE HYDROCHLORIDE 5 MG: 5 TABLET ORAL at 18:41

## 2025-04-28 RX ADMIN — RIFAXIMIN 400 MG: 200 TABLET ORAL at 14:40

## 2025-04-28 RX ADMIN — LEVOTHYROXINE SODIUM 88 MCG: 0.09 TABLET ORAL at 06:53

## 2025-04-28 RX ADMIN — LACTULOSE 30 G: 20 SOLUTION ORAL at 21:13

## 2025-04-28 RX ADMIN — RIFAXIMIN 400 MG: 200 TABLET ORAL at 23:32

## 2025-04-28 RX ADMIN — INSULIN LISPRO 6 UNITS: 100 INJECTION, SOLUTION INTRAVENOUS; SUBCUTANEOUS at 21:13

## 2025-04-28 ASSESSMENT — PAIN SCALES - GENERAL
PAINLEVEL_OUTOF10: 0
PAINLEVEL_OUTOF10: 0

## 2025-04-28 NOTE — PROGRESS NOTES
V2.0  [facility] Hospitalist Progress Note      Name:  Dayana Pacheco /Age/Sex: 1950  (74 y.o. female)   MRN & CSN:  [id number] & [id number] Encounter Date/Time: 2025 12:54 PM EDT    Location:  [address] [place] [address]-A PCP: Raji Her MD       Hospital Day: 5    Assessment and Plan:   Dayana Pacheco is a 74 y.o. female who presents with Hepatic encephalopathy (HCC)    Assessment and Plan:  # Decompensated MASH cirrhosis with hepatic encephalopathy - increased confusion over past 1-2 weeks.  Currently symptoms are improving mentation is better. On lactulose, takes once daily, has 1 BM per day.  Currently having 2-3 bowel movements loose every day.  EGD in 2023 showed moderate PHG, no EV. Followed by GI, last seen . Ammonia 137 on admission, alb 3.6, MELD-Na 18, HE grade 2, CT non-acute. Increased lactulose to TID, goal 2-3 soft BM/day, start rifaximin.  PT OT been consulted likely placement  # Mild febrile episode.  Started on Rocephin on2025.  Cultures are pending de-escalate antibiotic based on culture results.  # Acute kidney injury on CKD 3b - no recent NSAIDs, clinically hypovolemic, Cr 2.2, baseline ~ 1.4, UA bland, will challenge with IV, strict I/O's.   # Pancytopenia - mixed etiology, followed by H/O, stable.   # Essential hypertension - consider starting BB in setting of PHG if BP remains elevated.  # HLD - continue statin.  # T2DM with hyperglycemia - LCSI.   # Acquired hypothyroidism - continue Synthroid.  # GERD - continue PPI.   # Mood disorder - continue home meds.    Medical Decision Making:  The following items were considered in medical decision making:  Discussion of patient care with other providers  Reviewed clinical lab tests if any  Reviewed radiology tests if any  Reviewed other diagnostic tests/interventions  Independent review of radiologic images if any  Microbiology cultures and other micro tests if any    Estimated time spent for medical decision-making  Ref Range    Lactic Acid, Sepsis 2.2 (HH) 0.4 - 2.0 mmol/L   POCT Glucose    Collection Time: 04/28/25  6:15 AM   Result Value Ref Range    POC Glucose 193 (H) 74 - 99 mg/dL   POCT Glucose    Collection Time: 04/28/25 11:20 AM   Result Value Ref Range    POC Glucose 243 (H) 74 - 99 mg/dL        Imaging/Diagnostics Last 24 Hours   CT CHEST ABDOMEN PELVIS WO CONTRAST Additional Contrast? None  Result Date: 4/25/2025  PROCEDURE: CT CHEST ABDOMEN PELVIS WO CONTRAST DATE OF EXAM:  4/25/2025 2:53 DEMOGRAPHICS: 74 years old Female INDICATION: sirs/sepsis criteria; cxr shows possible pna; pt also has roverto; concern for obstructing process Contrast utilized and relevant clinical information: COMPARISON: CT abdomen from 1/18/2024 TECHNIQUE: Contiguous axial slices of the chest, abdomen and pelvis were submitted without the IV administration of contrast. Additional coronal reformatted images were submitted.   DOSE OPTIMIZATION: CT radiation dose optimization techniques (automated exposure  control, and use of iterative reconstruction techniques, or adjustment of the mA and/or kV according to patient size) were used to limit patient radiation dose. FINDINGS: CT chest: Normal thyroid. Atherosclerosis of the thoracic aorta. Cardiomegaly. No pericardial effusion. No mediastinal or hilar adenopathy. Mild esophageal wall thickening. No axillary adenopathy. No chest wall mass. No consolidation. Scarring in the periphery of the lungs bilaterally. No pneumothorax or pleural fluid. CT ABDOMEN: Liver is normal in size. Subtle nodularity of the liver contour. No liver lesions. Cholecystectomy. No biliary ductal dilatation. Spleen is normal in size. No pancreatic mass or adrenal nodules. Kidneys are normal in size. No hydronephrosis. Atherosclerosis of the abdominal aorta and branch vessels. No retroperitoneal adenopathy. Mild gastric wall thickening. No bowel obstruction. Moderate stool throughout the colon. Normal appendix. CT PELVIS:

## 2025-04-28 NOTE — CARE COORDINATION
Pt can go to [facility] as long has pt is not in the [facility].  Doctor stated he will take pt off the [facility] and pt will go to SNF of [facility].  PASS completed and packet started and placed with soft chart.     Mily with [facility] is aware.      CM will need ERICKA to be completed by RN and doctor. If pt is discharged after hours please complete the following.... Call report to  [phone]   Place copy of AVS with both ERICKA and any written Rx for pain and anxiety in the packet.  Set up transportation with [facility] [phone] and call family.

## 2025-04-28 NOTE — DISCHARGE INSTR - COC
Continuity of Care Form    Patient Name: Dayana Pacheco   :  1950  MRN:  [id number]    Admit date:  2025  Discharge date:      Code Status Order: Full Code   Advance Directives:     Admitting Physician:  Marcellus Villalta MD  PCP: Raij Her MD    Discharging Nurse: Skye Barth Hospital Unit/Room#: 3002/3002-A  Discharging Unit Phone Number: [phone]    Emergency Contact:   Extended Emergency Contact Information  Primary Emergency Contact: Zhang P[facility]  Address: [address] [place] [address]  Home Phone: [phone]  Mobile Phone: [phone]  Relation: Spouse    Past Surgical History:  Past Surgical History:   Procedure Laterality Date    BREAST BIOPSY      CATARACT EXTRACTION Bilateral      SECTION      CHOLECYSTECTOMY, LAPAROSCOPIC N/A 2024    CHOLECYSTECTOMY LAPAROSCOPIC, LIVER BIOPSY performed by Goran Daniels MD at [facility] OR    HYSTERECTOMY (CERVIX STATUS UNKNOWN) N/A     abdominal    MASTECTOMY, BILATERAL  10/26/2007    UPPER GASTROINTESTINAL ENDOSCOPY N/A 2022    EGD BAND LIGATION with 6 bands placed performed by Haresh Lawrence MD at [facility] ENDOSCOPY    UPPER GASTROINTESTINAL ENDOSCOPY N/A 2022    EGD BAND LIGATION WITH 3 BANDS PLACED, 4TH BAND DID NOT HOLD WITH SUBSEQUENT OOZING, TETRADECAL INJECTION GIVEN performed by Haresh Lawrence MD at [facility] ENDOSCOPY    UPPER GASTROINTESTINAL ENDOSCOPY N/A 2022    EGD BAND LIGATION WITH 3 BANDS APPLIED performed by Haresh Lawrence MD at [facility] ENDOSCOPY    UPPER GASTROINTESTINAL ENDOSCOPY N/A 2022    EGD BAND LIGATION WITH X1 BAND PLACEMENT performed by Haresh Lawrence MD at [facility] ENDOSCOPY    UPPER GASTROINTESTINAL ENDOSCOPY N/A 02/15/2023    EGD DIAGNOSTIC ONLY performed by Haresh Lawrence MD at [facility] ENDOSCOPY    UPPER GASTROINTESTINAL ENDOSCOPY N/A 2023    EVL EGD ESOPHAGOGASTRODUODENOSCOPY performed by Haresh Lawrence MD at  [facility]       Isolation/Infection:   Isolation            No Isolation          Patient Infection Status    No active infections.   Resolved       Infection Onset Added Last Indicated Last Indicated By Resolved Resolved By    Respiratory Syncytial Virus (RSV) 24 Respiratory Panel, Molecular, with COVID-19 (Restricted: peds pts or suitable admitted adults) 24 Infection     COVID-19 24 Respiratory Panel, Molecular, with COVID-19 (Restricted: peds pts or suitable admitted adults) 24 Infection                          Nurse Assessment:  Last Vital Signs: BP (!) 128/50   Pulse 89   Temp 99.7 °F (37.6 °C) (Oral)   Resp 10   Ht 1.613 m (5' 3.5\")   Wt 64 kg (141 lb 1.5 oz)   SpO2 94%   BMI 24.60 kg/m²     Last documented pain score (0-10 scale): Pain Level: 0  Last Weight:   Wt Readings from Last 1 Encounters:   25 64 kg (141 lb 1.5 oz)     Mental Status:  disoriented    IV Access:  - None    Nursing Mobility/ADLs:  Walking   Assisted  Transfer  Assisted  Bathing  Assisted  Dressing  Assisted  Toileting  Assisted  Feeding  Assisted  Med Admin  Assisted  Med Delivery   whole    Wound Care Documentation and Therapy:  Incision 24 Abdomen Medial;Upper (Active)   Number of days: 464        Elimination:  Continence:   Bowel: No  Bladder: No  Urinary Catheter: None   Colostomy/Ileostomy/Ileal Conduit: No       Date of Last BM:     Intake/Output Summary (Last 24 hours) at 2025 1357  Last data filed at 2025 0838  Gross per 24 hour   Intake 480 ml   Output --   Net 480 ml     I/O last 3 completed shifts:  In: 780 [P.O.:780]  Out: 200 [Urine:200]    Safety Concerns:     At Risk for Falls    Impairments/Disabilities:      None      Patient's personal belongings (please select all that are sent with patient):  Violet    RN SIGNATURE:  Electronically signed by Padmini Cantrell RN on 25 at 10:26 AM EDT      PHYSICIAN

## 2025-04-28 NOTE — PLAN OF CARE
Problem: Chronic Conditions and Co-morbidities  Goal: Patient's chronic conditions and co-morbidity symptoms are monitored and maintained or improved  Outcome: Progressing     Problem: Discharge Planning  Goal: Discharge to home or other facility with appropriate resources  Outcome: Progressing     Problem: Safety - Adult  Goal: Free from fall injury  Outcome: Progressing     Problem: Skin/Tissue Integrity  Goal: Skin integrity remains intact  Description: 1.  Monitor for areas of redness and/or skin breakdown2.  Assess vascular access sites hourly3.  Every 4-6 hours minimum:  Change oxygen saturation probe site4.  Every 4-6 hours:  If on nasal continuous positive airway pressure, respiratory therapy assess nares and determine need for appliance change or resting period  Outcome: Progressing  Flowsheets (Taken 4/28/2025 8256)  Skin Integrity Remains Intact: Monitor for areas of redness and/or skin breakdown     Problem: Pain  Goal: Verbalizes/displays adequate comfort level or baseline comfort level  Outcome: Progressing

## 2025-04-28 NOTE — TELEPHONE ENCOUNTER
Patient is currently hospitalized and is in process of being discharged to a facility for rehabilitation. Per patient's  once she completes rehab, they will call office back to schedule EGD

## 2025-04-28 NOTE — CARE COORDINATION
LSW called Mily with [facility] asking if they can take pt.  Mily stated she does not have a determination yet on this pt.  Pt has updated OT notes in the chart. LSW placed a WB noted asking for updated PT notes.

## 2025-04-28 NOTE — PROGRESS NOTES
04/28/25 1233   Encounter Summary   Encounter Overview/Reason Spiritual/Emotional Needs   Encounter Code  Assessment by  services   Service Provided For Patient   Referral/Consult From [facility]   Support System Family members   Last Encounter  04/28/25  (Pt is feeling better, last night was rough, she has support with her , she wanted prayer, expressed having peace.)   Complexity of Encounter Low   Begin Time 1210   End Time  1234   Total Time Calculated 24 min   Spiritual/Emotional needs   Type Spiritual Support   Assessment/Intervention/Outcome   Assessment Calm;Concerns with suffering;Hopeful;Stress overload   Intervention Active listening;Discussed illness injury and it’s impact;Discussed relationship with God;Prayer (assurance of)/Plain;Sustaining Presence/Ministry of presence   Outcome Comfort;Coping;Expressed feelings, needs, and concerns;Expressed Gratitude   Plan and Referrals   Plan/Referrals Continue Support (comment)

## 2025-04-28 NOTE — CONSULTS
Endocrinology   Consult Note  2025 11:51 PM     Primary Care provider: Raji Her MD     Referring physician:  Marcellus Villalta MD     Dear Doctor Griselda Jeffers    Thank You for the Consult     Pt. Was Admitted for : Altered mental status and confusion    Reason for Consult: Better control of blood glucose      History Obtained From:  Patient/ EMR       HISTORY OF PRESENT ILLNESS:                The patient is a 74 y.o. female with significant past medical history of diabetes mellitus, breast cancer, MASH cirrhosis of liver t CKD, pancytopenia, hypertension, hyperlipidemia, GERD, mood disorder, hypothyroidism brought into the hospital with symptoms history of UTI and also mental confusion workup showed the patient has elevated ammonia level and possible left lower lobe pneumonia.  Patient diabetes being managed by OmniPod insulin pump and Dexcom CGM.  Patient has been running high blood glucose levels.  I was  consulted for better control of blood glucose.       ROS:   Pt's ROS done in detail.  Abnormal ROS are noted in Medical and Surgical History Section below:     Other Medical History:        Diagnosis Date    Arthritis     Cancer (HCC)     right breast.    Diabetes mellitus (HCC)     Esophageal varices (HCC)     Fatty liver     Glaucoma     Gout     patient states \"she has never had gout\".    History of blood transfusion     Hyperlipidemia     Hypertension     Neuropathy     Thyroid disease      Surgical History:        Procedure Laterality Date    BREAST BIOPSY      CATARACT EXTRACTION Bilateral      SECTION      CHOLECYSTECTOMY, LAPAROSCOPIC N/A 2024    CHOLECYSTECTOMY LAPAROSCOPIC, LIVER BIOPSY performed by Goran Daniels MD at [facility] OR    HYSTERECTOMY (CERVIX STATUS UNKNOWN) N/A     abdominal    MASTECTOMY, BILATERAL  10/26/2007    UPPER GASTROINTESTINAL ENDOSCOPY N/A 2022    EGD BAND LIGATION with 6 bands placed performed by Haresh Lawrence MD at [facility] ENDOSCOPY    UPPER

## 2025-04-28 NOTE — PLAN OF CARE
Will continue with IV Rocephin and monitor for no febrile episode tonight and patient can go tomorrow

## 2025-04-28 NOTE — PROGRESS NOTES
Progress Note( Dr. Rosenthal)  4/28/2025  Subjective:   Admit Date: 4/24/2025  PCP: Raji Her MD    Admitted For :  Altered mental status and confusion     Consulted For: Better control of blood glucose     Interval History: Feels somewhat better this morning.  Has had high blood glucose today    Denies any chest pains,   Mild  SOB .   Denies nausea or vomiting.  Eating better this morning  No new bowel or bladder symptoms.       Intake/Output Summary (Last 24 hours) at 4/28/2025 1940  Last data filed at 4/28/2025 0838  Gross per 24 hour   Intake 240 ml   Output --   Net 240 ml       DATA    CBC:   Recent Labs     04/27/25  2100 04/28/25  0239   WBC 5.5 5.9   HGB 7.9* 7.2*    CMP:  Recent Labs     04/27/25  2100 04/28/25  0239    136   K 4.2 4.2    106   CO2 19* 20*   BUN 23* 26*   CREATININE 2.0* 2.1*   CALCIUM 8.0* 7.8*   BILITOT 0.5  --    ALKPHOS 59  --    AST 31  --    ALT 24  --      Lipids:   Lab Results   Component Value Date/Time    CHOL 114 04/21/2025 08:56 AM    HDL 67 04/21/2025 08:56 AM    TRIG 60 04/21/2025 08:56 AM     Glucose:  Recent Labs     04/28/25  0615 04/28/25  1120 04/28/25  1538   POCGLU 193* 243* 307*     SmuyjlnvimN0T:  Lab Results   Component Value Date/Time    LABA1C 5.4 04/21/2025 08:57 AM     High Sensitivity TSH:   Lab Results   Component Value Date/Time    TSHHS 1.350 08/22/2024 01:47 PM     Free T3: No results found for: \"FT3\"  Free T4:  Lab Results   Component Value Date/Time    T4FREE 1.9 04/21/2025 08:56 AM       CT CHEST ABDOMEN PELVIS WO CONTRAST Additional Contrast? None   Final Result      XR CHEST PORTABLE   Final Result           Scheduled Medicines   Medications:    insulin lispro  0-20 Units SubCUTAneous TID WC    insulin glargine  15 Units SubCUTAneous Nightly    cefTRIAXone (ROCEPHIN) IV  1,000 mg IntraVENous Q24H    insulin lispro  0-8 Units SubCUTAneous 4x Daily AC & HS    lactulose  30 g Oral TID    levothyroxine  88 mcg Oral Daily

## 2025-04-28 NOTE — PROGRESS NOTES
Physical Therapy    Physical Therapy Treatment Note  Name: Dayana Pacheco MRN: [id number] :   1950   Date:  2025   Admission Date: 2025 Room:  3002/3002A   Restrictions/Precautions:          general precautions, fall risk   Communication with other providers:  Hand off with Nurse    Subjective:  Patient states: Pt. Agreeable to work with therapy.  Pt. Minimally verbal throughout session.    Pt.  present during session and very encouraging to pt.      Pain:  (0/10 to 10/10):  Denies pain   Objective:    Observation: Pt. Up to BSC upon arrival to room   Objective Measures:  Room air, tele- stable   Treatment, including education/measures:  Therapeutic Activity Training:   Therapeutic activity training was instructed today.  Cues were given for safety, sequence, UE/LE placement, awareness, and balance.    Activities performed today included bed mobility training, sup-sit, sit-stand, SPT.    Bed Mobility: DNT, OOB pre/post session   Sit to stand transfer: BSC - Amilcar with cues for anterior weight shift   EOB - ModA and maximal cues or weight shift. Pt. Unable to complete transfer unassisted.   Stand Pivot transfer: Amilcar from BSC to EOB    Sitting balance:SBA at EOB with feet on floor.  Standing balance: Pt. Initially requiring ModA d/t posterior lean. Pt. With improved standing with cues for anterior weight shift, requiring  Amilcar and occasionally CGA.      Gait Training:  Cues were given for safety, sequence, device management, balance, posture, awareness, path.      Pt. With initially very small shuffling steps that improved with cues for COG, larger \"BIG\"  steps. Pt. Completed gait training in hallway in 10-20 foot bouts. Pt. Requiring ModA for walker management and finding path. Pt. Veering from side to side of hallway. Pt. Requires chair follow for safety.     Safety  Patient left safely in the recliner, with call light/phone in reach with alarm applied.  Gait belt was used for transfers

## 2025-04-28 NOTE — PROGRESS NOTES
Attempted to see pt on this date for OT session with spouse present.  Pt declined despite encouragement and education. Will attempt as able and appropriate.

## 2025-04-29 LAB
ANION GAP SERPL CALCULATED.3IONS-SCNC: 9 MMOL/L (ref 9–17)
BILIRUB UR QL STRIP: NEGATIVE
BUN SERPL-MCNC: 24 MG/DL (ref 7–20)
CALCIUM SERPL-MCNC: 8.6 MG/DL (ref 8.3–10.6)
CHLORIDE SERPL-SCNC: 109 MMOL/L (ref 99–110)
CLARITY UR: CLEAR
CO2 SERPL-SCNC: 19 MMOL/L (ref 21–32)
COLOR UR: YELLOW
COMMENT: NORMAL
CREAT SERPL-MCNC: 2 MG/DL (ref 0.6–1.2)
ERYTHROCYTE [DISTWIDTH] IN BLOOD BY AUTOMATED COUNT: 14.5 % (ref 11.7–14.9)
GFR, ESTIMATED: 24 ML/MIN/1.73M2
GLUCOSE BLD-MCNC: 187 MG/DL (ref 74–99)
GLUCOSE BLD-MCNC: 190 MG/DL (ref 74–99)
GLUCOSE BLD-MCNC: 214 MG/DL (ref 74–99)
GLUCOSE BLD-MCNC: 267 MG/DL (ref 74–99)
GLUCOSE BLD-MCNC: 298 MG/DL (ref 74–99)
GLUCOSE BLD-MCNC: 311 MG/DL (ref 74–99)
GLUCOSE SERPL-MCNC: 181 MG/DL (ref 74–99)
GLUCOSE UR STRIP-MCNC: NEGATIVE MG/DL
HCT VFR BLD AUTO: 22.8 % (ref 37–47)
HGB BLD-MCNC: 7.6 G/DL (ref 12.5–16)
HGB UR QL STRIP.AUTO: NEGATIVE
KETONES UR STRIP-MCNC: NEGATIVE MG/DL
LEUKOCYTE ESTERASE UR QL STRIP: NEGATIVE
MAGNESIUM SERPL-MCNC: 1.9 MG/DL (ref 1.8–2.4)
MCH RBC QN AUTO: 38.2 PG (ref 27–31)
MCHC RBC AUTO-ENTMCNC: 33.3 G/DL (ref 32–36)
MCV RBC AUTO: 114.6 FL (ref 78–100)
NITRITE UR QL STRIP: NEGATIVE
PH UR STRIP: 5.5 [PH] (ref 5–8)
PHOSPHATE SERPL-MCNC: 2.3 MG/DL (ref 2.5–4.9)
PLATELET CONFIRMATION: NORMAL
PLATELET, FLUORESCENCE: ABNORMAL K/UL (ref 140–440)
PMV BLD AUTO: 11.7 FL (ref 7.5–11.1)
POTASSIUM SERPL-SCNC: 4.3 MMOL/L (ref 3.5–5.1)
PROT UR STRIP-MCNC: NEGATIVE MG/DL
RBC # BLD AUTO: 1.99 M/UL (ref 4.2–5.4)
SODIUM SERPL-SCNC: 137 MMOL/L (ref 136–145)
SP GR UR STRIP: 1.02 (ref 1–1.03)
UROBILINOGEN UR STRIP-ACNC: 0.2 EU/DL (ref 0–1)
WBC OTHER # BLD: 3.8 K/UL (ref 4–10.5)

## 2025-04-29 PROCEDURE — 6360000002 HC RX W HCPCS: Performed by: STUDENT IN AN ORGANIZED HEALTH CARE EDUCATION/TRAINING PROGRAM

## 2025-04-29 PROCEDURE — 94761 N-INVAS EAR/PLS OXIMETRY MLT: CPT

## 2025-04-29 PROCEDURE — 6370000000 HC RX 637 (ALT 250 FOR IP): Performed by: INTERNAL MEDICINE

## 2025-04-29 PROCEDURE — 6370000000 HC RX 637 (ALT 250 FOR IP): Performed by: STUDENT IN AN ORGANIZED HEALTH CARE EDUCATION/TRAINING PROGRAM

## 2025-04-29 PROCEDURE — 80048 BASIC METABOLIC PNL TOTAL CA: CPT

## 2025-04-29 PROCEDURE — 81003 URINALYSIS AUTO W/O SCOPE: CPT

## 2025-04-29 PROCEDURE — 36415 COLL VENOUS BLD VENIPUNCTURE: CPT

## 2025-04-29 PROCEDURE — 2500000003 HC RX 250 WO HCPCS: Performed by: STUDENT IN AN ORGANIZED HEALTH CARE EDUCATION/TRAINING PROGRAM

## 2025-04-29 PROCEDURE — 84100 ASSAY OF PHOSPHORUS: CPT

## 2025-04-29 PROCEDURE — 83735 ASSAY OF MAGNESIUM: CPT

## 2025-04-29 PROCEDURE — 82962 GLUCOSE BLOOD TEST: CPT

## 2025-04-29 PROCEDURE — 1200000000 HC SEMI PRIVATE

## 2025-04-29 PROCEDURE — 85027 COMPLETE CBC AUTOMATED: CPT

## 2025-04-29 PROCEDURE — 87086 URINE CULTURE/COLONY COUNT: CPT

## 2025-04-29 RX ORDER — LACTULOSE 10 G/15ML
30 SOLUTION ORAL 2 TIMES DAILY
Status: DISCONTINUED | OUTPATIENT
Start: 2025-04-29 | End: 2025-04-30 | Stop reason: HOSPADM

## 2025-04-29 RX ADMIN — RIFAXIMIN 400 MG: 200 TABLET ORAL at 11:20

## 2025-04-29 RX ADMIN — INSULIN LISPRO 12 UNITS: 100 INJECTION, SOLUTION INTRAVENOUS; SUBCUTANEOUS at 17:35

## 2025-04-29 RX ADMIN — METOCLOPRAMIDE HYDROCHLORIDE 5 MG: 5 TABLET ORAL at 13:25

## 2025-04-29 RX ADMIN — RISPERIDONE 0.25 MG: 0.5 TABLET, FILM COATED ORAL at 20:14

## 2025-04-29 RX ADMIN — INSULIN LISPRO 4 UNITS: 100 INJECTION, SOLUTION INTRAVENOUS; SUBCUTANEOUS at 11:21

## 2025-04-29 RX ADMIN — ATORVASTATIN CALCIUM 10 MG: 10 TABLET, FILM COATED ORAL at 11:21

## 2025-04-29 RX ADMIN — INSULIN GLARGINE 15 UNITS: 100 INJECTION, SOLUTION SUBCUTANEOUS at 20:15

## 2025-04-29 RX ADMIN — METOCLOPRAMIDE HYDROCHLORIDE 5 MG: 5 TABLET ORAL at 21:23

## 2025-04-29 RX ADMIN — RIFAXIMIN 400 MG: 200 TABLET ORAL at 13:25

## 2025-04-29 RX ADMIN — LACTULOSE 30 G: 20 SOLUTION ORAL at 20:14

## 2025-04-29 RX ADMIN — LEVOTHYROXINE SODIUM 88 MCG: 0.09 TABLET ORAL at 06:30

## 2025-04-29 RX ADMIN — INSULIN LISPRO 4 UNITS: 100 INJECTION, SOLUTION INTRAVENOUS; SUBCUTANEOUS at 20:15

## 2025-04-29 RX ADMIN — METOCLOPRAMIDE HYDROCHLORIDE 5 MG: 5 TABLET ORAL at 11:21

## 2025-04-29 RX ADMIN — SODIUM CHLORIDE, PRESERVATIVE FREE 10 ML: 5 INJECTION INTRAVENOUS at 20:21

## 2025-04-29 RX ADMIN — ACETAMINOPHEN 500 MG: 500 TABLET ORAL at 03:43

## 2025-04-29 RX ADMIN — INSULIN LISPRO 4 UNITS: 100 INJECTION, SOLUTION INTRAVENOUS; SUBCUTANEOUS at 13:25

## 2025-04-29 RX ADMIN — SODIUM CHLORIDE, PRESERVATIVE FREE 10 ML: 5 INJECTION INTRAVENOUS at 11:22

## 2025-04-29 RX ADMIN — PANTOPRAZOLE SODIUM 40 MG: 40 TABLET, DELAYED RELEASE ORAL at 06:30

## 2025-04-29 RX ADMIN — INSULIN LISPRO 2 UNITS: 100 INJECTION, SOLUTION INTRAVENOUS; SUBCUTANEOUS at 17:36

## 2025-04-29 RX ADMIN — WATER 1000 MG: 1 INJECTION INTRAMUSCULAR; INTRAVENOUS; SUBCUTANEOUS at 17:35

## 2025-04-29 RX ADMIN — RIFAXIMIN 400 MG: 200 TABLET ORAL at 21:21

## 2025-04-29 RX ADMIN — METOCLOPRAMIDE HYDROCHLORIDE 5 MG: 5 TABLET ORAL at 17:34

## 2025-04-29 RX ADMIN — INSULIN LISPRO 4 UNITS: 100 INJECTION, SOLUTION INTRAVENOUS; SUBCUTANEOUS at 09:00

## 2025-04-29 RX ADMIN — ENOXAPARIN SODIUM 30 MG: 100 INJECTION SUBCUTANEOUS at 17:35

## 2025-04-29 ASSESSMENT — PAIN SCALES - GENERAL
PAINLEVEL_OUTOF10: 0
PAINLEVEL_OUTOF10: 0

## 2025-04-29 NOTE — PROGRESS NOTES
V2.0  [facility] Hospitalist Progress Note      Name:  Dayana Pacheco /Age/Sex: 1950  (74 y.o. female)   MRN & CSN:  [id number] & [id number] Encounter Date/Time: 2025 12:54 PM EDT    Location:  [address] [place] [address]-A PCP: Raji Her MD       Hospital Day: 6    Assessment and Plan:   Dayana Pacheco is a 74 y.o. female who presents with Hepatic encephalopathy (HCC)    Assessment and Plan:  # Decompensated MASH cirrhosis with hepatic encephalopathy - increased confusion over past 1-2 weeks.  Currently symptoms are improving mentation is better. On lactulose, takes once daily, has 1 BM per day.  Currently having 2-3 bowel movements loose every day.  EGD in 2023 showed moderate PHG, no EV. Followed by GI, last seen . Ammonia 137 on admission, alb 3.6, MELD-Na 18, HE grade 2, CT non-acute.  Was increased lactulose to TID, goal 2-3 soft BM/day, started rifaximin.  Decrease lactulose to twice daily again.  # Mild febrile episode.  Started on Rocephin.  Cultures are pending de-escalate antibiotic based on culture results.  Patient has not had fever since the initial spike but the temperature was up to 100 °F/early morning.  Would monitor for another 24 hours prior to clearing for for discharge.  # Acute kidney injury on CKD 3b - no recent NSAIDs, clinically hypovolemic, Cr 2.2, baseline ~ 1.4, UA bland, will challenge with IV, strict I/O's.   # Pancytopenia - mixed etiology, followed by H/O, stable.   # Essential hypertension - consider starting BB in setting of PHG if BP remains elevated.  # HLD - continue statin.  # T2DM with hyperglycemia - LCSI.   # Acquired hypothyroidism - continue Synthroid.  # GERD - continue PPI.   # Mood disorder - continue home meds.    Medical Decision Making:  The following items were considered in medical decision making:  Discussion of patient care with other providers  Reviewed clinical lab tests if any  Reviewed radiology tests if any  Reviewed other diagnostic

## 2025-04-29 NOTE — PLAN OF CARE
Problem: Chronic Conditions and Co-morbidities  Goal: Patient's chronic conditions and co-morbidity symptoms are monitored and maintained or improved  4/28/2025 2314 by Zack Hanley RN  Outcome: Progressing     Problem: Safety - Adult  Goal: Free from fall injury  4/28/2025 2314 by Zack Hanley, RN  Outcome: Progressing     Problem: Skin/Tissue Integrity  Goal: Skin integrity remains intact  Description: 1.  Monitor for areas of redness and/or skin breakdown2.  Assess vascular access sites hourly3.  Every 4-6 hours minimum:  Change oxygen saturation probe site4.  Every 4-6 hours:  If on nasal continuous positive airway pressure, respiratory therapy assess nares and determine need for appliance change or resting period  4/28/2025 2314 by Zack Hanley, RN  Outcome: Progressing

## 2025-04-29 NOTE — CARE COORDINATION
Pt has been approved to go to [facility].  Doctor was informed yesterday however CM was told in IDR that pt is not ready.  PASS completed and packet started and placed with soft chart.      CM will need ERICKA to be completed by RN and doctor. If pt is discharged after hours please complete the following.... Call report to  [phone]   Place copy of AVS with both ERICKA and any written Rx for pain and anxiety in the packet.  Set up transportation with [place] [phone] and call family.        Bennie ROSALES/Pediatrician

## 2025-04-30 VITALS
HEART RATE: 88 BPM | TEMPERATURE: 99.6 F | HEIGHT: 64 IN | SYSTOLIC BLOOD PRESSURE: 130 MMHG | BODY MASS INDEX: 23.49 KG/M2 | OXYGEN SATURATION: 95 % | RESPIRATION RATE: 17 BRPM | DIASTOLIC BLOOD PRESSURE: 50 MMHG | WEIGHT: 137.57 LBS

## 2025-04-30 PROBLEM — K76.82 HEPATIC ENCEPHALOPATHY (HCC): Status: RESOLVED | Noted: 2025-04-25 | Resolved: 2025-04-30

## 2025-04-30 LAB
ANION GAP SERPL CALCULATED.3IONS-SCNC: 8 MMOL/L (ref 9–17)
BUN SERPL-MCNC: 24 MG/DL (ref 7–20)
CALCIUM SERPL-MCNC: 8.6 MG/DL (ref 8.3–10.6)
CHLORIDE SERPL-SCNC: 109 MMOL/L (ref 99–110)
CO2 SERPL-SCNC: 18 MMOL/L (ref 21–32)
CREAT SERPL-MCNC: 1.7 MG/DL (ref 0.6–1.2)
ERYTHROCYTE [DISTWIDTH] IN BLOOD BY AUTOMATED COUNT: 14.5 % (ref 11.7–14.9)
FOLATE SERPL-MCNC: 17.9 NG/ML (ref 4.8–24.2)
GFR, ESTIMATED: 30 ML/MIN/1.73M2
GLUCOSE BLD-MCNC: 176 MG/DL (ref 74–99)
GLUCOSE BLD-MCNC: 87 MG/DL (ref 74–99)
GLUCOSE SERPL-MCNC: 125 MG/DL (ref 74–99)
HCT VFR BLD AUTO: 23.4 % (ref 37–47)
HGB BLD-MCNC: 7.4 G/DL (ref 12.5–16)
MAGNESIUM SERPL-MCNC: 1.6 MG/DL (ref 1.8–2.4)
MCH RBC QN AUTO: 37.2 PG (ref 27–31)
MCHC RBC AUTO-ENTMCNC: 31.6 G/DL (ref 32–36)
MCV RBC AUTO: 117.6 FL (ref 78–100)
MICROORGANISM SPEC CULT: NORMAL
PHOSPHATE SERPL-MCNC: 1.9 MG/DL (ref 2.5–4.9)
PLATELET CONFIRMATION: NORMAL
PLATELET, FLUORESCENCE: 66 K/UL (ref 140–440)
PMV BLD AUTO: 12.4 FL (ref 7.5–11.1)
POTASSIUM SERPL-SCNC: 4.3 MMOL/L (ref 3.5–5.1)
RBC # BLD AUTO: 1.99 M/UL (ref 4.2–5.4)
SODIUM SERPL-SCNC: 135 MMOL/L (ref 136–145)
SPECIMEN DESCRIPTION: NORMAL
VIT B12 SERPL-MCNC: 766 PG/ML (ref 211–911)
WBC OTHER # BLD: 3.3 K/UL (ref 4–10.5)

## 2025-04-30 PROCEDURE — 83735 ASSAY OF MAGNESIUM: CPT

## 2025-04-30 PROCEDURE — 6370000000 HC RX 637 (ALT 250 FOR IP): Performed by: INTERNAL MEDICINE

## 2025-04-30 PROCEDURE — 82746 ASSAY OF FOLIC ACID SERUM: CPT

## 2025-04-30 PROCEDURE — 6370000000 HC RX 637 (ALT 250 FOR IP): Performed by: STUDENT IN AN ORGANIZED HEALTH CARE EDUCATION/TRAINING PROGRAM

## 2025-04-30 PROCEDURE — 85027 COMPLETE CBC AUTOMATED: CPT

## 2025-04-30 PROCEDURE — 2500000003 HC RX 250 WO HCPCS: Performed by: INTERNAL MEDICINE

## 2025-04-30 PROCEDURE — 82962 GLUCOSE BLOOD TEST: CPT

## 2025-04-30 PROCEDURE — 94761 N-INVAS EAR/PLS OXIMETRY MLT: CPT

## 2025-04-30 PROCEDURE — 36415 COLL VENOUS BLD VENIPUNCTURE: CPT

## 2025-04-30 PROCEDURE — 82607 VITAMIN B-12: CPT

## 2025-04-30 PROCEDURE — 80048 BASIC METABOLIC PNL TOTAL CA: CPT

## 2025-04-30 PROCEDURE — 2580000003 HC RX 258: Performed by: INTERNAL MEDICINE

## 2025-04-30 PROCEDURE — 2500000003 HC RX 250 WO HCPCS: Performed by: STUDENT IN AN ORGANIZED HEALTH CARE EDUCATION/TRAINING PROGRAM

## 2025-04-30 PROCEDURE — 84100 ASSAY OF PHOSPHORUS: CPT

## 2025-04-30 RX ORDER — INSULIN GLARGINE 100 [IU]/ML
12 INJECTION, SOLUTION SUBCUTANEOUS NIGHTLY
DISCHARGE
Start: 2025-04-30

## 2025-04-30 RX ORDER — INSULIN ASPART 100 [IU]/ML
0-20 INJECTION, SOLUTION INTRAVENOUS; SUBCUTANEOUS
DISCHARGE
Start: 2025-04-30

## 2025-04-30 RX ORDER — INSULIN ASPART 100 [IU]/ML
0-8 INJECTION, SOLUTION INTRAVENOUS; SUBCUTANEOUS
DISCHARGE
Start: 2025-04-30

## 2025-04-30 RX ORDER — INSULIN GLARGINE 100 [IU]/ML
10 INJECTION, SOLUTION SUBCUTANEOUS NIGHTLY
Status: DISCONTINUED | OUTPATIENT
Start: 2025-04-30 | End: 2025-04-30 | Stop reason: HOSPADM

## 2025-04-30 RX ORDER — SULFAMETHOXAZOLE AND TRIMETHOPRIM 800; 160 MG/1; MG/1
1 TABLET ORAL 2 TIMES DAILY
DISCHARGE
Start: 2025-04-30 | End: 2025-05-05

## 2025-04-30 RX ADMIN — METOCLOPRAMIDE HYDROCHLORIDE 5 MG: 5 TABLET ORAL at 13:19

## 2025-04-30 RX ADMIN — SODIUM CHLORIDE, PRESERVATIVE FREE 10 ML: 5 INJECTION INTRAVENOUS at 10:13

## 2025-04-30 RX ADMIN — RIFAXIMIN 400 MG: 200 TABLET ORAL at 09:51

## 2025-04-30 RX ADMIN — INSULIN LISPRO 6 UNITS: 100 INJECTION, SOLUTION INTRAVENOUS; SUBCUTANEOUS at 13:19

## 2025-04-30 RX ADMIN — LACTULOSE 30 G: 20 SOLUTION ORAL at 10:07

## 2025-04-30 RX ADMIN — PANTOPRAZOLE SODIUM 40 MG: 40 TABLET, DELAYED RELEASE ORAL at 06:32

## 2025-04-30 RX ADMIN — RIFAXIMIN 400 MG: 200 TABLET ORAL at 13:19

## 2025-04-30 RX ADMIN — LEVOTHYROXINE SODIUM 88 MCG: 0.09 TABLET ORAL at 06:32

## 2025-04-30 RX ADMIN — METOCLOPRAMIDE HYDROCHLORIDE 5 MG: 5 TABLET ORAL at 09:51

## 2025-04-30 RX ADMIN — SODIUM PHOSPHATE, MONOBASIC, MONOHYDRATE AND SODIUM PHOSPHATE, DIBASIC, ANHYDROUS 15 MMOL: 142; 276 INJECTION, SOLUTION INTRAVENOUS at 10:12

## 2025-04-30 RX ADMIN — ATORVASTATIN CALCIUM 10 MG: 10 TABLET, FILM COATED ORAL at 09:51

## 2025-04-30 ASSESSMENT — PAIN SCALES - GENERAL: PAINLEVEL_OUTOF10: 0

## 2025-04-30 NOTE — CARE COORDINATION
Pt has been approved to go to [facility].  Doctor was informed.  PASS completed and packet started and placed with soft chart.      CM will need ERICKA to be completed by RN and doctor. If pt is discharged after hours please complete the following.... Call report to  [phone]   Place copy of AVS with both ERICKA and any written Rx for pain and anxiety in the packet.  Set up transportation with [place] [phone] and call family.

## 2025-04-30 NOTE — PROGRESS NOTES
Progress Note( Dr. Rosenthal)  4/29/2025  Subjective:   Admit Date: 4/24/2025  PCP: Raji Her MD    Admitted For :  Altered mental status and confusion     Consulted For: Better control of blood glucose     Interval History: Feels somewhat better this morning.  Has had high blood glucose today    Denies any chest pains,   Mild  SOB .   Denies nausea or vomiting.  Eating better this morning  No new bowel or bladder symptoms.     No intake or output data in the 24 hours ending 04/29/25 2106      DATA    CBC:   Recent Labs     04/27/25 2100 04/28/25 0239 04/29/25 0258   WBC 5.5 5.9 3.8*   HGB 7.9* 7.2* 7.6*    CMP:  Recent Labs     04/27/25 2100 04/28/25 0239 04/29/25 0258    136 137   K 4.2 4.2 4.3    106 109   CO2 19* 20* 19*   BUN 23* 26* 24*   CREATININE 2.0* 2.1* 2.0*   CALCIUM 8.0* 7.8* 8.6   BILITOT 0.5  --   --    ALKPHOS 59  --   --    AST 31  --   --    ALT 24  --   --      Lipids:   Lab Results   Component Value Date/Time    CHOL 114 04/21/2025 08:56 AM    HDL 67 04/21/2025 08:56 AM    TRIG 60 04/21/2025 08:56 AM     Glucose:  Recent Labs     04/29/25  1119 04/29/25  1651 04/29/25 1956   POCGLU 298* 214* 267*     [id number]:  Lab Results   Component Value Date/Time    LABA1C 5.4 04/21/2025 08:57 AM     High Sensitivity TSH:   Lab Results   Component Value Date/Time    TSHHS 1.350 08/22/2024 01:47 PM     Free T3: No results found for: \"FT3\"  Free T4:  Lab Results   Component Value Date/Time    T4FREE 1.9 04/21/2025 08:56 AM       CT CHEST ABDOMEN PELVIS WO CONTRAST Additional Contrast? None   Final Result      XR CHEST PORTABLE   Final Result           Scheduled Medicines   Medications:    lactulose  30 g Oral BID    insulin lispro  0-20 Units SubCUTAneous TID WC    insulin glargine  15 Units SubCUTAneous Nightly    insulin lispro  0-8 Units SubCUTAneous 4x Daily AC & HS    levothyroxine  88 mcg Oral Daily    metoclopramide  5 mg Oral 4x Daily    atorvastatin  10 mg Oral Daily

## 2025-04-30 NOTE — DISCHARGE SUMMARY
Discharge Summary    Name:  Dayana Pacheco /Age/Sex: 1950  (74 y.o. female)   MRN & CSN:  [id number] & [id number] Admission Date/Time: 2025 11:51 PM   Attending:  Tracy Lang MD Discharging Physician: Tracy Lang MD     Discharge diagnosis and plan:  HPI : \"Patient is a 74 y.o. female with a PMHx as above who presented to the ED with increased confusion over past 1-2 week. On lactulose, takes once daily, has 1 BM day.  reported that patient has been having increased fatigue over past few days. Denied any fevers, chills, CP, SOB, cough, N/V, abdominal pain or urinary changes. Denied any tobacco or alcohol use.\"    # Decompensated MASH cirrhosis with hepatic encephalopathy -presented with increased confusion over past 1-2 weeks. She was placed on escalated lactulose dose,  used to take once daily, as was having 1 BM per day.  Increased to 30 g TID - started to have 2-3 bowel movements loose every day.  EGD in 2023 showed moderate PHG, no EV. Followed by GI, last seen . Ammonia 137 on admission, alb 3.6, MELD-Na 18, HE grade 2, CT non-acute. He was also placed on Rifaximin. By the day of discharge patient's mentation had improved close to baseline. Discussed with her spouse at length that despite hepatic encephalopathy causing her acute decompensation-she likely has worsening dementia.  Spouse voiced understanding.  Also stressed on importance of continuing lactulose and adjusting the dose so that she continues to have 2-3 soft bowel movements a day.  # Mild febrile episode.  Unclear etiology.  She was started on Rocephin.  Cultures remain negative.  Patient remained afebrile for 48 hours.  Patient is to be discharged on Bactrim for another 5 days.  # Acute kidney injury on CKD 3b - no recent NSAIDs, clinically hypovolemic, Cr 2.2, baseline ~ 1.4, UA bland.  IV fluid resuscitation.  Creatinine improved slowly to 1.7 by the day of discharge.  # Pancytopenia - mixed etiology.   tenderness. There is no guarding or rebound.      Hernia: No hernia is present.   Genitourinary:     Vagina: Normal.   Musculoskeletal:         General: No tenderness or deformity. Normal range of motion.      Cervical back: Normal range of motion and neck supple.   Lymphadenopathy:      Cervical: No cervical adenopathy.   Skin:     General: Skin is warm and dry.      Capillary Refill: Capillary refill takes less than 2 seconds.      Coloration: Skin is not pale.      Findings: No erythema or rash.   Neurological:      Mental Status: She is alert. Mental status is at baseline. She is disoriented and confused.      Cranial Nerves: No cranial nerve deficit.      Motor: No abnormal muscle tone.      Coordination: Coordination normal.      Deep Tendon Reflexes: Reflexes normal.   Psychiatric:         Behavior: Behavior normal.         Thought Content: Thought content normal.         Cognition and Memory: Cognition is impaired. Memory is impaired. She exhibits impaired recent memory and impaired remote memory.         Judgment: Judgment normal.           Hospital Course:   Dayana Pacheco is a 74 y.o.  female  who presents with Hepatic encephalopathy (HCC)    -Please refer to discharge diagnosis and plan as mentioned above for details on hospital course.    The patient expressed appropriate understanding of and agreement with the discharge recommendations, medications, and plan.     Consults this admission:  None      Discharge Instruction:   Handoff to PCP:   -  Follow up appointments: PCP, neurology  Primary care physician: Raji Her MD      Diet:  regular diet   ADULT DIET; Regular; 4 carb choices (60 gm/meal)    Activity: activity as tolerated  Disposition: Discharged to:   []Home, [][facility], [x]SNF, []Acute Rehab, []Hospice   Condition on discharge: Stable    Discharge Medications:        Medication List        START taking these medications      Lantus SoloStar 100 UNIT/ML injection pen  Generic drug:

## 2025-04-30 NOTE — PROGRESS NOTES
ATTEMPT  Attempt x 2. First attempt pt eating lunch, second attempt pt just returned from ambulating to bathroom and bath, pt declines. Will cont.   Electronically signed by:    Charlotte Phan, REYES  4/30/2025, 1:10 PM

## 2025-04-30 NOTE — CARE COORDINATION
Pt is on discharge.  LSW was told pt has a IV med that needs to run until 2:15PM.  Superior to  pt at 3:30.  Superior paperwork completed and placed on packet.  Copy of AVS with both ERICKA placed in packet.  RN, pt's  and Mily with Yo all aware of  time.

## 2025-05-01 ENCOUNTER — HOSPITAL ENCOUNTER (OUTPATIENT)
Age: 75
Setting detail: SPECIMEN
Discharge: HOME | End: 2025-05-01

## 2025-05-01 LAB
ALBUMIN SERPL-MCNC: 3.2 G/DL (ref 3.4–5)
ALBUMIN/GLOB SERPL: 1.2 {RATIO} (ref 1.1–2.2)
ALP SERPL-CCNC: 68 U/L (ref 40–129)
ALT SERPL-CCNC: 26 U/L (ref 10–40)
AMMONIA PLAS-SCNC: 26 UMOL/L (ref 11–51)
ANION GAP SERPL CALCULATED.3IONS-SCNC: 11 MMOL/L (ref 9–17)
AST SERPL-CCNC: 29 U/L (ref 15–37)
BILIRUB SERPL-MCNC: 0.4 MG/DL (ref 0–1)
BUN SERPL-MCNC: 22 MG/DL (ref 7–20)
CALCIUM SERPL-MCNC: 8.9 MG/DL (ref 8.3–10.6)
CHLORIDE SERPL-SCNC: 103 MMOL/L (ref 99–110)
CO2 SERPL-SCNC: 20 MMOL/L (ref 21–32)
CREAT SERPL-MCNC: 1.7 MG/DL (ref 0.6–1.2)
ERYTHROCYTE [DISTWIDTH] IN BLOOD BY AUTOMATED COUNT: 14.4 % (ref 11.7–14.9)
EST. AVERAGE GLUCOSE BLD GHB EST-MCNC: 127 MG/DL
GFR, ESTIMATED: 29 ML/MIN/1.73M2
GLUCOSE SERPL-MCNC: 336 MG/DL (ref 74–99)
HBA1C MFR BLD: 6 % (ref 4.2–6.3)
HCT VFR BLD AUTO: 23 % (ref 37–47)
HGB BLD-MCNC: 7.5 G/DL (ref 12.5–16)
MCH RBC QN AUTO: 36.9 PG (ref 27–31)
MCHC RBC AUTO-ENTMCNC: 32.6 G/DL (ref 32–36)
MCV RBC AUTO: 113.3 FL (ref 78–100)
MICROORGANISM SPEC CULT: NORMAL
MICROORGANISM SPEC CULT: NORMAL
PLATELET, FLUORESCENCE: 81 K/UL (ref 140–440)
PMV BLD AUTO: 11.9 FL (ref 7.5–11.1)
POTASSIUM SERPL-SCNC: 4.3 MMOL/L (ref 3.5–5.1)
PROT SERPL-MCNC: 6 G/DL (ref 6.4–8.2)
RBC # BLD AUTO: 2.03 M/UL (ref 4.2–5.4)
SERVICE CMNT-IMP: NORMAL
SERVICE CMNT-IMP: NORMAL
SODIUM SERPL-SCNC: 134 MMOL/L (ref 136–145)
SPECIMEN DESCRIPTION: NORMAL
SPECIMEN DESCRIPTION: NORMAL
TSH SERPL DL<=0.05 MIU/L-ACNC: 0.52 UIU/ML (ref 0.27–4.2)
WBC OTHER # BLD: 2.8 K/UL (ref 4–10.5)

## 2025-05-01 PROCEDURE — 82140 ASSAY OF AMMONIA: CPT

## 2025-05-01 PROCEDURE — 83036 HEMOGLOBIN GLYCOSYLATED A1C: CPT

## 2025-05-01 PROCEDURE — 80053 COMPREHEN METABOLIC PANEL: CPT

## 2025-05-01 PROCEDURE — 85027 COMPLETE CBC AUTOMATED: CPT

## 2025-05-01 PROCEDURE — 84443 ASSAY THYROID STIM HORMONE: CPT

## 2025-05-02 ENCOUNTER — RESULTS FOLLOW-UP (OUTPATIENT)
Dept: EMERGENCY DEPT | Age: 75
End: 2025-05-02

## 2025-05-02 ENCOUNTER — HOSPITAL ENCOUNTER (OUTPATIENT)
Age: 75
Setting detail: SPECIMEN
Discharge: HOME | End: 2025-05-02

## 2025-05-02 LAB
FERRITIN SERPL-MCNC: 250 NG/ML (ref 15–150)
IRON SATN MFR SERPL: 27 % (ref 15–50)
IRON SERPL-MCNC: 49 UG/DL (ref 37–145)
TIBC SERPL-MCNC: 179 UG/DL (ref 260–445)
UNSATURATED IRON BINDING CAPACITY: 130 UG/DL (ref 110–370)

## 2025-05-02 PROCEDURE — 83550 IRON BINDING TEST: CPT

## 2025-05-02 PROCEDURE — 83540 ASSAY OF IRON: CPT

## 2025-05-02 PROCEDURE — 82728 ASSAY OF FERRITIN: CPT

## 2025-05-03 LAB
MICROORGANISM SPEC CULT: NORMAL
MICROORGANISM SPEC CULT: NORMAL
SERVICE CMNT-IMP: NORMAL
SERVICE CMNT-IMP: NORMAL
SPECIMEN DESCRIPTION: NORMAL
SPECIMEN DESCRIPTION: NORMAL

## 2025-05-07 NOTE — PROGRESS NOTES
Physician Progress Note      PATIENT:               ZENOBIA JI  CSN #:                  [id number]  :                       1950  ADMIT DATE:       2025 11:51 PM  DISCH DATE:        2025 3:59 PM  RESPONDING  PROVIDER #:        Tracy Lang MD          QUERY TEXT:    Encephalopathy is documented in the medical record hepatic encephalopathy.    Please specify type:    The clinical indicators include:  -74 year old female MASH,Ammonia level 137, ANTONINA on CKD 3a  -per H&P/DC Summary \"Decompensated MASH cirrhosis with hepatic encephalopathy   - increased confusion over past 1-2 weeks.\"  \"By the day of discharge patient's mentation had improved close to baseline.\"  -Treatment Lactulose, and Rifaximin, lab monitoring  Options provided:  -- The patient has hepatic encephalopathy and toxic encephalopathy  -- The patient has hepatic encephalopathy.  -- The patient has toxic encephalopathy.  -- Other - I will add my own diagnosis  -- Disagree - Not applicable / Not valid  -- Disagree - Clinically unable to determine / Unknown  -- Refer to Clinical Documentation Reviewer    PROVIDER RESPONSE TEXT:    The patient has hepatic encephalopathy and toxic encephalopathy    Query created by: Kathleen Banks on 2025 7:24 AM      Electronically signed by:  Tracy Lang MD 2025 12:44 PM

## 2025-05-08 ENCOUNTER — HOSPITAL ENCOUNTER (OUTPATIENT)
Age: 75
Setting detail: SPECIMEN
Discharge: HOME | End: 2025-05-08

## 2025-05-08 LAB
ALBUMIN SERPL-MCNC: 2.9 G/DL (ref 3.4–5)
ALBUMIN/GLOB SERPL: 1.2 {RATIO} (ref 1.1–2.2)
ALP SERPL-CCNC: 74 U/L (ref 40–129)
ALT SERPL-CCNC: 31 U/L (ref 10–40)
AMMONIA PLAS-SCNC: 48 UMOL/L (ref 11–51)
ANION GAP SERPL CALCULATED.3IONS-SCNC: 7 MMOL/L (ref 9–17)
AST SERPL-CCNC: 38 U/L (ref 15–37)
BILIRUB SERPL-MCNC: 0.4 MG/DL (ref 0–1)
BUN SERPL-MCNC: 22 MG/DL (ref 7–20)
CALCIUM SERPL-MCNC: 8.9 MG/DL (ref 8.3–10.6)
CHLORIDE SERPL-SCNC: 103 MMOL/L (ref 99–110)
CO2 SERPL-SCNC: 21 MMOL/L (ref 21–32)
CREAT SERPL-MCNC: 2.3 MG/DL (ref 0.6–1.2)
ERYTHROCYTE [DISTWIDTH] IN BLOOD BY AUTOMATED COUNT: 14.8 % (ref 11.7–14.9)
GFR, ESTIMATED: 21 ML/MIN/1.73M2
GLUCOSE SERPL-MCNC: 196 MG/DL (ref 74–99)
HCT VFR BLD AUTO: 20.3 % (ref 37–47)
HGB BLD-MCNC: 6.2 G/DL (ref 12.5–16)
MCH RBC QN AUTO: 36 PG (ref 27–31)
MCHC RBC AUTO-ENTMCNC: 30.5 G/DL (ref 32–36)
MCV RBC AUTO: 118 FL (ref 78–100)
PLATELET # BLD AUTO: 100 K/UL (ref 140–440)
PMV BLD AUTO: 11.5 FL (ref 7.5–11.1)
POTASSIUM SERPL-SCNC: 5.6 MMOL/L (ref 3.5–5.1)
PROT SERPL-MCNC: 5.4 G/DL (ref 6.4–8.2)
RBC # BLD AUTO: 1.72 M/UL (ref 4.2–5.4)
SODIUM SERPL-SCNC: 131 MMOL/L (ref 136–145)
WBC OTHER # BLD: 3.7 K/UL (ref 4–10.5)

## 2025-05-08 PROCEDURE — 82140 ASSAY OF AMMONIA: CPT

## 2025-05-08 PROCEDURE — 80053 COMPREHEN METABOLIC PANEL: CPT

## 2025-05-08 PROCEDURE — 85027 COMPLETE CBC AUTOMATED: CPT

## 2025-05-08 PROCEDURE — 36415 COLL VENOUS BLD VENIPUNCTURE: CPT

## 2025-05-09 ENCOUNTER — HOSPITAL ENCOUNTER (OUTPATIENT)
Age: 75
Setting detail: SPECIMEN
Discharge: HOME | End: 2025-05-09

## 2025-05-09 LAB
ANION GAP SERPL CALCULATED.3IONS-SCNC: 9 MMOL/L (ref 9–17)
BUN SERPL-MCNC: 20 MG/DL (ref 7–20)
CALCIUM SERPL-MCNC: 8.9 MG/DL (ref 8.3–10.6)
CHLORIDE SERPL-SCNC: 103 MMOL/L (ref 99–110)
CO2 SERPL-SCNC: 21 MMOL/L (ref 21–32)
CREAT SERPL-MCNC: 2 MG/DL (ref 0.6–1.2)
ERYTHROCYTE [DISTWIDTH] IN BLOOD BY AUTOMATED COUNT: 14.6 % (ref 11.7–14.9)
GFR, ESTIMATED: 24 ML/MIN/1.73M2
GLUCOSE SERPL-MCNC: 167 MG/DL (ref 74–99)
HCT VFR BLD AUTO: 21.8 % (ref 37–47)
HGB BLD-MCNC: 6.8 G/DL (ref 12.5–16)
MCH RBC QN AUTO: 36.8 PG (ref 27–31)
MCHC RBC AUTO-ENTMCNC: 31.2 G/DL (ref 32–36)
MCV RBC AUTO: 117.8 FL (ref 78–100)
PLATELET, FLUORESCENCE: 116 K/UL (ref 140–440)
PMV BLD AUTO: 11.9 FL (ref 7.5–11.1)
POTASSIUM SERPL-SCNC: 5.2 MMOL/L (ref 3.5–5.1)
RBC # BLD AUTO: 1.85 M/UL (ref 4.2–5.4)
SODIUM SERPL-SCNC: 133 MMOL/L (ref 136–145)
WBC OTHER # BLD: 6.3 K/UL (ref 4–10.5)

## 2025-05-09 PROCEDURE — 85027 COMPLETE CBC AUTOMATED: CPT

## 2025-05-09 PROCEDURE — 80048 BASIC METABOLIC PNL TOTAL CA: CPT

## 2025-05-11 ENCOUNTER — HOSPITAL ENCOUNTER (OUTPATIENT)
Age: 75
Setting detail: SPECIMEN
Discharge: HOME | End: 2025-05-11

## 2025-05-11 LAB
ANION GAP SERPL CALCULATED.3IONS-SCNC: 7 MMOL/L (ref 9–17)
BUN SERPL-MCNC: 16 MG/DL (ref 7–20)
CALCIUM SERPL-MCNC: 8.5 MG/DL (ref 8.3–10.6)
CHLORIDE SERPL-SCNC: 105 MMOL/L (ref 99–110)
CO2 SERPL-SCNC: 25 MMOL/L (ref 21–32)
CREAT SERPL-MCNC: 1.7 MG/DL (ref 0.6–1.2)
GFR, ESTIMATED: 29 ML/MIN/1.73M2
GLUCOSE SERPL-MCNC: 71 MG/DL (ref 74–99)
HCT VFR BLD AUTO: 19.8 % (ref 37–47)
HGB BLD-MCNC: 6.2 G/DL (ref 12.5–16)
POTASSIUM SERPL-SCNC: 4.9 MMOL/L (ref 3.5–5.1)
SODIUM SERPL-SCNC: 137 MMOL/L (ref 136–145)

## 2025-05-11 PROCEDURE — 80048 BASIC METABOLIC PNL TOTAL CA: CPT

## 2025-05-11 PROCEDURE — 9900360100 HC STAT COLLECTION FEE SNF

## 2025-05-11 PROCEDURE — 86901 BLOOD TYPING SEROLOGIC RH(D): CPT

## 2025-05-11 PROCEDURE — 85018 HEMOGLOBIN: CPT

## 2025-05-11 PROCEDURE — 85014 HEMATOCRIT: CPT

## 2025-05-11 PROCEDURE — 86850 RBC ANTIBODY SCREEN: CPT

## 2025-05-11 PROCEDURE — 86900 BLOOD TYPING SEROLOGIC ABO: CPT

## 2025-05-11 PROCEDURE — 86920 COMPATIBILITY TEST SPIN: CPT

## 2025-05-12 ENCOUNTER — HOSPITAL ENCOUNTER (OUTPATIENT)
Dept: INFUSION THERAPY | Age: 75
Setting detail: INFUSION SERIES
Discharge: HOME | End: 2025-05-12
Payer: MEDICARE

## 2025-05-12 VITALS
HEART RATE: 72 BPM | RESPIRATION RATE: 16 BRPM | DIASTOLIC BLOOD PRESSURE: 76 MMHG | OXYGEN SATURATION: 97 % | SYSTOLIC BLOOD PRESSURE: 156 MMHG | TEMPERATURE: 97.5 F

## 2025-05-12 DIAGNOSIS — D64.9 ANEMIA, UNSPECIFIED TYPE: Primary | ICD-10-CM

## 2025-05-12 PROCEDURE — 36430 TRANSFUSION BLD/BLD COMPNT: CPT

## 2025-05-12 PROCEDURE — P9016 RBC LEUKOCYTES REDUCED: HCPCS

## 2025-05-12 PROCEDURE — 96374 THER/PROPH/DIAG INJ IV PUSH: CPT

## 2025-05-12 PROCEDURE — 6360000002 HC RX W HCPCS: Performed by: FAMILY MEDICINE

## 2025-05-12 PROCEDURE — 2500000003 HC RX 250 WO HCPCS: Performed by: FAMILY MEDICINE

## 2025-05-12 PROCEDURE — 2580000003 HC RX 258: Performed by: FAMILY MEDICINE

## 2025-05-12 RX ORDER — SODIUM CHLORIDE 0.9 % (FLUSH) 0.9 %
5-40 SYRINGE (ML) INJECTION PRN
Status: DISCONTINUED | OUTPATIENT
Start: 2025-05-12 | End: 2025-05-12

## 2025-05-12 RX ORDER — ACETAMINOPHEN 325 MG/1
650 TABLET ORAL
Status: CANCELLED | OUTPATIENT
Start: 2025-05-12

## 2025-05-12 RX ORDER — ONDANSETRON 2 MG/ML
8 INJECTION INTRAMUSCULAR; INTRAVENOUS
Status: CANCELLED | OUTPATIENT
Start: 2025-05-12

## 2025-05-12 RX ORDER — ALBUTEROL SULFATE 90 UG/1
4 INHALANT RESPIRATORY (INHALATION) PRN
Status: CANCELLED | OUTPATIENT
Start: 2025-05-12

## 2025-05-12 RX ORDER — EPINEPHRINE 1 MG/ML
0.3 INJECTION, SOLUTION INTRAMUSCULAR; SUBCUTANEOUS PRN
Status: CANCELLED | OUTPATIENT
Start: 2025-05-12

## 2025-05-12 RX ORDER — SODIUM CHLORIDE 9 MG/ML
20 INJECTION, SOLUTION INTRAVENOUS CONTINUOUS
Status: DISCONTINUED | OUTPATIENT
Start: 2025-05-12 | End: 2025-05-12

## 2025-05-12 RX ORDER — SODIUM CHLORIDE 9 MG/ML
INJECTION, SOLUTION INTRAVENOUS CONTINUOUS
Status: CANCELLED | OUTPATIENT
Start: 2025-05-12

## 2025-05-12 RX ORDER — HYDROCORTISONE SODIUM SUCCINATE 100 MG/2ML
100 INJECTION INTRAMUSCULAR; INTRAVENOUS
Status: CANCELLED | OUTPATIENT
Start: 2025-05-12

## 2025-05-12 RX ORDER — SODIUM CHLORIDE 0.9 % (FLUSH) 0.9 %
5-40 SYRINGE (ML) INJECTION PRN
Status: CANCELLED | OUTPATIENT
Start: 2025-05-12

## 2025-05-12 RX ORDER — FUROSEMIDE 10 MG/ML
20 INJECTION INTRAMUSCULAR; INTRAVENOUS ONCE
Status: COMPLETED | OUTPATIENT
Start: 2025-05-12 | End: 2025-05-12

## 2025-05-12 RX ORDER — FAMOTIDINE 10 MG/ML
20 INJECTION, SOLUTION INTRAVENOUS
Status: CANCELLED | OUTPATIENT
Start: 2025-05-12

## 2025-05-12 RX ORDER — FUROSEMIDE 10 MG/ML
20 INJECTION INTRAMUSCULAR; INTRAVENOUS ONCE
Status: CANCELLED | OUTPATIENT
Start: 2025-05-12 | End: 2025-05-12

## 2025-05-12 RX ORDER — DIPHENHYDRAMINE HYDROCHLORIDE 50 MG/ML
50 INJECTION, SOLUTION INTRAMUSCULAR; INTRAVENOUS
Status: CANCELLED | OUTPATIENT
Start: 2025-05-12

## 2025-05-12 RX ORDER — SODIUM CHLORIDE 9 MG/ML
20 INJECTION, SOLUTION INTRAVENOUS CONTINUOUS
Status: CANCELLED | OUTPATIENT
Start: 2025-05-12

## 2025-05-12 RX ADMIN — SODIUM CHLORIDE, PRESERVATIVE FREE 10 ML: 5 INJECTION INTRAVENOUS at 08:09

## 2025-05-12 RX ADMIN — FUROSEMIDE 20 MG: 10 INJECTION, SOLUTION INTRAMUSCULAR; INTRAVENOUS at 09:54

## 2025-05-12 RX ADMIN — SODIUM CHLORIDE 20 ML/HR: 0.9 INJECTION, SOLUTION INTRAVENOUS at 08:09

## 2025-05-12 RX ADMIN — SODIUM CHLORIDE, PRESERVATIVE FREE 10 ML: 5 INJECTION INTRAVENOUS at 09:52

## 2025-05-12 NOTE — PROGRESS NOTES
Tolerated TRANSFUSION well. Reviewed discharge instruction, voiced understanding. Copies of AVS given. Pt discharged HOME. Pt to exit via WHEELCHAIR.    Orders Placed This Encounter   Medications    0.9 % sodium chloride infusion    sodium chloride flush 0.9 % injection 5-40 mL    furosemide (LASIX) injection 20 mg

## 2025-05-12 NOTE — PROGRESS NOTES
Pt taken to room 02 for blood transfusion. Pt oriented to room, call light, bed/chair controls, TV, pt voiced understanding.  Plan of care explained to pt, pt voiced understanding.

## 2025-05-13 ENCOUNTER — HOSPITAL ENCOUNTER (OUTPATIENT)
Age: 75
Setting detail: SPECIMEN
Discharge: HOME | End: 2025-05-13

## 2025-05-13 LAB
ABO/RH: NORMAL
ANTIBODY SCREEN: NEGATIVE
BLOOD BANK BLOOD PRODUCT EXPIRATION DATE: NORMAL
BLOOD BANK DISPENSE STATUS: NORMAL
BLOOD BANK ISBT PRODUCT BLOOD TYPE: 6200
BLOOD BANK PRODUCT CODE: NORMAL
BLOOD BANK SAMPLE EXPIRATION: NORMAL
BLOOD BANK UNIT TYPE AND RH: NORMAL
BPU ID: NORMAL
COMPONENT: NORMAL
CROSSMATCH RESULT: NORMAL
HCT VFR BLD AUTO: 23.4 % (ref 37–47)
HGB BLD-MCNC: 7.5 G/DL (ref 12.5–16)
TRANSFUSION STATUS: NORMAL
UNIT DIVISION: 0
UNIT ISSUE DATE/TIME: NORMAL

## 2025-05-13 PROCEDURE — 85014 HEMATOCRIT: CPT

## 2025-05-13 PROCEDURE — 85018 HEMOGLOBIN: CPT

## 2025-05-15 ENCOUNTER — HOSPITAL ENCOUNTER (OUTPATIENT)
Age: 75
Setting detail: SPECIMEN
Discharge: HOME | End: 2025-05-15

## 2025-05-15 LAB
HCT VFR BLD AUTO: 24 % (ref 37–47)
HGB BLD-MCNC: 7.1 G/DL (ref 12.5–16)
VIT B12 SERPL-MCNC: 816 PG/ML (ref 211–911)

## 2025-05-15 PROCEDURE — 85014 HEMATOCRIT: CPT

## 2025-05-15 PROCEDURE — 82607 VITAMIN B-12: CPT

## 2025-05-15 PROCEDURE — 82270 OCCULT BLOOD FECES: CPT

## 2025-05-15 PROCEDURE — 85018 HEMOGLOBIN: CPT

## 2025-05-16 ENCOUNTER — HOSPITAL ENCOUNTER (OUTPATIENT)
Age: 75
Setting detail: SPECIMEN
Discharge: HOME | End: 2025-05-16

## 2025-05-16 LAB
HCT VFR BLD AUTO: 25.3 % (ref 37–47)
HGB BLD-MCNC: 7.9 G/DL (ref 12.5–16)

## 2025-05-16 PROCEDURE — 85014 HEMATOCRIT: CPT

## 2025-05-16 PROCEDURE — 85018 HEMOGLOBIN: CPT

## 2025-05-18 LAB
DATE, STOOL #1: ABNORMAL
DATE, STOOL #2: ABNORMAL
DATE, STOOL #3: 517
HEMOCCULT SP1 STL QL: POSITIVE
HEMOCCULT SP2 STL QL: NEGATIVE
HEMOCCULT SP3 STL QL: NEGATIVE
TIME, STOOL #1: 1817
TIME, STOOL #2: 900
TIME, STOOL #3: 1700

## 2025-05-19 ENCOUNTER — HOSPITAL ENCOUNTER (OUTPATIENT)
Age: 75
Setting detail: SPECIMEN
Discharge: HOME | End: 2025-05-19

## 2025-05-19 LAB
HCT VFR BLD AUTO: 24.8 % (ref 37–47)
HGB BLD-MCNC: 7.8 G/DL (ref 12.5–16)

## 2025-05-19 PROCEDURE — 85014 HEMATOCRIT: CPT

## 2025-05-19 PROCEDURE — 85018 HEMOGLOBIN: CPT

## 2025-05-22 ENCOUNTER — HOSPITAL ENCOUNTER (OUTPATIENT)
Age: 75
Setting detail: SPECIMEN
Discharge: HOME | End: 2025-05-22

## 2025-05-22 LAB
HCT VFR BLD AUTO: 24.7 % (ref 37–47)
HGB BLD-MCNC: 7.9 G/DL (ref 12.5–16)

## 2025-05-22 PROCEDURE — 85014 HEMATOCRIT: CPT

## 2025-05-22 PROCEDURE — 85018 HEMOGLOBIN: CPT

## 2025-05-30 ENCOUNTER — TELEPHONE (OUTPATIENT)
Dept: GASTROENTEROLOGY | Age: 75
End: 2025-05-30

## 2025-05-30 NOTE — TELEPHONE ENCOUNTER
Patients spouse called to get patient scheduled for an endoscopy she was in the hospital in April and went to a nursing facility for rehab and was not able to get scheduled then, he would like someone to call him back to get her scheduled [phone].  Sending to Mi

## 2025-06-16 ENCOUNTER — PREP FOR PROCEDURE (OUTPATIENT)
Dept: GASTROENTEROLOGY | Age: 75
End: 2025-06-16

## 2025-06-16 ENCOUNTER — TELEPHONE (OUTPATIENT)
Dept: INFUSION THERAPY | Age: 75
End: 2025-06-16

## 2025-06-16 DIAGNOSIS — K21.9 GASTROESOPHAGEAL REFLUX DISEASE WITHOUT ESOPHAGITIS: ICD-10-CM

## 2025-06-17 ENCOUNTER — APPOINTMENT (OUTPATIENT)
Dept: CT IMAGING | Age: 75
DRG: 442 | End: 2025-06-17
Payer: MEDICARE

## 2025-06-17 ENCOUNTER — TELEPHONE (OUTPATIENT)
Dept: CARDIOLOGY CLINIC | Age: 75
End: 2025-06-17

## 2025-06-17 ENCOUNTER — HOSPITAL ENCOUNTER (INPATIENT)
Age: 75
LOS: 4 days | Discharge: HOME HEALTH | DRG: 442 | End: 2025-06-21
Attending: STUDENT IN AN ORGANIZED HEALTH CARE EDUCATION/TRAINING PROGRAM | Admitting: INTERNAL MEDICINE
Payer: MEDICARE

## 2025-06-17 ENCOUNTER — APPOINTMENT (OUTPATIENT)
Dept: GENERAL RADIOLOGY | Age: 75
DRG: 442 | End: 2025-06-17
Payer: MEDICARE

## 2025-06-17 DIAGNOSIS — D61.818 PANCYTOPENIA (HCC): ICD-10-CM

## 2025-06-17 DIAGNOSIS — R41.82 ALTERED MENTAL STATUS, UNSPECIFIED ALTERED MENTAL STATUS TYPE: Primary | ICD-10-CM

## 2025-06-17 DIAGNOSIS — K76.82 HEPATIC ENCEPHALOPATHY (HCC): ICD-10-CM

## 2025-06-17 DIAGNOSIS — Z87.19 HISTORY OF CIRRHOSIS: ICD-10-CM

## 2025-06-17 LAB
ABO + RH BLD: NORMAL
ALBUMIN SERPL-MCNC: 3.5 G/DL (ref 3.4–5)
ALBUMIN/GLOB SERPL: 1.1 {RATIO} (ref 1.1–2.2)
ALP SERPL-CCNC: 75 U/L (ref 40–129)
ALT SERPL-CCNC: 23 U/L (ref 10–40)
AMMONIA PLAS-SCNC: 172 UMOL/L (ref 11–51)
ANION GAP SERPL CALCULATED.3IONS-SCNC: 12 MMOL/L (ref 9–17)
ARTERIAL PATENCY WRIST A: ABNORMAL
AST SERPL-CCNC: 32 U/L (ref 15–37)
BASOPHILS # BLD: 0.01 K/UL
BASOPHILS NFR BLD: 0 % (ref 0–1)
BILIRUB SERPL-MCNC: 0.9 MG/DL (ref 0–1)
BLOOD BANK SAMPLE EXPIRATION: NORMAL
BLOOD GROUP ANTIBODIES SERPL: NEGATIVE
BODY TEMPERATURE: 37
BUN SERPL-MCNC: 21 MG/DL (ref 7–20)
CALCIUM SERPL-MCNC: 9.3 MG/DL (ref 8.3–10.6)
CHLORIDE SERPL-SCNC: 105 MMOL/L (ref 99–110)
CK SERPL-CCNC: 97 U/L (ref 26–192)
CO2 SERPL-SCNC: 20 MMOL/L (ref 21–32)
COHGB MFR BLD: 0.3 % (ref 0.5–1.5)
CREAT SERPL-MCNC: 1.7 MG/DL (ref 0.6–1.2)
EOSINOPHIL # BLD: 0.01 K/UL
EOSINOPHILS RELATIVE PERCENT: 0 % (ref 0–3)
ERYTHROCYTE [DISTWIDTH] IN BLOOD BY AUTOMATED COUNT: 17.4 % (ref 11.7–14.9)
ETHANOLAMINE SERPL-MCNC: <10 MG/DL (ref 0–0.08)
GFR, ESTIMATED: 30 ML/MIN/1.73M2
GLUCOSE BLD-MCNC: 92 MG/DL (ref 74–99)
GLUCOSE SERPL-MCNC: 123 MG/DL (ref 74–99)
HCO3 VENOUS: 19.9 MMOL/L (ref 22–29)
HCT VFR BLD AUTO: 27.2 % (ref 37–47)
HGB BLD-MCNC: 8.6 G/DL (ref 12.5–16)
IMM GRANULOCYTES # BLD AUTO: 0.01 K/UL
IMM GRANULOCYTES NFR BLD: 0 %
INR PPP: 1.1
LIPASE SERPL-CCNC: 42 U/L (ref 13–60)
LYMPHOCYTES NFR BLD: 0.67 K/UL
LYMPHOCYTES RELATIVE PERCENT: 21 % (ref 24–44)
MAGNESIUM SERPL-MCNC: 1.9 MG/DL (ref 1.8–2.4)
MCH RBC QN AUTO: 35 PG (ref 27–31)
MCHC RBC AUTO-ENTMCNC: 31.6 G/DL (ref 32–36)
MCV RBC AUTO: 110.6 FL (ref 78–100)
METHEMOGLOBIN: 0.7 % (ref 0.5–1.5)
MONOCYTES NFR BLD: 0.16 K/UL
MONOCYTES NFR BLD: 5 % (ref 0–5)
NEGATIVE BASE EXCESS, VEN: 2.7 MMOL/L (ref 0–3)
NEUTROPHILS NFR BLD: 73 % (ref 36–66)
NEUTS SEG NFR BLD: 2.35 K/UL
OXYHGB MFR BLD: 89.1 %
PCO2 VENOUS: 26.9 MM HG (ref 38–54)
PH VENOUS: 7.49 (ref 7.32–7.43)
PLATELET, FLUORESCENCE: 89 K/UL (ref 140–440)
PMV BLD AUTO: 11.7 FL (ref 7.5–11.1)
PO2 VENOUS: 59.5 MM HG (ref 23–48)
POTASSIUM SERPL-SCNC: 4.6 MMOL/L (ref 3.5–5.1)
PROT SERPL-MCNC: 6.6 G/DL (ref 6.4–8.2)
PROTHROMBIN TIME: 14.8 SEC (ref 11.7–14.5)
RBC # BLD AUTO: 2.46 M/UL (ref 4.2–5.4)
SODIUM SERPL-SCNC: 137 MMOL/L (ref 136–145)
T4 FREE SERPL-MCNC: 1.8 NG/DL (ref 0.9–1.8)
TSH SERPL DL<=0.05 MIU/L-ACNC: 0.16 UIU/ML (ref 0.27–4.2)
WBC OTHER # BLD: 3.2 K/UL (ref 4–10.5)

## 2025-06-17 PROCEDURE — 86901 BLOOD TYPING SEROLOGIC RH(D): CPT

## 2025-06-17 PROCEDURE — 36415 COLL VENOUS BLD VENIPUNCTURE: CPT

## 2025-06-17 PROCEDURE — 83690 ASSAY OF LIPASE: CPT

## 2025-06-17 PROCEDURE — 82140 ASSAY OF AMMONIA: CPT

## 2025-06-17 PROCEDURE — 71045 X-RAY EXAM CHEST 1 VIEW: CPT

## 2025-06-17 PROCEDURE — 84443 ASSAY THYROID STIM HORMONE: CPT

## 2025-06-17 PROCEDURE — 2500000003 HC RX 250 WO HCPCS: Performed by: NURSE PRACTITIONER

## 2025-06-17 PROCEDURE — 1200000000 HC SEMI PRIVATE

## 2025-06-17 PROCEDURE — 6360000002 HC RX W HCPCS: Performed by: NURSE PRACTITIONER

## 2025-06-17 PROCEDURE — 2580000003 HC RX 258: Performed by: STUDENT IN AN ORGANIZED HEALTH CARE EDUCATION/TRAINING PROGRAM

## 2025-06-17 PROCEDURE — 99285 EMERGENCY DEPT VISIT HI MDM: CPT

## 2025-06-17 PROCEDURE — 6370000000 HC RX 637 (ALT 250 FOR IP): Performed by: NURSE PRACTITIONER

## 2025-06-17 PROCEDURE — G0480 DRUG TEST DEF 1-7 CLASSES: HCPCS

## 2025-06-17 PROCEDURE — 85025 COMPLETE CBC W/AUTO DIFF WBC: CPT

## 2025-06-17 PROCEDURE — 85610 PROTHROMBIN TIME: CPT

## 2025-06-17 PROCEDURE — 86900 BLOOD TYPING SEROLOGIC ABO: CPT

## 2025-06-17 PROCEDURE — 80053 COMPREHEN METABOLIC PANEL: CPT

## 2025-06-17 PROCEDURE — 84439 ASSAY OF FREE THYROXINE: CPT

## 2025-06-17 PROCEDURE — 82805 BLOOD GASES W/O2 SATURATION: CPT

## 2025-06-17 PROCEDURE — 82550 ASSAY OF CK (CPK): CPT

## 2025-06-17 PROCEDURE — 82962 GLUCOSE BLOOD TEST: CPT

## 2025-06-17 PROCEDURE — 94761 N-INVAS EAR/PLS OXIMETRY MLT: CPT

## 2025-06-17 PROCEDURE — 70450 CT HEAD/BRAIN W/O DYE: CPT

## 2025-06-17 PROCEDURE — 86850 RBC ANTIBODY SCREEN: CPT

## 2025-06-17 PROCEDURE — 83735 ASSAY OF MAGNESIUM: CPT

## 2025-06-17 RX ORDER — SODIUM CHLORIDE 9 MG/ML
INJECTION, SOLUTION INTRAVENOUS PRN
Status: DISCONTINUED | OUTPATIENT
Start: 2025-06-17 | End: 2025-06-21 | Stop reason: HOSPADM

## 2025-06-17 RX ORDER — LEVOTHYROXINE SODIUM 88 UG/1
88 TABLET ORAL DAILY
Status: DISCONTINUED | OUTPATIENT
Start: 2025-06-17 | End: 2025-06-21 | Stop reason: HOSPADM

## 2025-06-17 RX ORDER — ACETAMINOPHEN 650 MG/1
650 SUPPOSITORY RECTAL EVERY 6 HOURS PRN
Status: DISCONTINUED | OUTPATIENT
Start: 2025-06-17 | End: 2025-06-21 | Stop reason: HOSPADM

## 2025-06-17 RX ORDER — SODIUM CHLORIDE 0.9 % (FLUSH) 0.9 %
5-40 SYRINGE (ML) INJECTION EVERY 12 HOURS SCHEDULED
Status: DISCONTINUED | OUTPATIENT
Start: 2025-06-17 | End: 2025-06-21 | Stop reason: HOSPADM

## 2025-06-17 RX ORDER — CARVEDILOL 6.25 MG/1
6.25 TABLET ORAL ONCE
Status: COMPLETED | OUTPATIENT
Start: 2025-06-17 | End: 2025-06-17

## 2025-06-17 RX ORDER — INSULIN LISPRO 100 [IU]/ML
0-8 INJECTION, SOLUTION INTRAVENOUS; SUBCUTANEOUS
Status: DISCONTINUED | OUTPATIENT
Start: 2025-06-17 | End: 2025-06-17

## 2025-06-17 RX ORDER — 0.9 % SODIUM CHLORIDE 0.9 %
1000 INTRAVENOUS SOLUTION INTRAVENOUS ONCE
Status: COMPLETED | OUTPATIENT
Start: 2025-06-17 | End: 2025-06-17

## 2025-06-17 RX ORDER — ONDANSETRON 4 MG/1
4 TABLET, ORALLY DISINTEGRATING ORAL EVERY 8 HOURS PRN
Status: DISCONTINUED | OUTPATIENT
Start: 2025-06-17 | End: 2025-06-21 | Stop reason: HOSPADM

## 2025-06-17 RX ORDER — ONDANSETRON 2 MG/ML
4 INJECTION INTRAMUSCULAR; INTRAVENOUS EVERY 6 HOURS PRN
Status: DISCONTINUED | OUTPATIENT
Start: 2025-06-17 | End: 2025-06-21 | Stop reason: HOSPADM

## 2025-06-17 RX ORDER — SPIRONOLACTONE 50 MG/1
25 TABLET, FILM COATED ORAL DAILY
Status: DISCONTINUED | OUTPATIENT
Start: 2025-06-17 | End: 2025-06-19

## 2025-06-17 RX ORDER — SUCRALFATE 1 G/1
1 TABLET ORAL 4 TIMES DAILY
COMMUNITY
Start: 2025-05-23

## 2025-06-17 RX ORDER — POLYETHYLENE GLYCOL 3350 17 G/17G
17 POWDER, FOR SOLUTION ORAL DAILY PRN
Status: DISCONTINUED | OUTPATIENT
Start: 2025-06-17 | End: 2025-06-21 | Stop reason: HOSPADM

## 2025-06-17 RX ORDER — POTASSIUM CHLORIDE 7.45 MG/ML
10 INJECTION INTRAVENOUS PRN
Status: DISCONTINUED | OUTPATIENT
Start: 2025-06-17 | End: 2025-06-21 | Stop reason: HOSPADM

## 2025-06-17 RX ORDER — LACTULOSE 10 G/15ML
20 SOLUTION ORAL ONCE
Status: DISCONTINUED | OUTPATIENT
Start: 2025-06-17 | End: 2025-06-17

## 2025-06-17 RX ORDER — ACETAMINOPHEN 325 MG/1
650 TABLET ORAL EVERY 6 HOURS PRN
Status: DISCONTINUED | OUTPATIENT
Start: 2025-06-17 | End: 2025-06-21 | Stop reason: HOSPADM

## 2025-06-17 RX ORDER — POTASSIUM CHLORIDE 1500 MG/1
40 TABLET, EXTENDED RELEASE ORAL PRN
Status: DISCONTINUED | OUTPATIENT
Start: 2025-06-17 | End: 2025-06-21 | Stop reason: HOSPADM

## 2025-06-17 RX ORDER — SODIUM CHLORIDE 0.9 % (FLUSH) 0.9 %
10 SYRINGE (ML) INJECTION PRN
Status: DISCONTINUED | OUTPATIENT
Start: 2025-06-17 | End: 2025-06-21 | Stop reason: HOSPADM

## 2025-06-17 RX ORDER — DEXTROSE MONOHYDRATE 100 MG/ML
INJECTION, SOLUTION INTRAVENOUS CONTINUOUS PRN
Status: DISCONTINUED | OUTPATIENT
Start: 2025-06-17 | End: 2025-06-21 | Stop reason: HOSPADM

## 2025-06-17 RX ORDER — POLYETHYLENE GLYCOL 3350 17 G
2 POWDER IN PACKET (EA) ORAL
Status: DISCONTINUED | OUTPATIENT
Start: 2025-06-17 | End: 2025-06-21 | Stop reason: HOSPADM

## 2025-06-17 RX ORDER — FUROSEMIDE 20 MG/1
20 TABLET ORAL DAILY
Status: DISCONTINUED | OUTPATIENT
Start: 2025-06-17 | End: 2025-06-19

## 2025-06-17 RX ORDER — LACTULOSE 10 G/15ML
20 SOLUTION ORAL 3 TIMES DAILY
Status: DISCONTINUED | OUTPATIENT
Start: 2025-06-17 | End: 2025-06-21 | Stop reason: HOSPADM

## 2025-06-17 RX ORDER — ENOXAPARIN SODIUM 100 MG/ML
30 INJECTION SUBCUTANEOUS DAILY
Status: DISCONTINUED | OUTPATIENT
Start: 2025-06-17 | End: 2025-06-21 | Stop reason: HOSPADM

## 2025-06-17 RX ORDER — GLUCAGON 1 MG/ML
1 KIT INJECTION PRN
Status: DISCONTINUED | OUTPATIENT
Start: 2025-06-17 | End: 2025-06-21 | Stop reason: HOSPADM

## 2025-06-17 RX ORDER — MAGNESIUM SULFATE IN WATER 40 MG/ML
2000 INJECTION, SOLUTION INTRAVENOUS PRN
Status: DISCONTINUED | OUTPATIENT
Start: 2025-06-17 | End: 2025-06-21 | Stop reason: HOSPADM

## 2025-06-17 RX ORDER — CARVEDILOL 6.25 MG/1
6.25 TABLET ORAL 2 TIMES DAILY WITH MEALS
Status: DISCONTINUED | OUTPATIENT
Start: 2025-06-17 | End: 2025-06-18

## 2025-06-17 RX ORDER — PANTOPRAZOLE SODIUM 40 MG/1
40 TABLET, DELAYED RELEASE ORAL
Status: DISCONTINUED | OUTPATIENT
Start: 2025-06-18 | End: 2025-06-21 | Stop reason: HOSPADM

## 2025-06-17 RX ADMIN — LACTULOSE 20 G: 20 SOLUTION ORAL at 20:24

## 2025-06-17 RX ADMIN — SODIUM CHLORIDE 1000 ML: 0.9 INJECTION, SOLUTION INTRAVENOUS at 05:32

## 2025-06-17 RX ADMIN — ENOXAPARIN SODIUM 30 MG: 100 INJECTION SUBCUTANEOUS at 13:41

## 2025-06-17 RX ADMIN — FUROSEMIDE 20 MG: 20 TABLET ORAL at 15:11

## 2025-06-17 RX ADMIN — RIFAXIMIN 550 MG: 550 TABLET ORAL at 15:11

## 2025-06-17 RX ADMIN — SODIUM CHLORIDE, PRESERVATIVE FREE 10 ML: 5 INJECTION INTRAVENOUS at 10:07

## 2025-06-17 RX ADMIN — LACTULOSE 20 G: 20 SOLUTION ORAL at 10:06

## 2025-06-17 RX ADMIN — SODIUM CHLORIDE, PRESERVATIVE FREE 10 ML: 5 INJECTION INTRAVENOUS at 20:26

## 2025-06-17 RX ADMIN — LEVOTHYROXINE SODIUM 88 MCG: 0.09 TABLET ORAL at 10:06

## 2025-06-17 RX ADMIN — RIFAXIMIN 550 MG: 550 TABLET ORAL at 20:24

## 2025-06-17 RX ADMIN — LACTULOSE 20 G: 20 SOLUTION ORAL at 13:40

## 2025-06-17 RX ADMIN — CARVEDILOL 6.25 MG: 6.25 TABLET, FILM COATED ORAL at 19:00

## 2025-06-17 RX ADMIN — CARVEDILOL 6.25 MG: 6.25 TABLET, FILM COATED ORAL at 13:39

## 2025-06-17 RX ADMIN — SPIRONOLACTONE 25 MG: 50 TABLET ORAL at 15:11

## 2025-06-17 ASSESSMENT — PAIN SCALES - WONG BAKER
WONGBAKER_NUMERICALRESPONSE: NO HURT

## 2025-06-17 NOTE — ED NOTES
Patient arrives via EMS after waking up at home and patients spouse reports she was more confused than normal. Patient arrives and will follow commands but confused. Patient is alert, blood glucose for EMS was 128. Unknown baseline or last known well.

## 2025-06-17 NOTE — PROGRESS NOTES
LOVENOX PROPHYLAXIS EVALUATION  (Populations not addressed in this protocol: trauma, obstetrics, or COVID-19)    Wt Readings from Last 3 Encounters:   06/17/25 61.2 kg (134 lb 14.7 oz)   04/30/25 62.4 kg (137 lb 9.1 oz)   09/10/24 61.2 kg (135 lb)       Estimated Creatinine Clearance: 25 mL/min (A) (based on SCr of 1.7 mg/dL (H)).  Recent Labs     06/17/25  0456 06/17/25  0634   BUN  --  21*   CREATININE  --  1.7*   HGB 8.6*  --    HCT 27.2*  --    INR 1.1  --        Weight Range: .9 kg    CRCL = 15-29.99    50.9 kg   and below     .9  kg   101-150.9 kg   151-174.9  kg   175 kg  or greater     Heparin 5,000 units  subq BID     30mg subq daily       30mg subq  daily   40mg subq  daily   60mg subq daily       Per P/T protocol for appropriate subq anticoagulation by weight and CRCL change to:    Enoxparin 30mg subq daily      Maria Isabel Nuñez RPH  11:31 AM  06/17/25

## 2025-06-17 NOTE — ED PROVIDER NOTES
Emergency Department Encounter      Patient: Dayana Pacheco  MRN: [id number]  : 1950  Date of Evaluation: 2025  PCP: Raji Her MD  ED Provider:  Eduard Rodriguez DO    Triage Chief Complaint:    Altered Mental Status (Blood glucose 128 per EMS)    HPI:   Dayana Pacheco is a 74 y.o. female that presents with altered mental status.  Spoke with patient's , he states that over the past couple of weeks she has slowly been declining.  She has been more confused than normal.  States yesterday that she worked with PT/OT but was not really able to ambulate or move around much.  He states that she is becoming more noncommunicative.  He reports that he was told from the provider after she was seen on 2025 but her blood counts were low and she needed a transfusion.  He declines any known areas of bleeding including dark black melanotic stools hematemesis or hemoptysis.  States that he has been giving her the lactulose and increase that to 3 times a day but is only been having about 1 bowel movement a day.  He declines any associated fevers or chills.  no falls injuries or trauma.    Note reviewed from 2025 including being recently hospitalized secondary to hepatic encephalopathy.  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  Type 2 diabetes urine was ordered.    Discharge summary reviewed from 2025.  Patient found to have decompensated cirrhosis with hepatic encephalopathy.  Ammonia was 137.  MELD sodium score 18.  CT nonacute.  Patient did have improvement in mentation.  Patient was discharged to skilled nursing facility        History from : Family house and EMS    Limitations to history : Altered Mental Status    MDM/ED Course:       In brief,     Pleasant 74-year-old female presenting to the emergency department as above.  Arrives hemodynamically stable.  No emergent conditions considered.  Patient does appear to be  used to limit patient radiation dose. FINDINGS: CT HEAD: Gray-white matter differentiation is intact. No evidence for acute territorial infarct. Ventricles, cisterns and cerebral and cerebellar sulci are normal caliber. Mild patchy hypoattenuation of the bilateral periventricular and subcortical white matter. Bilateral intracranial carotid and vertebral arteries are atherosclerotic. No discrete intra- or extra-axial fluid collections, hemorrhage or mass. No midline shift or mass effect. Calvaria is intact. Mild mucosal thickening of the left chamber of the sphenoid sinus. Mastoid air cells are clear. Orbits and soft tissues are unremarkable. IMPRESSION: 1.  Nonacute noncontrasted CT of the brain. 2.  Mild nonspecific white matter changes likely related to chronic microvascular disease. This dictation was created with voice recognition software.  While attempts have  been made to review the dictation as it is transcribed, on occasion the spoken word can be misinterpreted by the technology leading to omissions or inappropriate words, phrases or sentences.  Dictated and Electronically Signed By: Howard Mendez [facility] 6/17/2025 5:29        XR CHEST PORTABLE  Result Date: 6/17/2025  EXAMINATION: XR CHEST PORTABLE DATE OF EXAM:  6/17/2025 5:20 DEMOGRAPHICS: 74 years old Female INDICATION: AMS COMPARISON: Chest x-ray 4/25/2025 TECHNIQUE: Single AP portable chest radiograph was obtained. FINDINGS: No consolidation, pleural effusion or pneumothorax. Cardiac silhouette  and vascular pedicle are unremarkable. Trachea is midline. Multiple old healed bilateral rib fractures. Thoracic aorta is atherosclerotic. IMPRESSION: 1.  Nonacute chest. This dictation was created with voice recognition software.  While attempts have  been made to review the dictation as it is transcribed, on occasion the spoken word can be misinterpreted by the technology leading to omissions or inappropriate words, phrases or

## 2025-06-17 NOTE — ED NOTES
.Medication History  [facility]    Patient Name: Dayana Pacheco 1950     Medication history has been completed by: Deana Card [facility]    Source(s) of information: Insurance claims and patient's      Primary Care Physician: Raji Her MD     Pharmacy: [facility]    Allergies as of 06/17/2025 - Fully Reviewed 05/12/2025   Allergen Reaction Noted    Bupropion  06/02/2014    Nsaids  03/15/2011    Amlodipine  05/07/2019    Nateglinide Nausea Only 07/15/2015    Ofloxacin Hives 01/14/2008    Paroxetine hcl Hives 01/14/2008    Pioglitazone  10/06/2015    Citalopram Nausea And Vomiting 07/12/2022    Escitalopram Nausea And Vomiting 07/12/2022    Flonase [fluticasone] Nausea And Vomiting 07/12/2022    Paxil [paroxetine] Nausea And Vomiting 07/12/2022        Prior to Admission medications    Medication Sig Start Date End Date Taking? Authorizing Provider   sucralfate (CARAFATE) 1 GM tablet Take 1 tablet by mouth in the morning, at noon, in the evening, and at bedtime 06/17/25  reports uses applesauce or pudding for ease of swallowing. 5/23/25  Yes ProviderShannan MD   melatonin 3 MG TABS tablet Take 1 tablet by mouth at bedtime 4/30/25  Yes Tracy Lang MD   pantoprazole (PROTONIX) 40 MG tablet TAKE 1 TABLET BY MOUTH ONCE DAILY IN THE MORNING BEFORE BREAKFAST 4/24/25  Yes Nany Romero APRN - CNP   ondansetron (ZOFRAN) 4 MG tablet Take 1 tablet by mouth daily as needed for Nausea or Vomiting 3/20/25  Yes Nany Romero APRN - CNP   metoclopramide (REGLAN) 10 MG tablet Take 0.5 tablets by mouth 4 times daily 3/20/25  Yes Nany Romero APRN - CNP   Insulin Disposable Pump (OMNIPOD 5 G6 INTRO, GEN 5,) KIT  9/13/23  Yes JO-ANN Rosnethal MD   lovastatin (MEVACOR) 40 MG tablet Take 1 tablet by mouth nightly 9/13/23  Yes Leo Quezada MD   levothyroxine (SYNTHROID) 88 MCG tablet Take 1 tablet by mouth Daily 1/8/21  Yes Darrick Ma MD

## 2025-06-17 NOTE — ED NOTES
The following labs were labeled with appropriate pt sticker and tubed to lab:     [] Blue     [x] Lavender   [x] on ice  [x] Green/yellow x2  [] Green/black [] on ice  [] Grey  [] on ice  [] Yellow  [] Red  [] Pink  [] Type/ Screen  [] ABG  [] VBG    [] COVID-19 swab    [] Rapid  [] PCR  [] Flu swab  [] Peds Viral Panel     [] Urine Sample  [] Fecal Sample  [] Pelvic Cultures  [] Blood Cultures  [] X 2  [] STREP Cultures  [] Wound Cultures

## 2025-06-17 NOTE — ED PROVIDER NOTES
Patient signed out to me by Dr. Rodriguez,    74yof with h/o liver disease, cirrhosis, had hospitalizaton in early spring for AMS, was sent to nursing home. Recently returned home, over last several weeks has had worsening mental status, more confused than normal, not acting right. Not having good BMs- per  has been taking her lactulose.     Pending labs, likely admission.      Monica Mandujano MD  06/17/25 0607        Does appear to have hepatic encephalopathy, ammonia level of 172, lactulose is ordered. creatinine is 1.7 which does appear stable compared to previous, normal sodium and potassium.  LFTs appear to be stable.  She is pancytopenic    Admit to hospitalist team.      Monica Mandujano MD  06/17/25 9034

## 2025-06-17 NOTE — PROGRESS NOTES
4 Eyes Skin Assessment     NAME:  Dayana Pacheco  YOB: 1950  MEDICAL RECORD NUMBER:  [id number]    The patient is being assessed for  Admission    I agree that at least one RN has performed a thorough Head to Toe Skin Assessment on the patient. ALL assessment sites listed below have been assessed.      Areas assessed by both nurses:    Head, Face, Ears, Shoulders, Back, Chest, Arms, Elbows, Hands, Sacrum. Buttock, Coccyx, Ischium, and Legs. Feet and Heels        Does the Patient have a Wound? No noted wound(s)       Kunal Prevention initiated by RN: No  Wound Care Orders initiated by RN: No    Pressure Injury (Stage 3,4, Unstageable, DTI, NWPT, and Complex wounds) if present, place Wound referral order by RN under : No    New Ostomies, if present place, Ostomy referral order under : No     Nurse 1 eSignature: Electronically signed by Jo-Ann Hdz RN on 6/17/25 at 11:50 AM EDT    **SHARE this note so that the co-signing nurse can place an eSignature**    Nurse 2 eSignature: Electronically signed by Padmini Cantrell RN on 6/17/25 at 4:59 PM EDT

## 2025-06-17 NOTE — H&P
V2.0  History and Physical      Name:  Dayana Pacheco /Age/Sex: 1950  (74 y.o. female)   MRN & CSN:  [id number] & [id number] Encounter Date/Time: 2025 9:24 AM EDT   Location:  [address] [place] [address]- PCP: Raji Her MD       Hospital Day: 1    Assessment and Plan:   Dayana Pacheco is a 74 y.o. female with a pmh of hypothyroidism, cirrhosis of liver with ascites, type 2 diabetes, hypertension, hyperlipidemia, secondary varices who presents with Hepatic encephalopathy (HCC)    Hospital Problems           Last Modified POA    * (Principal) Hepatic encephalopathy (HCC) 2025 Yes     Hepatic encephalopathy  History of cirrhosis  varices  -confused  -elevated ammonia 172  -admit patient to medical surgical floor  -continue lactulose 20 g tid to keep 3-4 bowel movement daily  -add coreg for hypertension and varices  - Continue Protonix  -start xifaxan  -GI consult, appreciate their recommendation    Hypertension  - Not on home home medications  - BP was 190s over 100s  - Start Coreg 6.25 Mg twice daily  - Will consider add spironolactone if BP above goal    Type 2 diabetes  - Patient is insulin pump, patient's  is managing it  - Current glucose is 123  - Keep monitor    Hypothyroidism  - Is on Synthroid 88 mcg daily (recently decreased to 88 a month ago, was on 100 mcg daily before)  - TSH is diminished at 0.16, T4 is normal at 0.18    CKD stage III  - Creatinine 1.7 at her baseline    Disposition:   Current Living situation: Home  Expected Disposition: Pending  Estimated D/C: 2 to 3 days    Diet ADULT DIET; Regular   DVT Prophylaxis [x] Lovenox, []  Heparin, [] SCDs, [] Ambulation,  [] Eliquis, [] Xarelto   Code Status Full Code   Surrogate Decision Maker/ POA      History from:     electronic medical record    History of Present Illness:     Chief Complaint: Mental status  Dayana Pacheco is a 74 y.o. female with pmh of cirrhosis, hypertension, hyperlipidemia, hypothyroidism who presents

## 2025-06-17 NOTE — ED NOTES
ED TO INPATIENT SBAR HANDOFF    Patient Name: Dayana Pacheco   :  1950  74 y.o.   Preferred Name  Sugey   Family/Caregiver Present no   Restraints no   C-SSRS:    Sitter no   Sepsis Risk Score Sepsis V2 Risk Score: 27.3    PLEASE NOTE--Encounter / Re-Admission Within 30 Days  This patient has had another encounter or admission within the last 30 days.      Readmission Risk Score: 19.9      Situation  Chief Complaint   Patient presents with    Altered Mental Status     Blood glucose 128 per EMS     Brief Description of Patient's Condition: presents with altered mental status.  Spoke with patient's , he states that over the past couple of weeks she has slowly been declining.  She has been more confused than normal.  States yesterday that she worked with PT/OT but was not really able to ambulate or move around much.  He states that she is becoming more noncommunicative.  He reports that he was told from the provider after she was seen on 2025 but her blood counts were low and she needed a transfusion.  He declines any known areas of bleeding including dark black melanotic stools hematemesis or hemoptysis.  States that he has been giving her the lactulose and increase that to 3 times a day but is only been having about 1 bowel movement a day.  He declines any associated fevers or chills.  no falls injuries or trauma.     Note reviewed from 2025 including being recently hospitalized secondary to hepatic encephalopathy.  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  Type 2 diabetes urine was ordered.     Discharge summary reviewed from 2025.  Patient found to have decompensated cirrhosis with hepatic encephalopathy.  Ammonia was 137.  MELD sodium score 18.  CT nonacute.  Patient did have improvement in mentation.  Patient was discharged to skilled nursing facility  Mental Status: disoriented  Arrived from: home    Imaging:   CT HEAD WO

## 2025-06-18 LAB
ALBUMIN SERPL-MCNC: 3.4 G/DL (ref 3.4–5)
ALBUMIN/GLOB SERPL: 1.1 {RATIO} (ref 1.1–2.2)
ALP SERPL-CCNC: 70 U/L (ref 40–129)
ALT SERPL-CCNC: 22 U/L (ref 10–40)
AMMONIA PLAS-SCNC: 96 UMOL/L (ref 11–51)
ANION GAP SERPL CALCULATED.3IONS-SCNC: 11 MMOL/L (ref 9–17)
AST SERPL-CCNC: 30 U/L (ref 15–37)
BILIRUB SERPL-MCNC: 1 MG/DL (ref 0–1)
BUN SERPL-MCNC: 23 MG/DL (ref 7–20)
CALCIUM SERPL-MCNC: 9.1 MG/DL (ref 8.3–10.6)
CHLORIDE SERPL-SCNC: 102 MMOL/L (ref 99–110)
CO2 SERPL-SCNC: 23 MMOL/L (ref 21–32)
CREAT SERPL-MCNC: 1.8 MG/DL (ref 0.6–1.2)
GFR, ESTIMATED: 27 ML/MIN/1.73M2
GLUCOSE BLD-MCNC: 114 MG/DL (ref 74–99)
GLUCOSE BLD-MCNC: 158 MG/DL (ref 74–99)
GLUCOSE BLD-MCNC: 253 MG/DL (ref 74–99)
GLUCOSE BLD-MCNC: 307 MG/DL (ref 74–99)
GLUCOSE SERPL-MCNC: 176 MG/DL (ref 74–99)
POTASSIUM SERPL-SCNC: 4.5 MMOL/L (ref 3.5–5.1)
PROT SERPL-MCNC: 6.3 G/DL (ref 6.4–8.2)
SODIUM SERPL-SCNC: 136 MMOL/L (ref 136–145)

## 2025-06-18 PROCEDURE — 99223 1ST HOSP IP/OBS HIGH 75: CPT | Performed by: INTERNAL MEDICINE

## 2025-06-18 PROCEDURE — 94761 N-INVAS EAR/PLS OXIMETRY MLT: CPT

## 2025-06-18 PROCEDURE — 82962 GLUCOSE BLOOD TEST: CPT

## 2025-06-18 PROCEDURE — 6360000002 HC RX W HCPCS: Performed by: NURSE PRACTITIONER

## 2025-06-18 PROCEDURE — 36415 COLL VENOUS BLD VENIPUNCTURE: CPT

## 2025-06-18 PROCEDURE — 2500000003 HC RX 250 WO HCPCS: Performed by: NURSE PRACTITIONER

## 2025-06-18 PROCEDURE — 1200000000 HC SEMI PRIVATE

## 2025-06-18 PROCEDURE — 82140 ASSAY OF AMMONIA: CPT

## 2025-06-18 PROCEDURE — 6370000000 HC RX 637 (ALT 250 FOR IP): Performed by: NURSE PRACTITIONER

## 2025-06-18 PROCEDURE — 80053 COMPREHEN METABOLIC PANEL: CPT

## 2025-06-18 RX ORDER — GLUCAGON 1 MG/ML
1 KIT INJECTION PRN
Status: DISCONTINUED | OUTPATIENT
Start: 2025-06-18 | End: 2025-06-21 | Stop reason: HOSPADM

## 2025-06-18 RX ORDER — PROPRANOLOL HYDROCHLORIDE 10 MG/1
10 TABLET ORAL 3 TIMES DAILY
Status: DISCONTINUED | OUTPATIENT
Start: 2025-06-18 | End: 2025-06-21 | Stop reason: HOSPADM

## 2025-06-18 RX ORDER — DEXTROSE MONOHYDRATE 100 MG/ML
INJECTION, SOLUTION INTRAVENOUS CONTINUOUS PRN
Status: DISCONTINUED | OUTPATIENT
Start: 2025-06-18 | End: 2025-06-21 | Stop reason: HOSPADM

## 2025-06-18 RX ADMIN — LACTULOSE 20 G: 20 SOLUTION ORAL at 11:06

## 2025-06-18 RX ADMIN — SODIUM CHLORIDE, PRESERVATIVE FREE 10 ML: 5 INJECTION INTRAVENOUS at 21:37

## 2025-06-18 RX ADMIN — RIFAXIMIN 550 MG: 550 TABLET ORAL at 11:07

## 2025-06-18 RX ADMIN — SPIRONOLACTONE 25 MG: 50 TABLET ORAL at 11:07

## 2025-06-18 RX ADMIN — SODIUM CHLORIDE, PRESERVATIVE FREE 10 ML: 5 INJECTION INTRAVENOUS at 11:09

## 2025-06-18 RX ADMIN — LACTULOSE 20 G: 20 SOLUTION ORAL at 21:37

## 2025-06-18 RX ADMIN — PANTOPRAZOLE SODIUM 40 MG: 40 TABLET, DELAYED RELEASE ORAL at 11:07

## 2025-06-18 RX ADMIN — RIFAXIMIN 550 MG: 550 TABLET ORAL at 21:36

## 2025-06-18 RX ADMIN — LEVOTHYROXINE SODIUM 88 MCG: 0.09 TABLET ORAL at 11:07

## 2025-06-18 RX ADMIN — PROPRANOLOL HYDROCHLORIDE 10 MG: 10 TABLET ORAL at 16:33

## 2025-06-18 RX ADMIN — LACTULOSE 20 G: 20 SOLUTION ORAL at 14:39

## 2025-06-18 RX ADMIN — ENOXAPARIN SODIUM 30 MG: 100 INJECTION SUBCUTANEOUS at 11:08

## 2025-06-18 NOTE — CARE COORDINATION
06/18/25 1031   Service Assessment   Patient Orientation Unable to Assess   Cognition Other (see comment)  (sleeping)   History Provided By Spouse   Support Systems Spouse/Significant Other   Patient's Healthcare Decision Maker is: Named in Scanned ACP Document   PCP Verified by CM Yes   Prior Functional Level Assistance with the following:;Mobility   Current Functional Level Assistance with the following:;Mobility   Can patient return to prior living arrangement Unknown at present   Ability to make needs known: Other (see comment)  (sleeping)   Family able to assist with home care needs: Yes   Would you like for me to discuss the discharge plan with any other family members/significant others, and if so, who? Yes  ( Tad)   Financial Resources Medicare   Community Resources ECF/Home Care     CM in to see Pt to initiate discharge planning.  Pt sleeping soundly.  Pt  Tad present.      Pt is from home with Tad and [facility] home care.  Discharge plan is to return and resume home care.     PS to Dr. Jeffers to update, home care order requested.  Tad denies any needs at this time.  CM following

## 2025-06-18 NOTE — H&P (VIEW-ONLY)
at 6/17/2025  4:56 AM  Age at listing (hypothetical): 74 years  Sex: Female at 6/18/2025  9:03 AM     CIRRHOSIS DATABASE:  A.  Varices surveillance/prophylaxis-last in 2023 currently due for repeat 1 which is scheduled in July  B.  Ascites-none  C.  Hepatocellular carcinoma screening-ultrasound every 6 months  D.  Vaccination-per Dr. Lawrence's note has had injections for hep A and B  E.  Hepatic encephalopathy-lactulose and rifaximin  F.  Spontaneous bacterial peritonitis-none  G.  Patient counseled on ETOH and tobacco avoidance.  H.  Tylenol use up to 2 grams/day ok.  I.    Patient counseled on NSAID medication avoidance.  J.   Patient counseled on avoidance of hepatotoxic medications.  Correct coagulopathy if bleeding OR for procedures: no indication for correction at present time     3) history of possible gastroparesis.  Currently on long-term Reglan 5 mg twice daily as outpatient.  Continue.    4) acute on chronic anemia.  Check iron studies.  Supplement as needed.  Discussed with the patient that possible etiologies could include microscopic losses from GAVE versus portal hypertensive gastropathy.  Will assess on outpatient EGD    5) CKD   Cr stable at 1.7    More than 75 minutes spent in review of patient's charts, previous notes, lab work, history from patient's  and documentation    Aury Mehta MD    Thank you for allowing us to be involved in the management of this patient. We will follow this patient along with you. Please feel free to call with any questions.

## 2025-06-18 NOTE — CONSULTS
[facility] Gastroenterology and Hepatology             MD Aury Whipple MD Alan Gabbard, MD Carol Christensen, APRN-CNP       Gladis Rowell, APRN             [address] [place] [address]             [phone] fax [phone]      Gastroenterology Consult Note  Aury Mehta MD      Reason for Consult:  Cirrhosis, Altered metabolic Encephalopathy     Primary Care Physician:  Raji Her MD  History Obtained From:  patient, electronic medical record    CC: Confusion, weakness    HISTORY OF PRESENT ILLNESS:              The patient is a 74 y.o.  female with past medical history of cirrhosis, hypertension, neuropathy, thyroid disease who presented to the hospital for confusion.    Most of the history is provided by the patient's spouse was present at bedside.  According to him the patient had been having some difficulty last week when he noted some confusion and weakness however improved over the weekend after he was able to increase her lactulose dose and then all of a sudden started worsening again which caused him to come to the hospital.  According to the patient's  she was on lactulose twice daily however was only having 1-2 bowel movements.    Last endoscopy with Dr. Lawrence back in 2023 with deflated varices.  She was currently not on rifaximin.  Creatinine is currently stable at her baseline.  She is anemic with a hemoglobin of 8.6.  Currently has an outpatient endoscopy scheduled with us in July.    Past Medical History:        Diagnosis Date    Arthritis     Cancer (HCC)     right breast.    Diabetes mellitus (HCC)     Esophageal varices (HCC)     Fatty liver     Glaucoma     Gout     patient states \"she has never had gout\".    History of blood transfusion     Hyperlipidemia     Hypertension     Neuropathy     Thyroid disease        Past Surgical History:        Procedure Laterality Date    BREAST BIOPSY      CATARACT  at 6/17/2025  4:56 AM  Age at listing (hypothetical): 74 years  Sex: Female at 6/18/2025  9:03 AM     CIRRHOSIS DATABASE:  A.  Varices surveillance/prophylaxis-last in 2023 currently due for repeat 1 which is scheduled in July  B.  Ascites-none  C.  Hepatocellular carcinoma screening-ultrasound every 6 months  D.  Vaccination-per Dr. Lawrence's note has had injections for hep A and B  E.  Hepatic encephalopathy-lactulose and rifaximin  F.  Spontaneous bacterial peritonitis-none  G.  Patient counseled on ETOH and tobacco avoidance.  H.  Tylenol use up to 2 grams/day ok.  I.    Patient counseled on NSAID medication avoidance.  J.   Patient counseled on avoidance of hepatotoxic medications.  Correct coagulopathy if bleeding OR for procedures: no indication for correction at present time     3) history of possible gastroparesis.  Currently on long-term Reglan 5 mg twice daily as outpatient.  Continue.    4) acute on chronic anemia.  Check iron studies.  Supplement as needed.  Discussed with the patient that possible etiologies could include microscopic losses from GAVE versus portal hypertensive gastropathy.  Will assess on outpatient EGD    5) CKD   Cr stable at 1.7    More than 75 minutes spent in review of patient's charts, previous notes, lab work, history from patient's  and documentation    Aury Mehta MD    Thank you for allowing us to be involved in the management of this patient. We will follow this patient along with you. Please feel free to call with any questions.

## 2025-06-18 NOTE — PROGRESS NOTES
V2.0  [facility] Hospitalist Progress Note      Name:  Dayana Pacheco /Age/Sex: 1950  (74 y.o. female)   MRN & CSN:  [id number] & [id number] Encounter Date/Time: 2025 3:37 PM EDT    Location:  [address] [place] [address]- PCP: Raji Her MD       Hospital Day: 2    Assessment and Plan:   Dayana Pacheco is a 74 y.o. female with pmh ofhypothyroidism, cirrhosis of liver with ascites, type 2 diabetes, hypertension, hyperlipidemia, secondary varices who presents with Hepatic encephalopathy (HCC)     Plan:  Hepatic encephalopathy  History of cirrhosis  varices  -confused and sleepy  -elevated ammonia 172 at admission, improved to 92 today  -continue lactulose 20 g tid to keep 3-4 bowel movement daily  - Continue Protonix  - Continue xifaxan  -GI consult, appreciate their recommendation     Hypertension  - BP is very soft today  -Hold Lasix and spironolactone  -Switch Coreg to propranolol     Type 2 diabetes  - Patient is insulin pump, patient's  is managing it  - Current glucose is 123  - Keep monitor     Hypothyroidism  - Is on Synthroid 88 mcg daily (recently decreased to 88 a month ago, was on 100 mcg daily before)  - TSH is diminished at 0.16, T4 is normal at 0.18     CKD stage III  - Creatinine 1.7 at her baseline    Diet ADULT DIET; Regular   DVT Prophylaxis [x] Lovenox, []  Heparin, [] SCDs, [] Ambulation,  [] Eliquis, [] Xarelto  [] Coumadin   Code Status Full Code   Disposition From: Home  Expected Disposition: Pending  Estimated Date of Discharge: 2 to 3 days  Patient requires continued admission due to medical condition   Surrogate Decision Maker/ POA      Subjective:     Chief Complaint: Altered Mental Status (Blood glucose 128 per EMS)       Dayana Pacheco is a 74 y.o. female is evaluated in the room.  Patient seems a little more sleepy today.  Wake up with my voice.  Only answer questions with \"yes \"or \"no\"         Review of Systems:    Review of Systems   All other systems reviewed and

## 2025-06-19 LAB
ALBUMIN SERPL-MCNC: 3.3 G/DL (ref 3.4–5)
ALBUMIN/GLOB SERPL: 1.2 {RATIO} (ref 1.1–2.2)
ALP SERPL-CCNC: 68 U/L (ref 40–129)
ALT SERPL-CCNC: 21 U/L (ref 10–40)
AMMONIA PLAS-SCNC: 53 UMOL/L (ref 11–51)
ANION GAP SERPL CALCULATED.3IONS-SCNC: 12 MMOL/L (ref 9–17)
AST SERPL-CCNC: 27 U/L (ref 15–37)
BILIRUB SERPL-MCNC: 0.6 MG/DL (ref 0–1)
BUN SERPL-MCNC: 26 MG/DL (ref 7–20)
CALCIUM SERPL-MCNC: 8.9 MG/DL (ref 8.3–10.6)
CHLORIDE SERPL-SCNC: 102 MMOL/L (ref 99–110)
CO2 SERPL-SCNC: 23 MMOL/L (ref 21–32)
CREAT SERPL-MCNC: 2 MG/DL (ref 0.6–1.2)
EST. AVERAGE GLUCOSE BLD GHB EST-MCNC: 122 MG/DL
GFR, ESTIMATED: 24 ML/MIN/1.73M2
GLUCOSE BLD-MCNC: 285 MG/DL (ref 74–99)
GLUCOSE BLD-MCNC: 307 MG/DL (ref 74–99)
GLUCOSE BLD-MCNC: 389 MG/DL (ref 74–99)
GLUCOSE BLD-MCNC: 396 MG/DL (ref 74–99)
GLUCOSE SERPL-MCNC: 261 MG/DL (ref 74–99)
HBA1C MFR BLD: 5.9 % (ref 4.2–6.3)
POTASSIUM SERPL-SCNC: 4.3 MMOL/L (ref 3.5–5.1)
PROT SERPL-MCNC: 6.1 G/DL (ref 6.4–8.2)
SODIUM SERPL-SCNC: 137 MMOL/L (ref 136–145)

## 2025-06-19 PROCEDURE — 80053 COMPREHEN METABOLIC PANEL: CPT

## 2025-06-19 PROCEDURE — 97166 OT EVAL MOD COMPLEX 45 MIN: CPT

## 2025-06-19 PROCEDURE — 97535 SELF CARE MNGMENT TRAINING: CPT

## 2025-06-19 PROCEDURE — 1200000000 HC SEMI PRIVATE

## 2025-06-19 PROCEDURE — 82140 ASSAY OF AMMONIA: CPT

## 2025-06-19 PROCEDURE — 83036 HEMOGLOBIN GLYCOSYLATED A1C: CPT

## 2025-06-19 PROCEDURE — 97530 THERAPEUTIC ACTIVITIES: CPT

## 2025-06-19 PROCEDURE — 97162 PT EVAL MOD COMPLEX 30 MIN: CPT

## 2025-06-19 PROCEDURE — 94150 VITAL CAPACITY TEST: CPT

## 2025-06-19 PROCEDURE — 6370000000 HC RX 637 (ALT 250 FOR IP): Performed by: NURSE PRACTITIONER

## 2025-06-19 PROCEDURE — 94761 N-INVAS EAR/PLS OXIMETRY MLT: CPT

## 2025-06-19 PROCEDURE — 6360000002 HC RX W HCPCS: Performed by: NURSE PRACTITIONER

## 2025-06-19 PROCEDURE — 94664 DEMO&/EVAL PT USE INHALER: CPT

## 2025-06-19 PROCEDURE — 2500000003 HC RX 250 WO HCPCS: Performed by: NURSE PRACTITIONER

## 2025-06-19 PROCEDURE — 99232 SBSQ HOSP IP/OBS MODERATE 35: CPT | Performed by: INTERNAL MEDICINE

## 2025-06-19 PROCEDURE — 82962 GLUCOSE BLOOD TEST: CPT

## 2025-06-19 PROCEDURE — 36415 COLL VENOUS BLD VENIPUNCTURE: CPT

## 2025-06-19 PROCEDURE — 6370000000 HC RX 637 (ALT 250 FOR IP)

## 2025-06-19 RX ORDER — SODIUM CHLORIDE 9 MG/ML
INJECTION, SOLUTION INTRAVENOUS PRN
Status: CANCELLED | OUTPATIENT
Start: 2025-06-19

## 2025-06-19 RX ORDER — INSULIN LISPRO 100 [IU]/ML
4 INJECTION, SOLUTION INTRAVENOUS; SUBCUTANEOUS ONCE
Status: COMPLETED | OUTPATIENT
Start: 2025-06-19 | End: 2025-06-19

## 2025-06-19 RX ORDER — SODIUM CHLORIDE, SODIUM LACTATE, POTASSIUM CHLORIDE, CALCIUM CHLORIDE 600; 310; 30; 20 MG/100ML; MG/100ML; MG/100ML; MG/100ML
INJECTION, SOLUTION INTRAVENOUS CONTINUOUS
Status: CANCELLED | OUTPATIENT
Start: 2025-06-19

## 2025-06-19 RX ORDER — INSULIN GLARGINE 100 [IU]/ML
15 INJECTION, SOLUTION SUBCUTANEOUS DAILY
Status: DISCONTINUED | OUTPATIENT
Start: 2025-06-19 | End: 2025-06-21 | Stop reason: HOSPADM

## 2025-06-19 RX ORDER — SODIUM CHLORIDE 0.9 % (FLUSH) 0.9 %
5-40 SYRINGE (ML) INJECTION EVERY 12 HOURS SCHEDULED
Status: CANCELLED | OUTPATIENT
Start: 2025-06-19

## 2025-06-19 RX ORDER — INSULIN LISPRO 100 [IU]/ML
0-4 INJECTION, SOLUTION INTRAVENOUS; SUBCUTANEOUS
Status: DISCONTINUED | OUTPATIENT
Start: 2025-06-19 | End: 2025-06-21 | Stop reason: HOSPADM

## 2025-06-19 RX ORDER — SODIUM CHLORIDE 0.9 % (FLUSH) 0.9 %
5-40 SYRINGE (ML) INJECTION PRN
Status: CANCELLED | OUTPATIENT
Start: 2025-06-19

## 2025-06-19 RX ADMIN — SODIUM CHLORIDE, PRESERVATIVE FREE 10 ML: 5 INJECTION INTRAVENOUS at 09:33

## 2025-06-19 RX ADMIN — INSULIN LISPRO 4 UNITS: 100 INJECTION, SOLUTION INTRAVENOUS; SUBCUTANEOUS at 21:44

## 2025-06-19 RX ADMIN — INSULIN GLARGINE 15 UNITS: 100 INJECTION, SOLUTION SUBCUTANEOUS at 11:19

## 2025-06-19 RX ADMIN — PANTOPRAZOLE SODIUM 40 MG: 40 TABLET, DELAYED RELEASE ORAL at 06:22

## 2025-06-19 RX ADMIN — LACTULOSE 20 G: 20 SOLUTION ORAL at 21:31

## 2025-06-19 RX ADMIN — RIFAXIMIN 550 MG: 550 TABLET ORAL at 09:31

## 2025-06-19 RX ADMIN — LEVOTHYROXINE SODIUM 88 MCG: 0.09 TABLET ORAL at 06:21

## 2025-06-19 RX ADMIN — RIFAXIMIN 550 MG: 550 TABLET ORAL at 21:31

## 2025-06-19 RX ADMIN — INSULIN LISPRO 4 UNITS: 100 INJECTION, SOLUTION INTRAVENOUS; SUBCUTANEOUS at 11:19

## 2025-06-19 RX ADMIN — PROPRANOLOL HYDROCHLORIDE 10 MG: 10 TABLET ORAL at 09:31

## 2025-06-19 RX ADMIN — LACTULOSE 20 G: 20 SOLUTION ORAL at 14:35

## 2025-06-19 RX ADMIN — INSULIN LISPRO 2 UNITS: 100 INJECTION, SOLUTION INTRAVENOUS; SUBCUTANEOUS at 17:14

## 2025-06-19 RX ADMIN — LACTULOSE 20 G: 20 SOLUTION ORAL at 09:31

## 2025-06-19 RX ADMIN — ENOXAPARIN SODIUM 30 MG: 100 INJECTION SUBCUTANEOUS at 09:31

## 2025-06-19 RX ADMIN — INSULIN LISPRO 4 UNITS: 100 INJECTION, SOLUTION INTRAVENOUS; SUBCUTANEOUS at 22:30

## 2025-06-19 RX ADMIN — SODIUM CHLORIDE, PRESERVATIVE FREE 10 ML: 5 INJECTION INTRAVENOUS at 21:33

## 2025-06-19 ASSESSMENT — PAIN SCALES - WONG BAKER: WONGBAKER_NUMERICALRESPONSE: NO HURT

## 2025-06-19 NOTE — PROGRESS NOTES
[facility] Gastroenterology and Hepatology             MD Aury Cruz MD Carol Christensen, APRN-CNP             [address] [place] [address]             [phone] fax [phone]      Gastroenterology Progress note . 6/19/2025  Reason for consult:   Encephalopathy, cirrhosis          Interval H/P    Patient becomes more alert.  Ammonia continues to downtrend.  Hemoglobin is stable.  No overt melena or hematochezia     Physical Exam  Blood pressure (!) 103/48, pulse 67, temperature 97.8 °F (36.6 °C), temperature source Oral, resp. rate 20, weight 61.2 kg (134 lb 14.7 oz), SpO2 94%, not currently breastfeeding.  Constitutional: Patient is in no distress.   Eyes: Pupils equal, round.  No icterus.  HENT: Oral mucosa is moist.   Pulmonary: No accessory muscle use. No localizing pulmonary findings.     Cardiovascular: Heart has a regular rate and rhythm, no JVD.   Gastrointestinal: Bowel sounds are present in all four quadrants. Abdomen is soft, nontender, nondistended. Rectal examination deferred.   Skin: No jaundice, spider angiomas, or palmar erythema. No purpura.  Neuro: Awake,alert, and oriented x3. No gross focal neurologic deficits.       Chart and labs reviewed.  IMPRESSION/RECOMMENDATIONS:  1) hepatic encephalopathy.  Increase lactulose 15 mL 3 times daily.  Titrate to 2-3 bowel movements per day.  I have explained to the  that he can increase the lactulose to titrate to 2-3 bowel movements at home as well.  Started on rifaximin 550 twice daily.     2) decompensated hepatic cirrhosis  MELD 3.0: 16 at 6/18/2025  9:03 AM  MELD-Na: 14 at 6/18/2025  9:03 AM  Calculated from:  Serum Creatinine: 1.8 mg/dL at 6/18/2025  9:03 AM  Serum Sodium: 136 mmol/L at 6/18/2025  9:03 AM  Total Bilirubin: 1 mg/dL at 6/18/2025  9:03 AM  Serum Albumin: 3.4 g/dL at 6/18/2025  9:03 AM  INR(ratio): 1.1 at 6/17/2025  4:56 AM  Age at listing (hypothetical): 74

## 2025-06-19 NOTE — PROGRESS NOTES
[facility] ACUTE CARE OCCUPATIONAL THERAPY EVALUATION  Dyaana Pacheco, 1950, 3029/3029-A, 2025    Discharge Recommendation: Facility for moderate post-acute rehabilitation, anticipate 1-2 hours per day and 5 days per week.    History  [place]:  The primary encounter diagnosis was Altered mental status, unspecified altered mental status type. Diagnoses of History of cirrhosis, Pancytopenia (HCC), and Hepatic encephalopathy (HCC) were also pertinent to this visit.  Patient  has a past medical history of Arthritis, Cancer (HCC), Diabetes mellitus (HCC), Esophageal varices (HCC), Fatty liver, Glaucoma, Gout, History of blood transfusion, Hyperlipidemia, Hypertension, Neuropathy, and Thyroid disease.  Patient  has a past surgical history that includes  section; Mastectomy, bilateral (10/26/2007); Upper gastrointestinal endoscopy (N/A, 2022); Upper gastrointestinal endoscopy (N/A, 2022); Cataract extraction (Bilateral); Breast biopsy; Upper gastrointestinal endoscopy (N/A, 2022); Upper gastrointestinal endoscopy (N/A, 2022); Upper gastrointestinal endoscopy (N/A, 02/15/2023); Hysterectomy (N/A); Upper gastrointestinal endoscopy (N/A, 2023); and Cholecystectomy, laparoscopic (N/A, 2024).    Subjective:  Patient comments: \"No\"    Pain:  Did not rate.    Communication with other providers: Nurse okayporsha session, co-eval with PT Zoila per tolerance.  Restrictions: General Precautions, Fall Risk     Home Setup/Prior level of function  Social/Functional History  Lives With: Spouse  Type of Home: House  Home Layout: One level  Home Access: Ramped entrance  Bathroom Shower/Tub: Tub/Shower unit  Bathroom Toilet: Handicap height  Bathroom Equipment: Shower chair, Grab bars in shower, 3-in-1 Commode  Home Equipment: Rollator, Walker - Rolling (transport chair)  Prior Level of Assist for ADLs: Needs assistance (spouse helps with bathing, dressing,and toileting.  washing, rinsing, drying)? [] 1   [] 2   [x] 2 [] 3 [] 3 [] 4   3. Toileting, which includes using toilet, bedpan, or urinal? [] 1    [x] 2   [] 2   [] 3   [] 3   [] 4     4. Putting on and taking off regular upper body clothing? [] 1   [] 2   [] 2   [x] 3   [] 3    [] 4      5. Taking care of personal grooming such as brushing teeth? [] 1   [] 2    [] 2 [x] 3    [] 3   [] 4      6. Eating meals?   [] 1   [] 2   [] 2   [x] 3   [] 3   [] 4      Raw Score:  15    [24=0% impaired(CH), 23=1-19%(CI), 20-22=20-39%(CJ), 15-19=40-59%(CK), 10-14=60-79%(CL), 7-9=80-99%(CM), 6=100%(CN)]     Mobility:  Sit to stand: modA x2 (from reclining chair to RW)   Stand to sit: Warren (for safe eccentric control to reclining chair)   Functional Mobility: CGA (Approx 8ft in room w/ RW, close chair follow, increased time)     Balance:   Sitting balance: Fair+   Standing balance: Fair     Pt educated on role of therapy, POC, safety awareness, importance of OOB activity    Treatment:  Self Care Training:   Cues were given for safety, sequence, UE/LE placement, visual cues, and balance.    Activities performed today included LB dressing tasks, toileting    Safety: Patient left seated in reclining chair with alarm, call light within reach, RN notified, gait belt used.    Assessment:  Pt is a 75 y/o female admitted from home for hepatic encephalopathy. Pt at baseline needs assist for ADLs and for functional transfers/mobility. Pt currently presents w/ deficits relating to ADLs, IADLs, UE strength/ROM, functional activity tolerance, cognition, safety awareness, and functional mobility. Pt would benefit from continued acute care OT services and upon discharge would benefit from moderate post-acute rehabilitation, anticipate 1-2 hours per day and 5 days per week.    Complexity: Moderate   Prognosis: Good, no significant barriers to participation at this time.   Occupational Therapy Plan  Times Per Week: 3x+  Times Per Day: Once a day  Current

## 2025-06-19 NOTE — CONSULTS
[facility] ACUTE CARE PHYSICAL THERAPY EVALUATION  Dayana Pacheco, 1950, 3029/3029-A, 2025    History  [place]:  The primary encounter diagnosis was Altered mental status, unspecified altered mental status type. Diagnoses of History of cirrhosis, Pancytopenia (HCC), and Hepatic encephalopathy (HCC) were also pertinent to this visit.  Patient  has a past medical history of Arthritis, Cancer (HCC), Diabetes mellitus (HCC), Esophageal varices (HCC), Fatty liver, Glaucoma, Gout, History of blood transfusion, Hyperlipidemia, Hypertension, Neuropathy, and Thyroid disease.  Patient  has a past surgical history that includes  section; Mastectomy, bilateral (10/26/2007); Upper gastrointestinal endoscopy (N/A, 2022); Upper gastrointestinal endoscopy (N/A, 2022); Cataract extraction (Bilateral); Breast biopsy; Upper gastrointestinal endoscopy (N/A, 2022); Upper gastrointestinal endoscopy (N/A, 2022); Upper gastrointestinal endoscopy (N/A, 02/15/2023); Hysterectomy (N/A); Upper gastrointestinal endoscopy (N/A, 2023); and Cholecystectomy, laparoscopic (N/A, 2024).    Recommendation: Facility for moderate post-acute rehabilitation, anticipate 1-2 hours per day and 5 days per week.    Subjective:  Patient states:  \"I'm fine where I am\"   Pain:  denies   Communication with other providers:  RN, co-eval with Cailin CAMPOS   Restrictions: General precautions, falls     Home Setup/Prior level of function  Social/Functional History  Lives With: Spouse  Type of Home: House  Home Layout: One level  Home Access: Ramped entrance  Bathroom Shower/Tub: Tub/Shower unit  Bathroom Toilet: Handicap height  Bathroom Equipment: Shower chair, Grab bars in shower, 3-in-1 Commode  Home Equipment: Rollator, Walker - Rolling (transport chair)  Prior Level of Assist for ADLs: Needs assistance (spouse helps with bathing, dressing,and toileting. Recently pts spouse has needed to help with

## 2025-06-19 NOTE — PLAN OF CARE
Problem: Chronic Conditions and Co-morbidities  Goal: Patient's chronic conditions and co-morbidity symptoms are monitored and maintained or improved  6/19/2025 0143 by Mona Quiroz RN  Outcome: Progressing  6/18/2025 1405 by Alysha Brooks)MARGARET  Outcome: Progressing     Problem: Discharge Planning  Goal: Discharge to home or other facility with appropriate resources  6/19/2025 0143 by Mona Quiroz RN  Outcome: Progressing  6/18/2025 1405 by Alysha Brooks RN ([place])  Outcome: Progressing     Problem: Pain  Goal: Verbalizes/displays adequate comfort level or baseline comfort level  6/19/2025 0143 by Mona Quiroz RN  Outcome: Progressing  6/18/2025 1405 by Alysha Brooks) RN  Outcome: Progressing     Problem: ABCDS Injury Assessment  Goal: Absence of physical injury  6/19/2025 0143 by Mona Quiroz RN  Outcome: Progressing  6/18/2025 1405 by Alysha Brooks ([place]) RN  Outcome: Progressing

## 2025-06-19 NOTE — PROGRESS NOTES
V2.0  [facility] Hospitalist Progress Note      Name:  Dayana Pacheco /Age/Sex: 1950  (74 y.o. female)   MRN & CSN:  [id number] & [id number] Encounter Date/Time: 2025 12:25 PM EDT    Location:  [address] [place] [address]- PCP: Raji Her MD       Hospital Day: 3    Assessment and Plan:   Dayana Pacheco is a 74 y.o. female with pmh of hypothyroidism, cirrhosis of liver with ascites, type 2 diabetes, hypertension, hyperlipidemia, secondary varices who presents with Hepatic encephalopathy (HCC)     PT/OT recommended SNF.  is on board.     Plan:  Hepatic encephalopathy  History of cirrhosis  varices  -elevated ammonia 172 at admission, improved to 92 today  -continue lactulose 20 g tid to keep 3-4 bowel movement daily  - Continue Protonix  - Continue xifaxan  -appreciate GI input: outpatient EGD, continue lactulos and rifaximin, avoid hepatotoxic medications     Hypertension  - continue propranolol\      Type 2 diabetes  - insuline pump was removed accidentally during patient care  -start Lantus 15 units and insuline sliding scale in hospital     Hypothyroidism  - Is on Synthroid 88 mcg daily (recently decreased to 88 a month ago, was on 100 mcg daily before)  - TSH is diminished at 0.16, T4 is normal at 0.18     CKD stage III  - creatine 2.0 today, still on her baseline    Diet ADULT DIET; Regular   DVT Prophylaxis [x] Lovenox, []  Heparin, [] SCDs, [] Ambulation,  [] Eliquis, [] Xarelto  [] Coumadin   Code Status Full Code   Disposition From: home  Expected Disposition: possible SNF  Estimated Date of Discharge: 1-2 days  Patient requires continued admission due to placement   Surrogate Decision Maker/ POA      Subjective:     Chief Complaint: Altered Mental Status (Blood glucose 128 per EMS)       Dayana Pacheco is a 74 y.o. female is evaluated in the room. Still confused. But she can say more words today.          Review of Systems:    Review of Systems   All other systems reviewed and are  253 (H) 74 - 99 mg/dL   POCT Glucose    Collection Time: 06/18/25  9:33 PM   Result Value Ref Range    POC Glucose 307 (H) 74 - 99 mg/dL   POCT Glucose    Collection Time: 06/19/25 12:17 AM   Result Value Ref Range    POC Glucose 307 (H) 74 - 99 mg/dL   Comprehensive Metabolic Panel    Collection Time: 06/19/25  3:33 AM   Result Value Ref Range    Sodium 137 136 - 145 mmol/L    Potassium 4.3 3.5 - 5.1 mmol/L    Chloride 102 99 - 110 mmol/L    CO2 23 21 - 32 mmol/L    Anion Gap 12 9 - 17 mmol/L    Glucose 261 (H) 74 - 99 mg/dL    BUN 26 (H) 7 - 20 mg/dL    Creatinine 2.0 (H) 0.6 - 1.2 mg/dL    Est, Glom Filt Rate 24 (L) >60 mL/min/1.73m2    Calcium 8.9 8.3 - 10.6 mg/dL    Total Protein 6.1 (L) 6.4 - 8.2 g/dL    Albumin 3.3 (L) 3.4 - 5.0 g/dL    Albumin/Globulin Ratio 1.2 1.1 - 2.2    Total Bilirubin 0.6 0.0 - 1.0 mg/dL    Alkaline Phosphatase 68 40 - 129 U/L    ALT 21 10 - 40 U/L    AST 27 15 - 37 U/L   Hemoglobin A1c    Collection Time: 06/19/25  3:33 AM   Result Value Ref Range    Hemoglobin A1C 5.9 4.2 - 6.3 %    Estimated Avg Glucose 122 mg/dL   Ammonia    Collection Time: 06/19/25  9:05 AM   Result Value Ref Range    Ammonia 53 (H) 11 - 51 umol/L   POCT Glucose    Collection Time: 06/19/25 10:41 AM   Result Value Ref Range    POC Glucose 389 (H) 74 - 99 mg/dL        Imaging/Diagnostics Last 24 Hours   No results found.    Electronically signed by ATILIO Mauricio CNP on 6/19/2025 at 12:25 PM

## 2025-06-19 NOTE — PLAN OF CARE
Problem: Chronic Conditions and Co-morbidities  Goal: Patient's chronic conditions and co-morbidity symptoms are monitored and maintained or improved  6/19/2025 1529 by Alysha Brooks RN ([place])  Outcome: Progressing  6/19/2025 0143 by Mona Quiroz RN  Outcome: Progressing     Problem: Discharge Planning  Goal: Discharge to home or other facility with appropriate resources  6/19/2025 1529 by Alysha Brooks RN ([place])  Outcome: Progressing  6/19/2025 0143 by Mona Quiroz RN  Outcome: Progressing     Problem: Pain  Goal: Verbalizes/displays adequate comfort level or baseline comfort level  6/19/2025 1529 by Alysha Brooks RN ([place])  Outcome: Progressing  6/19/2025 0143 by Mona Quiroz RN  Outcome: Progressing     Problem: ABCDS Injury Assessment  Goal: Absence of physical injury  6/19/2025 1529 by Alysha Brooks RN ([place])  Outcome: Progressing  6/19/2025 0143 by Mona Quiroz RN  Outcome: Progressing     Problem: Safety - Adult  Goal: Free from fall injury  6/19/2025 1529 by Alysha Brooks RN ([place])  Outcome: Progressing  6/19/2025 0143 by Mona Quiroz RN  Outcome: Progressing     Problem: Skin/Tissue Integrity  Goal: Skin integrity remains intact  Description: 1.  Monitor for areas of redness and/or skin breakdown  2.  Assess vascular access sites hourly  3.  Every 4-6 hours minimum:  Change oxygen saturation probe site  4.  Every 4-6 hours:  If on nasal continuous positive airway pressure, respiratory therapy assess nares and determine need for appliance change or resting period  6/19/2025 1529 by Alysha Brooks RN ([place])  Outcome: Progressing  6/19/2025 0143 by Mona Quiroz RN  Outcome: Progressing

## 2025-06-20 LAB
ALBUMIN SERPL-MCNC: 3.2 G/DL (ref 3.4–5)
ALBUMIN/GLOB SERPL: 1.1 {RATIO} (ref 1.1–2.2)
ALP SERPL-CCNC: 69 U/L (ref 40–129)
ALT SERPL-CCNC: 20 U/L (ref 10–40)
ANION GAP SERPL CALCULATED.3IONS-SCNC: 10 MMOL/L (ref 9–17)
AST SERPL-CCNC: 28 U/L (ref 15–37)
BILIRUB SERPL-MCNC: 0.6 MG/DL (ref 0–1)
BILIRUB UR QL STRIP: NEGATIVE
BUN SERPL-MCNC: 26 MG/DL (ref 7–20)
CALCIUM SERPL-MCNC: 9.1 MG/DL (ref 8.3–10.6)
CHLORIDE SERPL-SCNC: 100 MMOL/L (ref 99–110)
CLARITY UR: ABNORMAL
CO2 SERPL-SCNC: 23 MMOL/L (ref 21–32)
COLOR UR: YELLOW
CREAT SERPL-MCNC: 1.7 MG/DL (ref 0.6–1.2)
CRYSTALS URNS MICRO: ABNORMAL /HPF
GFR, ESTIMATED: 30 ML/MIN/1.73M2
GLUCOSE BLD-MCNC: 155 MG/DL (ref 74–99)
GLUCOSE BLD-MCNC: 203 MG/DL (ref 74–99)
GLUCOSE BLD-MCNC: 238 MG/DL (ref 74–99)
GLUCOSE BLD-MCNC: 266 MG/DL (ref 74–99)
GLUCOSE BLD-MCNC: 322 MG/DL (ref 74–99)
GLUCOSE SERPL-MCNC: 222 MG/DL (ref 74–99)
GLUCOSE UR STRIP-MCNC: NEGATIVE MG/DL
HGB UR QL STRIP.AUTO: ABNORMAL
KETONES UR STRIP-MCNC: 15 MG/DL
LEUKOCYTE ESTERASE UR QL STRIP: ABNORMAL
MUCOUS THREADS URNS QL MICRO: ABNORMAL
NITRITE UR QL STRIP: NEGATIVE
PH UR STRIP: 5.5 [PH] (ref 5–8)
POTASSIUM SERPL-SCNC: 4.3 MMOL/L (ref 3.5–5.1)
PROT SERPL-MCNC: 6.1 G/DL (ref 6.4–8.2)
PROT UR STRIP-MCNC: ABNORMAL MG/DL
RBC #/AREA URNS HPF: 0 /HPF (ref 0–2)
SODIUM SERPL-SCNC: 134 MMOL/L (ref 136–145)
SP GR UR STRIP: >1.03 (ref 1–1.03)
UROBILINOGEN UR STRIP-ACNC: 0.2 EU/DL (ref 0–1)
WBC #/AREA URNS HPF: 0 /HPF (ref 0–5)

## 2025-06-20 PROCEDURE — 87088 URINE BACTERIA CULTURE: CPT

## 2025-06-20 PROCEDURE — 94761 N-INVAS EAR/PLS OXIMETRY MLT: CPT

## 2025-06-20 PROCEDURE — 6360000002 HC RX W HCPCS: Performed by: NURSE PRACTITIONER

## 2025-06-20 PROCEDURE — 2580000003 HC RX 258

## 2025-06-20 PROCEDURE — 87086 URINE CULTURE/COLONY COUNT: CPT

## 2025-06-20 PROCEDURE — 94150 VITAL CAPACITY TEST: CPT

## 2025-06-20 PROCEDURE — 2500000003 HC RX 250 WO HCPCS: Performed by: NURSE PRACTITIONER

## 2025-06-20 PROCEDURE — 80053 COMPREHEN METABOLIC PANEL: CPT

## 2025-06-20 PROCEDURE — 36415 COLL VENOUS BLD VENIPUNCTURE: CPT

## 2025-06-20 PROCEDURE — 81001 URINALYSIS AUTO W/SCOPE: CPT

## 2025-06-20 PROCEDURE — 6370000000 HC RX 637 (ALT 250 FOR IP): Performed by: NURSE PRACTITIONER

## 2025-06-20 PROCEDURE — 82962 GLUCOSE BLOOD TEST: CPT

## 2025-06-20 PROCEDURE — 97530 THERAPEUTIC ACTIVITIES: CPT

## 2025-06-20 PROCEDURE — 87186 SC STD MICRODIL/AGAR DIL: CPT

## 2025-06-20 PROCEDURE — 1200000000 HC SEMI PRIVATE

## 2025-06-20 RX ORDER — 0.9 % SODIUM CHLORIDE 0.9 %
500 INTRAVENOUS SOLUTION INTRAVENOUS ONCE
Status: COMPLETED | OUTPATIENT
Start: 2025-06-20 | End: 2025-06-20

## 2025-06-20 RX ADMIN — ONDANSETRON 4 MG: 2 INJECTION INTRAMUSCULAR; INTRAVENOUS at 20:31

## 2025-06-20 RX ADMIN — ONDANSETRON 4 MG: 2 INJECTION INTRAMUSCULAR; INTRAVENOUS at 09:16

## 2025-06-20 RX ADMIN — SODIUM CHLORIDE 500 ML: 0.9 INJECTION, SOLUTION INTRAVENOUS at 14:36

## 2025-06-20 RX ADMIN — PANTOPRAZOLE SODIUM 40 MG: 40 TABLET, DELAYED RELEASE ORAL at 06:30

## 2025-06-20 RX ADMIN — SODIUM CHLORIDE, PRESERVATIVE FREE 10 ML: 5 INJECTION INTRAVENOUS at 09:34

## 2025-06-20 RX ADMIN — LACTULOSE 20 G: 20 SOLUTION ORAL at 20:36

## 2025-06-20 RX ADMIN — LACTULOSE 20 G: 20 SOLUTION ORAL at 11:05

## 2025-06-20 RX ADMIN — SODIUM CHLORIDE, PRESERVATIVE FREE 10 ML: 5 INJECTION INTRAVENOUS at 20:37

## 2025-06-20 RX ADMIN — INSULIN GLARGINE 15 UNITS: 100 INJECTION, SOLUTION SUBCUTANEOUS at 09:32

## 2025-06-20 RX ADMIN — INSULIN LISPRO 3 UNITS: 100 INJECTION, SOLUTION INTRAVENOUS; SUBCUTANEOUS at 11:04

## 2025-06-20 RX ADMIN — INSULIN LISPRO 2 UNITS: 100 INJECTION, SOLUTION INTRAVENOUS; SUBCUTANEOUS at 09:33

## 2025-06-20 RX ADMIN — PROPRANOLOL HYDROCHLORIDE 10 MG: 10 TABLET ORAL at 11:05

## 2025-06-20 RX ADMIN — ENOXAPARIN SODIUM 30 MG: 100 INJECTION SUBCUTANEOUS at 09:33

## 2025-06-20 RX ADMIN — RIFAXIMIN 550 MG: 550 TABLET ORAL at 11:05

## 2025-06-20 RX ADMIN — INSULIN LISPRO 1 UNITS: 100 INJECTION, SOLUTION INTRAVENOUS; SUBCUTANEOUS at 18:02

## 2025-06-20 RX ADMIN — LACTULOSE 20 G: 20 SOLUTION ORAL at 14:37

## 2025-06-20 RX ADMIN — PROPRANOLOL HYDROCHLORIDE 10 MG: 10 TABLET ORAL at 20:38

## 2025-06-20 RX ADMIN — RIFAXIMIN 550 MG: 550 TABLET ORAL at 20:38

## 2025-06-20 RX ADMIN — PROPRANOLOL HYDROCHLORIDE 10 MG: 10 TABLET ORAL at 14:38

## 2025-06-20 RX ADMIN — LEVOTHYROXINE SODIUM 88 MCG: 0.09 TABLET ORAL at 06:29

## 2025-06-20 NOTE — PROGRESS NOTES
Occupational Therapy    Occupational Therapy Treatment Note    Name: Dayana Pacheco MRN: [id number] :   1950   Date:  2025   Admission Date: 2025 Room:  [address] [place] [address]     Restrictions/Precautions:  General, fall risk     Communication with other providers: RN     Subjective:  Patient states: \"I feel sick\"  Pain: Denied    Objective:    Observation: Pt semi fowlers in bed, agreeable to therapy.  at bedside and engaged in session  Objective Measures:  On room air, tele, vitals remained stable. Pt w/ dry heaving during mobility, MARGARET Balbuena aware.     Treatment, including education:  Therapeutic Activity Training:   Therapeutic activity training was instructed today.  Cues were given for safety, sequence, UE/LE placement, awareness, and balance.    Activities performed today included bed mobility training, sup-sit, sit-stand, functional mobility    Pt received semi fowlers in bed, agreeable to therapy. Pt provided w/ re-education on the importance of therapy and updated on current plan of care. Pt performed sup to sit w/ modA to advance BLE OOB, bring torso upright, and to pivot hips. Pt stood from EOB to RW w/ maxA x1, max verbal cues for hand placement and to weightshift forward. Pt then performed approx 60ft functional mobility w/ RW CGA to Warren, chair follow. Pt required increased time and frequent verbal cues during mobility. Pt noted to begin dry heaving during mobility, pt required mod verbal and manual cues to safely stop mobility and transition to sitting in reclining chair w/ modA. Pt returned to room and provided w/ wet wash cloth to wash face, SBA. Pt positioned in chair for comfort w/ all needs met, chair alarm applied, call light within reach.    Assessment / Impression:    Patient's tolerance of treatment: Well  Adverse Reaction: None  Significant change in status and assessment: Improved from initial evaluation, pt continues to demonstrate progress towards therapy goals

## 2025-06-20 NOTE — PLAN OF CARE
Problem: Chronic Conditions and Co-morbidities  Goal: Patient's chronic conditions and co-morbidity symptoms are monitored and maintained or improved  6/19/2025 2313 by Mona Quiroz RN  Outcome: Progressing  6/19/2025 1529 by Alysha Brooks RN ([place])  Outcome: Progressing     Problem: Discharge Planning  Goal: Discharge to home or other facility with appropriate resources  6/19/2025 2313 by Mona Quiroz RN  Outcome: Progressing  6/19/2025 1529 by Alysha Brooks RN ([place])  Outcome: Progressing     Problem: Pain  Goal: Verbalizes/displays adequate comfort level or baseline comfort level  6/19/2025 2313 by Mona Quiroz RN  Outcome: Progressing  6/19/2025 1529 by Alysha Brooks ([place]) RN  Outcome: Progressing     Problem: ABCDS Injury Assessment  Goal: Absence of physical injury  6/19/2025 2313 by Mona Quiroz RN  Outcome: Progressing  6/19/2025 1529 by Alysha Brooks ([place]) RN  Outcome: Progressing

## 2025-06-20 NOTE — CARE COORDINATION
CM in to see Pt to follow up on discharge planning.  Pt  Tad present.  Discharge plan remains home with [facility].    PS to Elyssa Chaidez CNP to update, home care order requested.     If Pt is discharged over the weekend, please call [facility] [phone], fax home care order, discharge summary/AVS to [phone]

## 2025-06-20 NOTE — PROGRESS NOTES
V2.0  [facility] Hospitalist Progress Note      Name:  Dayana Pacheco /Age/Sex: 1950  (74 y.o. female)   MRN & CSN:  [id number] & [id number] Encounter Date/Time: 2025 1:13 PM EDT    Location:  [address] [place] [address]-A PCP: Raji Her MD       Hospital Day: 4    Assessment and Plan:   Dayana Pacheco is a 74 y.o. female with pmh as noted below who presents with Hepatic encephalopathy (HCC)      Plan:  Hepatic encephalopathy--improved  History of cirrhosis  varices  -elevated ammonia 172 on admission, improving  -continue lactulose 20 g tid to keep 3-4 bowel movement daily  - Continue Protonix  - Continue xifaxan  -appreciate GI input: outpatient EGD, continue lactulos and rifaximin, avoid hepatotoxic medications  Nausea/vomiting-emesis on 2 occurrences today.      Hypertension  - continue propranolol     Type 2 diabetes  - insuline pump was removed accidentally during patient care  -start Lantus 15 units and insulin sliding scale in hospital     Hypothyroidism  - Is on Synthroid 88 mcg daily (recently decreased to 88 a month ago, was on 100 mcg daily before)  - TSH is diminished at 0.16, T4 is normal at 0.18     CKD stage III  - creatine 2.0 today, still on her baseline  -- creatinine improving  - Urine output is decreased, urine is dark toni, in the setting of decreased p.o. intake., will obtain UA    Diet ADULT DIET; Regular   DVT Prophylaxis [] Lovenox, []  Heparin, [] SCDs, [] Ambulation,  [] Eliquis, [] Xarelto  [] Coumadin   Code Status Full Code   Disposition From: Home  Expected Disposition: Home with home health care  Estimated Date of Discharge: 1-2 days  Patient requires continued admission due to nausea, vomiting, IV hydration   Surrogate Decision Maker/ POA Tad-     Subjective:     Chief Complaint: Hepatic encephalopathy (HCC)     Dayana Pacheco is a 74 y.o. female who presents with Hepatic encephalopathy (HCC)  Patient seen and examined at bedside.  Patient's bowels at

## 2025-06-20 NOTE — PROGRESS NOTES
Physical Therapy  Attempted to see pt for PT treatment. Pt busy with nursing staff at this time. Will re-attempt as time permits.

## 2025-06-21 VITALS
TEMPERATURE: 97.9 F | BODY MASS INDEX: 23.53 KG/M2 | HEART RATE: 67 BPM | OXYGEN SATURATION: 94 % | SYSTOLIC BLOOD PRESSURE: 105 MMHG | WEIGHT: 134.92 LBS | RESPIRATION RATE: 16 BRPM | DIASTOLIC BLOOD PRESSURE: 52 MMHG

## 2025-06-21 LAB
GLUCOSE BLD-MCNC: 135 MG/DL (ref 74–99)
GLUCOSE BLD-MCNC: 151 MG/DL (ref 74–99)

## 2025-06-21 PROCEDURE — 94761 N-INVAS EAR/PLS OXIMETRY MLT: CPT

## 2025-06-21 PROCEDURE — 6370000000 HC RX 637 (ALT 250 FOR IP): Performed by: NURSE PRACTITIONER

## 2025-06-21 PROCEDURE — 6360000002 HC RX W HCPCS: Performed by: NURSE PRACTITIONER

## 2025-06-21 PROCEDURE — 82962 GLUCOSE BLOOD TEST: CPT

## 2025-06-21 RX ORDER — PANTOPRAZOLE SODIUM 40 MG/1
40 TABLET, DELAYED RELEASE ORAL DAILY
Qty: 30 TABLET | Refills: 1 | Status: SHIPPED | OUTPATIENT
Start: 2025-06-21

## 2025-06-21 RX ORDER — PROPRANOLOL HYDROCHLORIDE 10 MG/1
10 TABLET ORAL 2 TIMES DAILY
Qty: 60 TABLET | Refills: 3 | Status: SHIPPED | OUTPATIENT
Start: 2025-06-21

## 2025-06-21 RX ADMIN — PANTOPRAZOLE SODIUM 40 MG: 40 TABLET, DELAYED RELEASE ORAL at 06:24

## 2025-06-21 RX ADMIN — RIFAXIMIN 550 MG: 550 TABLET ORAL at 10:27

## 2025-06-21 RX ADMIN — ENOXAPARIN SODIUM 30 MG: 100 INJECTION SUBCUTANEOUS at 10:27

## 2025-06-21 RX ADMIN — LEVOTHYROXINE SODIUM 88 MCG: 0.09 TABLET ORAL at 06:24

## 2025-06-21 RX ADMIN — INSULIN GLARGINE 15 UNITS: 100 INJECTION, SOLUTION SUBCUTANEOUS at 10:27

## 2025-06-21 RX ADMIN — LACTULOSE 20 G: 20 SOLUTION ORAL at 10:27

## 2025-06-21 NOTE — PLAN OF CARE
Problem: Chronic Conditions and Co-morbidities  Goal: Patient's chronic conditions and co-morbidity symptoms are monitored and maintained or improved  6/21/2025 1100 by Chuck Roblero RN  Outcome: Adequate for Discharge  6/21/2025 0054 by Nona Leonard RN  Outcome: Progressing     Problem: Discharge Planning  Goal: Discharge to home or other facility with appropriate resources  6/21/2025 1100 by Chuck Roblero RN  Outcome: Adequate for Discharge  6/21/2025 0054 by Nona Leonard RN  Outcome: Progressing     Problem: Pain  Goal: Verbalizes/displays adequate comfort level or baseline comfort level  6/21/2025 1100 by Chuck Roblero RN  Outcome: Adequate for Discharge  6/21/2025 0054 by Nona Leonard RN  Outcome: Progressing     Problem: ABCDS Injury Assessment  Goal: Absence of physical injury  6/21/2025 1100 by Chuck Roblero RN  Outcome: Adequate for Discharge  6/21/2025 0054 by Nona Leonard RN  Outcome: Progressing     Problem: Safety - Adult  Goal: Free from fall injury  6/21/2025 1100 by Chuck Roblero RN  Outcome: Adequate for Discharge  6/21/2025 0054 by Nona Leonard RN  Outcome: Progressing     Problem: Skin/Tissue Integrity  Goal: Skin integrity remains intact  Description: 1.  Monitor for areas of redness and/or skin breakdown  2.  Assess vascular access sites hourly  3.  Every 4-6 hours minimum:  Change oxygen saturation probe site  4.  Every 4-6 hours:  If on nasal continuous positive airway pressure, respiratory therapy assess nares and determine need for appliance change or resting period  6/21/2025 1100 by Chuck Roblero RN  Outcome: Adequate for Discharge  6/21/2025 0054 by Nona Leonard RN  Outcome: Progressing

## 2025-06-21 NOTE — PLAN OF CARE
Problem: Skin/Tissue Integrity  Goal: Skin integrity remains intact  Description: 1.  Monitor for areas of redness and/or skin breakdown  2.  Assess vascular access sites hourly  3.  Every 4-6 hours minimum:  Change oxygen saturation probe site  4.  Every 4-6 hours:  If on nasal continuous positive airway pressure, respiratory therapy assess nares and determine need for appliance change or resting period  Outcome: Progressing     Problem: Safety - Adult  Goal: Free from fall injury  Outcome: Progressing     Problem: ABCDS Injury Assessment  Goal: Absence of physical injury  Outcome: Progressing     Problem: Pain  Goal: Verbalizes/displays adequate comfort level or baseline comfort level  Outcome: Progressing

## 2025-06-21 NOTE — DISCHARGE SUMMARY
tablet  propranolol 10 MG tablet  rifAXIMin 550 MG tablet        Objective Findings at Discharge:   BP (!) 105/52   Pulse 67   Temp 97.9 °F (36.6 °C) (Oral)   Resp 16   Wt 61.2 kg (134 lb 14.7 oz)   SpO2 94%   BMI 23.53 kg/m²       Physical Exam:   General: NAD  Eyes: EOMI  ENT: neck supple  Cardiovascular: Regular rate.  Respiratory: Clear to auscultation  Gastrointestinal: Soft, non tender  Genitourinary: no suprapubic tenderness  Musculoskeletal: No edema  Skin: warm, dry  Neuro: Alert, oriented, no focal neurology deficit  Psych: Mood appropriate.         Labs and Imaging   CT HEAD WO CONTRAST  Result Date: 6/17/2025  PROCEDURE: CT HEAD WO CONTRAST DATE OF EXAM:  6/17/2025 5:23 DEMOGRAPHICS: 74 years old Female INDICATION: AMS COMPARISON: MRI brain 4/12/2022. TECHNIQUE: Contiguous axial slices of the head were submitted without IV contrast. Coronal reconstructions were performed.   DOSE OPTIMIZATION: CT radiation dose optimization techniques (automated exposure  control, and use of iterative reconstruction techniques, or adjustment of the mA and/or kV according to patient size) were used to limit patient radiation dose. FINDINGS: CT HEAD: Gray-white matter differentiation is intact. No evidence for acute territorial infarct. Ventricles, cisterns and cerebral and cerebellar sulci are normal caliber. Mild patchy hypoattenuation of the bilateral periventricular and subcortical white matter. Bilateral intracranial carotid and vertebral arteries are atherosclerotic. No discrete intra- or extra-axial fluid collections, hemorrhage or mass. No midline shift or mass effect. Calvaria is intact. Mild mucosal thickening of the left chamber of the sphenoid sinus. Mastoid air cells are clear. Orbits and soft tissues are unremarkable. IMPRESSION: 1.  Nonacute noncontrasted CT of the brain. 2.  Mild nonspecific white matter changes likely related to chronic microvascular disease. This dictation was created with voice      Troponin:   Lab Results   Component Value Date/Time    TROPONINT 0.020 07/02/2023 10:28 PM    TROPONINT 0.014 07/02/2023 05:16 PM    TROPONINT 0.022 07/02/2023 12:50 PM     Lactic Acid: No results for input(s): \"LACTA\" in the last 72 hours.  BNP: No results for input(s): \"PROBNP\" in the last 72 hours.  UA:  Lab Results   Component Value Date/Time    NITRU NEGATIVE 06/20/2025 08:34 PM    COLORU Yellow 06/20/2025 08:34 PM    PHUR 5.5 06/20/2025 08:34 PM    PHUR 5.5 12/17/2019 09:58 AM    WBCUA 0 06/20/2025 08:34 PM    RBCUA 0 06/20/2025 08:34 PM    RBCUA 1 01/18/2024 01:58 PM    MUCUS RARE 06/20/2025 08:34 PM    TRICHOMONAS NONE SEEN 01/18/2024 01:58 PM    YEAST RARE 03/20/2023 08:12 AM    BACTERIA MODERATE 01/18/2024 01:58 PM    CLARITYU CLEAR 01/18/2024 01:58 PM    LEUKOCYTESUR TRACE 06/20/2025 08:34 PM    UROBILINOGEN 0.2 06/20/2025 08:34 PM    BILIRUBINUR NEGATIVE 06/20/2025 08:34 PM    BLOODU NEGATIVE 01/18/2024 01:58 PM    GLUCOSEU NEGATIVE 06/20/2025 08:34 PM    KETUA 15 06/20/2025 08:34 PM     Urine Cultures: No results found for: \"LABURIN\"  Blood Cultures: No results found for: \"BC\"  No results found for: \"BLOODCULT2\"  Organism: No results found for: \"ORG\"    Time Spent Discharging patient 35 minutes    Electronically signed by ATILIO Mauricio CNP on 6/21/2025 at 10:50 AM

## 2025-06-22 LAB
MICROORGANISM SPEC CULT: ABNORMAL
MICROORGANISM SPEC CULT: ABNORMAL
SERVICE CMNT-IMP: ABNORMAL
SPECIMEN DESCRIPTION: ABNORMAL

## 2025-06-22 RX ORDER — CIPROFLOXACIN 500 MG/1
500 TABLET, FILM COATED ORAL 2 TIMES DAILY
Qty: 14 TABLET | Refills: 0 | Status: SHIPPED | OUTPATIENT
Start: 2025-06-22 | End: 2025-06-29

## 2025-06-23 NOTE — PROGRESS NOTES
Noted urine culture results and sensitivities.  I spoke with the discharging RAFIQ who called and spoke with the patient and sent a script for antibiotics to her outpatient pharmacy.

## 2025-06-25 NOTE — PROGRESS NOTES
Faxed [facility] order, AVS, facesheet, and dc summary to [facility] [phone], called them as well at  and made them aware of discharge   Padmini Cantrell RN

## 2025-06-26 ENCOUNTER — TELEPHONE (OUTPATIENT)
Dept: GASTROENTEROLOGY | Age: 75
End: 2025-06-26

## 2025-06-26 NOTE — PROGRESS NOTES
LM with my call-back # concerning surgery @ [facility] on  7/8/25.  Please call the PAT Nurse for a phone assessment and surgery instructions.

## 2025-06-27 NOTE — PROGRESS NOTES
Second attempt- LM with my call-back # concerning  surgery @ [facility] on  7/8/25.  Please call the PAT Nurse for a phone assessment and surgery instructions.

## 2025-06-30 NOTE — PROGRESS NOTES
Surgery @ [facility] on 7/8/25 you will be called 7/7/25 with times    NOTHING TO EAT OR DRINK AFTER MIDNIGHT DAY OF SURGERY    1. Enter thru the hospital main entrance on day of surgery, check in at the Information Desk. If you arrive prior to 6:00am, enter thru the ER entrance.    2. Follow the directions as prescribed by the doctor for your procedure and medications.         Morning of surgery take: Synthroid, Propanolol and Protonix with a sip of water.      Ok to wear insulin pump         Stop vitamins, supplements and NSAIDS:  7/1/25 (Tylenol is ok)     3. Check with your Doctor regarding stopping blood thinners and follow their instructions.    4. Do not smoke, vape or use chewing tobacco morning of surgery. Do not drink any alcoholic beverages 24 hours prior to surgery.       This includes NA Beer. No street drugs 7 days prior to surgery.    5. If you have dentures, contacts of glasses they will be removed before going to the OR; please bring a case.    6. Please bring picture ID, insurance card, paperwork from the doctor’s office (H & P, Consent, & card for implantable devices).    7. Take a shower with an antibacterial soap the night before surgery and the morning of surgery. Do not put anything on your skin      After your morning shower.    8. You will need a responsible adult to drive you home and check on you after surgery.

## 2025-07-01 RX ORDER — LACTULOSE 10 G/15ML
20 SOLUTION ORAL 3 TIMES DAILY
Qty: 30 ML | Refills: 3 | Status: SHIPPED | OUTPATIENT
Start: 2025-07-01

## 2025-07-06 ENCOUNTER — ANESTHESIA EVENT (OUTPATIENT)
Dept: ENDOSCOPY | Age: 75
End: 2025-07-06
Payer: MEDICARE

## 2025-07-06 NOTE — ANESTHESIA PRE PROCEDURE
3.2 06/17/2025 04:56 AM    RBC 2.46 06/17/2025 04:56 AM    HGB 8.6 06/17/2025 04:56 AM    HCT 27.2 06/17/2025 04:56 AM    .6 06/17/2025 04:56 AM    RDW 17.4 06/17/2025 04:56 AM     05/08/2025 06:58 AM       CMP:   Lab Results   Component Value Date/Time     06/20/2025 02:38 AM    K 4.3 06/20/2025 02:38 AM     06/20/2025 02:38 AM    CO2 23 06/20/2025 02:38 AM    BUN 26 06/20/2025 02:38 AM    CREATININE 1.7 06/20/2025 02:38 AM    GFRAA 41 09/21/2022 07:44 AM    AGRATIO 1.0 01/09/2023 03:06 PM    LABGLOM 30 06/20/2025 02:38 AM    LABGLOM 59 02/21/2024 10:44 AM    GLUCOSE 222 06/20/2025 02:38 AM    CALCIUM 9.1 06/20/2025 02:38 AM    BILITOT 0.6 06/20/2025 02:38 AM    ALKPHOS 69 06/20/2025 02:38 AM    AST 28 06/20/2025 02:38 AM    ALT 20 06/20/2025 02:38 AM       POC Tests: No results for input(s): \"POCGLU\", \"POCNA\", \"POCK\", \"POCCL\", \"POCBUN\", \"POCHEMO\", \"POCHCT\" in the last 72 hours.    Coags:   Lab Results   Component Value Date/Time    PROTIME 14.8 06/17/2025 04:56 AM    INR 1.1 06/17/2025 04:56 AM    APTT 30.0 07/02/2023 12:50 PM       HCG (If Applicable): No results found for: \"PREGTESTUR\", \"PREGSERUM\", \"HCG\", \"HCGQUANT\"     ABGs:   Lab Results   Component Value Date/Time    PO2ART 63 03/21/2019 06:15 AM    XBE0WHQ 39.0 03/21/2019 06:15 AM    IPF6GCZ 29.0 03/21/2019 06:15 AM        Type & Screen (If Applicable):  Lab Results   Component Value Date    ABORH A POSITIVE 06/17/2025    LABANTI NEGATIVE 06/17/2025       Drug/Infectious Status (If Applicable):  Lab Results   Component Value Date/Time    HEPCAB NON REACTIVE 07/11/2022 08:45 AM       COVID-19 Screening (If Applicable):   Lab Results   Component Value Date/Time    COVID19 DETECTED BY PCR 01/03/2024 09:22 AM           Anesthesia Evaluation  Patient summary reviewed  Airway: Mallampati: II  TM distance: >3 FB   Neck ROM: full  Mouth opening: > = 3 FB   Dental: normal exam         Pulmonary:Negative Pulmonary ROS and normal exam

## 2025-07-07 NOTE — PROGRESS NOTES
LVM- Notified patient surgery @ [facility] on  7/8/25 @ 0700, arrival 0600. NPO status  reviewed.

## 2025-07-08 ENCOUNTER — ANESTHESIA (OUTPATIENT)
Dept: ENDOSCOPY | Age: 75
End: 2025-07-08
Payer: MEDICARE

## 2025-07-08 ENCOUNTER — HOSPITAL ENCOUNTER (OUTPATIENT)
Age: 75
Setting detail: OUTPATIENT SURGERY
Discharge: HOME | End: 2025-07-08
Attending: INTERNAL MEDICINE | Admitting: INTERNAL MEDICINE
Payer: MEDICARE

## 2025-07-08 VITALS
TEMPERATURE: 97.3 F | WEIGHT: 138 LBS | DIASTOLIC BLOOD PRESSURE: 66 MMHG | SYSTOLIC BLOOD PRESSURE: 138 MMHG | OXYGEN SATURATION: 97 % | RESPIRATION RATE: 18 BRPM | BODY MASS INDEX: 23.56 KG/M2 | HEIGHT: 64 IN | HEART RATE: 64 BPM

## 2025-07-08 DIAGNOSIS — K74.60 CIRRHOSIS OF LIVER WITHOUT ASCITES, UNSPECIFIED HEPATIC CIRRHOSIS TYPE (HCC): ICD-10-CM

## 2025-07-08 DIAGNOSIS — K21.9 GASTROESOPHAGEAL REFLUX DISEASE WITHOUT ESOPHAGITIS: ICD-10-CM

## 2025-07-08 LAB — GLUCOSE BLD-MCNC: 90 MG/DL (ref 74–99)

## 2025-07-08 PROCEDURE — 7100000011 HC PHASE II RECOVERY - ADDTL 15 MIN: Performed by: INTERNAL MEDICINE

## 2025-07-08 PROCEDURE — 6360000002 HC RX W HCPCS: Performed by: NURSE ANESTHETIST, CERTIFIED REGISTERED

## 2025-07-08 PROCEDURE — 7100000010 HC PHASE II RECOVERY - FIRST 15 MIN: Performed by: INTERNAL MEDICINE

## 2025-07-08 PROCEDURE — 3609012400 HC EGD TRANSORAL BIOPSY SINGLE/MULTIPLE: Performed by: INTERNAL MEDICINE

## 2025-07-08 PROCEDURE — 3700000000 HC ANESTHESIA ATTENDED CARE: Performed by: INTERNAL MEDICINE

## 2025-07-08 PROCEDURE — 88342 IMHCHEM/IMCYTCHM 1ST ANTB: CPT | Performed by: PATHOLOGY

## 2025-07-08 PROCEDURE — 3700000001 HC ADD 15 MINUTES (ANESTHESIA): Performed by: INTERNAL MEDICINE

## 2025-07-08 PROCEDURE — 88305 TISSUE EXAM BY PATHOLOGIST: CPT | Performed by: PATHOLOGY

## 2025-07-08 PROCEDURE — 2709999900 HC NON-CHARGEABLE SUPPLY: Performed by: INTERNAL MEDICINE

## 2025-07-08 PROCEDURE — 82962 GLUCOSE BLOOD TEST: CPT

## 2025-07-08 PROCEDURE — 2580000003 HC RX 258: Performed by: INTERNAL MEDICINE

## 2025-07-08 RX ORDER — SODIUM CHLORIDE 0.9 % (FLUSH) 0.9 %
5-40 SYRINGE (ML) INJECTION EVERY 12 HOURS SCHEDULED
Status: DISCONTINUED | OUTPATIENT
Start: 2025-07-08 | End: 2025-07-08 | Stop reason: HOSPADM

## 2025-07-08 RX ORDER — PROPOFOL 10 MG/ML
INJECTION, EMULSION INTRAVENOUS
Status: DISCONTINUED | OUTPATIENT
Start: 2025-07-08 | End: 2025-07-08 | Stop reason: SDUPTHER

## 2025-07-08 RX ORDER — SODIUM CHLORIDE 9 MG/ML
INJECTION, SOLUTION INTRAVENOUS PRN
Status: DISCONTINUED | OUTPATIENT
Start: 2025-07-08 | End: 2025-07-08 | Stop reason: HOSPADM

## 2025-07-08 RX ORDER — SODIUM CHLORIDE, SODIUM LACTATE, POTASSIUM CHLORIDE, CALCIUM CHLORIDE 600; 310; 30; 20 MG/100ML; MG/100ML; MG/100ML; MG/100ML
INJECTION, SOLUTION INTRAVENOUS CONTINUOUS
Status: DISCONTINUED | OUTPATIENT
Start: 2025-07-08 | End: 2025-07-08 | Stop reason: HOSPADM

## 2025-07-08 RX ORDER — SODIUM CHLORIDE 0.9 % (FLUSH) 0.9 %
5-40 SYRINGE (ML) INJECTION PRN
Status: DISCONTINUED | OUTPATIENT
Start: 2025-07-08 | End: 2025-07-08 | Stop reason: HOSPADM

## 2025-07-08 RX ORDER — LIDOCAINE HYDROCHLORIDE 20 MG/ML
INJECTION, SOLUTION INTRAVENOUS
Status: DISCONTINUED | OUTPATIENT
Start: 2025-07-08 | End: 2025-07-08 | Stop reason: SDUPTHER

## 2025-07-08 RX ADMIN — PROPOFOL 50 MG: 10 INJECTION, EMULSION INTRAVENOUS at 07:41

## 2025-07-08 RX ADMIN — LIDOCAINE HYDROCHLORIDE 100 MG: 20 INJECTION, SOLUTION INTRAVENOUS at 07:41

## 2025-07-08 RX ADMIN — SODIUM CHLORIDE: 9 INJECTION, SOLUTION INTRAVENOUS at 07:35

## 2025-07-08 ASSESSMENT — PAIN - FUNCTIONAL ASSESSMENT
PAIN_FUNCTIONAL_ASSESSMENT: 0-10

## 2025-07-08 NOTE — DISCHARGE INSTRUCTIONS
EGD    DR. KANG    FOLLOW UP APPOINTMENT AS NEEDED.    REPEAT PROCEDURE AS  NEEDED.    CALL DR. KANG'S OFFICE IN ONE WEEK FOR PATHOLOGY RESULTS.    What to Expect at Home  Your Recovery:  The only discomfort after your EGD is generally limited to a mild soreness of the throat, which may last a day or two. Call your physician immediately if you have severe chest pain, shortness of breath or a temperature of 100 degrees or higher if taken orally.    How can you care for yourself at home?  Activity  Rest as much as you need to after you go home.  You should be able to go back to your usual activities the day after the test.  Diet  Follow your doctor's directions for eating after the test.  Drink plenty of fluids (unless your doctor has told you not to).  Medications  If you have a sore throat the day after the test, use an over-the-counter spray to numb your throat.  Your doctor will tell you if and when you can restart your medicines. He or she will also give you instructions about taking any new medicines.  If you take blood thinners, such as warfarin (Coumadin), clopidogrel (Plavix), or aspirin, be sure to talk to your doctor. He or she will tell you if and when to start taking those medicines again. Make sure that you understand exactly what your doctor wants you to do.  If a biopsy was done during the test, your doctor may tell you not to take aspirin or other anti-inflammatory medicines for a few days. These include ibuprofen (Advil, Motrin) and naproxen (Aleve).  DO NOT DRINK ANYTHING WITH ALCOHOL TODAY.    Other instructions:Anesthesia  For your safety, do not drive or operate machinery for 24 hours.   Do not sign legal documents or make major decisions for 24 hours. The anesthesia can make it hard for you to fully understand what you are agreeing to.      Follow-up care is a key part of your treatment and safety. Be sure to make and go to all appointments, and call your doctor if you are having problems.

## 2025-07-08 NOTE — INTERVAL H&P NOTE
Update History & Physical    The patient's History and Physical of June 18, 2025 was reviewed with the patient and I examined the patient. There was no change. The surgical site was confirmed by the patient and me.     Plan: The risks, benefits, expected outcome, and alternative to the recommended procedure have been discussed with the patient. Patient understands and wants to proceed with the procedure.     Electronically signed by Aury Mehta MD on 7/8/2025 at 7:23 AM

## 2025-07-08 NOTE — ANESTHESIA POSTPROCEDURE EVALUATION
Department of Anesthesiology  Postprocedure Note    Patient: Dayana Pacheco  MRN: [id number]  YOB: 1950  Date of evaluation: 7/8/2025    Procedure Summary       Date: 07/08/25 Room / Location: [place] [address] / [facility]    Anesthesia Start: 0733 Anesthesia Stop:     Procedure: ESOPHAGOGASTRODUODENOSCOPY BIOPSY Diagnosis:       Cirrhosis of liver without ascites, unspecified hepatic cirrhosis type (HCC)      Gastroesophageal reflux disease without esophagitis      (Cirrhosis of liver without ascites, unspecified hepatic cirrhosis type (HCC) [K74.60])      (Gastroesophageal reflux disease without esophagitis [K21.9])    Surgeons: Aury Mehta MD Responsible Provider: Bill Bruce MD    Anesthesia Type: MAC ASA Status: 3            Anesthesia Type: No value filed.    Jez Phase I:      Jez Phase II:      Anesthesia Post Evaluation    Patient location during evaluation: PACU  Patient participation: complete - patient participated  Level of consciousness: awake  Pain score: 0  Airway patency: patent  Nausea & Vomiting: no vomiting and no nausea  Cardiovascular status: blood pressure returned to baseline  Respiratory status: acceptable  Hydration status: euvolemic  Pain management: adequate    No notable events documented.

## 2025-07-09 LAB — SURGICAL PATHOLOGY REPORT: NORMAL

## 2025-07-10 ENCOUNTER — HOSPITAL ENCOUNTER (EMERGENCY)
Age: 75
Discharge: HOME | End: 2025-07-11
Attending: STUDENT IN AN ORGANIZED HEALTH CARE EDUCATION/TRAINING PROGRAM
Payer: MEDICARE

## 2025-07-10 DIAGNOSIS — E16.2 HYPOGLYCEMIA: Primary | ICD-10-CM

## 2025-07-10 LAB
GLUCOSE BLD-MCNC: 106 MG/DL
GLUCOSE BLD-MCNC: 106 MG/DL (ref 74–99)
GLUCOSE BLD-MCNC: 67 MG/DL (ref 74–99)

## 2025-07-10 PROCEDURE — 99284 EMERGENCY DEPT VISIT MOD MDM: CPT

## 2025-07-10 PROCEDURE — 85025 COMPLETE CBC W/AUTO DIFF WBC: CPT

## 2025-07-10 PROCEDURE — 80048 BASIC METABOLIC PNL TOTAL CA: CPT

## 2025-07-10 PROCEDURE — 6370000000 HC RX 637 (ALT 250 FOR IP): Performed by: STUDENT IN AN ORGANIZED HEALTH CARE EDUCATION/TRAINING PROGRAM

## 2025-07-10 PROCEDURE — 93005 ELECTROCARDIOGRAM TRACING: CPT | Performed by: STUDENT IN AN ORGANIZED HEALTH CARE EDUCATION/TRAINING PROGRAM

## 2025-07-10 PROCEDURE — 82962 GLUCOSE BLOOD TEST: CPT

## 2025-07-10 RX ORDER — GLUCAGON 1 MG/ML
1 KIT INJECTION PRN
Status: DISCONTINUED | OUTPATIENT
Start: 2025-07-10 | End: 2025-07-11 | Stop reason: HOSPADM

## 2025-07-10 RX ORDER — DEXTROSE MONOHYDRATE 100 MG/ML
INJECTION, SOLUTION INTRAVENOUS CONTINUOUS PRN
Status: DISCONTINUED | OUTPATIENT
Start: 2025-07-10 | End: 2025-07-11 | Stop reason: HOSPADM

## 2025-07-10 RX ADMIN — Medication 16 G: at 23:31

## 2025-07-10 ASSESSMENT — PAIN - FUNCTIONAL ASSESSMENT: PAIN_FUNCTIONAL_ASSESSMENT: 0-10

## 2025-07-10 ASSESSMENT — PAIN SCALES - GENERAL: PAINLEVEL_OUTOF10: 4

## 2025-07-11 VITALS
OXYGEN SATURATION: 98 % | SYSTOLIC BLOOD PRESSURE: 149 MMHG | TEMPERATURE: 97.4 F | DIASTOLIC BLOOD PRESSURE: 63 MMHG | HEART RATE: 69 BPM | RESPIRATION RATE: 11 BRPM

## 2025-07-11 LAB
ANION GAP SERPL CALCULATED.3IONS-SCNC: 10 MMOL/L (ref 9–17)
BASOPHILS # BLD: 0.01 K/UL
BASOPHILS NFR BLD: 0 % (ref 0–1)
BUN SERPL-MCNC: 18 MG/DL (ref 7–20)
CALCIUM SERPL-MCNC: 9 MG/DL (ref 8.3–10.6)
CHLORIDE SERPL-SCNC: 103 MMOL/L (ref 99–110)
CO2 SERPL-SCNC: 22 MMOL/L (ref 21–32)
CREAT SERPL-MCNC: 1.6 MG/DL (ref 0.6–1.2)
EKG ATRIAL RATE: 68 BPM
EKG DIAGNOSIS: NORMAL
EKG P AXIS: 59 DEGREES
EKG P-R INTERVAL: 156 MS
EKG Q-T INTERVAL: 432 MS
EKG QRS DURATION: 86 MS
EKG QTC CALCULATION (BAZETT): 459 MS
EKG R AXIS: 53 DEGREES
EKG T AXIS: 57 DEGREES
EKG VENTRICULAR RATE: 68 BPM
EOSINOPHIL # BLD: 0.04 K/UL
EOSINOPHILS RELATIVE PERCENT: 1 % (ref 0–3)
ERYTHROCYTE [DISTWIDTH] IN BLOOD BY AUTOMATED COUNT: 16.1 % (ref 11.7–14.9)
GFR, ESTIMATED: 31 ML/MIN/1.73M2
GLUCOSE BLD-MCNC: 111 MG/DL (ref 74–99)
GLUCOSE BLD-MCNC: 152 MG/DL (ref 74–99)
GLUCOSE BLD-MCNC: 174 MG/DL (ref 74–99)
GLUCOSE BLD-MCNC: 224 MG/DL (ref 74–99)
GLUCOSE BLD-MCNC: 91 MG/DL (ref 74–99)
GLUCOSE SERPL-MCNC: 58 MG/DL (ref 74–99)
HCT VFR BLD AUTO: 27.6 % (ref 37–47)
HGB BLD-MCNC: 8.8 G/DL (ref 12.5–16)
IMM GRANULOCYTES # BLD AUTO: 0.03 K/UL
IMM GRANULOCYTES NFR BLD: 0 %
LYMPHOCYTES NFR BLD: 0.77 K/UL
LYMPHOCYTES RELATIVE PERCENT: 12 % (ref 24–44)
MCH RBC QN AUTO: 34.6 PG (ref 27–31)
MCHC RBC AUTO-ENTMCNC: 31.9 G/DL (ref 32–36)
MCV RBC AUTO: 108.7 FL (ref 78–100)
MONOCYTES NFR BLD: 0.35 K/UL
MONOCYTES NFR BLD: 5 % (ref 0–5)
NEUTROPHILS NFR BLD: 82 % (ref 36–66)
NEUTS SEG NFR BLD: 5.51 K/UL
PLATELET CONFIRMATION: NORMAL
PLATELET, FLUORESCENCE: 92 K/UL (ref 140–440)
PMV BLD AUTO: 12 FL (ref 7.5–11.1)
POTASSIUM SERPL-SCNC: 4.5 MMOL/L (ref 3.5–5.1)
RBC # BLD AUTO: 2.54 M/UL (ref 4.2–5.4)
SODIUM SERPL-SCNC: 135 MMOL/L (ref 136–145)
WBC OTHER # BLD: 6.7 K/UL (ref 4–10.5)

## 2025-07-11 PROCEDURE — 85025 COMPLETE CBC W/AUTO DIFF WBC: CPT

## 2025-07-11 PROCEDURE — 93010 ELECTROCARDIOGRAM REPORT: CPT | Performed by: INTERNAL MEDICINE

## 2025-07-11 PROCEDURE — 82962 GLUCOSE BLOOD TEST: CPT

## 2025-07-11 NOTE — ED TRIAGE NOTES
Patient's  called EMS due to patients dexcom reading low. When medics arrived, patient was unresponsive and her blood sugar was 22. 1 amp of D50 was given and her sugar was in the 300s.   Blood sugar checked upon arrival was 106.

## 2025-07-11 NOTE — ED PROVIDER NOTES
Emergency Department Encounter        Pt Name: Dayana Pacheco  MRN: [id number]  Birthdate 1950  Date of evaluation: 7/10/2025  ED Physician: Ethan Saab MD    CHIEF COMPLAINT     Triage Chief Complaint:   Hypoglycemia (22 upon arrival- 1 amp of D50 given)      HISTORY OF PRESENT ILLNESS & REVIEW OF SYSTEMS     History obtained from the patient and staff.    Dayana Pacheco is a 75 y.o. female who presents to the emergency department for evaluation of hypoglycemia.  Sounds like her Dexcom was going off, EMS arrived and said glucose was 22, patient was awake but was a bit out of it.  They gave an amp of D50 with improvement in the 300s.  Recheck here is 106.  She is a bit groggy but otherwise is well-appearing.  Says she does have Dexcom as well as insulin pump, she is not sure how to pause the insulin pump but says her  knows.  She did mention some chronic back pain is unchanged from baseline denies any other issues or concerns.        Patient denies any new Headache, Fever, Chills, Cough, Chest pain, Shortness of breath, Abdominal pain, Nausea, Vomiting, Diarrhea, Constipation, and Leg swelling.    The patient has no other acute complaints at this time.  Review of systems as above.          PAST MED/SURG/SOCIAL/FAM HISTORY & ALLERGY & MEDICATIONS     Past Medical History:   Diagnosis Date    Arthritis     Cancer (HCC)     right breast.    Cerebral artery occlusion with cerebral infarction (HCC)     Diabetes mellitus (HCC)     Esophageal varices (HCC)     Fatty liver     Glaucoma     Gout     patient states \"she has never had gout\".    History of blood transfusion     Hyperlipidemia     Hypertension     Neuropathy     Thyroid disease      Patient Active Problem List   Diagnosis Code    Acquired hypothyroidism E03.9    Depression F32.A    Diabetic neuropathy (HCC) E11.40    DM (diabetes mellitus) (HCC) E11.9    HTN (hypertension) I10    Hyperlipidemia E78.5    Osteopenia M85.80    Personal  pen, Inject 12 Units into the skin nightly (Patient not taking: Reported on 6/30/2025), Disp: , Rfl:     insulin aspart (NOVOLOG FLEXPEN) 100 UNIT/ML injection pen, Inject 0-20 Units into the skin 3 times daily (before meals) Give Aspart Insulin based on Carb  consumed ( In grams)  1 carb count = 15 grams. Give Aspart Insulin soon after the meal. Hold it if pre meal Blood glucose < 100 mg +++ Give 2 unit of Humalog with each 10 gm of carb consumed+++. ADD more based on sliding scale regimen., Disp: , Rfl:     insulin aspart (NOVOLOG FLEXPEN) 100 UNIT/ML injection pen, Inject 0-8 Units into the skin 3 times daily (before meals) Along with carb counting based basal dose. Sliding scale: Glucose:  = No more Insulin; 180-249 = +2 Units; 250-299 = +4 Units; 300-349 = +6 Units; Over 349 = +8 Units and notify physician., Disp: , Rfl:     ondansetron (ZOFRAN) 4 MG tablet, Take 1 tablet by mouth daily as needed for Nausea or Vomiting, Disp: 30 tablet, Rfl: 3    metoclopramide (REGLAN) 10 MG tablet, Take 0.5 tablets by mouth 4 times daily, Disp: 90 tablet, Rfl: 0    traMADol (ULTRAM) 50 MG tablet, Take 1 tablet by mouth every 6 hours as needed., Disp: , Rfl:     LUMIGAN 0.01 % SOLN ophthalmic drops, INSTILL 1 DROP INTO EACH EYE ONCE DAILY IN THE EVENING, Disp: , Rfl:     Insulin Disposable Pump (OMNIPOD 5 G6 INTRO, GEN 5,) KIT, , Disp: , Rfl:     lovastatin (MEVACOR) 40 MG tablet, Take 1 tablet by mouth nightly, Disp: , Rfl:     levothyroxine (SYNTHROID) 88 MCG tablet, Take 1 tablet by mouth Daily, Disp: 30 tablet, Rfl: 0    OneTouch Delica Lancets 33G MISC, Test blood sugar 1-2 times a day as directed. Dx: E11.65, Disp: 100 each, Rfl: 5  Discharge Medication List as of 7/11/2025  3:13 AM        CONTINUE these medications which have NOT CHANGED    Details   lactulose (CONSTULOSE) 10 GM/15ML solution Take 30 mLs by mouth 3 times daily 06/17/25 Prescription noted for BID dosing, patient taking therapy TID., Disp-30 mL,

## 2025-07-14 ENCOUNTER — TRANSCRIBE ORDERS (OUTPATIENT)
Dept: ADMINISTRATIVE | Age: 75
End: 2025-07-14

## 2025-07-14 DIAGNOSIS — R41.3 MEMORY LOSS: Primary | ICD-10-CM

## 2025-07-14 DIAGNOSIS — R26.89 PRIMARY FREEZING OF GAIT: ICD-10-CM

## 2025-07-14 DIAGNOSIS — G31.84 COGNITIVE IMPAIRMENT, MILD, SO STATED: ICD-10-CM

## 2025-07-14 DIAGNOSIS — R26.81 GAIT INSTABILITY: ICD-10-CM

## 2025-07-24 ENCOUNTER — CLINICAL DOCUMENTATION (OUTPATIENT)
Dept: ONCOLOGY | Age: 75
End: 2025-07-24

## 2025-07-24 ENCOUNTER — OFFICE VISIT (OUTPATIENT)
Dept: GASTROENTEROLOGY | Age: 75
End: 2025-07-24
Payer: MEDICARE

## 2025-07-24 VITALS
OXYGEN SATURATION: 99 % | HEIGHT: 64 IN | DIASTOLIC BLOOD PRESSURE: 44 MMHG | BODY MASS INDEX: 24.06 KG/M2 | RESPIRATION RATE: 16 BRPM | HEART RATE: 63 BPM | SYSTOLIC BLOOD PRESSURE: 90 MMHG

## 2025-07-24 DIAGNOSIS — K76.82 HEPATIC ENCEPHALOPATHY (HCC): ICD-10-CM

## 2025-07-24 DIAGNOSIS — D64.9 ANEMIA, UNSPECIFIED TYPE: ICD-10-CM

## 2025-07-24 DIAGNOSIS — R11.0 NAUSEA: ICD-10-CM

## 2025-07-24 DIAGNOSIS — K74.60 CIRRHOSIS OF LIVER WITHOUT ASCITES, UNSPECIFIED HEPATIC CIRRHOSIS TYPE (HCC): Primary | ICD-10-CM

## 2025-07-24 DIAGNOSIS — K21.9 GASTROESOPHAGEAL REFLUX DISEASE WITHOUT ESOPHAGITIS: ICD-10-CM

## 2025-07-24 PROCEDURE — 3074F SYST BP LT 130 MM HG: CPT | Performed by: NURSE PRACTITIONER

## 2025-07-24 PROCEDURE — G8400 PT W/DXA NO RESULTS DOC: HCPCS | Performed by: NURSE PRACTITIONER

## 2025-07-24 PROCEDURE — 3017F COLORECTAL CA SCREEN DOC REV: CPT | Performed by: NURSE PRACTITIONER

## 2025-07-24 PROCEDURE — 1160F RVW MEDS BY RX/DR IN RCRD: CPT | Performed by: NURSE PRACTITIONER

## 2025-07-24 PROCEDURE — G2211 COMPLEX E/M VISIT ADD ON: HCPCS | Performed by: NURSE PRACTITIONER

## 2025-07-24 PROCEDURE — 1123F ACP DISCUSS/DSCN MKR DOCD: CPT | Performed by: NURSE PRACTITIONER

## 2025-07-24 PROCEDURE — 1036F TOBACCO NON-USER: CPT | Performed by: NURSE PRACTITIONER

## 2025-07-24 PROCEDURE — G8420 CALC BMI NORM PARAMETERS: HCPCS | Performed by: NURSE PRACTITIONER

## 2025-07-24 PROCEDURE — 1090F PRES/ABSN URINE INCON ASSESS: CPT | Performed by: NURSE PRACTITIONER

## 2025-07-24 PROCEDURE — 99214 OFFICE O/P EST MOD 30 MIN: CPT | Performed by: NURSE PRACTITIONER

## 2025-07-24 PROCEDURE — 3078F DIAST BP <80 MM HG: CPT | Performed by: NURSE PRACTITIONER

## 2025-07-24 PROCEDURE — G8427 DOCREV CUR MEDS BY ELIG CLIN: HCPCS | Performed by: NURSE PRACTITIONER

## 2025-07-24 PROCEDURE — 1159F MED LIST DOCD IN RCRD: CPT | Performed by: NURSE PRACTITIONER

## 2025-07-24 RX ORDER — LACTULOSE 10 G/15ML
20 SOLUTION ORAL 3 TIMES DAILY
Qty: 30 ML | Refills: 5 | Status: SHIPPED | OUTPATIENT
Start: 2025-07-24

## 2025-07-24 NOTE — PROGRESS NOTES
clinical purposes.  It should not be regarded as  investigational or for research.  This laboratory is certified under  the clinical laboratory improvement amendments (CLIA) as qualified to  perform high complexity clinical laboratory testing.  A complete  validation has not been performed on decalcified tissues.  Therefore,  if the specimen is decalcified, results should be interpreted with  caution given the possibility of false negative results on decalcified  specimens.    Unless gross only is specified, the diagnosis for each specimen is  based on a mi                          croscopic examination of sections of the tissue.    Processing Lab:  [address] [place] [address]  Interpretation Performed at [address] [place] [address]    SURGICAL PATHOLOGY REPORT       Patient Name: ZENOBIA JI  [facility] Rec: [id number]  [address] [place] [address]  [phone]  Fax: [phone]     Admission on 06/17/2025, Discharged on 06/21/2025   Component Date Value Ref Range Status    WBC 06/17/2025 3.2 (L)  4.0 - 10.5 k/uL Final    RBC 06/17/2025 2.46 (L)  4.20 - 5.40 m/uL Final    Hemoglobin 06/17/2025 8.6 (L)  12.5 - 16.0 g/dL Final    Hematocrit 06/17/2025 27.2 (L)  37.0 - 47.0 % Final    MCV 06/17/2025 110.6 (H)  78.0 - 100.0 fL Final    MCH 06/17/2025 35.0 (H)  27.0 - 31.0 pg Final    MCHC 06/17/2025 31.6 (L)  32.0 - 36.0 g/dL Final    RDW 06/17/2025 17.4 (H)  11.7 - 14.9 % Final    MPV 06/17/2025 11.7 (H)  7.5 - 11.1 fL Final    Platelet, Fluorescence 06/17/2025 89 (L)  140 - 440 k/uL Final    Neutrophils % 06/17/2025 73 (H)  36 - 66 % Final    Lymphocytes % 06/17/2025 21 (L)  24 - 44 % Final    Monocytes % 06/17/2025 5  0 - 5 % Final    Eosinophils % 06/17/2025 0  0 - 3 % Final    Basophils % 06/17/2025 0  0 - 1 % Final    Immature Granulocytes % 06/17/2025 0  0 % Final

## 2025-07-24 NOTE — PROGRESS NOTES
Patient called and would like to get vitamin b 12 injections local that [facility] has ordered.  Per patient  [facility] will fax ERICKA orders to our office for Vitamin B 12 injections.

## 2025-07-29 DIAGNOSIS — E53.8 B12 DEFICIENCY: Primary | ICD-10-CM

## 2025-07-29 RX ORDER — HYDROCORTISONE SODIUM SUCCINATE 100 MG/2ML
100 INJECTION INTRAMUSCULAR; INTRAVENOUS
OUTPATIENT
Start: 2025-07-29

## 2025-07-29 RX ORDER — ALBUTEROL SULFATE 90 UG/1
4 INHALANT RESPIRATORY (INHALATION) PRN
OUTPATIENT
Start: 2025-07-29

## 2025-07-29 RX ORDER — ONDANSETRON 2 MG/ML
8 INJECTION INTRAMUSCULAR; INTRAVENOUS
OUTPATIENT
Start: 2025-07-29

## 2025-07-29 RX ORDER — FAMOTIDINE 10 MG/ML
20 INJECTION, SOLUTION INTRAVENOUS
OUTPATIENT
Start: 2025-07-29

## 2025-07-29 RX ORDER — CYANOCOBALAMIN 1000 UG/ML
1000 INJECTION, SOLUTION INTRAMUSCULAR; SUBCUTANEOUS ONCE
OUTPATIENT
Start: 2025-07-29

## 2025-07-29 RX ORDER — SODIUM CHLORIDE 9 MG/ML
INJECTION, SOLUTION INTRAVENOUS PRN
OUTPATIENT
Start: 2025-07-29

## 2025-07-29 RX ORDER — DIPHENHYDRAMINE HYDROCHLORIDE 50 MG/ML
50 INJECTION, SOLUTION INTRAMUSCULAR; INTRAVENOUS
OUTPATIENT
Start: 2025-07-29

## 2025-07-29 RX ORDER — EPINEPHRINE 1 MG/ML
0.3 INJECTION, SOLUTION, CONCENTRATE INTRAVENOUS PRN
OUTPATIENT
Start: 2025-07-29

## 2025-07-29 RX ORDER — CYANOCOBALAMIN 1000 UG/ML
1000 INJECTION, SOLUTION INTRAMUSCULAR; SUBCUTANEOUS ONCE
OUTPATIENT
Start: 2025-07-29 | End: 2025-07-29

## 2025-07-29 RX ORDER — ACETAMINOPHEN 325 MG/1
650 TABLET ORAL
OUTPATIENT
Start: 2025-07-29

## 2025-07-29 NOTE — PROGRESS NOTES
Patient needs B12 injections for vitamin b12 deficiency. Order for weekly B12 injections x 4 then monthly. Therapy plan added.

## 2025-08-12 ENCOUNTER — HOSPITAL ENCOUNTER (OUTPATIENT)
Dept: INFUSION THERAPY | Age: 75
Discharge: HOME | End: 2025-08-12
Payer: MEDICARE

## 2025-08-12 DIAGNOSIS — E53.8 B12 DEFICIENCY: Primary | ICD-10-CM

## 2025-08-12 PROCEDURE — 6360000002 HC RX W HCPCS

## 2025-08-12 PROCEDURE — 96372 THER/PROPH/DIAG INJ SC/IM: CPT

## 2025-08-12 RX ORDER — SODIUM CHLORIDE 9 MG/ML
INJECTION, SOLUTION INTRAVENOUS PRN
Status: CANCELLED | OUTPATIENT
Start: 2025-08-26

## 2025-08-12 RX ORDER — CYANOCOBALAMIN 1000 UG/ML
1000 INJECTION, SOLUTION INTRAMUSCULAR; SUBCUTANEOUS ONCE
Status: CANCELLED | OUTPATIENT
Start: 2025-08-26 | End: 2025-08-26

## 2025-08-12 RX ORDER — DIPHENHYDRAMINE HYDROCHLORIDE 50 MG/ML
50 INJECTION, SOLUTION INTRAMUSCULAR; INTRAVENOUS
Status: CANCELLED | OUTPATIENT
Start: 2025-08-26

## 2025-08-12 RX ORDER — ONDANSETRON 2 MG/ML
8 INJECTION INTRAMUSCULAR; INTRAVENOUS
Status: CANCELLED | OUTPATIENT
Start: 2025-08-26

## 2025-08-12 RX ORDER — ACETAMINOPHEN 325 MG/1
650 TABLET ORAL
Status: CANCELLED | OUTPATIENT
Start: 2025-08-26

## 2025-08-12 RX ORDER — EPINEPHRINE 1 MG/ML
0.3 INJECTION, SOLUTION, CONCENTRATE INTRAVENOUS PRN
Status: CANCELLED | OUTPATIENT
Start: 2025-08-26

## 2025-08-12 RX ORDER — CYANOCOBALAMIN 1000 UG/ML
1000 INJECTION, SOLUTION INTRAMUSCULAR; SUBCUTANEOUS ONCE
Status: COMPLETED | OUTPATIENT
Start: 2025-08-12 | End: 2025-08-12

## 2025-08-12 RX ORDER — FAMOTIDINE 10 MG/ML
20 INJECTION, SOLUTION INTRAVENOUS
Status: CANCELLED | OUTPATIENT
Start: 2025-08-26

## 2025-08-12 RX ORDER — HYDROCORTISONE SODIUM SUCCINATE 100 MG/2ML
100 INJECTION INTRAMUSCULAR; INTRAVENOUS
Status: CANCELLED | OUTPATIENT
Start: 2025-08-26

## 2025-08-12 RX ORDER — CYANOCOBALAMIN 1000 UG/ML
INJECTION, SOLUTION INTRAMUSCULAR; SUBCUTANEOUS
Status: COMPLETED
Start: 2025-08-12 | End: 2025-08-12

## 2025-08-12 RX ORDER — ALBUTEROL SULFATE 90 UG/1
4 INHALANT RESPIRATORY (INHALATION) PRN
Status: CANCELLED | OUTPATIENT
Start: 2025-08-26

## 2025-08-12 RX ORDER — CYANOCOBALAMIN 1000 UG/ML
1000 INJECTION, SOLUTION INTRAMUSCULAR; SUBCUTANEOUS ONCE
Status: CANCELLED | OUTPATIENT
Start: 2025-08-26

## 2025-08-12 RX ADMIN — CYANOCOBALAMIN 1000 MCG: 1000 INJECTION, SOLUTION INTRAMUSCULAR; SUBCUTANEOUS at 14:19

## 2025-08-12 RX ADMIN — CYANOCOBALAMIN 1000 MCG: 1000 INJECTION, SOLUTION INTRAMUSCULAR at 14:19

## 2025-08-19 ENCOUNTER — HOSPITAL ENCOUNTER (OUTPATIENT)
Dept: INFUSION THERAPY | Age: 75
Discharge: HOME | End: 2025-08-19
Payer: MEDICARE

## 2025-08-19 ENCOUNTER — TELEPHONE (OUTPATIENT)
Dept: ONCOLOGY | Age: 75
End: 2025-08-19

## 2025-08-19 DIAGNOSIS — E53.8 B12 DEFICIENCY: Primary | ICD-10-CM

## 2025-08-19 PROCEDURE — 96372 THER/PROPH/DIAG INJ SC/IM: CPT

## 2025-08-19 PROCEDURE — 6360000002 HC RX W HCPCS: Performed by: INTERNAL MEDICINE

## 2025-08-19 RX ORDER — HYDROCORTISONE SODIUM SUCCINATE 100 MG/2ML
100 INJECTION INTRAMUSCULAR; INTRAVENOUS
Status: CANCELLED | OUTPATIENT
Start: 2025-08-26

## 2025-08-19 RX ORDER — DIPHENHYDRAMINE HYDROCHLORIDE 50 MG/ML
50 INJECTION, SOLUTION INTRAMUSCULAR; INTRAVENOUS
Status: CANCELLED | OUTPATIENT
Start: 2025-08-26

## 2025-08-19 RX ORDER — CYANOCOBALAMIN 1000 UG/ML
1000 INJECTION, SOLUTION INTRAMUSCULAR; SUBCUTANEOUS ONCE
Status: COMPLETED | OUTPATIENT
Start: 2025-08-19 | End: 2025-08-19

## 2025-08-19 RX ORDER — SODIUM CHLORIDE 9 MG/ML
INJECTION, SOLUTION INTRAVENOUS PRN
Status: CANCELLED | OUTPATIENT
Start: 2025-08-26

## 2025-08-19 RX ORDER — FAMOTIDINE 10 MG/ML
20 INJECTION, SOLUTION INTRAVENOUS
Status: CANCELLED | OUTPATIENT
Start: 2025-08-26

## 2025-08-19 RX ORDER — CYANOCOBALAMIN 1000 UG/ML
1000 INJECTION, SOLUTION INTRAMUSCULAR; SUBCUTANEOUS ONCE
Status: CANCELLED | OUTPATIENT
Start: 2025-08-26

## 2025-08-19 RX ORDER — ALBUTEROL SULFATE 90 UG/1
4 INHALANT RESPIRATORY (INHALATION) PRN
Status: CANCELLED | OUTPATIENT
Start: 2025-08-26

## 2025-08-19 RX ORDER — ONDANSETRON 2 MG/ML
8 INJECTION INTRAMUSCULAR; INTRAVENOUS
Status: CANCELLED | OUTPATIENT
Start: 2025-08-26

## 2025-08-19 RX ORDER — ACETAMINOPHEN 325 MG/1
650 TABLET ORAL
Status: CANCELLED | OUTPATIENT
Start: 2025-08-26

## 2025-08-19 RX ORDER — EPINEPHRINE 1 MG/ML
0.3 INJECTION, SOLUTION, CONCENTRATE INTRAVENOUS PRN
Status: CANCELLED | OUTPATIENT
Start: 2025-08-26

## 2025-08-19 RX ORDER — CYANOCOBALAMIN 1000 UG/ML
1000 INJECTION, SOLUTION INTRAMUSCULAR; SUBCUTANEOUS ONCE
Status: CANCELLED | OUTPATIENT
Start: 2025-08-26 | End: 2025-08-26

## 2025-08-19 RX ADMIN — CYANOCOBALAMIN 1000 MCG: 1000 INJECTION, SOLUTION INTRAMUSCULAR at 11:51

## 2025-08-20 ENCOUNTER — OFFICE VISIT (OUTPATIENT)
Dept: CARDIOLOGY CLINIC | Age: 75
End: 2025-08-20
Payer: MEDICARE

## 2025-08-20 VITALS
OXYGEN SATURATION: 98 % | SYSTOLIC BLOOD PRESSURE: 118 MMHG | HEART RATE: 65 BPM | WEIGHT: 138 LBS | HEIGHT: 63 IN | BODY MASS INDEX: 24.45 KG/M2 | DIASTOLIC BLOOD PRESSURE: 60 MMHG

## 2025-08-20 DIAGNOSIS — E78.5 DYSLIPIDEMIA: ICD-10-CM

## 2025-08-20 DIAGNOSIS — R79.89 ELEVATED TROPONIN: ICD-10-CM

## 2025-08-20 DIAGNOSIS — I10 ESSENTIAL HYPERTENSION: Primary | ICD-10-CM

## 2025-08-20 PROCEDURE — 1090F PRES/ABSN URINE INCON ASSESS: CPT | Performed by: INTERNAL MEDICINE

## 2025-08-20 PROCEDURE — G8427 DOCREV CUR MEDS BY ELIG CLIN: HCPCS | Performed by: INTERNAL MEDICINE

## 2025-08-20 PROCEDURE — 3074F SYST BP LT 130 MM HG: CPT | Performed by: INTERNAL MEDICINE

## 2025-08-20 PROCEDURE — 1159F MED LIST DOCD IN RCRD: CPT | Performed by: INTERNAL MEDICINE

## 2025-08-20 PROCEDURE — 99213 OFFICE O/P EST LOW 20 MIN: CPT | Performed by: INTERNAL MEDICINE

## 2025-08-20 PROCEDURE — 3078F DIAST BP <80 MM HG: CPT | Performed by: INTERNAL MEDICINE

## 2025-08-20 PROCEDURE — G8400 PT W/DXA NO RESULTS DOC: HCPCS | Performed by: INTERNAL MEDICINE

## 2025-08-20 PROCEDURE — G8420 CALC BMI NORM PARAMETERS: HCPCS | Performed by: INTERNAL MEDICINE

## 2025-08-20 PROCEDURE — 1036F TOBACCO NON-USER: CPT | Performed by: INTERNAL MEDICINE

## 2025-08-20 PROCEDURE — 3017F COLORECTAL CA SCREEN DOC REV: CPT | Performed by: INTERNAL MEDICINE

## 2025-08-20 PROCEDURE — 1123F ACP DISCUSS/DSCN MKR DOCD: CPT | Performed by: INTERNAL MEDICINE

## 2025-08-21 ENCOUNTER — HOSPITAL ENCOUNTER (OUTPATIENT)
Dept: PHYSICAL THERAPY | Age: 75
Setting detail: THERAPIES SERIES
Discharge: HOME | End: 2025-08-21
Payer: MEDICARE

## 2025-08-21 PROCEDURE — 97110 THERAPEUTIC EXERCISES: CPT

## 2025-08-21 PROCEDURE — 97161 PT EVAL LOW COMPLEX 20 MIN: CPT

## 2025-08-26 ENCOUNTER — HOSPITAL ENCOUNTER (OUTPATIENT)
Dept: INFUSION THERAPY | Age: 75
Discharge: HOME | End: 2025-08-26
Payer: MEDICARE

## 2025-08-26 DIAGNOSIS — E53.8 B12 DEFICIENCY: Primary | ICD-10-CM

## 2025-08-26 PROCEDURE — 6360000002 HC RX W HCPCS: Performed by: INTERNAL MEDICINE

## 2025-08-26 PROCEDURE — 96372 THER/PROPH/DIAG INJ SC/IM: CPT

## 2025-08-26 RX ORDER — SODIUM CHLORIDE 9 MG/ML
INJECTION, SOLUTION INTRAVENOUS PRN
Status: CANCELLED | OUTPATIENT
Start: 2025-09-02

## 2025-08-26 RX ORDER — ONDANSETRON 2 MG/ML
8 INJECTION INTRAMUSCULAR; INTRAVENOUS
Status: CANCELLED | OUTPATIENT
Start: 2025-09-02

## 2025-08-26 RX ORDER — DIPHENHYDRAMINE HYDROCHLORIDE 50 MG/ML
50 INJECTION, SOLUTION INTRAMUSCULAR; INTRAVENOUS
Status: CANCELLED | OUTPATIENT
Start: 2025-09-02

## 2025-08-26 RX ORDER — EPINEPHRINE 1 MG/ML
0.3 INJECTION, SOLUTION, CONCENTRATE INTRAVENOUS PRN
Status: CANCELLED | OUTPATIENT
Start: 2025-09-02

## 2025-08-26 RX ORDER — HYDROCORTISONE SODIUM SUCCINATE 100 MG/2ML
100 INJECTION INTRAMUSCULAR; INTRAVENOUS
Status: CANCELLED | OUTPATIENT
Start: 2025-09-02

## 2025-08-26 RX ORDER — FAMOTIDINE 10 MG/ML
20 INJECTION, SOLUTION INTRAVENOUS
Status: CANCELLED | OUTPATIENT
Start: 2025-09-02

## 2025-08-26 RX ORDER — ACETAMINOPHEN 325 MG/1
650 TABLET ORAL
Status: CANCELLED | OUTPATIENT
Start: 2025-09-02

## 2025-08-26 RX ORDER — CYANOCOBALAMIN 1000 UG/ML
1000 INJECTION, SOLUTION INTRAMUSCULAR; SUBCUTANEOUS ONCE
Status: CANCELLED | OUTPATIENT
Start: 2025-09-02 | End: 2025-09-02

## 2025-08-26 RX ORDER — ALBUTEROL SULFATE 90 UG/1
4 INHALANT RESPIRATORY (INHALATION) PRN
Status: CANCELLED | OUTPATIENT
Start: 2025-09-02

## 2025-08-26 RX ORDER — CYANOCOBALAMIN 1000 UG/ML
1000 INJECTION, SOLUTION INTRAMUSCULAR; SUBCUTANEOUS ONCE
Status: CANCELLED | OUTPATIENT
Start: 2025-09-02

## 2025-08-26 RX ORDER — CYANOCOBALAMIN 1000 UG/ML
1000 INJECTION, SOLUTION INTRAMUSCULAR; SUBCUTANEOUS ONCE
Status: COMPLETED | OUTPATIENT
Start: 2025-08-26 | End: 2025-08-26

## 2025-08-26 RX ADMIN — CYANOCOBALAMIN 1000 MCG: 1000 INJECTION, SOLUTION INTRAMUSCULAR at 09:36

## 2025-08-28 ENCOUNTER — HOSPITAL ENCOUNTER (OUTPATIENT)
Dept: PHYSICAL THERAPY | Age: 75
Setting detail: THERAPIES SERIES
Discharge: HOME | End: 2025-08-28
Payer: MEDICARE

## 2025-08-28 PROCEDURE — 97113 AQUATIC THERAPY/EXERCISES: CPT

## 2025-09-04 ENCOUNTER — HOSPITAL ENCOUNTER (OUTPATIENT)
Dept: LAB | Age: 75
Discharge: HOME | End: 2025-09-04
Payer: MEDICARE

## 2025-09-04 ENCOUNTER — HOSPITAL ENCOUNTER (OUTPATIENT)
Dept: INFUSION THERAPY | Age: 75
Discharge: HOME | End: 2025-09-04
Payer: MEDICARE

## 2025-09-04 DIAGNOSIS — K76.82 HEPATIC ENCEPHALOPATHY (HCC): ICD-10-CM

## 2025-09-04 DIAGNOSIS — K74.60 CIRRHOSIS OF LIVER WITHOUT ASCITES, UNSPECIFIED HEPATIC CIRRHOSIS TYPE (HCC): ICD-10-CM

## 2025-09-04 DIAGNOSIS — E53.8 B12 DEFICIENCY: Primary | ICD-10-CM

## 2025-09-04 LAB
ALBUMIN SERPL-MCNC: 3.4 G/DL (ref 3.4–5)
ALBUMIN SERPL-MCNC: 3.6 G/DL (ref 3.4–5)
ALBUMIN/GLOB SERPL: 1 {RATIO} (ref 1.1–2.2)
ALBUMIN/GLOB SERPL: 1 {RATIO} (ref 1.1–2.2)
ALP SERPL-CCNC: 160 U/L (ref 40–129)
ALP SERPL-CCNC: 161 U/L (ref 40–129)
ALT SERPL-CCNC: 25 U/L (ref 10–40)
ALT SERPL-CCNC: 25 U/L (ref 10–40)
AMMONIA PLAS-SCNC: 151 UMOL/L (ref 11–51)
ANION GAP SERPL CALCULATED.3IONS-SCNC: 11 MMOL/L (ref 9–17)
AST SERPL-CCNC: 35 U/L (ref 15–37)
AST SERPL-CCNC: 36 U/L (ref 15–37)
BILIRUB DIRECT SERPL-MCNC: 0.3 MG/DL (ref 0–0.3)
BILIRUB INDIRECT SERPL-MCNC: 0.3 MG/DL (ref 0–0.7)
BILIRUB SERPL-MCNC: 0.6 MG/DL (ref 0–1)
BILIRUB SERPL-MCNC: 0.6 MG/DL (ref 0–1)
BUN SERPL-MCNC: 21 MG/DL (ref 7–20)
CALCIUM SERPL-MCNC: 9.1 MG/DL (ref 8.3–10.6)
CHLORIDE SERPL-SCNC: 103 MMOL/L (ref 99–110)
CHOLEST SERPL-MCNC: 110 MG/DL (ref 125–199)
CO2 SERPL-SCNC: 23 MMOL/L (ref 21–32)
CREAT SERPL-MCNC: 1.7 MG/DL (ref 0.6–1.2)
EST. AVERAGE GLUCOSE BLD GHB EST-MCNC: 124 MG/DL
GFR, ESTIMATED: 30 ML/MIN/1.73M2
GLUCOSE SERPL-MCNC: 97 MG/DL (ref 74–99)
HBA1C MFR BLD: 5.9 % (ref 4.2–6.3)
HDLC SERPL-MCNC: 62 MG/DL
INR PPP: 1.2
LDLC SERPL CALC-MCNC: 36 MG/DL
POTASSIUM SERPL-SCNC: 5.1 MMOL/L (ref 3.5–5.1)
PROT SERPL-MCNC: 6.8 G/DL (ref 6.4–8.2)
PROT SERPL-MCNC: 7 G/DL (ref 6.4–8.2)
PROTHROMBIN TIME: 16 SEC (ref 11.7–14.5)
SODIUM SERPL-SCNC: 137 MMOL/L (ref 136–145)
T4 FREE SERPL-MCNC: 1.4 NG/DL (ref 0.9–1.8)
TRIGL SERPL-MCNC: 64 MG/DL
TSH SERPL DL<=0.05 MIU/L-ACNC: 0.36 UIU/ML (ref 0.27–4.2)

## 2025-09-04 PROCEDURE — 80076 HEPATIC FUNCTION PANEL: CPT

## 2025-09-04 PROCEDURE — 84443 ASSAY THYROID STIM HORMONE: CPT

## 2025-09-04 PROCEDURE — 96372 THER/PROPH/DIAG INJ SC/IM: CPT

## 2025-09-04 PROCEDURE — 82105 ALPHA-FETOPROTEIN SERUM: CPT

## 2025-09-04 PROCEDURE — 36415 COLL VENOUS BLD VENIPUNCTURE: CPT

## 2025-09-04 PROCEDURE — 83036 HEMOGLOBIN GLYCOSYLATED A1C: CPT

## 2025-09-04 PROCEDURE — 80061 LIPID PANEL: CPT

## 2025-09-04 PROCEDURE — 84439 ASSAY OF FREE THYROXINE: CPT

## 2025-09-04 PROCEDURE — 82140 ASSAY OF AMMONIA: CPT

## 2025-09-04 PROCEDURE — 6360000002 HC RX W HCPCS: Performed by: INTERNAL MEDICINE

## 2025-09-04 PROCEDURE — 85610 PROTHROMBIN TIME: CPT

## 2025-09-04 PROCEDURE — 80053 COMPREHEN METABOLIC PANEL: CPT

## 2025-09-04 RX ORDER — ONDANSETRON 2 MG/ML
8 INJECTION INTRAMUSCULAR; INTRAVENOUS
OUTPATIENT
Start: 2025-09-09

## 2025-09-04 RX ORDER — CYANOCOBALAMIN 1000 UG/ML
1000 INJECTION, SOLUTION INTRAMUSCULAR; SUBCUTANEOUS ONCE
Status: COMPLETED | OUTPATIENT
Start: 2025-09-04 | End: 2025-09-04

## 2025-09-04 RX ORDER — FAMOTIDINE 10 MG/ML
20 INJECTION, SOLUTION INTRAVENOUS
OUTPATIENT
Start: 2025-09-09

## 2025-09-04 RX ORDER — CYANOCOBALAMIN 1000 UG/ML
1000 INJECTION, SOLUTION INTRAMUSCULAR; SUBCUTANEOUS ONCE
Status: CANCELLED | OUTPATIENT
Start: 2025-09-09

## 2025-09-04 RX ORDER — ACETAMINOPHEN 325 MG/1
650 TABLET ORAL
OUTPATIENT
Start: 2025-09-09

## 2025-09-04 RX ORDER — EPINEPHRINE 1 MG/ML
0.3 INJECTION, SOLUTION, CONCENTRATE INTRAVENOUS PRN
OUTPATIENT
Start: 2025-09-09

## 2025-09-04 RX ORDER — HYDROCORTISONE SODIUM SUCCINATE 100 MG/2ML
100 INJECTION INTRAMUSCULAR; INTRAVENOUS
OUTPATIENT
Start: 2025-09-09

## 2025-09-04 RX ORDER — ALBUTEROL SULFATE 90 UG/1
4 INHALANT RESPIRATORY (INHALATION) PRN
OUTPATIENT
Start: 2025-09-09

## 2025-09-04 RX ORDER — SODIUM CHLORIDE 9 MG/ML
INJECTION, SOLUTION INTRAVENOUS PRN
OUTPATIENT
Start: 2025-09-09

## 2025-09-04 RX ORDER — DIPHENHYDRAMINE HYDROCHLORIDE 50 MG/ML
50 INJECTION, SOLUTION INTRAMUSCULAR; INTRAVENOUS
OUTPATIENT
Start: 2025-09-09

## 2025-09-04 RX ORDER — CYANOCOBALAMIN 1000 UG/ML
1000 INJECTION, SOLUTION INTRAMUSCULAR; SUBCUTANEOUS ONCE
OUTPATIENT
Start: 2025-09-09 | End: 2025-09-09

## 2025-09-04 RX ADMIN — CYANOCOBALAMIN 1000 MCG: 1000 INJECTION, SOLUTION INTRAMUSCULAR at 11:33

## 2025-09-05 LAB — AFP-TM SERPL-MCNC: 4 NG/ML (ref 0–9)

## (undated) DEVICE — NEEDLE HYPO 20GA L1.5IN YEL POLYPR HUB S STL REG BVL STR

## (undated) DEVICE — GLOVE ORANGE PI 7 1/2   MSG9075

## (undated) DEVICE — SET TBNG DISP TIP FOR AHTO

## (undated) DEVICE — ELECTRODE ES AD CRDLSS PT RET REM POLYHESIVE

## (undated) DEVICE — GLOVE SURG SZ 6 THK91MIL LTX FREE SYN POLYISOPRENE ANTI

## (undated) DEVICE — MULTIPLE BAND LIGATOR: Brand: SPEEDBAND SUPERVIEW SUPER 7

## (undated) DEVICE — PACK SURG LAP CHOLE

## (undated) DEVICE — BAG SPEC REM 224ML W4XL6IN DIA10MM 1 HND GYN DISP ENDOPCH

## (undated) DEVICE — Z DISCONTINUED (USE MFG CAT MVABO)  TUBING GAS SAMPLING STD 6.5 FT FEMALE CONN SMRT CAPNOLINE

## (undated) DEVICE — TOWEL,OR,DSP,ST,BLUE,STD,6/PK,12PK/CS: Brand: MEDLINE

## (undated) DEVICE — SCISSORS ENDOSCP DIA5MM CRV MPLR CAUT W/ RATCH HNDL

## (undated) DEVICE — NEEDLE SCLERO 23GA L240CM OD064MM ID032MM CLR INTERJECT

## (undated) DEVICE — Z INACTIVE NO ACTIVE SUPPLIER APPLICATOR MEDICATED 26 CC TINT HI-LITE ORNG STRL CHLORAPREP

## (undated) DEVICE — ENDOSCOPY KIT: Brand: MEDLINE INDUSTRIES, INC.

## (undated) DEVICE — TROCAR: Brand: KII FIOS FIRST ENTRY

## (undated) DEVICE — SUTURE VCRL SZ 4-0 L27IN ABSRB UD L19MM FS-2 3/8 CIR REV J422H

## (undated) DEVICE — TUBING INSUFFLATOR HEAT HI FLO SET PNEUMOCLEAR

## (undated) DEVICE — GOWN,SIRUS,POLYRNF,BRTHSLV,XLN/XL,20/CS: Brand: MEDLINE

## (undated) DEVICE — SYRINGE ONLY,20ML LUER LOCK: Brand: MEDLINE INDUSTRIES, INC.

## (undated) DEVICE — DRESSING HEMSTAT W1X2IN ABSRB SURGCEL SNOW

## (undated) DEVICE — LIGATOR ENDOSCP DIA8.6-11.5MM MULT DISP SPDBND LIGATOR SUP

## (undated) DEVICE — TROCAR: Brand: KII® SLEEVE

## (undated) DEVICE — ADHESIVE SKIN CLSR 0.7ML TOP DERMBND ADV

## (undated) DEVICE — SOLUTION IV IRRIG WATER 1000ML POUR BRL 2F7114

## (undated) DEVICE — FORCEPS BX L240CM JAW DIA2.8MM L CAP W/ NDL MIC MESH TOOTH

## (undated) DEVICE — AGENT HEMSTAT W2XL4IN OXIDIZED REGENERATED CELOS ABSRB

## (undated) DEVICE — ENDOSCOPIC KIT 1.1+ OP4 CA DE 2 GWN AAMI LEVEL 3

## (undated) DEVICE — SUTURE SZ 0 27IN 5/8 CIR UR-6  TAPER PT VIOLET ABSRB VICRYL J603H